# Patient Record
Sex: FEMALE | Race: WHITE | Employment: OTHER | URBAN - METROPOLITAN AREA
[De-identification: names, ages, dates, MRNs, and addresses within clinical notes are randomized per-mention and may not be internally consistent; named-entity substitution may affect disease eponyms.]

---

## 2017-01-13 ENCOUNTER — HOSPITAL ENCOUNTER (OUTPATIENT)
Dept: OTHER | Age: 82
Discharge: HOME | End: 2017-01-13
Attending: INTERNAL MEDICINE | Admitting: INTERNAL MEDICINE

## 2017-01-13 LAB
ANION GAP SERPL CALCULATED.3IONS-SCNC: 15 MMOL/L (ref 3–16)
BACTERIA: ABNORMAL /HPF
BILIRUBIN URINE: NEGATIVE
BLOOD, URINE: NEGATIVE
BUN BLDV-MCNC: 21 MG/DL (ref 7–20)
CALCIUM SERPL-MCNC: 9.4 MG/DL (ref 8.3–10.6)
CHLORIDE BLD-SCNC: 100 MMOL/L (ref 99–110)
CLARITY: CLEAR
CO2: 25 MMOL/L (ref 21–32)
COLOR: YELLOW
CREAT SERPL-MCNC: 1.2 MG/DL (ref 0.6–1.2)
CREATININE URINE: 27.8 MG/DL (ref 28–259)
EPITHELIAL CELLS, UA: ABNORMAL /HPF
GFR AFRICAN AMERICAN: 52
GFR NON-AFRICAN AMERICAN: 43
GLUCOSE BLD-MCNC: 104 MG/DL (ref 70–99)
GLUCOSE URINE: NEGATIVE MG/DL
HCT VFR BLD CALC: 35.3 % (ref 36–48)
HEMOGLOBIN: 11.8 G/DL (ref 12–16)
KETONES, URINE: NEGATIVE MG/DL
LEUKOCYTE ESTERASE, URINE: NEGATIVE
MCH RBC QN AUTO: 31.7 PG (ref 26–34)
MCHC RBC AUTO-ENTMCNC: 33.3 G/DL (ref 31–36)
MCV RBC AUTO: 95.4 FL (ref 80–100)
MICROSCOPIC EXAMINATION: ABNORMAL
NITRITE, URINE: NEGATIVE
PARATHYROID HORMONE INTACT: 54.1 PG/ML (ref 14–72)
PDW BLD-RTO: 14.1 % (ref 12.4–15.4)
PH UA: 5.5
PHOSPHORUS: 3.3 MG/DL (ref 2.5–4.9)
PLATELET # BLD: 170 K/UL (ref 135–450)
PMV BLD AUTO: 7.9 FL (ref 5–10.5)
POTASSIUM SERPL-SCNC: 4.6 MMOL/L (ref 3.5–5.1)
PROTEIN PROTEIN: 5 MG/DL
PROTEIN UA: NEGATIVE MG/DL
RBC # BLD: 3.7 M/UL (ref 4–5.2)
RBC UA: ABNORMAL /HPF (ref 0–2)
SODIUM BLD-SCNC: 140 MMOL/L (ref 136–145)
SPECIFIC GRAVITY UA: 1.01
UROBILINOGEN, URINE: 0.2 E.U./DL
VITAMIN D 25-HYDROXY: 18.4 NG/ML
WBC # BLD: 5.4 K/UL (ref 4–11)
WBC UA: ABNORMAL /HPF (ref 0–5)

## 2017-03-21 RX ORDER — ACETAMINOPHEN 160 MG
2000 TABLET,DISINTEGRATING ORAL DAILY
COMMUNITY

## 2017-03-21 RX ORDER — M-VIT,TX,IRON,MINS/CALC/FOLIC 27MG-0.4MG
1 TABLET ORAL DAILY
Status: ON HOLD | COMMUNITY
End: 2018-09-13

## 2017-03-21 RX ORDER — OMEPRAZOLE 20 MG/1
20 CAPSULE, DELAYED RELEASE ORAL NIGHTLY
Status: ON HOLD | COMMUNITY
End: 2019-04-11 | Stop reason: HOSPADM

## 2017-03-29 ENCOUNTER — HOSPITAL ENCOUNTER (OUTPATIENT)
Dept: SURGERY | Age: 82
Discharge: HOME | End: 2017-03-29
Attending: UROLOGY | Admitting: UROLOGY

## 2017-03-29 VITALS
WEIGHT: 180 LBS | SYSTOLIC BLOOD PRESSURE: 147 MMHG | BODY MASS INDEX: 35.34 KG/M2 | TEMPERATURE: 97.6 F | RESPIRATION RATE: 16 BRPM | HEIGHT: 60 IN | DIASTOLIC BLOOD PRESSURE: 84 MMHG | HEART RATE: 66 BPM | OXYGEN SATURATION: 99 %

## 2017-03-29 LAB
ANION GAP SERPL CALCULATED.3IONS-SCNC: 14 MMOL/L (ref 3–16)
BUN BLDV-MCNC: 17 MG/DL (ref 7–20)
CALCIUM SERPL-MCNC: 9.6 MG/DL (ref 8.3–10.6)
CHLORIDE BLD-SCNC: 100 MMOL/L (ref 99–110)
CO2: 26 MMOL/L (ref 21–32)
CREAT SERPL-MCNC: 1 MG/DL (ref 0.6–1.2)
GFR AFRICAN AMERICAN: >60
GFR NON-AFRICAN AMERICAN: 53
GLUCOSE BLD-MCNC: 112 MG/DL (ref 70–99)
GLUCOSE BLD-MCNC: 118 MG/DL (ref 70–99)
PERFORMED ON: ABNORMAL
POTASSIUM SERPL-SCNC: 4 MMOL/L (ref 3.5–5.1)
SODIUM BLD-SCNC: 140 MMOL/L (ref 136–145)

## 2017-03-29 PROCEDURE — 93010 ELECTROCARDIOGRAM REPORT: CPT | Performed by: INTERNAL MEDICINE

## 2017-03-29 RX ORDER — SODIUM CHLORIDE 0.9 % (FLUSH) 0.9 %
10 SYRINGE (ML) INJECTION EVERY 12 HOURS SCHEDULED
Status: DISCONTINUED | OUTPATIENT
Start: 2017-03-29 | End: 2017-03-30 | Stop reason: HOSPADM

## 2017-03-29 RX ORDER — PROMETHAZINE HYDROCHLORIDE 25 MG/ML
6.25 INJECTION, SOLUTION INTRAMUSCULAR; INTRAVENOUS
Status: DISCONTINUED | OUTPATIENT
Start: 2017-03-29 | End: 2017-03-30 | Stop reason: HOSPADM

## 2017-03-29 RX ORDER — SULFAMETHOXAZOLE AND TRIMETHOPRIM 400; 80 MG/1; MG/1
1 TABLET ORAL 2 TIMES DAILY
Qty: 10 TABLET | Refills: 0 | Status: SHIPPED | OUTPATIENT
Start: 2017-03-29 | End: 2017-04-03

## 2017-03-29 RX ORDER — ONDANSETRON 2 MG/ML
4 INJECTION INTRAMUSCULAR; INTRAVENOUS PRN
Status: DISCONTINUED | OUTPATIENT
Start: 2017-03-29 | End: 2017-03-30 | Stop reason: HOSPADM

## 2017-03-29 RX ORDER — SODIUM CHLORIDE, SODIUM LACTATE, POTASSIUM CHLORIDE, CALCIUM CHLORIDE 600; 310; 30; 20 MG/100ML; MG/100ML; MG/100ML; MG/100ML
INJECTION, SOLUTION INTRAVENOUS CONTINUOUS
Status: DISCONTINUED | OUTPATIENT
Start: 2017-03-29 | End: 2017-03-30 | Stop reason: HOSPADM

## 2017-03-29 RX ORDER — HYDRALAZINE HYDROCHLORIDE 20 MG/ML
5 INJECTION INTRAMUSCULAR; INTRAVENOUS EVERY 10 MIN PRN
Status: DISCONTINUED | OUTPATIENT
Start: 2017-03-29 | End: 2017-03-30 | Stop reason: HOSPADM

## 2017-03-29 RX ORDER — OXYCODONE HYDROCHLORIDE AND ACETAMINOPHEN 5; 325 MG/1; MG/1
1 TABLET ORAL PRN
Status: COMPLETED | OUTPATIENT
Start: 2017-03-29 | End: 2017-03-29

## 2017-03-29 RX ORDER — SODIUM CHLORIDE 0.9 % (FLUSH) 0.9 %
10 SYRINGE (ML) INJECTION PRN
Status: DISCONTINUED | OUTPATIENT
Start: 2017-03-29 | End: 2017-03-30 | Stop reason: HOSPADM

## 2017-03-29 RX ORDER — OXYCODONE HYDROCHLORIDE AND ACETAMINOPHEN 5; 325 MG/1; MG/1
2 TABLET ORAL PRN
Status: COMPLETED | OUTPATIENT
Start: 2017-03-29 | End: 2017-03-29

## 2017-03-29 RX ORDER — HYDROMORPHONE HCL 110MG/55ML
0.5 PATIENT CONTROLLED ANALGESIA SYRINGE INTRAVENOUS EVERY 5 MIN PRN
Status: DISCONTINUED | OUTPATIENT
Start: 2017-03-29 | End: 2017-03-30 | Stop reason: HOSPADM

## 2017-03-29 RX ORDER — HYDROMORPHONE HCL 110MG/55ML
0.25 PATIENT CONTROLLED ANALGESIA SYRINGE INTRAVENOUS EVERY 5 MIN PRN
Status: DISCONTINUED | OUTPATIENT
Start: 2017-03-29 | End: 2017-03-30 | Stop reason: HOSPADM

## 2017-03-29 RX ORDER — MEPERIDINE HYDROCHLORIDE 25 MG/ML
12.5 INJECTION INTRAMUSCULAR; INTRAVENOUS; SUBCUTANEOUS EVERY 5 MIN PRN
Status: DISCONTINUED | OUTPATIENT
Start: 2017-03-29 | End: 2017-03-30 | Stop reason: HOSPADM

## 2017-03-29 RX ORDER — LIDOCAINE HYDROCHLORIDE 10 MG/ML
1 INJECTION, SOLUTION EPIDURAL; INFILTRATION; INTRACAUDAL; PERINEURAL
Status: COMPLETED | OUTPATIENT
Start: 2017-03-29 | End: 2017-03-29

## 2017-03-29 RX ORDER — DIPHENHYDRAMINE HYDROCHLORIDE 50 MG/ML
12.5 INJECTION INTRAMUSCULAR; INTRAVENOUS
Status: ACTIVE | OUTPATIENT
Start: 2017-03-29 | End: 2017-03-29

## 2017-03-29 RX ORDER — LABETALOL HYDROCHLORIDE 5 MG/ML
5 INJECTION, SOLUTION INTRAVENOUS EVERY 10 MIN PRN
Status: DISCONTINUED | OUTPATIENT
Start: 2017-03-29 | End: 2017-03-30 | Stop reason: HOSPADM

## 2017-03-29 RX ADMIN — SODIUM CHLORIDE, SODIUM LACTATE, POTASSIUM CHLORIDE, CALCIUM CHLORIDE: 600; 310; 30; 20 INJECTION, SOLUTION INTRAVENOUS at 14:27

## 2017-03-29 RX ADMIN — LIDOCAINE HYDROCHLORIDE 1 ML: 10 INJECTION, SOLUTION EPIDURAL; INFILTRATION; INTRACAUDAL; PERINEURAL at 14:27

## 2017-03-29 RX ADMIN — OXYCODONE HYDROCHLORIDE AND ACETAMINOPHEN 1 TABLET: 5; 325 TABLET ORAL at 15:52

## 2017-03-29 ASSESSMENT — PAIN SCALES - GENERAL
PAINLEVEL_OUTOF10: 8
PAINLEVEL_OUTOF10: 8

## 2017-03-29 ASSESSMENT — PAIN DESCRIPTION - DESCRIPTORS: DESCRIPTORS: CONSTANT

## 2017-03-29 ASSESSMENT — PAIN DESCRIPTION - PAIN TYPE: TYPE: CHRONIC PAIN

## 2017-03-30 LAB
EKG ATRIAL RATE: 71 BPM
EKG DIAGNOSIS: NORMAL
EKG P AXIS: 40 DEGREES
EKG P-R INTERVAL: 334 MS
EKG Q-T INTERVAL: 410 MS
EKG QRS DURATION: 92 MS
EKG QTC CALCULATION (BAZETT): 445 MS
EKG R AXIS: -19 DEGREES
EKG T AXIS: 14 DEGREES
EKG VENTRICULAR RATE: 71 BPM

## 2017-11-03 ENCOUNTER — HOSPITAL ENCOUNTER (OUTPATIENT)
Dept: ULTRASOUND IMAGING | Age: 82
Discharge: HOME | End: 2017-11-03
Attending: NURSE PRACTITIONER | Admitting: NURSE PRACTITIONER

## 2017-11-03 DIAGNOSIS — R10.9 ABDOMINAL PAIN: ICD-10-CM

## 2017-11-03 DIAGNOSIS — R10.9 ABDOMINAL PAIN, UNSPECIFIED ABDOMINAL LOCATION: ICD-10-CM

## 2017-11-16 ENCOUNTER — HOSPITAL ENCOUNTER (OUTPATIENT)
Dept: MAMMOGRAPHY | Age: 82
Discharge: HOME | End: 2017-11-16
Attending: FAMILY MEDICINE | Admitting: FAMILY MEDICINE

## 2017-11-16 DIAGNOSIS — Z12.31 SCREENING MAMMOGRAM, ENCOUNTER FOR: ICD-10-CM

## 2017-11-21 ENCOUNTER — HOSPITAL ENCOUNTER (OUTPATIENT)
Dept: OTHER | Age: 82
Discharge: HOME | End: 2017-11-21
Attending: INTERNAL MEDICINE | Admitting: INTERNAL MEDICINE

## 2017-11-22 LAB
ALBUMIN SERPL-MCNC: 4.5 G/DL (ref 3.4–5)
ALP BLD-CCNC: 54 U/L (ref 40–129)
ALT SERPL-CCNC: 14 U/L (ref 10–40)
AST SERPL-CCNC: 23 U/L (ref 15–37)
BILIRUB SERPL-MCNC: 0.3 MG/DL (ref 0–1)
BILIRUBIN DIRECT: <0.2 MG/DL (ref 0–0.3)
BILIRUBIN, INDIRECT: NORMAL MG/DL (ref 0–1)
TOTAL PROTEIN: 7.1 G/DL (ref 6.4–8.2)

## 2017-11-28 RX ORDER — SODIUM CHLORIDE, SODIUM LACTATE, POTASSIUM CHLORIDE, CALCIUM CHLORIDE 600; 310; 30; 20 MG/100ML; MG/100ML; MG/100ML; MG/100ML
INJECTION, SOLUTION INTRAVENOUS CONTINUOUS
Status: CANCELLED | OUTPATIENT
Start: 2017-11-28

## 2017-11-29 ENCOUNTER — HOSPITAL ENCOUNTER (OUTPATIENT)
Dept: SURGERY | Age: 82
Discharge: HOME | End: 2017-11-29

## 2017-12-02 ENCOUNTER — HOSPITAL ENCOUNTER (OUTPATIENT)
Dept: MRI IMAGING | Age: 82
Discharge: HOME | End: 2017-12-02
Attending: INTERNAL MEDICINE | Admitting: INTERNAL MEDICINE

## 2017-12-02 DIAGNOSIS — R10.11 ABDOMINAL PAIN, RIGHT UPPER QUADRANT: ICD-10-CM

## 2017-12-02 DIAGNOSIS — K83.8 DILATED BILE DUCT: ICD-10-CM

## 2017-12-02 DIAGNOSIS — R10.11 RIGHT UPPER QUADRANT PAIN: ICD-10-CM

## 2018-06-04 VITALS — BODY MASS INDEX: 34.36 KG/M2 | WEIGHT: 175 LBS | HEIGHT: 60 IN

## 2018-06-04 RX ORDER — PREDNISOLONE ACETATE 10 MG/ML
1 SUSPENSION/ DROPS OPHTHALMIC CONTINUOUS
Status: CANCELLED | OUTPATIENT
Start: 2018-06-07

## 2018-06-04 RX ORDER — TOBRAMYCIN AND DEXAMETHASONE 3; 1 MG/ML; MG/ML
1 SUSPENSION/ DROPS OPHTHALMIC CONTINUOUS
Status: CANCELLED | OUTPATIENT
Start: 2018-06-07

## 2018-06-06 RX ORDER — LIDOCAINE HYDROCHLORIDE 10 MG/ML
1 INJECTION, SOLUTION EPIDURAL; INFILTRATION; INTRACAUDAL; PERINEURAL
Status: CANCELLED | OUTPATIENT
Start: 2018-06-06 | End: 2018-06-06

## 2018-06-06 RX ORDER — SODIUM CHLORIDE 0.9 % (FLUSH) 0.9 %
10 SYRINGE (ML) INJECTION PRN
Status: CANCELLED | OUTPATIENT
Start: 2018-06-06

## 2018-06-06 RX ORDER — SODIUM CHLORIDE, SODIUM LACTATE, POTASSIUM CHLORIDE, CALCIUM CHLORIDE 600; 310; 30; 20 MG/100ML; MG/100ML; MG/100ML; MG/100ML
INJECTION, SOLUTION INTRAVENOUS CONTINUOUS
Status: CANCELLED | OUTPATIENT
Start: 2018-06-06

## 2018-06-06 RX ORDER — SODIUM CHLORIDE 0.9 % (FLUSH) 0.9 %
10 SYRINGE (ML) INJECTION EVERY 12 HOURS SCHEDULED
Status: CANCELLED | OUTPATIENT
Start: 2018-06-06

## 2018-06-07 ENCOUNTER — HOSPITAL ENCOUNTER (OUTPATIENT)
Dept: SURGERY | Age: 83
Discharge: HOME | End: 2018-06-07
Attending: OPHTHALMOLOGY | Admitting: OPHTHALMOLOGY

## 2018-06-28 ENCOUNTER — HOSPITAL ENCOUNTER (OUTPATIENT)
Dept: SURGERY | Age: 83
Discharge: HOME | End: 2018-06-28
Attending: OPHTHALMOLOGY | Admitting: OPHTHALMOLOGY

## 2018-06-28 VITALS
TEMPERATURE: 97.6 F | SYSTOLIC BLOOD PRESSURE: 139 MMHG | RESPIRATION RATE: 16 BRPM | DIASTOLIC BLOOD PRESSURE: 77 MMHG | WEIGHT: 175 LBS | HEIGHT: 60 IN | BODY MASS INDEX: 34.36 KG/M2 | HEART RATE: 70 BPM | OXYGEN SATURATION: 95 %

## 2018-06-28 LAB
GLUCOSE BLD-MCNC: 90 MG/DL (ref 70–99)
PERFORMED ON: NORMAL

## 2018-06-28 RX ORDER — LIDOCAINE HYDROCHLORIDE 10 MG/ML
1 INJECTION, SOLUTION EPIDURAL; INFILTRATION; INTRACAUDAL; PERINEURAL
Status: ACTIVE | OUTPATIENT
Start: 2018-06-28 | End: 2018-06-28

## 2018-06-28 RX ORDER — SODIUM CHLORIDE, SODIUM LACTATE, POTASSIUM CHLORIDE, CALCIUM CHLORIDE 600; 310; 30; 20 MG/100ML; MG/100ML; MG/100ML; MG/100ML
INJECTION, SOLUTION INTRAVENOUS CONTINUOUS
Status: DISCONTINUED | OUTPATIENT
Start: 2018-06-28 | End: 2018-06-29 | Stop reason: HOSPADM

## 2018-06-28 RX ORDER — MEPERIDINE HYDROCHLORIDE 25 MG/ML
12.5 INJECTION INTRAMUSCULAR; INTRAVENOUS; SUBCUTANEOUS EVERY 5 MIN PRN
Status: DISCONTINUED | OUTPATIENT
Start: 2018-06-28 | End: 2018-06-29 | Stop reason: HOSPADM

## 2018-06-28 RX ORDER — TOBRAMYCIN AND DEXAMETHASONE 3; 1 MG/ML; MG/ML
1 SUSPENSION/ DROPS OPHTHALMIC CONTINUOUS
Status: DISCONTINUED | OUTPATIENT
Start: 2018-06-28 | End: 2018-06-29 | Stop reason: HOSPADM

## 2018-06-28 RX ORDER — SODIUM CHLORIDE 0.9 % (FLUSH) 0.9 %
10 SYRINGE (ML) INJECTION PRN
Status: DISCONTINUED | OUTPATIENT
Start: 2018-06-28 | End: 2018-06-29 | Stop reason: HOSPADM

## 2018-06-28 RX ORDER — ONDANSETRON 2 MG/ML
4 INJECTION INTRAMUSCULAR; INTRAVENOUS
Status: ACTIVE | OUTPATIENT
Start: 2018-06-28 | End: 2018-06-28

## 2018-06-28 RX ORDER — DIPHENHYDRAMINE HYDROCHLORIDE 50 MG/ML
12.5 INJECTION INTRAMUSCULAR; INTRAVENOUS
Status: ACTIVE | OUTPATIENT
Start: 2018-06-28 | End: 2018-06-28

## 2018-06-28 RX ORDER — OXYCODONE HYDROCHLORIDE AND ACETAMINOPHEN 5; 325 MG/1; MG/1
1 TABLET ORAL PRN
Status: ACTIVE | OUTPATIENT
Start: 2018-06-28 | End: 2018-06-28

## 2018-06-28 RX ORDER — LIDOCAINE HYDROCHLORIDE 10 MG/ML
1 INJECTION, SOLUTION EPIDURAL; INFILTRATION; INTRACAUDAL; PERINEURAL
Status: DISCONTINUED | OUTPATIENT
Start: 2018-06-28 | End: 2018-06-28 | Stop reason: SDUPTHER

## 2018-06-28 RX ORDER — HYDRALAZINE HYDROCHLORIDE 20 MG/ML
5 INJECTION INTRAMUSCULAR; INTRAVENOUS EVERY 10 MIN PRN
Status: DISCONTINUED | OUTPATIENT
Start: 2018-06-28 | End: 2018-06-29 | Stop reason: HOSPADM

## 2018-06-28 RX ORDER — SODIUM CHLORIDE 0.9 % (FLUSH) 0.9 %
10 SYRINGE (ML) INJECTION EVERY 12 HOURS SCHEDULED
Status: DISCONTINUED | OUTPATIENT
Start: 2018-06-28 | End: 2018-06-29 | Stop reason: HOSPADM

## 2018-06-28 RX ORDER — LABETALOL HYDROCHLORIDE 5 MG/ML
5 INJECTION, SOLUTION INTRAVENOUS EVERY 10 MIN PRN
Status: DISCONTINUED | OUTPATIENT
Start: 2018-06-28 | End: 2018-06-29 | Stop reason: HOSPADM

## 2018-06-28 RX ORDER — SODIUM CHLORIDE 0.9 % (FLUSH) 0.9 %
10 SYRINGE (ML) INJECTION EVERY 12 HOURS SCHEDULED
Status: DISCONTINUED | OUTPATIENT
Start: 2018-06-28 | End: 2018-06-28 | Stop reason: SDUPTHER

## 2018-06-28 RX ORDER — OXYCODONE HYDROCHLORIDE AND ACETAMINOPHEN 5; 325 MG/1; MG/1
2 TABLET ORAL PRN
Status: ACTIVE | OUTPATIENT
Start: 2018-06-28 | End: 2018-06-28

## 2018-06-28 RX ORDER — PROMETHAZINE HYDROCHLORIDE 25 MG/ML
6.25 INJECTION, SOLUTION INTRAMUSCULAR; INTRAVENOUS
Status: ACTIVE | OUTPATIENT
Start: 2018-06-28 | End: 2018-06-28

## 2018-06-28 RX ORDER — SODIUM CHLORIDE 0.9 % (FLUSH) 0.9 %
10 SYRINGE (ML) INJECTION PRN
Status: DISCONTINUED | OUTPATIENT
Start: 2018-06-28 | End: 2018-06-28 | Stop reason: SDUPTHER

## 2018-06-28 RX ORDER — PREDNISOLONE ACETATE 10 MG/ML
1 SUSPENSION/ DROPS OPHTHALMIC CONTINUOUS
Status: DISCONTINUED | OUTPATIENT
Start: 2018-06-28 | End: 2018-06-29 | Stop reason: HOSPADM

## 2018-06-28 ASSESSMENT — PAIN - FUNCTIONAL ASSESSMENT: PAIN_FUNCTIONAL_ASSESSMENT: 0-10

## 2018-08-21 ENCOUNTER — HOSPITAL ENCOUNTER (OUTPATIENT)
Dept: CT IMAGING | Age: 83
Discharge: HOME | End: 2018-08-21
Payer: MEDICARE

## 2018-08-21 DIAGNOSIS — S06.0X0A CONCUSSION WITHOUT LOSS OF CONSCIOUSNESS, INITIAL ENCOUNTER: ICD-10-CM

## 2018-08-21 PROCEDURE — 70450 CT HEAD/BRAIN W/O DYE: CPT

## 2018-08-28 ENCOUNTER — ANESTHESIA EVENT (OUTPATIENT)
Dept: OPERATING ROOM | Age: 83
End: 2018-08-28
Payer: MEDICARE

## 2018-08-28 RX ORDER — NITROFURANTOIN 25; 75 MG/1; MG/1
100 CAPSULE ORAL 2 TIMES DAILY
Status: ON HOLD | COMMUNITY
End: 2019-02-27 | Stop reason: HOSPADM

## 2018-08-28 NOTE — ANESTHESIA PRE PROCEDURE
summary reviewed and Nursing notes reviewed no history of anesthetic complications:   Airway: Mallampati: II  TM distance: >3 FB      Dental:          Pulmonary:Negative Pulmonary ROS and normal exam                               Cardiovascular:Negative CV ROS    (+) CAD:,                   Neuro/Psych:   Negative Neuro/Psych ROS              GI/Hepatic/Renal: Neg GI/Hepatic/Renal ROS  (+) hepatitis: A, liver disease:,      (-) hiatal hernia and GERD       Endo/Other: Negative Endo/Other ROS   (+) Diabetes, . Abdominal:           Vascular:                                     NPO > MN    2010 ECHO  Summary-  1.  Left ventricular ejection fraction is estimated to be 55-60%. 2.  The left ventricle is normal in size, wall thickness is normal,  and there is overall normal global and regional systolic function. 3.  Mild tricuspid regurgitation. 4.  Mild mitral regurgitation. 5.  Mildly elevated pulmonary artery systolic pressure.        Anesthesia Plan      MAC     ASA 3     (I discussed with the patient the risks and benefits of PIV, MAC, IV Narcotics, PACU. All questions were answered the patient agrees with the plan)        Anesthetic plan and risks discussed with patient. Plan discussed with CRNA.                   Torri Poole MD   8/28/2018

## 2018-08-28 NOTE — PROGRESS NOTES
1.  Do not eat or drink anything after 12 midnight prior to surgery. This includes no water, chewing gum or mints. 2.  Take the following pills with a small sip of water on the morning of surgery   3. Aspirin, Ibuprofen, Advil, Naproxen, Vitamin E and other Anti-inflammatory products should be stopped for 5 days before surgery or as directed by your physician. 4.  Check with your doctor regarding stopping Plavix, Coumadin, Lovenox, Fragmin or other blood thinners. 5.  Do not smoke and do not drink alcoholic beverages 24 hours prior to surgery. This includes NA Beer. 6.  You may brush your teeth and gargle the morning of surgery. DO NOT SWALLOW WATER.  7.  You MUST make arrangements for a responsible adult to take you home after your surgery. You will not be allowed to leave alone or drive yourself home. It is strongly suggested someone stay with you the first 24 hours. Your surgery will be cancelled if you do not have a ride home. 8.  A parent/legal guardian must accompany a child scheduled for surgery and plan to stay at the hospital until the child is discharged. Please do not bring other children with you. 9.  Please wear simple, loose fitting clothing to the hospital.  Andriette Maillard not bring valuables ( money, credit cards, checkbooks, etc.)  Do not wear any makeup (including no eye makeup) or nail polish on your fingers or toes. 10.  Do not wear any jewelry or piercing on the day of surgery. All body piercing jewelry must be removed. 11.  If you have dentures, they will be removed before going to the OR; we will provide you a container. If you wear contact lenses or glasses, they will be removed; please bring a case for them. 12.  Please see your family doctor/pediatrician for a history & physical and/or concerning medications. Bring any test results/reports from your physician's office the day of surgery. 15.  Remember to bring Blood Bank Bracelet to the hospital on the day of surgery.   14.  If

## 2018-08-29 RX ORDER — LIDOCAINE HYDROCHLORIDE 10 MG/ML
1 INJECTION, SOLUTION EPIDURAL; INFILTRATION; INTRACAUDAL; PERINEURAL
Status: CANCELLED | OUTPATIENT
Start: 2018-08-29 | End: 2018-08-29

## 2018-08-29 RX ORDER — SODIUM CHLORIDE 0.9 % (FLUSH) 0.9 %
10 SYRINGE (ML) INJECTION EVERY 12 HOURS SCHEDULED
Status: CANCELLED | OUTPATIENT
Start: 2018-08-29

## 2018-08-29 RX ORDER — SODIUM CHLORIDE 0.9 % (FLUSH) 0.9 %
10 SYRINGE (ML) INJECTION PRN
Status: CANCELLED | OUTPATIENT
Start: 2018-08-29

## 2018-08-30 ENCOUNTER — ANESTHESIA (OUTPATIENT)
Dept: OPERATING ROOM | Age: 83
End: 2018-08-30
Payer: MEDICARE

## 2018-09-10 RX ORDER — TOBRAMYCIN AND DEXAMETHASONE 3; 1 MG/ML; MG/ML
1 SUSPENSION/ DROPS OPHTHALMIC CONTINUOUS
Status: CANCELLED | OUTPATIENT
Start: 2018-09-13

## 2018-09-10 RX ORDER — PREDNISOLONE ACETATE 10 MG/ML
1 SUSPENSION/ DROPS OPHTHALMIC CONTINUOUS
Status: CANCELLED | OUTPATIENT
Start: 2018-09-13

## 2018-09-10 NOTE — PROGRESS NOTES
PRE OP INSTRUCTION SHEET   1. Do not eat or drink anything after 12 midnight  prior to surgery. This includes no water, chewing gum or mints. 2. Take the following pills will a small sip of water (see MAR)                                        3. Aspirin, Ibuprofen, Advil, Naproxen, Vitamin E, fish oil and other Anti-inflammatory products should be stopped for 5 days before surgery or as directed by your physician. 4. Check with your Doctor regarding stopping Plavix, Coumadin, Lovenox, Fragmin or other blood thinners   5. Do not smoke, and do not drink any alcoholic beverages 24 hours prior to surgery. This includes NA Beer. 6. You may brush your teeth and gargle the morning of surgery. DO NOT SWALLOW WATER   7. You MUST make arrangements for a responsible adult to take you home after your surgery. You will not be allowed to leave alone or drive yourself home. It is strongly suggested someone stay with you the first 24 hrs. Your surgery will be cancelled if you do not have a ride home. 8. A parent/legal guardian must accompany a child scheduled for surgery and plan to stay at the hospital until the child is discharged. Please do not bring other children with you. 9. Please wear simple, loose fitting clothing to the hospital.  Forest Graff not bring valuables (money, credit cards, checkbooks, etc.) Do not wear any makeup (including no eye makeup) or nail polish on your fingers or toes. 10. DO NOT wear any jewelry or piercings on day of surgery. All body piercing jewelry must be removed. 11. If you have dentures,glasses, or contacts they will be removed before going to the OR; we will provide you a container. 12. Please see your family doctor/and cardiologist for a history & physical and/or concerning medications. Bring any test results/reports from your physician's office. Have history and labs faxed to [phone].  Remember to bring Blood Bank bracelet on the day of surgery. 14. If you have a Living Will and Durable Power of  for Healthcare, please bring in a copy. 13. Notify your Surgeon if you develop any illness between now and surgery  time, cough, cold, fever, sore throat, nausea, vomiting, etc.  Please notify your surgeon if you experience dizziness, shortness of breath or blurred vision between now & the time of your surgery   16. DO NOT shave your operative site 96 hours prior to surgery. For face & neck surgery, men may use an electric razor 48 hours prior to surgery. 17. Shower with _x__Antibacterial soap (x_chlorhexidine for total joint  Pt's) shower two times before surgery.(the morning of and the night before. 18. To provide excellent care visitors will be limited to one in the room at any given time.   Please call pre admission testing if you any further questions [phone] or [phone]

## 2018-09-12 RX ORDER — SODIUM CHLORIDE, SODIUM LACTATE, POTASSIUM CHLORIDE, CALCIUM CHLORIDE 600; 310; 30; 20 MG/100ML; MG/100ML; MG/100ML; MG/100ML
INJECTION, SOLUTION INTRAVENOUS CONTINUOUS
Status: CANCELLED | OUTPATIENT
Start: 2018-09-12

## 2018-09-12 NOTE — ANESTHESIA PRE PROCEDURE
Historical Provider, MD   docusate sodium (COLACE) 100 MG capsule Take 300 mg by mouth 2 times daily    Yes Historical Provider, MD       Current medications:    No current facility-administered medications for this encounter. Current Outpatient Prescriptions   Medication Sig Dispense Refill    oxyCODONE-acetaminophen (PERCOCET)  MG per tablet Take 1 tablet by mouth 3 times daily for 30 days. . 90 tablet 0    [START ON 10/5/2018] oxyCODONE-acetaminophen (PERCOCET)  MG per tablet Take 1 tablet by mouth 3 times daily for 30 days. Nadene Moritz Date: 10/5/18 90 tablet 0    nitrofurantoin, macrocrystal-monohydrate, (MACROBID) 100 MG capsule Take 100 mg by mouth 2 times daily      omeprazole (PRILOSEC) 20 MG delayed release capsule Take 20 mg by mouth nightly      Multiple Vitamins-Minerals (THERAPEUTIC MULTIVITAMIN-MINERALS) tablet Take 1 tablet by mouth daily      Cholecalciferol (VITAMIN D3) 2000 UNITS CAPS Take 2,000 Units by mouth daily      ondansetron (ZOFRAN ODT) 4 MG disintegrating tablet Take 1 tablet by mouth every 8 hours as needed for Nausea or Vomiting 20 tablet 0    diphenoxylate-atropine (LOMOTIL) 2.5-0.025 MG per tablet Take 1 tablet by mouth 4 times daily as needed. [place] NEXIUM 40 MG capsule 40 mg 2 times daily       furosemide (LASIX) 20 MG tablet Take 20 mg by mouth as needed (MAINLY WHEN TRAVELS FOR LEG SWELLING).  Potassium Chloride ER 8 MEQ CPCR Take 1 tablet by mouth as needed (ONLY WHEN TAKES LASIX MAINLY WHEN TRAVELS).  docusate sodium (COLACE) 100 MG capsule Take 300 mg by mouth 2 times daily          Allergies:     Allergies   Allergen Reactions    Morphine Other (See Comments)     MIGRAINE HEADACHE    Ciprofloxacin Hcl Nausea And Vomiting       Problem List:    Patient Active Problem List   Diagnosis Code    Spinal stenosis, lumbar region, without neurogenic claudication M48.061    Spinal stenosis of thoracic region M48.04    Scoliosis (and kyphoscoliosis), idiopathic M41.20    Displacement of thoracic intervertebral disc without myelopathy M51.24    Displacement of lumbar intervertebral disc without myelopathy M51.26    Thoracic or lumbosacral neuritis or radiculitis, unspecified [id number]    Disc displacement, lumbar M51.26    Disc displacement, thoracic M51.24    Scoliosis M41.9    Lumbar stenosis M48.061    Thoracic stenosis M48.04    Cervicalgia M54.2       Past Medical History:        Diagnosis Date    Arthritis     OSTEOARTHRITIS BACK    CAD (coronary artery disease)     PT HAS AV BLOCK AND MVP    Cancer (HCC)     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    Cystocele     Diabetes mellitus (HCC)     type  2 diet controlled    Hepatitis A 1953    History of blood transfusion     hiatal hernia surgery    Hyperlipidemia     PVC (premature ventricular contraction)     Rectocele     Reflux     Scoliosis        Past Surgical History:        Procedure Laterality Date    APPENDECTOMY      BLADDER SUSPENSION      3 SURGERIES/ANTERIOR AND POSTERIOR REPAIR    BREAST SURGERY      RIGHT LUMPECTOMY AND SEVERAL BIOPSIES ON BOTH BREAST    BUNIONECTOMY  7/26/12    BUNIONECTOMY BILATERAL  FEET    CARDIAC CATHETERIZATION      AV block    CHOLECYSTECTOMY      COLONOSCOPY  2008    normal    CYSTOSCOPY  03/29/2017    U-dil    DILATION AND CURETTAGE OF UTERUS      SUNCTION    FOOT SURGERY      bilat foot surgeries    HERNIA REPAIR  6 YRS AGO    HIATAL HERNIA REPAIR- WIRED  RIBS    HYSTERECTOMY      VAGINAL    JOINT REPLACEMENT Left     TOTAL KNEE REPLACEMENT LEFT    OTHER SURGICAL HISTORY Left 09/22/14    removal rib wire    OTHER SURGICAL HISTORY Right 06/28/2018    PHACO EMULSIFICATION OF CATARACT WITH INTRAOCULAR LENS implant right eye    SHOULDER SURGERY Right 7/26/12    [place] DEPOSIT TAKEN OFF RIGHT SHOULDER    UPPER GASTROINTESTINAL ENDOSCOPY  11/2/12       Social History:    Social History   Substance Use Topics    Smoking status: Former Smoker     Packs/day: 0.50     Years: 15.00     Quit date: 1/20/2015    Smokeless tobacco: Former User      Comment: quit 2001    Alcohol use Yes      Comment: rare wine                                 Counseling given: Not Answered      Vital Signs (Current):   Vitals:    08/28/18 1043 09/10/18 1454   Weight: 170 lb (77.1 kg) 170 lb (77.1 kg)   Height: 5' (1.524 m) 5' (1.524 m)                                              BP Readings from Last 3 Encounters:   06/28/18 139/77   12/16/17 (!) 145/66   03/29/17 (!) 147/84       NPO Status:                                                                                 BMI:   Wt Readings from Last 3 Encounters:   06/26/18 175 lb (79.4 kg)   06/04/18 175 lb (79.4 kg)   12/16/17 180 lb (81.6 kg)     Body mass index is 33.2 kg/m². CBC:   Lab Results   Component Value Date    WBC 6.0 12/16/2017    RBC 3.83 12/16/2017    HGB 12.3 12/16/2017    HCT 36.2 12/16/2017    MCV 94.5 12/16/2017    RDW 13.3 12/16/2017     12/16/2017       CMP:   Lab Results   Component Value Date     12/16/2017    K 4.3 12/16/2017    CL 98 12/16/2017    CO2 23 12/16/2017    BUN 21 12/16/2017    CREATININE 1.2 12/16/2017    GFRAA 52 12/16/2017    GFRAA 56 01/10/2010    AGRATIO 1.5 12/16/2017    LABGLOM 43 12/16/2017    GLUCOSE 107 12/16/2017    PROT 7.1 12/16/2017    PROT 6.1 01/13/2010    CALCIUM 9.4 12/16/2017    BILITOT 0.5 12/16/2017    ALKPHOS 56 12/16/2017    AST 26 12/16/2017    ALT 16 12/16/2017       POC Tests: No results for input(s): POCGLU, POCNA, POCK, POCCL, POCBUN, POCHEMO, POCHCT in the last 72 hours.     Coags:   Lab Results   Component Value Date    PROTIME 11.5 08/05/2010    INR 1.03 08/05/2010    APTT 30.9 08/05/2010       HCG (If Applicable): No results found for: PREGTESTUR, PREGSERUM, HCG, HCGQUANT     ABGs: No results found for: PHART, PO2ART, DLV6CDP, ASM2FKF, BEART, Y1ZOEITZ     Type & Screen (If Applicable):  No results found for: LABABO,

## 2018-09-13 ENCOUNTER — HOSPITAL ENCOUNTER (OUTPATIENT)
Age: 83
Setting detail: OUTPATIENT SURGERY
Discharge: HOME | End: 2018-09-13
Attending: OPHTHALMOLOGY | Admitting: OPHTHALMOLOGY
Payer: MEDICARE

## 2018-09-13 VITALS
HEIGHT: 60 IN | DIASTOLIC BLOOD PRESSURE: 76 MMHG | BODY MASS INDEX: 33.38 KG/M2 | OXYGEN SATURATION: 99 % | TEMPERATURE: 98.4 F | SYSTOLIC BLOOD PRESSURE: 146 MMHG | WEIGHT: 170 LBS | HEART RATE: 71 BPM | RESPIRATION RATE: 16 BRPM

## 2018-09-13 VITALS — DIASTOLIC BLOOD PRESSURE: 72 MMHG | SYSTOLIC BLOOD PRESSURE: 150 MMHG | OXYGEN SATURATION: 100 %

## 2018-09-13 PROCEDURE — 2500000003 HC RX 250 WO HCPCS: Performed by: NURSE ANESTHETIST, CERTIFIED REGISTERED

## 2018-09-13 PROCEDURE — 3600000013 HC SURGERY LEVEL 3 ADDTL 15MIN: Performed by: OPHTHALMOLOGY

## 2018-09-13 PROCEDURE — V2632 POST CHMBR INTRAOCULAR LENS: HCPCS | Performed by: OPHTHALMOLOGY

## 2018-09-13 PROCEDURE — 3700000000 HC ANESTHESIA ATTENDED CARE: Performed by: OPHTHALMOLOGY

## 2018-09-13 PROCEDURE — 2580000003 HC RX 258: Performed by: NURSE ANESTHETIST, CERTIFIED REGISTERED

## 2018-09-13 PROCEDURE — 3700000001 HC ADD 15 MINUTES (ANESTHESIA): Performed by: OPHTHALMOLOGY

## 2018-09-13 PROCEDURE — 6370000000 HC RX 637 (ALT 250 FOR IP): Performed by: OPHTHALMOLOGY

## 2018-09-13 PROCEDURE — 2500000003 HC RX 250 WO HCPCS: Performed by: ANESTHESIOLOGY

## 2018-09-13 PROCEDURE — 2580000003 HC RX 258: Performed by: OPHTHALMOLOGY

## 2018-09-13 PROCEDURE — 7100000011 HC PHASE II RECOVERY - ADDTL 15 MIN: Performed by: OPHTHALMOLOGY

## 2018-09-13 PROCEDURE — 2709999900 HC NON-CHARGEABLE SUPPLY: Performed by: OPHTHALMOLOGY

## 2018-09-13 PROCEDURE — 3600000003 HC SURGERY LEVEL 3 BASE: Performed by: OPHTHALMOLOGY

## 2018-09-13 PROCEDURE — 6360000002 HC RX W HCPCS: Performed by: NURSE ANESTHETIST, CERTIFIED REGISTERED

## 2018-09-13 PROCEDURE — 7100000010 HC PHASE II RECOVERY - FIRST 15 MIN: Performed by: OPHTHALMOLOGY

## 2018-09-13 PROCEDURE — 6360000002 HC RX W HCPCS: Performed by: OPHTHALMOLOGY

## 2018-09-13 PROCEDURE — 2500000003 HC RX 250 WO HCPCS: Performed by: OPHTHALMOLOGY

## 2018-09-13 DEVICE — ACRYSOF(R) SINGLE-PIECE ACRYLIC FOLDABLE IOL,UV-ABSORBING POSTERIOR CHAMBER LENS (IOL/PC),13.0MM LENGTH, 6.0MM BICONVEX OPTIC, PLANARHAPTICS.
Type: IMPLANTABLE DEVICE | Site: EYE | Status: FUNCTIONAL
Brand: ACRYSOF®

## 2018-09-13 RX ORDER — TETRACAINE HYDROCHLORIDE 5 MG/ML
SOLUTION OPHTHALMIC PRN
Status: DISCONTINUED | OUTPATIENT
Start: 2018-09-13 | End: 2018-09-13 | Stop reason: HOSPADM

## 2018-09-13 RX ORDER — MEPERIDINE HYDROCHLORIDE 25 MG/ML
12.5 INJECTION INTRAMUSCULAR; INTRAVENOUS; SUBCUTANEOUS EVERY 5 MIN PRN
Status: DISCONTINUED | OUTPATIENT
Start: 2018-09-13 | End: 2018-09-13 | Stop reason: HOSPADM

## 2018-09-13 RX ORDER — SODIUM CHLORIDE, SODIUM LACTATE, POTASSIUM CHLORIDE, CALCIUM CHLORIDE 600; 310; 30; 20 MG/100ML; MG/100ML; MG/100ML; MG/100ML
INJECTION, SOLUTION INTRAVENOUS CONTINUOUS
Status: DISCONTINUED | OUTPATIENT
Start: 2018-09-13 | End: 2018-09-13 | Stop reason: HOSPADM

## 2018-09-13 RX ORDER — LIDOCAINE HYDROCHLORIDE 20 MG/ML
INJECTION, SOLUTION EPIDURAL; INFILTRATION; INTRACAUDAL; PERINEURAL PRN
Status: DISCONTINUED | OUTPATIENT
Start: 2018-09-13 | End: 2018-09-13 | Stop reason: SDUPTHER

## 2018-09-13 RX ORDER — ONDANSETRON 2 MG/ML
4 INJECTION INTRAMUSCULAR; INTRAVENOUS PRN
Status: DISCONTINUED | OUTPATIENT
Start: 2018-09-13 | End: 2018-09-13 | Stop reason: HOSPADM

## 2018-09-13 RX ORDER — SODIUM CHLORIDE, POTASSIUM CHLORIDE, CALCIUM CHLORIDE, MAGNESIUM CHLORIDE, SODIUM ACETATE, AND SODIUM CITRATE 6.4; .75; .48; .3; 3.9; 1.7 MG/ML; MG/ML; MG/ML; MG/ML; MG/ML; MG/ML
SOLUTION IRRIGATION PRN
Status: DISCONTINUED | OUTPATIENT
Start: 2018-09-13 | End: 2018-09-13 | Stop reason: HOSPADM

## 2018-09-13 RX ORDER — OXYCODONE HYDROCHLORIDE AND ACETAMINOPHEN 5; 325 MG/1; MG/1
2 TABLET ORAL PRN
Status: DISCONTINUED | OUTPATIENT
Start: 2018-09-13 | End: 2018-09-13 | Stop reason: HOSPADM

## 2018-09-13 RX ORDER — SODIUM CHLORIDE 0.9 % (FLUSH) 0.9 %
10 SYRINGE (ML) INJECTION PRN
Status: DISCONTINUED | OUTPATIENT
Start: 2018-09-13 | End: 2018-09-13 | Stop reason: HOSPADM

## 2018-09-13 RX ORDER — PREDNISOLONE ACETATE 10 MG/ML
1 SUSPENSION/ DROPS OPHTHALMIC SEE ADMIN INSTRUCTIONS
Status: DISCONTINUED | OUTPATIENT
Start: 2018-09-13 | End: 2018-09-13 | Stop reason: HOSPADM

## 2018-09-13 RX ORDER — TOBRAMYCIN AND DEXAMETHASONE 3; 1 MG/ML; MG/ML
SUSPENSION/ DROPS OPHTHALMIC PRN
Status: DISCONTINUED | OUTPATIENT
Start: 2018-09-13 | End: 2018-09-13 | Stop reason: HOSPADM

## 2018-09-13 RX ORDER — LIDOCAINE HYDROCHLORIDE 10 MG/ML
0.3 INJECTION, SOLUTION EPIDURAL; INFILTRATION; INTRACAUDAL; PERINEURAL
Status: COMPLETED | OUTPATIENT
Start: 2018-09-13 | End: 2018-09-13

## 2018-09-13 RX ORDER — PILOCARPINE HYDROCHLORIDE 20 MG/ML
SOLUTION/ DROPS OPHTHALMIC PRN
Status: DISCONTINUED | OUTPATIENT
Start: 2018-09-13 | End: 2018-09-13 | Stop reason: HOSPADM

## 2018-09-13 RX ORDER — HYDROMORPHONE HCL 110MG/55ML
0.25 PATIENT CONTROLLED ANALGESIA SYRINGE INTRAVENOUS EVERY 5 MIN PRN
Status: DISCONTINUED | OUTPATIENT
Start: 2018-09-13 | End: 2018-09-13 | Stop reason: HOSPADM

## 2018-09-13 RX ORDER — SODIUM CHLORIDE 0.9 % (FLUSH) 0.9 %
10 SYRINGE (ML) INJECTION EVERY 12 HOURS SCHEDULED
Status: DISCONTINUED | OUTPATIENT
Start: 2018-09-13 | End: 2018-09-13 | Stop reason: HOSPADM

## 2018-09-13 RX ORDER — PREDNISOLONE ACETATE 10 MG/ML
SUSPENSION/ DROPS OPHTHALMIC PRN
Status: DISCONTINUED | OUTPATIENT
Start: 2018-09-13 | End: 2018-09-13 | Stop reason: HOSPADM

## 2018-09-13 RX ORDER — OXYCODONE HYDROCHLORIDE AND ACETAMINOPHEN 5; 325 MG/1; MG/1
1 TABLET ORAL PRN
Status: DISCONTINUED | OUTPATIENT
Start: 2018-09-13 | End: 2018-09-13 | Stop reason: HOSPADM

## 2018-09-13 RX ORDER — PROPOFOL 10 MG/ML
INJECTION, EMULSION INTRAVENOUS PRN
Status: DISCONTINUED | OUTPATIENT
Start: 2018-09-13 | End: 2018-09-13 | Stop reason: SDUPTHER

## 2018-09-13 RX ORDER — HYDRALAZINE HYDROCHLORIDE 20 MG/ML
5 INJECTION INTRAMUSCULAR; INTRAVENOUS EVERY 10 MIN PRN
Status: DISCONTINUED | OUTPATIENT
Start: 2018-09-13 | End: 2018-09-13 | Stop reason: HOSPADM

## 2018-09-13 RX ORDER — SODIUM CHLORIDE, SODIUM LACTATE, POTASSIUM CHLORIDE, CALCIUM CHLORIDE 600; 310; 30; 20 MG/100ML; MG/100ML; MG/100ML; MG/100ML
INJECTION, SOLUTION INTRAVENOUS CONTINUOUS PRN
Status: DISCONTINUED | OUTPATIENT
Start: 2018-09-13 | End: 2018-09-13 | Stop reason: SDUPTHER

## 2018-09-13 RX ORDER — TOBRAMYCIN AND DEXAMETHASONE 3; 1 MG/ML; MG/ML
1 SUSPENSION/ DROPS OPHTHALMIC SEE ADMIN INSTRUCTIONS
Status: DISCONTINUED | OUTPATIENT
Start: 2018-09-13 | End: 2018-09-13 | Stop reason: HOSPADM

## 2018-09-13 RX ORDER — DIPHENHYDRAMINE HYDROCHLORIDE 50 MG/ML
12.5 INJECTION INTRAMUSCULAR; INTRAVENOUS
Status: DISCONTINUED | OUTPATIENT
Start: 2018-09-13 | End: 2018-09-13 | Stop reason: HOSPADM

## 2018-09-13 RX ORDER — LABETALOL HYDROCHLORIDE 5 MG/ML
5 INJECTION, SOLUTION INTRAVENOUS EVERY 10 MIN PRN
Status: DISCONTINUED | OUTPATIENT
Start: 2018-09-13 | End: 2018-09-13 | Stop reason: HOSPADM

## 2018-09-13 RX ORDER — PROMETHAZINE HYDROCHLORIDE 25 MG/ML
6.25 INJECTION, SOLUTION INTRAMUSCULAR; INTRAVENOUS
Status: DISCONTINUED | OUTPATIENT
Start: 2018-09-13 | End: 2018-09-13 | Stop reason: HOSPADM

## 2018-09-13 RX ORDER — HYDROMORPHONE HCL 110MG/55ML
0.5 PATIENT CONTROLLED ANALGESIA SYRINGE INTRAVENOUS EVERY 5 MIN PRN
Status: DISCONTINUED | OUTPATIENT
Start: 2018-09-13 | End: 2018-09-13 | Stop reason: HOSPADM

## 2018-09-13 RX ADMIN — PROPOFOL 60 MG: 10 INJECTION, EMULSION INTRAVENOUS at 07:59

## 2018-09-13 RX ADMIN — BROMFENAC SODIUM 1 DROP: 0.7 SOLUTION/ DROPS OPHTHALMIC at 06:40

## 2018-09-13 RX ADMIN — SODIUM CHLORIDE, POTASSIUM CHLORIDE, SODIUM LACTATE AND CALCIUM CHLORIDE: 600; 310; 30; 20 INJECTION, SOLUTION INTRAVENOUS at 07:59

## 2018-09-13 RX ADMIN — Medication 0.3 ML: at 06:49

## 2018-09-13 RX ADMIN — Medication 0.3 ML: at 06:40

## 2018-09-13 RX ADMIN — Medication 0.3 ML: at 07:02

## 2018-09-13 RX ADMIN — LIDOCAINE HYDROCHLORIDE 0.3 ML: 10 INJECTION, SOLUTION EPIDURAL; INFILTRATION; INTRACAUDAL; PERINEURAL at 06:47

## 2018-09-13 RX ADMIN — SODIUM CHLORIDE, POTASSIUM CHLORIDE, SODIUM LACTATE AND CALCIUM CHLORIDE: 600; 310; 30; 20 INJECTION, SOLUTION INTRAVENOUS at 06:47

## 2018-09-13 RX ADMIN — LIDOCAINE HYDROCHLORIDE 20 MG: 20 INJECTION, SOLUTION EPIDURAL; INFILTRATION; INTRACAUDAL; PERINEURAL at 07:59

## 2018-09-13 ASSESSMENT — PULMONARY FUNCTION TESTS
PIF_VALUE: 0
PIF_VALUE: 1
PIF_VALUE: 0

## 2018-09-13 ASSESSMENT — PAIN - FUNCTIONAL ASSESSMENT: PAIN_FUNCTIONAL_ASSESSMENT: 0-10

## 2018-09-13 ASSESSMENT — PAIN SCALES - GENERAL
PAINLEVEL_OUTOF10: 0
PAINLEVEL_OUTOF10: 0

## 2018-09-13 NOTE — H&P
Surgical Service History and Physical    CHIEF COMPLAINT:  Decreased Vision and Glare    Reason for Admission:  Cataract Surgery    History Obtained From:  Patient    HISTORY OF PRESENT ILLNESS:        Past Medical History:        Diagnosis Date    Arthritis     OSTEOARTHRITIS BACK    CAD (coronary artery disease)     PT HAS AV BLOCK AND MVP    Cancer (HCC)     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    Cystocele     Diabetes mellitus ([address]     type  2 diet controlled    Hepatitis A 1953    History of blood transfusion     hiatal hernia surgery    Hyperlipidemia     PVC (premature ventricular contraction)     Rectocele     Reflux     Scoliosis        Past Surgical History:        Procedure Laterality Date    APPENDECTOMY      BLADDER SUSPENSION      3 SURGERIES/ANTERIOR AND POSTERIOR REPAIR    BREAST SURGERY      RIGHT LUMPECTOMY AND SEVERAL BIOPSIES ON BOTH BREAST    BUNIONECTOMY  7/26/12    BUNIONECTOMY BILATERAL  FEET    CARDIAC CATHETERIZATION      AV block    CHOLECYSTECTOMY      COLONOSCOPY  2008    normal    CYSTOSCOPY  03/29/2017    U-dil    DILATION AND CURETTAGE OF UTERUS      SUNCTION    FOOT SURGERY      bilat foot surgeries    HERNIA REPAIR  6 YRS AGO    HIATAL HERNIA REPAIR- WIRED  RIBS    HYSTERECTOMY      VAGINAL    JOINT REPLACEMENT Left     TOTAL KNEE REPLACEMENT LEFT    OTHER SURGICAL HISTORY Left 09/22/14    removal rib wire    OTHER SURGICAL HISTORY Right 06/28/2018    PHACO EMULSIFICATION OF CATARACT WITH INTRAOCULAR LENS implant right eye    SHOULDER SURGERY Right 7/26/12    [place] DEPOSIT TAKEN OFF RIGHT SHOULDER    UPPER GASTROINTESTINAL ENDOSCOPY  11/2/12       Allergies:  Morphine and Ciprofloxacin hcl    Prior to Admission medications    Medication Sig Start Date End Date Taking? Authorizing Provider   oxyCODONE-acetaminophen (PERCOCET)  MG per tablet Take 1 tablet by mouth 3 times daily for 30 days. . 9/6/18 10/6/18 Yes DENIZ Win - CRISTOPHER oxyCODONE-acetaminophen (PERCOCET)  MG per tablet Take 1 tablet by mouth 3 times daily for 30 days. Jose Parekh Date: 10/5/18 10/5/18 11/4/18 Yes DENIZ Hauser - CNP   nitrofurantoin, macrocrystal-monohydrate, (MACROBID) 100 MG capsule Take 100 mg by mouth 2 times daily   Yes Historical Provider, MD   omeprazole (PRILOSEC) 20 MG delayed release capsule Take 20 mg by mouth nightly   Yes Historical Provider, MD   Cholecalciferol (VITAMIN D3) 2000 UNITS CAPS Take 2,000 Units by mouth daily   Yes Historical Provider, MD   ondansetron (ZOFRAN ODT) 4 MG disintegrating tablet Take 1 tablet by mouth every 8 hours as needed for Nausea or Vomiting 11/19/15  Yes Zandra Oglesby MD   diphenoxylate-atropine (LOMOTIL) 2.5-0.025 MG per tablet Take 1 tablet by mouth 4 times daily as needed. . 14  Yes Historical Provider, MD   NEXIUM 40 MG capsule 40 mg 2 times daily  14  Yes Historical Provider, MD   furosemide (LASIX) 20 MG tablet Take 20 mg by mouth as needed (MAINLY WHEN TRAVELS FOR LEG SWELLING). Yes Historical Provider, MD   Potassium Chloride ER 8 MEQ CPCR Take 1 tablet by mouth as needed (ONLY WHEN TAKES LASIX MAINLY WHEN TRAVELS). Yes Historical Provider, MD   docusate sodium (COLACE) 100 MG capsule Take 300 mg by mouth 2 times daily    Yes Historical Provider, MD         REVIEW OF SYSTEMS:    CONSTITUTIONAL:  negative  EYES: negative  HEENT:  negative  RESPIRATORY:  negative  CARDIOVASCULAR:  negative  MUSCULOSKELETAL:  negative  NEUROLOGICAL:  negative    PHYSICAL EXAM:    VITALS:  /71   Pulse 68   Temp 98.4 °F (36.9 °C) (Temporal)   Resp 16   Ht 5' (1.524 m)   Wt 170 lb (77.1 kg)   SpO2 95%   BMI 33.20 kg/m²   TEMPERATURE:  Current - Temp: 98.4 °F (36.9 °C);  Max - Temp  Av.4 °F (36.9 °C)  Min: 98.4 °F (36.9 °C)  Max: 98.4 °F (36.9 °C)  RESPIRATIONS RANGE: Resp  Av  Min: 16  Max: 16  PULSE RANGE: Pulse  Av  Min: 68  Max: 68  BLOOD PRESSURE RANGE:  Systolic (24hrs), Av , Min:133 , BIB:494   ; Diastolic (72NAB), NHZ:40, Min:71, Max:71    CONSTITUTIONAL:  awake, alert, cooperative, no apparent distress, and appears stated age  EYES:  Lids and lashes normal, pupils equal, round and reactive to light, extra ocular muscles intact, sclera clear, conjunctiva normal  ENT:  Normocephalic, without obvious abnormality, atramatic, sinuses nontender on palpation, external ears without lesions, oral pharynx with moist mucus membranes, tonsils without erythema or exudates, gums normal and good dentition. NECK:  Supple, symmetrical, trachea midline, no adenopathy, thyroid symmetric, not enlarged and no tenderness, skin normal  LUNGS:  No increased work of breathing, good air exchange, clear to auscultation bilaterally, no crackles or wheezing  CARDIOVASCULAR:  Normal apical impulse, regular rate and rhythm, normal S1 and S2, no S3 or S4, and no murmur noted  ABDOMEN:  No scars, normal bowel sounds, soft, non-distended, non-tender, no masses palpated, no hepatosplenomegally  MUSCULOSKELETAL:  There is no redness, warmth, or swelling of the joints. Full range of motion noted. Motor strength is 5 out of 5 all extremities bilaterally. Tone is normal.  NEUROLOGIC:  Awake, alert, oriented to name, place and time. Cranial nerves II-XII are grossly intact. Motor is 5 out of 5 bilaterally. Cerebellar finger to nose, heel to shin intact. Sensory is intact.   Babinski down going, Romberg negative, and gait is normal.                 Meryle Dienes

## 2018-09-13 NOTE — OP NOTE
phacoemilsification/aspiration device. This was performed in a bimanual/CHOP technique. The remaining cortex was then removed using the irrigation/aspiration device. The posterior capsule was polished using the I/A device with CAP/VAC settings. The capsular bag was reformed using [employer]. The previously selected [place] Acrysof +20.0 diopter SA60AT intraocular lens implant was then inserted using the cartridge system. It was spun in place using a Kuglen hook. It was centered and found to be in good, stable position. The remaining viscoelastic was then removed using the I/A device. The corneal incision was then hydrated using sterile BSS on a 30 gauge cannula. It was checked and found to be water-tight. Pilocarpine 2%, followed by Tobradex ophthalmic solutions were then instilled into the operative eye. The wire lid speculum was removed, and a postoperative protective shield was applied. The patient was then transferred to the postanesthesia recovery unit in good stable condition. The patient tolerated the procedure well without complications. A postoperative instruction sheet was given, and the patient will follow up with Dr. Jaylan Andrade office as scheduled.       EBL: none    Specimen: none

## 2018-09-13 NOTE — PROGRESS NOTES
Tamera Lynch over discharge instructions with patient and family. Eye taped by Ninfa Salcedo patient told to un tape eye at 10:00 AM and start drops per MD order. Pt aware to start eyes drop per scheduled time 1 drop every 2 hours while awake and to continue in AM until seen by Dr Minoo Abreu.

## 2018-09-13 NOTE — ANESTHESIA POSTPROCEDURE EVALUATION
Department of Anesthesiology  Postprocedure Note    Patient: Tomasz Guillen  MRN: [id number]  YOB: 1932  Date of evaluation: 9/13/2018  Time:  11:18 AM     Procedure Summary     Date:  09/13/18 Room / Location:  [place] [address] / [facility] OR    Anesthesia Start:  0752 Anesthesia Stop:  0830    Procedure:  PHACO EMULSIFICATION OF CATARACT WITH INTRAOCULAR LENS IMPLANT LEFT EYE (Left Eye) Diagnosis:  (CATARACT LEFT EYE)    Surgeon:  Citlalli Diaz MD Responsible Provider:  Ap Calloway MD    Anesthesia Type:  general ASA Status:  3          Anesthesia Type: general    Jorge Phase I: Jorge Score: 10    Jorge Phase II: Jorge Score: 10    Last vitals: Reviewed and per EMR flowsheets.        Anesthesia Post Evaluation    Comments: Postoperative Anesthesia Note    Name:    Tomasz Guillen  MRN:      [id number]    Patient Vitals in the past 12 hrs:  09/13/18 0844, BP:(!) 146/76, Pulse:71, Resp:16, SpO2:99 %  09/13/18 0835, BP:(!) 156/81, Temp:98.4 °F (36.9 °C), Temp src:Temporal, Pulse:70, Resp:16, SpO2:100 %  09/13/18 0626, BP:133/71, Temp:98.4 °F (36.9 °C), Temp src:Temporal, Pulse:68, Resp:16, SpO2:95 %     LABS:    CBC  Lab Results       Component                Value               Date/Time                  WBC                      6.0                 12/16/2017 12:20 PM        HGB                      12.3                12/16/2017 12:20 PM        HCT                      36.2                12/16/2017 12:20 PM        PLT                      172                 12/16/2017 12:20 PM   RENAL  Lab Results       Component                Value               Date/Time                  NA                       136                 12/16/2017 12:20 PM        K                        4.3                 12/16/2017 12:20 PM        CL                       98 (L)              12/16/2017 12:20 PM        CO2                      23                  12/16/2017 12:20 PM        BUN                      21 (H)

## 2018-12-06 ENCOUNTER — HOSPITAL ENCOUNTER (OUTPATIENT)
Age: 83
Discharge: HOME | End: 2018-12-06
Payer: MEDICARE

## 2018-12-06 LAB
APTT: 34.6 SEC (ref 26–36)
HCT VFR BLD CALC: 37.3 % (ref 36–48)
HEMOGLOBIN: 12.6 G/DL (ref 12–16)
INR BLD: 1.01 (ref 0.86–1.14)
MCH RBC QN AUTO: 33 PG (ref 26–34)
MCHC RBC AUTO-ENTMCNC: 33.6 G/DL (ref 31–36)
MCV RBC AUTO: 98.1 FL (ref 80–100)
PDW BLD-RTO: 13.6 % (ref 12.4–15.4)
PLATELET # BLD: 178 K/UL (ref 135–450)
PMV BLD AUTO: 7.2 FL (ref 5–10.5)
PROTHROMBIN TIME: 11.5 SEC (ref 9.8–13)
RBC # BLD: 3.8 M/UL (ref 4–5.2)
WBC # BLD: 6.2 K/UL (ref 4–11)

## 2018-12-06 PROCEDURE — 85730 THROMBOPLASTIN TIME PARTIAL: CPT

## 2018-12-06 PROCEDURE — 85027 COMPLETE CBC AUTOMATED: CPT

## 2018-12-06 PROCEDURE — 36415 COLL VENOUS BLD VENIPUNCTURE: CPT

## 2018-12-06 PROCEDURE — 85610 PROTHROMBIN TIME: CPT

## 2019-02-20 ENCOUNTER — APPOINTMENT (OUTPATIENT)
Dept: GENERAL RADIOLOGY | Age: 84
End: 2019-02-20
Payer: MEDICARE

## 2019-02-20 ENCOUNTER — HOSPITAL ENCOUNTER (EMERGENCY)
Age: 84
Discharge: HOME | End: 2019-02-20
Payer: MEDICARE

## 2019-02-20 VITALS
TEMPERATURE: 98 F | HEIGHT: 60 IN | WEIGHT: 160 LBS | OXYGEN SATURATION: 94 % | HEART RATE: 91 BPM | RESPIRATION RATE: 18 BRPM | SYSTOLIC BLOOD PRESSURE: 169 MMHG | BODY MASS INDEX: 31.41 KG/M2 | DIASTOLIC BLOOD PRESSURE: 85 MMHG
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
## 2019-02-20 DIAGNOSIS — S42.295A OTHER CLOSED NONDISPLACED FRACTURE OF PROXIMAL END OF LEFT HUMERUS, INITIAL ENCOUNTER: Primary | ICD-10-CM

## 2019-02-20 PROCEDURE — L3660 SO 8 AB RSTR CAN/WEB PRE OTS: HCPCS

## 2019-02-20 PROCEDURE — 73030 X-RAY EXAM OF SHOULDER: CPT

## 2019-02-20 PROCEDURE — 96372 THER/PROPH/DIAG INJ SC/IM: CPT

## 2019-02-20 PROCEDURE — 73060 X-RAY EXAM OF HUMERUS: CPT

## 2019-02-20 PROCEDURE — 6360000002 HC RX W HCPCS: Performed by: NURSE PRACTITIONER

## 2019-02-20 PROCEDURE — 99284 EMERGENCY DEPT VISIT MOD MDM: CPT

## 2019-02-20 RX ORDER — TRAMADOL HYDROCHLORIDE 50 MG/1
50 TABLET ORAL EVERY 6 HOURS PRN
Qty: 12 TABLET | Refills: 0 | Status: SHIPPED | OUTPATIENT
Start: 2019-02-20 | End: 2019-02-20 | Stop reason: CLARIF

## 2019-02-20 RX ORDER — HYDROMORPHONE HCL 110MG/55ML
1 PATIENT CONTROLLED ANALGESIA SYRINGE INTRAVENOUS ONCE
Status: COMPLETED | OUTPATIENT
Start: 2019-02-20 | End: 2019-02-20

## 2019-02-20 RX ADMIN — HYDROMORPHONE HYDROCHLORIDE 1 MG: 2 INJECTION INTRAMUSCULAR; INTRAVENOUS; SUBCUTANEOUS at 14:02

## 2019-02-20 ASSESSMENT — PAIN DESCRIPTION - PAIN TYPE: TYPE: ACUTE PAIN

## 2019-02-20 ASSESSMENT — ENCOUNTER SYMPTOMS
SHORTNESS OF BREATH: 0
ABDOMINAL PAIN: 0

## 2019-02-20 ASSESSMENT — PAIN DESCRIPTION - ORIENTATION: ORIENTATION: LEFT

## 2019-02-20 ASSESSMENT — PAIN DESCRIPTION - DESCRIPTORS: DESCRIPTORS: DULL

## 2019-02-20 ASSESSMENT — PAIN DESCRIPTION - LOCATION: LOCATION: ARM

## 2019-02-20 ASSESSMENT — PAIN SCALES - GENERAL: PAINLEVEL_OUTOF10: 9

## 2019-02-22 ENCOUNTER — OFFICE VISIT (OUTPATIENT)
Dept: ORTHOPEDIC SURGERY | Age: 84
End: 2019-02-22
Payer: MEDICARE

## 2019-02-22 VITALS — WEIGHT: 160.05 LBS | BODY MASS INDEX: 31.42 KG/M2 | HEIGHT: 60 IN

## 2019-02-22 DIAGNOSIS — M54.5 LOW BACK PAIN, UNSPECIFIED BACK PAIN LATERALITY, UNSPECIFIED CHRONICITY, WITH SCIATICA PRESENCE UNSPECIFIED: Primary | ICD-10-CM

## 2019-02-22 DIAGNOSIS — S42.202A CLOSED FRACTURE OF PROXIMAL END OF LEFT HUMERUS, UNSPECIFIED FRACTURE MORPHOLOGY, INITIAL ENCOUNTER: ICD-10-CM

## 2019-02-22 DIAGNOSIS — M25.512 LEFT SHOULDER PAIN, UNSPECIFIED CHRONICITY: ICD-10-CM

## 2019-02-22 PROCEDURE — 1101F PT FALLS ASSESS-DOCD LE1/YR: CPT | Performed by: ORTHOPAEDIC SURGERY

## 2019-02-22 PROCEDURE — G8484 FLU IMMUNIZE NO ADMIN: HCPCS | Performed by: ORTHOPAEDIC SURGERY

## 2019-02-22 PROCEDURE — G8427 DOCREV CUR MEDS BY ELIG CLIN: HCPCS | Performed by: ORTHOPAEDIC SURGERY

## 2019-02-22 PROCEDURE — 1090F PRES/ABSN URINE INCON ASSESS: CPT | Performed by: ORTHOPAEDIC SURGERY

## 2019-02-22 PROCEDURE — 4040F PNEUMOC VAC/ADMIN/RCVD: CPT | Performed by: ORTHOPAEDIC SURGERY

## 2019-02-22 PROCEDURE — 99213 OFFICE O/P EST LOW 20 MIN: CPT | Performed by: ORTHOPAEDIC SURGERY

## 2019-02-22 PROCEDURE — L3660 SO 8 AB RSTR CAN/WEB PRE OTS: HCPCS | Performed by: ORTHOPAEDIC SURGERY

## 2019-02-22 PROCEDURE — 1036F TOBACCO NON-USER: CPT | Performed by: ORTHOPAEDIC SURGERY

## 2019-02-22 PROCEDURE — G8417 CALC BMI ABV UP PARAM F/U: HCPCS | Performed by: ORTHOPAEDIC SURGERY

## 2019-02-22 PROCEDURE — 1123F ACP DISCUSS/DSCN MKR DOCD: CPT | Performed by: ORTHOPAEDIC SURGERY

## 2019-02-24 ENCOUNTER — HOSPITAL ENCOUNTER (INPATIENT)
Age: 84
LOS: 3 days | Discharge: NURSING, REHAB OR LONG-TERM CARE | DRG: 683 | End: 2019-02-27
Attending: EMERGENCY MEDICINE | Admitting: INTERNAL MEDICINE
Payer: MEDICARE

## 2019-02-24 ENCOUNTER — APPOINTMENT (OUTPATIENT)
Dept: GENERAL RADIOLOGY | Age: 84
DRG: 683 | End: 2019-02-24
Payer: MEDICARE

## 2019-02-24 DIAGNOSIS — M51.24 DISC DISPLACEMENT, THORACIC: ICD-10-CM

## 2019-02-24 DIAGNOSIS — R09.02 HYPOXIA: ICD-10-CM

## 2019-02-24 DIAGNOSIS — N39.0 URINARY TRACT INFECTION WITHOUT HEMATURIA, SITE UNSPECIFIED: ICD-10-CM

## 2019-02-24 DIAGNOSIS — R11.0 NAUSEA: ICD-10-CM

## 2019-02-24 DIAGNOSIS — E86.0 DEHYDRATION: Primary | ICD-10-CM

## 2019-02-24 DIAGNOSIS — I44.0 FIRST DEGREE AV BLOCK: ICD-10-CM

## 2019-02-24 DIAGNOSIS — J18.9 PNEUMONIA DUE TO ORGANISM: ICD-10-CM

## 2019-02-24 LAB
A/G RATIO: 1.4 (ref 1.1–2.2)
ALBUMIN SERPL-MCNC: 3.9 G/DL (ref 3.4–5)
ALP BLD-CCNC: 74 U/L (ref 40–129)
ALT SERPL-CCNC: 28 U/L (ref 10–40)
ANION GAP SERPL CALCULATED.3IONS-SCNC: 10 MMOL/L (ref 3–16)
ANION GAP SERPL CALCULATED.3IONS-SCNC: 11 MMOL/L (ref 3–16)
AST SERPL-CCNC: 36 U/L (ref 15–37)
BACTERIA: ABNORMAL /HPF
BASE EXCESS VENOUS: -0.8 MMOL/L (ref -3–3)
BASOPHILS ABSOLUTE: 0 K/UL (ref 0–0.2)
BASOPHILS RELATIVE PERCENT: 0.7 %
BILIRUB SERPL-MCNC: 0.5 MG/DL (ref 0–1)
BILIRUBIN URINE: NEGATIVE
BLOOD, URINE: ABNORMAL
BUN BLDV-MCNC: 33 MG/DL (ref 7–20)
BUN BLDV-MCNC: 35 MG/DL (ref 7–20)
CALCIUM SERPL-MCNC: 7.9 MG/DL (ref 8.3–10.6)
CALCIUM SERPL-MCNC: 8.9 MG/DL (ref 8.3–10.6)
CARBOXYHEMOGLOBIN: 4.1 % (ref 0–1.5)
CHLORIDE BLD-SCNC: 100 MMOL/L (ref 99–110)
CHLORIDE BLD-SCNC: 105 MMOL/L (ref 99–110)
CLARITY: CLEAR
CO2: 21 MMOL/L (ref 21–32)
CO2: 24 MMOL/L (ref 21–32)
COLOR: YELLOW
CREAT SERPL-MCNC: 1.6 MG/DL (ref 0.6–1.2)
CREAT SERPL-MCNC: 1.9 MG/DL (ref 0.6–1.2)
EOSINOPHILS ABSOLUTE: 0.1 K/UL (ref 0–0.6)
EOSINOPHILS RELATIVE PERCENT: 1.8 %
EPITHELIAL CELLS, UA: ABNORMAL /HPF
GFR AFRICAN AMERICAN: 30
GFR AFRICAN AMERICAN: 37
GFR NON-AFRICAN AMERICAN: 25
GFR NON-AFRICAN AMERICAN: 31
GLOBULIN: 2.8 G/DL
GLUCOSE BLD-MCNC: 109 MG/DL (ref 70–99)
GLUCOSE BLD-MCNC: 121 MG/DL (ref 70–99)
GLUCOSE URINE: NEGATIVE MG/DL
HCO3 VENOUS: 24.8 MMOL/L (ref 23–29)
HCT VFR BLD CALC: 30 % (ref 36–48)
HEMOGLOBIN: 10.2 G/DL (ref 12–16)
KETONES, URINE: NEGATIVE MG/DL
LACTIC ACID: 0.8 MMOL/L (ref 0.4–2)
LEUKOCYTE ESTERASE, URINE: ABNORMAL
LIPASE: 9 U/L (ref 13–60)
LYMPHOCYTES ABSOLUTE: 1.6 K/UL (ref 1–5.1)
LYMPHOCYTES RELATIVE PERCENT: 28.9 %
MCH RBC QN AUTO: 33.4 PG (ref 26–34)
MCHC RBC AUTO-ENTMCNC: 33.8 G/DL (ref 31–36)
MCV RBC AUTO: 98.8 FL (ref 80–100)
METHEMOGLOBIN VENOUS: 0.2 %
MICROSCOPIC EXAMINATION: YES
MONOCYTES ABSOLUTE: 0.3 K/UL (ref 0–1.3)
MONOCYTES RELATIVE PERCENT: 6.2 %
NEUTROPHILS ABSOLUTE: 3.4 K/UL (ref 1.7–7.7)
NEUTROPHILS RELATIVE PERCENT: 62.4 %
NITRITE, URINE: NEGATIVE
O2 CONTENT, VEN: 10 VOL %
O2 SAT, VEN: 67 %
O2 THERAPY: ABNORMAL
PCO2, VEN: 45.2 MMHG (ref 40–50)
PDW BLD-RTO: 13.8 % (ref 12.4–15.4)
PH UA: 5.5
PH VENOUS: 7.36 (ref 7.35–7.45)
PLATELET # BLD: 123 K/UL (ref 135–450)
PMV BLD AUTO: 7.4 FL (ref 5–10.5)
PO2, VEN: 36.3 MMHG (ref 25–40)
POTASSIUM SERPL-SCNC: 4.5 MMOL/L (ref 3.5–5.1)
POTASSIUM SERPL-SCNC: 4.6 MMOL/L (ref 3.5–5.1)
PROTEIN UA: NEGATIVE MG/DL
RBC # BLD: 3.04 M/UL (ref 4–5.2)
RBC UA: ABNORMAL /HPF (ref 0–2)
SODIUM BLD-SCNC: 135 MMOL/L (ref 136–145)
SODIUM BLD-SCNC: 136 MMOL/L (ref 136–145)
SPECIFIC GRAVITY UA: 1.02
TCO2 CALC VENOUS: 26 MMOL/L
TOTAL PROTEIN: 6.7 G/DL (ref 6.4–8.2)
TROPONIN: <0.01 NG/ML
URINE REFLEX TO CULTURE: YES
URINE TYPE: ABNORMAL
UROBILINOGEN, URINE: 0.2 E.U./DL
WBC # BLD: 5.5 K/UL (ref 4–11)
WBC UA: ABNORMAL /HPF (ref 0–5)

## 2019-02-24 PROCEDURE — 96361 HYDRATE IV INFUSION ADD-ON: CPT

## 2019-02-24 PROCEDURE — 83690 ASSAY OF LIPASE: CPT

## 2019-02-24 PROCEDURE — 82607 VITAMIN B-12: CPT

## 2019-02-24 PROCEDURE — 93005 ELECTROCARDIOGRAM TRACING: CPT | Performed by: EMERGENCY MEDICINE

## 2019-02-24 PROCEDURE — 80053 COMPREHEN METABOLIC PANEL: CPT

## 2019-02-24 PROCEDURE — 82803 BLOOD GASES ANY COMBINATION: CPT

## 2019-02-24 PROCEDURE — 6370000000 HC RX 637 (ALT 250 FOR IP): Performed by: PHYSICIAN ASSISTANT

## 2019-02-24 PROCEDURE — 83605 ASSAY OF LACTIC ACID: CPT

## 2019-02-24 PROCEDURE — 83540 ASSAY OF IRON: CPT

## 2019-02-24 PROCEDURE — 96365 THER/PROPH/DIAG IV INF INIT: CPT

## 2019-02-24 PROCEDURE — 6360000002 HC RX W HCPCS: Performed by: PHYSICIAN ASSISTANT

## 2019-02-24 PROCEDURE — 83550 IRON BINDING TEST: CPT

## 2019-02-24 PROCEDURE — G0378 HOSPITAL OBSERVATION PER HR: HCPCS

## 2019-02-24 PROCEDURE — 84484 ASSAY OF TROPONIN QUANT: CPT

## 2019-02-24 PROCEDURE — 93010 ELECTROCARDIOGRAM REPORT: CPT | Performed by: INTERNAL MEDICINE

## 2019-02-24 PROCEDURE — 85025 COMPLETE CBC W/AUTO DIFF WBC: CPT

## 2019-02-24 PROCEDURE — 82728 ASSAY OF FERRITIN: CPT

## 2019-02-24 PROCEDURE — 99285 EMERGENCY DEPT VISIT HI MDM: CPT

## 2019-02-24 PROCEDURE — 2060000000 HC ICU INTERMEDIATE R&B

## 2019-02-24 PROCEDURE — 82746 ASSAY OF FOLIC ACID SERUM: CPT

## 2019-02-24 PROCEDURE — 36415 COLL VENOUS BLD VENIPUNCTURE: CPT

## 2019-02-24 PROCEDURE — 87077 CULTURE AEROBIC IDENTIFY: CPT

## 2019-02-24 PROCEDURE — 96375 TX/PRO/DX INJ NEW DRUG ADDON: CPT

## 2019-02-24 PROCEDURE — 87186 SC STD MICRODIL/AGAR DIL: CPT

## 2019-02-24 PROCEDURE — 2580000003 HC RX 258: Performed by: PHYSICIAN ASSISTANT

## 2019-02-24 PROCEDURE — 96366 THER/PROPH/DIAG IV INF ADDON: CPT

## 2019-02-24 PROCEDURE — 87086 URINE CULTURE/COLONY COUNT: CPT

## 2019-02-24 PROCEDURE — 81001 URINALYSIS AUTO W/SCOPE: CPT

## 2019-02-24 PROCEDURE — 71045 X-RAY EXAM CHEST 1 VIEW: CPT

## 2019-02-24 RX ORDER — HYDROMORPHONE HCL 110MG/55ML
0.5 PATIENT CONTROLLED ANALGESIA SYRINGE INTRAVENOUS ONCE
Status: COMPLETED | OUTPATIENT
Start: 2019-02-24 | End: 2019-02-24

## 2019-02-24 RX ORDER — ONDANSETRON 4 MG/1
4 TABLET, ORALLY DISINTEGRATING ORAL ONCE
Status: COMPLETED | OUTPATIENT
Start: 2019-02-24 | End: 2019-02-24

## 2019-02-24 RX ORDER — 0.9 % SODIUM CHLORIDE 0.9 %
1000 INTRAVENOUS SOLUTION INTRAVENOUS ONCE
Status: COMPLETED | OUTPATIENT
Start: 2019-02-24 | End: 2019-02-24

## 2019-02-24 RX ORDER — CEFUROXIME AXETIL 500 MG/1
500 TABLET ORAL DAILY
Qty: 10 TABLET | Refills: 0 | Status: SHIPPED | OUTPATIENT
Start: 2019-02-24 | End: 2019-02-27 | Stop reason: HOSPADM

## 2019-02-24 RX ORDER — MECLIZINE HYDROCHLORIDE CHEWABLE TABLETS 25 MG/1
25 TABLET, CHEWABLE ORAL ONCE
Status: COMPLETED | OUTPATIENT
Start: 2019-02-24 | End: 2019-02-24

## 2019-02-24 RX ORDER — ONDANSETRON 2 MG/ML
4 INJECTION INTRAMUSCULAR; INTRAVENOUS ONCE
Status: COMPLETED | OUTPATIENT
Start: 2019-02-24 | End: 2019-02-24

## 2019-02-24 RX ORDER — MECLIZINE HYDROCHLORIDE 25 MG/1
25 TABLET ORAL 3 TIMES DAILY PRN
Qty: 30 TABLET | Refills: 0 | Status: SHIPPED | OUTPATIENT
Start: 2019-02-24 | End: 2019-02-27 | Stop reason: HOSPADM

## 2019-02-24 RX ORDER — 0.9 % SODIUM CHLORIDE 0.9 %
500 INTRAVENOUS SOLUTION INTRAVENOUS ONCE
Status: COMPLETED | OUTPATIENT
Start: 2019-02-24 | End: 2019-02-24

## 2019-02-24 RX ORDER — OXYCODONE AND ACETAMINOPHEN 10; 325 MG/1; MG/1
1 TABLET ORAL ONCE
Status: COMPLETED | OUTPATIENT
Start: 2019-02-24 | End: 2019-02-24

## 2019-02-24 RX ADMIN — OXYCODONE HYDROCHLORIDE AND ACETAMINOPHEN 1 TABLET: 10; 325 TABLET ORAL at 20:25

## 2019-02-24 RX ADMIN — SODIUM CHLORIDE 1000 ML: 9 INJECTION, SOLUTION INTRAVENOUS at 19:58

## 2019-02-24 RX ADMIN — MECLIZINE HCL 25 MG: 25 TABLET, CHEWABLE ORAL at 19:04

## 2019-02-24 RX ADMIN — ONDANSETRON 4 MG: 2 INJECTION INTRAMUSCULAR; INTRAVENOUS at 19:04

## 2019-02-24 RX ADMIN — ONDANSETRON 4 MG: 4 TABLET, ORALLY DISINTEGRATING ORAL at 20:25

## 2019-02-24 RX ADMIN — HYDROMORPHONE HYDROCHLORIDE 0.5 MG: 2 INJECTION, SOLUTION INTRAMUSCULAR; INTRAVENOUS; SUBCUTANEOUS at 19:04

## 2019-02-24 RX ADMIN — SODIUM CHLORIDE 500 ML: 9 INJECTION, SOLUTION INTRAVENOUS at 19:23

## 2019-02-24 RX ADMIN — CEFTRIAXONE SODIUM 1 G: 1 INJECTION, POWDER, FOR SOLUTION INTRAMUSCULAR; INTRAVENOUS at 20:04

## 2019-02-24 ASSESSMENT — PAIN DESCRIPTION - LOCATION: LOCATION: BACK;ARM

## 2019-02-24 ASSESSMENT — PAIN SCALES - GENERAL
PAINLEVEL_OUTOF10: 7
PAINLEVEL_OUTOF10: 8
PAINLEVEL_OUTOF10: 7
PAINLEVEL_OUTOF10: 8

## 2019-02-24 ASSESSMENT — PAIN DESCRIPTION - PAIN TYPE: TYPE: ACUTE PAIN

## 2019-02-24 ASSESSMENT — PAIN DESCRIPTION - ONSET: ONSET: GRADUAL

## 2019-02-24 ASSESSMENT — PAIN DESCRIPTION - PROGRESSION: CLINICAL_PROGRESSION: GRADUALLY WORSENING

## 2019-02-24 ASSESSMENT — PAIN DESCRIPTION - DESCRIPTORS: DESCRIPTORS: ACHING

## 2019-02-24 ASSESSMENT — PAIN DESCRIPTION - ORIENTATION: ORIENTATION: LEFT

## 2019-02-24 ASSESSMENT — PAIN DESCRIPTION - FREQUENCY: FREQUENCY: CONTINUOUS

## 2019-02-25 ENCOUNTER — APPOINTMENT (OUTPATIENT)
Dept: GENERAL RADIOLOGY | Age: 84
DRG: 683 | End: 2019-02-25
Payer: MEDICARE

## 2019-02-25 LAB
EKG ATRIAL RATE: 77 BPM
EKG DIAGNOSIS: NORMAL
EKG P AXIS: 42 DEGREES
EKG P-R INTERVAL: 324 MS
EKG Q-T INTERVAL: 376 MS
EKG QRS DURATION: 84 MS
EKG QTC CALCULATION (BAZETT): 425 MS
EKG R AXIS: -18 DEGREES
EKG T AXIS: 3 DEGREES
EKG VENTRICULAR RATE: 77 BPM
FERRITIN: 111 NG/ML (ref 15–150)
FOLATE: 12.19 NG/ML (ref 4.78–24.2)
GLUCOSE BLD-MCNC: 101 MG/DL (ref 70–99)
GLUCOSE BLD-MCNC: 106 MG/DL (ref 70–99)
GLUCOSE BLD-MCNC: 114 MG/DL (ref 70–99)
GLUCOSE BLD-MCNC: 161 MG/DL (ref 70–99)
GLUCOSE BLD-MCNC: 87 MG/DL (ref 70–99)
IRON SATURATION: 14 % (ref 15–50)
IRON: 28 UG/DL (ref 37–145)
PERFORMED ON: ABNORMAL
PERFORMED ON: NORMAL
TOTAL IRON BINDING CAPACITY: 207 UG/DL (ref 260–445)
VITAMIN B-12: 638 PG/ML (ref 211–911)

## 2019-02-25 PROCEDURE — 97530 THERAPEUTIC ACTIVITIES: CPT

## 2019-02-25 PROCEDURE — 2580000003 HC RX 258: Performed by: INTERNAL MEDICINE

## 2019-02-25 PROCEDURE — 6360000002 HC RX W HCPCS: Performed by: INTERNAL MEDICINE

## 2019-02-25 PROCEDURE — 2580000003 HC RX 258: Performed by: PHYSICIAN ASSISTANT

## 2019-02-25 PROCEDURE — 97116 GAIT TRAINING THERAPY: CPT

## 2019-02-25 PROCEDURE — 97535 SELF CARE MNGMENT TRAINING: CPT

## 2019-02-25 PROCEDURE — 2580000003 HC RX 258

## 2019-02-25 PROCEDURE — 6370000000 HC RX 637 (ALT 250 FOR IP): Performed by: INTERNAL MEDICINE

## 2019-02-25 PROCEDURE — 97162 PT EVAL MOD COMPLEX 30 MIN: CPT

## 2019-02-25 PROCEDURE — 2700000000 HC OXYGEN THERAPY PER DAY

## 2019-02-25 PROCEDURE — 6370000000 HC RX 637 (ALT 250 FOR IP): Performed by: PHYSICIAN ASSISTANT

## 2019-02-25 PROCEDURE — 2060000000 HC ICU INTERMEDIATE R&B

## 2019-02-25 PROCEDURE — 94761 N-INVAS EAR/PLS OXIMETRY MLT: CPT

## 2019-02-25 PROCEDURE — 97166 OT EVAL MOD COMPLEX 45 MIN: CPT

## 2019-02-25 PROCEDURE — 72070 X-RAY EXAM THORAC SPINE 2VWS: CPT

## 2019-02-25 PROCEDURE — 6360000002 HC RX W HCPCS: Performed by: PHYSICIAN ASSISTANT

## 2019-02-25 RX ORDER — SODIUM CHLORIDE 9 MG/ML
INJECTION, SOLUTION INTRAVENOUS
Status: COMPLETED
Start: 2019-02-25 | End: 2019-02-25

## 2019-02-25 RX ORDER — SODIUM CHLORIDE 0.9 % (FLUSH) 0.9 %
10 SYRINGE (ML) INJECTION EVERY 12 HOURS SCHEDULED
Status: DISCONTINUED | OUTPATIENT
Start: 2019-02-25 | End: 2019-02-27 | Stop reason: HOSPADM

## 2019-02-25 RX ORDER — SODIUM CHLORIDE 9 MG/ML
INJECTION, SOLUTION INTRAVENOUS CONTINUOUS
Status: DISCONTINUED | OUTPATIENT
Start: 2019-02-25 | End: 2019-02-26

## 2019-02-25 RX ORDER — OXYCODONE AND ACETAMINOPHEN 10; 325 MG/1; MG/1
1 TABLET ORAL 3 TIMES DAILY
Status: DISCONTINUED | OUTPATIENT
Start: 2019-02-25 | End: 2019-02-25

## 2019-02-25 RX ORDER — OXYCODONE AND ACETAMINOPHEN 10; 325 MG/1; MG/1
1 TABLET ORAL EVERY 6 HOURS PRN
Status: DISCONTINUED | OUTPATIENT
Start: 2019-02-25 | End: 2019-02-27

## 2019-02-25 RX ORDER — OXYCODONE AND ACETAMINOPHEN 10; 325 MG/1; MG/1
1 TABLET ORAL EVERY 8 HOURS PRN
Status: DISCONTINUED | OUTPATIENT
Start: 2019-02-25 | End: 2019-02-25

## 2019-02-25 RX ORDER — SODIUM CHLORIDE 0.9 % (FLUSH) 0.9 %
10 SYRINGE (ML) INJECTION PRN
Status: DISCONTINUED | OUTPATIENT
Start: 2019-02-25 | End: 2019-02-27 | Stop reason: HOSPADM

## 2019-02-25 RX ORDER — ONDANSETRON 2 MG/ML
4 INJECTION INTRAMUSCULAR; INTRAVENOUS EVERY 6 HOURS PRN
Status: DISCONTINUED | OUTPATIENT
Start: 2019-02-25 | End: 2019-02-27 | Stop reason: HOSPADM

## 2019-02-25 RX ORDER — PANTOPRAZOLE SODIUM 40 MG/1
40 TABLET, DELAYED RELEASE ORAL
Status: DISCONTINUED | OUTPATIENT
Start: 2019-02-25 | End: 2019-02-27 | Stop reason: HOSPADM

## 2019-02-25 RX ORDER — AZITHROMYCIN 250 MG/1
250 TABLET, FILM COATED ORAL DAILY
Status: DISCONTINUED | OUTPATIENT
Start: 2019-02-25 | End: 2019-02-25

## 2019-02-25 RX ADMIN — AZITHROMYCIN 250 MG: 250 TABLET, FILM COATED ORAL at 08:29

## 2019-02-25 RX ADMIN — Medication 10 ML: at 08:30

## 2019-02-25 RX ADMIN — PANTOPRAZOLE SODIUM 40 MG: 40 TABLET, DELAYED RELEASE ORAL at 06:34

## 2019-02-25 RX ADMIN — ENOXAPARIN SODIUM 30 MG: 30 INJECTION SUBCUTANEOUS at 08:29

## 2019-02-25 RX ADMIN — SODIUM CHLORIDE: 9 INJECTION, SOLUTION INTRAVENOUS at 10:10

## 2019-02-25 RX ADMIN — DEXTROSE MONOHYDRATE 500 MG: 50 INJECTION, SOLUTION INTRAVENOUS at 00:09

## 2019-02-25 RX ADMIN — OXYCODONE HYDROCHLORIDE AND ACETAMINOPHEN 1 TABLET: 10; 325 TABLET ORAL at 10:10

## 2019-02-25 RX ADMIN — SODIUM CHLORIDE: 9 INJECTION, SOLUTION INTRAVENOUS at 00:39

## 2019-02-25 RX ADMIN — Medication 10 ML: at 00:30

## 2019-02-25 RX ADMIN — OXYCODONE HYDROCHLORIDE AND ACETAMINOPHEN 1 TABLET: 10; 325 TABLET ORAL at 22:28

## 2019-02-25 RX ADMIN — ONDANSETRON 4 MG: 2 INJECTION INTRAMUSCULAR; INTRAVENOUS at 00:30

## 2019-02-25 RX ADMIN — OXYCODONE HYDROCHLORIDE AND ACETAMINOPHEN 1 TABLET: 10; 325 TABLET ORAL at 16:20

## 2019-02-25 ASSESSMENT — PAIN DESCRIPTION - PROGRESSION
CLINICAL_PROGRESSION: NOT CHANGED
CLINICAL_PROGRESSION: GRADUALLY IMPROVING
CLINICAL_PROGRESSION: GRADUALLY WORSENING
CLINICAL_PROGRESSION: GRADUALLY IMPROVING
CLINICAL_PROGRESSION: NOT CHANGED

## 2019-02-25 ASSESSMENT — PAIN DESCRIPTION - ORIENTATION
ORIENTATION: LEFT

## 2019-02-25 ASSESSMENT — PAIN DESCRIPTION - ONSET
ONSET: GRADUAL
ONSET: ON-GOING
ONSET: ON-GOING

## 2019-02-25 ASSESSMENT — PAIN SCALES - GENERAL
PAINLEVEL_OUTOF10: 8
PAINLEVEL_OUTOF10: 4
PAINLEVEL_OUTOF10: 7
PAINLEVEL_OUTOF10: 8
PAINLEVEL_OUTOF10: 8
PAINLEVEL_OUTOF10: 4

## 2019-02-25 ASSESSMENT — PAIN DESCRIPTION - LOCATION
LOCATION: BACK;ARM
LOCATION: BACK;ARM
LOCATION: ARM;BACK
LOCATION: ARM;BACK
LOCATION: BACK;ARM

## 2019-02-25 ASSESSMENT — PAIN DESCRIPTION - PAIN TYPE
TYPE: ACUTE PAIN

## 2019-02-25 ASSESSMENT — PAIN DESCRIPTION - FREQUENCY
FREQUENCY: CONTINUOUS

## 2019-02-25 ASSESSMENT — PAIN DESCRIPTION - DESCRIPTORS
DESCRIPTORS: ACHING;CONSTANT

## 2019-02-25 ASSESSMENT — PAIN DESCRIPTION - DIRECTION
RADIATING_TOWARDS: CHEST
RADIATING_TOWARDS: NON

## 2019-02-25 ASSESSMENT — PAIN - FUNCTIONAL ASSESSMENT: PAIN_FUNCTIONAL_ASSESSMENT: PREVENTS OR INTERFERES SOME ACTIVE ACTIVITIES AND ADLS

## 2019-02-26 ENCOUNTER — APPOINTMENT (OUTPATIENT)
Dept: GENERAL RADIOLOGY | Age: 84
DRG: 683 | End: 2019-02-26
Payer: MEDICARE

## 2019-02-26 LAB
GLUCOSE BLD-MCNC: 108 MG/DL (ref 70–99)
GLUCOSE BLD-MCNC: 111 MG/DL (ref 70–99)
GLUCOSE BLD-MCNC: 114 MG/DL (ref 70–99)
GLUCOSE BLD-MCNC: 120 MG/DL (ref 70–99)
ORGANISM: ABNORMAL
PERFORMED ON: ABNORMAL
URINE CULTURE, ROUTINE: ABNORMAL
URINE CULTURE, ROUTINE: ABNORMAL

## 2019-02-26 PROCEDURE — 94761 N-INVAS EAR/PLS OXIMETRY MLT: CPT

## 2019-02-26 PROCEDURE — 2580000003 HC RX 258: Performed by: INTERNAL MEDICINE

## 2019-02-26 PROCEDURE — 6360000002 HC RX W HCPCS: Performed by: INTERNAL MEDICINE

## 2019-02-26 PROCEDURE — 99232 SBSQ HOSP IP/OBS MODERATE 35: CPT | Performed by: INTERNAL MEDICINE

## 2019-02-26 PROCEDURE — 97535 SELF CARE MNGMENT TRAINING: CPT

## 2019-02-26 PROCEDURE — 73501 X-RAY EXAM HIP UNI 1 VIEW: CPT

## 2019-02-26 PROCEDURE — 6370000000 HC RX 637 (ALT 250 FOR IP): Performed by: INTERNAL MEDICINE

## 2019-02-26 PROCEDURE — 2700000000 HC OXYGEN THERAPY PER DAY

## 2019-02-26 PROCEDURE — 97116 GAIT TRAINING THERAPY: CPT

## 2019-02-26 PROCEDURE — 97530 THERAPEUTIC ACTIVITIES: CPT

## 2019-02-26 PROCEDURE — 97110 THERAPEUTIC EXERCISES: CPT

## 2019-02-26 PROCEDURE — 2060000000 HC ICU INTERMEDIATE R&B

## 2019-02-26 PROCEDURE — 2580000003 HC RX 258: Performed by: PHYSICIAN ASSISTANT

## 2019-02-26 RX ORDER — NITROFURANTOIN 25; 75 MG/1; MG/1
100 CAPSULE ORAL EVERY 12 HOURS SCHEDULED
Status: DISCONTINUED | OUTPATIENT
Start: 2019-02-26 | End: 2019-02-26

## 2019-02-26 RX ORDER — CIPROFLOXACIN 250 MG/1
250 TABLET, FILM COATED ORAL EVERY 12 HOURS SCHEDULED
Status: DISCONTINUED | OUTPATIENT
Start: 2019-02-26 | End: 2019-02-27 | Stop reason: HOSPADM

## 2019-02-26 RX ORDER — SENNA AND DOCUSATE SODIUM 50; 8.6 MG/1; MG/1
2 TABLET, FILM COATED ORAL 2 TIMES DAILY PRN
Status: DISCONTINUED | OUTPATIENT
Start: 2019-02-26 | End: 2019-02-27 | Stop reason: HOSPADM

## 2019-02-26 RX ORDER — LIDOCAINE 4 G/G
1 PATCH TOPICAL DAILY
Status: DISCONTINUED | OUTPATIENT
Start: 2019-02-26 | End: 2019-02-27 | Stop reason: HOSPADM

## 2019-02-26 RX ORDER — POLYETHYLENE GLYCOL 3350 17 G/17G
17 POWDER, FOR SOLUTION ORAL DAILY
Status: DISCONTINUED | OUTPATIENT
Start: 2019-02-26 | End: 2019-02-27 | Stop reason: HOSPADM

## 2019-02-26 RX ADMIN — SENNOSIDES AND DOCUSATE SODIUM 2 TABLET: 8.6; 5 TABLET ORAL at 10:33

## 2019-02-26 RX ADMIN — Medication 10 ML: at 20:08

## 2019-02-26 RX ADMIN — ENOXAPARIN SODIUM 30 MG: 30 INJECTION SUBCUTANEOUS at 08:37

## 2019-02-26 RX ADMIN — PANTOPRAZOLE SODIUM 40 MG: 40 TABLET, DELAYED RELEASE ORAL at 06:20

## 2019-02-26 RX ADMIN — OXYCODONE HYDROCHLORIDE AND ACETAMINOPHEN 1 TABLET: 10; 325 TABLET ORAL at 04:34

## 2019-02-26 RX ADMIN — OXYCODONE HYDROCHLORIDE AND ACETAMINOPHEN 1 TABLET: 10; 325 TABLET ORAL at 10:33

## 2019-02-26 RX ADMIN — OXYCODONE HYDROCHLORIDE AND ACETAMINOPHEN 1 TABLET: 10; 325 TABLET ORAL at 23:05

## 2019-02-26 RX ADMIN — CIPROFLOXACIN 250 MG: 250 TABLET, FILM COATED ORAL at 20:08

## 2019-02-26 RX ADMIN — SODIUM CHLORIDE: 9 INJECTION, SOLUTION INTRAVENOUS at 01:35

## 2019-02-26 RX ADMIN — OXYCODONE HYDROCHLORIDE AND ACETAMINOPHEN 1 TABLET: 10; 325 TABLET ORAL at 16:32

## 2019-02-26 RX ADMIN — NITROFURANTOIN (MONOHYDRATE/MACROCRYSTALS) 100 MG: 75; 25 CAPSULE ORAL at 13:55

## 2019-02-26 RX ADMIN — POLYETHYLENE GLYCOL (3350) 17 G: 17 POWDER, FOR SOLUTION ORAL at 16:32

## 2019-02-26 RX ADMIN — Medication 10 ML: at 08:37

## 2019-02-26 ASSESSMENT — PAIN SCALES - GENERAL
PAINLEVEL_OUTOF10: 7
PAINLEVEL_OUTOF10: 9
PAINLEVEL_OUTOF10: 8

## 2019-02-26 ASSESSMENT — PAIN - FUNCTIONAL ASSESSMENT: PAIN_FUNCTIONAL_ASSESSMENT: PREVENTS OR INTERFERES SOME ACTIVE ACTIVITIES AND ADLS

## 2019-02-26 ASSESSMENT — PAIN DESCRIPTION - FREQUENCY: FREQUENCY: CONTINUOUS

## 2019-02-26 ASSESSMENT — PAIN DESCRIPTION - LOCATION: LOCATION: BACK

## 2019-02-26 ASSESSMENT — PAIN DESCRIPTION - PROGRESSION: CLINICAL_PROGRESSION: GRADUALLY WORSENING

## 2019-02-26 ASSESSMENT — PAIN DESCRIPTION - ORIENTATION: ORIENTATION: LOWER

## 2019-02-26 ASSESSMENT — PAIN DESCRIPTION - DESCRIPTORS: DESCRIPTORS: ACHING;CONSTANT

## 2019-02-26 ASSESSMENT — PAIN DESCRIPTION - PAIN TYPE: TYPE: ACUTE PAIN

## 2019-02-26 ASSESSMENT — PAIN DESCRIPTION - ONSET: ONSET: GRADUAL

## 2019-02-27 ENCOUNTER — APPOINTMENT (OUTPATIENT)
Dept: CT IMAGING | Age: 84
DRG: 683 | End: 2019-02-27
Payer: MEDICARE

## 2019-02-27 VITALS
HEART RATE: 81 BPM | WEIGHT: 168.7 LBS | DIASTOLIC BLOOD PRESSURE: 80 MMHG | TEMPERATURE: 98.5 F | SYSTOLIC BLOOD PRESSURE: 177 MMHG | RESPIRATION RATE: 14 BRPM | HEIGHT: 60 IN | BODY MASS INDEX: 33.12 KG/M2 | OXYGEN SATURATION: 96 %

## 2019-02-27 LAB
ANION GAP SERPL CALCULATED.3IONS-SCNC: 8 MMOL/L (ref 3–16)
BUN BLDV-MCNC: 10 MG/DL (ref 7–20)
CALCIUM SERPL-MCNC: 8.8 MG/DL (ref 8.3–10.6)
CHLORIDE BLD-SCNC: 103 MMOL/L (ref 99–110)
CO2: 26 MMOL/L (ref 21–32)
CREAT SERPL-MCNC: 1 MG/DL (ref 0.6–1.2)
GFR AFRICAN AMERICAN: >60
GFR NON-AFRICAN AMERICAN: 53
GLUCOSE BLD-MCNC: 106 MG/DL (ref 70–99)
GLUCOSE BLD-MCNC: 109 MG/DL (ref 70–99)
GLUCOSE BLD-MCNC: 109 MG/DL (ref 70–99)
GLUCOSE BLD-MCNC: 160 MG/DL (ref 70–99)
PERFORMED ON: ABNORMAL
POTASSIUM SERPL-SCNC: 4.3 MMOL/L (ref 3.5–5.1)
SODIUM BLD-SCNC: 137 MMOL/L (ref 136–145)

## 2019-02-27 PROCEDURE — 6370000000 HC RX 637 (ALT 250 FOR IP): Performed by: INTERNAL MEDICINE

## 2019-02-27 PROCEDURE — 94150 VITAL CAPACITY TEST: CPT

## 2019-02-27 PROCEDURE — 80048 BASIC METABOLIC PNL TOTAL CA: CPT

## 2019-02-27 PROCEDURE — 99238 HOSP IP/OBS DSCHRG MGMT 30/<: CPT | Performed by: INTERNAL MEDICINE

## 2019-02-27 PROCEDURE — 73700 CT LOWER EXTREMITY W/O DYE: CPT

## 2019-02-27 PROCEDURE — 2580000003 HC RX 258: Performed by: INTERNAL MEDICINE

## 2019-02-27 PROCEDURE — 36415 COLL VENOUS BLD VENIPUNCTURE: CPT

## 2019-02-27 PROCEDURE — 6360000002 HC RX W HCPCS: Performed by: INTERNAL MEDICINE

## 2019-02-27 RX ORDER — POLYETHYLENE GLYCOL 3350 17 G/17G
17 POWDER, FOR SOLUTION ORAL DAILY
Qty: 527 G | Refills: 1
Start: 2019-02-28 | End: 2019-03-30

## 2019-02-27 RX ORDER — LIDOCAINE 4 G/G
1 PATCH TOPICAL DAILY
Qty: 12 PATCH | Refills: 0 | Status: ON HOLD
Start: 2019-02-28 | End: 2019-04-11 | Stop reason: HOSPADM

## 2019-02-27 RX ORDER — OXYCODONE AND ACETAMINOPHEN 10; 325 MG/1; MG/1
1 TABLET ORAL EVERY 4 HOURS PRN
Qty: 10 TABLET | Refills: 0 | Status: ON HOLD | OUTPATIENT
Start: 2019-02-27 | End: 2019-06-04 | Stop reason: HOSPADM

## 2019-02-27 RX ORDER — CIPROFLOXACIN 250 MG/1
250 TABLET, FILM COATED ORAL EVERY 12 HOURS SCHEDULED
Qty: 8 TABLET | Refills: 0
Start: 2019-02-27 | End: 2019-03-03

## 2019-02-27 RX ORDER — OXYCODONE AND ACETAMINOPHEN 10; 325 MG/1; MG/1
1 TABLET ORAL EVERY 4 HOURS PRN
Status: DISCONTINUED | OUTPATIENT
Start: 2019-02-27 | End: 2019-02-27 | Stop reason: HOSPADM

## 2019-02-27 RX ADMIN — OXYCODONE HYDROCHLORIDE AND ACETAMINOPHEN 1 TABLET: 10; 325 TABLET ORAL at 15:25

## 2019-02-27 RX ADMIN — SENNOSIDES AND DOCUSATE SODIUM 2 TABLET: 8.6; 5 TABLET ORAL at 15:26

## 2019-02-27 RX ADMIN — CIPROFLOXACIN 250 MG: 250 TABLET, FILM COATED ORAL at 11:06

## 2019-02-27 RX ADMIN — PANTOPRAZOLE SODIUM 40 MG: 40 TABLET, DELAYED RELEASE ORAL at 05:09

## 2019-02-27 RX ADMIN — OXYCODONE HYDROCHLORIDE AND ACETAMINOPHEN 1 TABLET: 10; 325 TABLET ORAL at 05:08

## 2019-02-27 RX ADMIN — Medication 10 ML: at 09:16

## 2019-02-27 RX ADMIN — OXYCODONE HYDROCHLORIDE AND ACETAMINOPHEN 1 TABLET: 10; 325 TABLET ORAL at 11:05

## 2019-02-27 RX ADMIN — POLYETHYLENE GLYCOL (3350) 17 G: 17 POWDER, FOR SOLUTION ORAL at 11:06

## 2019-02-27 RX ADMIN — ENOXAPARIN SODIUM 30 MG: 30 INJECTION SUBCUTANEOUS at 11:06

## 2019-02-27 ASSESSMENT — PAIN SCALES - GENERAL
PAINLEVEL_OUTOF10: 8
PAINLEVEL_OUTOF10: 8
PAINLEVEL_OUTOF10: 7
PAINLEVEL_OUTOF10: 7
PAINLEVEL_OUTOF10: 8

## 2019-02-27 ASSESSMENT — PAIN DESCRIPTION - FREQUENCY: FREQUENCY: CONTINUOUS

## 2019-02-27 ASSESSMENT — PAIN DESCRIPTION - ORIENTATION: ORIENTATION: LOWER

## 2019-02-27 ASSESSMENT — PAIN DESCRIPTION - LOCATION: LOCATION: BACK

## 2019-02-27 ASSESSMENT — PAIN DESCRIPTION - DESCRIPTORS: DESCRIPTORS: CRAMPING

## 2019-02-27 ASSESSMENT — PAIN DESCRIPTION - ONSET: ONSET: ON-GOING

## 2019-02-27 ASSESSMENT — PAIN DESCRIPTION - PAIN TYPE: TYPE: ACUTE PAIN

## 2019-03-11 ENCOUNTER — OFFICE VISIT (OUTPATIENT)
Dept: ORTHOPEDIC SURGERY | Age: 84
End: 2019-03-11
Payer: MEDICARE

## 2019-03-11 VITALS — HEIGHT: 60 IN | BODY MASS INDEX: 33.11 KG/M2 | WEIGHT: 168.65 LBS

## 2019-03-11 DIAGNOSIS — S42.202A CLOSED FRACTURE OF PROXIMAL END OF LEFT HUMERUS, UNSPECIFIED FRACTURE MORPHOLOGY, INITIAL ENCOUNTER: ICD-10-CM

## 2019-03-11 DIAGNOSIS — M25.512 LEFT SHOULDER PAIN, UNSPECIFIED CHRONICITY: Primary | ICD-10-CM

## 2019-03-11 DIAGNOSIS — M75.41 IMPINGEMENT SYNDROME OF RIGHT SHOULDER: ICD-10-CM

## 2019-03-11 PROCEDURE — G8484 FLU IMMUNIZE NO ADMIN: HCPCS | Performed by: ORTHOPAEDIC SURGERY

## 2019-03-11 PROCEDURE — 1123F ACP DISCUSS/DSCN MKR DOCD: CPT | Performed by: ORTHOPAEDIC SURGERY

## 2019-03-11 PROCEDURE — 99214 OFFICE O/P EST MOD 30 MIN: CPT | Performed by: ORTHOPAEDIC SURGERY

## 2019-03-11 PROCEDURE — 1111F DSCHRG MED/CURRENT MED MERGE: CPT | Performed by: ORTHOPAEDIC SURGERY

## 2019-03-11 PROCEDURE — 1036F TOBACCO NON-USER: CPT | Performed by: ORTHOPAEDIC SURGERY

## 2019-03-11 PROCEDURE — 1090F PRES/ABSN URINE INCON ASSESS: CPT | Performed by: ORTHOPAEDIC SURGERY

## 2019-03-11 PROCEDURE — 4040F PNEUMOC VAC/ADMIN/RCVD: CPT | Performed by: ORTHOPAEDIC SURGERY

## 2019-03-11 PROCEDURE — G8417 CALC BMI ABV UP PARAM F/U: HCPCS | Performed by: ORTHOPAEDIC SURGERY

## 2019-03-11 PROCEDURE — G8427 DOCREV CUR MEDS BY ELIG CLIN: HCPCS | Performed by: ORTHOPAEDIC SURGERY

## 2019-03-11 PROCEDURE — 1101F PT FALLS ASSESS-DOCD LE1/YR: CPT | Performed by: ORTHOPAEDIC SURGERY

## 2019-03-26 ENCOUNTER — TELEPHONE (OUTPATIENT)
Dept: ORTHOPEDIC SURGERY | Age: 84
End: 2019-03-26

## 2019-04-01 ENCOUNTER — APPOINTMENT (OUTPATIENT)
Dept: CT IMAGING | Age: 84
DRG: 983 | End: 2019-04-01
Payer: MEDICARE

## 2019-04-01 ENCOUNTER — APPOINTMENT (OUTPATIENT)
Dept: GENERAL RADIOLOGY | Age: 84
DRG: 983 | End: 2019-04-01
Payer: MEDICARE

## 2019-04-01 ENCOUNTER — APPOINTMENT (OUTPATIENT)
Dept: MRI IMAGING | Age: 84
DRG: 983 | End: 2019-04-01
Payer: MEDICARE

## 2019-04-01 ENCOUNTER — HOSPITAL ENCOUNTER (INPATIENT)
Age: 84
LOS: 2 days | Discharge: NURSING, REHAB OR LONG-TERM CARE | DRG: 983 | End: 2019-04-03
Attending: EMERGENCY MEDICINE | Admitting: HOSPITALIST
Payer: MEDICARE

## 2019-04-01 DIAGNOSIS — S42.202A CLOSED FRACTURE OF PROXIMAL END OF LEFT HUMERUS, UNSPECIFIED FRACTURE MORPHOLOGY, INITIAL ENCOUNTER: ICD-10-CM

## 2019-04-01 DIAGNOSIS — R29.6 MULTIPLE FALLS: Primary | ICD-10-CM

## 2019-04-01 DIAGNOSIS — S22.089A CLOSED FRACTURE OF TWELFTH THORACIC VERTEBRA, UNSPECIFIED FRACTURE MORPHOLOGY, INITIAL ENCOUNTER (HCC): ICD-10-CM

## 2019-04-01 LAB
A/G RATIO: 1.5 (ref 1.1–2.2)
ALBUMIN SERPL-MCNC: 4.1 G/DL (ref 3.4–5)
ALP BLD-CCNC: 87 U/L (ref 40–129)
ALT SERPL-CCNC: 13 U/L (ref 10–40)
ANION GAP SERPL CALCULATED.3IONS-SCNC: 13 MMOL/L (ref 3–16)
AST SERPL-CCNC: 22 U/L (ref 15–37)
BASOPHILS ABSOLUTE: 0 K/UL (ref 0–0.2)
BASOPHILS RELATIVE PERCENT: 0.9 %
BILIRUB SERPL-MCNC: 0.3 MG/DL (ref 0–1)
BILIRUBIN URINE: NEGATIVE
BLOOD, URINE: NEGATIVE
BUN BLDV-MCNC: 13 MG/DL (ref 7–20)
CALCIUM SERPL-MCNC: 9.1 MG/DL (ref 8.3–10.6)
CHLORIDE BLD-SCNC: 100 MMOL/L (ref 99–110)
CLARITY: CLEAR
CO2: 25 MMOL/L (ref 21–32)
COLOR: YELLOW
CREAT SERPL-MCNC: 1 MG/DL (ref 0.6–1.2)
EOSINOPHILS ABSOLUTE: 0 K/UL (ref 0–0.6)
EOSINOPHILS RELATIVE PERCENT: 1.2 %
GFR AFRICAN AMERICAN: >60
GFR NON-AFRICAN AMERICAN: 53
GLOBULIN: 2.8 G/DL
GLUCOSE BLD-MCNC: 133 MG/DL (ref 70–99)
GLUCOSE URINE: NEGATIVE MG/DL
HCT VFR BLD CALC: 34.9 % (ref 36–48)
HEMOGLOBIN: 11.6 G/DL (ref 12–16)
INR BLD: 0.98 (ref 0.86–1.14)
KETONES, URINE: NEGATIVE MG/DL
LEUKOCYTE ESTERASE, URINE: NEGATIVE
LYMPHOCYTES ABSOLUTE: 0.8 K/UL (ref 1–5.1)
LYMPHOCYTES RELATIVE PERCENT: 21 %
MCH RBC QN AUTO: 32.7 PG (ref 26–34)
MCHC RBC AUTO-ENTMCNC: 33.3 G/DL (ref 31–36)
MCV RBC AUTO: 98.2 FL (ref 80–100)
MICROSCOPIC EXAMINATION: NORMAL
MONOCYTES ABSOLUTE: 0.2 K/UL (ref 0–1.3)
MONOCYTES RELATIVE PERCENT: 6.1 %
NEUTROPHILS ABSOLUTE: 2.7 K/UL (ref 1.7–7.7)
NEUTROPHILS RELATIVE PERCENT: 70.8 %
NITRITE, URINE: NEGATIVE
PDW BLD-RTO: 13.9 % (ref 12.4–15.4)
PH UA: 6.5 (ref 5–8)
PLATELET # BLD: 189 K/UL (ref 135–450)
PMV BLD AUTO: 7.3 FL (ref 5–10.5)
POTASSIUM REFLEX MAGNESIUM: 4.2 MMOL/L (ref 3.5–5.1)
PRO-BNP: 769 PG/ML (ref 0–449)
PROTEIN UA: NEGATIVE MG/DL
PROTHROMBIN TIME: 11.2 SEC (ref 9.8–13)
RBC # BLD: 3.55 M/UL (ref 4–5.2)
SODIUM BLD-SCNC: 138 MMOL/L (ref 136–145)
SPECIFIC GRAVITY UA: 1.01 (ref 1–1.03)
TOTAL PROTEIN: 6.9 G/DL (ref 6.4–8.2)
TROPONIN: <0.01 NG/ML
URINE REFLEX TO CULTURE: NORMAL
URINE TYPE: NORMAL
UROBILINOGEN, URINE: 0.2 E.U./DL
WBC # BLD: 3.8 K/UL (ref 4–11)

## 2019-04-01 PROCEDURE — 84484 ASSAY OF TROPONIN QUANT: CPT

## 2019-04-01 PROCEDURE — 71250 CT THORAX DX C-: CPT

## 2019-04-01 PROCEDURE — 6370000000 HC RX 637 (ALT 250 FOR IP): Performed by: HOSPITALIST

## 2019-04-01 PROCEDURE — 2580000003 HC RX 258: Performed by: HOSPITALIST

## 2019-04-01 PROCEDURE — 96374 THER/PROPH/DIAG INJ IV PUSH: CPT

## 2019-04-01 PROCEDURE — 70450 CT HEAD/BRAIN W/O DYE: CPT

## 2019-04-01 PROCEDURE — 99285 EMERGENCY DEPT VISIT HI MDM: CPT

## 2019-04-01 PROCEDURE — 93005 ELECTROCARDIOGRAM TRACING: CPT | Performed by: PHYSICIAN ASSISTANT

## 2019-04-01 PROCEDURE — 81003 URINALYSIS AUTO W/O SCOPE: CPT

## 2019-04-01 PROCEDURE — 73560 X-RAY EXAM OF KNEE 1 OR 2: CPT

## 2019-04-01 PROCEDURE — 83880 ASSAY OF NATRIURETIC PEPTIDE: CPT

## 2019-04-01 PROCEDURE — 85610 PROTHROMBIN TIME: CPT

## 2019-04-01 PROCEDURE — 85025 COMPLETE CBC W/AUTO DIFF WBC: CPT

## 2019-04-01 PROCEDURE — 80053 COMPREHEN METABOLIC PANEL: CPT

## 2019-04-01 PROCEDURE — 73060 X-RAY EXAM OF HUMERUS: CPT

## 2019-04-01 PROCEDURE — 72125 CT NECK SPINE W/O DYE: CPT

## 2019-04-01 PROCEDURE — 96376 TX/PRO/DX INJ SAME DRUG ADON: CPT

## 2019-04-01 PROCEDURE — 2500000003 HC RX 250 WO HCPCS: Performed by: PHYSICIAN ASSISTANT

## 2019-04-01 PROCEDURE — 6360000002 HC RX W HCPCS: Performed by: HOSPITALIST

## 2019-04-01 PROCEDURE — 1200000000 HC SEMI PRIVATE

## 2019-04-01 PROCEDURE — 2500000003 HC RX 250 WO HCPCS: Performed by: HOSPITALIST

## 2019-04-01 PROCEDURE — 96375 TX/PRO/DX INJ NEW DRUG ADDON: CPT

## 2019-04-01 PROCEDURE — 72148 MRI LUMBAR SPINE W/O DYE: CPT

## 2019-04-01 PROCEDURE — 6360000002 HC RX W HCPCS: Performed by: PHYSICIAN ASSISTANT

## 2019-04-01 RX ORDER — METHOCARBAMOL 750 MG/1
750 TABLET, FILM COATED ORAL 3 TIMES DAILY
Status: DISCONTINUED | OUTPATIENT
Start: 2019-04-01 | End: 2019-04-03 | Stop reason: HOSPADM

## 2019-04-01 RX ORDER — SODIUM CHLORIDE 0.9 % (FLUSH) 0.9 %
10 SYRINGE (ML) INJECTION EVERY 12 HOURS SCHEDULED
Status: DISCONTINUED | OUTPATIENT
Start: 2019-04-01 | End: 2019-04-03 | Stop reason: HOSPADM

## 2019-04-01 RX ORDER — ACETAMINOPHEN 325 MG/1
650 TABLET ORAL EVERY 4 HOURS PRN
Status: DISCONTINUED | OUTPATIENT
Start: 2019-04-01 | End: 2019-04-03 | Stop reason: HOSPADM

## 2019-04-01 RX ORDER — FUROSEMIDE 20 MG/1
20 TABLET ORAL PRN
Status: DISCONTINUED | OUTPATIENT
Start: 2019-04-01 | End: 2019-04-03 | Stop reason: HOSPADM

## 2019-04-01 RX ORDER — ONDANSETRON 2 MG/ML
4 INJECTION INTRAMUSCULAR; INTRAVENOUS EVERY 6 HOURS PRN
Status: DISCONTINUED | OUTPATIENT
Start: 2019-04-01 | End: 2019-04-03 | Stop reason: HOSPADM

## 2019-04-01 RX ORDER — SODIUM CHLORIDE 0.9 % (FLUSH) 0.9 %
10 SYRINGE (ML) INJECTION PRN
Status: DISCONTINUED | OUTPATIENT
Start: 2019-04-01 | End: 2019-04-03 | Stop reason: HOSPADM

## 2019-04-01 RX ORDER — ONDANSETRON 2 MG/ML
4 INJECTION INTRAMUSCULAR; INTRAVENOUS ONCE
Status: COMPLETED | OUTPATIENT
Start: 2019-04-01 | End: 2019-04-01

## 2019-04-01 RX ORDER — PANTOPRAZOLE SODIUM 40 MG/1
40 TABLET, DELAYED RELEASE ORAL
Status: DISCONTINUED | OUTPATIENT
Start: 2019-04-02 | End: 2019-04-03 | Stop reason: HOSPADM

## 2019-04-01 RX ORDER — OXYCODONE AND ACETAMINOPHEN 10; 325 MG/1; MG/1
1 TABLET ORAL ONCE
Status: DISCONTINUED | OUTPATIENT
Start: 2019-04-01 | End: 2019-04-01

## 2019-04-01 RX ORDER — HYDROMORPHONE HYDROCHLORIDE 1 MG/ML
0.5 INJECTION, SOLUTION INTRAMUSCULAR; INTRAVENOUS; SUBCUTANEOUS EVERY 4 HOURS PRN
Status: DISCONTINUED | OUTPATIENT
Start: 2019-04-01 | End: 2019-04-02

## 2019-04-01 RX ORDER — DEXAMETHASONE SODIUM PHOSPHATE 4 MG/ML
4 INJECTION, SOLUTION INTRA-ARTICULAR; INTRALESIONAL; INTRAMUSCULAR; INTRAVENOUS; SOFT TISSUE EVERY 6 HOURS
Status: COMPLETED | OUTPATIENT
Start: 2019-04-01 | End: 2019-04-02

## 2019-04-01 RX ORDER — HYDROMORPHONE HYDROCHLORIDE 1 MG/ML
0.5 INJECTION, SOLUTION INTRAMUSCULAR; INTRAVENOUS; SUBCUTANEOUS ONCE
Status: COMPLETED | OUTPATIENT
Start: 2019-04-01 | End: 2019-04-01

## 2019-04-01 RX ORDER — DOCUSATE SODIUM 100 MG/1
100 CAPSULE, LIQUID FILLED ORAL 2 TIMES DAILY
COMMUNITY

## 2019-04-01 RX ORDER — ONDANSETRON 2 MG/ML
INJECTION INTRAMUSCULAR; INTRAVENOUS
Status: DISPENSED
Start: 2019-04-01 | End: 2019-04-02

## 2019-04-01 RX ORDER — LIDOCAINE 4 G/G
1 PATCH TOPICAL DAILY
Status: DISCONTINUED | OUTPATIENT
Start: 2019-04-02 | End: 2019-04-03 | Stop reason: HOSPADM

## 2019-04-01 RX ORDER — ALENDRONATE SODIUM 70 MG/1
70 TABLET ORAL WEEKLY
COMMUNITY
Start: 2019-03-21 | End: 2022-03-02 | Stop reason: ALTCHOICE

## 2019-04-01 RX ORDER — DEXAMETHASONE SODIUM PHOSPHATE 4 MG/ML
4 INJECTION, SOLUTION INTRA-ARTICULAR; INTRALESIONAL; INTRAMUSCULAR; INTRAVENOUS; SOFT TISSUE ONCE
Status: DISCONTINUED | OUTPATIENT
Start: 2019-04-01 | End: 2019-04-01

## 2019-04-01 RX ADMIN — ONDANSETRON 4 MG: 2 INJECTION INTRAMUSCULAR; INTRAVENOUS at 17:50

## 2019-04-01 RX ADMIN — Medication 10 ML: at 21:49

## 2019-04-01 RX ADMIN — METHOCARBAMOL 750 MG: 750 TABLET ORAL at 21:48

## 2019-04-01 RX ADMIN — HYDROMORPHONE HYDROCHLORIDE 0.5 MG: 1 INJECTION, SOLUTION INTRAMUSCULAR; INTRAVENOUS; SUBCUTANEOUS at 15:53

## 2019-04-01 RX ADMIN — HYDROMORPHONE HYDROCHLORIDE 0.5 MG: 1 INJECTION, SOLUTION INTRAMUSCULAR; INTRAVENOUS; SUBCUTANEOUS at 21:49

## 2019-04-01 RX ADMIN — HYDROMORPHONE HYDROCHLORIDE 0.5 MG: 1 INJECTION, SOLUTION INTRAMUSCULAR; INTRAVENOUS; SUBCUTANEOUS at 17:49

## 2019-04-01 RX ADMIN — DEXAMETHASONE SODIUM PHOSPHATE 4 MG: 4 INJECTION, SOLUTION INTRAMUSCULAR; INTRAVENOUS at 21:49

## 2019-04-01 RX ADMIN — ONDANSETRON 4 MG: 2 INJECTION INTRAMUSCULAR; INTRAVENOUS at 15:53

## 2019-04-01 ASSESSMENT — PAIN SCALES - GENERAL
PAINLEVEL_OUTOF10: 5
PAINLEVEL_OUTOF10: 9
PAINLEVEL_OUTOF10: 8
PAINLEVEL_OUTOF10: 9
PAINLEVEL_OUTOF10: 7
PAINLEVEL_OUTOF10: 7
PAINLEVEL_OUTOF10: 9

## 2019-04-01 ASSESSMENT — ENCOUNTER SYMPTOMS
COLOR CHANGE: 0
CONSTIPATION: 0
ABDOMINAL PAIN: 0
NAUSEA: 0
RESPIRATORY NEGATIVE: 1
DIARRHEA: 0
COUGH: 0
BACK PAIN: 0
VOMITING: 0
SHORTNESS OF BREATH: 0
PHOTOPHOBIA: 0

## 2019-04-01 ASSESSMENT — PAIN DESCRIPTION - PROGRESSION
CLINICAL_PROGRESSION: GRADUALLY IMPROVING
CLINICAL_PROGRESSION: GRADUALLY IMPROVING

## 2019-04-01 NOTE — ED PROVIDER NOTES
Impression:    Fracture deformity at L1 vertebral body with fracture lines seen extending  through the vertebral body involving both anterior and middle columns but  without involvement of posterior elements. Dennie Arms is also loss of vertebral  body height at L1.  Loss of vertebral body height appears similar to prior  thoracic spine x-rays 02/24/2019 but fracture lines may be new from that exam  (although diffuse bone demineralization somewhat limits evaluation on prior  x-rays).  All findings at L1 are new since prior CT chest, abdomen and pelvis  from 2017.  Acute on subacute to chronic fracture of concern.  Consider  further evaluation with MRI. Stable suspected subacute to chronic compression fractures at T5 and T6,  stable from thoracic spine x-rays 02/24/2019 but new from CT chest, abdomen  and pelvis from 2017. Subacute appearing left proximal humeral neck fracture redemonstrated. No acute intrathoracic abnormality. Large hiatal hernia, stable. Atherosclerosis to include coronary artery disease. Critical results were called by Dr. Bart Lynn. MD Barbara to [facility]  on 4/1/2019 at 15:43. MRI ordered. Shows: .      MRI LUMBAR SPINE WO CONTRAST (Final result)   Result time 04/01/19 17:49:01   Final result by Shikha Anderson MD (04/01/19 17:49:01)                Impression:    Acute T12 fracture. Dennie Arms is prominent diastasis of the vertebral fracture  plane, which may involve the right pedicle. Borderline compression of the distal cord at the T12 inferior endplate. Interspinous edema, likely traumatic.  In addition, there is suspected  traumatic disruption of the anterior annulus at T11-T12. MIGUEL spoke with neurosurgery. No new orders. Recommends admission. Patient to be admitted to the hospitalist.        FINAL IMPRESSION     1. Multiple falls    2.  Closed fracture of proximal end of left humerus, unspecified fracture morphology, initial encounter    3. Closed fracture of twelfth thoracic vertebra, unspecified fracture morphology, initial encounter [facility])            Electronically signed by:   Aaliyah Acosta DO  04/01/19 1945

## 2019-04-01 NOTE — PLAN OF CARE
79 yo female with hx of multiple falls over several weeks. Was in rehab 2 weeks ago w/o improvement. Mike Aver again and comes to ED with Back pain. Also has humeral fx. AVSS  Back pain on exam but neuro exam intact per ED CNP. MRI: acute on chronic T 12 compression fracture with some stenosis. Also chronic T5,6 fractures per CT. A/P: 79 yo female with multiple compression fractures of different ages on CT/MRI. No surgical indication. 1. Recommend w/u for persistent falls/syncope. 2. TLSO brace per orthotist.  3. Complete MRI of T spine when able,  Plain xrays of T and Lspine. 4. Analgesics per medicine  5. Consider IR consult for compression fracture if needed for analgesia. Will follow.

## 2019-04-01 NOTE — H&P
HOSPITALISTS HISTORY AND PHYSICAL    4/1/2019 6:49 PM    Patient Information:  Gavin Weinberg is a 80 y.o. female [id number]  PCP:  Gatito Gao MD (Tel: [phone] )    Chief complaint:    Chief Complaint   Patient presents with    Fall     Pt with 4 falls in last 6 weeks, known humerus fx from 6 weeks ago. Pt states right leg/hip get weak and cause to fall. Pt states fell in rehab few weeks ago and arm has been hurting worse since lower than origional fracture, no new xrays done. Pt with pain in both hips, right knee back. History of Present Illness:  Jesika Ortez is a 80 y.o. female who presented with falls, multiple fall  Pt has ahd 4 falls in past 6 weeks. States has been falling on her back and side. States looses balance all ere mecanical fall. Pt had falls at rehab as well. Pt states last fall today. States was wlking whne her right leg agave out on her and had afall. Landed on her right knee and left side. States she had pain on left shoulder. Back spine and neck area. Pt denies any weakness or numbness. No visual changs. No cp. No n/v/d. No bowel or bladder incontinence   She has unabarable pain. Her pain is dull achy at time sharp. 10/10. Worse with movement. History obtained from patient and . Old medical records show        REVIEW OF SYSTEMS:   Constitutional: Negative for fever,chills or night sweats  ENT: Negative for rhinorrhea, epistaxis, hoarseness, sore throat. Respiratory: Negative for shortness of breath,wheezing  Cardiovascular: Negative for chest pain, palpitations   Gastrointestinal: Negative for nausea, vomiting, diarrhea  Genitourinary: Negative for polyuria, dysuria   Hematologic/Lymphatic: Negative for bleeding tendency, easy bruising  Musculoskeletal: Negative for myalgias and arthralgias  Neurologic: Negative for confusion,dysarthria.   Skin: Negative for itching,rash  Psychiatric: Negative for depression,anxiety, agitation. Endocrine: Negative for polydipsia,polyuria,heat /cold intolerance. Past Medical History:   has a past medical history of Arthritis, CAD (coronary artery disease), Cancer ([facility] [address] Cystocele, Diabetes mellitus ([facility] [address] Hepatitis A, History of blood transfusion, Hyperlipidemia, PVC (premature ventricular contraction), Rectocele, Reflux, and Scoliosis. Past Surgical History:   has a past surgical history that includes Appendectomy; joint replacement (Left); hernia repair (6 YRS AGO); Cholecystectomy; shoulder surgery (Right, 7/26/12); Bunionectomy (7/26/12); Breast surgery; Upper gastrointestinal endoscopy (11/2/12); Hysterectomy; bladder suspension; Dilation and curettage of uterus; other surgical history (Left, 09/22/14); Foot surgery; Cardiac catheterization; Colonoscopy (2008); Cystocopy (03/29/2017); other surgical history (Right, 06/28/2018); Cataract removal with implant (Left, 09/13/2018); and pr remv cataract extracap,insert lens (Left, 9/13/2018). Medications:        Current Outpatient Medications on File Prior to Encounter   Medication Sig Dispense Refill    lidocaine PTCH Place 1 patch onto the skin daily 12 patch 0    Potassium (POTASSIMIN PO) Take 20 mg by mouth      naloxone 4 MG/0.1ML LIQD nasal spray 1 spray by Nasal route as needed for Opioid Reversal 1 each 0    omeprazole (PRILOSEC) 20 MG delayed release capsule Take 20 mg by mouth nightly      Cholecalciferol (VITAMIN D3) 2000 UNITS CAPS Take 2,000 Units by mouth daily      ondansetron (ZOFRAN ODT) 4 MG disintegrating tablet Take 1 tablet by mouth every 8 hours as needed for Nausea or Vomiting 20 tablet 0    diphenoxylate-atropine (LOMOTIL) 2.5-0.025 MG per tablet Take 1 tablet by mouth 4 times daily as needed. Chales Haste furosemide (LASIX) 20 MG tablet Take 20 mg by mouth as needed (MAINLY WHEN TRAVELS FOR LEG SWELLING).       oxyCODONE-acetaminophen (PERCOCET)  MG per tablet Take 1 tablet by mouth every 4 hours as needed for Pain for up to 3 days. . 10 tablet 0       Allergies: Allergies   Allergen Reactions    Morphine Other (See Comments)     MIGRAINE HEADACHE        Social History:  Patient Lives    reports that she quit smoking about 24 years ago. She has a 7.50 pack-year smoking history. She has quit using smokeless tobacco. She reports that she drinks alcohol. She reports that she does not use drugs. Family History:  family history includes Cancer in her maternal grandfather; Diabetes in her maternal grandmother. ,    Physical Exam:  /61   Pulse 91   Temp 98.7 °F (37.1 °C) (Oral)   Resp 22   Ht 5' 1\" (1.549 m)   Wt 157 lb (71.2 kg)   SpO2 90%   BMI 29.66 kg/m²     General appearance:  Appears comfortable. Well nourished  Eyes: Sclera clear, pupils equal  ENT: Moist mucus membranes, no thrush. Trachea midline. Cardiovascular: Regular rhythm, normal S1, S2. No murmur, gallop, rub. No edema in lower extremities  Respiratory: Clear to auscultation bilaterally, no wheeze, good inspiratory effort  Gastrointestinal: Abdomen soft, non-tender, not distended, normal bowel sounds  Musculoskeletal: No cyanosis in digits, neck supple  Neurology: Cranial nerves grossly intact. Alert and oriented in time, place and person. No speech or motor deficits  Psychiatry: Appropriate affect. Not agitated  Skin: Warm, dry, normal turgor, no rash  Brisk capillary refill, peripheral pulses palpable     Spine   she had tenderness to the cervcial and thoracic spine. Rib cage area. Pt with no foot drop. No focal deposit. Neurovascular in tack.    Labs:  CBC:   Lab Results   Component Value Date    WBC 3.8 04/01/2019    RBC 3.55 04/01/2019    HGB 11.6 04/01/2019    HCT 34.9 04/01/2019    MCV 98.2 04/01/2019    MCH 32.7 04/01/2019    MCHC 33.3 04/01/2019    RDW 13.9 04/01/2019     04/01/2019    MPV 7.3 04/01/2019     BMP:    Lab Results   Component Value Date    NA 138 04/01/2019    K 4.2 04/01/2019     04/01/2019    CO2 25 04/01/2019    BUN 13 04/01/2019    CREATININE 1.0 04/01/2019    CALCIUM 9.1 04/01/2019    GFRAA >60 04/01/2019    GFRAA 56 01/10/2010    LABGLOM 53 04/01/2019    GLUCOSE 133 04/01/2019     MRI LUMBAR SPINE WO CONTRAST   Final Result   Acute T12 fracture. There is prominent diastasis of the vertebral fracture   plane, which may involve the right pedicle. Borderline compression of the distal cord at the T12 inferior endplate. Interspinous edema, likely traumatic. In addition, there is suspected   traumatic disruption of the anterior annulus at T11-T12. CT CHEST WO CONTRAST   Final Result   Fracture deformity at L1 vertebral body with fracture lines seen extending   through the vertebral body involving both anterior and middle columns but   without involvement of posterior elements. There is also loss of vertebral   body height at L1. Loss of vertebral body height appears similar to prior   thoracic spine x-rays 02/24/2019 but fracture lines may be new from that exam   (although diffuse bone demineralization somewhat limits evaluation on prior   x-rays). All findings at L1 are new since prior CT chest, abdomen and pelvis   from 2017. Acute on subacute to chronic fracture of concern. Consider   further evaluation with MRI. Stable suspected subacute to chronic compression fractures at T5 and T6,   stable from thoracic spine x-rays 02/24/2019 but new from CT chest, abdomen   and pelvis from 2017. Subacute appearing left proximal humeral neck fracture redemonstrated. No acute intrathoracic abnormality. Large hiatal hernia, stable. Atherosclerosis to include coronary artery disease. Critical results were called by Dr. Iris Soto. MD Barbara to Maren Pompa   on 4/1/2019 at 15:43. CT Cervical Spine WO Contrast   Final Result   1. No acute abnormality of the cervical spine.    2. Age indeterminate fractures of T5 and likely T6 only partially evaluated   on this cervical spine CT. These are new since 12/16/2017, and contribute to   kyphosis in the thoracic spine. Recommend correlation with physical exam for   point tenderness. A follow-up MRI of the thoracic spine may be considered   for further evaluation if necessary. 3. Incidental right thyroid nodule, stable. No ultrasound follow-up is   necessary. CT Head WO Contrast   Final Result   No acute intracranial abnormality. XR HUMERUS LEFT (MIN 2 VIEWS)   Final Result   Healing, impacted fracture of the humeral neck. No superimposed, acute   fracture is identified. XR KNEE RIGHT (1-2 VIEWS)   Final Result   No acute abnormality of the right knee. MRI THORACIC SPINE WO CONTRAST    (Results Pending)     Chest Xray:   EKG:    I visualized CXR images and EKG strips      Problem List  Active Problems:    T12 compression fracture (HCC)  Resolved Problems:    * No resolved hospital problems. *        Assessment/Plan:   Acute t12 fracture with intractable pain   - likely due to multiple falls  - ? compression of distal cord but pt with no symptoms  - neurosurgery was consulted from er no acute intervention after mri reading  - MRI spine is pending as pt was not able to tolerate due to pain   - TLSO. - will consult IR to see if pt is a candidate for kyphoplasty. - pain control  - iv steroid. Muscle relaxant and pain  - will need PT/Ot. Given recurrent fall    multiple T5/t6 fracture which seems old. Chronic humurs neck fracture  - healing. Not acute    Recurrent fall  - mechanical.   - needs rehab         DVT prophylaxis       Admit as inpatient I anticipate hospitalization spanni than two midnights for investigation and treatment of the above medically necessary diagnoses. Please note that some part of this chart was generated using Dragon dictation software.  Although every effort was made to ensure the accuracy of this automated transcription, some errors in transcription may have occurred inadvertently. If you may need any clarification, please do not hesitate to contact me through [facility].        Stephon Dillon MD    4/1/2019 6:49 PM

## 2019-04-01 NOTE — ED NOTES
Pt had recent fracture of L arm and does not want to be changed into a gown. Refused and left in street clothing.      Ashlyn Ramirez RN  04/01/19 [id number]

## 2019-04-01 NOTE — PROGRESS NOTES
Pt was not able to complete thoracic spine MRI due to pain. Offered to call RN to see if additional pain meds were available, but patient could not tolerate anymore she said. Notified RN that patient was back, unable to complete tspine, and that she was in a lot of pain.

## 2019-04-01 NOTE — CARE COORDINATION
Discharge Planning Assessment  SW discharge planner met with patient to discuss reason for admission, current living situation, and potential needs at the time of discharge. Pt in ED d/t falls, left humerous fx, other fx's    Demographics/Insurance verified Yes    Current type of dwelling:  [employer] in [place], [place]. Pt just moved in 2 weeks ago. Informed registration and updated address on file. Living arrangements:  Alone    Level of function/Support:  Pt reported 4 falls recently. Broke her left humerus and in a sling. Pt stated she has weakness in legs which is causing the falls. Been using a cane at all times since first fall 6 weeks ago. PCP:   [facility]    Last Visit to PCP:  Can't remember    DME:  [employer] - uses all the time. Active with any community resources/agencies/skilled home care:  Yes, has [address] with the [address]. Has meals and cleaning from facility. Medication compliance issues:  No    Financial issues that could impact healthcare:  No    Transportation at the time of discharge: Will need assistance. Tentative discharge plan:  Pt just discharged from [facility] 2 weeks ago, felt is did not help and does not want to go back. WARD discussed ARU and pt was agreeable if she qualifies. WARD sent message to Dr. iDanne Garcia to put in Physical Medicine and Rehab Consult.       Electronically signed by THI Esquivel, LSW on 4/1/2019 at 7:49 PM

## 2019-04-01 NOTE — ED NOTES
Bed: [facility]  Expected date:   Expected time:   Means of arrival: [facility]  Comments:  M31 - fall     María Willson  04/01/19 [id number]

## 2019-04-01 NOTE — ED NOTES
Pt a&o x4. Pt c/o several falls over the last few weeks. Pt has a slight cardiac hx with 'some mitral problems' per the pt. IV established without complications, blood work obtained and sent to lab. Pt tolerated well. Medicated per MAR. Pt placed on monitors and cycling. NSR with some PVCs with a HR of 90s. ST segment alarm enabled. Pt laying supine in bed in low position, call light in reach, side rails up x2. Pt showing no signs of distress at this time. Breathing easy and unlabored. Will continue to monitor.       Felipe Cortez RN  04/01/19 [id number]

## 2019-04-01 NOTE — ED PROVIDER NOTES
[address] [facility]  eMERGENCY dEPARTMENT eNCOUnter        Pt Name: Yuan Musa  MRN: [id number]  [place] 10/16/1932  Date of evaluation: 4/1/2019  Provider: ROSALINDA Hebert  PCP: Crow Weston MD    This patient was seen and evaluated by the attending physician Cheikh Mitchell       Chief Complaint   Patient presents with    Fall     Pt with 4 falls in last 6 weeks, known humerus fx from 6 weeks ago. Pt states right leg/hip get weak and cause to fall. Pt states fell in rehab few weeks ago and arm has been hurting worse since lower than origional fracture, no new xrays done. Pt with pain in both hips, right knee back. HISTORY OF PRESENT ILLNESS   (Location/Symptom, Timing/Onset, Context/Setting, Quality, Duration, Modifying Factors, Severity)  Note limiting factors. Yuan Musa is a 80 y.o. female with past medical history of osteoarthritis, CAD, previous cancer, diabetes, hyperlipidemia who presents to the ED with complaint of a fall. Patient has had 4+ falls in the past 6 weeks. States initial fall caused a left humerus fracture. States she is followed up with orthopedic surgeon through [facility].. Patient that she is in a sling and is told she does not need surgery. Has had 4 more falls in the past 6 weeks. Most recent fall today. Patient states was in rehab a few weeks ago but states did not improve. Patient states today she was walking and felt like her right leg gave out on her and she fell. Landed on her right knee and onto her left side. States pain to her left shoulder is concern for injury to the left humerus that she has a previous fracture to. States also pain to left sided rib cage. Patient states she didn't hit her head and one of the previous falls. Patient states she feels so shaky and weak and is concern for \"neurologic abnormality\".   Denies visual changes, speech disturbances, numbness/tingling, chest pain otherwise, shortness of breath, abdominal pain, nausea/vomiting, urinary symptoms or changes in bowel movements. Nursing Notes were all reviewed and agreed with or any disagreements were addressed  in the HPI. REVIEW OF SYSTEMS    (2-9 systems for level 4, 10 or more for level 5)     Review of Systems   Constitutional: Positive for fatigue. Negative for appetite change, chills and fever. Eyes: Negative for photophobia and visual disturbance. Respiratory: Negative. Negative for cough and shortness of breath. Cardiovascular: Negative. Negative for chest pain. Gastrointestinal: Negative for abdominal pain, constipation, diarrhea, nausea and vomiting. Genitourinary: Negative for difficulty urinating and dysuria. Musculoskeletal: Positive for arthralgias, gait problem and myalgias. Negative for back pain, joint swelling, neck pain and neck stiffness. Skin: Negative for color change, pallor, rash and wound. Neurological: Positive for weakness. Negative for dizziness, tremors, seizures, syncope, facial asymmetry, speech difficulty, light-headedness, numbness and headaches. Positives and Pertinent negatives as per HPI. Except as noted abovein the ROS, all other systems were reviewed and negative.        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    Cystocele     Diabetes mellitus ([address]     type  2 diet controlled    Hepatitis A 1953    History of blood transfusion     hiatal hernia surgery    Hyperlipidemia     PVC (premature ventricular contraction)     Rectocele     Reflux     Scoliosis          SURGICAL HISTORY     Past Surgical History:   Procedure Laterality Date    APPENDECTOMY      BLADDER SUSPENSION      3 SURGERIES/ANTERIOR AND POSTERIOR REPAIR    BREAST SURGERY      RIGHT LUMPECTOMY AND SEVERAL BIOPSIES ON BOTH BREAST    BUNIONECTOMY  7/26/12    BUNIONECTOMY BILATERAL  FEET    CARDIAC CATHETERIZATION      AV block    CATARACT REMOVAL WITH IMPLANT Left 09/13/2018    CHOLECYSTECTOMY      COLONOSCOPY  2008    normal    CYSTOSCOPY  03/29/2017    U-dil    DILATION AND CURETTAGE OF UTERUS      SUNCTION    FOOT SURGERY      bilat foot surgeries    HERNIA REPAIR  6 YRS AGO    HIATAL HERNIA REPAIR- WIRED  RIBS    HYSTERECTOMY      VAGINAL    JOINT REPLACEMENT Left     TOTAL KNEE REPLACEMENT LEFT    OTHER SURGICAL HISTORY Left 09/22/14    removal rib wire    OTHER SURGICAL HISTORY Right 06/28/2018    [facility] EMULSIFICATION OF CATARACT WITH INTRAOCULAR LENS implant right eye    [place] REMV CATARACT EXTRACAP,INSERT LENS Left 9/13/2018    [facility]ACO EMULSIFICATION OF CATARACT WITH INTRAOCULAR LENS IMPLANT LEFT EYE performed by Bonny Roldan MD at [address] Right 7/26/12    [place] DEPOSIT TAKEN OFF RIGHT SHOULDER    UPPER GASTROINTESTINAL ENDOSCOPY  11/2/12         CURRENTMEDICATIONS       Previous Medications    CHOLECALCIFEROL (VITAMIN D3) 2000 UNITS CAPS    Take 2,000 Units by mouth daily    DIPHENOXYLATE-ATROPINE (LOMOTIL) 2.5-0.025 MG PER TABLET    Take 1 tablet by mouth 4 times daily as needed. .    FUROSEMIDE (LASIX) 20 MG TABLET    Take 20 mg by mouth as needed (MAINLY WHEN TRAVELS FOR LEG SWELLING). LIDOCAINE PTCH    Place 1 patch onto the skin daily    NALOXONE 4 MG/0.1ML LIQD NASAL SPRAY    1 spray by Nasal route as needed for Opioid Reversal    OMEPRAZOLE (PRILOSEC) 20 MG DELAYED RELEASE CAPSULE    Take 20 mg by mouth nightly    ONDANSETRON (ZOFRAN ODT) 4 MG DISINTEGRATING TABLET    Take 1 tablet by mouth every 8 hours as needed for Nausea or Vomiting    OXYCODONE-ACETAMINOPHEN (PERCOCET)  MG PER TABLET    Take 1 tablet by mouth every 4 hours as needed for Pain for up to 3 days. Sidra Stoughton     POTASSIUM (POTASSIMIN PO)    Take 20 mg by mouth         ALLERGIES Morphine    FAMILYHISTORY       Family History   Problem Relation Age of Onset    Diabetes Maternal Grandmother         type 2, great grandma type1    Cancer Maternal Grandfather         rectal and  scrotal cancer          SOCIAL HISTORY       Social History     Socioeconomic History    Marital status:      Spouse name: None    Number of children: None    Years of education: None    Highest education level: None   Occupational History    None   Social Needs    Financial resource strain: None    Food insecurity:     Worry: None     Inability: None    Transportation needs:     Medical: None     Non-medical: None   Tobacco Use    Smoking status: Former Smoker     Packs/day: 0.50     Years: 15.00     Pack years: 7.50     Last attempt to quit: 1995     Years since quittin.2    Smokeless tobacco: Former User    Tobacco comment: quit - pt reports    Substance and Sexual Activity    Alcohol use: Yes     Comment: rare wine     Drug use: No    Sexual activity: None   Lifestyle    Physical activity:     Days per week: None     Minutes per session: None    Stress: None   Relationships    Social connections:     Talks on phone: None     Gets together: None     Attends Religion service: None     Active member of club or organization: None     Attends meetings of clubs or organizations: None     Relationship status: None    Intimate partner violence:     Fear of current or ex partner: None     Emotionally abused: None     Physically abused: None     Forced sexual activity: None   Other Topics Concern    None   Social History Narrative    None       SCREENINGS             PHYSICAL EXAM    (up to 7 for level 4, 8 or more for level 5)     ED Triage Vitals [19 1318]   BP Temp Temp Source Pulse Resp SpO2 Height Weight   136/80 98.7 °F (37.1 °C) Oral 89 18 93 % 5' 1\" (1.549 m) 157 lb (71.2 kg)       Physical Exam   Constitutional: She is oriented to person, place, and time.  She appears well-developed and well-nourished. No distress. HENT:   Head: Normocephalic and atraumatic. Right Ear: External ear normal.   Left Ear: External ear normal.   Atraumatic. No raccoon eyes or Kumar sign. No trismus or jaw malocclusion. No crepitus or step-off. Eyes: Pupils are equal, round, and reactive to light. EOM are normal. Right eye exhibits no discharge. Left eye exhibits no discharge. Neck: Normal range of motion. Neck supple. Cardiovascular: Normal rate, regular rhythm, normal heart sounds and intact distal pulses. Exam reveals no gallop and no friction rub. No murmur heard. Pulmonary/Chest: Effort normal and breath sounds normal. No stridor. No respiratory distress. She has no wheezes. She has no rales. She exhibits tenderness. Abdominal: Soft. She exhibits no distension and no mass. There is no tenderness. There is no rebound and no guarding. Musculoskeletal: Normal range of motion. Sling noted to the left sided arm. Tenderness over the left mid humerus. No anterior chest wall tenderness. Tenderness over the left-sided rib cage. No pelvis instability. Tenderness over the right anterior knee. Full range of motion and strength. Distal neurovascular intact. No other TTP to the upper and lower extremities throughout. No midline cervical or lumbar spine. Tenderness thoracic paraspinal musculature and associated midline spine. Neurological: She is alert and oriented to person, place, and time. She has normal strength. No cranial nerve deficit or sensory deficit. GCS eye subscore is 4. GCS verbal subscore is 5. GCS motor subscore is 6. Reflex Scores:       Achilles reflexes are 2+ on the right side and 2+ on the left side. Gait deferred. Skin: Skin is warm and dry. No rash noted. She is not diaphoretic. No erythema. No pallor. Psychiatric: She has a normal mood and affect.  Her behavior is normal.       DIAGNOSTIC RESULTS   LABS:    Labs Reviewed   CBC WITH AUTO DIFFERENTIAL - Abnormal; Notable for the following components:       Result Value    WBC 3.8 (*)     RBC 3.55 (*)     Hemoglobin 11.6 (*)     Hematocrit 34.9 (*)     Lymphocytes # 0.8 (*)     All other components within normal limits    Narrative:     Performed at:  [facility] [address]   Phone [phone]   COMPREHENSIVE METABOLIC PANEL W/ REFLEX TO MG FOR LOW K - Abnormal; Notable for the following components:    Glucose 133 (*)     GFR Non- 53 (*)     All other components within normal limits    Narrative:     Performed at:  [facility] [address]   Phone [phone]   BRAIN NATRIURETIC PEPTIDE - Abnormal; Notable for the following components:    Pro- (*)     All other components within normal limits    Narrative:     Performed at:  [facility] [address]   Phone [phone]   TROPONIN    Narrative:     Performed at:  [facility] [address]   Phone [phone]   URINE RT REFLEX TO CULTURE    Narrative:     Performed at:  [facility] [address]   Phone [phone]   PROTIME-INR    Narrative:     Performed at:  [facility] [address]   Phone [phone]       All other labs were within normal range or not returned as of this dictation. EKG: All EKG's are interpreted by the Emergency Department Physician who either signs orCo-signs this chart in the absence of a cardiologist.  Please see their note for interpretation of EKG.       RADIOLOGY:   Non-plain film images such as CT, Ultrasound and MRI are read by the radiologist. Plain radiographic images are visualized andpreliminarily interpreted by the  ED Provider with admission with possible TLSO brace, consultation by IR for potential kyphoplasty, and further pain control/PT/OT evaluation. Did not recommend any emergent surgical intervention from their service. Neurosurgeon did place note in the chart. Case discussed with hospitalist, Dr. Teresita Jay, who agreed to admit the patient for further evaluation and treatment. FINAL IMPRESSION      1. Multiple falls    2. Closed fracture of proximal end of left humerus, unspecified fracture morphology, initial encounter    3.  Closed fracture of twelfth thoracic vertebra, unspecified fracture morphology, initial encounter [facility])          [address]Antoinette Dent Admitted 04/01/2019 06:48:13 PM      PATIENT REFERREDTO:  Joyce Maier MD  [address] [place] [address]  [phone]            DISCHARGE MEDICATIONS:  New Prescriptions    No medications on file       DISCONTINUED MEDICATIONS:  Discontinued Medications    No medications on file              (Please note that portions ofthis note were completed with a voice recognition program.  Efforts were made to edit the dictations but occasionally words are mis-transcribed.)    ROSALINDA Dominguez (electronically signed)          ROSALINDA Gonzalez  04/01/19 8853

## 2019-04-02 ENCOUNTER — APPOINTMENT (OUTPATIENT)
Dept: INTERVENTIONAL RADIOLOGY/VASCULAR | Age: 84
DRG: 983 | End: 2019-04-02
Payer: MEDICARE

## 2019-04-02 PROCEDURE — 6360000002 HC RX W HCPCS: Performed by: RADIOLOGY

## 2019-04-02 PROCEDURE — 1200000000 HC SEMI PRIVATE

## 2019-04-02 PROCEDURE — 2500000003 HC RX 250 WO HCPCS: Performed by: HOSPITALIST

## 2019-04-02 PROCEDURE — 99153 MOD SED SAME PHYS/QHP EA: CPT

## 2019-04-02 PROCEDURE — 99152 MOD SED SAME PHYS/QHP 5/>YRS: CPT

## 2019-04-02 PROCEDURE — 6360000002 HC RX W HCPCS: Performed by: HOSPITALIST

## 2019-04-02 PROCEDURE — 2700000000 HC OXYGEN THERAPY PER DAY

## 2019-04-02 PROCEDURE — C1713 ANCHOR/SCREW BN/BN,TIS/BN: HCPCS

## 2019-04-02 PROCEDURE — 6370000000 HC RX 637 (ALT 250 FOR IP): Performed by: RADIOLOGY

## 2019-04-02 PROCEDURE — 6370000000 HC RX 637 (ALT 250 FOR IP): Performed by: HOSPITALIST

## 2019-04-02 PROCEDURE — 22513 PERQ VERTEBRAL AUGMENTATION: CPT

## 2019-04-02 PROCEDURE — 0PS43ZZ REPOSITION THORACIC VERTEBRA, PERCUTANEOUS APPROACH: ICD-10-PCS | Performed by: RADIOLOGY

## 2019-04-02 PROCEDURE — 0PU43JZ SUPPLEMENT THORACIC VERTEBRA WITH SYNTHETIC SUBSTITUTE, PERCUTANEOUS APPROACH: ICD-10-PCS | Performed by: RADIOLOGY

## 2019-04-02 RX ORDER — HYDROMORPHONE HYDROCHLORIDE 1 MG/ML
0.5 INJECTION, SOLUTION INTRAMUSCULAR; INTRAVENOUS; SUBCUTANEOUS
Status: DISCONTINUED | OUTPATIENT
Start: 2019-04-02 | End: 2019-04-03 | Stop reason: HOSPADM

## 2019-04-02 RX ORDER — LIDOCAINE HYDROCHLORIDE 10 MG/ML
20 INJECTION, SOLUTION EPIDURAL; INFILTRATION; INTRACAUDAL; PERINEURAL ONCE
Status: COMPLETED | OUTPATIENT
Start: 2019-04-02 | End: 2019-04-02

## 2019-04-02 RX ORDER — CEFAZOLIN SODIUM 2 G/100ML
2 INJECTION, SOLUTION INTRAVENOUS
Status: COMPLETED | OUTPATIENT
Start: 2019-04-02 | End: 2019-04-02

## 2019-04-02 RX ORDER — OXYCODONE HYDROCHLORIDE AND ACETAMINOPHEN 5; 325 MG/1; MG/1
1 TABLET ORAL EVERY 4 HOURS PRN
Status: DISCONTINUED | OUTPATIENT
Start: 2019-04-02 | End: 2019-04-03 | Stop reason: HOSPADM

## 2019-04-02 RX ORDER — FENTANYL CITRATE 50 UG/ML
INJECTION, SOLUTION INTRAMUSCULAR; INTRAVENOUS
Status: COMPLETED | OUTPATIENT
Start: 2019-04-02 | End: 2019-04-02

## 2019-04-02 RX ORDER — DIPHENHYDRAMINE HCL 25 MG
TABLET ORAL
Status: COMPLETED | OUTPATIENT
Start: 2019-04-02 | End: 2019-04-02

## 2019-04-02 RX ORDER — BUPIVACAINE HYDROCHLORIDE 5 MG/ML
30 INJECTION, SOLUTION EPIDURAL; INTRACAUDAL ONCE
Status: COMPLETED | OUTPATIENT
Start: 2019-04-02 | End: 2019-04-02

## 2019-04-02 RX ORDER — MIDAZOLAM HYDROCHLORIDE 1 MG/ML
INJECTION INTRAMUSCULAR; INTRAVENOUS
Status: COMPLETED | OUTPATIENT
Start: 2019-04-02 | End: 2019-04-02

## 2019-04-02 RX ORDER — DIPHENHYDRAMINE HYDROCHLORIDE 50 MG/ML
50 INJECTION INTRAMUSCULAR; INTRAVENOUS ONCE
Status: DISCONTINUED | OUTPATIENT
Start: 2019-04-02 | End: 2019-04-03 | Stop reason: HOSPADM

## 2019-04-02 RX ADMIN — FENTANYL CITRATE 50 MCG: 50 INJECTION, SOLUTION INTRAMUSCULAR; INTRAVENOUS at 11:25

## 2019-04-02 RX ADMIN — OXYCODONE AND ACETAMINOPHEN 1 TABLET: 5; 325 TABLET ORAL at 17:05

## 2019-04-02 RX ADMIN — Medication: at 12:31

## 2019-04-02 RX ADMIN — HYDROMORPHONE HYDROCHLORIDE 0.5 MG: 1 INJECTION, SOLUTION INTRAMUSCULAR; INTRAVENOUS; SUBCUTANEOUS at 09:00

## 2019-04-02 RX ADMIN — CEFAZOLIN SODIUM 2 G: 2 INJECTION, SOLUTION INTRAVENOUS at 11:26

## 2019-04-02 RX ADMIN — MIDAZOLAM HYDROCHLORIDE 1 MG: 1 INJECTION INTRAMUSCULAR; INTRAVENOUS at 11:26

## 2019-04-02 RX ADMIN — DEXAMETHASONE SODIUM PHOSPHATE 4 MG: 4 INJECTION, SOLUTION INTRAMUSCULAR; INTRAVENOUS at 04:25

## 2019-04-02 RX ADMIN — DIPHENHYDRAMINE HCL 50 MG: 25 TABLET ORAL at 11:26

## 2019-04-02 RX ADMIN — HYDROMORPHONE HYDROCHLORIDE 0.5 MG: 1 INJECTION, SOLUTION INTRAMUSCULAR; INTRAVENOUS; SUBCUTANEOUS at 15:29

## 2019-04-02 RX ADMIN — HYDROMORPHONE HYDROCHLORIDE 0.5 MG: 1 INJECTION, SOLUTION INTRAMUSCULAR; INTRAVENOUS; SUBCUTANEOUS at 06:35

## 2019-04-02 RX ADMIN — HYDROMORPHONE HYDROCHLORIDE 0.5 MG: 1 INJECTION, SOLUTION INTRAMUSCULAR; INTRAVENOUS; SUBCUTANEOUS at 12:16

## 2019-04-02 RX ADMIN — METHOCARBAMOL 750 MG: 750 TABLET ORAL at 17:05

## 2019-04-02 RX ADMIN — LIDOCAINE HYDROCHLORIDE 8 ML: 10 INJECTION, SOLUTION EPIDURAL; INFILTRATION; INTRACAUDAL; PERINEURAL at 11:45

## 2019-04-02 RX ADMIN — OXYCODONE AND ACETAMINOPHEN 1 TABLET: 5; 325 TABLET ORAL at 21:02

## 2019-04-02 RX ADMIN — PANTOPRAZOLE SODIUM 40 MG: 40 TABLET, DELAYED RELEASE ORAL at 06:35

## 2019-04-02 RX ADMIN — HYDROMORPHONE HYDROCHLORIDE 0.5 MG: 1 INJECTION, SOLUTION INTRAMUSCULAR; INTRAVENOUS; SUBCUTANEOUS at 01:48

## 2019-04-02 RX ADMIN — METHOCARBAMOL 750 MG: 750 TABLET ORAL at 21:03

## 2019-04-02 RX ADMIN — BUPIVACAINE HYDROCHLORIDE 150 MG: 5 INJECTION, SOLUTION EPIDURAL; INTRACAUDAL; PERINEURAL at 11:40

## 2019-04-02 ASSESSMENT — PAIN SCALES - GENERAL
PAINLEVEL_OUTOF10: 9
PAINLEVEL_OUTOF10: 4
PAINLEVEL_OUTOF10: 4
PAINLEVEL_OUTOF10: 7
PAINLEVEL_OUTOF10: 7
PAINLEVEL_OUTOF10: 9
PAINLEVEL_OUTOF10: 7
PAINLEVEL_OUTOF10: 8
PAINLEVEL_OUTOF10: 4
PAINLEVEL_OUTOF10: 8
PAINLEVEL_OUTOF10: 0
PAINLEVEL_OUTOF10: 4
PAINLEVEL_OUTOF10: 7

## 2019-04-02 NOTE — OP NOTE
Brief Postoperative Note    Jesika Ortez  YOB: 1932  [id number]    Pre-operative Diagnosis: T12 osteoporotic compression fracture    Post-operative Diagnosis: Same    Procedure: T12 kyphoplasty    Anesthesia: Moderate Sedation    Surgeons/Assistants: Dr. Violetta Landau    Estimated Blood Loss: less than 5ml     Complications: None    Specimens: Was Not Obtained    Findings: successful T12 kyphoplasty.     Electronically signed by Daniel Varela MD on 4/2/2019 at 12:10 PM

## 2019-04-02 NOTE — ED NOTES
Report called to [facility] on 4T. V/u, denies questions at this time. Pt taken to the floor by ED Tech and belongings in tow. Pt a&o with no signs of distress. No medications infusing during time of transport.         Madelyn Mitchell RN  04/01/19 2037

## 2019-04-02 NOTE — PROGRESS NOTES
Hospitalist Progress Note      PCP: Savannah Torrez MD    Date of Admission: 4/1/2019    Chief Complaint: fall    Hospital Course: 79 y/o female with Hx of spinal stenosis, disc displacement, and previous mechanical falls; presents to [facility] after a new mechanical fall. She states she was walking and her right leg gave out and she fell on her back and side. On admission she states unbearable pain in her left shoulder, back/spine, and neck area. Denies weakness or numbness, bowel, or bladder incontinence. CT spine showed compression fracture of T12, kyphoplasty performed 04/02/2019. Subjective:  Patient was seen post kyphoplasty with daughter at bedside. Patient complains of increased 10/10 L shoulder pain radiating to her neck since MRI early this morning, and asked if she can get an X-Ray to see if the \"bone shifted\". Patient states pain in back however is bearable at this moment. She denies any fever, chills, loss of consciousness, and bowel or urinary Sx. Medications:  Reviewed  Infusion Medications   Scheduled Medications    diphenhydrAMINE  50 mg Intravenous Once    lidocaine  1 patch Transdermal Daily    pantoprazole  40 mg Oral QAM AC    methocarbamol  750 mg Oral TID    sodium chloride flush  10 mL Intravenous 2 times per day     PRN Meds: HYDROmorphone, ondansetron, furosemide, sodium chloride flush, magnesium hydroxide, ondansetron, acetaminophen    Intake/Output Summary (Last 24 hours) at 4/2/2019 1433  Last data filed at 4/2/2019 1230  Gross per 24 hour   Intake --   Output 700 ml   Net -700 ml     Physical Exam Performed:  /71   Pulse 80   Temp 97.3 °F (36.3 °C) (Oral)   Resp 18   Ht 5' 1\" (1.549 m)   Wt 156 lb 9.6 oz (71 kg)   SpO2 100%   BMI 29.59 kg/m²   General appearance: Elderly  female. No apparent distress, appears stated age and cooperative. HEENT: Pupils equal, round, and reactive to light. Conjunctivae/corneas clear.   Neck: Supple, with full range of motion. No jugular venous distention. Trachea midline. Respiratory:  Normal respiratory effort. Clear to auscultation, bilaterally without Rales/Wheezes/Rhonchi. Cardiovascular: Regular rate and rhythm with normal S1/S2 without murmurs, rubs or gallops. Abdomen: Soft, non-tender, non-distended with normal bowel sounds. Musculoskeletal: Kyphosis and Scoliosis noted. Spinal and muscular tenderness to palpation from T5 to L5. No clubbing, cyanosis or edema bilaterally. ROM of LUE limited and in a sling from previous Fx. RLE passive ROM limited when compared to LLE. Skin: Skin color, texture, turgor normal.  No rashes or lesions. Neurologic:  Neurovascularly intact without any focal sensory/motor deficits. Cranial nerves: II-XII intact, grossly non-focal.  Psychiatric: Alert and oriented, thought content appropriate, normal insight  Capillary Refill: Brisk,< 3 seconds   Peripheral Pulses: +2 palpable, equal bilaterally     Labs:   Recent Labs     04/01/19  1431   WBC 3.8*   HGB 11.6*   HCT 34.9*        Recent Labs     04/01/19  1431      K 4.2      CO2 25   BUN 13   CREATININE 1.0   CALCIUM 9.1     Recent Labs     04/01/19  1431   AST 22   ALT 13   BILITOT 0.3   ALKPHOS 87     Recent Labs     04/01/19  1431   INR 0.98     Recent Labs     04/01/19  1431   TROPONINI <0.01     Urinalysis:   Lab Results   Component Value Date    NITRU Negative 04/01/2019    WBCUA 6-10 02/24/2019    BACTERIA 4+ 02/24/2019    RBCUA 3-5 02/24/2019    BLOODU Negative 04/01/2019    SPECGRAV 1.009 04/01/2019    GLUCOSEU Negative 04/01/2019     Radiology:  IR KYPHOPLASTY THORACIC 1 VERTEBRAL BODY   Final Result   Technically successful fluoroscopic guided T12 kyphoplasty. MRI LUMBAR SPINE WO CONTRAST   Final Result   Acute T12 fracture. There is prominent diastasis of the vertebral fracture   plane, which may involve the right pedicle.       Borderline compression of the distal cord at the T12 inferior endplate. Interspinous edema, likely traumatic. In addition, there is suspected   traumatic disruption of the anterior annulus at T11-T12. CT CHEST WO CONTRAST   Final Result   Fracture deformity at L1 vertebral body with fracture lines seen extending   through the vertebral body involving both anterior and middle columns but   without involvement of posterior elements. There is also loss of vertebral   body height at L1. Loss of vertebral body height appears similar to prior   thoracic spine x-rays 02/24/2019 but fracture lines may be new from that exam   (although diffuse bone demineralization somewhat limits evaluation on prior   x-rays). All findings at L1 are new since prior CT chest, abdomen and pelvis   from 2017. Acute on subacute to chronic fracture of concern. Consider   further evaluation with MRI. Stable suspected subacute to chronic compression fractures at T5 and T6,   stable from thoracic spine x-rays 02/24/2019 but new from CT chest, abdomen   and pelvis from 2017. Subacute appearing left proximal humeral neck fracture redemonstrated. No acute intrathoracic abnormality. Large hiatal hernia, stable. Atherosclerosis to include coronary artery disease. Critical results were called by Dr. Bart Lynn. MD Barbara to Rickie Clark   on 4/1/2019 at 15:43. CT Cervical Spine WO Contrast   Final Result   1. No acute abnormality of the cervical spine. 2. Age indeterminate fractures of T5 and likely T6 only partially evaluated   on this cervical spine CT. These are new since 12/16/2017, and contribute to   kyphosis in the thoracic spine. Recommend correlation with physical exam for   point tenderness. A follow-up MRI of the thoracic spine may be considered   for further evaluation if necessary. 3. Incidental right thyroid nodule, stable. No ultrasound follow-up is   necessary.          CT Head WO Contrast   Final Result   No acute intracranial

## 2019-04-02 NOTE — PROGRESS NOTES
Shift assessment complete. Patient alert and oriented x4, able to express physical needs. PRN pain meds through shift. Patient wants to go to rehab for strength building. The care plan and education has been reviewed and mutually agreed upon with the patient. Patient remains free from falls. All fall precautions in place. Yellow blanket at bedside, yellow bracelet on patient. SAFE sign on door. Bed and chair alarms being used. Bed in lowest position. Will monitor.      Electronically signed by Haven Weinberg RN on 4/2/2019 at 3:40 AM

## 2019-04-02 NOTE — PLAN OF CARE
Problem: Pain:  Goal: Pain level will decrease  Description  Pain level will decrease  Outcome: Ongoing  Note:   Pain has been manageable through means of assessing, interventions, and reassessing for duration of shift. Patient is able to rate pain on 0-10 pain scale. Pain level tolerable with PRN pain medications. Patient aware of medications, side effects and PRN schedule. Will continue to assess.     Goal: Control of acute pain  Description  Control of acute pain  Outcome: Ongoing  Goal: Control of chronic pain  Description  Control of chronic pain  Outcome: Ongoing

## 2019-04-02 NOTE — PROGRESS NOTES
Physical/Occupational Therapy  Roor Traore  PT/OT orders noted and appreciated. Per RN and chart review, pt scheduled for kyphoplasty this date for T12 compression fracture. PT/OT will therefore HOLD this date and follow-up with pt tomorrow to complete evaluations for D/C planning.   Thank you,  Dain Jones, PT, DPT, [phone]

## 2019-04-02 NOTE — ED NOTES
Called Gopal Awad RN ([phone]) - From Rehab and updated on plan of care and admission status.  Denies needs at this time, v/u.     rGegoria Hampton RN  04/01/19 2042

## 2019-04-02 NOTE — PRE SEDATION
Sedation Pre-Procedure Note    Patient Name: Alma Stewart   YOB: 1932  Room/Bed: [facility]4568/5880-91  Medical Record Number: [id number]  Date: 4/2/2019   Time: 12:09 PM       Indication:  T12 osteoporotic compression fracture    Consent: I have discussed with the patient and/or the patient representative the indication, alternatives, and the possible risks and/or complications of the planned procedure and the anesthesia methods. The patient and/or patient representative appear to understand and agree to proceed. Vital Signs:   Vitals:    04/02/19 1151   BP: (!) 150/76   Pulse: 98   Resp: (!) 34   Temp:    SpO2: 100%       Past Medical History:   has a past medical history of Arthritis, CAD (coronary artery disease), Cancer ([facility] [address] Cystocele, Diabetes mellitus ([facility] [address] Hepatitis A, History of blood transfusion, Hyperlipidemia, PVC (premature ventricular contraction), Rectocele, Reflux, and Scoliosis. Past Surgical History:   has a past surgical history that includes Appendectomy; joint replacement (Left); hernia repair (6 YRS AGO); Cholecystectomy; shoulder surgery (Right, 7/26/12); Bunionectomy (7/26/12); Breast surgery; Upper gastrointestinal endoscopy (11/2/12); Hysterectomy; bladder suspension; Dilation and curettage of uterus; other surgical history (Left, 09/22/14); Foot surgery; Cardiac catheterization; Colonoscopy (2008); Cystocopy (03/29/2017); other surgical history (Right, 06/28/2018); Cataract removal with implant (Left, 09/13/2018); and pr remv cataract extracap,insert lens (Left, 9/13/2018).     Medications:   Scheduled Meds:    diphenhydrAMINE  50 mg Intravenous Once    lidocaine  1 patch Transdermal Daily    pantoprazole  40 mg Oral QAM AC    methocarbamol  750 mg Oral TID    sodium chloride flush  10 mL Intravenous 2 times per day     Continuous Infusions:   PRN Meds: HYDROmorphone, ondansetron, furosemide, sodium chloride flush, magnesium hydroxide, ondansetron, acetaminophen  Home Meds:   Prior to Admission medications    Medication Sig Start Date End Date Taking? Authorizing Provider   omeprazole (PRILOSEC) 20 MG delayed release capsule Take 20 mg by mouth nightly   Yes Historical Provider, MD   Cholecalciferol (VITAMIN D3) 2000 UNITS CAPS Take 2,000 Units by mouth daily   Yes Historical Provider, MD   docusate sodium (COLACE) 100 MG capsule Take 100 mg by mouth 2 times daily    Historical Provider, MD   alendronate (FOSAMAX) 70 MG tablet Take 70 mg by mouth once a week 3/21/19   Historical Provider, MD   oxyCODONE-acetaminophen (PERCOCET)  MG per tablet Take 1 tablet by mouth every 4 hours as needed for Pain for up to 3 days. . 2/27/19 3/2/19  Kenny Retana MD   [facility] 1 patch onto the skin daily 2/28/19   Kenny Retana MD   Potassium (POTASSIMIN PO) Take 20 mg by mouth    Historical Provider, MD   naloxone 4 MG/0.1ML LIQD nasal spray 1 spray by Nasal route as needed for Opioid Reversal 1/7/19   Shawna Ganser, APRN - CNP   ondansetron (ZOFRAN ODT) 4 MG disintegrating tablet Take 1 tablet by mouth every 8 hours as needed for Nausea or Vomiting 11/19/15   Xavier Louis MD   diphenoxylate-atropine (LOMOTIL) 2.5-0.025 MG per tablet Take 1 tablet by mouth 4 times daily as needed. . 12/19/14   Historical Provider, MD   furosemide (LASIX) 20 MG tablet Take 20 mg by mouth as needed (MAINLY WHEN TRAVELS FOR LEG SWELLING). Historical Provider, MD     Coumadin Use Last 7 Days:  no  Antiplatelet drug therapy use last 7 days: no  Other anticoagulant use last 7 days: no  Additional Medication Information:        Pre-Sedation Documentation and Exam:   I have reviewed the patient's history and review of systems.     Mallampati Airway Assessment:  normal neck range of motion    Prior History of Anesthesia Complications:   none    ASA Classification:  Class 3 - A patient with severe systemic disease that limits activity but is not incapacitating    Sedation/ Anesthesia Plan:   intravenous sedation    Medications Planned:   midazolam (Versed) intravenously and fentanyl intravenously    Patient is an appropriate candidate for plan of sedation: yes    Electronically signed by Benigno Taylor MD on 4/2/2019 at 12:09 PM

## 2019-04-03 ENCOUNTER — HOSPITAL ENCOUNTER (INPATIENT)
Age: 84
LOS: 9 days | Discharge: HOME HEALTH | DRG: 561 | End: 2019-04-12
Attending: PHYSICAL MEDICINE & REHABILITATION | Admitting: PHYSICAL MEDICINE & REHABILITATION
Payer: MEDICARE

## 2019-04-03 VITALS
HEART RATE: 79 BPM | OXYGEN SATURATION: 96 % | SYSTOLIC BLOOD PRESSURE: 132 MMHG | TEMPERATURE: 97.5 F | BODY MASS INDEX: 29.57 KG/M2 | HEIGHT: 61 IN | DIASTOLIC BLOOD PRESSURE: 79 MMHG | WEIGHT: 156.6 LBS | RESPIRATION RATE: 16 BRPM

## 2019-04-03 DIAGNOSIS — S22.000S THORACIC COMPRESSION FRACTURE, SEQUELA: Primary | ICD-10-CM

## 2019-04-03 LAB
EKG ATRIAL RATE: 85 BPM
EKG DIAGNOSIS: NORMAL
EKG P AXIS: 47 DEGREES
EKG P-R INTERVAL: 332 MS
EKG Q-T INTERVAL: 362 MS
EKG QRS DURATION: 92 MS
EKG QTC CALCULATION (BAZETT): 430 MS
EKG R AXIS: -20 DEGREES
EKG T AXIS: 7 DEGREES
EKG VENTRICULAR RATE: 85 BPM

## 2019-04-03 PROCEDURE — 1280000000 HC REHAB R&B

## 2019-04-03 PROCEDURE — 2580000003 HC RX 258: Performed by: HOSPITALIST

## 2019-04-03 PROCEDURE — 6370000000 HC RX 637 (ALT 250 FOR IP): Performed by: HOSPITALIST

## 2019-04-03 PROCEDURE — 6360000002 HC RX W HCPCS: Performed by: FAMILY MEDICINE

## 2019-04-03 PROCEDURE — 6370000000 HC RX 637 (ALT 250 FOR IP): Performed by: FAMILY MEDICINE

## 2019-04-03 PROCEDURE — 97535 SELF CARE MNGMENT TRAINING: CPT

## 2019-04-03 PROCEDURE — 97166 OT EVAL MOD COMPLEX 45 MIN: CPT

## 2019-04-03 PROCEDURE — 2580000003 HC RX 258: Performed by: PHYSICAL MEDICINE & REHABILITATION

## 2019-04-03 PROCEDURE — 99024 POSTOP FOLLOW-UP VISIT: CPT | Performed by: NURSE PRACTITIONER

## 2019-04-03 PROCEDURE — 6370000000 HC RX 637 (ALT 250 FOR IP): Performed by: PHYSICAL MEDICINE & REHABILITATION

## 2019-04-03 PROCEDURE — 2500000003 HC RX 250 WO HCPCS: Performed by: HOSPITALIST

## 2019-04-03 PROCEDURE — 93010 ELECTROCARDIOGRAM REPORT: CPT | Performed by: INTERNAL MEDICINE

## 2019-04-03 PROCEDURE — 97530 THERAPEUTIC ACTIVITIES: CPT

## 2019-04-03 PROCEDURE — 97162 PT EVAL MOD COMPLEX 30 MIN: CPT

## 2019-04-03 PROCEDURE — APPNB30 APP NON BILLABLE TIME 0-30 MINS: Performed by: NURSE PRACTITIONER

## 2019-04-03 RX ORDER — DOCUSATE SODIUM 100 MG/1
100 CAPSULE, LIQUID FILLED ORAL 2 TIMES DAILY
Status: CANCELLED | OUTPATIENT
Start: 2019-04-03

## 2019-04-03 RX ORDER — METHYLPREDNISOLONE SODIUM SUCCINATE 40 MG/ML
40 INJECTION, POWDER, LYOPHILIZED, FOR SOLUTION INTRAMUSCULAR; INTRAVENOUS EVERY 12 HOURS
Status: DISCONTINUED | OUTPATIENT
Start: 2019-04-03 | End: 2019-04-03

## 2019-04-03 RX ORDER — HYDRALAZINE HYDROCHLORIDE 25 MG/1
50 TABLET, FILM COATED ORAL EVERY 8 HOURS PRN
Status: DISCONTINUED | OUTPATIENT
Start: 2019-04-03 | End: 2019-04-12 | Stop reason: HOSPADM

## 2019-04-03 RX ORDER — PANTOPRAZOLE SODIUM 40 MG/1
40 TABLET, DELAYED RELEASE ORAL
Status: DISCONTINUED | OUTPATIENT
Start: 2019-04-04 | End: 2019-04-04

## 2019-04-03 RX ORDER — SODIUM CHLORIDE 0.9 % (FLUSH) 0.9 %
10 SYRINGE (ML) INJECTION PRN
Status: DISCONTINUED | OUTPATIENT
Start: 2019-04-03 | End: 2019-04-04

## 2019-04-03 RX ORDER — POLYETHYLENE GLYCOL 3350 17 G/17G
17 POWDER, FOR SOLUTION ORAL
Status: DISCONTINUED | OUTPATIENT
Start: 2019-04-06 | End: 2019-04-12 | Stop reason: HOSPADM

## 2019-04-03 RX ORDER — POLYETHYLENE GLYCOL 3350 17 G/17G
17 POWDER, FOR SOLUTION ORAL
Status: DISCONTINUED | OUTPATIENT
Start: 2019-04-03 | End: 2019-04-03 | Stop reason: HOSPADM

## 2019-04-03 RX ORDER — LIDOCAINE 4 G/G
1 PATCH TOPICAL DAILY
Status: DISCONTINUED | OUTPATIENT
Start: 2019-04-04 | End: 2019-04-12 | Stop reason: HOSPADM

## 2019-04-03 RX ORDER — METHYLPREDNISOLONE 4 MG/1
4 TABLET ORAL
Status: DISCONTINUED | OUTPATIENT
Start: 2019-04-04 | End: 2019-04-03 | Stop reason: HOSPADM

## 2019-04-03 RX ORDER — TRAZODONE HYDROCHLORIDE 50 MG/1
50 TABLET ORAL NIGHTLY PRN
Status: DISCONTINUED | OUTPATIENT
Start: 2019-04-03 | End: 2019-04-12 | Stop reason: HOSPADM

## 2019-04-03 RX ORDER — HYDROMORPHONE HYDROCHLORIDE 1 MG/ML
0.5 INJECTION, SOLUTION INTRAMUSCULAR; INTRAVENOUS; SUBCUTANEOUS EVERY 4 HOURS PRN
Status: DISCONTINUED | OUTPATIENT
Start: 2019-04-03 | End: 2019-04-08

## 2019-04-03 RX ORDER — FUROSEMIDE 20 MG/1
20 TABLET ORAL PRN
Status: CANCELLED | OUTPATIENT
Start: 2019-04-03

## 2019-04-03 RX ORDER — ESOMEPRAZOLE MAGNESIUM 20 MG/1
20 FOR SUSPENSION ORAL DAILY
COMMUNITY

## 2019-04-03 RX ORDER — ACETAMINOPHEN 325 MG/1
650 TABLET ORAL 4 TIMES DAILY
Status: CANCELLED | OUTPATIENT
Start: 2019-04-03

## 2019-04-03 RX ORDER — ONDANSETRON 4 MG/1
4 TABLET, ORALLY DISINTEGRATING ORAL EVERY 8 HOURS PRN
Status: CANCELLED | OUTPATIENT
Start: 2019-04-03

## 2019-04-03 RX ORDER — HYDRALAZINE HYDROCHLORIDE 25 MG/1
50 TABLET, FILM COATED ORAL EVERY 8 HOURS PRN
Status: CANCELLED | OUTPATIENT
Start: 2019-04-03

## 2019-04-03 RX ORDER — SODIUM CHLORIDE 0.9 % (FLUSH) 0.9 %
10 SYRINGE (ML) INJECTION EVERY 12 HOURS SCHEDULED
Status: DISCONTINUED | OUTPATIENT
Start: 2019-04-03 | End: 2019-04-04

## 2019-04-03 RX ORDER — ONDANSETRON 4 MG/1
4 TABLET, ORALLY DISINTEGRATING ORAL EVERY 8 HOURS PRN
Status: DISCONTINUED | OUTPATIENT
Start: 2019-04-03 | End: 2019-04-12 | Stop reason: HOSPADM

## 2019-04-03 RX ORDER — HYDROMORPHONE HYDROCHLORIDE 1 MG/ML
0.5 INJECTION, SOLUTION INTRAMUSCULAR; INTRAVENOUS; SUBCUTANEOUS EVERY 4 HOURS PRN
Status: CANCELLED | OUTPATIENT
Start: 2019-04-03

## 2019-04-03 RX ORDER — DOCUSATE SODIUM 100 MG/1
100 CAPSULE, LIQUID FILLED ORAL 2 TIMES DAILY
Status: DISCONTINUED | OUTPATIENT
Start: 2019-04-03 | End: 2019-04-03 | Stop reason: HOSPADM

## 2019-04-03 RX ORDER — PANTOPRAZOLE SODIUM 40 MG/1
40 TABLET, DELAYED RELEASE ORAL
Status: CANCELLED | OUTPATIENT
Start: 2019-04-04

## 2019-04-03 RX ORDER — FLUTICASONE PROPIONATE 50 MCG
1 SPRAY, SUSPENSION (ML) NASAL 2 TIMES DAILY
Status: ON HOLD | COMMUNITY
End: 2022-01-12

## 2019-04-03 RX ORDER — TRAZODONE HYDROCHLORIDE 50 MG/1
50 TABLET ORAL NIGHTLY PRN
Status: CANCELLED | OUTPATIENT
Start: 2019-04-03

## 2019-04-03 RX ORDER — SODIUM CHLORIDE 0.9 % (FLUSH) 0.9 %
10 SYRINGE (ML) INJECTION EVERY 12 HOURS SCHEDULED
Status: CANCELLED | OUTPATIENT
Start: 2019-04-03

## 2019-04-03 RX ORDER — OXYCODONE HYDROCHLORIDE 5 MG/1
5 TABLET ORAL EVERY 4 HOURS PRN
Status: CANCELLED | OUTPATIENT
Start: 2019-04-03

## 2019-04-03 RX ORDER — METHOCARBAMOL 750 MG/1
750 TABLET, FILM COATED ORAL 3 TIMES DAILY
Status: CANCELLED | OUTPATIENT
Start: 2019-04-03

## 2019-04-03 RX ORDER — METHYLPREDNISOLONE 4 MG/1
4 TABLET ORAL NIGHTLY
Status: DISCONTINUED | OUTPATIENT
Start: 2019-04-05 | End: 2019-04-03 | Stop reason: HOSPADM

## 2019-04-03 RX ORDER — FUROSEMIDE 20 MG/1
20 TABLET ORAL PRN
Status: DISCONTINUED | OUTPATIENT
Start: 2019-04-03 | End: 2019-04-12 | Stop reason: HOSPADM

## 2019-04-03 RX ORDER — GUAIFENESIN 400 MG/1
400 TABLET ORAL 2 TIMES DAILY
COMMUNITY
End: 2019-07-03 | Stop reason: SDUPTHER

## 2019-04-03 RX ORDER — DOCUSATE SODIUM 100 MG/1
100 CAPSULE, LIQUID FILLED ORAL 2 TIMES DAILY
Status: DISCONTINUED | OUTPATIENT
Start: 2019-04-03 | End: 2019-04-12 | Stop reason: HOSPADM

## 2019-04-03 RX ORDER — LIDOCAINE 4 G/G
1 PATCH TOPICAL DAILY
Status: CANCELLED | OUTPATIENT
Start: 2019-04-04

## 2019-04-03 RX ORDER — METHYLPREDNISOLONE 4 MG/1
8 TABLET ORAL NIGHTLY
Status: DISCONTINUED | OUTPATIENT
Start: 2019-04-04 | End: 2019-04-03 | Stop reason: HOSPADM

## 2019-04-03 RX ORDER — ACETAMINOPHEN 325 MG/1
650 TABLET ORAL 4 TIMES DAILY
Status: DISCONTINUED | OUTPATIENT
Start: 2019-04-03 | End: 2019-04-12 | Stop reason: HOSPADM

## 2019-04-03 RX ORDER — DIPHENHYDRAMINE HCL 25 MG
25 TABLET ORAL EVERY 6 HOURS PRN
Status: CANCELLED | OUTPATIENT
Start: 2019-04-03

## 2019-04-03 RX ORDER — METHYLPREDNISOLONE 4 MG/1
24 TABLET ORAL ONCE
Status: COMPLETED | OUTPATIENT
Start: 2019-04-03 | End: 2019-04-03

## 2019-04-03 RX ORDER — DIPHENHYDRAMINE HCL 25 MG
25 TABLET ORAL EVERY 6 HOURS PRN
Status: DISCONTINUED | OUTPATIENT
Start: 2019-04-03 | End: 2019-04-12 | Stop reason: HOSPADM

## 2019-04-03 RX ORDER — METHOCARBAMOL 750 MG/1
750 TABLET, FILM COATED ORAL 3 TIMES DAILY
Status: DISCONTINUED | OUTPATIENT
Start: 2019-04-03 | End: 2019-04-12 | Stop reason: HOSPADM

## 2019-04-03 RX ORDER — OXYCODONE HYDROCHLORIDE 5 MG/1
5 TABLET ORAL EVERY 4 HOURS PRN
Status: DISCONTINUED | OUTPATIENT
Start: 2019-04-03 | End: 2019-04-04

## 2019-04-03 RX ORDER — POLYETHYLENE GLYCOL 3350 17 G/17G
17 POWDER, FOR SOLUTION ORAL
Status: CANCELLED | OUTPATIENT
Start: 2019-04-03

## 2019-04-03 RX ORDER — SODIUM CHLORIDE 0.9 % (FLUSH) 0.9 %
10 SYRINGE (ML) INJECTION PRN
Status: CANCELLED | OUTPATIENT
Start: 2019-04-03

## 2019-04-03 RX ADMIN — PANTOPRAZOLE SODIUM 40 MG: 40 TABLET, DELAYED RELEASE ORAL at 05:26

## 2019-04-03 RX ADMIN — OXYCODONE AND ACETAMINOPHEN 1 TABLET: 5; 325 TABLET ORAL at 01:27

## 2019-04-03 RX ADMIN — Medication 10 ML: at 08:22

## 2019-04-03 RX ADMIN — OXYCODONE AND ACETAMINOPHEN 1 TABLET: 5; 325 TABLET ORAL at 12:12

## 2019-04-03 RX ADMIN — POLYETHYLENE GLYCOL 3350 17 G: 17 POWDER, FOR SOLUTION ORAL at 13:12

## 2019-04-03 RX ADMIN — DOCUSATE SODIUM 100 MG: 100 CAPSULE, LIQUID FILLED ORAL at 13:12

## 2019-04-03 RX ADMIN — METHOCARBAMOL 750 MG: 750 TABLET ORAL at 08:21

## 2019-04-03 RX ADMIN — METHOCARBAMOL 750 MG: 750 TABLET ORAL at 13:11

## 2019-04-03 RX ADMIN — METHOCARBAMOL 750 MG: 750 TABLET ORAL at 21:16

## 2019-04-03 RX ADMIN — Medication 10 ML: at 21:15

## 2019-04-03 RX ADMIN — OXYCODONE AND ACETAMINOPHEN 1 TABLET: 5; 325 TABLET ORAL at 15:47

## 2019-04-03 RX ADMIN — HYDROMORPHONE HYDROCHLORIDE 0.5 MG: 1 INJECTION, SOLUTION INTRAMUSCULAR; INTRAVENOUS; SUBCUTANEOUS at 06:25

## 2019-04-03 RX ADMIN — DOCUSATE SODIUM 100 MG: 100 CAPSULE, LIQUID FILLED ORAL at 21:16

## 2019-04-03 RX ADMIN — OXYCODONE AND ACETAMINOPHEN 1 TABLET: 5; 325 TABLET ORAL at 05:26

## 2019-04-03 RX ADMIN — ACETAMINOPHEN 650 MG: 325 TABLET, FILM COATED ORAL at 21:16

## 2019-04-03 RX ADMIN — HYDROMORPHONE HYDROCHLORIDE 0.5 MG: 1 INJECTION, SOLUTION INTRAMUSCULAR; INTRAVENOUS; SUBCUTANEOUS at 02:26

## 2019-04-03 RX ADMIN — METHYLPREDNISOLONE 24 MG: 4 TABLET ORAL at 17:34

## 2019-04-03 RX ADMIN — METHYLPREDNISOLONE SODIUM SUCCINATE 40 MG: 40 INJECTION, POWDER, FOR SOLUTION INTRAMUSCULAR; INTRAVENOUS at 13:11

## 2019-04-03 RX ADMIN — OXYCODONE HYDROCHLORIDE 5 MG: 5 TABLET ORAL at 22:45

## 2019-04-03 ASSESSMENT — PAIN DESCRIPTION - FREQUENCY: FREQUENCY: INTERMITTENT

## 2019-04-03 ASSESSMENT — PAIN SCALES - GENERAL
PAINLEVEL_OUTOF10: 3
PAINLEVEL_OUTOF10: 3
PAINLEVEL_OUTOF10: 8
PAINLEVEL_OUTOF10: 7
PAINLEVEL_OUTOF10: 8
PAINLEVEL_OUTOF10: 2
PAINLEVEL_OUTOF10: 8
PAINLEVEL_OUTOF10: 8
PAINLEVEL_OUTOF10: 7
PAINLEVEL_OUTOF10: 3
PAINLEVEL_OUTOF10: 7
PAINLEVEL_OUTOF10: 0
PAINLEVEL_OUTOF10: 0
PAINLEVEL_OUTOF10: 7

## 2019-04-03 ASSESSMENT — PAIN DESCRIPTION - PAIN TYPE
TYPE: ACUTE PAIN

## 2019-04-03 ASSESSMENT — PAIN DESCRIPTION - LOCATION
LOCATION: GENERALIZED
LOCATION: GENERALIZED
LOCATION: BACK;SHOULDER
LOCATION: GENERALIZED
LOCATION: BACK;SHOULDER
LOCATION: GENERALIZED

## 2019-04-03 ASSESSMENT — PAIN DESCRIPTION - ORIENTATION
ORIENTATION: LEFT;MID;LOWER
ORIENTATION: LEFT;MID;LOWER

## 2019-04-03 NOTE — PROGRESS NOTES
AM assessment complete. Neuro checks WDL. VSS. Sling in place. Dressing is clean dry and intact with no drainage noted. Patient resting in bed with call light in reach.

## 2019-04-03 NOTE — DISCHARGE INSTR - COC
Continuity of Care Form    Patient Name: Poncho Oconnell   :  10/16/1932  MRN:  [id number]    Admit date:  2019  Discharge date:  ***    Code Status Order: Full Code   Advance Directives:   Advance Care Flowsheet Documentation     Date/Time Healthcare Directive Type of Healthcare Directive Copy in [address] Agent's Name Healthcare Agent's Phone Number    [phone]  Yes, patient has an advance directive for healthcare treatment  Living will  No, copy requested from family  Healthcare power of   Patricia Cleary  [phone]          Admitting Physician:  Denisse Alexander MD  PCP: Maribell Crum MD    Discharging Nurse: [facility] Unit/Room#: 7JO-8521/7393-47  Discharging Unit Phone Number: ***    Emergency Contact:   Extended Emergency Contact Information  Primary Emergency Contact: Chloé Allred  Address: [address] [place] [address] Phone: [phone]  Relation: Child  Secondary Emergency Contact: Anthony Riojas  Address: [place] [address] Phone: [phone]  Work Phone: [phone]  Relation: Other    Past Surgical History:  Past Surgical History:   Procedure Laterality Date    APPENDECTOMY      BLADDER SUSPENSION      3 SURGERIES/ANTERIOR AND POSTERIOR REPAIR    BREAST SURGERY      RIGHT LUMPECTOMY AND SEVERAL BIOPSIES ON BOTH BREAST    BUNIONECTOMY  12    BUNIONECTOMY BILATERAL  FEET    CARDIAC CATHETERIZATION      AV block    CATARACT REMOVAL WITH IMPLANT Left 2018    CHOLECYSTECTOMY      COLONOSCOPY  2008    normal    CYSTOSCOPY  2017    U-dil    DILATION AND CURETTAGE OF UTERUS      SUNCTION    FOOT SURGERY      bilat foot surgeries    HERNIA REPAIR  6 YRS AGO    HIATAL HERNIA REPAIR- WIRED  RIBS    HYSTERECTOMY      VAGINAL    JOINT REPLACEMENT Left     TOTAL KNEE REPLACEMENT LEFT    OTHER SURGICAL HISTORY Left 14    removal rib wire    AJVY:021084117}  Dressing  {CHP DME JSTM:051441920}  Toileting  {CHP DME VQKN:344902877}  Feeding  {CHP DME FYRS:513332030}  Med Admin  {CHP DME JUCH:963646307}  Med Delivery   { LINETTE MED Delivery:982551810}    Wound Care Documentation and Therapy:  Incision 09/22/14 Back Left;Upper (Active)   Number of days: 2090       Incision 09/13/18 Eye Left (Active)   Number of days: 202        Elimination:  Continence:   · Bowel: {YES / EN:95575}  · Bladder: {YES / VD:23572}  Urinary Catheter: {Urinary Catheter:773009775}   Colostomy/Ileostomy/Ileal Conduit: {YES / LI:31378}       Date of Last BM: ***    Intake/Output Summary (Last 24 hours) at 4/3/2019 1449  Last data filed at 4/3/2019 0526  Gross per 24 hour   Intake 480 ml   Output 800 ml   Net -320 ml     I/O last 3 completed shifts: In: 18 [P.O.:480]  Out: 1500 [Urine:1500]    Safety Concerns:     [employer] Safety Concerns:416567341}    Impairments/Disabilities:      [employer] Impairments/Disabilities:959671787}    Nutrition Therapy:  Current Nutrition Therapy:   [employer] Diet List:532513699}    Routes of Feeding: {CHP DME Other Feedings:565461870}  Liquids: {Slp liquid thickness:40776}  Daily Fluid Restriction: {CHP DME Yes amt example:079730910}  Last Modified Barium Swallow with Video (Video Swallowing Test): {Done Not Done XOBX:483647932}    Treatments at the Time of Hospital Discharge:   Respiratory Treatments: ***  Oxygen Therapy:  {Therapy; copd oxygen:44211}  Ventilator:    { CC Vent SGLQ:067094457}    Rehab Therapies: {THERAPEUTIC INTERVENTION:8213959731}  Weight Bearing Status/Restrictions: Non weight bearing LEFT shoulder. Maintain sling; may remove 24/7 for elbow/wrist ROM;  Lenetta Basket for passive ROM of shoulder with therapy  Other Medical Equipment (for information only, NOT a DME order):  {EQUIPMENT:705093739}  Other Treatments: ***    Patient's personal belongings (please select all that are sent with patient):  {SHAILA DME Belongings:996779843}    RN SIGNATURE:

## 2019-04-03 NOTE — PROGRESS NOTES
Physical Therapy    Facility/Department: [address] ORTHO/NEURO NURSING  Initial Assessment    NAME: Vanessa Crump  : 10/16/1932  MRN: [id number]    Date of Service: 4/3/2019    Discharge Recommendations:Lennie Alvarez scored a 15/24 on the AM-PAC short mobility form. Current research shows that an AM-PAC score of 17 or less is typically not associated with a discharge to the patient's home setting. Based on the patients AM-PAC score and their current functional mobility deficits, it is recommended that the patient have 5-7 sessions per week of Physical Therapy at d/c to increase the patients independence. PT Equipment Recommendations  Equipment Needed: No    Assessment   Body structures, Functions, Activity limitations: Decreased functional mobility ; Decreased ADL status; Decreased ROM; Decreased strength;Decreased endurance;Decreased balance;Decreased coordination  Assessment: Pt presents as below her baseline function, secondary to frequent falls and BLE weakness limiting tolerance to ambulation. Pt would benefit from skilled PT services to promote safe return to OF. Prognosis: Good  Decision Making: Medium Complexity  Patient Education: POC, role of acute PT, discharge recommendations  REQUIRES PT FOLLOW UP: Yes  Activity Tolerance  Activity Tolerance: Patient Tolerated treatment well;Patient limited by endurance       Patient Diagnosis(es): The primary encounter diagnosis was Multiple falls. Diagnoses of Closed fracture of proximal end of left humerus, unspecified fracture morphology, initial encounter and Closed fracture of twelfth thoracic vertebra, unspecified fracture morphology, initial encounter [facility]) were also pertinent to this visit.      has a past medical history of Arthritis, CAD (coronary artery disease), Cancer ([facility] [address] Cystocele, Diabetes mellitus ([facility] [address] Hepatitis A, History of blood transfusion, Hyperlipidemia, PVC (premature ventricular contraction), Rectocele, Reflux, and Scoliosis. has a past surgical history that includes Appendectomy; joint replacement (Left); hernia repair (6 YRS AGO); Cholecystectomy; shoulder surgery (Right, 7/26/12); Bunionectomy (7/26/12); Breast surgery; Upper gastrointestinal endoscopy (11/2/12); Hysterectomy; bladder suspension; Dilation and curettage of uterus; other surgical history (Left, 09/22/14); Foot surgery; Cardiac catheterization; Colonoscopy (2008); Cystocopy (03/29/2017); other surgical history (Right, 06/28/2018); Cataract removal with implant (Left, 09/13/2018); and pr remv cataract extracap,insert lens (Left, 9/13/2018).     Restrictions  Restrictions/Precautions  Restrictions/Precautions: Fall Risk, Weight Bearing(High fall risk)  Required Braces or Orthoses?: Yes  Upper Extremity Weight Bearing Restrictions  Left Upper Extremity Weight Bearing: Non Weight Bearing(Sling)  Required Braces or Orthoses  Left Upper Extremity Brace/Splint: Sling  Left Upper Extremity Brace/Splint: S/P fracture (~6 weeks ago)  Vision/Hearing  Vision: Impaired  Vision Exceptions: Wears glasses for reading  Hearing: Exceptions to [facility]  Hearing Exceptions: Hard of hearing/hearing concerns;Bilateral hearing aid(not here at hospital)     Subjective  General  Chart Reviewed: Yes  Response To Previous Treatment: Not applicable  Family / Caregiver Present: No  Diagnosis: T12 compression fracture; s/p kyphoplasty  Follows Commands: Within Functional Limits  Pain Screening  Patient Currently in Pain: Yes  Pain Assessment  Pain Assessment: 0-10  Pain Level: 7  Vital Signs  Patient Currently in Pain: Yes       Orientation  Orientation  Overall Orientation Status: Within Normal Limits  Social/Functional History  Social/Functional History  Lives With: Alone  Type of Home: Assisted living([facility] (just moved in 2 weeks ago) Was at home and independent prior to first fall)  Home Layout: One level  Home Access: Level entry  Bathroom Shower/Tub: Walk-in shower  Bathroom Toilet: Standard  Home Equipment: Alert Button, Quad cane, Wheelchair-manual, Rolling walker  ADL Assistance: Independent  Homemaking Assistance: Independent  Homemaking Responsibilities: No  Ambulation Assistance: Independent(use of cane at all times since initial fall; as being taken to dining room by w/c for meals)  Transfer Assistance: Independent  Active : Yes(waas driving prior to falls and admission [facility])  Additional Comments: Prior to initial fall 6 weeks, pt independent and living alone, just recently moved to [address] [place] [address] Pt had 4-5 falls since initial fall. Cognition        Objective     Observation/Palpation  Posture: Good    AROM RLE (degrees)  RLE AROM: WFL  AROM LLE (degrees)  LLE AROM : WFL  Strength RLE  Strength RLE: WFL  Comment: Knee extension 3/5; pain and difficulty maintaining R hip flexion 3-/5  Strength LLE  Comment: Knee extension 4/5  Tone RLE  RLE Tone: Normotonic  Tone LLE  LLE Tone: Normotonic  Motor Control  Gross Motor?: WFL  Sensation  Overall Sensation Status: WFL(Pt denies parasthesias to UEs/LEs)  Bed mobility  Supine to Sit: Minimal assistance  Scooting: Contact guard assistance  Transfers  Sit to Stand: Minimal Assistance  Stand to sit: Minimal Assistance  Bed to Chair: Minimal assistance(Pt with limited tolerance to stand, secondary to BLE weakness and muscle fatigue. Pt only able to tolerate standing ~30 seconds before requesting to sit due to \"wobbliness\" in LEs)  Ambulation  Ambulation?: Yes  Ambulation 1  Surface: level tile  Device: Single point cane  Assistance: Minimal assistance  Quality of Gait: Pt ambulates with decreased step length, narrow YASSINE, decreased step length, no LOB.   Distance: ~4 step to chair  Stairs/Curb  Stairs?: No     Balance  Posture: Good  Sitting - Static: Good  Sitting - Dynamic: Good  Standing - Static: Fair  Standing - Dynamic: [address]  Times per week: 7x  Times per day: Daily  Current Treatment

## 2019-04-03 NOTE — CONSULTS
[facility] Orthopedic Surgery  Consult Note    Patient: Atilio Cabrera  Admit Date: 4/1/2019  Requesting Physician: Rey Niño MD  Room: [address] [place] [id number]    Chief complaint: Left shoulder pain 2/2 known proximal humerus fracture    HPI: Atilio Cabrera is a 80 y.o. female who presented to [facility] ER 4/1/19 after having 4 falls in the past 6 weeks. She had known proximal humerus fracture from February 2019 and has been utilizing a sling. She was found to have acute T12 fracture and underwent kyphoplasty 4/2/19. She continues with shoulder pain. She is RIGHT hand dominant. She saw Dr. Rai Zelaya in the office 3/11/19. Describes pain in LEFT shoulder of moderate intensity and of aching nature since last several weeks after her fall about a week ago which is relieved by narcotic pain medication. States she normally takes 3 Percocet a day at home or her chronic back pain. Denies new numbness/tingling.        Imaging review of LEFT shoulder demonstrated:   Healing, impacted fracture of the humeral neck.  No superimposed, acute   fracture is identified     Medical History:  Past Medical History:   Diagnosis Date    Arthritis     OSTEOARTHRITIS BACK    CAD (coronary artery disease)     PT HAS AV BLOCK AND MVP    Cancer (HCC)     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    Cystocele     Diabetes mellitus (HCC)     type  2 diet controlled    Hepatitis A 1953    History of blood transfusion     hiatal hernia surgery    Hyperlipidemia     PVC (premature ventricular contraction)     Rectocele     Reflux     Scoliosis      Past Surgical History:   Procedure Laterality Date    APPENDECTOMY      BLADDER SUSPENSION      3 SURGERIES/ANTERIOR AND POSTERIOR REPAIR    BREAST SURGERY      RIGHT LUMPECTOMY AND SEVERAL BIOPSIES ON BOTH BREAST    BUNIONECTOMY  7/26/12    BUNIONECTOMY BILATERAL  FEET    CARDIAC CATHETERIZATION      AV block    CATARACT REMOVAL WITH IMPLANT Left 09/13/2018    CHOLECYSTECTOMY      COLONOSCOPY  2008    normal    CYSTOSCOPY  03/29/2017    U-dil    DILATION AND CURETTAGE OF UTERUS      SUNCTION    FOOT SURGERY      bilat foot surgeries    HERNIA REPAIR  6 YRS AGO    HIATAL HERNIA REPAIR- WIRED  RIBS    HYSTERECTOMY      VAGINAL    JOINT REPLACEMENT Left     TOTAL KNEE REPLACEMENT LEFT    OTHER SURGICAL HISTORY Left 09/22/14    removal rib wire    OTHER SURGICAL HISTORY Right 06/28/2018    [facility]O EMULSIFICATION OF CATARACT WITH INTRAOCULAR LENS implant right eye    IN REMV CATARACT EXTRACAP,INSERT LENS Left 9/13/2018    PHACO EMULSIFICATION OF CATARACT WITH INTRAOCULAR LENS IMPLANT LEFT EYE performed by Nael Milton MD at [address] [place], [place] [address]ers Right 7/26/12    CA DEPOSIT TAKEN OFF RIGHT SHOULDER    UPPER GASTROINTESTINAL ENDOSCOPY  11/2/12       Social History:    reports that she quit smoking about 24 years ago. She has a 7.50 pack-year smoking history. She has quit using smokeless tobacco.    Family History:        Problem Relation Age of Onset    Diabetes Maternal Grandmother         type 2, great grandma type1    Cancer Maternal Grandfather         rectal and  scrotal cancer       Medications:  ALL MEDICATIONS HAVE BEEN REVIEWED:  Scheduled:   methylPREDNISolone  40 mg Intravenous Q12H    docusate sodium  100 mg Oral BID    polyethylene glycol  17 g Oral Q72H    diphenhydrAMINE  50 mg Intravenous Once    lidocaine  1 patch Transdermal Daily    pantoprazole  40 mg Oral QAM AC    methocarbamol  750 mg Oral TID    sodium chloride flush  10 mL Intravenous 2 times per day     Continuous:  PRN:HYDROmorphone, oxyCODONE-acetaminophen, ondansetron, furosemide, sodium chloride flush, magnesium hydroxide, ondansetron, acetaminophen    Allergies: Allergies   Allergen Reactions    Morphine Other (See Comments)     MIGRAINE HEADACHE       Review of Systems:  Constitutional: Negative for fever, chills, fatigue.   Endorses weakness b/l LE over last several weeks and several falls  Skin:  Negative for pruritis, rash  Eyes: Negative for photophobia and visual disturbance. ENT:  Negative for rhinorrhea, epistaxis, sore throat  Respiratory:  Negative for cough and shortness of breath. Cardiovascular: Negative for chest pain. Gastrointestinal: Negative for nausea, vomiting, diarrhea. Genitourinary: Negative for dysuria and difficulty urinating. Neurological: Negative for confusion, dysarthria, tremors, seizures. Psychiatric:  Negative for depression or anxiety  Musculoskeletal:  Positive for chronic back pain and re-aggravation of shoulder pain sine fall about a week prior. States she sometimes experiences right groin pain which she believes may cause her to fall. Objective:  Vitals:    04/03/19 0803   BP: 132/79   Pulse: 79   Resp: 16   Temp: 97.5 °F (36.4 °C)   SpO2: 96%      Physical Examination:  GENERAL: No apparent distress, well-nourished  SKIN:  Warm and dry  EYES: Nonicteric. ENT: Mucous membranes moist  HEAD: Normocephalic, atraumatic  RESPIRATORY: Resp easy and unlabored  CARDIOVASCULAR: Regular rate and rhythm  GI: Abdomen soft, nontender  NEURO: Awake and alert. No speech defect  PSYCHIATRIC: Appropriate affect; not agitated  MUSCULOSKELETAL:  LEFT UE  Patient with sling. No obvious deformity. No erythema; expected dense proximal humerus ecchymosis in varying states of healing; worse posteriorly. No obvious swelling. Did not perform any ROM on shoulder. Sensation intact in axillary distribution. NVI in median, ulnar, radial distributions.        Labs reviewed:  Recent Labs     04/01/19  1431   WBC 3.8*   HGB 11.6*   HCT 34.9*        Recent Labs     04/01/19  1431      K 4.2      CO2 25   BUN 13   CREATININE 1.0   GLUCOSE 133*   CALCIUM 9.1     Recent Labs     04/01/19  1431   INR 0.98   PROTIME 11.2       Lab Results   Component Value Date    COLORU YELLOW 04/01/2019    CLARITYU Clear 04/01/2019    PHUR 6.5 04/01/2019 GLUCOSEU Negative 04/01/2019    BLOODU Negative 04/01/2019    LEUKOCYTESUR Negative 04/01/2019    BILIRUBINUR Negative 04/01/2019    UROBILINOGEN 0.2 04/01/2019    RBCUA 3-5 (A) 02/24/2019    WBCUA 6-10 (A) 02/24/2019    BACTERIA 4+ (A) 02/24/2019       Imaging:  IR KYPHOPLASTY THORACIC 1 VERTEBRAL BODY   Final Result   Technically successful fluoroscopic guided T12 kyphoplasty. MRI LUMBAR SPINE WO CONTRAST   Final Result   Acute T12 fracture. There is prominent diastasis of the vertebral fracture   plane, which may involve the right pedicle. Borderline compression of the distal cord at the T12 inferior endplate. Interspinous edema, likely traumatic. In addition, there is suspected   traumatic disruption of the anterior annulus at T11-T12. CT CHEST WO CONTRAST   Final Result   Fracture deformity at L1 vertebral body with fracture lines seen extending   through the vertebral body involving both anterior and middle columns but   without involvement of posterior elements. There is also loss of vertebral   body height at L1. Loss of vertebral body height appears similar to prior   thoracic spine x-rays 02/24/2019 but fracture lines may be new from that exam   (although diffuse bone demineralization somewhat limits evaluation on prior   x-rays). All findings at L1 are new since prior CT chest, abdomen and pelvis   from 2017. Acute on subacute to chronic fracture of concern. Consider   further evaluation with MRI. Stable suspected subacute to chronic compression fractures at T5 and T6,   stable from thoracic spine x-rays 02/24/2019 but new from CT chest, abdomen   and pelvis from 2017. Subacute appearing left proximal humeral neck fracture redemonstrated. No acute intrathoracic abnormality. Large hiatal hernia, stable. Atherosclerosis to include coronary artery disease. Critical results were called by Dr. Paulina Jim.  MD Barbara to Laron Anaya   on 4/1/2019 at 15:43. CT Cervical Spine WO Contrast   Final Result   1. No acute abnormality of the cervical spine. 2. Age indeterminate fractures of T5 and likely T6 only partially evaluated   on this cervical spine CT. These are new since 12/16/2017, and contribute to   kyphosis in the thoracic spine. Recommend correlation with physical exam for   point tenderness. A follow-up MRI of the thoracic spine may be considered   for further evaluation if necessary. 3. Incidental right thyroid nodule, stable. No ultrasound follow-up is   necessary. CT Head WO Contrast   Final Result   No acute intracranial abnormality. XR HUMERUS LEFT (MIN 2 VIEWS)   Final Result   Healing, impacted fracture of the humeral neck. No superimposed, acute   fracture is identified. XR KNEE RIGHT (1-2 VIEWS)   Final Result   No acute abnormality of the right knee. IMPRESSION:  LEFT proximal humerus fracture since February 2019  Multiple falls  S/P 4/2/19 T12 kyphoplasty  Chronic back pain  Generalized weakness. Active Problems:    Spinal stenosis, lumbar region, without neurogenic claudication    Scoliosis (and kyphoscoliosis), idiopathic    Displacement of thoracic intervertebral disc without myelopathy    Closed fracture of proximal end of left humerus with routine healing    T12 compression fracture (HCC)  Resolved Problems:    * No resolved hospital problems. *      RECOMMENDATIONS:  Continue with closed treatment of fracture in sling  Recent x-ray dated 4/1/19 demonsrates continued fracture line across the humeral neck, but no new fracture. May remove 4x day for elbow/wrist ROM  NWB LEFT shoulder  Ok for passive ROM of left shoulder  Pain control  F U with Dr. Do Gonzáles in 2 weeks. D/W patient need to discuss occasional right groin pain with Dr. Jerrod Melendez and evaluate for right hip OA. Patient does not use tobacco products.     I have reviewed imaging and plan with  Ariella Ford.         STEPHANE TORRES, DENIZ-CNP  4/3/2019  2:03 PM

## 2019-04-03 NOTE — PROGRESS NOTES
Shift assessment completed. Patient alert, oriented x4. Patient repositioned in bed for comfort. Ice packs provided to patient. Neuro checks completed, see flow sheets. All needs met at this time. PRN pain medications given, see MAR. POC discussed with patient. The care plan and education has been reviewed and mutually agreed upon with the patient. Bed alarm on. Call light in reach and patient demonstrated how to reach nursing staff. Will monitor.

## 2019-04-03 NOTE — DISCHARGE SUMMARY
Hospital Discharge Summary    Patient's PCP: Niharika Milton MD  Admit Date: 4/1/2019   Discharge Date: 4/3/2019    Admitting Physician: Dr. Milagro Wen MD  Discharge Physician: Dr. Glasgow Hidden:   IP CONSULT TO HOSPITALIST  IP CONSULT TO [facility] TO PHYSICAL MEDICINE REHAB  IP CONSULT TO DIETITIAN  IP CONSULT TO SOCIAL WORK    Brief HPI:   Torrie Westbrook is a 80 y.o. female who presented with falls, multiple fall  Pt has ahd 4 falls in past 6 weeks. States has been falling on her back and side. States looses balance all ere mecanical fall. Pt had falls at rehab as well. Pt states last fall today. States was wlking whne her right leg agave out on her and had afall. Landed on her right knee and left side. States she had pain on left shoulder. Back spine and neck area. Pt denies any weakness or numbness. No visual changs. No cp. No n/v/d. No bowel or bladder incontinence   She has unabarable pain. Her pain is dull achy at time sharp. 10/10. Worse with movement. Brief hospital course:   1) Acute on Chronic Compression Fracture T12  -Interspinous edema, likely traumatic- from cord compression  -MRI: acute on chronic T12 compression Fx with some stenosis  -Interventional Radiology Procedure: Kyphoplasty T12, successful on 04/02/2018   -Pain Management: Methocarbamol 750mg PO tid; Hydromorphone HCI PF IV 0.5 q3h PRN for pain     2) Chronic Compression Fracture T5/T6  -CT: Chronic T5/T6 compression Fx   -PT/OT evaluation pending     3) Humoral Fracture  -Previous fall injury  -increased pain, patient attributes to position in MRI  -Orthopedic Consult       Pt evaluated by Ortho and no surgical indication, follow ortho reccs. I recommend she complete a prednisone taper, apply heat to rt lower back, stable for transfer to ARU.         Invasive procedures:  Signed                 Show:Clear all  [x]Manual[x]Template[]Copied    Added by:  [x]Maddie Skylar Aldrich MD      [][facility] for details      Brief Postoperative Note     Liliya Garcia  YOB: 1932  [id number]     Pre-operative Diagnosis: T12 osteoporotic compression fracture     Post-operative Diagnosis: Same     Procedure: T12 kyphoplasty     Anesthesia: Moderate Sedation     Surgeons/Assistants: Dr. Shira Mondragon     Estimated Blood Loss: less than 5ml      Complications: None     Specimens: Was Not Obtained     Findings: successful T12 kyphoplasty.     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 *      Physical Exam: /79   Pulse 79   Temp 97.5 °F (36.4 °C) (Axillary)   Resp 16   Ht 5' 1\" (1.549 m)   Wt 156 lb 9.6 oz (71 kg)   SpO2 96%   BMI 29.59 kg/m²   Gen/overall appearance: Not in acute distress. Alert. Head: Normocephalic, atraumatic  Eyes: EOMI, good acuity  ENT:- Oral mucosa moist  Neck: No JVD, thyromegaly  CVS: Nml S1S2, no MRG, RRR  Pulm: Clear bilaterally. No crackles/wheezes  Gastrointestinal: Soft, NT/ND, +BS  Musculoskeletal: No edema. Warm, rt lower back muscle TTP, + muscle spasm, no rash or erythema, no bony tenderness, left arm in sling  Neuro: No focal deficit. Moves extremity spontaneously. Psychiatry: Appropriate affect. Not agitated. Skin: Warm, dry with normal turgor.  No rash        Significant diagnostic studies that may require follow up:  Xr Humerus Left (min 2 Views)    Result Date: 4/1/2019  EXAMINATION: TWO XRAY VIEWS OF THE LEFT HUMERUS 4/1/2019 2:17 pm COMPARISON: 02/20/2019 HISTORY: ORDERING SYSTEM PROVIDED HISTORY: inj TECHNOLOGIST states fell in rehab few weeks ago and arm has been hurting worse since lower than origional fracture, no new xrays done. Pt with pain in both hips, right knee back. ) Acuity: Acute Type of Exam: Initial FINDINGS: BRAIN/VENTRICLES: There is no acute intracranial hemorrhage, mass effect or midline shift. No abnormal extra-axial fluid collection. The gray-white differentiation is maintained without evidence of an acute infarct. There is no evidence of hydrocephalus. Mild generalized volume loss. Mild periventricular low-attenuation likely related chronic small vessel disease. ORBITS: The visualized portion of the orbits demonstrate no acute abnormality. SINUSES: The visualized paranasal sinuses and mastoid air cells demonstrate no acute abnormality. SOFT TISSUES/SKULL:  No acute abnormality of the visualized skull or soft tissues. No acute intracranial abnormality. Ct Chest Wo Contrast    Result Date: 4/1/2019  EXAMINATION: CT OF THE CHEST WITHOUT CONTRAST, 4/1/2019 2:56 pm TECHNIQUE: CT of the chest was performed without the administration of intravenous contrast. Multiplanar reformatted images are provided for review. Dose modulation, iterative reconstruction, and/or weight based adjustment of the mA/kV was utilized to reduce the radiation dose to as low as reasonably achievable. COMPARISON: Chest x-ray 02/24/2019. CT chest, abdomen and pelvis 12/16/2017. HISTORY: ORDERING SYSTEM PROVIDED HISTORY: Injury TECHNOLOGIST PROVIDED HISTORY: Ordering Physician Provided Reason for Exam: Fall (Pt with 4 falls in last 6 weeks, known humerus fx from 6 weeks ago. Pt states right leg/hip get weak and cause to fall. Pt states fell in rehab few weeks ago and arm has been hurting worse since lower than original fracture, no new x-rays done. Pt with pain in both hips, right knee back. ) Acuity: Acute Type of Exam: Initial FINDINGS: Mediastinum: Heart is normal in size. No pericardial effusion.   Coronary artery calcifications. Atherosclerotic changes to the thoracic aorta. No evidence for thoracic aortic aneurysm. No mediastinal or hilar lymphadenopathy noted on this noncontrast exam.  Large hiatal hernia, similar to prior exam. Lungs/pleura: Mild predominately dependent atelectasis. There is some stable likely postinflammatory scarring with some mild traction bronchiectasis noted to the lingula of the left upper lobe. No focal consolidations, pleural effusions or pneumothoraces. No suspicious pulmonary nodules or masses. No endobronchial lesions. Upper Abdomen: Limited images through the upper abdomen demonstrate no acute or suspicious abnormalities. Partially imaged intrahepatic biliary duct dilatation redemonstrated, better appreciated on prior dedicated CT of the abdomen and pelvis. This appears grossly stable to the visualized portions of the liver. Soft Tissues/Bones: Diffuse bone demineralization. Partially imaged subacute appearing proximal left humerus fracture involving surgical neck. Age-indeterminate compression fractures involving T5 and T6 vertebral bodies, new since prior CT exam from 2017, but grossly stable from thoracic spine x-rays 02/24/2019 and suspected subacute to chronic. There is also an age indeterminate fracture involving the L1 vertebral body with fracture lines seen through the vertebral body along with loss of vertebral body height, new from prior 2017 exam.  Loss of vertebral body height at L1 appears similar to prior thoracic spine x-rays 02/24/2019 but the superimposed fracture lines appear to be new, acute on subacute to chronic fracture cannot be excluded. No retropulsion noted at any of the fractures. Old healed fracture deformity of the sternum. Fracture deformity at L1 vertebral body with fracture lines seen extending through the vertebral body involving both anterior and middle columns but without involvement of posterior elements.   There is also loss of vertebral body height at L1. Loss of vertebral body height appears similar to prior thoracic spine x-rays 02/24/2019 but fracture lines may be new from that exam (although diffuse bone demineralization somewhat limits evaluation on prior x-rays). All findings at L1 are new since prior CT chest, abdomen and pelvis from 2017. Acute on subacute to chronic fracture of concern. Consider further evaluation with MRI. Stable suspected subacute to chronic compression fractures at T5 and T6, stable from thoracic spine x-rays 02/24/2019 but new from CT chest, abdomen and pelvis from 2017. Subacute appearing left proximal humeral neck fracture redemonstrated. No acute intrathoracic abnormality. Large hiatal hernia, stable. Atherosclerosis to include coronary artery disease. Critical results were called by Dr. Jaziel Shultz. MD Barbara to Carlton Adler on 4/1/2019 at 15:43. Ct Cervical Spine Wo Contrast    Result Date: 4/1/2019  EXAMINATION: CT OF THE CERVICAL SPINE WITHOUT CONTRAST 4/1/2019 2:57 pm TECHNIQUE: CT of the cervical spine was performed without the administration of intravenous contrast. Multiplanar reformatted images are provided for review. Dose modulation, iterative reconstruction, and/or weight based adjustment of the mA/kV was utilized to reduce the radiation dose to as low as reasonably achievable. COMPARISON: 12/16/2017 CT HISTORY: ORDERING SYSTEM PROVIDED HISTORY: inj TECHNOLOGIST PROVIDED HISTORY: Ordering Physician Provided Reason for Exam: Fall (Pt with 4 falls in last 6 weeks, known humerus fx from 6 weeks ago. Pt states right leg/hip get weak and cause to fall. Pt states fell in rehab few weeks ago and arm has been hurting worse since lower than origional fracture, no new xrays done. Pt with pain in both hips, right knee back. ) Acuity: Acute Type of Exam: Initial FINDINGS: BONES/ALIGNMENT: Severe kyphosis in the thoracic spine contributes to straightening of lordotic curvature in the cervical spine.   There is an vertebral body marrow edema. No significant left pedicular edema. There is right pedicular edema with possible mild fracture involvement. Focal linear T2 hyperintensity within the anterior T11-T12 disc space, likely traumatic. Chronic height loss of L4 and L5 vertebral bodies. Lumbar scoliosis with minimal retrolisthesis present at the L2-L3 and L3-L4 levels. There is minimal anterolisthesis at T11-T12 and retrolisthesis at T12-L1. Disc space and endplate change at D8-B9 favored represent degenerative process, similar to previous exam.  No evidence of additional acute fracture in the visualized thoracolumbar spine. SPINAL CORD: The conus terminates normally. SOFT TISSUES: Interspinous edema at T12-L1 and L1-L2. No definite ligamentous discontinuity. T12-L1: Bilateral facet hypertrophy. Retrolisthesis. Retropulsion and disc bulging/endplate spurring. Moderate neural foraminal narrowing. Anterior and posterior subarachnoid effacement. Conus/cord flattening. AP dimension of the thecal sac measures 7-8 mm at midline. L1-L2: Mild right neural foraminal narrowing due to facet hypertrophy and endplate spurring. No significant spinal canal stenosis. L2-L3: Mild-moderate right neural foraminal narrowing due to facet hypertrophy and disc spur complex. No significant spinal canal stenosis. L3-L4: Bilateral facet hypertrophy. Disc bulging and endplate spurring. Moderate-severe left and mild right neural foraminal narrowing. No significant spinal canal stenosis. L4-L5: Bilateral facet hypertrophy. Mild disc bulging and endplate spurring. Mild bilateral neural foraminal narrowing. No significant spinal canal stenosis. L5-S1: Bilateral facet hypertrophy. Moderate neural foraminal narrowing, greater on the left. No significant spinal canal stenosis. No evidence of descending S1 nerve root compression. Acute T12 fracture.   There is prominent diastasis of the vertebral fracture plane, which may involve the right pedicle. Borderline compression of the distal cord at the T12 inferior endplate. Interspinous edema, likely traumatic. In addition, there is suspected traumatic disruption of the anterior annulus at T11-T12. Xr Shoulder Left (min 2 Views)    Result Date: 3/11/2019  Radiology exam is complete. No Radiologist dictation. Please follow up with ordering provider. Ir Kyphoplasty Thoracic 1 Vertebral Body    Result Date: 4/2/2019  PROCEDURE: IR KYPHOPLASTY THORACIC FIRST LEVEL MODERATE CONSCIOUS SEDATION 4/2/2019 HISTORY: ORDERING SYSTEM PROVIDED HISTORY: t-12 compression fracture TECHNOLOGIST PROVIDED HISTORY: Reason for exam:->t-12 compression fracture Ordering Physician Provided Reason for Exam: Compression fracture Acuity: Unknown Type of Exam: Unknown T12 osteoporotic compression fracture TECHNIQUE: Fluoroscopic guidance CONTRAST: None SEDATION: 1 mgversed and 50 mcg fentanyl were titrated intravenously for moderate sedation monitored under my direction. Total intraservice time of sedation was 30 minutes. The patient's vital signs were monitored throughout the procedure and recorded in the patient's medical record by the nurse. FLUOROSCOPY DOSE AND TYPE OR TIME AND EXPOSURES: 5.6 minutes, 4 images, 181.8 mGy DESCRIPTION OF PROCEDURE: Informed consent was obtained after a detailed explanation of the procedure including risks, benefits, and alternatives. Universal protocol was observed. The patient received preprocedure IV antibiotics. With patient in prone position, fluoroscopy was used to identify the T12 compression fracture and select an appropriate skin entry site. The posterior mid back was prepped and draped in sterile fashion. 1% lidocaine local anesthesia was used and Marcaine was used for anesthesia on the bone surface. Using fluoroscopic guidance, Kyphon bone needles were advanced using a transpedicular approach into the anterior T12 vertebral body.   Small channels were drilled into the vertebral body and balloons were inflated through both needle cannula. Balloons were removed. Using continuous fluoroscopy, contrast was injected into the T12 vertebral body. Total of 4 mL methylmethacrylate bone cement was injected into the T12 vertebral body. No contrast extravasation or complication identified. The bone cannulas were then removed. Sites were closed with Dermabond and dressed and bandaged in sterile fashion. The patient tolerated procedure well without immediate complication. FINDINGS: There is excellent filling of bone cement across the midline of the T12 vertebral body. No contrast extravasation. Technically successful fluoroscopic guided T12 kyphoplasty. Treatments: As above. Discharge Medications:     Medication List      ASK your doctor about these medications    alendronate 70 MG tablet  Commonly known as:  FOSAMAX     diphenoxylate-atropine 2.5-0.025 MG per tablet  Commonly known as:  LOMOTIL     docusate sodium 100 MG capsule  Commonly known as:  COLACE     furosemide 20 MG tablet  Commonly known as:  LASIX     lidocaine 4 % external patch  Place 1 patch onto the skin daily     naloxone 4 MG/0.1ML Liqd nasal spray  1 spray by Nasal route as needed for Opioid Reversal     omeprazole 20 MG delayed release capsule  Commonly known as:  PRILOSEC     ondansetron 4 MG disintegrating tablet  Commonly known as:  ZOFRAN ODT  Take 1 tablet by mouth every 8 hours as needed for Nausea or Vomiting     oxyCODONE-acetaminophen  MG per tablet  Commonly known as:  PERCOCET  Take 1 tablet by mouth every 4 hours as needed for Pain for up to 3 days. Loly Berta      POTASSIMIN PO     Vitamin D3 2000 units Caps            Activity: activity as tolerated  Diet: DIET GENERAL;      Disposition: ARU  Discharged Condition: Stable  Follow Up:   Harvey Garza MD  [id number]          Heena Sargent MD  [facility] [address]  [phone]    Schedule an appointment as soon as possible for a visit in 2 weeks          Code status:  Full Code         Total time spent on discharge, finalizing medications, referrals and arranging outpatient follow up was more than 1 hour      Thank you Dr. Brett Brink MD for the opportunity to be involved in this patients care.

## 2019-04-03 NOTE — PROGRESS NOTES
Patient admitted to room 4908 per bed. Transferred to ARU with personal items andx cane and methylprednisolone. Oriented to room, call light, phone, TV, thermostat, bed controls, bathroom, emergency cord, white board, therapy times, unit routine, visiting hours,  and meal times. Patient verbalized understanding. States bowel routine is every other  Call light and personal items in reach, alarms active and in place.

## 2019-04-03 NOTE — PROGRESS NOTES
Occupational Therapy   Occupational Therapy Initial Assessment  Date: 4/3/2019   Patient Name: Vanessa Crump  MRN: [id number]     : 10/16/1932    Date of Service: 4/3/2019    Discharge Recommendations:Lennie Alvarez scored a 14/24 on the AM-PAC ADL Inpatient form. Current research shows that an AM-PAC score of 17 or less is typically not associated with a discharge to the patient's home setting. Based on the patients AM-PAC score and their current ADL deficits, it is recommended that the patient have 5-7 sessions per week of Occupational Therapy at d/c to increase the patients independence. OT Equipment Recommendations  Equipment Needed: No    Assessment   Performance deficits / Impairments: Decreased functional mobility ; Decreased ADL status; Decreased strength;Decreased endurance;Decreased balance;Decreased high-level IADLs  Assessment: Patient presents with the above deficits, which are below baseline, and would benefit from continued skilled OT to address  Treatment Diagnosis: Decreased ADLs, IADLs, transfers, mobility associated with T12 compression fx s/p Kyphoplasty  Prognosis: Good  Decision Making: Medium Complexity  History: I x 6 weeks ago, taking dog walking, all IADLs I  Exam: Decreased bathing, dressing, transfers, mobility, IADLs  Patient Education: Eval, POC, discharge recommendations  REQUIRES OT FOLLOW UP: Yes  Activity Tolerance  Activity Tolerance: Patient Tolerated treatment well;Patient limited by pain  Safety Devices  Safety Devices in place: Yes  Type of devices: All fall risk precautions in place;Call light within reach; Chair alarm in place; Patient at risk for falls;Nurse notified;Gait belt;Left in chair           Patient Diagnosis(es): The primary encounter diagnosis was Multiple falls.  Diagnoses of Closed fracture of proximal end of left humerus, unspecified fracture morphology, initial encounter and Closed fracture of twelfth thoracic vertebra, unspecified fracture goals : Home, I Living       Therapy Time   Individual Concurrent Group Co-treatment   Time In 0820         Time Out 0920         Minutes 60           Timed Code Treatment Minutes:  45 Minutes    Total Treatment Minutes:  [address], OTR/L [id number]

## 2019-04-04 ENCOUNTER — APPOINTMENT (OUTPATIENT)
Dept: GENERAL RADIOLOGY | Age: 84
DRG: 561 | End: 2019-04-04
Attending: PHYSICAL MEDICINE & REHABILITATION
Payer: MEDICARE

## 2019-04-04 PROBLEM — E44.1 MILD MALNUTRITION (HCC): Chronic | Status: ACTIVE | Noted: 2019-04-04

## 2019-04-04 LAB
ANION GAP SERPL CALCULATED.3IONS-SCNC: 11 MMOL/L (ref 3–16)
BUN BLDV-MCNC: 19 MG/DL (ref 7–20)
CALCIUM SERPL-MCNC: 9.1 MG/DL (ref 8.3–10.6)
CHLORIDE BLD-SCNC: 99 MMOL/L (ref 99–110)
CO2: 28 MMOL/L (ref 21–32)
CREAT SERPL-MCNC: 1 MG/DL (ref 0.6–1.2)
GFR AFRICAN AMERICAN: >60
GFR NON-AFRICAN AMERICAN: 53
GLUCOSE BLD-MCNC: 138 MG/DL (ref 70–99)
HCT VFR BLD CALC: 35 % (ref 36–48)
HEMOGLOBIN: 11.7 G/DL (ref 12–16)
MCH RBC QN AUTO: 32.4 PG (ref 26–34)
MCHC RBC AUTO-ENTMCNC: 33.5 G/DL (ref 31–36)
MCV RBC AUTO: 96.8 FL (ref 80–100)
PDW BLD-RTO: 14.2 % (ref 12.4–15.4)
PLATELET # BLD: 204 K/UL (ref 135–450)
PMV BLD AUTO: 6.7 FL (ref 5–10.5)
POTASSIUM SERPL-SCNC: 4.5 MMOL/L (ref 3.5–5.1)
RBC # BLD: 3.61 M/UL (ref 4–5.2)
SODIUM BLD-SCNC: 138 MMOL/L (ref 136–145)
WBC # BLD: 4.1 K/UL (ref 4–11)

## 2019-04-04 PROCEDURE — 97535 SELF CARE MNGMENT TRAINING: CPT

## 2019-04-04 PROCEDURE — 97162 PT EVAL MOD COMPLEX 30 MIN: CPT

## 2019-04-04 PROCEDURE — 6370000000 HC RX 637 (ALT 250 FOR IP): Performed by: PHYSICAL MEDICINE & REHABILITATION

## 2019-04-04 PROCEDURE — 72170 X-RAY EXAM OF PELVIS: CPT

## 2019-04-04 PROCEDURE — 1280000000 HC REHAB R&B

## 2019-04-04 PROCEDURE — 6360000002 HC RX W HCPCS: Performed by: PHYSICAL MEDICINE & REHABILITATION

## 2019-04-04 PROCEDURE — 80048 BASIC METABOLIC PNL TOTAL CA: CPT

## 2019-04-04 PROCEDURE — 36415 COLL VENOUS BLD VENIPUNCTURE: CPT

## 2019-04-04 PROCEDURE — 85027 COMPLETE CBC AUTOMATED: CPT

## 2019-04-04 PROCEDURE — 97530 THERAPEUTIC ACTIVITIES: CPT

## 2019-04-04 PROCEDURE — 97165 OT EVAL LOW COMPLEX 30 MIN: CPT

## 2019-04-04 RX ORDER — OXYCODONE HYDROCHLORIDE 5 MG/1
10 TABLET ORAL EVERY 4 HOURS PRN
Status: DISCONTINUED | OUTPATIENT
Start: 2019-04-04 | End: 2019-04-05

## 2019-04-04 RX ORDER — FLUTICASONE PROPIONATE 50 MCG
1 SPRAY, SUSPENSION (ML) NASAL DAILY
Status: DISCONTINUED | OUTPATIENT
Start: 2019-04-04 | End: 2019-04-08

## 2019-04-04 RX ORDER — OXYCODONE HYDROCHLORIDE 5 MG/1
5 TABLET ORAL EVERY 4 HOURS PRN
Status: DISCONTINUED | OUTPATIENT
Start: 2019-04-04 | End: 2019-04-05

## 2019-04-04 RX ORDER — GUAIFENESIN 600 MG/1
600 TABLET, EXTENDED RELEASE ORAL 2 TIMES DAILY
Status: DISCONTINUED | OUTPATIENT
Start: 2019-04-04 | End: 2019-04-12 | Stop reason: HOSPADM

## 2019-04-04 RX ORDER — PANTOPRAZOLE SODIUM 40 MG/1
40 TABLET, DELAYED RELEASE ORAL
Status: DISCONTINUED | OUTPATIENT
Start: 2019-04-04 | End: 2019-04-12 | Stop reason: HOSPADM

## 2019-04-04 RX ADMIN — METHOCARBAMOL 750 MG: 750 TABLET ORAL at 07:35

## 2019-04-04 RX ADMIN — ACETAMINOPHEN 650 MG: 325 TABLET, FILM COATED ORAL at 09:50

## 2019-04-04 RX ADMIN — PANTOPRAZOLE SODIUM 40 MG: 40 TABLET, DELAYED RELEASE ORAL at 15:54

## 2019-04-04 RX ADMIN — OXYCODONE HYDROCHLORIDE 5 MG: 5 TABLET ORAL at 07:35

## 2019-04-04 RX ADMIN — PANTOPRAZOLE SODIUM 40 MG: 40 TABLET, DELAYED RELEASE ORAL at 06:11

## 2019-04-04 RX ADMIN — ENOXAPARIN SODIUM 40 MG: 40 INJECTION SUBCUTANEOUS at 07:36

## 2019-04-04 RX ADMIN — OXYCODONE HYDROCHLORIDE 10 MG: 5 TABLET ORAL at 11:57

## 2019-04-04 RX ADMIN — METHOCARBAMOL 750 MG: 750 TABLET ORAL at 13:57

## 2019-04-04 RX ADMIN — METHOCARBAMOL 750 MG: 750 TABLET ORAL at 20:37

## 2019-04-04 RX ADMIN — OXYCODONE HYDROCHLORIDE 5 MG: 5 TABLET ORAL at 03:11

## 2019-04-04 RX ADMIN — ACETAMINOPHEN 650 MG: 325 TABLET, FILM COATED ORAL at 06:12

## 2019-04-04 RX ADMIN — GUAIFENESIN 600 MG: 600 TABLET, EXTENDED RELEASE ORAL at 20:36

## 2019-04-04 RX ADMIN — FLUTICASONE PROPIONATE 1 SPRAY: 50 SPRAY, METERED NASAL at 12:11

## 2019-04-04 RX ADMIN — ACETAMINOPHEN 650 MG: 325 TABLET, FILM COATED ORAL at 15:54

## 2019-04-04 RX ADMIN — DOCUSATE SODIUM 100 MG: 100 CAPSULE, LIQUID FILLED ORAL at 20:37

## 2019-04-04 RX ADMIN — DOCUSATE SODIUM 100 MG: 100 CAPSULE, LIQUID FILLED ORAL at 07:35

## 2019-04-04 RX ADMIN — OXYCODONE HYDROCHLORIDE 10 MG: 5 TABLET ORAL at 20:37

## 2019-04-04 RX ADMIN — ACETAMINOPHEN 650 MG: 325 TABLET, FILM COATED ORAL at 20:37

## 2019-04-04 ASSESSMENT — PAIN DESCRIPTION - ORIENTATION
ORIENTATION: MID;LOWER;LEFT
ORIENTATION: LOWER;RIGHT
ORIENTATION: RIGHT;MID;UPPER;LEFT

## 2019-04-04 ASSESSMENT — PAIN SCALES - GENERAL
PAINLEVEL_OUTOF10: 10
PAINLEVEL_OUTOF10: 10
PAINLEVEL_OUTOF10: 6
PAINLEVEL_OUTOF10: 8
PAINLEVEL_OUTOF10: 8
PAINLEVEL_OUTOF10: 9
PAINLEVEL_OUTOF10: 10
PAINLEVEL_OUTOF10: 5
PAINLEVEL_OUTOF10: 8
PAINLEVEL_OUTOF10: 10
PAINLEVEL_OUTOF10: 10
PAINLEVEL_OUTOF10: 0

## 2019-04-04 ASSESSMENT — PAIN DESCRIPTION - LOCATION
LOCATION: BACK;SHOULDER
LOCATION: BACK;HIP
LOCATION: BACK;HIP;SHOULDER

## 2019-04-04 ASSESSMENT — PAIN DESCRIPTION - DESCRIPTORS
DESCRIPTORS: ACHING;CONSTANT
DESCRIPTORS: ACHING;CONSTANT

## 2019-04-04 ASSESSMENT — PAIN DESCRIPTION - PAIN TYPE
TYPE: ACUTE PAIN
TYPE: ACUTE PAIN;SURGICAL PAIN
TYPE: ACUTE PAIN

## 2019-04-04 ASSESSMENT — PAIN DESCRIPTION - FREQUENCY: FREQUENCY: INTERMITTENT

## 2019-04-04 NOTE — CARE COORDINATION
Social Work Admission Assessment    Objective:  Met with pt to complete initial assessment and review role of  in rehab process. Pt oriented to unit. Pt states understanding of this. Current Home Situation:  Patient lives at [address] [place] [address]. Patient is active with [facility]. Patient has a cane at home already. Pt's plans re:  Return to work/school/volunteer:  Patient is retired. Accessibility to community resources/transportation:  Family to transport. Has pt experienced a recent loss or signigicant life event that would impact their care or ability to participate? _X_No  __Yes - Explain    Has pt ever been treated for emotional disorders? _X_No  __Yes--How does that affect current situation:    How does pt and family cope with stressful events and this hospitalization? Pt and family appears to be coping with this hospitalization appropriately, no concerns voiced or observed. Special Problem Areas:  None    Discharge Plan:  [address] [place] [address], await progress with therapy for recommendations. Impression/Plan:  Poncho Orourke (patient) is an 80year old female that has been admitted to ARU. Patient and family informed of team conference on Tuesday after 11 a.m. Will continue to follow for support and discharge planning.  Lady Lopez, MSW, LSW

## 2019-04-04 NOTE — PROGRESS NOTES
Patient admitted to room 4908. Oriented to room, call light, phone, TV, thermostat, bed controls,bed in low position, wheels locked. bathroom, emergency cord, white board, therapy times, unit routine, visiting hours, and meal times. Patient verbalized understanding. States bowel routine is daily. Call light and personal items in reach, bed  alarm on cell phone at bedside charging.

## 2019-04-04 NOTE — PLAN OF CARE
ARU PATIENT TREATMENT PLAN  [facility]   [address], [address]  [phone]      Anil Boland    : 10/16/1932  [facility]  MRN: [id number]   PHYSICIAN:  Kamron Angelo MD  Primary Problem    Patient Active Problem List   Diagnosis    Spinal stenosis, lumbar region, without neurogenic claudication    Spinal stenosis of thoracic region    Scoliosis (and kyphoscoliosis), idiopathic    Displacement of thoracic intervertebral disc without myelopathy    Displacement of lumbar intervertebral disc without myelopathy    Thoracic or lumbosacral neuritis or radiculitis, unspecified    Disc displacement, lumbar    Disc displacement, thoracic    Scoliosis    Lumbar stenosis    Thoracic stenosis    Cervicalgia    Hypoxia    Closed fracture of proximal end of left humerus with routine healing    Urinary tract infection without hematuria    T12 compression fracture ([address]    Thoracic compression fracture, sequela    Mild malnutrition ([address]       Rehabilitation Diagnosis:     T12 Compression Fracture   ADMIT DATE:4/3/2019    Patient Goals:  To return to assisted living  Admitting Impairments: Orthopedic  Activities: Eating, grooming, bathing, dressing, toileting, transfers, ambulation, endurance and cognition  Participation: Prior to admission was modified independent and driving   CARE PLAN     NURSING:  Anil Boland while on this unit will:   [x] Be continent of bowel and bladder      [x] Have an adequate number of bowel movements   [x] Urinate with no urinary retention >300ml in bladder   [] Complete bladder protocol with posadas removal   [] Maintain O2 SATs at ___%   [x] Have pain managed while on ARU        [x] Be pain free by discharge    [x] Have no skin breakdown while on ARU   [] Have improved skin integrity via wound measurements   [] Have no signs/symptoms of infection at the wound site  [x] Be free from injury during hospitalization   [x] Complete education with patient/family with understanding demonstrated for:  [] Adjustment   [] Other:   Nursing Interventions will include:  [x] bowel/bladder training   [] education for medical assistive devices   [x] medication education   [] O2 saturation management   [x] energy conservation   [x] stress management techniques   [x] fall prevention   [x] alarms protocol   [] seating and positioning   [x] skin/wound care   [x] pressure relief instruction   [] dressing changes     [] infection protection   [] DVT prophylaxis  [x] assistance with in room safety with transfers to bed, toilet, wheelchair, shower   [] bathroom activities and hygiene  [] Other:    Patient/Caregiver Education for:   [x] Disease/sustained injury/management      [x] Medication Use   [] Surgical intervention   [x] Safety   [x] Body mechanics and or joint protection   [] Health maintenance      [] Other:     PHYSICAL THERAPY:  Goals:                  Short term goals  Time Frame for Short term goals: 5-7 days  Short term goal 1: Pt will complete bed mobility mod I   Short term goal 2: Pt will complete transfers mod I w/ LRAD  Short term goal 3: Pt will ambulate 150' mod I w/ LRAD  Short term goal 4: Pt will navigate 4 stairs mod I                These goals were reviewed with this patient at the time of assessment and Anil Boland is in agreement.      Plan of Care: Pt to be seen 5 out of 7 days per week per ARU protocol ( 90  minutes with PT)                  Current Treatment Recommendations: Strengthening, ROM, Balance Training, Functional Mobility Training, Transfer Training, Endurance Training, Gait Training, Stair training, Neuromuscular Re-education, Home Exercise Program, Safety Education & Training, Patient/Caregiver Education & Training, Equipment Evaluation, Education, & procurement, Pain Management, Positioning, Manual Therapy - Soft Tissue Mobilization    OCCUPATIONAL THERAPY:  Goals:             Short term goals  Time Frame for Short term goals: 10 days   Short term goal 1: UB bathing/dressing mod I   Short term goal 2: LB bathing/dressing mod I   Short term goal 3: toileting mod I   Short term goal 4: SBA light meal preparation   Short term goal 5: mod I toilet transfers and shower transfers  :  Long term goals  Time Frame for Long term goals : LTG=STG :  These goals were reviewed with this patient at the time of assessment and Byron Cerda is in agreement    Plan of Care:  Pt to be seen 5 out of 7 days per week per ARU protocol ( 90  minutes with OT)  Patient Education: role of OT, review of goals, rehab POC    SPEECH THERAPY: Goals will be left blank if speech is not following this patient. Goals: These goals were reviewed with this patient at the time of assessment and Richmond Prashant is in agreement    Plan of Care:  Pt to be seen 5 out of 7 days per week per ARU protocol (    minutes with SLP)    Therapy Treatments will include:  [x]  therapeutic exercises    [x]  gait training     [x]  neuromuscular re-ed                            [x]  transfer training             [x] community reintegration    [x] bed mobility                          []  w/c mobility and training  [x]  self care    [x]home mgmt    []  cognitive training            [x]  energy conservation        []  dysphagia tx    []  speech/language/communication therapy   [x]  group therapy    [x]  patient/family education    [] Other:    If the patient is admitted with a diagnosis of 'CVA' and is appropriate for group therapy, their treatment plan will include educational group therapy interventions in the form of \"Stroke Education Group Therapy Class\". CASE MANAGEMENT:  Goals:   Assist patient/family with discharge planning, patient/family counseling,   and coordination with insurance during ARU stay.     Admission Period/Goal FIM SCORES  FIM Admit/Goal Score   Eating/Swallowing 5/6 fractures to address strengthening, endurance, balance, gait, activities of daily living, assistive/adaptive device needs, patient and family training, pain control. This plan has been reviewed with Atilio Cabrera on ____4/11/19________  in a language the patient understands. Atilio Cabrera has had the opportunity to include input with the therapy team. Electronically signed by THI Fu, MARCE on 4/11/2019 at 4:03 PM    I have reviewed this initial plan of care and agree with its contents:    Title   Name    Date    Time    Physician: Lucien Johnson. Jelani Hoyos MD 4/5/2019, 10:02 AM     Case Mgmt: THI Ireland, MARCE, 4/4/19, 9315    OT: Tunde Martinez OTR/L [id number], 4/4/2019, 4:28 PM    PT: Jacki Trevizo DPT [id number] 4/4/19 1558    RN: Karishma Hernandez RN  [id number]  3-6-16    :  Sudha Senior MA [facility], 4/5/19 831    Other:

## 2019-04-04 NOTE — PROGRESS NOTES
Occupational Therapy   Occupational Therapy Initial Assessment  Date: 2019   Patient Name: Liliya Garcia  MRN: [id number]     : 10/16/1932    Date of Service: 2019    Discharge Recommendations:  Home with assist PRN, Home with nursing aide, S Level 3  OT Equipment Recommendations  Other: may benefit from [facility] for night use as pt reports she is frequently up and down to the bathroom at night     Assessment   Performance deficits / Impairments: Decreased functional mobility ; Decreased ADL status; Decreased strength;Decreased endurance;Decreased balance;Decreased high-level IADLs  Assessment: Patient presents with the above deficits, which are below baseline, and would benefit from continued skilled OT to address  Treatment Diagnosis: Decreased ADLs, IADLs, transfers, mobility associated with T12 compression fx s/p Kyphoplasty  Prognosis: Good  Decision Making: Low Complexity  Patient Education: role of OT, review of goals, rehab POC  REQUIRES OT FOLLOW UP: Yes  Activity Tolerance  Activity Tolerance: Patient Tolerated treatment well  Safety Devices  Safety Devices in place: Yes  Type of devices: Call light within reach; Chair alarm in place; Left in chair  Restraints  Initially in place: No           Patient Diagnosis(es): Kyphoplasty post T12 compression fracture - L arm NWB due to humeral fracture 6 weeks ago      has a past medical history of Arthritis, CAD (coronary artery disease), Cancer ([address] Cystocele, Diabetes mellitus ([address] Hepatitis A, History of blood transfusion, Hyperlipidemia, PVC (premature ventricular contraction), Rectocele, Reflux, and Scoliosis. has a past surgical history that includes Appendectomy; joint replacement (Left); hernia repair (6 YRS AGO); Cholecystectomy; shoulder surgery (Right, 12); Bunionectomy (12); Breast surgery; Upper gastrointestinal endoscopy (12);  Hysterectomy; bladder suspension; Dilation and curettage of uterus; other surgical history (Left, 09/22/14); Foot surgery; Cardiac catheterization; Colonoscopy (2008); Cystocopy (03/29/2017); other surgical history (Right, 06/28/2018); Cataract removal with implant (Left, 09/13/2018); and pr remv cataract extracap,insert lens (Left, 9/13/2018).     Treatment Diagnosis: Decreased ADLs, IADLs, transfers, mobility associated with T12 compression fx s/p Kyphoplasty      Restrictions  Restrictions/Precautions  Restrictions/Precautions: Fall Risk, Weight Bearing  Required Braces or Orthoses?: Yes  Implants present? : Metal implants(L TKA)  Upper Extremity Weight Bearing Restrictions  Left Upper Extremity Weight Bearing: Non Weight Bearing(sling)  Required Braces or Orthoses  Left Upper Extremity Brace/Splint: Sling  Left Upper Extremity Brace/Splint: S/P humeral fracture (~6 weeks ago)    Subjective   General  Chart Reviewed: Yes  Patient assessed for rehabilitation services?: Yes  Additional Pertinent Hx: L TKA, cataract sx   Family / Caregiver Present: No  Subjective  Subjective: pt in chair on arrival - rate pain 7-8/10 in back and L arm   Pain Assessment  Pain Assessment: 0-10  Pain Level: 10  Patient's Stated Pain Goal: 4  Pain Type: Acute pain  Pain Location: Back, Hip  Pain Orientation: Lower, Right  Pain Descriptors: Aching, Constant  Pain Frequency: Intermittent  Non-Pharmaceutical Pain Intervention(s): Repositioned  Response to Pain Intervention: Asleep with RR greater than 10     Social/Functional History  Social/Functional History  Lives With: Alone  Type of Home: Assisted living([place] )  Home Layout: One level  Home Access: Level entry  Bathroom Shower/Tub: Walk-in shower, Shower chair without back  Bathroom Toilet: Standard  Bathroom Equipment: Grab bars in shower, Grab bars around toilet, Hand-held shower  Bathroom Accessibility: Accessible  Home Equipment: Alert Button, Quad cane, Wheelchair-manual, 4 wheeled walker, Hospital bed(head and feet adjustable )  Receives Help From: Home health, Family(supererior care at [address] [place] [address], family visits as able )  ADL Assistance: Independent  Homemaking Assistance: Independent  Homemaking Responsibilities: No  Ambulation Assistance: Independent(using cane since fall )  Transfer Assistance: Independent  Active : Yes  Mode of Transportation: Van([employer] van )  Education: college  Occupation: Retired  Type of occupation: RN  Leisure & Hobbies: play games on [address], group activities at [employer]  Additional Comments: Prior to initial fall 6 weeks, pt independent and living alone, just recently moved to [address] [place] [address]. Pt had 4-5 falls since initial fall. Patient Information/Preferences Form    Preferred Name:  Leisure activities/hobbies:games on ipad  Downtime activities:games  Please list 1-3 goals you want to achieve prior to going home:take care of myself, no more falls   How often do you take showers? 2 times a week   When do you prefer to shower?  Depends on how I'm feeling   Bedtime: between 8 and 10 but up to the bathroom a lot during the night   Wake up time:   Breakfast time: 8 am   Length of time to eat:  [employer] Accommodations:   Forms of alternative medication:       Objective   Vision: Impaired  Vision Exceptions: Wears glasses for reading  Hearing: Exceptions to [facility]  Hearing Exceptions: Hard of hearing/hearing concerns;Bilateral hearing aid    Orientation  Overall Orientation Status: Within Normal Limits  Observation/Palpation  Posture: Fair  Palpation: +TTP R ATFL, R sinus tarsi, R distal interosseous membrane between tib/fib  Observation: increased thoracic kyphosis  Balance  Sitting Balance: Stand by assistance  Standing Balance: Contact guard assistance  Standing Balance  Time: ~2-3 minutes x 2  Activity: ambulating to/from bathroom   Toilet Transfers  Toilet - Technique: Ambulating  Equipment Used: Standard toilet  Toilet Transfer: Minimal assistance  Toilet Transfers Comments: provided with BSC after transfer due to using hand on

## 2019-04-04 NOTE — H&P
Patient: Xin Major  [id number]  Date: 4/4/2019      Chief Complaint: pain     History of Present Illness/Hospital Course:  80-year-old female to history of CAD, diabetes, hyperlipidemia, and recent left humerus fracture who was admitted on 4/1 with repeat falls and back pain. Imaging revealed a healing left humerus fracture and T12 acute compression fracture. She underwent a kyphoplasty on 4/2. Her medical course has been significant for pain in the back and left upper extremity. Patient states that she was living independently in a condo and was in the process of moving to an assisted living facility when she fell while standing on a ladder and reaching for an object which initially set her into this down slide. It was this event that resulted in her left humerus fracture initially. She has had increased pain and right lower extremity occasional weakness that has led to her repeat falls per her report. She reports her pain as completely uncontrolled this morning.     Prior Level of Function:  Independent and living alone     Current Level of Function:  CGA     has a past medical history of Arthritis, CAD (coronary artery disease), Cancer ([address] Cystocele, Diabetes mellitus ([address] Hepatitis A, History of blood transfusion, Hyperlipidemia, PVC (premature ventricular contraction), Rectocele, Reflux, and Scoliosis. has a past surgical history that includes Appendectomy; joint replacement (Left); hernia repair (6 YRS AGO); Cholecystectomy; shoulder surgery (Right, 7/26/12); Bunionectomy (7/26/12); Breast surgery; Upper gastrointestinal endoscopy (11/2/12); Hysterectomy; bladder suspension; Dilation and curettage of uterus; other surgical history (Left, 09/22/14); Foot surgery; Cardiac catheterization; Colonoscopy (2008); Cystocopy (03/29/2017); other surgical history (Right, 06/28/2018); Cataract removal with implant (Left, 09/13/2018); and pr remv cataract extracap,insert lens (Left, 9/13/2018). back problems but   recently had a fall. Severe back pain, had dilaudid before coming to MRI,   still in a lot of pain, best images possible   Acuity: Acute   Type of Exam: Initial       FINDINGS:   BONES/ALIGNMENT: Approximately 25% height loss of T12 vertebral body.  Fluid   and/or hemorrhage present within a large fracture plane, involving the   anterior and posterior vertebral cortex in addition to superior endplate. There is osseous retropulsion at the inferior endplate level measuring   approximately 3 mm.  Extensive vertebral body marrow edema.  No significant   left pedicular edema.  There is right pedicular edema with possible mild   fracture involvement.  Focal linear T2 hyperintensity within the anterior   T11-T12 disc space, likely traumatic.       Chronic height loss of L4 and L5 vertebral bodies.  Lumbar scoliosis with   minimal retrolisthesis present at the L2-L3 and L3-L4 levels.  There is   minimal anterolisthesis at T11-T12 and retrolisthesis at T12-L1.       Disc space and endplate change at H1-J3 favored represent degenerative   process, similar to previous exam.  No evidence of additional acute fracture   in the visualized thoracolumbar spine.       SPINAL CORD: The conus terminates normally.       SOFT TISSUES: Interspinous edema at T12-L1 and L1-L2. No definite ligamentous   discontinuity.       T12-L1: Bilateral facet hypertrophy.  Retrolisthesis.  Retropulsion and disc   bulging/endplate spurring.  Moderate neural foraminal narrowing.  Anterior   and posterior subarachnoid effacement.  Conus/cord flattening.  AP dimension   of the thecal sac measures 7-8 mm at midline.       L1-L2: Mild right neural foraminal narrowing due to facet hypertrophy and   endplate spurring.  No significant spinal canal stenosis.       L2-L3: Mild-moderate right neural foraminal narrowing due to facet   hypertrophy and disc spur complex.  No significant spinal canal stenosis.       L3-L4: Bilateral facet hypertrophy.  Disc bulging and endplate spurring. Moderate-severe left and mild right neural foraminal narrowing.  No   significant spinal canal stenosis.       L4-L5: Bilateral facet hypertrophy.  Mild disc bulging and endplate spurring. Mild bilateral neural foraminal narrowing.  No significant spinal canal   stenosis.       L5-S1: Bilateral facet hypertrophy.  Moderate neural foraminal narrowing,   greater on the left.  No significant spinal canal stenosis.  No evidence of   descending S1 nerve root compression.           Impression   Acute T12 fracture.  There is prominent diastasis of the vertebral fracture   plane, which may involve the right pedicle.       Borderline compression of the distal cord at the T12 inferior endplate.       Interspinous edema, likely traumatic.  In addition, there is suspected   traumatic disruption of the anterior annulus at T11-T12.      EXAMINATION:   CT OF THE CERVICAL SPINE WITHOUT CONTRAST 4/1/2019 2:57 pm       TECHNIQUE:   CT of the cervical spine was performed without the administration of   intravenous contrast. Multiplanar reformatted images are provided for review. Dose modulation, iterative reconstruction, and/or weight based adjustment of   the mA/kV was utilized to reduce the radiation dose to as low as reasonably   achievable.       COMPARISON:   12/16/2017 CT       HISTORY:   ORDERING SYSTEM PROVIDED HISTORY: inj   TECHNOLOGIST PROVIDED HISTORY:   Ordering Physician Provided Reason for Exam: Fall (Pt with 4 falls in last 6   weeks, known humerus fx from 6 weeks ago. Pt states right leg/hip get weak   and cause to fall. Pt states fell in rehab few weeks ago and arm has been   hurting worse since lower than origional fracture, no new xrays done.  Pt with   pain in both hips, right knee back. )   Acuity: Acute   Type of Exam: Initial       FINDINGS:   BONES/ALIGNMENT: Severe kyphosis in the thoracic spine contributes to   straightening of lordotic curvature in the cervical spine. Sherry Martinez is an age   indeterminate compression fracture of T5 which is new in comparison to a   prior CT 12/16/2017.  Probable mild compression fracture also seen to the   superior endplate of T6.  No acute fracture or subluxation in the cervical   spine.       DEGENERATIVE CHANGES: Degenerative spondylosis and facet DJD observed   throughout.  No significant spinal canal stenosis.       SOFT TISSUES: Paraspinal soft tissues are normal.  Lung apices are clear. Incidental 6 mm right thyroid nodule.  This appears stable.           Impression   1. No acute abnormality of the cervical spine. 2. Age indeterminate fractures of T5 and likely T6 only partially evaluated   on this cervical spine CT.  These are new since 12/16/2017, and contribute to   kyphosis in the thoracic spine.  Recommend correlation with physical exam for   point tenderness.  A follow-up MRI of the thoracic spine may be considered   for further evaluation if necessary. 3. Incidental right thyroid nodule, stable.  No ultrasound follow-up is   necessary.      EXAMINATION:   CT OF THE HEAD WITHOUT CONTRAST  4/1/2019 2:56 pm       TECHNIQUE:   CT of the head was performed without the administration of intravenous   contrast. Dose modulation, iterative reconstruction, and/or weight based   adjustment of the mA/kV was utilized to reduce the radiation dose to as low   as reasonably achievable.       COMPARISON:   August 21, 2018       HISTORY:   ORDERING SYSTEM PROVIDED HISTORY: inj   TECHNOLOGIST PROVIDED HISTORY:   Has a \"code stroke\" or \"stroke alert\" been called? ->No   Ordering Physician Provided Reason for Exam: Fall (Pt with 4 falls in last 6   weeks, known humerus fx from 6 weeks ago. Pt states right leg/hip get weak   and cause to fall. Pt states fell in rehab few weeks ago and arm has been   hurting worse since lower than origional fracture, no new xrays done.  Pt with   pain in both hips, right knee back. )   Acuity: Acute   Type of Exam: Initial       FINDINGS:   BRAIN/VENTRICLES: There is no acute intracranial hemorrhage, mass effect or   midline shift.  No abnormal extra-axial fluid collection.  The gray-white   differentiation is maintained without evidence of an acute infarct.  There is   no evidence of hydrocephalus. Mild generalized volume loss.  Mild   periventricular low-attenuation likely related chronic small vessel disease.       ORBITS: The visualized portion of the orbits demonstrate no acute abnormality.       SINUSES: The visualized paranasal sinuses and mastoid air cells demonstrate   no acute abnormality.       SOFT TISSUES/SKULL:  No acute abnormality of the visualized skull or soft   tissues.           Impression   No acute intracranial abnormality.      EXAMINATION:   TWO XRAY VIEWS OF THE LEFT HUMERUS       4/1/2019 2:17 pm       COMPARISON:   02/20/2019       HISTORY:   ORDERING SYSTEM PROVIDED HISTORY: inj   TECHNOLOGIST PROVIDED HISTORY:   Reason for exam:->inj   Ordering Physician Provided Reason for Exam: Pt with 4 falls in last 6 weeks,   known humerus fx from 6 weeks ago. Pt states right leg/hip get weak and cause   to fall. Acuity: Acute   Type of Exam: Initial       FINDINGS:   There is a sclerotic, impacted fracture line across the humeral neck.  There   is fragmentation of the greater tuberosity.  There is no dislocation.  Old   left posterolateral rib fractures are noted.           Impression   Healing, impacted fracture of the humeral neck.  No superimposed, acute   fracture is identified.                The above laboratory data have been reviewed.      The above imaging data have been reviewed.      The above medical testing have been reviewed. Body mass index is 29.68 kg/m².     Barriers to Discharge: Pain, weakness, safety, falls, ADLs    POST ADMISSION PHYSICIAN EVALUATION  The patient has agreed to being admitted to our comprehensive inpatient  rehabilitation facility consisting of at least 180 minutes of therapy a day,  5 out of 7 days a week. The patient/family has a good understanding of our discharge process. The  patient has potential to make improvement and is in need of at least two of  the following multidisciplinary therapies including but not limited to  physical, occupational, respiratory, and speech, nutritional services, wound care, and prosthetics and orthotics. Given the patients complex condition  and risk of further medical complications, rehabilitation services cannot be  safely provided at a lower level of care such as a skilled nursing facility. I have compared the patients medical and functional status at the time of the  preadmission screening and the same on this date, and there are no significant changes except as documented below in the assessment and plan. By signing this document, I acknowledge that I have personally performed a  full physical examination on this patient within 24 hours of admission to  this inpatient rehabilitation facility and have determined the patient to be  able to tolerate the above course of treatment at an intensive level for a  reasonable period of time. I will be completing a detailed individualized  Plan of Care for this patient by day four of the patients stay based upon the  Preadmission Screen, this Post-Admission Evaluation, and the therapy  evaluations. Assessment and Plan:  Acute T12 compression fracture: S/P kyphoplasty 4/2. Pain control. PT/OT. TLSO when OOB    Subacute left humerus fracture: Sling immobilization    CAD: no ASA per home meds    HTN: question pain related     R PSIS point tenderness: significant, check XR    Osteoporosis: Fosamax at d/c    Bowels: Per protocol  Bladder: Per protocol   Sleep: Trazodone provided prn. Pain: Lidoder, robaxin 750 scheduled, - schedule tylenol, increase david, wean dilaudid in near future.    DVT PPx: eduardo Vora MD 4/4/2019, 9:02 AM     * This document was created using

## 2019-04-04 NOTE — PROGRESS NOTES
Spoke to patient at bedside post Kyphoplasty procedure. Patients site was clean, dry and intact. Patient states some rib pain from laying on stomach during procedure but overall back pain is  Much better post procedure. I addressed all the patients concerns, and directed patient to contact Special Procedures if they had any further questions.

## 2019-04-04 NOTE — PROGRESS NOTES
Physical Therapy    Facility/Department: [facility] AC[facility]  Initial Assessment    NAME: Melissa Ann  : 10/16/1932  MRN: [id number]    Date of Service: 2019    Discharge Recommendations:  S Level 3, Home with assist PRN, Home with Home health PT   PT Equipment Recommendations  Equipment Needed: No    Assessment   Body structures, Functions, Activity limitations: Decreased functional mobility ; Decreased ADL status; Decreased ROM; Decreased strength;Decreased endurance;Decreased balance; Increased Pain  Assessment: Pt presents w/ main limitations of decreased functional mobility, strength, and endurance below baseline 2/2 recent falls and WBing restriction LUE. Pt refused to complete FES-I, stating \"I'm not scared of falling, I just don't know why it keeps happening. \" Pt will benefit from skilled PT to improve safety and independence to assist in return to PLOF. Treatment Diagnosis: decreased functional mobility, decreased endurance  Prognosis: Good  Decision Making: Medium Complexity  Patient Education: PT POC, sequencing w/ transfers, arthritis presentation  Barriers to Learning: n/a  REQUIRES PT FOLLOW UP: Yes  Activity Tolerance  Activity Tolerance: Patient Tolerated treatment well;Patient limited by endurance       Patient Diagnosis(es): There were no encounter diagnoses. has a past medical history of Arthritis, CAD (coronary artery disease), Cancer ([address] Cystocele, Diabetes mellitus ([address] Hepatitis A, History of blood transfusion, Hyperlipidemia, PVC (premature ventricular contraction), Rectocele, Reflux, and Scoliosis. has a past surgical history that includes Appendectomy; joint replacement (Left); hernia repair (6 YRS AGO); Cholecystectomy; shoulder surgery (Right, 12); Bunionectomy (12); Breast surgery; Upper gastrointestinal endoscopy (12); Hysterectomy; bladder suspension; Dilation and curettage of uterus; other surgical history (Left, 14);  Foot surgery; Cardiac catheterization; Colonoscopy (2008); Cystocopy (03/29/2017); other surgical history (Right, 06/28/2018); Cataract removal with implant (Left, 09/13/2018); and pr remv cataract extracap,insert lens (Left, 9/13/2018). Restrictions  Restrictions/Precautions  Restrictions/Precautions: Fall Risk, Weight Bearing  Required Braces or Orthoses?: Yes  Implants present? : Metal implants(L TKA)  Upper Extremity Weight Bearing Restrictions  Left Upper Extremity Weight Bearing: Non Weight Bearing(sling)  Required Braces or Orthoses  Left Upper Extremity Brace/Splint: Sling  Left Upper Extremity Brace/Splint: S/P humeral fracture (~6 weeks ago)  Vision/Hearing  Vision: Impaired  Vision Exceptions: Wears glasses for reading  Hearing: Exceptions to [facility]  Hearing Exceptions: Hard of hearing/hearing concerns;Bilateral hearing aid     Subjective  General  Chart Reviewed: Yes  Patient assessed for rehabilitation services?: Yes  Additional Pertinent Hx: CAD, DM, cancer, arthritis  Response To Previous Treatment: Not applicable  Family / Caregiver Present: No  Referring Practitioner: Dr. Lupillo Galvan  Referral Date : 04/03/19  Diagnosis: T12 compression fracture; s/p kyphoplasty  Follows Commands: Within Functional Limits  General Comment  Comments: Pt supine in bed on arrival, L arm sling donned. Subjective  Subjective: Pt states she \"is not doing well, I'm in pain. \" Agreeable to therapy. Pt also states she is having difficulty controlling her bladder. During evaluation pt reported increased R ankle pain that is new since fall. Pt states R hip pain is chronic but worsened. Pain Screening  Patient Currently in Pain: Yes  Pain Assessment  Pain Assessment: 0-10  Pain Level: 6  Pain Type: Acute pain  Pain Location: Back; Hip  Pain Orientation: Lower;Right  Pain Descriptors: Aching;Constant  Vital Signs  Patient Currently in Pain: Yes  Pre Treatment Pain Screening  Intervention List: Patient able to continue with treatment; Pt educated regarding timing of pain meds;Nurse/physician notified  Comments / Details: pt received pain medicaiton 30 minutes ago    Orientation  Orientation  Overall Orientation Status: Within Normal Limits  Social/Functional History  Social/Functional History  Lives With: Alone  Type of Home: Assisted living([facility] )  Home Layout: One level  Home Access: Level entry  Bathroom Shower/Tub: Walk-in shower, Shower chair without back  Bathroom Toilet: Standard  Bathroom Equipment: Grab bars in shower, Grab bars around toilet, Hand-held shower  Bathroom Accessibility: Accessible  Home Equipment: Alert Button, Quad cane, Wheelchair-manual, 4 wheeled walker, Hospital bed(head and feet adjustable )  Receives Help From: Home health, Family(supererior care at [address] [place] [address], family visits as able )  ADL Assistance: Independent  Homemaking Assistance: Independent  Homemaking Responsibilities: No  Ambulation Assistance: Independent(using cane since fall )  Transfer Assistance: Independent  Active : Yes  Mode of Transportation: Van([facility] transport van )  Education: college  Occupation: Retired  Type of occupation: RN  Leisure & Hobbies: play games on [address], group activities at [facility]  Additional Comments: Prior to initial fall 6 weeks, pt independent and living alone, just recently moved to [address] [place] [address]. Pt had 4-5 falls since initial fall.    Cognition        Objective     Observation/Palpation  Posture: Fair  Palpation: +TTP R ATFL, R sinus tarsi, R distal interosseous membrane between tib/fib  Observation: increased thoracic kyphosis    AROM RLE (degrees)  RLE AROM: WFL  AROM LLE (degrees)  LLE AROM : WFL  Strength RLE  Strength RLE: WFL  Comment: increased pain w/ resisted ankle DF  Strength LLE  Strength LLE: WFL  Tone RLE  RLE Tone: Normotonic  Tone LLE  LLE Tone: Normotonic  Motor Control  Gross Motor?: WFL  Coordination  Rapid Alternating Movements: Normal  Sensation  Overall Sensation Status: WFL  Bed mobility  Bridging: Stand by assistance  Rolling to Left: Unable to assess(2/2 LUE pain/restrictions)  Rolling to Right: Stand by assistance  Supine to Sit: Stand by assistance  Sit to Supine: Stand by assistance  Scooting: Stand by assistance  Comment: performed in hospital bed, HOB slightly elevated   Transfers  Sit to Stand: Contact guard assistance(performed x3 during session w/ proper sequencing and good safety awareness)  Stand to sit: Contact guard assistance  Bed to Chair: Unable to assess  Stand Pivot Transfers: Unable to assess  Car Transfer: Contact guard assistance  Ambulation  Ambulation?: Yes  More Ambulation?: Yes  Ambulation 1  Surface: level tile  Device: Small Hector Corbin  Other Apparatus: Wheelchair follow  Assistance: Contact guard assistance  Quality of Gait: decreased step length, narrow YASSINE, decreased step length, decreased R heel strike, no LOB  Distance: 160'  Comments: pt stated \"my legs are getting weak and shakey\" towards end of ambulation but able to complete w/o incident   Ambulation 2  Surface - 2: carpet  Device 2: Small Base Quad Care  Quality of Gait 2: same as quality 1  Distance: 15'  Comments: pt ambulated across carpet, picked up object off floor using reacher  Stairs/Curb  Stairs?: Yes  Stairs  # Steps : 4  Stairs Height: 4\"  Rails: Right ascending  Device: No Device  Assistance: Contact guard assistance  Comment: step to pattern ascending/descending     Balance  Posture: Good  Sitting - Static: Good  Sitting - Dynamic: Good  Standing - Static: Fair  Standing - Dynamic: Fair        2nd session: Pt seated in chair on arrival, no complaints from previous session and agreeable to PT session. Sit <> stand transfer x2, SBA during session. Pt ambulated 125' + 125' CGA w/ same gait quality as above. Pt performed car transfer x2 (one at lowest height, one at standard elevated seat height) w/ CGA.  Discussed fear of falling w/ pt during rest break x7 minutes w/ pt stating \"I don't have fear of falling, I just don't know why it keeps happening. \" Pt left seated in chair, alarm engaged, call button in reach, all needs currently met. Pt requested Mucinex and Flonase for congestion, nursing and Dr. Lu Figueroa notified. Nursing notified of pt request for pain medication reporting 5/10 low back and R hip pain. Plan   Plan  Times per week: 90 minutes/day, 5 days/week  Times per day: Daily  Current Treatment Recommendations: Strengthening, ROM, Balance Training, Functional Mobility Training, Transfer Training, Endurance Training, Gait Training, Stair training, Neuromuscular Re-education, Home Exercise Program, Safety Education & Training, Patient/Caregiver Education & Training, Equipment Evaluation, Education, & procurement, Pain Management, Positioning, Manual Therapy - Soft Tissue Mobilization  Safety Devices  Type of devices:  All fall risk precautions in place, Call light within reach, Chair alarm in place, Gait belt, Patient at risk for falls, Left in chair, Nurse notified  Restraints  Initially in place: No    G-Code       OutComes Score                 AM-PAC Score       Goals  Short term goals  Time Frame for Short term goals: 5-7 days  Short term goal 1: Pt will complete bed mobility mod I   Short term goal 2: Pt will complete transfers mod I w/ LRAD  Short term goal 3: Pt will ambulate 150' mod I w/ LRAD  Short term goal 4: Pt will navigate 4 stairs mod I   Patient Goals   Patient goals : \"to get back home and take care of myself, don't have any more falls\"       Therapy Time   Individual Concurrent Group Co-treatment   Time In 0830         Time Out 0930         Minutes 60          Treatment Time: 60 minutes      Therapy Time   Individual Concurrent Group Co-treatment   Time In 1100         Time Out 1130         Minutes 30          Treatment Time: 30 minutes      Total Treatment Time: 90 minutes  Timed Code Treatment Minutes: 45+30    Garett Adame, student PT  Therapist was present, directed the patient's care, made skilled judgement, and was responsible for assessment and treatment of the patient.  Co-signed and supervised by:  Vannesa Pollock DPT [id number]

## 2019-04-04 NOTE — PROGRESS NOTES
Occupational Therapy Discharge Summary    Name: Afshin Springer  : 10/16/1932    The pt was evaluated by OT on 4/3/19 and seen for initial evaluation only, prior to DC to ARU on 4/3/19, per MD order. The pt's acute therapy goals were:    Short term goals  Time Frame for Short term goals: Discharge  Short term goal 1: Patient will bathe [place]  Short term goal 2: Patient will dress [place]  Short term goal 3: Patient will perform functional ADL transfer CGA  Short term goal 4: Patient will tolerate stance for ADLs and mobility CGA x 3-5 minutes  Long term goals  Time Frame for Long term goals : LTG=STG     Patient met 0 goals during stay. Number of Refusals:0  Number of Holds: 0    During this hospitalization, the patient was educated on:  Patient Education: YVES Delgado, discharge recommendations    DC pt from OT caseload at this time.   Thank you, Jose Beatty, OTR/L, [id number]

## 2019-04-04 NOTE — PLAN OF CARE
Nutrition Problem: Unintended weight loss  Intervention: Food and/or Nutrient Delivery: Continue current diet, Start ONS  Nutritional Goals: po intake 75% or greater

## 2019-04-04 NOTE — PROGRESS NOTES
Nutrition Assessment    Type and Reason for Visit: Initial, Consult    Nutrition Recommendations:   Chocolate Ensure High Protein BID  Monitor glucose for diet modification to [facility]    Nutrition Assessment: New admission to ARU. Pt nutritionally compromised as evidenced by wt loss r/t recent decrease in po intake. Pt was at a skilled nursing facility for rehab and reports the food as being awful and not eating; therefore she lost weight. Reports UBW of 160 - 165 lb. CBW is 157lb and pt's daughter reports that pt is now regaining some of her lost weight. Pt likes oral supplements, willing to accept chocolate Ensure High Protein. Pt with hx of DM, not on any OHA or insulin at this time. POC glucose elevated, suggest carb controlled diet. Pt not at risk for greater nutrition compromise at this time, as PO intake is improving and pt willing to drink ONS. Malnutrition Assessment:  · Malnutrition Status: Mild Malnutrition based on recent wt loss and inadequate oral intake. · Context: Acute illness or injury  · Findings of the 6 clinical characteristics of malnutrition (Minimum of 2 out of 6 clinical characteristics is required to make the diagnosis of moderate or severe Protein Calorie Malnutrition based on AND/ASPEN Guidelines):  1. Energy Intake-Less than or equal to 75% of estimated energy requirement, Greater than or equal to 1 month    2. Weight Loss-5% loss or greater, (6 wks)  3. Fat Loss-No significant subcutaneous fat loss,    4. Muscle Loss-No significant muscle mass loss,    5. Fluid Accumulation-No significant fluid accumulation,    6.  Strength-Not measured    Nutrition Risk Level:  Moderate    Nutrient Needs:  · Estimated Daily Total Kcal: 1420 - 1775  · Estimated Daily Protein (g): 62 - 72  · Estimated Daily Total Fluid (ml/day): 1 mL/kcal    Nutrition Diagnosis:   · Problem: Unintended weight loss  · Etiology: related to Insufficient energy/nutrient consumption     Signs and symptoms: as evidenced by Patient report of, Diet history of poor intake    Objective Information:  · Nutrition-Focused Physical Findings: healing left humerus fracture and T12 acute compression fracture (kyphoplasty on 4/2).     · Wound Type: None  · Current Nutrition Therapies:  · Oral Diet Orders: General   · Oral Diet intake: %  · Anthropometric Measures:  · Ht: 5' 1\" (154.9 cm)   · Current Body Wt: 157 lb (71.2 kg)  · % Weight Change:  ,  currently 8lb down from UBW (5% in 6 wks)  · Ideal Body Wt: 105 lb (47.6 kg)   · BMI Classification: BMI 25.0 - 29.9 Overweight    Nutrition Interventions:   Continue current diet, Start ONS  Continued Inpatient Monitoring, Education Not Indicated    Nutrition Evaluation:   · Evaluation: Goals set   · Goals: po intake 75% or greater    · Monitoring: Meal Intake, Supplement Intake, Pertinent Labs(monitor glucose)      Electronically signed by Joel Youssef RD, LD on 4/4/19 at 2:57 PM  Contact Number: [phone]

## 2019-04-05 PROCEDURE — 97535 SELF CARE MNGMENT TRAINING: CPT

## 2019-04-05 PROCEDURE — 97110 THERAPEUTIC EXERCISES: CPT

## 2019-04-05 PROCEDURE — 1280000000 HC REHAB R&B

## 2019-04-05 PROCEDURE — 6360000002 HC RX W HCPCS: Performed by: PHYSICAL MEDICINE & REHABILITATION

## 2019-04-05 PROCEDURE — 6370000000 HC RX 637 (ALT 250 FOR IP): Performed by: PHYSICAL MEDICINE & REHABILITATION

## 2019-04-05 PROCEDURE — 97530 THERAPEUTIC ACTIVITIES: CPT

## 2019-04-05 RX ORDER — OXYCODONE HYDROCHLORIDE 5 MG/1
5 TABLET ORAL
Status: DISCONTINUED | OUTPATIENT
Start: 2019-04-05 | End: 2019-04-08

## 2019-04-05 RX ORDER — OXYCODONE HYDROCHLORIDE 5 MG/1
10 TABLET ORAL
Status: DISCONTINUED | OUTPATIENT
Start: 2019-04-05 | End: 2019-04-08

## 2019-04-05 RX ADMIN — ACETAMINOPHEN 650 MG: 325 TABLET, FILM COATED ORAL at 07:46

## 2019-04-05 RX ADMIN — ACETAMINOPHEN 650 MG: 325 TABLET, FILM COATED ORAL at 16:09

## 2019-04-05 RX ADMIN — METHOCARBAMOL 750 MG: 750 TABLET ORAL at 12:53

## 2019-04-05 RX ADMIN — DOCUSATE SODIUM 100 MG: 100 CAPSULE, LIQUID FILLED ORAL at 21:22

## 2019-04-05 RX ADMIN — METHOCARBAMOL 750 MG: 750 TABLET ORAL at 07:46

## 2019-04-05 RX ADMIN — OXYCODONE HYDROCHLORIDE 10 MG: 5 TABLET ORAL at 10:29

## 2019-04-05 RX ADMIN — ENOXAPARIN SODIUM 40 MG: 40 INJECTION SUBCUTANEOUS at 07:47

## 2019-04-05 RX ADMIN — OXYCODONE HYDROCHLORIDE 10 MG: 5 TABLET ORAL at 21:22

## 2019-04-05 RX ADMIN — FLUTICASONE PROPIONATE 1 SPRAY: 50 SPRAY, METERED NASAL at 07:47

## 2019-04-05 RX ADMIN — OXYCODONE HYDROCHLORIDE 10 MG: 5 TABLET ORAL at 02:37

## 2019-04-05 RX ADMIN — OXYCODONE HYDROCHLORIDE 10 MG: 5 TABLET ORAL at 14:25

## 2019-04-05 RX ADMIN — GUAIFENESIN 600 MG: 600 TABLET, EXTENDED RELEASE ORAL at 21:21

## 2019-04-05 RX ADMIN — METHOCARBAMOL 750 MG: 750 TABLET ORAL at 21:22

## 2019-04-05 RX ADMIN — PANTOPRAZOLE SODIUM 40 MG: 40 TABLET, DELAYED RELEASE ORAL at 16:09

## 2019-04-05 RX ADMIN — ACETAMINOPHEN 650 MG: 325 TABLET, FILM COATED ORAL at 12:53

## 2019-04-05 RX ADMIN — PANTOPRAZOLE SODIUM 40 MG: 40 TABLET, DELAYED RELEASE ORAL at 05:55

## 2019-04-05 RX ADMIN — DOCUSATE SODIUM 100 MG: 100 CAPSULE, LIQUID FILLED ORAL at 07:46

## 2019-04-05 RX ADMIN — GUAIFENESIN 600 MG: 600 TABLET, EXTENDED RELEASE ORAL at 07:46

## 2019-04-05 RX ADMIN — ACETAMINOPHEN 650 MG: 325 TABLET, FILM COATED ORAL at 21:21

## 2019-04-05 RX ADMIN — OXYCODONE HYDROCHLORIDE 10 MG: 5 TABLET ORAL at 06:34

## 2019-04-05 ASSESSMENT — PAIN SCALES - GENERAL
PAINLEVEL_OUTOF10: 5
PAINLEVEL_OUTOF10: 10
PAINLEVEL_OUTOF10: 10
PAINLEVEL_OUTOF10: 8
PAINLEVEL_OUTOF10: 8
PAINLEVEL_OUTOF10: 0
PAINLEVEL_OUTOF10: 10
PAINLEVEL_OUTOF10: 10
PAINLEVEL_OUTOF10: 8
PAINLEVEL_OUTOF10: 7

## 2019-04-05 ASSESSMENT — PAIN DESCRIPTION - FREQUENCY: FREQUENCY: INTERMITTENT

## 2019-04-05 ASSESSMENT — PAIN DESCRIPTION - LOCATION
LOCATION: BACK;HIP;SHOULDER
LOCATION: BACK;HIP;SHOULDER
LOCATION: HIP;BACK

## 2019-04-05 ASSESSMENT — PAIN DESCRIPTION - PAIN TYPE
TYPE: ACUTE PAIN;SURGICAL PAIN
TYPE: ACUTE PAIN

## 2019-04-05 ASSESSMENT — PAIN DESCRIPTION - ORIENTATION
ORIENTATION: RIGHT;LOWER
ORIENTATION: RIGHT;LEFT;MID;UPPER
ORIENTATION: RIGHT;LOWER

## 2019-04-05 ASSESSMENT — PAIN DESCRIPTION - DESCRIPTORS: DESCRIPTORS: ACHING;CONSTANT

## 2019-04-05 NOTE — PROGRESS NOTES
Physical Therapy  Facility/Department: [facility] ACUTE REHAB UNIT  Daily Treatment Note  NAME: Torrie Westbrook  : 10/16/1932  MRN: [id number]    Date of Service: 2019    Discharge Recommendations:  S Level 3, Home with assist PRN, Home with Home health PT   PT Equipment Recommendations  Equipment Needed: No    Patient Diagnosis(es): There were no encounter diagnoses. has a past medical history of Arthritis, CAD (coronary artery disease), Cancer ([facility], [address] Cystocele, Diabetes mellitus ([facility], [address] Hepatitis A, History of blood transfusion, Hyperlipidemia, PVC (premature ventricular contraction), Rectocele, Reflux, and Scoliosis. has a past surgical history that includes Appendectomy; joint replacement (Left); hernia repair (6 YRS AGO); Cholecystectomy; shoulder surgery (Right, 12); Bunionectomy (12); Breast surgery; Upper gastrointestinal endoscopy (12); Hysterectomy; bladder suspension; Dilation and curettage of uterus; other surgical history (Left, 14); Foot surgery; Cardiac catheterization; Colonoscopy (); Cystocopy (2017); other surgical history (Right, 2018); Cataract removal with implant (Left, 2018); and pr remv cataract extracap,insert lens (Left, 2018). Restrictions  Restrictions/Precautions  Restrictions/Precautions: Fall Risk, Weight Bearing  Required Braces or Orthoses?: Yes  Implants present? : Metal implants(L TKA)  Upper Extremity Weight Bearing Restrictions  Left Upper Extremity Weight Bearing: Non Weight Bearing(sling)  Required Braces or Orthoses  Left Upper Extremity Brace/Splint: Sling  Left Upper Extremity Brace/Splint: S/P humeral fracture (~6 weeks ago)  Subjective   General  Chart Reviewed: Yes  Additional Pertinent Hx: CAD, DM, cancer, arthritis  Response To Previous Treatment: Patient with no complaints from previous session.   Family / Caregiver Present: No  Referring Practitioner: Dr. Keron Head  Subjective  Subjective: Pt states \"I'm having balance during today's session w/ EO/EC. Pt left seated in chair, alarm engaged, call button in reach, all needs currently met. 2nd session: Pt seated in chair on arrival, states she just started getting a headache, nursing notified and medicine administered prior to session, pt agreeable to therapy session. Sit <> stand transfer x2, SBA. Pt ambulated 115' + 115' to therapy gym SBA w/ small based quad cane, same gait quality as above. Alternating toe taps on 4 inch step CGA w/ RUE support 2x10 w/ pt demonstrating good control w/ weight shifts. Standing hamstring curls 2x10 BLE w/ 2# ankle weight, SBA w/ use of cane. BLE LAQ 2x10 w/ 2# ankle weight, pt reported increased difficulty w/ RLE but able to reach full ROM. Seated ankle pumps 2x10. Seated hip abduction x10 w/ red theraband. Seated hip adduction ball squeeze w/ pink ball, x10. Pt reported she needed to use the restroom at end of session, performed toileting and transfer w/ supervision w/ cane and grab bar. Pt left seated in chair, alarm engaged, call button in reach, all needs currently met. Assessment   Body structures, Functions, Activity limitations: Decreased functional mobility ; Decreased ADL status; Decreased ROM; Decreased strength;Decreased endurance;Decreased balance; Increased Pain  Assessment: Pt w/ improving functional mobility, strength and endurance w/ therapy today. Pt main limitation continues to be pain tolerance in low back, L arm, and R hip. Pt will continue to benefit from skilled PT to address impairments and return to PLOF.   Treatment Diagnosis: decreased functional mobility, decreased endurance  Prognosis: Good  Patient Education: use of call button for bathroom/brief change, sequencing for sit to stand from w/c  REQUIRES PT FOLLOW UP: Yes  Activity Tolerance  Activity Tolerance: Patient Tolerated treatment well     G-Code     OutComes Score       AM-PAC Score       Goals  Short term goals  Time Frame for Short term goals: 5-7 days  Short term goal 1: Pt will complete bed mobility mod I   Short term goal 2: Pt will complete transfers mod I w/ LRAD  Short term goal 3: Pt will ambulate 150' mod I w/ LRAD  Short term goal 4: Pt will navigate 4 stairs mod I   Patient Goals   Patient goals : \"to get back home and take care of myself, don't have any more falls\"    Plan    Plan  Times per week: 90 minutes/day, 5 days/week  Times per day: Daily  Current Treatment Recommendations: Strengthening, ROM, Balance Training, Functional Mobility Training, Transfer Training, Endurance Training, Gait Training, Stair training, Neuromuscular Re-education, Home Exercise Program, Safety Education & Training, Patient/Caregiver Education & Training, Equipment Evaluation, Education, & procurement, Pain Management, Positioning, Manual Therapy - Soft Tissue Mobilization  Safety Devices  Type of devices: All fall risk precautions in place, Call light within reach, Chair alarm in place, Gait belt, Patient at risk for falls, Left in chair, Nurse notified  Restraints  Initially in place: No     Therapy Time   Individual Concurrent Group Co-treatment   Time In 0945         Time Out 1030         Minutes 45          Treatment Time: 45 minutes    Therapy Time   Individual Concurrent Group Co-treatment   Time In 1245         Time Out 1330         Minutes 45          Treatment Time: 45 minutes     Total Treatment Time: 90 minutes  Timed Code Treatment Minutes: 88+02    Deepak Miner, student PT  Therapist was present, directed the patient's care, made skilled judgement, and was responsible for assessment and treatment of the patient.  Co-signed and supervised by:  Natalia Daniel DPT [id number]

## 2019-04-05 NOTE — PLAN OF CARE
Patient c/o pain all the time. Biofreeze abbplied to her left arm.  Pain meds given at the appropriate time

## 2019-04-05 NOTE — PROGRESS NOTES
Mounika Arcos  4/5/2019  [id number]    Chief Complaint: Thoracic compression fracture, sequela    Subjective:   Reports that pain medications work well but she can't get them frequently enough. No new c/o otherwise. ROS: No cp, sob, n/v  Objective:  Patient Vitals for the past 24 hrs:   BP Temp Temp src Pulse Resp SpO2   04/04/19 2030 112/61 97.8 °F (36.6 °C) Oral 72 16 95 %     Gen: No distress, pleasant. HEENT: Normocephalic, atraumatic. CV: Regular rate and rhythm. Resp: No respiratory distress. Abd: Soft, nontender   Ext: No edema. Neuro: Alert, oriented, appropriately interactive.    Wt Readings from Last 3 Encounters:   04/03/19 157 lb 1 oz (71.2 kg)   04/01/19 156 lb 9.6 oz (71 kg)   03/11/19 168 lb 10.4 oz (76.5 kg)       Laboratory data:   Lab Results   Component Value Date    WBC 4.1 04/04/2019    HGB 11.7 (L) 04/04/2019    HCT 35.0 (L) 04/04/2019    MCV 96.8 04/04/2019     04/04/2019       Lab Results   Component Value Date     04/04/2019    K 4.5 04/04/2019    K 4.2 04/01/2019    CL 99 04/04/2019    CO2 28 04/04/2019    BUN 19 04/04/2019    CREATININE 1.0 04/04/2019    GLUCOSE 138 04/04/2019    CALCIUM 9.1 04/04/2019        Therapy progress:  PT  Upper Extremity Weight Bearing Restrictions  Left Upper Extremity Weight Bearing: Non Weight Bearing(sling)  Required Braces or Orthoses  Left Upper Extremity Brace/Splint: Sling  Left Upper Extremity Brace/Splint: S/P humeral fracture (~6 weeks ago)  Objective     Sit to Stand: Contact guard assistance(performed x3 during session w/ proper sequencing and good safety awareness)  Stand to sit: Contact guard assistance  Bed to Chair: Unable to assess  Stand Pivot Transfers: Unable to assess  Device: [employer]  Other Apparatus: Wheelchair follow  Assistance: Contact guard assistance  Distance: 160'  OT  PT Equipment Recommendations  Equipment Needed: No  Toilet - Technique: Ambulating  Equipment Used: Standard toilet  Toilet Transfers Comments: provided with BSC after transfer due to using hand on toilet seat for sit to/from stand   Assessment        SLP                Body mass index is 29.68 kg/m². Assessment and Plan:  Acute T12 compression fracture: S/P kyphoplasty 4/2.  Pain control.  PT/OT.  TLSO when OOB     Subacute left humerus fracture: Sling immobilization     CAD: no ASA per home meds     HTN: question pain related      R PSIS point tenderness: significant; xray with osteopenia and arthritis but no fracture. Treat symptomatically for now. If no improvement consider CT.     Osteoporosis: Fosamax at d/c     Bowels: Per protocol  Bladder: Per protocol   Sleep: Trazodone provided prn. Pain: Lidoder, robaxin 750 scheduled, - schedule tylenol, increase frequency of david, wean dilaudid in near future. DVT PPx: lovenox         Alex Kaur MD 4/5/2019, 10:02 AM

## 2019-04-05 NOTE — PROGRESS NOTES
Occupational Therapy  Facility/Department: [facility] ACUTE REHAB UNIT  Daily Treatment Note  NAME: Aime Spring  : 10/16/1932  MRN: [id number]    Date of Service: 2019    Discharge Recommendations:  Home with assist PRN, Home with nursing aide, S Level 3  OT Equipment Recommendations  Other: may benefit from [facility] for night use as pt reports she is frequently up and down to the bathroom at night     Assessment   Performance deficits / Impairments: Decreased functional mobility ; Decreased ADL status; Decreased strength;Decreased endurance;Decreased balance;Decreased high-level IADLs  Assessment: Patient presents with the above deficits, which are below baseline, and would benefit from continued skilled OT to address  Treatment Diagnosis: Decreased ADLs, IADLs, transfers, mobility associated with T12 compression fx s/p Kyphoplasty  Prognosis: Good  Patient Education: ADL safety, energy conservation   REQUIRES OT FOLLOW UP: Yes  Activity Tolerance  Activity Tolerance: Patient Tolerated treatment well;Patient limited by pain  Safety Devices  Safety Devices in place: Yes  Type of devices: Call light within reach; Chair alarm in place; Left in chair;Nurse notified(RN providing pain medication at end of PM session )  Restraints  Initially in place: No         Patient Diagnosis(es): T12 compression fracture      has a past medical history of Arthritis, CAD (coronary artery disease), Cancer ([address] Cystocele, Diabetes mellitus ([address] Hepatitis A, History of blood transfusion, Hyperlipidemia, PVC (premature ventricular contraction), Rectocele, Reflux, and Scoliosis. has a past surgical history that includes Appendectomy; joint replacement (Left); hernia repair (6 YRS AGO); Cholecystectomy; shoulder surgery (Right, 12); Bunionectomy (12); Breast surgery; Upper gastrointestinal endoscopy (12); Hysterectomy; bladder suspension; Dilation and curettage of uterus; other surgical history (Left, 14);  Foot surgery; Cardiac catheterization; Colonoscopy (2008); Cystocopy (03/29/2017); other surgical history (Right, 06/28/2018); Cataract removal with implant (Left, 09/13/2018); and pr remv cataract extracap,insert lens (Left, 9/13/2018). Restrictions  Restrictions/Precautions  Restrictions/Precautions: Fall Risk, Weight Bearing  Required Braces or Orthoses?: Yes  Implants present? : Metal implants(L TKA)  Upper Extremity Weight Bearing Restrictions  Left Upper Extremity Weight Bearing: Non Weight Bearing(sling)  Required Braces or Orthoses  Left Upper Extremity Brace/Splint: Sling  Left Upper Extremity Brace/Splint: S/P humeral fracture (~6 weeks ago)  Subjective   General  Chart Reviewed: Yes  Patient assessed for rehabilitation services?: Yes  Additional Pertinent Hx: L TKA, cataract sx   Family / Caregiver Present: No  Subjective  Subjective: pt in chair on arrival - rate pain 7-8/10 in back and L arm - received pain medication recently  Second session pt received tylenol recently, heat pack applied to back, RN provided pain medication at end of session       Orientation   WFL  Objective    ADL  UE Bathing: Setup(sponge bathing, assist for back )  UE Dressing: Minimal assistance(assist to pull shirt down in back and to adjust sling )  LE Dressing: None  Toileting:  Moderate assistance(pt able to pull pants down and complete pericare- mod A to pull pants over hips )  Additional Comments: pt gathered new shirt from closet with SBA with cane for mobility and to get shirt out of knee height drawer and assist to transport - to toilet with cane SBA for transfer and toileting at assist level above - completed changing shirt and sponge bathing UB from reclining chair - periformis stretch x 2 minutes each leg for increased independence with LB dressing         Functional Mobility  Functional Mobility Comments: attempted kitchen mobility in pm session - pt able to ambulate to counter with cane with SBA but limited by pain and returned to sitting - states she would like to complete today but is in too much pain, awaiting pain medication at this time, heat applied for 20 minutes prior to attempt with some relief of pain      Transfers  Stand Step Transfers: Contact guard assistance        Coordination  Fine Motor: completed med management in sitting 100% accuracy with 5 medications setting up for a week   Modalities: Yes  Moist Heat  Number Minutes Moist Heat: 20 minutes to lower back for pain relief with mild relief noted - discussed Kpad for pain relief with RN, Conor Adame         Type of ROM/Therapeutic Exercise  Comment: AROM R shoulder flexion, ab/adduction, horizontal ab/adduction each 1x10; PM session 1# free weights biceps curl 1x15, chest press 1x15, shoulder flexion 1x10, horizontal ab/adduction 1x10        Returned to reclining chair at end of session with SBA for SPT   Plan   Plan  Times per week: 90 minutes 5 days per week   Times per day: Daily  Current Treatment Recommendations: Strengthening, Balance Training, Functional Mobility Training, Patient/Caregiver Education & Training, Equipment Evaluation, Education, & procurement, Self-Care / ADL, Home Management Training    Goals  Short term goals  Time Frame for Short term goals: 10 days   Short term goal 1: UB bathing/dressing mod I   Short term goal 2: LB bathing/dressing mod I   Short term goal 3: toileting mod I   Short term goal 4: SBA light meal preparation   Short term goal 5: mod I toilet transfers and shower transfers   Long term goals  Time Frame for Long term goals : LTG=STG  Patient Goals   Patient goals : \"take care of myself, no more falls\"        Therapy Time   Individual Concurrent Group Co-treatment   Time In 8059(4234)         Time Out 1100(1430)         Minutes 30,60         Timed Code Treatment Minutes: 60 Minutes+ 30    Total minutes 90     Tunde Zavaleta, OT   Tunde Gilbert OTR/L [id number], 4/5/2019, 2:54 PM

## 2019-04-06 LAB
BILIRUBIN URINE: NEGATIVE
BLOOD, URINE: NEGATIVE
CLARITY: CLEAR
COLOR: YELLOW
GLUCOSE URINE: NEGATIVE MG/DL
KETONES, URINE: NEGATIVE MG/DL
LEUKOCYTE ESTERASE, URINE: NEGATIVE
MICROSCOPIC EXAMINATION: NORMAL
NITRITE, URINE: NEGATIVE
PH UA: 6.5 (ref 5–8)
PROTEIN UA: NEGATIVE MG/DL
SPECIFIC GRAVITY UA: 1.01 (ref 1–1.03)
URINE REFLEX TO CULTURE: NORMAL
URINE TYPE: NORMAL
UROBILINOGEN, URINE: 0.2 E.U./DL

## 2019-04-06 PROCEDURE — 97530 THERAPEUTIC ACTIVITIES: CPT

## 2019-04-06 PROCEDURE — 97110 THERAPEUTIC EXERCISES: CPT

## 2019-04-06 PROCEDURE — 6370000000 HC RX 637 (ALT 250 FOR IP): Performed by: PHYSICAL MEDICINE & REHABILITATION

## 2019-04-06 PROCEDURE — 1280000000 HC REHAB R&B

## 2019-04-06 PROCEDURE — 97116 GAIT TRAINING THERAPY: CPT

## 2019-04-06 PROCEDURE — 81003 URINALYSIS AUTO W/O SCOPE: CPT

## 2019-04-06 PROCEDURE — 97535 SELF CARE MNGMENT TRAINING: CPT

## 2019-04-06 PROCEDURE — 6360000002 HC RX W HCPCS: Performed by: PHYSICAL MEDICINE & REHABILITATION

## 2019-04-06 RX ORDER — POLYVINYL ALCOHOL 14 MG/ML
1 SOLUTION/ DROPS OPHTHALMIC PRN
Status: DISCONTINUED | OUTPATIENT
Start: 2019-04-06 | End: 2019-04-12 | Stop reason: HOSPADM

## 2019-04-06 RX ADMIN — METHOCARBAMOL 750 MG: 750 TABLET ORAL at 14:41

## 2019-04-06 RX ADMIN — PANTOPRAZOLE SODIUM 40 MG: 40 TABLET, DELAYED RELEASE ORAL at 06:36

## 2019-04-06 RX ADMIN — DOCUSATE SODIUM 100 MG: 100 CAPSULE, LIQUID FILLED ORAL at 08:42

## 2019-04-06 RX ADMIN — OXYCODONE HYDROCHLORIDE 10 MG: 5 TABLET ORAL at 21:05

## 2019-04-06 RX ADMIN — OXYCODONE HYDROCHLORIDE 10 MG: 5 TABLET ORAL at 14:41

## 2019-04-06 RX ADMIN — OXYCODONE HYDROCHLORIDE 10 MG: 5 TABLET ORAL at 10:31

## 2019-04-06 RX ADMIN — GUAIFENESIN 600 MG: 600 TABLET, EXTENDED RELEASE ORAL at 08:42

## 2019-04-06 RX ADMIN — DOCUSATE SODIUM 100 MG: 100 CAPSULE, LIQUID FILLED ORAL at 21:06

## 2019-04-06 RX ADMIN — POLYVINYL ALCOHOL 1 DROP: 14 SOLUTION/ DROPS OPHTHALMIC at 10:32

## 2019-04-06 RX ADMIN — GUAIFENESIN 600 MG: 600 TABLET, EXTENDED RELEASE ORAL at 21:06

## 2019-04-06 RX ADMIN — ACETAMINOPHEN 650 MG: 325 TABLET, FILM COATED ORAL at 08:41

## 2019-04-06 RX ADMIN — ENOXAPARIN SODIUM 40 MG: 40 INJECTION SUBCUTANEOUS at 08:41

## 2019-04-06 RX ADMIN — POLYVINYL ALCOHOL 1 DROP: 14 SOLUTION/ DROPS OPHTHALMIC at 17:01

## 2019-04-06 RX ADMIN — OXYCODONE HYDROCHLORIDE 10 MG: 5 TABLET ORAL at 06:33

## 2019-04-06 RX ADMIN — ACETAMINOPHEN 650 MG: 325 TABLET, FILM COATED ORAL at 16:59

## 2019-04-06 RX ADMIN — PANTOPRAZOLE SODIUM 40 MG: 40 TABLET, DELAYED RELEASE ORAL at 17:00

## 2019-04-06 RX ADMIN — METHOCARBAMOL 750 MG: 750 TABLET ORAL at 21:05

## 2019-04-06 RX ADMIN — METHOCARBAMOL 750 MG: 750 TABLET ORAL at 08:42

## 2019-04-06 RX ADMIN — ACETAMINOPHEN 650 MG: 325 TABLET, FILM COATED ORAL at 14:41

## 2019-04-06 RX ADMIN — ACETAMINOPHEN 650 MG: 325 TABLET, FILM COATED ORAL at 21:05

## 2019-04-06 RX ADMIN — OXYCODONE HYDROCHLORIDE 10 MG: 5 TABLET ORAL at 03:31

## 2019-04-06 RX ADMIN — FLUTICASONE PROPIONATE 1 SPRAY: 50 SPRAY, METERED NASAL at 08:44

## 2019-04-06 ASSESSMENT — PAIN SCALES - GENERAL
PAINLEVEL_OUTOF10: 8
PAINLEVEL_OUTOF10: 8
PAINLEVEL_OUTOF10: 0
PAINLEVEL_OUTOF10: 7
PAINLEVEL_OUTOF10: 8
PAINLEVEL_OUTOF10: 8
PAINLEVEL_OUTOF10: 9
PAINLEVEL_OUTOF10: 7

## 2019-04-06 ASSESSMENT — PAIN DESCRIPTION - ORIENTATION
ORIENTATION: LEFT

## 2019-04-06 ASSESSMENT — PAIN DESCRIPTION - PAIN TYPE
TYPE: ACUTE PAIN

## 2019-04-06 ASSESSMENT — PAIN DESCRIPTION - LOCATION
LOCATION: BACK;SHOULDER
LOCATION: BACK;SHOULDER
LOCATION: BACK;HIP;SHOULDER
LOCATION: SHOULDER

## 2019-04-06 NOTE — PROGRESS NOTES
Physical Therapy  Facility/Department: [facility] ACUTE REHAB UNIT  Daily Treatment Note  NAME: Afshin Springer  : 10/16/1932  MRN: [id number]    Date of Service: 2019    Discharge Recommendations:  S Level 3, Home with assist PRN, Home with Home health PT   PT Equipment Recommendations  Equipment Needed: No    Patient Diagnosis(es): There were no encounter diagnoses. has a past medical history of Arthritis, CAD (coronary artery disease), Cancer ([facility] [address] Cystocele, Diabetes mellitus ([facility] [address] Hepatitis A, History of blood transfusion, Hyperlipidemia, PVC (premature ventricular contraction), Rectocele, Reflux, and Scoliosis. has a past surgical history that includes Appendectomy; joint replacement (Left); hernia repair (6 YRS AGO); Cholecystectomy; shoulder surgery (Right, 12); Bunionectomy (12); Breast surgery; Upper gastrointestinal endoscopy (12); Hysterectomy; bladder suspension; Dilation and curettage of uterus; other surgical history (Left, 14); Foot surgery; Cardiac catheterization; Colonoscopy (); Cystocopy (2017); other surgical history (Right, 2018); Cataract removal with implant (Left, 2018); and pr remv cataract extracap,insert lens (Left, 2018). Restrictions  Restrictions/Precautions  Restrictions/Precautions: Fall Risk, Weight Bearing  Required Braces or Orthoses?: Yes  Implants present? : Metal implants(L TKA )  Upper Extremity Weight Bearing Restrictions  Left Upper Extremity Weight Bearing: Non Weight Bearing  Required Braces or Orthoses  Left Upper Extremity Brace/Splint: Sling  Left Upper Extremity Brace/Splint: S/P humeral fracture (~6 weeks ago)  Subjective   General  Chart Reviewed: Yes  Additional Pertinent Hx: CAD, DM, cancer, arthritis  Response To Previous Treatment: Patient with no complaints from previous session.   Family / Caregiver Present: No  Subjective  Subjective: pt reported 7/10 pain in her shoulder at rest, RN aware and provided medication prior to PT  General Comment  Comments: Pt supine in bed on arrival, L arm sling donned. Pain Screening  Patient Currently in Pain: Yes  Pain Assessment  Pain Assessment: 0-10  Pain Level: 7  Pain Type: Acute pain  Pain Location: Shoulder  Pain Orientation: Left  Vital Signs  Patient Currently in Pain: Yes       Orientation  Orientation  Overall Orientation Status: Within Normal Limits       Objective   Bed mobility  Supine to Sit: Supervision  Scooting: Supervision  Comment: pt had pain with supine to sit, pt did not complete log rolling technique  Transfers  Sit to Stand: Stand by assistance(widened YASSINE with RLE ABD more than LLE)  Stand to sit: Stand by assistance  Ambulation  Ambulation?: Yes  Ambulation 1  Surface: level tile  Device: [facility]  Other Apparatus: Wheelchair follow  Assistance: Stand by assistance  Quality of Gait: decreased step length, narrow YASSINE, decreased step length, decreased R heel strike, no LOB  Distance: 50' in room, 150', 76'   Comments: pt stated \"my legs are getting weak and shakey\" towards end of ambulation but able to complete w/o incident   Stairs/Curb  Stairs?: Yes  Stairs  # Steps : 7  Rails: Right ascending  Assistance: Minimal assistance  Comment: R knee buckled while descending with LLE leading -min A to recover, pt cued to ascend with LLE leading and descend with RLE leading, no buckling during next set of stairs     Balance  Posture: Fair(flexed cervical spine, thoracic kyphosis, scoliosis)  Sitting - Static: Good  Sitting - Dynamic: Good  Standing - Static: Good;-  Standing - Dynamic: Fair;+  Comments: The pt stood at the sink for grooming with supervision. Pt toileted with PT's assistance for changing pants due to time restraint. Exercises  Comments: Sit to/from stand x7 reps with emphasis on normal YASSINE and RLE in equal weight-bearing with LLE with CGA. Second session: Pt reported fatigue and pain.   PT provided heat packs to back and shoulder at the end of the PT session, RN stated pt was not yet due for pain medication. Pt reported her shoulder hurts worse after rolling in bed at night for pericare. PT educated the pt on the benefit of ambulating to the toilet or using a BSC regularly throughout the night to decrease the need for pericare in supine. Pt stated she is incontinent of urine every hour at night. She stated this has been worse since her falls and UTI. PT educated the pt on decreasing her caffeine intake and increasing water intake. Pt stated she drinks more than 4 cups of coffee per day. Sit to/from supine with supervision, VC for log rolling to decrease back pain. Supine bridging x15 reps, hip ABD with feet on slide board with yellow theraband 2x10 reps, hip ABD in hooklying with knees flexed x15 reps and yellow theraband x20 reps. Posterior pelvic tilts 2x10 reps, kegel education with pt attempting to complete for 1 minute. Pt ambulated 163' with SBQC and SBA. Pt was left in supine with needs in reach, alarm on. Assessment   Body structures, Functions, Activity limitations: Decreased functional mobility ; Decreased ADL status; Decreased ROM; Decreased strength;Decreased endurance;Decreased balance; Increased Pain  Assessment: Pt w/ improving functional mobility, strength and endurance w/ therapy today. Pt main limitation continues to be pain tolerance in low back, L arm, and R hip. Pt will continue to benefit from skilled PT to address impairments and return to PLOF.   Treatment Diagnosis: decreased functional mobility, decreased endurance  Prognosis: Good  Patient Education: use of call button for bathroom/brief change, sequencing for sit to stand from w/c, importance of toileting OOB overnight  Barriers to Learning: hearing  REQUIRES PT FOLLOW UP: Yes  Activity Tolerance  Activity Tolerance: Patient Tolerated treatment well;Patient limited by fatigue          Goals  Short term goals  Time Frame for Short term goals: 5-7 days  Short term goal 1: Pt will complete bed mobility mod I   Short term goal 2: Pt will complete transfers mod I w/ LRAD  Short term goal 3: Pt will ambulate 150' mod I w/ LRAD  Short term goal 4: Pt will navigate 4 stairs mod I   Patient Goals   Patient goals : \"to get back home and take care of myself, don't have any more falls\"    Plan    Plan  Times per week: 90 minutes/day, 5 days/week  Times per day: Daily  Current Treatment Recommendations: Strengthening, ROM, Balance Training, Functional Mobility Training, Transfer Training, Endurance Training, Gait Training, Stair training, Neuromuscular Re-education, Home Exercise Program, Safety Education & Training, Patient/Caregiver Education & Training, Equipment Evaluation, Education, & procurement, Pain Management, Positioning, Manual Therapy - Soft Tissue Mobilization  Safety Devices  Type of devices:  All fall risk precautions in place, Left in chair, Chair alarm in place, Call light within reach  Restraints  Initially in place: No     Therapy Time   Individual Concurrent Group Co-treatment   Time In 0730         Time Out 0815         Minutes 39              Second Session Therapy Time:   Individual Concurrent Group Co-treatment   Time In 0930         Time Out 1024         Minutes 54           Timed Code Treatment Minutes:  99    Total Treatment Minutes:  [place], [place] DPT [id number]

## 2019-04-06 NOTE — PROGRESS NOTES
New order for UA w/ culture per patient request.  States she has a recent Hx of UTI. Feeling difficulty emptying bladder. Frequency noted.

## 2019-04-06 NOTE — PROGRESS NOTES
Occupational Therapy  Facility/Department: [facility] ACUTE REHAB UNIT  Daily Treatment Note  NAME: Mikael Aly  : 10/16/1932  MRN: [id number]    Date of Service: 2019    Discharge Recommendations:  Home with assist PRN, Home with nursing aide, S Level 3  OT Equipment Recommendations  Other: may benefit from [facility] for night use as pt reports she is frequently up and down to the bathroom at night     Assessment   Performance deficits / Impairments: Decreased functional mobility ; Decreased ADL status; Decreased strength;Decreased endurance;Decreased balance;Decreased high-level IADLs  Assessment: Patient presents with the above deficits, which are below baseline, and would benefit from continued skilled OT to address. Pt more fatigued and less willing to attempt functional standing activities over last two days   Treatment Diagnosis: Decreased ADLs, IADLs, transfers, mobility associated with T12 compression fx s/p Kyphoplasty  Prognosis: Good;Fair  Patient Education: ADL safety, energy conservation   REQUIRES OT FOLLOW UP: Yes  Activity Tolerance  Activity Tolerance: Patient Tolerated treatment well;Patient limited by pain; Patient limited by fatigue  Safety Devices  Type of devices: Call light within reach; Chair alarm in place; Left in chair  Restraints  Initially in place: No         Patient Diagnosis(es): T12 compression fx      has a past medical history of Arthritis, CAD (coronary artery disease), Cancer ([address] Cystocele, Diabetes mellitus ([address] Hepatitis A, History of blood transfusion, Hyperlipidemia, PVC (premature ventricular contraction), Rectocele, Reflux, and Scoliosis. has a past surgical history that includes Appendectomy; joint replacement (Left); hernia repair (6 YRS AGO); Cholecystectomy; shoulder surgery (Right, 12); Bunionectomy (12); Breast surgery; Upper gastrointestinal endoscopy (12);  Hysterectomy; bladder suspension; Dilation and curettage of uterus; other surgical history (Left, 09/22/14); Foot surgery; Cardiac catheterization; Colonoscopy (2008); Cystocopy (03/29/2017); other surgical history (Right, 06/28/2018); Cataract removal with implant (Left, 09/13/2018); and pr remv cataract extracap,insert lens (Left, 9/13/2018). Restrictions  Restrictions/Precautions  Restrictions/Precautions: Fall Risk, Weight Bearing  Required Braces or Orthoses?: Yes  Implants present? : Metal implants(L TKA )  Upper Extremity Weight Bearing Restrictions  Left Upper Extremity Weight Bearing: Non Weight Bearing  Required Braces or Orthoses  Left Upper Extremity Brace/Splint: Sling  Left Upper Extremity Brace/Splint: S/P humeral fracture (~6 weeks ago)  Subjective   General  Chart Reviewed: Yes  Patient assessed for rehabilitation services?: Yes  Additional Pertinent Hx: L TKA, cataract sx   Family / Caregiver Present: No  Diagnosis: T12 Compression fx s/p kyphoplasty  Subjective  Subjective: pt in chair on arrival - states she is feeling very weak and fatigued - participated in first session with encouragement, when arrived for second session after toileting pt stating \"i can't, i can't i'm just too tired\" requesting to return to reclining chair, declines returning to bed to rest despite encouragement       Orientation  Orientation  Overall Orientation Status: Within Normal Limits  Objective    ADL  Grooming: None  Toileting:  Moderate assistance(asssist to pull pants over hips - able to pull pants down and wipe )  Additional Comments: pt in chair on arrival for second session - ambulated to bathroom with cane and CGA - completed toileting, voiding urine - from standard toilet - pt initially agreeable to showering but stating she is too tired to complete at this time and just needs to rest             Type of ROM/Therapeutic Exercise  Comment: L UE - modified [employer]'s exerices in seated position with pt leaning forward in chair x 2 minutes PROM and 2 minutes static position; 1# free weight R UE shoulder flexion/ext 1x10, bicep curl 1x15, chest pres 1x15, horizontal ab/adduction 1x10       PM session - pt in chair on arrival - continued to complain of fatigue and declining shower but agreeable to sponge bathing - pt able to doff sling SBA - washed UB SBA except for assistance for back - dressed with min A to pull shirt down in back and adjust sling - pt washed LB with min A for feet and SBA while in standing for pericare - mod A for LB dressing to adjust L side of pants on hips and tien socks - stood x 2 with short periods of time SBA for LB bathing/dressing - remained in chair and set up with lunch at end of session - assist to cut chicken and open packages prior to eating - alarm in place and needs in reach at end of session     Plan   Plan  Times per week: 90 minutes 5 days per week   Times per day: Daily  Current Treatment Recommendations: Strengthening, Balance Training, Functional Mobility Training, Patient/Caregiver Education & Training, Equipment Evaluation, Education, & procurement, Self-Care / ADL, Home Management Training       Goals  Short term goals  Time Frame for Short term goals: 10 days   Short term goal 1: UB bathing/dressing mod I   Short term goal 2: LB bathing/dressing mod I   Short term goal 3: toileting mod I   Short term goal 4: SBA light meal preparation   Short term goal 5: mod I toilet transfers and shower transfers   Long term goals  Time Frame for Long term goals : LTG=STG  Patient Goals   Patient goals : \"take care of myself, no more falls\"        Therapy Time   Individual Concurrent Group Co-treatment   Time In 0900         Time Out 0930         Minutes 30         Timed Code Treatment Minutes: 30 Minutes    Second Session Therapy Time:   Individual Concurrent Group Co-treatment   Time In 1055         Time Out 1110         Minutes 15           Second Session Therapy Time:   Individual Concurrent Group Co-treatment   Time In 1150         Time Out 1230         Minutes 40 Timed Code Treatment Minutes:  85    Total Treatment Minutes:  [address] [place], OT   Tunde SOLORZANO/MARY [id number], 4/6/2019, 12:29 PM

## 2019-04-06 NOTE — PLAN OF CARE
Problem: Pain:  Goal: Pain level will decrease  Outcome: Ongoing     Problem: Falls - Risk of:  Goal: Will remain free from falls  Outcome: Ongoing     Problem: SAFETY  Goal: STG - Patient uses call light consistently to request assistance with transfers  Outcome: Ongoing     Problem: SKIN INTEGRITY  Goal: STG - patient will maintain good skin integrity  Outcome: Ongoing

## 2019-04-07 LAB
GLUCOSE BLD-MCNC: 154 MG/DL (ref 70–99)
PERFORMED ON: ABNORMAL

## 2019-04-07 PROCEDURE — 51798 US URINE CAPACITY MEASURE: CPT

## 2019-04-07 PROCEDURE — 1280000000 HC REHAB R&B

## 2019-04-07 PROCEDURE — 6370000000 HC RX 637 (ALT 250 FOR IP): Performed by: PHYSICAL MEDICINE & REHABILITATION

## 2019-04-07 PROCEDURE — 6360000002 HC RX W HCPCS: Performed by: PHYSICAL MEDICINE & REHABILITATION

## 2019-04-07 RX ADMIN — ACETAMINOPHEN 650 MG: 325 TABLET, FILM COATED ORAL at 13:49

## 2019-04-07 RX ADMIN — OXYCODONE HYDROCHLORIDE 10 MG: 5 TABLET ORAL at 13:49

## 2019-04-07 RX ADMIN — OXYCODONE HYDROCHLORIDE 10 MG: 5 TABLET ORAL at 21:37

## 2019-04-07 RX ADMIN — ACETAMINOPHEN 650 MG: 325 TABLET, FILM COATED ORAL at 09:58

## 2019-04-07 RX ADMIN — DOCUSATE SODIUM 100 MG: 100 CAPSULE, LIQUID FILLED ORAL at 09:58

## 2019-04-07 RX ADMIN — PANTOPRAZOLE SODIUM 40 MG: 40 TABLET, DELAYED RELEASE ORAL at 05:21

## 2019-04-07 RX ADMIN — DOCUSATE SODIUM 100 MG: 100 CAPSULE, LIQUID FILLED ORAL at 21:37

## 2019-04-07 RX ADMIN — GUAIFENESIN 600 MG: 600 TABLET, EXTENDED RELEASE ORAL at 09:57

## 2019-04-07 RX ADMIN — ACETAMINOPHEN 650 MG: 325 TABLET, FILM COATED ORAL at 21:37

## 2019-04-07 RX ADMIN — ACETAMINOPHEN 650 MG: 325 TABLET, FILM COATED ORAL at 16:57

## 2019-04-07 RX ADMIN — FLUTICASONE PROPIONATE 1 SPRAY: 50 SPRAY, METERED NASAL at 09:58

## 2019-04-07 RX ADMIN — ENOXAPARIN SODIUM 40 MG: 40 INJECTION SUBCUTANEOUS at 09:57

## 2019-04-07 RX ADMIN — OXYCODONE HYDROCHLORIDE 10 MG: 5 TABLET ORAL at 05:22

## 2019-04-07 RX ADMIN — OXYCODONE HYDROCHLORIDE 10 MG: 5 TABLET ORAL at 09:58

## 2019-04-07 RX ADMIN — ONDANSETRON 4 MG: 4 TABLET, ORALLY DISINTEGRATING ORAL at 12:44

## 2019-04-07 RX ADMIN — OXYCODONE HYDROCHLORIDE 10 MG: 5 TABLET ORAL at 18:26

## 2019-04-07 RX ADMIN — OXYCODONE HYDROCHLORIDE 10 MG: 5 TABLET ORAL at 01:49

## 2019-04-07 RX ADMIN — POLYVINYL ALCOHOL 1 DROP: 14 SOLUTION/ DROPS OPHTHALMIC at 09:57

## 2019-04-07 RX ADMIN — METHOCARBAMOL 750 MG: 750 TABLET ORAL at 13:49

## 2019-04-07 RX ADMIN — GUAIFENESIN 600 MG: 600 TABLET, EXTENDED RELEASE ORAL at 21:37

## 2019-04-07 RX ADMIN — METHOCARBAMOL 750 MG: 750 TABLET ORAL at 21:37

## 2019-04-07 RX ADMIN — METHOCARBAMOL 750 MG: 750 TABLET ORAL at 09:58

## 2019-04-07 ASSESSMENT — PAIN SCALES - GENERAL
PAINLEVEL_OUTOF10: 8
PAINLEVEL_OUTOF10: 9
PAINLEVEL_OUTOF10: 7
PAINLEVEL_OUTOF10: 8
PAINLEVEL_OUTOF10: 8
PAINLEVEL_OUTOF10: 9
PAINLEVEL_OUTOF10: 7
PAINLEVEL_OUTOF10: 9
PAINLEVEL_OUTOF10: 8

## 2019-04-07 ASSESSMENT — PAIN DESCRIPTION - PAIN TYPE
TYPE: ACUTE PAIN;SURGICAL PAIN
TYPE: ACUTE PAIN
TYPE: ACUTE PAIN

## 2019-04-07 ASSESSMENT — PAIN DESCRIPTION - ORIENTATION
ORIENTATION: LEFT;LOWER
ORIENTATION: MID
ORIENTATION: LEFT;LOWER

## 2019-04-07 ASSESSMENT — PAIN DESCRIPTION - LOCATION
LOCATION: BACK
LOCATION: BACK
LOCATION: ARM;BACK
LOCATION: BACK
LOCATION: ARM;BACK

## 2019-04-07 ASSESSMENT — PAIN DESCRIPTION - PROGRESSION
CLINICAL_PROGRESSION: NOT CHANGED

## 2019-04-07 ASSESSMENT — PAIN DESCRIPTION - DESCRIPTORS: DESCRIPTORS: ACHING

## 2019-04-07 ASSESSMENT — PAIN DESCRIPTION - ONSET: ONSET: ON-GOING

## 2019-04-07 ASSESSMENT — PAIN DESCRIPTION - FREQUENCY: FREQUENCY: CONTINUOUS

## 2019-04-07 NOTE — PLAN OF CARE
Problem: Pain:  Goal: Control of acute pain  Description  Control of acute pain  Note:   Patient medicated for back and L shoulder pain per mar.

## 2019-04-07 NOTE — PROGRESS NOTES
Patient has been to bathroom multiple times during shift. Encouraged patient to decrease fluids in the evening to promote rest (pt had an ensure before bed, I have filled patient's water pitcher twice tonight and three times the previous night). Patient states \" I'm a mouth breather my mouth gets dry. \" gave patient a cup of water with mouth swabs to moisten her mouth.

## 2019-04-07 NOTE — PROGRESS NOTES
Patient has been getting up to bathroom tonight and urinating. Pt has not been incontinent yet. Pt appears to urinate large amount in toilet but states she still feels like she needs to go. Patient bladder scanned for 121cc, no catheterization needed at this time.

## 2019-04-08 LAB
ANION GAP SERPL CALCULATED.3IONS-SCNC: 11 MMOL/L (ref 3–16)
BUN BLDV-MCNC: 25 MG/DL (ref 7–20)
CALCIUM SERPL-MCNC: 8.8 MG/DL (ref 8.3–10.6)
CHLORIDE BLD-SCNC: 101 MMOL/L (ref 99–110)
CO2: 25 MMOL/L (ref 21–32)
CREAT SERPL-MCNC: 1.1 MG/DL (ref 0.6–1.2)
GFR AFRICAN AMERICAN: 57
GFR NON-AFRICAN AMERICAN: 47
GLUCOSE BLD-MCNC: 104 MG/DL (ref 70–99)
HCT VFR BLD CALC: 30.6 % (ref 36–48)
HEMOGLOBIN: 10.2 G/DL (ref 12–16)
MCH RBC QN AUTO: 33.1 PG (ref 26–34)
MCHC RBC AUTO-ENTMCNC: 33.4 G/DL (ref 31–36)
MCV RBC AUTO: 99 FL (ref 80–100)
PDW BLD-RTO: 14.4 % (ref 12.4–15.4)
PLATELET # BLD: 178 K/UL (ref 135–450)
PMV BLD AUTO: 6.8 FL (ref 5–10.5)
POTASSIUM SERPL-SCNC: 4.5 MMOL/L (ref 3.5–5.1)
RBC # BLD: 3.09 M/UL (ref 4–5.2)
SODIUM BLD-SCNC: 137 MMOL/L (ref 136–145)
WBC # BLD: 4.2 K/UL (ref 4–11)

## 2019-04-08 PROCEDURE — 97116 GAIT TRAINING THERAPY: CPT

## 2019-04-08 PROCEDURE — 97110 THERAPEUTIC EXERCISES: CPT

## 2019-04-08 PROCEDURE — 6370000000 HC RX 637 (ALT 250 FOR IP): Performed by: PHYSICAL MEDICINE & REHABILITATION

## 2019-04-08 PROCEDURE — 51798 US URINE CAPACITY MEASURE: CPT

## 2019-04-08 PROCEDURE — 97530 THERAPEUTIC ACTIVITIES: CPT

## 2019-04-08 PROCEDURE — 0HBRXZZ EXCISION OF TOE NAIL, EXTERNAL APPROACH: ICD-10-PCS | Performed by: PODIATRIST

## 2019-04-08 PROCEDURE — 36415 COLL VENOUS BLD VENIPUNCTURE: CPT

## 2019-04-08 PROCEDURE — 1280000000 HC REHAB R&B

## 2019-04-08 PROCEDURE — 6360000002 HC RX W HCPCS: Performed by: PHYSICAL MEDICINE & REHABILITATION

## 2019-04-08 PROCEDURE — 80048 BASIC METABOLIC PNL TOTAL CA: CPT

## 2019-04-08 PROCEDURE — 85027 COMPLETE CBC AUTOMATED: CPT

## 2019-04-08 PROCEDURE — 97535 SELF CARE MNGMENT TRAINING: CPT

## 2019-04-08 RX ORDER — MORPHINE SULFATE 15 MG/1
15 TABLET ORAL EVERY 12 HOURS SCHEDULED
Status: DISCONTINUED | OUTPATIENT
Start: 2019-04-08 | End: 2019-04-08 | Stop reason: ALTCHOICE

## 2019-04-08 RX ORDER — CALCIUM CARBONATE 200(500)MG
1000 TABLET,CHEWABLE ORAL 3 TIMES DAILY PRN
Status: DISCONTINUED | OUTPATIENT
Start: 2019-04-08 | End: 2019-04-12 | Stop reason: HOSPADM

## 2019-04-08 RX ORDER — MORPHINE SULFATE 15 MG/1
15 TABLET, FILM COATED, EXTENDED RELEASE ORAL EVERY 12 HOURS SCHEDULED
Status: DISCONTINUED | OUTPATIENT
Start: 2019-04-08 | End: 2019-04-12 | Stop reason: HOSPADM

## 2019-04-08 RX ORDER — OXYCODONE HYDROCHLORIDE 5 MG/1
5 TABLET ORAL EVERY 4 HOURS PRN
Status: DISCONTINUED | OUTPATIENT
Start: 2019-04-08 | End: 2019-04-12 | Stop reason: HOSPADM

## 2019-04-08 RX ORDER — FLUTICASONE PROPIONATE 50 MCG
1 SPRAY, SUSPENSION (ML) NASAL 2 TIMES DAILY
Status: DISCONTINUED | OUTPATIENT
Start: 2019-04-08 | End: 2019-04-12 | Stop reason: HOSPADM

## 2019-04-08 RX ADMIN — MORPHINE SULFATE 15 MG: 15 TABLET, FILM COATED, EXTENDED RELEASE ORAL at 11:14

## 2019-04-08 RX ADMIN — GUAIFENESIN 600 MG: 600 TABLET, EXTENDED RELEASE ORAL at 08:05

## 2019-04-08 RX ADMIN — PANTOPRAZOLE SODIUM 40 MG: 40 TABLET, DELAYED RELEASE ORAL at 06:03

## 2019-04-08 RX ADMIN — GUAIFENESIN 600 MG: 600 TABLET, EXTENDED RELEASE ORAL at 21:31

## 2019-04-08 RX ADMIN — DOCUSATE SODIUM 100 MG: 100 CAPSULE, LIQUID FILLED ORAL at 21:31

## 2019-04-08 RX ADMIN — OXYCODONE HYDROCHLORIDE 10 MG: 5 TABLET ORAL at 00:43

## 2019-04-08 RX ADMIN — METHOCARBAMOL 750 MG: 750 TABLET ORAL at 21:31

## 2019-04-08 RX ADMIN — FLUTICASONE PROPIONATE 1 SPRAY: 50 SPRAY, METERED NASAL at 08:08

## 2019-04-08 RX ADMIN — METHOCARBAMOL 750 MG: 750 TABLET ORAL at 12:41

## 2019-04-08 RX ADMIN — PANTOPRAZOLE SODIUM 40 MG: 40 TABLET, DELAYED RELEASE ORAL at 16:56

## 2019-04-08 RX ADMIN — METHOCARBAMOL 750 MG: 750 TABLET ORAL at 08:05

## 2019-04-08 RX ADMIN — Medication 1 SPRAY: at 21:37

## 2019-04-08 RX ADMIN — ENOXAPARIN SODIUM 40 MG: 40 INJECTION SUBCUTANEOUS at 08:06

## 2019-04-08 RX ADMIN — OXYCODONE HYDROCHLORIDE 10 MG: 5 TABLET ORAL at 03:59

## 2019-04-08 RX ADMIN — ACETAMINOPHEN 650 MG: 325 TABLET, FILM COATED ORAL at 12:41

## 2019-04-08 RX ADMIN — ACETAMINOPHEN 650 MG: 325 TABLET, FILM COATED ORAL at 21:30

## 2019-04-08 RX ADMIN — MORPHINE SULFATE 15 MG: 15 TABLET, FILM COATED, EXTENDED RELEASE ORAL at 21:39

## 2019-04-08 RX ADMIN — OXYCODONE HYDROCHLORIDE 10 MG: 5 TABLET ORAL at 07:24

## 2019-04-08 RX ADMIN — OXYCODONE HYDROCHLORIDE 5 MG: 5 TABLET ORAL at 21:31

## 2019-04-08 RX ADMIN — ACETAMINOPHEN 650 MG: 325 TABLET, FILM COATED ORAL at 16:56

## 2019-04-08 RX ADMIN — POLYVINYL ALCOHOL 1 DROP: 14 SOLUTION/ DROPS OPHTHALMIC at 08:15

## 2019-04-08 RX ADMIN — OXYCODONE HYDROCHLORIDE 5 MG: 5 TABLET ORAL at 16:56

## 2019-04-08 RX ADMIN — DOCUSATE SODIUM 100 MG: 100 CAPSULE, LIQUID FILLED ORAL at 08:05

## 2019-04-08 RX ADMIN — OXYCODONE HYDROCHLORIDE 5 MG: 5 TABLET ORAL at 12:41

## 2019-04-08 RX ADMIN — ACETAMINOPHEN 650 MG: 325 TABLET, FILM COATED ORAL at 08:04

## 2019-04-08 ASSESSMENT — PAIN SCALES - GENERAL
PAINLEVEL_OUTOF10: 8
PAINLEVEL_OUTOF10: 0
PAINLEVEL_OUTOF10: 8
PAINLEVEL_OUTOF10: 10
PAINLEVEL_OUTOF10: 10
PAINLEVEL_OUTOF10: 8
PAINLEVEL_OUTOF10: 8

## 2019-04-08 ASSESSMENT — PAIN DESCRIPTION - PROGRESSION

## 2019-04-08 ASSESSMENT — PAIN DESCRIPTION - ORIENTATION
ORIENTATION: LEFT

## 2019-04-08 ASSESSMENT — PAIN DESCRIPTION - DESCRIPTORS
DESCRIPTORS: CONSTANT
DESCRIPTORS: CONSTANT

## 2019-04-08 ASSESSMENT — PAIN DESCRIPTION - LOCATION
LOCATION: ARM
LOCATION: BACK;ARM
LOCATION: ARM

## 2019-04-08 NOTE — PLAN OF CARE
Problem: Pain:  Goal: Control of acute pain  Description  Control of acute pain  Note:   Patient medicated for pain per mar and scheduled medications. Attempted hot pack but pt states it was \"too uncomfortable. \" biofreeze applied. Also have attempted to educate pt on sling not being dependent but she states it is to tight and loosens it up.

## 2019-04-08 NOTE — PROGRESS NOTES
Patient medicated for L arm pain. Pt c/o sling and buckle on sling hitting arm. Tried to educate pt on not keeping arm in a dependent position with sling on and to keep it elevated. Pt c/o pain and it being to tight. Placed washcloth where buckle is to prevent rubbing, folded towel under arm for support and also placed ice pack.

## 2019-04-08 NOTE — PATIENT CARE CONFERENCE
[facility]  Inpatient Rehabilitation  Weekly Team Conference Note    Patient Name: Adalberto Bustamante        MRN: [id number]    : 10/16/1932  (80 y.o.)  Gender: female   Referring Practitioner: Dr. Makayla Arcos  Diagnosis: T12 compression fracture; s/p kyphoplasty    The team conference for this patient was held on 19 at 11:00am by:  Danelle Ortiz MD    CASE MANAGEMENT:  Assessment: Patient lives at [address] [place] [address]. Patient is active with [facility]. Patient has a cane at home already. PSYCHOLOGY:  Assessment:     PHYSICAL THERAPY:  Bed Mobility:   Scooting: Supervision    Transfers:  Sit to Stand: Supervision  Stand to sit: Supervision  Bed to Chair: Unable to assess  Stand Pivot Transfers: Supervision(chair to w/c)    Ambulation 1  Surface: level tile  Device: [employer]  Other Apparatus: Wheelchair follow  Assistance: Stand by assistance  Quality of Gait: decreased step length, narrow YASSINE, decreased step length, decreased R heel strike, no LOB  Distance: 113' + 160'  Comments: pt stated \"my legs are getting weak and shakey\" towards end of ambulation but able to complete w/o incident     Stairs  # Steps : 7  Stairs Height: 4\"  Rails: Right ascending  Device: No Device  Assistance: Minimal assistance  Comment: R knee buckled while descending with LLE leading -min A to recover, pt cued to ascend with LLE leading and descend with RLE leading, no buckling during next set of stairs    FIMS:  Bed, Chair, Wheel Chair: 5 - Requires setup/supervision/cues  Walk: 5 - Supervision Requires standby supervision or cuing to walk at least 150 feet  Distance Walked: 160'  Stairs: 2- Maximal Assistance Performs 25-49% of the effort to go up and down 4 to 6 stairs and requires the assistance of one person only    PT Equipment Recommendations  Equipment Needed: No    Assessment: Pt continues to improve w/ functional mobility, strength, and ambulation endurance.  Pt w/ seemingly improved pain stool softeners, laxatives, inserts own suppository    [place] Fall Risk Score: 40  Wounds/Incisions/Ulcers: none noted/seen  Medication Review: reviewed with patient daily  Pain: controlled with medication as needed  Consultations/Labs/X-rays: CBC/BMP every Mon/Thurs              Risk for Readmission: 19%  Critical Items: If High Risk, consider the following recommendations:Home Health Nursing        Patient/Family Education provided by team:        Discharge Plan   Estimated Length of Stay:3 days  Destination: home health  Pass:No  Services at Discharge: Other [facility] OT/PT S3, home health aide  Equipment at Discharge: RW   Factors facilitating achievement of predicted outcomes: services available at discharge  Barriers to the achievement of predicted outcomes: pain, anxiety   Patient Goals:\"to get back home and take care of myself, don't have any more falls\", \"take care of myself, no more falls\"     Rehab Team Members in attendance for Team Conference:  Fer Fisher MSW, LSW  Amber Espinoza RD, LD    Ethan Jasso, OTR/L    Holly Horton, PT, DPT    Camden Meraz, MSN, RN, [address],       I approve the established interdisciplinary plan of care as documented within the medical record of Sue Rivera.     Mario Conde MD  Electronically signed by Mario Conde MD on 4/9/2019 at 11:32 AM

## 2019-04-08 NOTE — PROGRESS NOTES
Physical Therapy  Facility/Department: [facility] ACUTE REHAB UNIT  Daily Treatment Note  NAME: Gómez Mariano  : 10/16/1932  MRN: [id number]    Date of Service: 2019    Discharge Recommendations:  S Level 3, Home with assist PRN, Home with Home health PT   PT Equipment Recommendations  Equipment Needed: No    Patient Diagnosis(es): There were no encounter diagnoses. has a past medical history of Arthritis, CAD (coronary artery disease), Cancer ([facility] [address] Cystocele, Diabetes mellitus ([facility] [address] Hepatitis A, History of blood transfusion, Hyperlipidemia, PVC (premature ventricular contraction), Rectocele, Reflux, and Scoliosis. has a past surgical history that includes Appendectomy; joint replacement (Left); hernia repair (6 YRS AGO); Cholecystectomy; shoulder surgery (Right, 12); Bunionectomy (12); Breast surgery; Upper gastrointestinal endoscopy (12); Hysterectomy; bladder suspension; Dilation and curettage of uterus; other surgical history (Left, 14); Foot surgery; Cardiac catheterization; Colonoscopy (); Cystocopy (2017); other surgical history (Right, 2018); Cataract removal with implant (Left, 2018); and pr remv cataract extracap,insert lens (Left, 2018). Restrictions  Restrictions/Precautions  Restrictions/Precautions: Fall Risk, Weight Bearing  Required Braces or Orthoses?: Yes  Implants present? : Metal implants(L TKA)  Upper Extremity Weight Bearing Restrictions  Left Upper Extremity Weight Bearing: Non Weight Bearing  Required Braces or Orthoses  Left Upper Extremity Brace/Splint: Sling  Left Upper Extremity Brace/Splint: S/P humeral fracture (~6 weeks ago)  Subjective   General  Chart Reviewed: Yes  Additional Pertinent Hx: CAD, DM, cancer, arthritis  Response To Previous Treatment: Patient with no complaints from previous session.   Family / Caregiver Present: No  Referring Practitioner: Dr. Ann Marie Healy  Subjective  Subjective: Pt reported she had low back pain but would not provide rating, denied further intervention, agreeable to PT. General Comment  Comments: Pt sitting in chair on arrival, L UE sling donned. Pain Screening  Patient Currently in Pain: Yes  Pain Assessment  Pain Assessment: (did not rate)  Vital Signs  Patient Currently in Pain: Yes       Orientation     Cognition      Objective   Bed mobility  Comment: unable to assess 2/2 pre/post therapy positioning  Transfers  Sit to Stand: Supervision  Stand to sit: Supervision  Stand Pivot Transfers: Supervision(chair to w/c)  Ambulation  Ambulation?: Yes  More Ambulation?: No  Ambulation 1  Surface: level tile  Device: Gamador  Other Apparatus: Wheelchair follow  Assistance: Stand by assistance  Quality of Gait: decreased step length, narrow YASSINE, decreased step length, decreased R heel strike, no LOB  Distance: 113' + 160'     Balance  Posture: Fair  Sitting - Static: Good  Sitting - Dynamic: Good  Standing - Static: Good;-  Standing - Dynamic: Fair;+            Comment: 1st session: performed transfers and ambulation as noted above. Pt ambulated from chair to toilet w/ superivision using cane. Performed toileting w/ supervision, required min A to complete pulling up pants on L side 2/2 LUE in sling. Seated stepper x5 minutes, level 2.0. Pt performed sit <> stand from low chair x5, SBA w/ vc for foot placement to decrease hip abduction. Performed mini squats 2x10 w/ RUE support on rail. Pt left seated in chair, alarm engaged, call button in reach, all needs currently met. 2nd session: pt seated on toilet w/ nursing on arrival, no complaints from previous session and agreeable to therapy. Performed toilet transfer w/ supervision and required assist donning LE dressing 2/2 LUE restriction. Pt ambulated 160' SBA w/ quad based cane. Performed sit to stand transfer x2 w/ supervision. Stand pivot transfer x1 w/ supervision.  Performed 3x10 of following exercises: LAQ w/ 2# ankle weights w/ pt demonstrating improved AROM from last week, heel raises w/ 2# ankle weight above knee, ankle DF, seated hamstring curls w/ red theraband, seated hip abduction w/ red theraband. Pt left seated in chair, alarm engaged, call button in reach, all needs currently met. Assessment   Body structures, Functions, Activity limitations: Decreased functional mobility ; Decreased ADL status; Decreased ROM; Decreased strength;Decreased endurance;Decreased balance; Increased Pain  Assessment: Pt continues to improve w/ functional mobility, strength, and ambulation endurance. Pt w/ seemingly improved pain tolerance during treatment 2/2 no complaints during session and no request for pain intervention. Pt will continue to benefit from skilled PT to address limitations and return to PLOF.   Treatment Diagnosis: decreased functional mobility, decreased endurance  Prognosis: Good  Patient Education: positioning for sit to stand transfers  REQUIRES PT FOLLOW UP: Yes  Activity Tolerance  Activity Tolerance: Patient Tolerated treatment well     G-Code     OutComes Score       AM-PAC Score       Goals  Short term goals  Time Frame for Short term goals: 5-7 days  Short term goal 1: Pt will complete bed mobility mod I   Short term goal 2: Pt will complete transfers mod I w/ LRAD  Short term goal 3: Pt will ambulate 150' mod I w/ LRAD  Short term goal 4: Pt will navigate 4 stairs mod I   Patient Goals   Patient goals : \"to get back home and take care of myself, don't have any more falls\"    Plan    Plan  Times per week: 90 minutes/day, 5 days/week  Times per day: Daily  Current Treatment Recommendations: Strengthening, ROM, Balance Training, Functional Mobility Training, Transfer Training, Endurance Training, Gait Training, Stair training, Neuromuscular Re-education, Home Exercise Program, Safety Education & Training, Patient/Caregiver Education & Training, Equipment Evaluation, Education, & procurement, Pain Management, Positioning, Manual Therapy - Soft Tissue Mobilization  Safety Devices  Type of devices: All fall risk precautions in place, Left in chair, Chair alarm in place, Call light within reach  Restraints  Initially in place: No     Therapy Time   Individual Concurrent Group Co-treatment   Time In 0830         Time Out 0915         Minutes 45          Treatment Time: 45 minutes    Therapy Time   Individual Concurrent Group Co-treatment   Time In 1115         Time Out 1200         Minutes 45          Treatment Time: 45 minutes      Total Treatment Time: 90 minutes  Timed Code Treatment Minutes: 20+92    Gianfranco Andre, student PT  Therapist was present, directed the patient's care, made skilled judgement, and was responsible for assessment and treatment of the patient.  Co-signed and supervised by:  Shikha Boyd DPT [id number]

## 2019-04-08 NOTE — PLAN OF CARE
Problem: IP BLADDER/VOIDING  Goal: LTG - patient will complete bladder elimination  Note:   Patient incontinent of urine and used toilet. Pt feels like she always needs to go. Bladder scanned for 174cc.

## 2019-04-08 NOTE — PROGRESS NOTES
Woke patient up for assessment. Pt incontinent of urine. Explained to pt will help her to the bathroom so she can try to empty her bladder and get cleaned up. Pt states \" I already went. \" explained to pt that it is important for her to get up and try to empty her bladder. Pt agrees but asks if we have \"the wick\" for urine. Explained that it is not recommended to use, explained rehab and that we promote getting up and going to the bathroom like she will at home. Also explained therapy schedule, pt expressed surprise at morning and afternoon sessions and start times d/t she was not informed of that.

## 2019-04-08 NOTE — CONSULTS
MONOPCT 6.1 04/01/2019    EOSPCT 2.6 08/05/2010    BASOPCT 0.9 04/01/2019    MONOSABS 0.2 04/01/2019    LYMPHSABS 0.8 04/01/2019    EOSABS 0.0 04/01/2019    BASOSABS 0.0 04/01/2019    DIFFTYPE Auto 08/05/2010     CMP:    Lab Results   Component Value Date     04/08/2019    K 4.5 04/08/2019    K 4.2 04/01/2019     04/08/2019    CO2 25 04/08/2019    BUN 25 04/08/2019    CREATININE 1.1 04/08/2019    GFRAA 57 04/08/2019    GFRAA 56 01/10/2010    AGRATIO 1.5 04/01/2019    LABGLOM 47 04/08/2019    GLUCOSE 104 04/08/2019    PROT 6.9 04/01/2019    PROT 6.1 01/13/2010    LABALBU 4.1 04/01/2019    CALCIUM 8.8 04/08/2019    BILITOT 0.3 04/01/2019    ALKPHOS 87 04/01/2019    AST 22 04/01/2019    ALT 13 04/01/2019        Assessment:  Tinea unguium  Nail dystrophy  Pain b/l toes  Calluses    Plan:  Pt examined and evaluated. Nails 1-5 b/l debrided in length and thickness to tolerance of pt. Pt can follow up once d/c for routine foot care and callus care. Podiatry will sing off at this time.      Carlos Thomas DPM  4/8/2019

## 2019-04-08 NOTE — PROGRESS NOTES
Recommendations  Equipment Needed: No  Toilet - Technique: Ambulating  Equipment Used: Standard toilet  Toilet Transfers Comments: provided with BSC after transfer due to using hand on toilet seat for sit to/from stand   Assessment        SLP                Body mass index is 29.68 kg/m². Assessment and Plan:  Acute T12 compression fracture: S/P kyphoplasty 4/2.  Pain control.  PT/OT.  TLSO when OOB     Subacute left humerus fracture: Sling immobilization     CAD: no ASA per home meds     HTN: question pain related      R PSIS point tenderness: significant; xray with osteopenia and arthritis but no fracture. Treat symptomatically for now. If no improvement consider CT.     Osteoporosis: Fosamax at d/c     Bowels: Per protocol  Bladder: Per protocol   Sleep: Trazodone provided prn. Pain: Lidoderm, robaxin 750 scheduled, scheduled tylenol, decrease david, weaned dilaudid. - trial ms contin.   DVT PPx: lovenox     Brooklynn Hutson MD 4/8/2019, 9:28 AM

## 2019-04-08 NOTE — FLOWSHEET NOTE
Morphine given po aware of the allergy, will monitor patient for H/A's which is the reaction to IV Morphine

## 2019-04-09 PROCEDURE — 97110 THERAPEUTIC EXERCISES: CPT

## 2019-04-09 PROCEDURE — 6370000000 HC RX 637 (ALT 250 FOR IP): Performed by: PHYSICAL MEDICINE & REHABILITATION

## 2019-04-09 PROCEDURE — 97535 SELF CARE MNGMENT TRAINING: CPT

## 2019-04-09 PROCEDURE — 6360000002 HC RX W HCPCS: Performed by: PHYSICAL MEDICINE & REHABILITATION

## 2019-04-09 PROCEDURE — 97530 THERAPEUTIC ACTIVITIES: CPT

## 2019-04-09 PROCEDURE — 1280000000 HC REHAB R&B

## 2019-04-09 RX ADMIN — MORPHINE SULFATE 15 MG: 15 TABLET, FILM COATED, EXTENDED RELEASE ORAL at 21:28

## 2019-04-09 RX ADMIN — METHOCARBAMOL 750 MG: 750 TABLET ORAL at 09:34

## 2019-04-09 RX ADMIN — METHOCARBAMOL 750 MG: 750 TABLET ORAL at 21:28

## 2019-04-09 RX ADMIN — Medication 1 SPRAY: at 21:28

## 2019-04-09 RX ADMIN — METHOCARBAMOL 750 MG: 750 TABLET ORAL at 13:32

## 2019-04-09 RX ADMIN — ACETAMINOPHEN 650 MG: 325 TABLET, FILM COATED ORAL at 13:32

## 2019-04-09 RX ADMIN — OXYCODONE HYDROCHLORIDE 5 MG: 5 TABLET ORAL at 13:32

## 2019-04-09 RX ADMIN — ACETAMINOPHEN 650 MG: 325 TABLET, FILM COATED ORAL at 21:28

## 2019-04-09 RX ADMIN — PANTOPRAZOLE SODIUM 40 MG: 40 TABLET, DELAYED RELEASE ORAL at 05:26

## 2019-04-09 RX ADMIN — DOCUSATE SODIUM 100 MG: 100 CAPSULE, LIQUID FILLED ORAL at 09:34

## 2019-04-09 RX ADMIN — DOCUSATE SODIUM 100 MG: 100 CAPSULE, LIQUID FILLED ORAL at 21:28

## 2019-04-09 RX ADMIN — OXYCODONE HYDROCHLORIDE 5 MG: 5 TABLET ORAL at 09:34

## 2019-04-09 RX ADMIN — OXYCODONE HYDROCHLORIDE 5 MG: 5 TABLET ORAL at 17:49

## 2019-04-09 RX ADMIN — OXYCODONE HYDROCHLORIDE 5 MG: 5 TABLET ORAL at 03:43

## 2019-04-09 RX ADMIN — ACETAMINOPHEN 650 MG: 325 TABLET, FILM COATED ORAL at 09:34

## 2019-04-09 RX ADMIN — MORPHINE SULFATE 15 MG: 15 TABLET, FILM COATED, EXTENDED RELEASE ORAL at 10:28

## 2019-04-09 RX ADMIN — Medication 1 SPRAY: at 09:39

## 2019-04-09 RX ADMIN — GUAIFENESIN 600 MG: 600 TABLET, EXTENDED RELEASE ORAL at 09:34

## 2019-04-09 RX ADMIN — POLYVINYL ALCOHOL 1 DROP: 14 SOLUTION/ DROPS OPHTHALMIC at 13:36

## 2019-04-09 RX ADMIN — ENOXAPARIN SODIUM 40 MG: 40 INJECTION SUBCUTANEOUS at 09:35

## 2019-04-09 RX ADMIN — GUAIFENESIN 600 MG: 600 TABLET, EXTENDED RELEASE ORAL at 21:28

## 2019-04-09 ASSESSMENT — PAIN DESCRIPTION - PAIN TYPE
TYPE: ACUTE PAIN
TYPE: ACUTE PAIN

## 2019-04-09 ASSESSMENT — PAIN DESCRIPTION - ORIENTATION
ORIENTATION: LEFT
ORIENTATION: LEFT

## 2019-04-09 ASSESSMENT — PAIN SCALES - GENERAL
PAINLEVEL_OUTOF10: 10
PAINLEVEL_OUTOF10: 8
PAINLEVEL_OUTOF10: 8
PAINLEVEL_OUTOF10: 6
PAINLEVEL_OUTOF10: 10
PAINLEVEL_OUTOF10: 10
PAINLEVEL_OUTOF10: 8
PAINLEVEL_OUTOF10: 10

## 2019-04-09 ASSESSMENT — PAIN DESCRIPTION - LOCATION
LOCATION: ARM;BACK
LOCATION: ARM

## 2019-04-09 NOTE — PROGRESS NOTES
restroom, does not address pain this session, agreeable to PT session. General Comment  Comments: Pt sitting in chair on arrival, heating pad on low back. Pain Screening  Patient Currently in Pain: Denies  Vital Signs  Patient Currently in Pain: Denies       Orientation     Cognition      Objective   Bed mobility  Comment: unable to assess 2/2 pre/post therapy positioning  Transfers  Sit to Stand: Modified independent  Stand to sit: Modified independent  Stand Pivot Transfers: Modified independent(w/c to seated stepper)  Ambulation  Ambulation?: Yes  More Ambulation?: No  Ambulation 1  Surface: level tile  Device: Small Hector Troy  Assistance: Supervision  Quality of Gait: decreased step length, narrow YASSINE, decreased step length, decreased R heel strike, no LOB  Distance: 270'  Stairs/Curb  Stairs?: Yes  Stairs  # Steps : 8  Stairs Height: 4\"  Rails: Right ascending  Device: No Device  Assistance: Stand by assistance  Comment: pt educated on ascending LLE leading, descending w/ RLE leading; no episodes of buckling during this set     Balance  Posture: Fair  Sitting - Static: Good  Sitting - Dynamic: Good  Standing - Static: Good;-  Standing - Dynamic: Fair;+  Other exercises  Other exercises?: Yes  Other exercises 1: BLE heel taps on 4 inch step x10; RUE support on rail, SBA  Other exercises 2: seated stepper x5 minutes, level 2.5         Comment: 1st session: transfers, ambulation, stairs, and exercises as noted above. Pt ambulated to toilet w/ supervision w/ quad cane, performed toilet transfer w/ supervision w/ use of cane, provided min A for donning LB dressing on L side 2/2 to LUE in sling. Pt left seated in chair, alarm engaged, call button in reach, all needs currently met. 2nd session: Pt seated in chair on arrival talking w/ daughter, no complaints from previous session and agreeable to therapy.  Pt ambulated to toilet mod I w/ cane, performed toilet transfer mod I w/ cane, min A for LE dressing L side 2/2 LUE immobility. Sit to stand x3, mod I w/ quad cane during session. Pt ambulated 130' +130' mod I w/ quad cane. Sit <> supine transfer mod I on mat table. Performed alternating marching in supine 2x10, modified clamshells w/ yellow theraband 2x10, standing hip abduction SBA w/ cane 2x10 each side, standing hip extension SBA w/ cane 2x10 each side. Pt left seated in chair, alarm engaged, call button in reach, all needs currently met. Assessment   Body structures, Functions, Activity limitations: Decreased functional mobility ; Decreased ADL status; Decreased ROM; Decreased strength;Decreased endurance;Decreased balance; Increased Pain  Assessment: Pt w/ improved functional mobility and ambulation endurance today noted by assist levels and increased walking distance. Pt w/ improved sequencing w/ transfers and ambulation requiring no standing rest breaks. Pt will continue to benefit from skilled PT to improve safety and independence prior to discharge.   Treatment Diagnosis: decreased functional mobility, decreased endurance  Prognosis: Good  Patient Education: therex, stair navigation  REQUIRES PT FOLLOW UP: Yes  Activity Tolerance  Activity Tolerance: Patient Tolerated treatment well     G-Code     OutComes Score       AM-PAC Score       Goals  Short term goals  Time Frame for Short term goals: 5-7 days  Short term goal 1: Pt will complete bed mobility mod I   Short term goal 2: Pt will complete transfers mod I w/ LRAD  Short term goal 3: Pt will ambulate 150' mod I w/ LRAD  Short term goal 4: Pt will navigate 4 stairs mod I   Patient Goals   Patient goals : \"to get back home and take care of myself, don't have any more falls\"    Plan    Plan  Times per week: 90 minutes/day, 5 days/week  Times per day: Daily  Current Treatment Recommendations: Strengthening, ROM, Balance Training, Functional Mobility Training, Transfer Training, Endurance Training, Gait Training, Stair training, Neuromuscular Re-education, Home Exercise Program, Safety Education & Training, Patient/Caregiver Education & Training, Equipment Evaluation, Education, & procurement, Pain Management, Positioning, Manual Therapy - Soft Tissue Mobilization  Safety Devices  Type of devices: All fall risk precautions in place, Left in chair, Chair alarm in place, Call light within reach  Restraints  Initially in place: No     Therapy Time   Individual Concurrent Group Co-treatment   Time In 0830         Time Out 0915         Minutes 45          Treatment Time: 45 minutes      Therapy Time   Individual Concurrent Group Co-treatment   Time In 1245         Time Out 1330         Minutes 45          Treatment Time: 45 minutes     Total Treatment Time: 90 minutes  Timed Code Treatment Minutes: 27+89    Rissa Kan, student PT  Therapist was present, directed the patient's care, made skilled judgement, and was responsible for assessment and treatment of the patient.  Co-signed and supervised by:  Vannesa Pollock DPT [id number]

## 2019-04-09 NOTE — CARE COORDINATION
Team conference/Weekly Summary     Team conference held today. Met with pt and dtr to discuss progress with therapy, barriers to discharge, and plans to return to [place]. Estimated discharge date set for 4/12. Will continue to follow for support and discharge planning.  Mukul Huffman, MSW, LSW

## 2019-04-09 NOTE — PLAN OF CARE
Problem: Pain:  Goal: Pain level will decrease  Description  Pain level will decrease  4/8/2019 2126 by Jannie Kauffman RN  Outcome: Ongoing  4/8/2019 0950 by Sharon Avendano RN  Outcome: Ongoing  Goal: Control of acute pain  Description  Control of acute pain  4/8/2019 2126 by Jannie Kauffman RN  Outcome: Ongoing  4/8/2019 0950 by Sharon Avendano RN  Outcome: Ongoing  Goal: Control of chronic pain  Description  Control of chronic pain  4/8/2019 2126 by Jannie Kauffman RN  Outcome: Ongoing  4/8/2019 0950 by Sharon Avendano RN  Outcome: Ongoing     Problem: Falls - Risk of:  Goal: Will remain free from falls  Description  Will remain free from falls  4/8/2019 2126 by Jannie Kauffman RN  Outcome: Ongoing  4/8/2019 0950 by Sharon Avendano RN  Outcome: Ongoing  Goal: Absence of physical injury  Description  Absence of physical injury  4/8/2019 2126 by Jannie Kauffman RN  Outcome: Ongoing  4/8/2019 0950 by Sharon Avendano RN  Outcome: Ongoing     Problem: Risk for Impaired Skin Integrity  Goal: Tissue integrity - skin and mucous membranes  Description  Structural intactness and normal physiological function of skin and  mucous membranes.   4/8/2019 2126 by Jannie Kauffman RN  Outcome: Ongoing  4/8/2019 0950 by Sharon Avendano RN  Outcome: Ongoing     Problem: SAFETY  Goal: LTG - Patient will demonstrate safety requirements appropriate to situation/environment  4/8/2019 2126 by Jannie Kauffman RN  Outcome: Ongoing  4/8/2019 0950 by Sharon Avendano RN  Outcome: Ongoing  Goal: STG - Patient uses call light consistently to request assistance with transfers  4/8/2019 2126 by Jannie Kauffman RN  Outcome: Ongoing  4/8/2019 0950 by Sharon Avendano RN  Outcome: Ongoing     Problem: SKIN INTEGRITY  Goal: STG - patient will maintain good skin integrity  4/8/2019 2126 by Jannie Kauffman RN  Outcome: Ongoing  4/8/2019 0950 by Sharon Avendano RN  Outcome: Ongoing     Problem: Nutrition  Goal: Optimal nutrition therapy  4/8/2019 2126 by Daniel Crowley RN  Outcome: Ongoing  4/8/2019 0950 by Blayne Miguel RN  Outcome: Ongoing     Problem: IP BOWEL ELIMINATION  Goal: LTG - patient will have regular and routine bowel evacuation  4/8/2019 2126 by Daniel Crowley RN  Outcome: Ongoing  4/8/2019 0950 by Blayne Miguel RN  Outcome: Ongoing     Problem: IP BLADDER/VOIDING  Goal: LTG - patient will complete bladder elimination  4/8/2019 2126 by Daniel Crowley RN  Outcome: Ongoing  4/8/2019 0950 by Blayne Miguel RN  Outcome: Ongoing

## 2019-04-09 NOTE — PROGRESS NOTES
Occupational Therapy  Facility/Department: [facility] ACUTE REHAB UNIT  Daily Treatment Note  NAME: Peter Gutierrez  : 10/16/1932  MRN: [id number]    Date of Service: 2019    Discharge Recommendations:  Home with assist PRN, Home with nursing aide, S Level 3  OT Equipment Recommendations  Other: may benefit from [facility] for night use as pt reports she is frequently up and down to the bathroom at night     Assessment   Performance deficits / Impairments: Decreased functional mobility ; Decreased ADL status; Decreased strength;Decreased endurance;Decreased balance;Decreased high-level IADLs  Assessment: Patient presents with the above deficits, which are below baseline, and would benefit from continued skilled OT to address. Pt continues to report increased pain in L arm and increased fatigue but willing to participate with encouragement and rest breaks   Treatment Diagnosis: Decreased ADLs, IADLs, transfers, mobility associated with T12 compression fx s/p Kyphoplasty  Prognosis: Good;Fair  Patient Education: ADLs, codman's exercises   REQUIRES OT FOLLOW UP: Yes  Activity Tolerance  Activity Tolerance: Patient Tolerated treatment well  Safety Devices  Safety Devices in place: Yes  Type of devices: Call light within reach; Chair alarm in place; Left in chair  Restraints  Initially in place: No         Patient Diagnosis(es): T12 fracture. L UE NWB     has a past medical history of Arthritis, CAD (coronary artery disease), Cancer ([address] Cystocele, Diabetes mellitus ([address] Hepatitis A, History of blood transfusion, Hyperlipidemia, PVC (premature ventricular contraction), Rectocele, Reflux, and Scoliosis. has a past surgical history that includes Appendectomy; joint replacement (Left); hernia repair (6 YRS AGO); Cholecystectomy; shoulder surgery (Right, 12); Bunionectomy (12); Breast surgery; Upper gastrointestinal endoscopy (12);  Hysterectomy; bladder suspension; Dilation and curettage of uterus; other surgical history (Left, 09/22/14); Foot surgery; Cardiac catheterization; Colonoscopy (2008); Cystocopy (03/29/2017); other surgical history (Right, 06/28/2018); Cataract removal with implant (Left, 09/13/2018); and pr remv cataract extracap,insert lens (Left, 9/13/2018).     Restrictions  Restrictions/Precautions  Restrictions/Precautions: Fall Risk, Weight Bearing  Required Braces or Orthoses?: Yes  Implants present? : Metal implants(L TKA)  Upper Extremity Weight Bearing Restrictions  Left Upper Extremity Weight Bearing: Non Weight Bearing  Required Braces or Orthoses  Left Upper Extremity Brace/Splint: Sling  Left Upper Extremity Brace/Splint: S/P humeral fracture (~6 weeks ago)  Subjective   General  Chart Reviewed: Yes  Patient assessed for rehabilitation services?: Yes  Additional Pertinent Hx: L TKA, cataract sx   Family / Caregiver Present: No  Diagnosis: T12 Compression fx s/p kyphoplasty  Subjective  Subjective: pt in bed on arrival - no complaint of pain on arrival - requesting to use toilet       Orientation     Objective    ADL  Grooming: Supervision(in stance at sink to wash hands and brush/rinse dentures )  UE Dressing: None  LE Dressing: Minimal assistance(to tien pants, able to pull underwear and pants over hips with extra time )  Toileting: Contact guard assistance(able to pull pants up/down over hips and complete pericare )  Additional Comments: pt in bed on arrival - ambulated to/from bathroom with cane and SBA - completed toileting, voiding urine, SBA - stood at sink for grooming - sat in reclining chair to tien pants min A          Type of ROM/Therapeutic Exercise  Comment: caroline's exercises with cuing and facilitation of therapist x 5 minutes in standing - AROM elbow flex/ext, supination/pronation, wrist flex/ext, gross grasp release L UE each 1x10; R UE exercises AROM \"swimming stroke\", shoulder retraction, ER to touch back of neck each 1x10, 1# free weight biceps curl 1x15, chest press 1x10, shoulder flexion 1x10       PM session - pt on toilet on arrival - SBA for toileting and clothing management, pt had voided bowels and RN aware, stood at sink to wash hands with supervision - ambulated 10 feet to w/c - community re-entry activity - dependent for w/c mobility to hospital lobby and outside, ambulated with SBA through hospital gift shop with quad cane for about 8 minutes prior to fatiguing and returning to chair - stood x 5 minutes at high top table for scrabAilvxing net type game, then continued in sitting - game on easel to promote upright posture and overhead reaching in sitting and standing - in reclining chair with k-pad in place at end of session      Plan   Plan  Times per week: 90 minutes 5 days per week   Times per day: Daily  Current Treatment Recommendations: Strengthening, Balance Training, Functional Mobility Training, Patient/Caregiver Education & Training, Equipment Evaluation, Education, & procurement, Self-Care / ADL, Home Management Training       Goals  Short term goals  Time Frame for Short term goals: 10 days   Short term goal 1: UB bathing/dressing mod I   Short term goal 2: LB bathing/dressing mod I   Short term goal 3: toileting mod I   Short term goal 4: SBA light meal preparation   Short term goal 5: mod I toilet transfers and shower transfers   Long term goals  Time Frame for Long term goals : LTG=STG  Patient Goals   Patient goals : \"take care of myself, no more falls\"        Therapy Time   Individual Concurrent Group Co-treatment   Time In 0730         Time Out 0815         Minutes 39           Second Session Therapy Time:   Individual Concurrent Group Co-treatment   Time In 0945         Time Out 1030         Minutes 45           Timed Code Treatment Minutes:  90    Total Treatment Minutes:  90         Tunde Conner, OT   Tunde Otero OTR/L [id number], 4/9/2019, 8:23 AM

## 2019-04-09 NOTE — PROGRESS NOTES
Isma Mom  4/9/2019  [id number]    Chief Complaint: Thoracic compression fracture, sequela    Subjective:   No overnight events. Pain control improved with MS Contin. Sleep improved overnight. Patient appears more comfortable today. No complaints. ROS: No cp, sob, n/v    Objective:  Patient Vitals for the past 24 hrs:   BP Temp Temp src Pulse Resp SpO2   04/08/19 2124 133/73 98.3 °F (36.8 °C) Oral 86 18 95 %   04/08/19 2057 (!) 174/85 98.7 °F (37.1 °C) Oral 97 16 94 %   04/08/19 1200 133/67 99.4 °F (37.4 °C) Oral 83 18 98 %   04/08/19 1015 123/63 98.3 °F (36.8 °C) Oral 74 18 93 %     Gen: No distress, pleasant. Resting in bedside chair  HEENT: Normocephalic, atraumatic. CV: Regular rate and rhythm. No MRG  Resp: No respiratory distress. CTAB  Abd: Soft, nontender, nondistended  Ext: No edema. Neuro: Alert, oriented, appropriately interactive.      Wt Readings from Last 3 Encounters:   04/03/19 157 lb 1 oz (71.2 kg)   04/01/19 156 lb 9.6 oz (71 kg)   03/11/19 168 lb 10.4 oz (76.5 kg)       Laboratory data:   Lab Results   Component Value Date    WBC 4.2 04/08/2019    HGB 10.2 (L) 04/08/2019    HCT 30.6 (L) 04/08/2019    MCV 99.0 04/08/2019     04/08/2019       Lab Results   Component Value Date     04/08/2019    K 4.5 04/08/2019    K 4.2 04/01/2019     04/08/2019    CO2 25 04/08/2019    BUN 25 04/08/2019    CREATININE 1.1 04/08/2019    GLUCOSE 104 04/08/2019    CALCIUM 8.8 04/08/2019        Therapy progress:  PT  Upper Extremity Weight Bearing Restrictions  Left Upper Extremity Weight Bearing: Non Weight Bearing  Required Braces or Orthoses  Left Upper Extremity Brace/Splint: Sling  Left Upper Extremity Brace/Splint: S/P humeral fracture (~6 weeks ago)  Objective     Sit to Stand: Supervision  Stand to sit: Supervision  Bed to Chair: Unable to assess  Stand Pivot Transfers: Supervision(chair to w/c)  Device: [employer]  Other Apparatus: Wheelchair follow  Assistance: Stand by assistance  Distance: 113' + 160'  OT  PT Equipment Recommendations  Equipment Needed: No  Toilet - Technique: Ambulating  Equipment Used: Standard toilet  Toilet Transfers Comments: provided with BSC after transfer due to using hand on toilet seat for sit to/from stand   Assessment        SLP                Body mass index is 29.68 kg/m². Assessment and Plan:  Acute T12 compression fracture: S/P kyphoplasty 4/2.  Pain control.  PT/OT.      Subacute left humerus fracture: Sling immobilization     CAD: no ASA per home meds     HTN: question pain related, improving     R PSIS point tenderness: significant; xray with osteopenia and arthritis but no fracture. Treat symptomatically for now. If no improvement consider CT.     Osteoporosis: Fosamax at d/c     Bowels: Per protocol  Bladder: Per protocol   Sleep: Trazodone provided prn. Pain: Lidoderm, robaxin 750 scheduled, scheduled tylenol, decreased david, weaned dilaudid. Started ms contin. DVT PPx: lovenox     Team conference was held today on the patient and discussed directly with the patient utilizing their entire treatment team. Please see separate team note for details. Total treatment time for today's care >34 min.     Nataliia Melo MD 4/9/2019, 8:57 AM

## 2019-04-09 NOTE — PROGRESS NOTES
Nutrition Assessment (Low Risk)    Type and Reason for Visit: Reassess    Nutrition Recommendations:   No new rec's     Nutrition Assessment:  Patient assessed for nutritional risk. Deemed to be at low risk at this time. Will continue to monitor for changes in status. Pt's nutrition status improving as po intake greater than 50% of meals. Pt reports appettie is better & tries to drink Ensure BID. No new wt to review for further changes. No further nutrition compromise @ this time.     Malnutrition Assessment:  · Malnutrition Status: Mild Malnutrition(as previously identified)    Nutrition Risk Level   Risk Level: Low    Nutrition Diagnosis:   · Problem: Unintended weight loss  · Etiology: Insufficient energy/nutrient consumption    Signs and symptoms: Diet history of poor intake, Patient report of(wt loss prior to admission)    Nutrition Intervention:  Food and/or Delivery: Continue current diet, Continue current ONS  Nutrition Education/Counseling/Coordination of Care:  Continued Inpatient Monitoring, Education Not Indicated      Electronically signed by Dorene Petersen RD, LD on 4/9/19 at 1:15 PM    Contact Number: [phone]

## 2019-04-10 ENCOUNTER — TELEPHONE (OUTPATIENT)
Dept: GENERAL RADIOLOGY | Age: 84
End: 2019-04-10

## 2019-04-10 PROCEDURE — 97530 THERAPEUTIC ACTIVITIES: CPT

## 2019-04-10 PROCEDURE — 1280000000 HC REHAB R&B

## 2019-04-10 PROCEDURE — 97110 THERAPEUTIC EXERCISES: CPT

## 2019-04-10 PROCEDURE — 6370000000 HC RX 637 (ALT 250 FOR IP): Performed by: PHYSICAL MEDICINE & REHABILITATION

## 2019-04-10 PROCEDURE — 97535 SELF CARE MNGMENT TRAINING: CPT

## 2019-04-10 PROCEDURE — 6360000002 HC RX W HCPCS: Performed by: PHYSICAL MEDICINE & REHABILITATION

## 2019-04-10 RX ADMIN — GUAIFENESIN 600 MG: 600 TABLET, EXTENDED RELEASE ORAL at 08:34

## 2019-04-10 RX ADMIN — OXYCODONE HYDROCHLORIDE 5 MG: 5 TABLET ORAL at 13:40

## 2019-04-10 RX ADMIN — METHOCARBAMOL 750 MG: 750 TABLET ORAL at 21:17

## 2019-04-10 RX ADMIN — Medication 1 SPRAY: at 21:18

## 2019-04-10 RX ADMIN — OXYCODONE HYDROCHLORIDE 5 MG: 5 TABLET ORAL at 05:05

## 2019-04-10 RX ADMIN — ACETAMINOPHEN 650 MG: 325 TABLET, FILM COATED ORAL at 08:34

## 2019-04-10 RX ADMIN — GUAIFENESIN 600 MG: 600 TABLET, EXTENDED RELEASE ORAL at 21:17

## 2019-04-10 RX ADMIN — MORPHINE SULFATE 15 MG: 15 TABLET, FILM COATED, EXTENDED RELEASE ORAL at 08:34

## 2019-04-10 RX ADMIN — METHOCARBAMOL 750 MG: 750 TABLET ORAL at 13:42

## 2019-04-10 RX ADMIN — DOCUSATE SODIUM 100 MG: 100 CAPSULE, LIQUID FILLED ORAL at 08:34

## 2019-04-10 RX ADMIN — ACETAMINOPHEN 650 MG: 325 TABLET, FILM COATED ORAL at 13:40

## 2019-04-10 RX ADMIN — OXYCODONE HYDROCHLORIDE 5 MG: 5 TABLET ORAL at 00:52

## 2019-04-10 RX ADMIN — PANTOPRAZOLE SODIUM 40 MG: 40 TABLET, DELAYED RELEASE ORAL at 05:05

## 2019-04-10 RX ADMIN — MORPHINE SULFATE 15 MG: 15 TABLET, FILM COATED, EXTENDED RELEASE ORAL at 21:18

## 2019-04-10 RX ADMIN — ENOXAPARIN SODIUM 40 MG: 40 INJECTION SUBCUTANEOUS at 08:33

## 2019-04-10 RX ADMIN — Medication 1 SPRAY: at 08:34

## 2019-04-10 RX ADMIN — ACETAMINOPHEN 650 MG: 325 TABLET, FILM COATED ORAL at 21:17

## 2019-04-10 RX ADMIN — ONDANSETRON 4 MG: 4 TABLET, ORALLY DISINTEGRATING ORAL at 18:11

## 2019-04-10 RX ADMIN — DOCUSATE SODIUM 100 MG: 100 CAPSULE, LIQUID FILLED ORAL at 21:17

## 2019-04-10 RX ADMIN — ACETAMINOPHEN 650 MG: 325 TABLET, FILM COATED ORAL at 17:21

## 2019-04-10 RX ADMIN — METHOCARBAMOL 750 MG: 750 TABLET ORAL at 08:34

## 2019-04-10 RX ADMIN — PANTOPRAZOLE SODIUM 40 MG: 40 TABLET, DELAYED RELEASE ORAL at 17:21

## 2019-04-10 ASSESSMENT — PAIN DESCRIPTION - PAIN TYPE
TYPE: ACUTE PAIN

## 2019-04-10 ASSESSMENT — PAIN DESCRIPTION - LOCATION
LOCATION: BACK
LOCATION: BACK
LOCATION: ARM
LOCATION: BACK
LOCATION: ARM;BACK

## 2019-04-10 ASSESSMENT — PAIN SCALES - GENERAL
PAINLEVEL_OUTOF10: 6
PAINLEVEL_OUTOF10: 8
PAINLEVEL_OUTOF10: 8
PAINLEVEL_OUTOF10: 7
PAINLEVEL_OUTOF10: 0
PAINLEVEL_OUTOF10: 8
PAINLEVEL_OUTOF10: 8
PAINLEVEL_OUTOF10: 9

## 2019-04-10 ASSESSMENT — PAIN DESCRIPTION - ORIENTATION: ORIENTATION: LOWER;RIGHT

## 2019-04-10 ASSESSMENT — PAIN DESCRIPTION - DESCRIPTORS: DESCRIPTORS: CONSTANT

## 2019-04-10 NOTE — PROGRESS NOTES
Physical Therapy  Facility/Department: [facility] ACUTE REHAB UNIT  Daily Treatment Note  NAME: Yuan Musa  : 10/16/1932  MRN: [id number]    Date of Service: 4/10/2019    Discharge Recommendations:  S Level 3, Home with assist PRN, Home with Home health PT   PT Equipment Recommendations  Equipment Needed: No    Patient Diagnosis(es): There were no encounter diagnoses. has a past medical history of Arthritis, CAD (coronary artery disease), Cancer ([facility] [address] Cystocele, Diabetes mellitus ([facility] [address] Hepatitis A, History of blood transfusion, Hyperlipidemia, PVC (premature ventricular contraction), Rectocele, Reflux, and Scoliosis. has a past surgical history that includes Appendectomy; joint replacement (Left); hernia repair (6 YRS AGO); Cholecystectomy; shoulder surgery (Right, 12); Bunionectomy (12); Breast surgery; Upper gastrointestinal endoscopy (12); Hysterectomy; bladder suspension; Dilation and curettage of uterus; other surgical history (Left, 14); Foot surgery; Cardiac catheterization; Colonoscopy (); Cystocopy (2017); other surgical history (Right, 2018); Cataract removal with implant (Left, 2018); and pr remv cataract extracap,insert lens (Left, 2018). Restrictions  Restrictions/Precautions  Restrictions/Precautions: Fall Risk, Weight Bearing  Required Braces or Orthoses?: Yes  Implants present? : Metal implants  Upper Extremity Weight Bearing Restrictions  Left Upper Extremity Weight Bearing: Non Weight Bearing  Required Braces or Orthoses  Left Upper Extremity Brace/Splint: Sling  Left Upper Extremity Brace/Splint: S/P humeral fracture (~6 weeks ago)  Subjective   General  Chart Reviewed: Yes  Additional Pertinent Hx: CAD, DM, cancer, arthritis  Response To Previous Treatment: Patient with no complaints from previous session.   Family / Caregiver Present: No  Referring Practitioner: Dr. Rogerio Stearns  Subjective  Subjective: Pt states she is doing good an dwould like to use the bathroom prior to therpay, agreeable to session. General Comment  Comments: Pt sitting in chair on arrival w/ nurse administering medication. Pain Screening  Patient Currently in Pain: Yes  Pain Assessment  Pain Type: Acute pain  Pain Location: Back  Pain Orientation: Lower;Right  Pain Descriptors: Constant  Non-Pharmaceutical Pain Intervention(s): Ambulation/Increased Activity  Response to Pain Intervention: Patient Satisfied  Vital Signs  Patient Currently in Pain: Yes       Orientation     Cognition      Objective   Bed mobility  Comment: unable to assess 2/2 pre/post therapy positioning  Transfers  Sit to Stand: Modified independent(x5 during session from low chair)  Stand to sit: Modified independent  Stand Pivot Transfers: Modified independent  Comment: toilet transfer mod I w/ cane  Ambulation  Ambulation?: Yes  More Ambulation?: No  Ambulation 1  Surface: level tile  Device: [employer]  Assistance: Modified Independent  Quality of Gait: decreased step length, narrow YASSINE, decreased R heel strike, no LOB  Distance: 175' + 113'  Stairs/Curb  Stairs?: No     Balance  Posture: Fair  Sitting - Static: Good  Sitting - Dynamic: Good  Standing - Static: Good;-  Standing - Dynamic: Fair;+  Other exercises  Other exercises?: Yes  Other exercises 1: modified lunges 2x5, RUE support on ballet bar w/ SBA  Other exercises 2: soft surface walking: 2x6 ft fwd, bwd, right, left(RUE support on ballet bar, SBA)         Comment: 1st session: transfers, ambulation, and exercises as noted above. Pt ambulated to toilet from chair mod I, performed toilet transfer mod I, min A for LB dressing 2/2 LUE immobility. Pt left seated in chair, heating pad place on low back, alarm engaged, call button in reach, all needs currently met. 2nd session:  Pt sitting in bedside chair on arrival.  Pain rated 8/10. Pt states she already had pain medication. Performed sit to stand SBA.   Ambulated 160' with SBQC and SBA.  Performed step ups onto 4\" step with RUE assist X 10 B. Performed sit to stand on aeromat X 10. Performed seated LAQ X 10 with 2# B, marching B X 10 with 2#, heel raises X 10 B with 2#.  Performed manual stretching of BLE all muscle groups with increasing in R hip. Performed superior reaching as well as reaching out of base of support X 6 reps with facilitation to increase upright posture. When attempting to look up for numbers, pt utilized B knee flexion. Pt also unable to maintain upward gaze and perform shoulder flexion and started reaching without looking. Pt returned to room and performed toilet transfer SBA and toileting SBA in standing. Pt returned to room and remained in recliner with feet elevated with alarm on and all needs in reach. Assessment   Body structures, Functions, Activity limitations: Decreased functional mobility ; Decreased ADL status; Decreased ROM; Decreased strength;Decreased endurance;Decreased balance; Increased Pain  Assessment: Pt w/ continued improvment w/ functional mobility and ambulation w/ continued improvement w/ independence. Pt w/ continued improvement in gait quality w/ improved step length bilaterally and no standing rest breaks. Pt will continue to benefit from skilled PT to improve safety and independence prior to discharge.   Treatment Diagnosis: decreased functional mobility, decreased endurance  Prognosis: Good  Patient Education: modified lunges  REQUIRES PT FOLLOW UP: Yes  Activity Tolerance  Activity Tolerance: Patient Tolerated treatment well     G-Code     OutComes Score       AM-PAC Score       Goals  Short term goals  Time Frame for Short term goals: 5-7 days  Short term goal 1: Pt will complete bed mobility mod I   Short term goal 2: Pt will complete transfers mod I w/ LRAD- MET 4/10/2019  Short term goal 3: Pt will ambulate 150' mod I w/ LRAD- MET 4/10/2019; progress to ambulate 200' mod I w/ LRAD  Short term goal 4: Pt will navigate 4 stairs mod I Patient Goals   Patient goals : \"to get back home and take care of myself, don't have any more falls\"    Plan    Plan  Times per week: 90 minutes/day, 5 days/week  Times per day: Daily  Current Treatment Recommendations: Strengthening, ROM, Balance Training, Functional Mobility Training, Transfer Training, Endurance Training, Gait Training, Stair training, Neuromuscular Re-education, Home Exercise Program, Safety Education & Training, Patient/Caregiver Education & Training, Equipment Evaluation, Education, & procurement, Pain Management, Positioning, Manual Therapy - Soft Tissue Mobilization  Safety Devices  Type of devices: All fall risk precautions in place, Left in chair, Chair alarm in place, Call light within reach  Restraints  Initially in place: No     Therapy Time   Individual Concurrent Group Co-treatment   Time In 0830         Time Out 0915         Minutes 45          Treatment Time: 45 minutes  1st session performed by: Ana Laura Elaine, student PT    Therapy Time   Individual Concurrent Group Co-treatment   Time In 1345         Time Out 1430         Minutes 45          Treatment Time: 45 minutes      Total Treatment Time: 90 minutes   Timed Code Treatment Minutes: 56+33    Amelie Vitale, [facility]   Therapist was present, directed the patient's care, made skilled judgement, and was responsible for assessment and treatment of the patient.  Co-signed and supervised by:  Lucia Diana DPT [id number]

## 2019-04-10 NOTE — PROGRESS NOTES
Pt up to bathroom again and requesting pain medication again. Told patient that she received it right before 1 am and it is not due until 5 am. Pt states pain medicine not helping pain for very long. Informed pt will leave note for doctor.

## 2019-04-10 NOTE — PROGRESS NOTES
Cole Bass  4/10/2019  [id number]    Chief Complaint: Thoracic compression fracture, sequela    Subjective:   No overnight events. Slept well again. Reporting pain is uncontrolled and constantly an 8 though she appears much more comfortable. ROS: No cp, sob, n/v    Objective:  Patient Vitals for the past 24 hrs:   BP Temp Temp src Pulse Resp SpO2   04/10/19 0833 112/65 98 °F (36.7 °C) Oral 84 18 --   04/09/19 2115 (!) 149/75 98.2 °F (36.8 °C) Oral 82 18 96 %   04/09/19 0915 138/81 98.2 °F (36.8 °C) Oral 88 18 --     Gen: No distress, pleasant. Resting in bedside chair  HEENT: Normocephalic, atraumatic. CV: Regular rate and rhythm. No MRG  Resp: No respiratory distress. CTAB  Abd: Soft, nontender, nondistended  Ext: No edema. Neuro: Alert, oriented, appropriately interactive.      Wt Readings from Last 3 Encounters:   04/03/19 157 lb 1 oz (71.2 kg)   04/01/19 156 lb 9.6 oz (71 kg)   03/11/19 168 lb 10.4 oz (76.5 kg)       Laboratory data:   Lab Results   Component Value Date    WBC 4.2 04/08/2019    HGB 10.2 (L) 04/08/2019    HCT 30.6 (L) 04/08/2019    MCV 99.0 04/08/2019     04/08/2019       Lab Results   Component Value Date     04/08/2019    K 4.5 04/08/2019    K 4.2 04/01/2019     04/08/2019    CO2 25 04/08/2019    BUN 25 04/08/2019    CREATININE 1.1 04/08/2019    GLUCOSE 104 04/08/2019    CALCIUM 8.8 04/08/2019        Therapy progress:  PT  Upper Extremity Weight Bearing Restrictions  Left Upper Extremity Weight Bearing: Non Weight Bearing  Required Braces or Orthoses  Left Upper Extremity Brace/Splint: Sling  Left Upper Extremity Brace/Splint: S/P humeral fracture (~6 weeks ago)  Objective     Sit to Stand: Modified independent  Stand to sit: Modified independent  Bed to Chair: Unable to assess  Stand Pivot Transfers: Modified independent(w/c to seated stepper)  Device: [employer]ade[employer]  Other Apparatus: Wheelchair follow  Assistance: Supervision  Distance: 270'  OT  PT

## 2019-04-10 NOTE — TELEPHONE ENCOUNTER
Patients chart was reviewed, 7 day follow-up IR  Kyphoplasty procedure. No complications were noted post procedure.

## 2019-04-10 NOTE — PROGRESS NOTES
Occupational Therapy  Facility/Department: [facility] ACUTE REHAB UNIT  Daily Treatment Note  NAME: Alma Stewart  : 10/16/1932  MRN: [id number]    Date of Service: 4/10/2019    Discharge Recommendations:  Home with assist PRN, Home with nursing aide, S Level 3  OT Equipment Recommendations  Equipment Needed: Yes  Other: may benefit from [facility] for night use as pt reports she is frequently up and down to the bathroom at night     Assessment   Performance deficits / Impairments: Decreased functional mobility ; Decreased ADL status; Decreased strength;Decreased endurance;Decreased balance;Decreased high-level IADLs  Assessment: Patient presents with the above deficits, which are below baseline, and would benefit from continued skilled OT to address. Pt continues to report increased pain in L arm and increased fatigue but willing to participate with encouragement and rest breaks   Treatment Diagnosis: Decreased ADLs, IADLs, transfers, mobility associated with T12 compression fx s/p Kyphoplasty  Prognosis: Good  REQUIRES OT FOLLOW UP: Yes  Activity Tolerance  Activity Tolerance: Patient Tolerated treatment well  Safety Devices  Safety Devices in place: Yes  Type of devices: Call light within reach; Chair alarm in place; Left in chair;Gait belt;Nurse notified; Patient at risk for falls         Patient Diagnosis(es): There were no encounter diagnoses. has a past medical history of Arthritis, CAD (coronary artery disease), Cancer ([address] Cystocele, Diabetes mellitus ([address] Hepatitis A, History of blood transfusion, Hyperlipidemia, PVC (premature ventricular contraction), Rectocele, Reflux, and Scoliosis. has a past surgical history that includes Appendectomy; joint replacement (Left); hernia repair (6 YRS AGO); Cholecystectomy; shoulder surgery (Right, 12); Bunionectomy (12); Breast surgery; Upper gastrointestinal endoscopy (12);  Hysterectomy; bladder suspension; Dilation and curettage of uterus; other surgical history (Left, 09/22/14); Foot surgery; Cardiac catheterization; Colonoscopy (2008); Cystocopy (03/29/2017); other surgical history (Right, 06/28/2018); Cataract removal with implant (Left, 09/13/2018); and pr remv cataract extracap,insert lens (Left, 9/13/2018). Restrictions  Restrictions/Precautions  Restrictions/Precautions: Fall Risk, Weight Bearing  Required Braces or Orthoses?: Yes  Implants present? : Metal implants  Upper Extremity Weight Bearing Restrictions  Left Upper Extremity Weight Bearing: Non Weight Bearing  Required Braces or Orthoses  Left Upper Extremity Brace/Splint: Sling  Left Upper Extremity Brace/Splint: S/P humeral fracture (~6 weeks ago)  Subjective   General  Chart Reviewed: Yes  Patient assessed for rehabilitation services?: Yes  Additional Pertinent Hx: L TKA, cataract sx   Family / Caregiver Present: No  Diagnosis: T12 Compression fx s/p kyphoplasty  Subjective  Subjective: Pt upright in chair upon arrival. Pt requested to use the toilet. Denies pain in UE  Pain Assessment  Pain Level: 8  Pain Location: Back  Vital Signs  Patient Currently in Pain: Yes   Orientation  Orientation  Overall Orientation Status: Within Normal Limits  Objective    ADL  Grooming: Minimal assistance(washing hands at sink; combed hair, clipped nails with min A, in chair )  UE Bathing: Setup;Minimal assistance(in chair, assist for reaching R underarm )  LE Bathing: Setup;Stand by assistance(in chair, verbal cues for maintaining precautions)  UE Dressing: Stand by assistance;Setup(in chair, long sleeve shirt )  LE Dressing: Setup;Stand by assistance(socks, verbal cues for maintaining precatuions )  Toileting: Contact guard assistance(pulling pants up/down, shaheen care )  Additional Comments: Pt requested to clip finger nails in chair, required minimal assist for clipping broken nail.          Balance  Sitting Balance: Supervision(in chair, on toilet)  Standing Balance: Contact guard assistance  Standing Balance  Time: ~20 seconds, 1 minute   Activity: functional mobility to toilet> grooming at sink, ambulation to chair  Sit to stand: Contact guard assistance  Stand to sit: Contact guard assistance  Functional Mobility  Functional - Mobility Device: Cane  Activity: To/from bathroom  Assist Level: Contact guard assistance  Toilet Transfers  Toilet - Technique: Ambulating  Equipment Used: Standard toilet  Toilet Transfer: Contact guard assistance  Bed mobility  Comment: pt in chair upon arrival, ended session with pt in chair   Transfers  Sit to stand: Contact guard assistance  Stand to sit: Contact guard assistance         Cognition  Overall Cognitive Status: WNL     Perception  Overall Perceptual Status: WFL      Type of ROM/Therapeutic Exercise  Type of ROM/Therapeutic Exercise: AROM  Comment: 2 sets 15 reps, elbow flexion/extension, wrist flexion/extension; 2 sets 10 reps of shoulder  flexion to  ~70 degrees       PM session - pt in chair on arrival - states she is feeling fatigued but agreeable to session - concerned about swelling in R LE - MAKSIM hose applied - elastic shoe laces added to tennis shoes for increased independence with LB dressing - min A with shoes and assist to tien L sock and R compression sock - ambulated to kitchenette area with  feet with cane - after seated rest break pt made hot chocolate at counter top SBA to reach items in cabinets and refrigerator - able to put cup into microwave but assist to take out of microwave - pt reports she plans to use rollator walker with brake on as a seat by countertop in kitchen - returned to room in w/c - SPT to reclining chair mod I - requesting pain medication end of session and reports pain 8/10 - RN informed - in chair with alarm in place and needs in reach end of session   Plan   Plan  Times per week: 90 minutes 5 days per week   Times per day: Daily  Current Treatment Recommendations: Strengthening, Balance Training, Functional Mobility Training, Patient/Caregiver Education & Training, Equipment Evaluation, Education, & procurement, Self-Care / ADL, Home Management Training                  AM-PAC Score        AM-PAC Inpatient Daily Activity Raw Score: 18  AM-PAC Inpatient ADL T-Scale Score : 38.66  ADL Inpatient CMS 0-100% Score: 46.65  ADL Inpatient CMS G-Code Modifier : CK    Goals  Short term goals  Time Frame for Short term goals: 10 days   Short term goal 1: UB bathing/dressing mod I--UB bathing with min A, UB dressing with setup 4/10  Short term goal 2: LB bathing/dressing mod I--LB bathing with setup, SBA; LB dressing socks with setup and verbal cues for maintaining precautions, 4/10  Short term goal 3: toileting mod I--CGA 4/10  Short term goal 4: SBA light meal preparation--did not address 4/10  Short term goal 5: mod I toilet transfers and shower transfers--CGA 4/10  Long term goals  Time Frame for Long term goals : LTG=STG  Patient Goals   Patient goals : \"take care of myself, no more falls\"        Therapy Time   Individual Concurrent Group Co-treatment   Time In 1103         Time Out 5970         Minutes 49           Second Session Therapy Time:   Individual Concurrent Group Co-treatment   Time In 7356         Time Out 1330         Minutes 45           Timed Code Treatment Minutes:  94    Total Treatment Minutes:  [address] [facility], [place]     Trisha Gonzalez OT   Therapist directly observed and directed this treatment. Lakeisha Guallpa OTR/L [id number]    PM session   Tunde Goldstein OTR/L [id number], 4/10/2019, 3:08 PM

## 2019-04-10 NOTE — PLAN OF CARE
Problem: Pain:  Description  Pain management should include both nonpharmacologic and pharmacologic interventions. Goal: Pain level will decrease  Description  Pain level will decrease  Outcome: Ongoing  Goal: Control of acute pain  Description  Control of acute pain  Outcome: Ongoing  Goal: Control of chronic pain  Description  Control of chronic pain  Outcome: Ongoing     Problem: Falls - Risk of:  Goal: Will remain free from falls  Description  Will remain free from falls  Outcome: Ongoing  Goal: Absence of physical injury  Description  Absence of physical injury  Outcome: Ongoing     Problem: Risk for Impaired Skin Integrity  Goal: Tissue integrity - skin and mucous membranes  Description  Structural intactness and normal physiological function of skin and  mucous membranes.   Outcome: Ongoing     Problem: SAFETY  Goal: LTG - Patient will demonstrate safety requirements appropriate to situation/environment  Outcome: Ongoing  Goal: STG - Patient uses call light consistently to request assistance with transfers  Outcome: Ongoing     Problem: SKIN INTEGRITY  Goal: STG - patient will maintain good skin integrity  Outcome: Ongoing     Problem: Nutrition  Goal: Optimal nutrition therapy  Outcome: Ongoing     Problem: IP BOWEL ELIMINATION  Goal: LTG - patient will have regular and routine bowel evacuation  Outcome: Ongoing     Problem: IP BLADDER/VOIDING  Goal: LTG - patient will complete bladder elimination  Outcome: Ongoing

## 2019-04-11 LAB
ANION GAP SERPL CALCULATED.3IONS-SCNC: 10 MMOL/L (ref 3–16)
BUN BLDV-MCNC: 22 MG/DL (ref 7–20)
CALCIUM SERPL-MCNC: 8.7 MG/DL (ref 8.3–10.6)
CHLORIDE BLD-SCNC: 104 MMOL/L (ref 99–110)
CO2: 25 MMOL/L (ref 21–32)
CREAT SERPL-MCNC: 1 MG/DL (ref 0.6–1.2)
GFR AFRICAN AMERICAN: >60
GFR NON-AFRICAN AMERICAN: 53
GLUCOSE BLD-MCNC: 107 MG/DL (ref 70–99)
HCT VFR BLD CALC: 30.8 % (ref 36–48)
HEMOGLOBIN: 10.2 G/DL (ref 12–16)
MCH RBC QN AUTO: 32.5 PG (ref 26–34)
MCHC RBC AUTO-ENTMCNC: 33.1 G/DL (ref 31–36)
MCV RBC AUTO: 98.2 FL (ref 80–100)
PDW BLD-RTO: 14.4 % (ref 12.4–15.4)
PLATELET # BLD: 170 K/UL (ref 135–450)
PMV BLD AUTO: 6.5 FL (ref 5–10.5)
POTASSIUM SERPL-SCNC: 4.3 MMOL/L (ref 3.5–5.1)
RBC # BLD: 3.14 M/UL (ref 4–5.2)
SODIUM BLD-SCNC: 139 MMOL/L (ref 136–145)
WBC # BLD: 3.8 K/UL (ref 4–11)

## 2019-04-11 PROCEDURE — 85027 COMPLETE CBC AUTOMATED: CPT

## 2019-04-11 PROCEDURE — 6370000000 HC RX 637 (ALT 250 FOR IP): Performed by: PHYSICAL MEDICINE & REHABILITATION

## 2019-04-11 PROCEDURE — 97110 THERAPEUTIC EXERCISES: CPT

## 2019-04-11 PROCEDURE — 1280000000 HC REHAB R&B

## 2019-04-11 PROCEDURE — 6360000002 HC RX W HCPCS: Performed by: PHYSICAL MEDICINE & REHABILITATION

## 2019-04-11 PROCEDURE — 97530 THERAPEUTIC ACTIVITIES: CPT

## 2019-04-11 PROCEDURE — 36415 COLL VENOUS BLD VENIPUNCTURE: CPT

## 2019-04-11 PROCEDURE — 80048 BASIC METABOLIC PNL TOTAL CA: CPT

## 2019-04-11 PROCEDURE — 97535 SELF CARE MNGMENT TRAINING: CPT

## 2019-04-11 RX ORDER — TIZANIDINE 2 MG/1
4 TABLET ORAL EVERY 6 HOURS PRN
Qty: 75 TABLET | Refills: 0 | Status: ON HOLD | OUTPATIENT
Start: 2019-04-11 | End: 2022-01-12

## 2019-04-11 RX ORDER — OXYCODONE HYDROCHLORIDE 5 MG/1
5 TABLET ORAL EVERY 4 HOURS PRN
Qty: 45 TABLET | Refills: 0 | Status: SHIPPED | OUTPATIENT
Start: 2019-04-11 | End: 2019-04-18

## 2019-04-11 RX ORDER — DOCOSANOL 100 MG/G
CREAM TOPICAL
Status: DISCONTINUED | OUTPATIENT
Start: 2019-04-11 | End: 2019-04-12 | Stop reason: HOSPADM

## 2019-04-11 RX ORDER — MORPHINE SULFATE 15 MG/1
15 TABLET, FILM COATED, EXTENDED RELEASE ORAL EVERY 12 HOURS SCHEDULED
Qty: 14 TABLET | Refills: 0 | Status: SHIPPED | OUTPATIENT
Start: 2019-04-11 | End: 2019-05-11

## 2019-04-11 RX ADMIN — PANTOPRAZOLE SODIUM 40 MG: 40 TABLET, DELAYED RELEASE ORAL at 06:09

## 2019-04-11 RX ADMIN — DOCOSANOL: 100 CREAM TOPICAL at 17:55

## 2019-04-11 RX ADMIN — Medication 1 SPRAY: at 23:00

## 2019-04-11 RX ADMIN — ENOXAPARIN SODIUM 40 MG: 40 INJECTION SUBCUTANEOUS at 08:34

## 2019-04-11 RX ADMIN — METHOCARBAMOL 750 MG: 750 TABLET ORAL at 08:34

## 2019-04-11 RX ADMIN — Medication 1 SPRAY: at 08:34

## 2019-04-11 RX ADMIN — MORPHINE SULFATE 15 MG: 15 TABLET, FILM COATED, EXTENDED RELEASE ORAL at 22:52

## 2019-04-11 RX ADMIN — GUAIFENESIN 600 MG: 600 TABLET, EXTENDED RELEASE ORAL at 08:34

## 2019-04-11 RX ADMIN — OXYCODONE HYDROCHLORIDE 5 MG: 5 TABLET ORAL at 13:29

## 2019-04-11 RX ADMIN — DOCUSATE SODIUM 100 MG: 100 CAPSULE, LIQUID FILLED ORAL at 08:34

## 2019-04-11 RX ADMIN — DOCOSANOL: 100 CREAM TOPICAL at 10:26

## 2019-04-11 RX ADMIN — DOCUSATE SODIUM 100 MG: 100 CAPSULE, LIQUID FILLED ORAL at 22:52

## 2019-04-11 RX ADMIN — METHOCARBAMOL 750 MG: 750 TABLET ORAL at 13:29

## 2019-04-11 RX ADMIN — PANTOPRAZOLE SODIUM 40 MG: 40 TABLET, DELAYED RELEASE ORAL at 16:06

## 2019-04-11 RX ADMIN — ACETAMINOPHEN 650 MG: 325 TABLET, FILM COATED ORAL at 13:29

## 2019-04-11 RX ADMIN — OXYCODONE HYDROCHLORIDE 5 MG: 5 TABLET ORAL at 04:53

## 2019-04-11 RX ADMIN — DOCOSANOL: 100 CREAM TOPICAL at 14:49

## 2019-04-11 RX ADMIN — ACETAMINOPHEN 650 MG: 325 TABLET, FILM COATED ORAL at 08:34

## 2019-04-11 RX ADMIN — ACETAMINOPHEN 650 MG: 325 TABLET, FILM COATED ORAL at 22:51

## 2019-04-11 RX ADMIN — MORPHINE SULFATE 15 MG: 15 TABLET, FILM COATED, EXTENDED RELEASE ORAL at 08:34

## 2019-04-11 RX ADMIN — GUAIFENESIN 600 MG: 600 TABLET, EXTENDED RELEASE ORAL at 22:51

## 2019-04-11 RX ADMIN — DOCOSANOL: 100 CREAM TOPICAL at 22:55

## 2019-04-11 RX ADMIN — POLYVINYL ALCOHOL 1 DROP: 14 SOLUTION/ DROPS OPHTHALMIC at 17:55

## 2019-04-11 RX ADMIN — METHOCARBAMOL 750 MG: 750 TABLET ORAL at 22:52

## 2019-04-11 RX ADMIN — ACETAMINOPHEN 650 MG: 325 TABLET, FILM COATED ORAL at 16:06

## 2019-04-11 ASSESSMENT — PAIN SCALES - GENERAL
PAINLEVEL_OUTOF10: 8
PAINLEVEL_OUTOF10: 7
PAINLEVEL_OUTOF10: 8
PAINLEVEL_OUTOF10: 8
PAINLEVEL_OUTOF10: 0
PAINLEVEL_OUTOF10: 8
PAINLEVEL_OUTOF10: 8

## 2019-04-11 ASSESSMENT — PAIN DESCRIPTION - LOCATION: LOCATION: BACK

## 2019-04-11 ASSESSMENT — PAIN DESCRIPTION - ORIENTATION: ORIENTATION: LOWER

## 2019-04-11 ASSESSMENT — PAIN DESCRIPTION - PROGRESSION: CLINICAL_PROGRESSION: NOT CHANGED

## 2019-04-11 ASSESSMENT — PAIN DESCRIPTION - PAIN TYPE: TYPE: CHRONIC PAIN

## 2019-04-11 ASSESSMENT — PAIN DESCRIPTION - ONSET: ONSET: ON-GOING

## 2019-04-11 ASSESSMENT — PAIN DESCRIPTION - FREQUENCY: FREQUENCY: CONTINUOUS

## 2019-04-11 ASSESSMENT — PAIN DESCRIPTION - DESCRIPTORS: DESCRIPTORS: CONSTANT

## 2019-04-11 NOTE — PROGRESS NOTES
Occupational Therapy  Facility/Department: [facility] ACUTE REHAB UNIT  Daily Treatment Note/Discharge Summary  NAME: María Anna  : 10/16/1932  MRN: [id number]    Date of Service: 2019    Discharge Recommendations:  Home with assist PRN, Home with nursing aide, S Level 3  OT Equipment Recommendations  Other: may benefit from [facility] for night use as pt reports she is frequently up and down to the bathroom at night     Assessment   Performance deficits / Impairments: Decreased functional mobility ; Decreased ADL status; Decreased strength;Decreased endurance;Decreased balance;Decreased high-level IADLs  Assessment: Patient presents with the above deficits, which are below baseline, and would benefit from continued skilled OT to address. Pt continues to report increased pain in L arm and increased fatigue but willing to participate with encouragement and rest breaks   Treatment Diagnosis: Decreased ADLs, IADLs, transfers, mobility associated with T12 compression fx s/p Kyphoplasty  Prognosis: Good  Patient Education: ADls, ADL safety   Barriers to Learning: anxiety, pain   REQUIRES OT FOLLOW UP: Yes  Activity Tolerance  Activity Tolerance: Patient Tolerated treatment well  Safety Devices  Safety Devices in place: Yes  Type of devices: Call light within reach; Chair alarm in place; Left in chair  Restraints  Initially in place: No         Patient Diagnosis(es): The encounter diagnosis was Thoracic compression fracture, sequela. has a past medical history of Arthritis, CAD (coronary artery disease), Cancer ([address] Cystocele, Diabetes mellitus ([address] Hepatitis A, History of blood transfusion, Hyperlipidemia, PVC (premature ventricular contraction), Rectocele, Reflux, and Scoliosis. has a past surgical history that includes Appendectomy; joint replacement (Left); hernia repair (6 YRS AGO); Cholecystectomy; shoulder surgery (Right, 12); Bunionectomy (12); Breast surgery;  Upper gastrointestinal endoscopy (11/2/12); Hysterectomy; bladder suspension; Dilation and curettage of uterus; other surgical history (Left, 09/22/14); Foot surgery; Cardiac catheterization; Colonoscopy (2008); Cystocopy (03/29/2017); other surgical history (Right, 06/28/2018); Cataract removal with implant (Left, 09/13/2018); and pr remv cataract extracap,insert lens (Left, 9/13/2018).     Restrictions  Restrictions/Precautions  Restrictions/Precautions: Fall Risk, Weight Bearing  Required Braces or Orthoses?: Yes  Implants present? : Metal implants  Upper Extremity Weight Bearing Restrictions  Left Upper Extremity Weight Bearing: Non Weight Bearing  Required Braces or Orthoses  Left Upper Extremity Brace/Splint: Sling  Left Upper Extremity Brace/Splint: S/P humeral fracture (~6 weeks ago)  Subjective   General  Chart Reviewed: Yes  Patient assessed for rehabilitation services?: Yes  Additional Pertinent Hx: L TKA, cataract sx   Family / Caregiver Present: No  Diagnosis: T12 Compression fx s/p kyphoplasty  Subjective  Subjective: pt in bed on arrival - agreeable to session - states she is in pain and her medications are not due yet but able to continue with session without further complaints of pain       Orientation  Orientation  Overall Orientation Status: Within Normal Limits  Objective    ADL  Feeding: Setup  Grooming: Modified independent   UE Bathing: Modified independent ;Setup  LE Bathing: Setup;Stand by assistance  UE Dressing: Modified independent ;Setup  LE Dressing: Stand by assistance;Setup  Toileting: Modified independent   Additional Comments: pt in bed on arrival - independent to EOB - mod I with cane to toilet and toileted, voiding urine, mod I - completed showering from shower chair in walk in shower - cuing to avoid using R arm to pull up pants due to sling not donned yet - pt able to tien shirt and sling mod I, underwear with cuing, pants and socks mod I - in chair eating breakfast end of session         Functional MET   Long term goals  Time Frame for Long term goals : LTG=STG  Patient Goals   Patient goals : \"take care of myself, no more falls\"        Therapy Time   Individual Concurrent Group Co-treatment   Time In 0730         Time Out 0825         Minutes 55         Timed Code Treatment Minutes: 55 Minutes     Therapy Time   Individual Concurrent Group Co-treatment   Time In 0559         Time Out 1320         Minutes 35         Timed Code Treatment Minutes: 35 Minutes     Total minutes [address] [id number] (Treating and documentation for 2nd therapy session)    CRYS Lemus OTR/MARY [id number], 4/11/2019, 12:15 PM- AM session, d/c summary and co-sign

## 2019-04-11 NOTE — PROGRESS NOTES
follow  Assistance: Modified Independent  Distance: 80' + 113'  OT  PT Equipment Recommendations  Equipment Needed: No  Toilet - Technique: Ambulating  Equipment Used: Standard toilet  Toilet Transfers Comments: provided with BSC after transfer due to using hand on toilet seat for sit to/from stand   Assessment        SLP                Body mass index is 29.68 kg/m². Assessment and Plan:  Acute T12 compression fracture: S/P kyphoplasty 4/2.  Pain control.  PT/OT.      Subacute left humerus fracture: Sling immobilization. PROM with therapy approved.     CAD: no ASA per home meds     HTN: question pain related, improving     R PSIS point tenderness: significant; xray with osteopenia and arthritis but no fracture. Treat symptomatically for now.      Osteoporosis: Fosamax at d/c    HSV 1: - abreva     Bowels: Per protocol  Bladder: Per protocol   Sleep: Trazodone provided prn. Pain: Lidoderm, robaxin 750 scheduled, scheduled tylenol, decreased david, weaned dilaudid. Started ms contin. DVT PPx: lovenox - d/c, ambulating >250'    Dispo: Prep d/c for tomorrow.       Mera Glass MD 4/11/2019, 9:09 AM

## 2019-04-11 NOTE — DISCHARGE SUMMARY
Physical Medicine & Rehabilitation  Discharge Summary     Patient Identification:  Byron Cerda  : 10/16/1932  Admit date: 4/3/2019  Discharge date:  2019  Attending provider: Beth No MD        Primary care provider: Fannie Garcia MD     Discharge Diagnoses:   Patient Active Problem List   Diagnosis    Spinal stenosis, lumbar region, without neurogenic claudication    Spinal stenosis of thoracic region    Scoliosis (and kyphoscoliosis), idiopathic    Displacement of thoracic intervertebral disc without myelopathy    Displacement of lumbar intervertebral disc without myelopathy    Thoracic or lumbosacral neuritis or radiculitis, unspecified    Disc displacement, lumbar    Disc displacement, thoracic    Scoliosis    Lumbar stenosis    Thoracic stenosis    Cervicalgia    Hypoxia    Closed fracture of proximal end of left humerus with routine healing    Urinary tract infection without hematuria    T12 compression fracture ([address]    Thoracic compression fracture, sequela    Mild malnutrition ([address]       Discharge Functional Status:    Physical therapy:  Bed Mobility: Scooting: Independent  Transfers: Sit to Stand: Modified independent(x5 during session from low chair)  Stand to sit: Modified independent  Bed to Chair: Unable to assess  Stand Pivot Transfers: Modified independent  Comment: 1st session: transfers, ambulation, and exercises as noted above. Pt ambulated to toilet from chair mod I, performed toilet transfer mod I, min A for LB dressing 2/2 LUE immobility.  Pt left seated in chair, heating pad place on low back, alarm engaged, call button in reach, all needs currently met., Ambulation 1  Surface: level tile  Device: Small Hector Corbin  Other Apparatus: Wheelchair follow  Assistance: Modified Independent  Quality of Gait: decreased step length, narrow YASSINE, decreased R heel strike, no LOB  Distance: 175' + 113'  Comments: pt stated \"my legs are getting weak and shakey\" towards end of ambulation but able to complete w/o incident , Stairs  # Steps : 8  Stairs Height: 4\"  Rails: Right ascending  Device: No Device  Assistance: Stand by assistance  Comment: pt educated on ascending LLE leading, descending w/ RLE leading; no episodes of buckling during this set  Mobility: Bed, Chair, Wheel Chair: 6 - Requires assistive device (slide rail)  Walk: 6 - Modified Gibbs  Walks at least 150 feet with an ambulatory device, orthosis or prosthesis OR requires extra amount of time OR there is concern for safety  Distance Walked: 175'  Stairs: 2- Maximal Assistance Performs 25-49% of the effort to go up and down 4 to 6 stairs and requires the assistance of one person only, PT Equipment Recommendations  Equipment Needed: No, Assessment: Pt w/ continued improvment w/ functional mobility and ambulation w/ continued improvement w/ independence. Pt w/ continued improvement in gait quality w/ improved step length bilaterally and no standing rest breaks. Pt will continue to benefit from skilled PT to improve safety and independence prior to discharge. Occupational therapy: Eatin - Feeds self with adaptive equipment/dentures and/or feeds self with modified diet and/or performs own tube feeding  Groomin - Requires setup/cues to do all tasks  Bathin - Able to bathe 8-9 areas  Dressing-Upper: 5 - Requires setup/supervision/cues and/or requires assist with presthesis/brace only  Dressing-Lower: 4 - Requires assist with buttons/zippers/shoelaces and/or assist with shoes only  Toiletin - Requires setup/supervision/cues  Toilet Transfer: 6 - Independent with device (grab bar/walker/slide bar)  Shower Transfer: 4 - Minimal contact assistance, pt. expends 75% or more effort,  , Assessment: Patient presents with the above deficits, which are below baseline, and would benefit from continued skilled OT to address.  Pt continues to report increased pain in L arm and increased fatigue but willing to participate with encouragement and rest breaks     Speech therapy:  Comprehension: 6 - Complex ideas 90% or device (hearing aid/glasses)  Expression: 6 - Device used to express complex ideas/needs  Social Interaction: 5 - Patient is appropriate with supervision/cues  Problem Solvin - Independent with device (e.g. notes, schedules)  Memory: 6 - Patient requires device to recall (e.g. memory book)    Inpatient Rehabilitation Course:   Natalia Palencia is a 80 y.o. female admitted to inpatient rehabilitation on 4/3/2019 s/p Thoracic compression fracture, sequela. The patient participated in an aggressive multidisciplinary inpatient rehabilitation program involving 3 hours of therapy per day, at least 5 days per week. She progressed well in therapy was able be discharged to home with home healthcare. Her medical rehab course was significant for below:    Acute T12 compression fracture: S/P kyphoplasty .  Pain control.  PT/OT.      Subacute left humerus fracture: Sling immobilization. PROM with therapy approved.     CAD: no ASA per home meds     HTN: question pain related, improving prior to discharge.     R PSIS point tenderness: significant; xray with osteopenia and arthritis but no fracture. Treat symptomatically for now.      Osteoporosis: Fosamax to resume at d/c    Discharge Exam:  Gen: No distress, pleasant. Resting in bedside chair  HEENT: Normocephalic, atraumatic. CV: Regular rate and rhythm. No MRG  Resp: No respiratory distress. CTAB  Abd: Soft, nontender, nondistended  Ext: No edema. Neuro: Alert, oriented, appropriately interactive.      Significant Diagnostics:   Lab Results   Component Value Date    CREATININE 1.0 2019    BUN 22 (H) 2019     2019    K 4.3 2019     2019    CO2 25 2019       Lab Results   Component Value Date    WBC 3.8 (L) 2019    HGB 10.2 (L) 2019    HCT 30.8 (L) 2019    MCV 98.2 2019    PLT 170 04/11/2019       Disposition:  Home in stable condition. Follow-up:  See after visit summary from hospitalization    Discharge Medications:     Medication List      START taking these medications    morphine 15 MG extended release tablet  Commonly known as:  MS CONTIN  Take 1 tablet by mouth every 12 hours for 30 days. oxyCODONE 5 MG immediate release tablet  Commonly known as:  ROXICODONE  Take 1 tablet by mouth every 4 hours as needed for Pain for up to 7 days. tiZANidine 2 MG tablet  Commonly known as:  ZANAFLEX  Take 2 tablets by mouth every 6 hours as needed (spasms)        CONTINUE taking these medications    alendronate 70 MG tablet  Commonly known as:  FOSAMAX     diphenoxylate-atropine 2.5-0.025 MG per tablet  Commonly known as:  LOMOTIL     docusate sodium 100 MG capsule  Commonly known as:  COLACE     esomeprazole Magnesium 20 MG Pack  Commonly known as:  NEXIUM     fluticasone 50 MCG/ACT nasal spray  Commonly known as:  FLONASE     furosemide 20 MG tablet  Commonly known as:  LASIX     guaiFENesin 400 MG tablet     OCUVITE ADULT FORMULA PO     ondansetron 4 MG disintegrating tablet  Commonly known as:  ZOFRAN ODT  Take 1 tablet by mouth every 8 hours as needed for Nausea or Vomiting     POTASSIMIN PO     Vitamin D3 2000 units Caps        STOP taking these medications    lidocaine 4 % external patch     naloxone 4 MG/0.1ML Liqd nasal spray     omeprazole 20 MG delayed release capsule  Commonly known as:  PRILOSEC        ASK your doctor about these medications    oxyCODONE-acetaminophen  MG per tablet  Commonly known as:  PERCOCET  Take 1 tablet by mouth every 4 hours as needed for Pain for up to 3 days. .           Where to Get Your Medications      You can get these medications from any pharmacy    Bring a paper prescription for each of these medications  · morphine 15 MG extended release tablet  · oxyCODONE 5 MG immediate release tablet  · tiZANidine 2 MG tablet         I spent over 32 minutes on this discharge encounter between counseling, coordination of care, and medication reconciliation. To comply with [facility] by R.II.[id number]:  Discharge order placed in advance to facilitate patient's discharge needs. Juan Santos MD    * This document was created using dictation software. While all precautions were taken to ensure accuracy, errors may have occurred. Please disregard any typographical errors.

## 2019-04-11 NOTE — CARE COORDINATION
Discharge Planning:  Met with pt to review care plan and discuss discharge plans. Provided pt with IMM letter, pt signed and dated- copy provided to pt and original on chart w/ stickers. Faxed home care orders to [facility].  Mariel Jasso, MSW, LSW

## 2019-04-11 NOTE — PROGRESS NOTES
Physical Therapy  Facility/Department: [facility] ACUTE REHAB UNIT  Daily Treatment Note/Discharge Summary  NAME: Anil Boland  : 10/16/1932  MRN: [id number]    Date of Service: 2019    Discharge Recommendations:  S Level 3, Home with assist PRN, Home with Home health PT   PT Equipment Recommendations  Equipment Needed: No    Patient Diagnosis(es): The encounter diagnosis was Thoracic compression fracture, sequela. has a past medical history of Arthritis, CAD (coronary artery disease), Cancer ([address] Cystocele, Diabetes mellitus ([address] Hepatitis A, History of blood transfusion, Hyperlipidemia, PVC (premature ventricular contraction), Rectocele, Reflux, and Scoliosis. has a past surgical history that includes Appendectomy; joint replacement (Left); hernia repair (6 YRS AGO); Cholecystectomy; shoulder surgery (Right, 12); Bunionectomy (12); Breast surgery; Upper gastrointestinal endoscopy (12); Hysterectomy; bladder suspension; Dilation and curettage of uterus; other surgical history (Left, 14); Foot surgery; Cardiac catheterization; Colonoscopy (); Cystocopy (2017); other surgical history (Right, 2018); Cataract removal with implant (Left, 2018); and pr remv cataract extracap,insert lens (Left, 2018). Restrictions  Restrictions/Precautions  Restrictions/Precautions: Fall Risk, Weight Bearing  Required Braces or Orthoses?: Yes  Implants present? : Metal implants  Upper Extremity Weight Bearing Restrictions  Left Upper Extremity Weight Bearing: Non Weight Bearing  Required Braces or Orthoses  Left Upper Extremity Brace/Splint: Sling  Left Upper Extremity Brace/Splint: S/P humeral fracture (~6 weeks ago)  Subjective   General  Chart Reviewed: Yes  Additional Pertinent Hx: CAD, DM, cancer, arthritis  Response To Previous Treatment: Patient with no complaints from previous session.   Family / Caregiver Present: No  Referring Practitioner:  mod I and min A for LB dressing 2/2 immobile LUE and time constraints. Pt ambulated 270' + 80' mod I w/ quad cane, same gait quality as above w/ 1 standing rest break. Pt performed car transfer mod I w/ quad cane. Seated stepper 2x4 minutes, level 1.5. Therex as follows: 2x10 LAQ w/ 2# ankle weight, seated hip abduction w/ red theraband 2x10, seated DF/PF 2x10, mini squats 2x5. Pt left seated in chair, alarm engaged, call button in reach, all needs currently met. Assessment   Body structures, Functions, Activity limitations: Decreased functional mobility ; Decreased ADL status; Decreased ROM; Decreased strength;Decreased endurance;Decreased balance; Increased Pain  Assessment: Pt continues to improve w/ functional mobility and endurance w/ ambulation based on distance and independence w/ functional tasks. Pt will continue to benefit from skilled PT following discharge to maintain functional mobility and continue to improve strength and endurance to maintain independence and return to PLOF.   Treatment Diagnosis: decreased functional mobility, decreased endurance  Prognosis: Good  Patient Education: therex for HEP, d/c schedule  REQUIRES PT FOLLOW UP: Yes  Activity Tolerance  Activity Tolerance: Patient Tolerated treatment well     G-Code     OutComes Score       AM-PAC Score       Goals  Short term goals  Time Frame for Short term goals: 5-7 days  Short term goal 1: Pt will complete bed mobility mod I - MET 4/11/2019  Short term goal 2: Pt will complete transfers mod I w/ LRAD- MET 4/10/2019  Short term goal 3: Pt will ambulate 150' mod I w/ LRAD- MET 4/10/2019; progress to ambulate 200' mod I w/ LRAD- MET 4/11/2019  Short term goal 4: Pt will navigate 4 stairs mod I - MET 4/11/2019  Patient Goals   Patient goals : Barbara Nicole get back home and take care of myself, don't have any more falls\"    Plan    Plan  Times per week: 90 minutes/day, 5 days/week  Times per day: Daily  Current Treatment Recommendations: Strengthening, ROM, Balance Training, Functional Mobility Training, Transfer Training, Endurance Training, Gait Training, Stair training, Neuromuscular Re-education, Home Exercise Program, Safety Education & Training, Patient/Caregiver Education & Training, Equipment Evaluation, Education, & procurement, Pain Management, Positioning, Manual Therapy - Soft Tissue Mobilization  Safety Devices  Type of devices:  All fall risk precautions in place, Left in chair, Chair alarm in place, Call light within reach  Restraints  Initially in place: No     Therapy Time   Individual Concurrent Group Co-treatment   Time In 0900         Time Out 0945         Minutes 45          Treatment Time: 45 minutes    Therapy Time   Individual Concurrent Group Co-treatment   Time In 1345         Time Out 1430         Minutes 45          Treatment Time: 45 minutes    Total Treatment Time: 90 minutes  Timed Code Treatment Minutes: 78+69       Michaela Diaz, student PT

## 2019-04-11 NOTE — PLAN OF CARE
Problem: Pain:  Goal: Pain level will decrease  Description  Pain level will decrease  4/10/2019 2237 by Sergio Holguin RN  Outcome: Ongoing  4/10/2019 1043 by Stephy Miller RN  Outcome: Ongoing  Goal: Control of acute pain  Description  Control of acute pain  4/10/2019 2237 by Sergio Holguin RN  Outcome: Ongoing  Note:   Pain assessment done every shift, pain is currently being managed with PO medications, see MAR. Staff assist with repositioning when needed and pillow support is provided for comfort, patient verbalizes satisfaction with current pain interventions. 4/10/2019 1043 by Stephy Miller RN  Outcome: Ongoing  Goal: Control of chronic pain  Description  Control of chronic pain  4/10/2019 2237 by Sergio Holguin RN  Outcome: Ongoing  4/10/2019 1043 by Stephy Miller RN  Outcome: Ongoing     Problem: Falls - Risk of:  Goal: Will remain free from falls  Description  Will remain free from falls  4/10/2019 2237 by Sergio Hloguin RN  Outcome: Ongoing  4/10/2019 1043 by Stephy Miller RN  Outcome: Ongoing  Goal: Absence of physical injury  Description  Absence of physical injury  4/10/2019 2237 by Sergio Holguin RN  Outcome: Ongoing  4/10/2019 1043 by Stephy Miller RN  Outcome: Ongoing     Problem: Risk for Impaired Skin Integrity  Goal: Tissue integrity - skin and mucous membranes  Description  Structural intactness and normal physiological function of skin and  mucous membranes.   4/10/2019 2237 by Sergio Holguin RN  Outcome: Ongoing  4/10/2019 1043 by Stephy Miller RN  Outcome: Ongoing     Problem: SAFETY  Goal: LTG - Patient will demonstrate safety requirements appropriate to situation/environment  4/10/2019 2237 by Sergio Holguin RN  Outcome: Ongoing  4/10/2019 1043 by Stephy Miller RN  Outcome: Ongoing  Goal: STG - Patient uses call light consistently to request assistance with transfers  4/10/2019 2237 by Sergio Holguin RN  Outcome: Ongoing  4/10/2019 1043 by Stephy Miller RN  Outcome: Ongoing     Problem: SKIN

## 2019-04-12 VITALS
WEIGHT: 157.06 LBS | HEIGHT: 61 IN | DIASTOLIC BLOOD PRESSURE: 70 MMHG | SYSTOLIC BLOOD PRESSURE: 136 MMHG | HEART RATE: 76 BPM | RESPIRATION RATE: 16 BRPM | TEMPERATURE: 98.2 F | OXYGEN SATURATION: 93 % | BODY MASS INDEX: 29.65 KG/M2

## 2019-04-12 PROCEDURE — 6370000000 HC RX 637 (ALT 250 FOR IP): Performed by: PHYSICAL MEDICINE & REHABILITATION

## 2019-04-12 RX ADMIN — ACETAMINOPHEN 650 MG: 325 TABLET, FILM COATED ORAL at 12:52

## 2019-04-12 RX ADMIN — ACETAMINOPHEN 650 MG: 325 TABLET, FILM COATED ORAL at 09:47

## 2019-04-12 RX ADMIN — DOCOSANOL: 100 CREAM TOPICAL at 06:48

## 2019-04-12 RX ADMIN — Medication 1 SPRAY: at 11:34

## 2019-04-12 RX ADMIN — OXYCODONE HYDROCHLORIDE 5 MG: 5 TABLET ORAL at 06:48

## 2019-04-12 RX ADMIN — GUAIFENESIN 600 MG: 600 TABLET, EXTENDED RELEASE ORAL at 09:48

## 2019-04-12 RX ADMIN — OXYCODONE HYDROCHLORIDE 5 MG: 5 TABLET ORAL at 12:52

## 2019-04-12 RX ADMIN — MORPHINE SULFATE 15 MG: 15 TABLET, FILM COATED, EXTENDED RELEASE ORAL at 09:48

## 2019-04-12 RX ADMIN — DOCUSATE SODIUM 100 MG: 100 CAPSULE, LIQUID FILLED ORAL at 09:47

## 2019-04-12 RX ADMIN — PANTOPRAZOLE SODIUM 40 MG: 40 TABLET, DELAYED RELEASE ORAL at 06:48

## 2019-04-12 RX ADMIN — DOCOSANOL: 100 CREAM TOPICAL at 11:37

## 2019-04-12 RX ADMIN — OXYCODONE HYDROCHLORIDE 5 MG: 5 TABLET ORAL at 02:41

## 2019-04-12 RX ADMIN — METHOCARBAMOL 750 MG: 750 TABLET ORAL at 09:48

## 2019-04-12 ASSESSMENT — PAIN SCALES - GENERAL
PAINLEVEL_OUTOF10: 8
PAINLEVEL_OUTOF10: 8
PAINLEVEL_OUTOF10: 0
PAINLEVEL_OUTOF10: 9

## 2019-04-12 ASSESSMENT — PAIN DESCRIPTION - LOCATION: LOCATION: BACK

## 2019-04-12 NOTE — PLAN OF CARE
Problem: Pain:  Goal: Control of acute pain  Description  Control of acute pain  4/12/2019 1052 by Sanjay Loo RN  Outcome: Ongoing  4/11/2019 2306 by Chetna Clay RN  Outcome: Ongoing     Problem: Falls - Risk of:  Goal: Will remain free from falls  Description  Will remain free from falls  4/12/2019 1052 by Sanjay Loo RN  Outcome: Ongoing  4/11/2019 2306 by Chetna Clay RN  Outcome: Ongoing     Problem: Falls - Risk of:  Goal: Absence of physical injury  Description  Absence of physical injury  4/11/2019 2306 by Chetna Clay RN  Outcome: Ongoing     Problem: Risk for Impaired Skin Integrity  Goal: Tissue integrity - skin and mucous membranes  Description  Structural intactness and normal physiological function of skin and  mucous membranes.   4/11/2019 2306 by Chetna Clay RN  Outcome: Ongoing

## 2019-04-12 NOTE — CARE COORDINATION
Social Work Discharge Summary    Patient discharged home via car with dtr. Patient to receive home nursing and therapy from [facility].  No further needs voiced by pt or treatment team.     Individual responsible for the coordination of the discharge/follow up:    Bobo Beth, MSW, LSW

## 2019-04-17 ENCOUNTER — OFFICE VISIT (OUTPATIENT)
Dept: ORTHOPEDIC SURGERY | Age: 84
End: 2019-04-17
Payer: MEDICARE

## 2019-04-17 VITALS — WEIGHT: 156.97 LBS | BODY MASS INDEX: 29.64 KG/M2 | HEIGHT: 61 IN

## 2019-04-17 DIAGNOSIS — M25.512 LEFT SHOULDER PAIN, UNSPECIFIED CHRONICITY: Primary | ICD-10-CM

## 2019-04-17 DIAGNOSIS — S42.202A CLOSED FRACTURE OF PROXIMAL END OF LEFT HUMERUS, UNSPECIFIED FRACTURE MORPHOLOGY, INITIAL ENCOUNTER: ICD-10-CM

## 2019-04-17 PROCEDURE — 1036F TOBACCO NON-USER: CPT | Performed by: ORTHOPAEDIC SURGERY

## 2019-04-17 PROCEDURE — 1090F PRES/ABSN URINE INCON ASSESS: CPT | Performed by: ORTHOPAEDIC SURGERY

## 2019-04-17 PROCEDURE — 1111F DSCHRG MED/CURRENT MED MERGE: CPT | Performed by: ORTHOPAEDIC SURGERY

## 2019-04-17 PROCEDURE — 4040F PNEUMOC VAC/ADMIN/RCVD: CPT | Performed by: ORTHOPAEDIC SURGERY

## 2019-04-17 PROCEDURE — 99213 OFFICE O/P EST LOW 20 MIN: CPT | Performed by: ORTHOPAEDIC SURGERY

## 2019-04-17 PROCEDURE — 1123F ACP DISCUSS/DSCN MKR DOCD: CPT | Performed by: ORTHOPAEDIC SURGERY

## 2019-04-17 PROCEDURE — G8427 DOCREV CUR MEDS BY ELIG CLIN: HCPCS | Performed by: ORTHOPAEDIC SURGERY

## 2019-04-17 PROCEDURE — G8417 CALC BMI ABV UP PARAM F/U: HCPCS | Performed by: ORTHOPAEDIC SURGERY

## 2019-04-17 NOTE — PROGRESS NOTES
Chief Complaint    Follow-up (left humerus fx)      History of Present Illness:  Peter Gutierrez is a 80 y.o. female. She is here for follow-up for her left shoulder. She's been treated for a left proximal humerus fracture. This fracture being treated nonoperatively. She is about 6 weeks from the time of injury. She has been using a sling. States her pain has improved. She has been doing some passive range of motion. Medical History:  Patient's medications, allergies, past medical, surgical, social and family histories were reviewed and updated as appropriate. Review of Systems:  Pertinent items are noted in HPI  Review of systems reviewed from Patient History Form dated on 4/17/19 and available in the patient's chart under the Media tab. Vital Signs:  Ht 5' 0.98\" (1.549 m)   Wt 156 lb 15.5 oz (71.2 kg)   BMI 29.67 kg/m²     General Exam:   Constitutional: Patient is adequately groomed with no evidence of malnutrition  DTRs: Deep tendon reflexes are intact  Mental Status: The patient is oriented to time, place and person. The patient's mood and affect are appropriate. Shoulder Examination:    Inspection:  No swelling erythema or ecchymosis noted about the left shoulder today    Palpation:  No significant tenderness palpation today    Range of Motion:  Passive forward elevation is 95°. External rotation is 25°. Strength:  She does demonstrate some weakness with rotator cuff testing    Special Tests:  Negative drop arm exam    Skin: There are no rashes, ulcerations or lesions. Additional Comments:       Additional Examinations:         Right Upper Extremity:  Examination of the right upper extremity does not show any tenderness, deformity or injury. Range of motion is unremarkable. There is no gross instability. There are no rashes, ulcerations or lesions.   Strength and tone are normal.    Radiology:     X-rays obtained and reviewed in office:  Views 4 views of left shoulder does demonstrate proximal humerus fracture without significant displacement from previous x-rays. She is starting to demonstrate some callus formation about the fracture site      Assessment : Left proximal Humerus fracture    Impression:  Encounter Diagnoses   Name Primary?  Left shoulder pain, unspecified chronicity Yes    Closed fracture of proximal end of left humerus, unspecified fracture morphology, initial encounter        Office Procedures:  Orders Placed This Encounter   Procedures    XR SHOULDER LEFT (MIN 2 VIEWS)     Standing Status:   Future     Number of Occurrences:   1     Standing Expiration Date:   4/17/2020       Treatment Plan:  She is doing much better at this point in time. I'm okay with her discontinuing use of her sling. I did write her a note allowing her to start doing physical therapy at her skilled nursing facility with both active and passive range of motion for the shoulder.   I'll see her back in 6 weeks with repeat x-rays of her left shoulder

## 2019-05-01 ENCOUNTER — OFFICE VISIT (OUTPATIENT)
Dept: ORTHOPEDIC SURGERY | Age: 84
End: 2019-05-01
Payer: MEDICARE

## 2019-05-01 VITALS
HEART RATE: 81 BPM | HEIGHT: 61 IN | WEIGHT: 156.97 LBS | SYSTOLIC BLOOD PRESSURE: 129 MMHG | DIASTOLIC BLOOD PRESSURE: 66 MMHG | BODY MASS INDEX: 29.64 KG/M2

## 2019-05-01 DIAGNOSIS — M47.816 SPONDYLOSIS OF LUMBAR REGION WITHOUT MYELOPATHY OR RADICULOPATHY: Primary | ICD-10-CM

## 2019-05-01 DIAGNOSIS — M70.62 GREATER TROCHANTERIC BURSITIS OF BOTH HIPS: ICD-10-CM

## 2019-05-01 DIAGNOSIS — M43.16 SPONDYLOLISTHESIS OF LUMBAR REGION: ICD-10-CM

## 2019-05-01 DIAGNOSIS — M70.61 GREATER TROCHANTERIC BURSITIS OF BOTH HIPS: ICD-10-CM

## 2019-05-01 DIAGNOSIS — S22.000S CLOSED COMPRESSION FRACTURE OF THORACIC VERTEBRA, SEQUELA: ICD-10-CM

## 2019-05-01 PROCEDURE — 99204 OFFICE O/P NEW MOD 45 MIN: CPT | Performed by: PHYSICAL MEDICINE & REHABILITATION

## 2019-05-01 PROCEDURE — G8417 CALC BMI ABV UP PARAM F/U: HCPCS | Performed by: PHYSICAL MEDICINE & REHABILITATION

## 2019-05-01 PROCEDURE — 1090F PRES/ABSN URINE INCON ASSESS: CPT | Performed by: PHYSICAL MEDICINE & REHABILITATION

## 2019-05-01 PROCEDURE — 20611 DRAIN/INJ JOINT/BURSA W/US: CPT | Performed by: PHYSICAL MEDICINE & REHABILITATION

## 2019-05-01 PROCEDURE — G8427 DOCREV CUR MEDS BY ELIG CLIN: HCPCS | Performed by: PHYSICAL MEDICINE & REHABILITATION

## 2019-05-01 RX ORDER — NALOXONE HYDROCHLORIDE 4 MG/.1ML
1 SPRAY NASAL PRN
Qty: 1 EACH | Refills: 5 | Status: SHIPPED | OUTPATIENT
Start: 2019-05-01 | End: 2022-03-02

## 2019-05-01 RX ORDER — OXYCODONE AND ACETAMINOPHEN 7.5; 325 MG/1; MG/1
1 TABLET ORAL 2 TIMES DAILY PRN
Qty: 14 TABLET | Refills: 0 | Status: ON HOLD | OUTPATIENT
Start: 2019-05-01 | End: 2019-05-29 | Stop reason: HOSPADM

## 2019-05-01 NOTE — PROGRESS NOTES
New Patient: SPINE    Referring Provider:  Frannie Raymundo MD    CHIEF COMPLAINT:    Chief Complaint   Patient presents with    Back Problem     NP low back pain. referred by Dr. Fela Bynum. c/o radiating pain into right leg. wakes her up at night.  Hip Pain     c/o bilateral hip pain. increased left hip pain w/ sitting. HISTORY OF PRESENT ILLNESS:      · The patient is being sent at the request of Frannie Raymundo MD in consultation as a new spine patient for low back pain and bilateral leg pain The patient is a 80 y.o. female whom reports increased pain over the last 2-3 months. She is having bilateral hip pain and low back with bilateral leg pain on occasion. Patient did have a fall and fractured her T12, and a kyphoplasty was undertaken at the beginning of April. She describes the pain to be a sharp, dull ache concerning her low back. She describes her pain to be persistent and constant. Aggravating factors include walking and standing, and she also has pain with sitting. She has limited behavior due to this pain. She is more comfortable in a leaned over position. Increased kyphosis is noted. Relieving factors include resting. · Patient has had x-rays and an MRI of the lumbar spine. She has been through PT over the last month. She has been in pain management but has recently moved [place] to [place]. She was seeing Dr. Main Hwang and receiving Percocet 10 3 times per day. This brought her pain to manageable level. She reports that she was not taking it 3 times per day. After her fracture she is admitted to [facility] and treated in the acute inpatient rehabilitation. She reports she is almost out of Percocet. She is requesting a refill. Her pain is 8-10 Sharp dull aching persistent constant. This aggravated by walking.   No Alleviating factors except for Percocet    Pain Assessment  Location of Pain: Back  Severity of Pain: 8  Quality of Pain: Selvin Walter Aching  Duration of Pain: Persistent  Frequency of Pain: Constant  Aggravating Factors: Walking, Standing(sitting)  Limiting Behavior: Yes  Relieving Factors: Rest  Result of Injury: No  Work-Related Injury: No  Are there other pain locations you wish to document?: No      Associated signs and symptoms:   Neurogenic bowel or bladder symptoms:  no   Perceived weakness:  yes   Difficulty walking:  yes    Recent Imaging (within past one year)   Xrays: yes   MRI or CT of spine: yes    Current/Past Treatment:   · Physical Therapy:  yes  · Chiropractic:  none  · Injection:  yes, three years ago   · Medications:   NSAIDS:  yes   Muscle relaxer:  none   Steriods:  none   Neuropathic medications:  none   Opioids:  Percocet   · Previous surgery:  Yes, kyphoplasty   · Previous surgical consult:  no  · Other:  · Infection control  · Tested positive for MRSA in past 12 months:  no  · Tested positive for MSSA \"staph infection\" in past 12 months: no  · Tested positive for VRE (Vancomycin Resistant Enterococci) in past 12 months:   no  · Currently on any antibiotics for an infection: no  · Anticoagulants:  · On a blood thinner:  no   · Any history of bleeding disorder: no   · MRI Contraindication: no   · Previous Pain Management: no                   Past Medical History:   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    Cystocele     Diabetes mellitus ([facility] [address]     type  2 diet controlled    Hepatitis A 1953    History of blood transfusion     hiatal hernia surgery    Hyperlipidemia     PVC (premature ventricular contraction)     Rectocele     Reflux     Scoliosis       Past Surgical History:     Past Surgical History:   Procedure Laterality Date    APPENDECTOMY      BLADDER SUSPENSION      3 SURGERIES/ANTERIOR AND POSTERIOR REPAIR    BREAST SURGERY RIGHT LUMPECTOMY AND SEVERAL BIOPSIES ON BOTH BREAST    BUNIONECTOMY  7/26/12    BUNIONECTOMY BILATERAL  FEET    CARDIAC CATHETERIZATION      AV block    CATARACT REMOVAL WITH IMPLANT Left 09/13/2018    CHOLECYSTECTOMY      COLONOSCOPY  2008    normal    CYSTOSCOPY  03/29/2017    U-dil    DILATION AND CURETTAGE OF UTERUS      SUNCTION    FOOT SURGERY      bilat foot surgeries    HERNIA REPAIR  6 YRS AGO    HIATAL HERNIA REPAIR- WIRED  RIBS    HYSTERECTOMY      VAGINAL    JOINT REPLACEMENT Left     TOTAL KNEE REPLACEMENT LEFT    OTHER SURGICAL HISTORY Left 09/22/14    removal rib wire    OTHER SURGICAL HISTORY Right 06/28/2018    PHACO EMULSIFICATION OF CATARACT WITH INTRAOCULAR LENS implant right eye    DC REMV CATARACT EXTRACAP,INSERT LENS Left 9/13/2018    PHACO EMULSIFICATION OF CATARACT WITH INTRAOCULAR LENS IMPLANT LEFT EYE performed by Latrice Kay MD at [facility] Right 7/26/12    CA DEPOSIT TAKEN OFF RIGHT SHOULDER    UPPER GASTROINTESTINAL ENDOSCOPY  11/2/12     Current Medications:     Current Outpatient Medications:     Omeprazole (PRILOSEC PO), Take by mouth, Disp: , Rfl:     guaiFENesin (MUCINEX PO), Take by mouth, Disp: , Rfl:     ACETAMINOPHEN-CODEINE #4 PO, Take by mouth, Disp: , Rfl:     Acetaminophen (TYLENOL PO), Take by mouth, Disp: , Rfl:     oxyCODONE-acetaminophen (PERCOCET) 7.5-325 MG per tablet, Take 1 tablet by mouth 2 times daily as needed for Pain for up to 7 days. , Disp: 14 tablet, Rfl: 0    naloxone 4 MG/0.1ML LIQD nasal spray, 1 spray by Nasal route as needed for Opioid Reversal, Disp: 1 each, Rfl: 5    tiZANidine (ZANAFLEX) 2 MG tablet, Take 2 tablets by mouth every 6 hours as needed (spasms), Disp: 75 tablet, Rfl: 0    esomeprazole Magnesium (NEXIUM) 20 MG PACK, Take 20 mg by mouth daily Took 20 mg Nexium in am and 20 Mg Pepcid at bedtime. , Disp: , Rfl:     fluticasone (FLONASE) 50 MCG/ACT nasal spray, 1 spray by Each Nare route 2 times daily, Disp: , Rfl:     Multiple Vitamins-Minerals (OCUVITE ADULT FORMULA PO), Take 1 tablet by mouth daily, Disp: , Rfl:     docusate sodium (COLACE) 100 MG capsule, Take 100 mg by mouth 2 times daily, Disp: , Rfl:     alendronate (FOSAMAX) 70 MG tablet, Take 70 mg by mouth once a week, Disp: , Rfl:     Cholecalciferol (VITAMIN D3) 2000 UNITS CAPS, Take 2,000 Units by mouth daily, Disp: , Rfl:     ondansetron (ZOFRAN ODT) 4 MG disintegrating tablet, Take 1 tablet by mouth every 8 hours as needed for Nausea or Vomiting, Disp: 20 tablet, Rfl: 0    diphenoxylate-atropine (LOMOTIL) 2.5-0.025 MG per tablet, Take 1 tablet by mouth 4 times daily as needed. ., Disp: , Rfl:     morphine (MS CONTIN) 15 MG extended release tablet, Take 1 tablet by mouth every 12 hours for 30 days. , Disp: 14 tablet, Rfl: 0    guaiFENesin 400 MG tablet, Take 400 mg by mouth 2 times daily Unsure of exact dose, Disp: , Rfl:     oxyCODONE-acetaminophen (PERCOCET)  MG per tablet, Take 1 tablet by mouth every 4 hours as needed for Pain for up to 3 days. ., Disp: 10 tablet, Rfl: 0    Potassium (POTASSIMIN PO), Take 20 mg by mouth, Disp: , Rfl:     furosemide (LASIX) 20 MG tablet, Take 20 mg by mouth as needed (MAINLY WHEN TRAVELS FOR LEG SWELLING). , Disp: , Rfl:   Allergies:  Morphine  Social History:    reports that she quit smoking about 18 years ago. She has a 7.50 pack-year smoking history. She has never used smokeless tobacco. She reports that she drinks about 0.6 oz of alcohol per week. She reports that she does not use drugs.   Family History:   Family History   Problem Relation Age of Onset    Diabetes Maternal Grandmother         type 2, great grandma type1    Cancer Maternal Grandfather         rectal and  scrotal cancer         REVIEW OF SYSTEMS: Full ROS reviewed & scanned from 5/1/2019           PHYSICAL EXAM:    Vitals: Blood pressure 129/66, pulse 81, height 5' 0.98\" (1.549 m), weight 156 lb 15.5 oz (71.2 kg), not currently breastfeeding. GENERAL EXAM:  · General Apparence: Patient is adequately groomed with no evidence of malnutrition. · Psychiatric: Orientation: The patient is oriented to time, place and person. The patient's mood and affect are appropriate   · Vascular: Examination reveals no swelling and palpation reveals no tenderness in upper or lower extremities. Good capillary refill. · The lymphatic examination of the neck, axillae and groin reveals all areas to be without enlargement or induration   Sensation is intact without deficit in the upper and lower extremities to light touch and pinprick  · Coordination of the upper and lower extremities are normal.    CERVICAL EXAMINATION:  · Inspection: Local inspection shows no step-off or bruising. Cervical alignment is normal. No instability is noted. · Palpation and Percussion: No evidence of tenderness at the midline. Paraspinal tenderness is present. There is no paraspinal spasm. · Range of Motion:  limited by 25% in all planes due to pain   · Strength: 5/5 bilateral upper extremities  · Special Tests:   Spurling's and Metcalf's are negative bilaterally. Mendez and Impingement tests are negative bilaterally. · Skin:There are no rashes, ulcerations or lesions. · Reflexes: Bilaterally triceps, biceps and brachioradialis are 2+. Clonus absent bilaterally at the feet. No pathological reflexes are noted. · Gait & station: uses a single point cane to ambulate  · Additional Examinations:  · RIGHT UPPER EXTREMITY:  Inspection/examination of the right upper extremity does not show any tenderness, deformity or injury. Range of motion is normal and pain-free. There is no gross instability. There are no rashes, ulcerations or lesions. Strength and tone are normal. No atrophy or abnormal movements are noted. · LEFT UPPER EXTREMITY: Inspection/examination of the left upper extremity does not show any tenderness, deformity or injury.  Range of motion is normal and pain-free. There is no gross instability. There are no rashes, ulcerations or lesions. Strength and tone are normal. No atrophy or abnormal movements are noted. LUMBAR/SACRAL EXAMINATION:  · Inspection: Local inspection shows no step-off or bruising. Lumbar alignment is normal. No instability is noted. · Palpation:   No evidence of tenderness at the midline. Lumbar paraspinal tenderness Mild L4/5 and L5/S1 tenderness  Bursal tenderness Bilateral greater trochanteric tenderness  There is no paraspinal spasm. · Range of Motion: limited by 50% in all planes due to pain with extension and side bending bilaterally. · Strength:   Strength testing is 5/5 in all muscle groups tested. · Special Tests:   Straight leg raise and crossed SLR negative. Taran's testing is negative bilaterally. FADIR's testing is negative bilaterally. · Skin: There are no rashes, ulcerations or lesions. · Reflexes: Reflexes are symmetrically 2+ at the patellar and ankle tendons. Clonus absent bilaterally at the feet. · Gait & station: uses a single point cane to ambulate  · Additional Examinations:  · RIGHT LOWER EXTREMITY: Inspection/examination of the right lower extremity does not show any tenderness, deformity or injury. Range of motion is unremarkable. There is no gross instability. There are no rashes, ulcerations or lesions. Strength and tone are normal. No atrophy or abnormal movements are noted. · LEFT LOWER EXTREMITY:  Inspection/examination of the left lower extremity does not show any tenderness, deformity or injury. Range of motion is unremarkable. There is no gross instability. There are no rashes, ulcerations or lesions. Strength and tone are normal. No atrophy or abnormal movements are noted. Diagnostic Testing:    Xrays:   I personally reviewed images of the lumbar spine from 2/22/19.   MRI or CT:  MRI of the Lumbar Spine from 4/1/19  Acute T12 fracture.  There is prominent diastasis of the vertebral fracture   plane, which may involve the right pedicle.       Borderline compression of the distal cord at the T12 inferior endplate.       Interspinous edema, likely traumatic.  In addition, there is suspected   traumatic disruption of the anterior annulus at T11-T12.      EMG:  None  Results for orders placed or performed during the hospital encounter of 57/63/01   Basic Metabolic Panel   Result Value Ref Range    Sodium 138 136 - 145 mmol/L    Potassium 4.5 3.5 - 5.1 mmol/L    Chloride 99 99 - 110 mmol/L    CO2 28 21 - 32 mmol/L    Anion Gap 11 3 - 16    Glucose 138 (H) 70 - 99 mg/dL    BUN 19 7 - 20 mg/dL    CREATININE 1.0 0.6 - 1.2 mg/dL    GFR Non- 53 (A) >60    GFR African American >60 >60    Calcium 9.1 8.3 - 10.6 mg/dL   CBC   Result Value Ref Range    WBC 4.1 4.0 - 11.0 K/uL    RBC 3.61 (L) 4.00 - 5.20 M/uL    Hemoglobin 11.7 (L) 12.0 - 16.0 g/dL    Hematocrit 35.0 (L) 36.0 - 48.0 %    MCV 96.8 80.0 - 100.0 fL    MCH 32.4 26.0 - 34.0 pg    MCHC 33.5 31.0 - 36.0 g/dL    RDW 14.2 12.4 - 15.4 %    Platelets 955 263 - 510 K/uL    MPV 6.7 5.0 - 10.5 fL   Urine rt reflex to culture   Result Value Ref Range    Color, UA YELLOW Straw/Yellow    Clarity, UA Clear Clear    Glucose, Ur Negative Negative mg/dL    Bilirubin Urine Negative Negative    Ketones, Urine Negative Negative mg/dL    Specific Gravity, UA 1.010 1.005 - 1.030    Blood, Urine Negative Negative    pH, UA 6.5 5.0 - 8.0    Protein, UA Negative Negative mg/dL    Urobilinogen, Urine 0.2 <2.0 E.U./dL    Nitrite, Urine Negative Negative    Leukocyte Esterase, Urine Negative Negative    Microscopic Examination Not Indicated     Urine Reflex to Culture Not Indicated     Urine Type Not Specified    Basic Metabolic Panel   Result Value Ref Range    Sodium 137 136 - 145 mmol/L    Potassium 4.5 3.5 - 5.1 mmol/L    Chloride 101 99 - 110 mmol/L    CO2 25 21 - 32 mmol/L    Anion Gap 11 3 - 16    Glucose 104 (H) 70 - 99 mg/dL    BUN 25 (H) 7 - 20 mg/dL    CREATININE 1.1 0.6 - 1.2 mg/dL    GFR Non- 47 (A) >60    GFR  57 (A) >60    Calcium 8.8 8.3 - 10.6 mg/dL   CBC   Result Value Ref Range    WBC 4.2 4.0 - 11.0 K/uL    RBC 3.09 (L) 4.00 - 5.20 M/uL    Hemoglobin 10.2 (L) 12.0 - 16.0 g/dL    Hematocrit 30.6 (L) 36.0 - 48.0 %    MCV 99.0 80.0 - 100.0 fL    MCH 33.1 26.0 - 34.0 pg    MCHC 33.4 31.0 - 36.0 g/dL    RDW 14.4 12.4 - 15.4 %    Platelets 915 384 - 688 K/uL    MPV 6.8 5.0 - 10.5 fL   Basic Metabolic Panel   Result Value Ref Range    Sodium 139 136 - 145 mmol/L    Potassium 4.3 3.5 - 5.1 mmol/L    Chloride 104 99 - 110 mmol/L    CO2 25 21 - 32 mmol/L    Anion Gap 10 3 - 16    Glucose 107 (H) 70 - 99 mg/dL    BUN 22 (H) 7 - 20 mg/dL    CREATININE 1.0 0.6 - 1.2 mg/dL    GFR Non- 53 (A) >60    GFR African American >60 >60    Calcium 8.7 8.3 - 10.6 mg/dL   CBC   Result Value Ref Range    WBC 3.8 (L) 4.0 - 11.0 K/uL    RBC 3.14 (L) 4.00 - 5.20 M/uL    Hemoglobin 10.2 (L) 12.0 - 16.0 g/dL    Hematocrit 30.8 (L) 36.0 - 48.0 %    MCV 98.2 80.0 - 100.0 fL    MCH 32.5 26.0 - 34.0 pg    MCHC 33.1 31.0 - 36.0 g/dL    RDW 14.4 12.4 - 15.4 %    Platelets 531 139 - 429 K/uL    MPV 6.5 5.0 - 10.5 fL   POCT Glucose   Result Value Ref Range    POC Glucose 154 (H) 70 - 99 mg/dl    Performed on ACCU-CHEK        Impression (Medical Decision Making):       1. Spondylosis of lumbar region without myelopathy or radiculopathy    2. Spondylolisthesis of lumbar region    3. Greater trochanteric bursitis of both hips    4. Closed compression fracture of thoracic vertebra, sequela        Plan (Medical Decision Making):    I discussed the diagnosis and the treatment options with Natalia Palencia today. In Summary:  The various treatment options were outlined and discussed with Natalia Palencia including:  Conservative care options: physical therapy, ice, medications, bracing, and activity modification.  The indications for therapeutic injections. The indications for additional imaging/laboratory studies. The indications for (possible future) interventions. After considering the various options discussed, Melissa Ann elected to pursue a course of treatment that includes the followin. Medications:   OARRS reviewed and appropriate. Low risk on ORT. 4 A's reviewed. Proceed with current regimen of Percocet 7.5-325 mg po BID for a total of 14 pills. Informed verbal consent was obtained. Goals of the current treatment regimen include: improvement in pain, improvement in overall in physical performance, ability to perform daily activities, work or disability, emotional distress, health care utilization and decreased medication consumption. Progress will be monitored towards achieving/maintaining therapeutic goals:    1. Improving perceived interference of pain with ADL's, ability to perform HEP, continue to improve in overall flexibility, ROM, strength and endurance, ability to do household activities indoor and/or outdoor, work and social/leisure activities. Improve psychosocial and physical functioning. 2. Improving sleep to 6-7 hours a night. Improve mood/ anxiety and depression symptoms    3. Reduction of reliance on opioid analgesia/more appropriate opioid use. Utilization of adjuvant medications as appropriate. Risks and benefits of the medications and alternative treatments have been discussed with the patient. The patient was advised against drinking alcohol with the opioid pain medicines, advised against driving or handling machinery when starting or adjusting the dose of medicines, feeling groggy or drowsy, or if having any cognitive issues related to the current medications. The patient is fully aware of the risk of overdose and death, if medicines are misused and not taken as prescribed.  If the patient develops new symptoms or if the symptoms worsen, the patient was told to call the office. RX Monitoring 2/24/2019   Attestation The Prescription Monitoring Report for this patient was reviewed today. Chronic Pain Routine Monitoring -         2. PT:  Encouraged to continue with HEP. 3. Further studies: No further imaging at this time. 4. Interventional:  We discussed pursuing a Right L4 and L5 TF epidural steroid injection to address the pain. Radiologic imaging and symptoms confirm the pain etiology. Risks, benefits and alternatives of interventional options were discussed. These include and are not limited to bleeding, infection, spinal headache, nerve injury, increased pain and lack of pain relief. The patient verbalized understanding and would like to proceed. The patient will be scheduled accordingly. After risks benefits alternatives discussed a right greater trochanter bursa injection was undertaken the bedside. The skin overlying the right hip was prepped with ChloraPrep ×3. Ultrasound guidance was utilized with a  curvilinear aray transducer  with an in plane technique. A mixture of 80 mg of Kenalog with 3 ml of 1% lidocaine was drawn up and instilled over the most tender point of the right greater trochanter with a 22-gauge needle. The needle was removed after the mixture was instilled and a Band-Aid was applied. The identical procedure was repeated on the left side       5.  Healthy Lifestyle Measures:  Patient education material reviewing the following was distributed to Gómez Mariano  Anatomic drawings  Healthy lifestyle education  Osteoporosis prevention,   Back and neck pain educational information   Advanced imaging preparedness    Posture education   Proper lifting and carrying techniques,   Weight management  Quitting smoking and   Minor ways to treat back pain  For further information regarding the spine conditions and to review interventional treatments the patient was directed to [facility].    6.  Follow up:  2-3 weeks    Amy Lopez

## 2019-05-01 NOTE — PROGRESS NOTES
5/1/19 1:35 PM         ND: 0739-6162-89   [employer]    Lot Number: [id number]       Comments: BILATERAL HIP BURSA        NDC: 9546-6384-18   -   LIDOCAINE 1%    Lot Number: [id number]       Comments:  BILATERAL HIP BURSA

## 2019-05-01 NOTE — LETTER
Please schedule the following with:     Date:   5/15/2019 @ 11:00     Account: [id number]  Patient: Poncho Oconnell    : 10/16/1932  Address:  [address] [place] [address]    Phone (H):  [phone] (home)      ----------------------------------------------------------------------------------------------  Diagnosis:     ICD-10-CM    1. Spondylosis of lumbar region without myelopathy or radiculopathy M47.816 oxyCODONE-acetaminophen (PERCOCET) 7.5-325 MG per tablet   2. Spondylolisthesis of lumbar region M43.16 oxyCODONE-acetaminophen (PERCOCET) 7.5-325 MG per tablet   3. Greater trochanteric bursitis of both hips M70.61 Ultrasound guided needle placement    M70.62 SD TRIAMCINOLONE ACETONIDE INJ     SD ARTHROCENTESIS ASPIR&/INJ MAJOR JT/BURSA W/O US     oxyCODONE-acetaminophen (PERCOCET) 7.5-325 MG per tablet   4. Closed compression fracture of thoracic vertebra, sequela S22.000S oxyCODONE-acetaminophen (PERCOCET) 7.5-325 MG per tablet         Levels:L4 AND L5  Procedure type TRANSFORAMINAL  Side RIGHT  CPT Codes 05303, 54581    ----------------------------------------------------------------------------------------------  Injection #   [id number]    Attending Physician       Roberto Carey MD.      ----------------------------------------------------------------------------------------------  Injection Scheduled For:    At:    1st Insurance MEDICARE A&B    Pre-Cert#    2nd Insurance BCBS    Pre-Cert#    Comments or Special instructions:    · Infection control  · Tested positive for MRSA in past 12 months:  no  · Tested positive for MSSA \"staph infection\" in past 12 months: no  · Tested positive for VRE (Vancomycin Resistant Enterococci) in past 12 months:   no  · Currently on any antibiotics for an infection: no  · Anticoagulants:  · On a blood thinner:  no   · Any history of bleeding disorder: no   · Advanced Liver disease: no   · Advanced Renal disease: no   · Glaucoma: no   · Diabetes: yes Sedation:  No  -----------------------------------------------------------------------------------------------  Allergies   Allergen Reactions    Morphine Other (See Comments)     MIGRAINE HEADACHE

## 2019-05-04 ENCOUNTER — CLINICAL DOCUMENTATION (OUTPATIENT)
Dept: ORTHOPEDIC SURGERY | Age: 84
End: 2019-05-04

## 2019-05-06 ENCOUNTER — TELEPHONE (OUTPATIENT)
Dept: ORTHOPEDIC SURGERY | Age: 84
End: 2019-05-06

## 2019-05-06 NOTE — TELEPHONE ENCOUNTER
Please see message below from home health nurse of this patient for worsening pain and numbness.  Patient last seen 5/1/2019

## 2019-05-06 NOTE — TELEPHONE ENCOUNTER
HOME HEALTH NURSE CALLED  STATING THAT PT IS COMPLAINING OF NUMBNESS AND TINGLING TRAVELING DOWN HER RIGHT LEG. THIS STATED ON Saturday. PT WAS LAST SEEN BY DR. Rudy Hdz ON 5-. [facility] HOME HEALTH NURSE WOULD LIKE A CALL BACK [phone]. THANKS!

## 2019-05-09 ENCOUNTER — TELEPHONE (OUTPATIENT)
Dept: ORTHOPEDIC SURGERY | Age: 84
End: 2019-05-09

## 2019-05-09 NOTE — TELEPHONE ENCOUNTER
DOS   05/15/2019  CPT   55458  23905   54984.26  [id number]  OP SX AUTH  NPR     RIGHT  LEVELS   L4 - L5   PROCEDURE   TRANSFORAMINAL RAIN INJ    [address]:   MEDICARE

## 2019-05-15 ENCOUNTER — APPOINTMENT (OUTPATIENT)
Dept: GENERAL RADIOLOGY | Age: 84
End: 2019-05-15
Attending: PHYSICAL MEDICINE & REHABILITATION
Payer: MEDICARE

## 2019-05-15 ENCOUNTER — HOSPITAL ENCOUNTER (OUTPATIENT)
Age: 84
Setting detail: OUTPATIENT SURGERY
Discharge: HOME | End: 2019-05-15
Attending: PHYSICAL MEDICINE & REHABILITATION | Admitting: PHYSICAL MEDICINE & REHABILITATION
Payer: MEDICARE

## 2019-05-15 VITALS
OXYGEN SATURATION: 99 % | HEIGHT: 60 IN | BODY MASS INDEX: 30.66 KG/M2 | TEMPERATURE: 98.6 F | HEART RATE: 87 BPM | DIASTOLIC BLOOD PRESSURE: 73 MMHG | RESPIRATION RATE: 20 BRPM | SYSTOLIC BLOOD PRESSURE: 132 MMHG

## 2019-05-15 LAB
GLUCOSE BLD-MCNC: 112 MG/DL (ref 70–99)
PERFORMED ON: ABNORMAL

## 2019-05-15 PROCEDURE — 3610000056 HC PAIN LEVEL 4 BASE (NON-OR): Performed by: PHYSICAL MEDICINE & REHABILITATION

## 2019-05-15 PROCEDURE — 3209999900 FLUORO FOR SURGICAL PROCEDURES

## 2019-05-15 PROCEDURE — 2500000003 HC RX 250 WO HCPCS: Performed by: PHYSICAL MEDICINE & REHABILITATION

## 2019-05-15 PROCEDURE — 2709999900 HC NON-CHARGEABLE SUPPLY: Performed by: PHYSICAL MEDICINE & REHABILITATION

## 2019-05-15 PROCEDURE — 6360000002 HC RX W HCPCS: Performed by: PHYSICAL MEDICINE & REHABILITATION

## 2019-05-15 RX ORDER — LIDOCAINE HYDROCHLORIDE 10 MG/ML
INJECTION, SOLUTION INFILTRATION; PERINEURAL
Status: COMPLETED | OUTPATIENT
Start: 2019-05-15 | End: 2019-05-15

## 2019-05-15 RX ORDER — METHYLPREDNISOLONE ACETATE 80 MG/ML
INJECTION, SUSPENSION INTRA-ARTICULAR; INTRALESIONAL; INTRAMUSCULAR; SOFT TISSUE
Status: COMPLETED | OUTPATIENT
Start: 2019-05-15 | End: 2019-05-15

## 2019-05-15 RX ORDER — BUPIVACAINE HYDROCHLORIDE 5 MG/ML
INJECTION, SOLUTION PERINEURAL
Status: COMPLETED | OUTPATIENT
Start: 2019-05-15 | End: 2019-05-15

## 2019-05-15 ASSESSMENT — PAIN - FUNCTIONAL ASSESSMENT: PAIN_FUNCTIONAL_ASSESSMENT: 0-10

## 2019-05-15 ASSESSMENT — PAIN SCALES - GENERAL: PAINLEVEL_OUTOF10: 7

## 2019-05-15 NOTE — OP NOTE
Patient:  Doug Lyons  YOB: 1932  Medical Record #:  [id number]   Place: [address] [place] [address]  Date:  5/15/2019   Physician:  Dong Dong MD, ANA    Procedure: 1. Transforaminal Lumbar Epidural Steroid Injection -  right L4           2. Transforaminal Lumbar Epidural Steroid Injection -  right L5     Pre-Procedure Diagnosis: Lumbar radiculopathy      Post-Procedure Diagnosis: Same    Sedation: Local with 1% Lidocaine 3 ml and 2 mg of IV Versed    EBL: None    Complications: None    Procedure Summary:        The patient was brought to the procedure suite and placed in the prone position. The skin overlying the lumbar spine was prepped and draped in the usual sterile fashion. Using fluoroscopic guidance, the right L4 foramen was identified. Through anesthetized skin, a 22 gauge 3.5 inch curved tip spinal needle was advanced into the foramen. Isovue M 300 was instilled showing an epidurogram/nerve root outline pattern without evidence of vascular or intrathecal spread. Following which, 50 mg of depomedrol mixed with 1 ml of 0.5% Marcaine was instilled. The needle was removed. Using fluoroscopic guidance, the right L5 foramen was identified. Through anesthetized skin, a 22 gauge 3.5 inch curved tip spinal needle was advanced into the foramen. Isovue M 300 was instilled showing an epidurogram/nerve root outline pattern without evidence of vascular or intrathecal spread. Following which, 50 mg of depomedrol mixed with 1 ml of 0.5% Marcaine was instilled. The needle was removed and band-aids were applied. The patient was transferred to the post-operative area in stable condition.

## 2019-05-15 NOTE — PROGRESS NOTES
Procedural dressing dry and intact. Bilateral lower extremities equal in strength. Discharge instructions reviewed with patient or responsible adult, signed and copy given. All home medications have been reviewed. All questions answered and patient or responsible adult verbalized understanding.   PAIN LEVEL AT DISCHARGE ___7__

## 2019-05-15 NOTE — H&P
HISTORY AND PHYSICAL/PRE-SEDATION ASSESSMENT    Patient:  Karlos Boo   :  10/16/1932  Medical Record No.:  [id number]   Date:  5/15/2019  Physician:  Sanket Alvarado M.D. Facility: [address] [place] [address]    HISTORY OF PRESENT ILLNESS:                 The patient is a 80 y.o. female whom presents with lower back and bilateral leg pain. Review of the imaging and physical exam of the patient confirmed the pre-procedure diagnosis. After a thorough discussion of risks, benefits and alternatives informed consent was obtained.                 Past Medical History:   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    Cystocele     Diabetes mellitus ([address]     type  2 diet controlled    Hepatitis A     History of blood transfusion     hiatal hernia surgery    Hyperlipidemia     PVC (premature ventricular contraction)     Rectocele     Reflux     Scoliosis       Past Surgical History:     Past Surgical History:   Procedure Laterality Date    APPENDECTOMY      BLADDER SUSPENSION      3 SURGERIES/ANTERIOR AND POSTERIOR REPAIR    BREAST SURGERY      RIGHT LUMPECTOMY AND SEVERAL BIOPSIES ON BOTH BREAST    BUNIONECTOMY  12    BUNIONECTOMY BILATERAL  FEET    CARDIAC CATHETERIZATION      AV block    CATARACT REMOVAL WITH IMPLANT Left 2018    CHOLECYSTECTOMY      COLONOSCOPY      normal    CYSTOSCOPY  2017    U-dil    DILATION AND CURETTAGE OF UTERUS      SUNCTION    FOOT SURGERY      bilat foot surgeries    HERNIA REPAIR  6 YRS AGO    HIATAL HERNIA REPAIR- WIRED  RIBS    HYSTERECTOMY      VAGINAL    JOINT REPLACEMENT Left     TOTAL KNEE REPLACEMENT LEFT    OTHER SURGICAL HISTORY Left 14    removal rib wire    OTHER SURGICAL HISTORY Right 2018    PHACO EMULSIFICATION OF CATARACT WITH INTRAOCULAR LENS implant right eye    OR REMV CATARACT EXTRACAP,INSERT LENS Left 9/13/2018    PHACO EMULSIFICATION OF CATARACT WITH INTRAOCULAR LENS IMPLANT LEFT EYE performed by Linda Flower MD at [facility] Right 7/26/12    [place] DEPOSIT TAKEN OFF RIGHT SHOULDER    UPPER GASTROINTESTINAL ENDOSCOPY  11/2/12     Current Medications:   Prior to Admission medications    Medication Sig Start Date End Date Taking? Authorizing Provider   Omeprazole (PRILOSEC PO) Take by mouth   Yes Historical Provider, MD   guaiFENesin (MUCINEX PO) Take by mouth   Yes Historical Provider, MD   ACETAMINOPHEN-CODEINE #4 PO Take by mouth   Yes Historical Provider, MD   Acetaminophen (TYLENOL PO) Take by mouth   Yes Historical Provider, MD   naloxone 4 MG/0.1ML LIQD nasal spray 1 spray by Nasal route as needed for Opioid Reversal 5/1/19  Yes Arely Salgado MD   tiZANidine (ZANAFLEX) 2 MG tablet Take 2 tablets by mouth every 6 hours as needed (spasms) 4/11/19  Yes Stephanie Graff MD   esomeprazole Magnesium (NEXIUM) 20 MG PACK Take 20 mg by mouth daily Took 20 mg Nexium in am and 20 Mg Pepcid at bedtime.    Yes Historical Provider, MD nicholasaiFENesin 400 MG tablet Take 400 mg by mouth 2 times daily Unsure of exact dose   Yes Historical Provider, MD   fluticasone (FLONASE) 50 MCG/ACT nasal spray 1 spray by Each Nare route 2 times daily   Yes Historical Provider, MD   Multiple Vitamins-Minerals (OCUVITE ADULT FORMULA PO) Take 1 tablet by mouth daily   Yes Historical Provider, MD   docusate sodium (COLACE) 100 MG capsule Take 100 mg by mouth 2 times daily   Yes Historical Provider, MD   alendronate (FOSAMAX) 70 MG tablet Take 70 mg by mouth once a week 3/21/19  Yes Historical Provider, MD   Potassium (POTASSIMIN PO) Take 20 mg by mouth   Yes Historical Provider, MD   Cholecalciferol (VITAMIN D3) 2000 UNITS CAPS Take 2,000 Units by mouth daily   Yes Historical Provider, MD   ondansetron (ZOFRAN ODT) 4 MG disintegrating tablet Take 1 tablet by mouth every 8 hours as needed for Nausea or Vomiting 11/19/15  Yes Chin Gage MD   diphenoxylate-atropine (LOMOTIL) 2.5-0.025 MG per tablet Take 1 tablet by mouth 4 times daily as needed. . 12/19/14  Yes Historical Provider, MD   furosemide (LASIX) 20 MG tablet Take 20 mg by mouth as needed (MAINLY WHEN TRAVELS FOR LEG SWELLING). Yes Historical Provider, MD   oxyCODONE-acetaminophen (PERCOCET) 7.5-325 MG per tablet Take 1 tablet by mouth 2 times daily as needed for Pain for up to 7 days. 5/1/19 5/8/19  Adry Argueta MD   oxyCODONE-acetaminophen (PERCOCET)  MG per tablet Take 1 tablet by mouth every 4 hours as needed for Pain for up to 3 days. . 2/27/19 4/3/19  Johann Rivera MD     Allergies:  Morphine  Social History:    reports that she quit smoking about 18 years ago. She has a 7.50 pack-year smoking history. She has never used smokeless tobacco. She reports that she drinks about 0.6 oz of alcohol per week. She reports that she does not use drugs. Family History:   Family History   Problem Relation Age of Onset    Diabetes Maternal Grandmother         type 2, great grandma type1    Cancer Maternal Grandfather         rectal and  scrotal cancer       Vitals: Blood pressure (!) 158/64, pulse 75, temperature 98.6 °F (37 °C), temperature source Temporal, resp. rate 20, height 5' (1.524 m), SpO2 96 %, not currently breastfeeding. PHYSICAL EXAM:including affected areas  HENT: Airway patent and reviewed  Cardiovascular: Normal rate, regular rhythm, normal heart sounds. Pulmonary/Chest: No wheezes. No rhonchi. No rales. Abdominal: Soft. Bowel sounds are normal. No distension. Extremities: Moves all extremities equally  Cervical and Lumbar Spine: Painful range of motion, no midline tenderness       Diagnosis:Lumbar radiculopathy  M47.816   M43.16   M70.61   M70.62   S22.000S    Plan: Proceed with planned procedure      ASA CLASS:         []   I.  Normal, healthy adult [x]   II.  Mild systemic disease            []   III. Severe systemic disease      Mallampati: Mallampati Class II - (soft palate, fauces & uvula are visible)      Sedation plan:   [x]  Local              []  Minimal                  []  General anesthesia    Patient's condition acceptable for planned procedure/sedation. Post Procedure Plan   Return to same level of care   ______________________     The risks and benefits as well as alternatives to the procedure have been discussed with the patient and or family. The patient and or next of kin understands and agrees to proceed.     Adry Argueta M.D.

## 2019-05-24 ENCOUNTER — APPOINTMENT (OUTPATIENT)
Dept: GENERAL RADIOLOGY | Age: 84
DRG: 392 | End: 2019-05-24
Payer: MEDICARE

## 2019-05-24 ENCOUNTER — HOSPITAL ENCOUNTER (INPATIENT)
Age: 84
LOS: 3 days | Discharge: NURSING, REHAB OR LONG-TERM CARE | DRG: 392 | End: 2019-05-29
Attending: EMERGENCY MEDICINE | Admitting: INTERNAL MEDICINE
Payer: MEDICARE

## 2019-05-24 ENCOUNTER — APPOINTMENT (OUTPATIENT)
Dept: CT IMAGING | Age: 84
DRG: 392 | End: 2019-05-24
Payer: MEDICARE

## 2019-05-24 DIAGNOSIS — M54.41 CHRONIC LOW BACK PAIN WITH RIGHT-SIDED SCIATICA, UNSPECIFIED BACK PAIN LATERALITY: ICD-10-CM

## 2019-05-24 DIAGNOSIS — G89.29 CHRONIC PAIN OF BOTH HIPS: ICD-10-CM

## 2019-05-24 DIAGNOSIS — R07.9 ACUTE CHEST PAIN: Primary | ICD-10-CM

## 2019-05-24 DIAGNOSIS — M25.551 CHRONIC PAIN OF BOTH HIPS: ICD-10-CM

## 2019-05-24 DIAGNOSIS — M25.552 CHRONIC PAIN OF BOTH HIPS: ICD-10-CM

## 2019-05-24 DIAGNOSIS — G89.29 CHRONIC LOW BACK PAIN WITH RIGHT-SIDED SCIATICA, UNSPECIFIED BACK PAIN LATERALITY: ICD-10-CM

## 2019-05-24 LAB
A/G RATIO: 1.8 (ref 1.1–2.2)
ALBUMIN SERPL-MCNC: 4 G/DL (ref 3.4–5)
ALP BLD-CCNC: 53 U/L (ref 40–129)
ALT SERPL-CCNC: 12 U/L (ref 10–40)
ANION GAP SERPL CALCULATED.3IONS-SCNC: 11 MMOL/L (ref 3–16)
APTT: 30.5 SEC (ref 26–36)
AST SERPL-CCNC: 17 U/L (ref 15–37)
BASOPHILS ABSOLUTE: 0 K/UL (ref 0–0.2)
BASOPHILS RELATIVE PERCENT: 0.6 %
BILIRUB SERPL-MCNC: 0.4 MG/DL (ref 0–1)
BUN BLDV-MCNC: 19 MG/DL (ref 7–20)
CALCIUM SERPL-MCNC: 9 MG/DL (ref 8.3–10.6)
CHLORIDE BLD-SCNC: 100 MMOL/L (ref 99–110)
CO2: 25 MMOL/L (ref 21–32)
CREAT SERPL-MCNC: 0.8 MG/DL (ref 0.6–1.2)
D DIMER: 575 NG/ML DDU (ref 0–229)
EKG ATRIAL RATE: 73 BPM
EKG DIAGNOSIS: NORMAL
EKG P AXIS: 51 DEGREES
EKG P-R INTERVAL: 346 MS
EKG Q-T INTERVAL: 400 MS
EKG QRS DURATION: 92 MS
EKG QTC CALCULATION (BAZETT): 440 MS
EKG R AXIS: -16 DEGREES
EKG T AXIS: 13 DEGREES
EKG VENTRICULAR RATE: 73 BPM
EOSINOPHILS ABSOLUTE: 0.2 K/UL (ref 0–0.6)
EOSINOPHILS RELATIVE PERCENT: 3.5 %
GFR AFRICAN AMERICAN: >60
GFR NON-AFRICAN AMERICAN: >60
GLOBULIN: 2.2 G/DL
GLUCOSE BLD-MCNC: 118 MG/DL (ref 70–99)
HCT VFR BLD CALC: 36.6 % (ref 36–48)
HEMOGLOBIN: 12.1 G/DL (ref 12–16)
INR BLD: 0.96 (ref 0.86–1.14)
LYMPHOCYTES ABSOLUTE: 1.4 K/UL (ref 1–5.1)
LYMPHOCYTES RELATIVE PERCENT: 30.3 %
MAGNESIUM: 2.4 MG/DL (ref 1.8–2.4)
MCH RBC QN AUTO: 32 PG (ref 26–34)
MCHC RBC AUTO-ENTMCNC: 33 G/DL (ref 31–36)
MCV RBC AUTO: 96.9 FL (ref 80–100)
MONOCYTES ABSOLUTE: 0.3 K/UL (ref 0–1.3)
MONOCYTES RELATIVE PERCENT: 7.2 %
NEUTROPHILS ABSOLUTE: 2.8 K/UL (ref 1.7–7.7)
NEUTROPHILS RELATIVE PERCENT: 58.4 %
PDW BLD-RTO: 14.4 % (ref 12.4–15.4)
PLATELET # BLD: 149 K/UL (ref 135–450)
PMV BLD AUTO: 6.6 FL (ref 5–10.5)
POTASSIUM SERPL-SCNC: 4.1 MMOL/L (ref 3.5–5.1)
PRO-BNP: 288 PG/ML (ref 0–449)
PROTHROMBIN TIME: 11 SEC (ref 9.8–13)
RBC # BLD: 3.78 M/UL (ref 4–5.2)
SODIUM BLD-SCNC: 136 MMOL/L (ref 136–145)
TOTAL PROTEIN: 6.2 G/DL (ref 6.4–8.2)
TROPONIN: 0.02 NG/ML
TROPONIN: 0.02 NG/ML
TROPONIN: <0.01 NG/ML
WBC # BLD: 4.8 K/UL (ref 4–11)

## 2019-05-24 PROCEDURE — 71046 X-RAY EXAM CHEST 2 VIEWS: CPT

## 2019-05-24 PROCEDURE — 83735 ASSAY OF MAGNESIUM: CPT

## 2019-05-24 PROCEDURE — 72190 X-RAY EXAM OF PELVIS: CPT

## 2019-05-24 PROCEDURE — 6370000000 HC RX 637 (ALT 250 FOR IP): Performed by: INTERNAL MEDICINE

## 2019-05-24 PROCEDURE — 70450 CT HEAD/BRAIN W/O DYE: CPT

## 2019-05-24 PROCEDURE — G0378 HOSPITAL OBSERVATION PER HR: HCPCS

## 2019-05-24 PROCEDURE — 99223 1ST HOSP IP/OBS HIGH 75: CPT | Performed by: INTERNAL MEDICINE

## 2019-05-24 PROCEDURE — 2580000003 HC RX 258: Performed by: INTERNAL MEDICINE

## 2019-05-24 PROCEDURE — 93005 ELECTROCARDIOGRAM TRACING: CPT | Performed by: PHYSICIAN ASSISTANT

## 2019-05-24 PROCEDURE — 36415 COLL VENOUS BLD VENIPUNCTURE: CPT

## 2019-05-24 PROCEDURE — 99285 EMERGENCY DEPT VISIT HI MDM: CPT

## 2019-05-24 PROCEDURE — 85025 COMPLETE CBC W/AUTO DIFF WBC: CPT

## 2019-05-24 PROCEDURE — 80053 COMPREHEN METABOLIC PANEL: CPT

## 2019-05-24 PROCEDURE — 83880 ASSAY OF NATRIURETIC PEPTIDE: CPT

## 2019-05-24 PROCEDURE — 99223 1ST HOSP IP/OBS HIGH 75: CPT | Performed by: PSYCHIATRY & NEUROLOGY

## 2019-05-24 PROCEDURE — 6360000002 HC RX W HCPCS: Performed by: INTERNAL MEDICINE

## 2019-05-24 PROCEDURE — 84484 ASSAY OF TROPONIN QUANT: CPT

## 2019-05-24 PROCEDURE — 93010 ELECTROCARDIOGRAM REPORT: CPT | Performed by: INTERNAL MEDICINE

## 2019-05-24 PROCEDURE — 6370000000 HC RX 637 (ALT 250 FOR IP): Performed by: PHYSICIAN ASSISTANT

## 2019-05-24 PROCEDURE — 85730 THROMBOPLASTIN TIME PARTIAL: CPT

## 2019-05-24 PROCEDURE — 85379 FIBRIN DEGRADATION QUANT: CPT

## 2019-05-24 PROCEDURE — 96372 THER/PROPH/DIAG INJ SC/IM: CPT

## 2019-05-24 PROCEDURE — 72100 X-RAY EXAM L-S SPINE 2/3 VWS: CPT

## 2019-05-24 PROCEDURE — 85610 PROTHROMBIN TIME: CPT

## 2019-05-24 RX ORDER — SODIUM CHLORIDE 0.9 % (FLUSH) 0.9 %
10 SYRINGE (ML) INJECTION PRN
Status: DISCONTINUED | OUTPATIENT
Start: 2019-05-24 | End: 2019-05-28 | Stop reason: SDUPTHER

## 2019-05-24 RX ORDER — MULTIVITAMIN WITH FOLIC ACID 400 MCG
1 TABLET ORAL DAILY
Status: DISCONTINUED | OUTPATIENT
Start: 2019-05-24 | End: 2019-05-29 | Stop reason: HOSPADM

## 2019-05-24 RX ORDER — ONDANSETRON 2 MG/ML
4 INJECTION INTRAMUSCULAR; INTRAVENOUS EVERY 6 HOURS PRN
Status: DISCONTINUED | OUTPATIENT
Start: 2019-05-24 | End: 2019-05-29 | Stop reason: HOSPADM

## 2019-05-24 RX ORDER — ONDANSETRON 4 MG/1
4 TABLET, ORALLY DISINTEGRATING ORAL EVERY 8 HOURS PRN
Status: DISCONTINUED | OUTPATIENT
Start: 2019-05-24 | End: 2019-05-29 | Stop reason: HOSPADM

## 2019-05-24 RX ORDER — ACETAMINOPHEN 325 MG/1
650 TABLET ORAL EVERY 4 HOURS PRN
Status: DISCONTINUED | OUTPATIENT
Start: 2019-05-24 | End: 2019-05-28 | Stop reason: SDUPTHER

## 2019-05-24 RX ORDER — DIPHENOXYLATE HYDROCHLORIDE AND ATROPINE SULFATE 2.5; .025 MG/1; MG/1
1 TABLET ORAL 4 TIMES DAILY PRN
Status: DISCONTINUED | OUTPATIENT
Start: 2019-05-24 | End: 2019-05-29 | Stop reason: HOSPADM

## 2019-05-24 RX ORDER — HYDROCODONE BITARTRATE AND ACETAMINOPHEN 5; 325 MG/1; MG/1
1 TABLET ORAL EVERY 6 HOURS PRN
Status: DISCONTINUED | OUTPATIENT
Start: 2019-05-24 | End: 2019-05-24

## 2019-05-24 RX ORDER — FUROSEMIDE 20 MG/1
20 TABLET ORAL PRN
Status: DISCONTINUED | OUTPATIENT
Start: 2019-05-24 | End: 2019-05-29 | Stop reason: HOSPADM

## 2019-05-24 RX ORDER — PANTOPRAZOLE SODIUM 40 MG/1
40 TABLET, DELAYED RELEASE ORAL DAILY
Status: DISCONTINUED | OUTPATIENT
Start: 2019-05-25 | End: 2019-05-29 | Stop reason: HOSPADM

## 2019-05-24 RX ORDER — ASPIRIN 81 MG/1
324 TABLET, CHEWABLE ORAL ONCE
Status: COMPLETED | OUTPATIENT
Start: 2019-05-24 | End: 2019-05-24

## 2019-05-24 RX ORDER — TIZANIDINE 4 MG/1
4 TABLET ORAL EVERY 6 HOURS PRN
Status: DISCONTINUED | OUTPATIENT
Start: 2019-05-24 | End: 2019-05-29 | Stop reason: HOSPADM

## 2019-05-24 RX ORDER — DOCUSATE SODIUM 100 MG/1
100 CAPSULE, LIQUID FILLED ORAL 2 TIMES DAILY
Status: DISCONTINUED | OUTPATIENT
Start: 2019-05-24 | End: 2019-05-29 | Stop reason: HOSPADM

## 2019-05-24 RX ORDER — POLYETHYLENE GLYCOL 3350 17 G/17G
17 POWDER, FOR SOLUTION ORAL DAILY
Status: DISCONTINUED | OUTPATIENT
Start: 2019-05-24 | End: 2019-05-29 | Stop reason: HOSPADM

## 2019-05-24 RX ORDER — FLUTICASONE PROPIONATE 50 MCG
1 SPRAY, SUSPENSION (ML) NASAL 2 TIMES DAILY
Status: DISCONTINUED | OUTPATIENT
Start: 2019-05-24 | End: 2019-05-29 | Stop reason: HOSPADM

## 2019-05-24 RX ORDER — OXYCODONE HYDROCHLORIDE AND ACETAMINOPHEN 5; 325 MG/1; MG/1
1 TABLET ORAL EVERY 4 HOURS PRN
Status: DISCONTINUED | OUTPATIENT
Start: 2019-05-24 | End: 2019-05-29 | Stop reason: HOSPADM

## 2019-05-24 RX ORDER — SODIUM CHLORIDE 0.9 % (FLUSH) 0.9 %
10 SYRINGE (ML) INJECTION EVERY 12 HOURS SCHEDULED
Status: DISCONTINUED | OUTPATIENT
Start: 2019-05-24 | End: 2019-05-28 | Stop reason: SDUPTHER

## 2019-05-24 RX ADMIN — DOCUSATE SODIUM 100 MG: 100 CAPSULE, LIQUID FILLED ORAL at 12:34

## 2019-05-24 RX ADMIN — HYDROCODONE BITARTRATE AND ACETAMINOPHEN 1 TABLET: 5; 325 TABLET ORAL at 14:55

## 2019-05-24 RX ADMIN — ENOXAPARIN SODIUM 40 MG: 40 INJECTION SUBCUTANEOUS at 12:34

## 2019-05-24 RX ADMIN — FLUTICASONE PROPIONATE 1 SPRAY: 50 SPRAY, METERED NASAL at 20:51

## 2019-05-24 RX ADMIN — NITROGLYCERIN 1 INCH: 20 OINTMENT TOPICAL at 10:39

## 2019-05-24 RX ADMIN — ASPIRIN 81 MG 324 MG: 81 TABLET ORAL at 10:39

## 2019-05-24 RX ADMIN — POLYETHYLENE GLYCOL 3350 17 G: 17 POWDER, FOR SOLUTION ORAL at 14:56

## 2019-05-24 RX ADMIN — THERA TABS 1 TABLET: TAB at 14:55

## 2019-05-24 RX ADMIN — FLUTICASONE PROPIONATE 1 SPRAY: 50 SPRAY, METERED NASAL at 13:21

## 2019-05-24 RX ADMIN — DOCUSATE SODIUM 100 MG: 100 CAPSULE, LIQUID FILLED ORAL at 20:51

## 2019-05-24 RX ADMIN — Medication 10 ML: at 20:52

## 2019-05-24 RX ADMIN — OXYCODONE HYDROCHLORIDE AND ACETAMINOPHEN 1 TABLET: 5; 325 TABLET ORAL at 19:45

## 2019-05-24 RX ADMIN — ACETAMINOPHEN 650 MG: 325 TABLET, FILM COATED ORAL at 12:34

## 2019-05-24 RX ADMIN — NITROGLYCERIN 0.5 INCH: 20 OINTMENT TOPICAL at 17:20

## 2019-05-24 ASSESSMENT — PAIN SCALES - GENERAL
PAINLEVEL_OUTOF10: 5
PAINLEVEL_OUTOF10: 7
PAINLEVEL_OUTOF10: 7
PAINLEVEL_OUTOF10: 8
PAINLEVEL_OUTOF10: 4

## 2019-05-24 ASSESSMENT — ENCOUNTER SYMPTOMS
WHEEZING: 0
ALLERGIC/IMMUNOLOGIC NEGATIVE: 1
VOMITING: 0
NAUSEA: 0
SHORTNESS OF BREATH: 0
ABDOMINAL PAIN: 0
BACK PAIN: 1
STRIDOR: 0
DIARRHEA: 0
COUGH: 0
ABDOMINAL DISTENTION: 0
CONSTIPATION: 0
COLOR CHANGE: 0

## 2019-05-24 ASSESSMENT — PAIN DESCRIPTION - PAIN TYPE: TYPE: CHRONIC PAIN

## 2019-05-24 ASSESSMENT — PAIN DESCRIPTION - LOCATION: LOCATION: BACK

## 2019-05-24 NOTE — ED PROVIDER NOTES
I independently performed a history and physical on Kristal Zimmerman. All diagnostic, treatment, and disposition decisions were made by myself in conjunction with the mid-level provider. Patient has had several recent issues, has been dealing with a numbness down her lateral right leg, she had hip injections done weeks ago, an epidural injection done in the last couple weeks as well, no dysuria, or saddle anesthesia or incontinence. She is also noticed some numbness in her right hand intermittently as well. Today she came in because she was having a squeezing pain in her chest for about 20-30 minutes which she does not routinely have. First EKG shows a first-degree AV block, but no ST elevation or depression. For further details of [facility] emergency department encounter, please see Dennis's documentation. The Ekg interpreted by me shows  normal sinus rhythm with a rate of 73  Axis is   Normal  QTc is  normal  First-degree AV block with VT interval of 346. ST Segments: normal  VT interval remains prolonged when compared to prior EKG from April 1 of this year.            Naman Chinchilla MD  05/24/19 0616

## 2019-05-24 NOTE — ED NOTES
Bed: 21  Expected date:   Expected time:   Means of arrival: [facility]  Comments:  FF [address] [facility], [facility]  05/24/19 8689

## 2019-05-24 NOTE — CONSULTS
[facility]   Cardiac Consultation    Referring Provider:  DENIZ Fernandes CNP     Chief Complaint   Patient presents with    Back Pain     Patient in with complaints of coming out of rehab for recent falls. Woke up this am with back pain and hip pain. No falls in last month just states 7/10 back pain since this morning. No urinary symptoms. History of Present Illness:  81 yo presents with multiple complaints and confusing clinical presentation. Initially awoke with a hard upper back/mid scapular pain described as dull heavy pain with associated SOB. Denies radiation, diaphoresis, palpitations, dizziness or syncope. Had taken a pain pill prior to event with gradual relief of discomfort after 30-45 minutes. Denies cough or fever. Has a hx of GERD and a large hiatal hernia with prior surgery and recurrence. She also states that she was dizzy with R arm weakness resulting in Stroke Alert in the ER. Her head CT was negative for acute CVA. She has chronic lower back pain and RLE weakness. 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 She ate a large meal at 1 PM today. Suspect non-invasive cardiac evaluation will be difficult to accomplish safely or effectively with the constellation of issues above. Past Medical History:   has a past medical history of Arthritis, CAD (coronary artery disease), Cancer ([address] Cystocele, Diabetes mellitus ([address] Hepatitis A, History of blood transfusion, Hyperlipidemia, PVC (premature ventricular contraction), Rectocele, Reflux, and Scoliosis. Surgical History:   has a past surgical history that includes Appendectomy; joint replacement (Left); hernia repair (6 YRS AGO); Cholecystectomy; shoulder surgery (Right, 7/26/12); Bunionectomy (7/26/12); Breast surgery; Upper gastrointestinal endoscopy (11/2/12);  Hysterectomy; bladder suspension; Dilation and curettage of uterus; other surgical history (Left, 09/22/14); Foot surgery; Cardiac catheterization; Colonoscopy (2008); Cystocopy (03/29/2017); other surgical history (Right, 06/28/2018); Cataract removal with implant (Left, 09/13/2018); pr remv cataract extracap,insert lens (Left, 9/13/2018); and Lumbar spine surgery (Right, 5/15/2019). Social History:   reports that she quit smoking about 18 years ago. She has a 7.50 pack-year smoking history. She has never used smokeless tobacco. She reports that she drinks about 0.6 oz of alcohol per week. She reports that she does not use drugs. Family History:  family history includes Cancer in her maternal grandfather; Diabetes in her maternal grandmother. Home Medications:  See List    Allergies:  Morphine     Review of Systems: See [place]  · Constitutional: there has been no unanticipated weight loss. There's been no change in energy level, sleep pattern, or activity level. · Eyes: No visual changes or diplopia. No scleral icterus. · ENT: No Headaches, hearing loss or vertigo. No mouth sores or sore throat. · Cardiovascular: Reviewed in HPI  · Respiratory: No cough or wheezing, no sputum production. No hematemesis. · Gastrointestinal: No abdominal pain, appetite loss, blood in stools. No change in bowel or bladder habits. · Genitourinary: No dysuria, trouble voiding, or hematuria. · Musculoskeletal:  No gait disturbance, weakness or joint complaints. · Integumentary: No rash or pruritis. · Neurological: No headache, diplopia, change in muscle strength, numbness or tingling. No change in gait, balance, coordination, mood, affect, memory, mentation, behavior. · Psychiatric: No anxiety, no depression. · Endocrine: No malaise, fatigue or temperature intolerance. No excessive thirst, fluid intake, or urination. No tremor. · Hematologic/Lymphatic: No abnormal bruising or bleeding, blood clots or swollen lymph nodes. · Allergic/Immunologic: No nasal congestion or hives.     Physical Examination: Vitals:    05/24/19 1204   BP: (!) 168/75   Pulse: 80   Resp: 20   Temp: 98.9 °F (37.2 °C)   SpO2: 94%          Constitutional and General Appearance:   . NAD   SKIN:  .     Warm and dry  EYES:    .     EOMI  Neck:   . Normal carotid contour  Respiratory:  · Normal excursion and expansion without use of accessory muscles  --Prominent kyphoscoliosis. · Resp Auscultation: Normal breath sounds without dullness  Cardiovascular:  · The apical impulses not displaced  · Heart tones are crisp and normal  · Cervical veins are not engorged  · Normal M8E0, No S3, systolic Murmur  · Peripheral pulses are symmetrical and full  Extremities:  · There is no clubbing, cyanosis of the extremities. · No edema  · Femoral Arteries: 2+ and equal  · Pedal Pulses: 2+ and equal   Abdomen:  · No masses or tenderness  · Liver/Spleen: No Abnormalities Noted  Neurological/Psychiatric:  · Alert and oriented in all spheres  · Moves all extremities well-Some assymetric grasp weakness on the right  · No abnormalities of mood, affect, memory, mentation, or behavior are noted    All testing and labs listed below were personally reviewed. Assessment:     1. Acute chest pain-Both typical and atypical features. Difficult to evaluate with noninvasive approach with her inability to walk and marked FAVB. Will need to treat as unstable angina until proven otherwise with current troponin elevation. 2. Chronic pain of both hips    3. Chronic low back pain with right-sided sciatica, unspecified back pain laterality    4. Large hiatal hernia with prior repair and current recurrence. 5.      RUE weakness and recurrent dizziness--She thinks she had a stroke though CT scan was negative.     Plan:  Low dose heparin infusion if ok with neuro  NTG paste  D-dimer   Christ enzymes and ECG  R/O recent stroke  Echo to evaluate Systolic Murmur  Cath when other issues clarified    Thank you for allowing me to participate in the care of this

## 2019-05-24 NOTE — ED PROVIDER NOTES
[facility]        Pt Name: Venancio Rain  MRN: [id number]  [place] 10/16/1932  Date of evaluation: 5/24/2019  Provider: Venancio Arias PA-C  PCP: DENIZ Anthony CNP    This patient was seen and evaluated by the attending physician Dr. Ramos Organ   Patient presents with    Back Pain     Patient in with complaints of coming out of rehab for recent falls. Woke up this am with back pain and hip pain. No falls in last month just states 7/10 back pain since this morning. No urinary symptoms. HISTORY OF PRESENT ILLNESS   (Location/Symptom, Timing/Onset, Context/Setting, Quality, Duration, Modifying Factors, Severity)  Note limiting factors. Venancio Rain is a 80 y.o. female with history of coronary artery disease, arthritis, chronic low back and hip pain who presents to the emergency department complaining of transient chest pain starting this morning. She states that she woke up with this pain. She states that it felt like someone was pressing on her chest from the front and the back. It lasted for about thirty minutes. This is what brought her to the emergency department. Currently, her chest pain is gone. She does have chronic low back and bilateral hip pain, unchanged today. She states that she has had right leg numbness/tingling and a little bit of weakness since a recent epidural in early May by Dr. Tommy Rogers with [facility] orthopedics. Dr. Tommy Rogers has been managing her pain and radicular symptoms. Denies new injury/fall. Nursing Notes were all reviewed and agreed with or any disagreements were addressed  in the HPI. REVIEW OF SYSTEMS    (2-9 systems for level 4, 10 or more for level 5)     Review of Systems   Constitutional: Negative for chills and fever. HENT: Negative. Eyes: Negative for visual disturbance.    Respiratory: Negative for cough, shortness of breath, wheezing and stridor. Cardiovascular: Positive for chest pain. Negative for palpitations and leg swelling. Gastrointestinal: Negative for abdominal distention, abdominal pain, constipation, diarrhea, nausea and vomiting. Endocrine: Negative. Genitourinary: Negative. Musculoskeletal: Positive for arthralgias, back pain and myalgias. Negative for neck pain and neck stiffness. Skin: Negative for color change, pallor, rash and wound. Allergic/Immunologic: Negative. Neurological: Negative for dizziness, tremors, seizures, syncope, facial asymmetry, speech difficulty, weakness, light-headedness, numbness and headaches. Hematological: Negative. Psychiatric/Behavioral: Negative for confusion. All other systems reviewed and are negative. Positives and Pertinent negatives as per HPI. Except as noted abovein the ROS, all other systems were reviewed and negative.        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    Cystocele     Diabetes mellitus ([facility] [address]     type  2 diet controlled    Hepatitis A 1953    History of blood transfusion     hiatal hernia surgery    Hyperlipidemia     PVC (premature ventricular contraction)     Rectocele     Reflux     Scoliosis          SURGICAL HISTORY     Past Surgical History:   Procedure Laterality Date    APPENDECTOMY      BLADDER SUSPENSION      3 SURGERIES/ANTERIOR AND POSTERIOR REPAIR    BREAST SURGERY      RIGHT LUMPECTOMY AND SEVERAL BIOPSIES ON BOTH BREAST    BUNIONECTOMY  7/26/12    BUNIONECTOMY BILATERAL  FEET    CARDIAC CATHETERIZATION      AV block    CATARACT REMOVAL WITH IMPLANT Left 09/13/2018    CHOLECYSTECTOMY      COLONOSCOPY  2008    normal    CYSTOSCOPY  03/29/2017    U-dil    DILATION AND CURETTAGE OF UTERUS SUNCTION    FOOT SURGERY      bilat foot surgeries    HERNIA REPAIR  6 YRS AGO    HIATAL HERNIA REPAIR- WIRED  RIBS    HYSTERECTOMY      VAGINAL    JOINT REPLACEMENT Left     TOTAL KNEE REPLACEMENT LEFT    LUMBAR SPINE SURGERY Right 5/15/2019    RIGHT L4 AND L5 TRANSFORAMINAL EPIDURAL STEROID INJECTION WITH FLUOROSCOPY performed by Nathalie Payne MD at [address] [place] [address] Left 09/22/14    removal rib wire    OTHER SURGICAL HISTORY Right 06/28/2018    PHACO EMULSIFICATION OF CATARACT WITH INTRAOCULAR LENS implant right eye    [place] REMV CATARACT EXTRACAP,INSERT LENS Left 9/13/2018    PHACO EMULSIFICATION OF CATARACT WITH INTRAOCULAR LENS IMPLANT LEFT EYE performed by Allison Ramirez MD at [address] [place] [address] Right 7/26/12    [place] DEPOSIT TAKEN OFF RIGHT SHOULDER    UPPER GASTROINTESTINAL ENDOSCOPY  11/2/12         CURRENTMEDICATIONS       Previous Medications    ACETAMINOPHEN (TYLENOL PO)    Take by mouth    ACETAMINOPHEN-CODEINE #4 PO    Take by mouth    ALENDRONATE (FOSAMAX) 70 MG TABLET    Take 70 mg by mouth once a week    CHOLECALCIFEROL (VITAMIN D3) 2000 UNITS CAPS    Take 2,000 Units by mouth daily    DIPHENOXYLATE-ATROPINE (LOMOTIL) 2.5-0.025 MG PER TABLET    Take 1 tablet by mouth 4 times daily as needed. E[employer] H[employer]les DOCUSATE SODIUM (COLACE) 100 MG CAPSULE    Take 100 mg by mouth 2 times daily    ESOMEPRAZOLE MAGNESIUM (NEXIUM) 20 MG PACK    Take 20 mg by mouth daily Took 20 mg Nexium in am and 20 Mg Pepcid at bedtime. FLUTICASONE (FLONASE) 50 MCG/ACT NASAL SPRAY    1 spray by Each Nare route 2 times daily    FUROSEMIDE (LASIX) 20 MG TABLET    Take 20 mg by mouth as needed (MAINLY WHEN TRAVELS FOR LEG SWELLING).     GUAIFENESIN (MUCINEX PO)    Take by mouth    GUAIFENESIN 400 MG TABLET    Take 400 mg by mouth 2 times daily Unsure of exact dose    MULTIPLE VITAMINS-MINERALS (OCUVITE ADULT FORMULA PO)    Take 1 tablet by mouth daily    NALOXONE 4 MG/0.1ML LIQD NASAL SPRAY    1 spray by Nasal route as needed for Opioid Reversal    OMEPRAZOLE (PRILOSEC PO)    Take by mouth    ONDANSETRON (ZOFRAN ODT) 4 MG DISINTEGRATING TABLET    Take 1 tablet by mouth every 8 hours as needed for Nausea or Vomiting    OXYCODONE-ACETAMINOPHEN (PERCOCET)  MG PER TABLET    Take 1 tablet by mouth every 4 hours as needed for Pain for up to 3 days. .    OXYCODONE-ACETAMINOPHEN (PERCOCET) 7.5-325 MG PER TABLET    Take 1 tablet by mouth 2 times daily as needed for Pain for up to 7 days. POTASSIUM (POTASSIMIN PO)    Take 20 mg by mouth    TIZANIDINE (ZANAFLEX) 2 MG TABLET    Take 2 tablets by mouth every 6 hours as needed (spasms)         ALLERGIES     Morphine    FAMILYHISTORY       Family History   Problem Relation Age of Onset    Diabetes Maternal Grandmother         type 2, great grandma type1    Cancer Maternal Grandfather         rectal and  scrotal cancer          SOCIAL HISTORY       Social History     Socioeconomic History    Marital status:      Spouse name: None    Number of children: None    Years of education: None    Highest education level: None   Occupational History    None   Social Needs    Financial resource strain: None    Food insecurity:     Worry: None     Inability: None    Transportation needs:     Medical: None     Non-medical: None   Tobacco Use    Smoking status: Former Smoker     Packs/day: 0.50     Years: 15.00     Pack years: 7.50     Last attempt to quit: 2001     Years since quittin.4    Smokeless tobacco: Never Used    Tobacco comment: quit -    Substance and Sexual Activity    Alcohol use:  Yes     Alcohol/week: 0.6 oz     Types: 1 Glasses of wine per week     Comment: rare wine     Drug use: No    Sexual activity: None   Lifestyle    Physical activity:     Days per week: None     Minutes per session: None    Stress: None   Relationships    Social connections:     Talks on phone: None     Gets together: None     Attends Evangelical service: None     Active member of club or organization: None     Attends meetings of clubs or organizations: None     Relationship status: None    Intimate partner violence:     Fear of current or ex partner: None     Emotionally abused: None     Physically abused: None     Forced sexual activity: None   Other Topics Concern    None   Social History Narrative    None       SCREENINGS             PHYSICAL EXAM    (up to 7 for level 4, 8 or more for level 5)     ED Triage Vitals [05/24/19 0843]   BP Temp Temp Source Pulse Resp SpO2 Height Weight   (!) 158/66 98.4 °F (36.9 °C) Oral 74 16 99 % -- --       Physical Exam   Constitutional: She is oriented to person, place, and time. She appears well-developed and well-nourished. No distress. HENT:   Head: Normocephalic and atraumatic. Right Ear: External ear normal.   Left Ear: External ear normal.   Nose: Nose normal.   Mouth/Throat: Oropharynx is clear and moist.   Eyes: Pupils are equal, round, and reactive to light. Conjunctivae and EOM are normal. Right eye exhibits no discharge. Left eye exhibits no discharge. No scleral icterus. Neck: Normal range of motion. No JVD present. Cardiovascular: Normal rate. Pulses:       Femoral pulses are 2+ on the right side, and 2+ on the left side. Popliteal pulses are 2+ on the right side, and 2+ on the left side. Dorsalis pedis pulses are 2+ on the right side, and 2+ on the left side. Posterior tibial pulses are 2+ on the right side, and 2+ on the left side. Pulmonary/Chest: Effort normal and breath sounds normal.   Abdominal: Soft. Bowel sounds are normal. She exhibits no distension, no abdominal bruit and no pulsatile midline mass. There is no tenderness. There is no rigidity, no rebound, no guarding, no CVA tenderness, no tenderness at McBurney's point and negative Tao's sign. Musculoskeletal:        Right hip: She exhibits decreased range of motion, decreased strength and tenderness.         Left hip: She exhibits tenderness. She exhibits normal range of motion and normal strength. Right knee: Normal.        Left knee: Normal.        Right ankle: Normal. Achilles tendon normal.        Left ankle: Normal. Achilles tendon normal.        Cervical back: Normal.        Thoracic back: Normal.        Lumbar back: She exhibits tenderness (bilateral musculature). She exhibits normal range of motion, no bony tenderness, no swelling, no edema, no deformity and no laceration. Right foot: Normal.        Left foot: Normal.   No foot drop or saddle paresthesia  Has subjective numbness/tingling in right leg, unchanged from her baseline. Positive SLR in RLE. No midline or other bony tenderness/step off deformity   Lymphadenopathy:     She has no cervical adenopathy. Neurological: She is alert and oriented to person, place, and time. Skin: Skin is warm and dry. Capillary refill takes less than 2 seconds. No rash noted. She is not diaphoretic. No erythema. No pallor. Psychiatric: She has a normal mood and affect. Her behavior is normal.   Nursing note and vitals reviewed.       DIAGNOSTIC RESULTS   LABS:    Labs Reviewed   CBC WITH AUTO DIFFERENTIAL - Abnormal; Notable for the following components:       Result Value    RBC 3.78 (*)     All other components within normal limits    Narrative:     Performed at:  [facility] [address], [place], [address] [facility]   Phone [phone]   COMPREHENSIVE METABOLIC PANEL - Abnormal; Notable for the following components:    Glucose 118 (*)     Total Protein 6.2 (*)     All other components within normal limits    Narrative:     Performed at:  [facility] [address], [place], [address]   Phone [phone]   TROPONIN    Narrative:     Performed at:  [facility] [address], [place], [address]   Phone [phone]   APTT    Narrative:     Performed at:  [facility]  [address], [address]   Phone [phone]   PROTIME-INR    Narrative:     Performed at:  [facility]  [address], [address]   Phone [phone]   BRAIN NATRIURETIC PEPTIDE    Narrative:     Performed at:  [facility]  [address] [facility],  [place], [address]   Phone [phone]   MAGNESIUM    Narrative:     Performed at:  [facility]  [address],  [place], [address]   Phone [phone]       All other labs were within normal range or not returned as of this dictation. EKG: All EKG's are interpreted by the Emergency Department Physician who either signs orCo-signs this chart in the absence of a cardiologist.  Please see their note for interpretation of EKG. RADIOLOGY:   Non-plain film images such as CT, Ultrasound and MRI are read by the radiologist. Plain radiographic images are visualized andpreliminarily interpreted by the  ED Provider with the below findings:        Interpretation [facility] Radiologist below, if available at the time of this note:    CT HEAD WO CONTRAST   Final Result   No acute intracranial abnormality. XR PELVIS (MIN 3 VIEWS)   Preliminary Result   1. No acute osseous abnormality of the pelvis. 2. Bilateral hip osteoarthritis. 3. Osteopenia. XR LUMBAR SPINE (2-3 VIEWS)   Final Result   Limited radiographs due to osteopenia and patient habitus. No definitive new   fracture or acute findings although consider further evaluation with CT or   MRI if there is persistent or acute symptomatology given limitations of these   radiographs         XR CHEST STANDARD (2 VW)   Final Result   Stable cardiomegaly. Large hiatal hernia.                  PROCEDURES   Unless otherwise noted below, none     Procedures    CRITICAL CARE TIME   N/A    CONSULTS:  Landon Morse Dr. Hetal Stroud will admit (1992)    EMERGENCY DEPARTMENT COURSE and DIFFERENTIALDIAGNOSIS/MDM:   Vitals:    Vitals:    05/24/19 0843 05/24/19 0945 05/24/19 0955   BP: (!) 158/66  (!) 152/51   Pulse: 74 75    Resp: 16 17    Temp: 98.4 °F (36.9 °C)     TempSrc: Oral     SpO2: 99% 98%        Patient was given thefollowing medications:  Medications   nitroglycerin (NITRO-BID) 2 % ointment 1 inch (1 inch Topical Given 5/24/19 1039)   aspirin chewable tablet 324 mg (324 mg Oral Given 5/24/19 1039)       This patient presents to the emergency department complaining of transient chest pain. She is currently chest pain-free, therefore nitroglycerin paste and full strength aspirin applied. She reports right leg numbness tingling sensation and discomfort, worsened with straight leg raise evaluation. She has no foot drop at this time. Denies bowel or bladder incontinence or retention. This has been an issue for the patient for several weeks and has already been addressed with her pain specialist, Dr. Ami Connor. X-rays appears stable at this time. CT head shows no acute intracranial abnormality. EKG appears stable at this time. She does have several risk factors, therefore we do feel admission is warranted for further evaluation. She is currently chest pain-free. My suspicion is low for STEMI, PE, myocarditis, pericarditis, endocarditis, acute pulmonary edema, pleural effusion, pericardial effusion, cardiac tamponade, cardiomyopathy, CHF exacerbation, thoracic aortic dissection, esophageal rupture, other life-threatening arrhythmia, hypertensive urgency or emergency, hemothorax, pulmonary contusion, subcutaneous emphysema, flail chest, pneumo mediastinum, rib fracture, pneumonia, pneumothorax, carotid dissection, sinus abscess, acute fracture, acute CVA, ICH, SAH, TIA, meningitis, encephalitis, pseudotumor cerebri, temporal arteritis, sentinel bleed from ruptured aneurysm, hypertensive emergency, subdural hematoma, epidural hematoma,  Acute fracture or dislocation, epidural abscess or hematoma, discitis, meningitis, encephalitis, transverse myelitis, pyelonephritis, perinephric abscess, urosepsis, kidney stone, AAA, dissection, shingles,  or other concerning pathology. hospitalist will admit. We have addressed concerns and expectations. FINAL IMPRESSION      1. Acute chest pain    2. Chronic pain of both hips    3. Chronic low back pain with right-sided sciatica, unspecified back pain laterality          DISPOSITION/PLAN   DISPOSITION Decision To Admit 05/24/2019 10:28:13 AM      PATIENT REFERREDTO:  No follow-up provider specified.     DISCHARGE MEDICATIONS:  New Prescriptions    No medications on file       DISCONTINUED MEDICATIONS:  Discontinued Medications    No medications on file              (Please note that portions ofthis note were completed with a voice recognition program.  Efforts were made to edit the dictations but occasionally words are mis-transcribed.)    Wolf Armando PA-C (electronically signed)           Wolf Armando PA-C  05/24/19 4194

## 2019-05-24 NOTE — ED NOTES
Patient in with history of falls. Patient states she was in a rehab unit a month ago for the frequent falls. Patient states she has not had a fall for almost a month now. No injury noted this am patient just states she woke up with 7/10 pain in lower back and hip. States her leg feels numb at times when the pain comes on strong. Vital signs stable. IV established and blood sent to lab. Denies needs at this time. Will continue to monitor.       Angelina Kawasaki, RN  05/24/19 2403

## 2019-05-24 NOTE — H&P
Hospital Medicine History & Physical      PCP: DNEIZ aMrcum - CNP    Date of Admission: 5/24/2019    Date of Service: Pt seen/examined on 5/24/19  and  Placed in Observation. Chief Complaint:  Back pain and chest pain       History Of Present Illness:       80 y.o. female  With h/o first degree AV block, DJD  In hips and back  Woke up this morning with upper back pain. It wrapped around to the front and she felt SOB,. It lasted  For 30 minutes or so. She was brought in for eval. It did not recur after that. Pt denies any URI or fever or heart burn. Denies any prior cardiac history. Had stress test in 2010. Having right leg numbness since hip injections by PMR  Which did not improve after RAIN.      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    Cystocele     Diabetes mellitus ([facility] [address]     type  2 diet controlled    Hepatitis A 1953    History of blood transfusion     hiatal hernia surgery    Hyperlipidemia     PVC (premature ventricular contraction)     Rectocele     Reflux     Scoliosis        Past Surgical History:          Procedure Laterality Date    APPENDECTOMY      BLADDER SUSPENSION      3 SURGERIES/ANTERIOR AND POSTERIOR REPAIR    BREAST SURGERY      RIGHT LUMPECTOMY AND SEVERAL BIOPSIES ON BOTH BREAST    BUNIONECTOMY  7/26/12    BUNIONECTOMY BILATERAL  FEET    CARDIAC CATHETERIZATION      AV block    CATARACT REMOVAL WITH IMPLANT Left 09/13/2018    CHOLECYSTECTOMY      COLONOSCOPY  2008    normal    CYSTOSCOPY  03/29/2017    U-dil    DILATION AND CURETTAGE OF UTERUS      SUNCTION    FOOT SURGERY      bilat foot surgeries    HERNIA REPAIR  6 YRS AGO    HIATAL HERNIA REPAIR- WIRED  RIBS    HYSTERECTOMY      VAGINAL    JOINT REPLACEMENT Left     TOTAL KNEE REPLACEMENT LEFT    LUMBAR SPINE SURGERY Right 5/15/2019    RIGHT L4 AND L5 TRANSFORAMINAL EPIDURAL STEROID INJECTION WITH FLUOROSCOPY performed by Noa Alford MD at [address] [place] [address] Left 09/22/14    removal rib wire    OTHER SURGICAL HISTORY Right 06/28/2018    PHACO EMULSIFICATION OF CATARACT WITH INTRAOCULAR LENS implant right eye    FL REMV CATARACT EXTRACAP,INSERT LENS Left 9/13/2018    PHACO EMULSIFICATION OF CATARACT WITH INTRAOCULAR LENS IMPLANT LEFT EYE performed by Julia Hernandez MD at [facility] Right 7/26/12    CA DEPOSIT TAKEN OFF RIGHT SHOULDER    UPPER GASTROINTESTINAL ENDOSCOPY  11/2/12       Medications Prior to Admission:      Prior to Admission medications    Medication Sig Start Date End Date Taking? Authorizing Provider   Omeprazole (PRILOSEC PO) Take by mouth    Historical Provider, MD   guaiFENesin (MUCINEX PO) Take by mouth    Historical Provider, MD   ACETAMINOPHEN-CODEINE #4 PO Take by mouth    Historical Provider, MD   Acetaminophen (TYLENOL PO) Take by mouth    Historical Provider, MD   oxyCODONE-acetaminophen (PERCOCET) 7.5-325 MG per tablet Take 1 tablet by mouth 2 times daily as needed for Pain for up to 7 days. 5/1/19 5/8/19  Noa Alford MD   naloxone 4 MG/0.1ML LIQD nasal spray 1 spray by Nasal route as needed for Opioid Reversal 5/1/19   Noa Alford MD   tiZANidine (ZANAFLEX) 2 MG tablet Take 2 tablets by mouth every 6 hours as needed (spasms) 4/11/19   Taran Ye MD   esomeprazole Magnesium (NEXIUM) 20 MG PACK Take 20 mg by mouth daily Took 20 mg Nexium in am and 20 Mg Pepcid at bedtime.     Historical Provider, MD houFENesin 400 MG tablet Take 400 mg by mouth 2 times daily Unsure of exact dose    Historical Provider, MD   fluticasone (FLONASE) 50 MCG/ACT nasal spray 1 spray by Each Nare route 2 times daily    Historical Provider, MD   Multiple Vitamins-Minerals (OCUVITE ADULT FORMULA PO) Take 1 tablet by mouth daily    Historical Provider, MD docusate sodium (COLACE) 100 MG capsule Take 100 mg by mouth 2 times daily    Historical Provider, MD   alendronate (FOSAMAX) 70 MG tablet Take 70 mg by mouth once a week 3/21/19   Historical Provider, MD   oxyCODONE-acetaminophen (PERCOCET)  MG per tablet Take 1 tablet by mouth every 4 hours as needed for Pain for up to 3 days. . 2/27/19 4/3/19  Briseida Whalen MD   Potassium (POTASSIMIN PO) Take 20 mg by mouth    Historical Provider, MD   Cholecalciferol (VITAMIN D3) 2000 UNITS CAPS Take 2,000 Units by mouth daily    Historical Provider, MD   ondansetron (ZOFRAN ODT) 4 MG disintegrating tablet Take 1 tablet by mouth every 8 hours as needed for Nausea or Vomiting 11/19/15   Bebe Schmid MD   diphenoxylate-atropine (LOMOTIL) 2.5-0.025 MG per tablet Take 1 tablet by mouth 4 times daily as needed. . 12/19/14   Historical Provider, MD   furosemide (LASIX) 20 MG tablet Take 20 mg by mouth as needed (MAINLY WHEN TRAVELS FOR LEG SWELLING). Historical Provider, MD       Allergies:  Morphine    Social History:      The patient currently lives at [facility]     TOBACCO:   reports that she quit smoking about 18 years ago. She has a 7.50 pack-year smoking history. She has never used smokeless tobacco.  ETOH:   reports that she drinks about 0.6 oz of alcohol per week. Family History:       Reviewed in detail and negative for DM, CAD, Cancer, CVA. Positive as follows:        Problem Relation Age of Onset    Diabetes Maternal Grandmother         type 2, great grandma type1    Cancer Maternal Grandfather         rectal and  scrotal cancer       REVIEW OF SYSTEMS:   Pertinent positives as noted in the HPI. All other systems reviewed and negative. PHYSICAL EXAM PERFORMED:    BP (!) 152/51   Pulse 75   Temp 98.4 °F (36.9 °C) (Oral)   Resp 17   SpO2 98%     General appearance:  No apparent distress, appears stated age and cooperative.   HEENT:  Normal cephalic, atraumatic without obvious deformity. Pupils equal, round, and reactive to light. Extra ocular muscles intact. Conjunctivae/corneas clear. Neck: Supple, with full range of motion. No jugular venous distention. Trachea midline. Respiratory:  Normal respiratory effort. Clear to auscultation, bilaterally without Rales/Wheezes/Rhonchi. Cardiovascular:  Regular rate and rhythm with normal S1/S2 without murmurs, rubs or gallops. Abdomen: Soft, non-tender, non-distended with normal bowel sounds. Musculoskeletal:  No clubbing, cyanosis or edema bilaterally. Full range of motion without deformity. Skin: Skin color, texture, turgor normal.  No rashes or lesions. Neurologic:  Neurovascularly intact without any focal sensory/motor deficits. Cranial nerves: II-XII intact, grossly non-focal.  Psychiatric:  Alert and oriented, thought content appropriate, normal insight  Capillary Refill: Brisk,< 3 seconds   Peripheral Pulses: +2 palpable, equal bilaterally       Labs:     Recent Labs     05/24/19  0905   WBC 4.8   HGB 12.1   HCT 36.6        Recent Labs     05/24/19  0905      K 4.1      CO2 25   BUN 19   CREATININE 0.8   CALCIUM 9.0     Recent Labs     05/24/19  0905   AST 17   ALT 12   BILITOT 0.4   ALKPHOS 53     Recent Labs     05/24/19  0905   INR 0.96     Recent Labs     05/24/19  0905   TROPONINI <0.01       Urinalysis:      Lab Results   Component Value Date    NITRU Negative 04/06/2019    WBCUA 6-10 02/24/2019    BACTERIA 4+ 02/24/2019    RBCUA 3-5 02/24/2019    BLOODU Negative 04/06/2019    SPECGRAV 1.010 04/06/2019    GLUCOSEU Negative 04/06/2019       Radiology:       EKG:  I have reviewed the EKG with the following interpretation: first degree AV block     CT HEAD WO CONTRAST   Final Result   No acute intracranial abnormality. XR PELVIS (MIN 3 VIEWS)   Preliminary Result   1. No acute osseous abnormality of the pelvis. 2. Bilateral hip osteoarthritis. 3. Osteopenia.          XR LUMBAR SPINE (2-3 VIEWS)

## 2019-05-24 NOTE — CONSULTS
NEUROLOGY CONSULTATION     Patient's Name :   Anil Boland        YOB: 1932                    Date of Consultation : 5/24/2019 5:26 PM    Referring Physician :  Cristian Teran MD    Reason for Consultation :    Right-sided numbness    HISTORY OF PRESENT ILLNESS :    Anusha Hurtado is a 80 y.o. female   History was obtained from patient and from dictations in the chart  Patient states that a month ago she had an injection to the right hip and later she developed right leg numbness. She also had epidural state or injections but the right leg has remained numb. Several weeks ago patient developed some episodic numbness in the right hand and arm. This seems to come and go. It lasts for a few minutes each time and if she would shake the arm it gets better. Patient denies any vertigo or diplopia. She developed significant chest pain this morning and was found to have a slightly elevated troponin level she was admitted for cardiac evaluation. I was asked to see her for neurological consultation regarding the numbness and the since the patient was concerned about a possible stroke. REVIEW OF SYSTEMS  Positive for chest pain. Denies any palpitations. Has occasional breathlessness. No abdominal pain fever or weight loss. Has chronic back pain.   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    Cystocele     Diabetes mellitus ([facility] [address]     type  2 diet controlled    Hepatitis A 1953    History of blood transfusion     hiatal hernia surgery    Hyperlipidemia     PVC (premature ventricular contraction)     Rectocele     Reflux     Scoliosis      Family History   Problem Relation Age of Onset    Diabetes Maternal Grandmother         type 2, great grandma type1    Cancer Maternal Grandfather         rectal and  scrotal cancer     Social History     Socioeconomic History    Marital status:      Spouse name: None    Number of children: None    Years of education: None    Highest education level: None   Occupational History    None   Social Needs    Financial resource strain: None    Food insecurity:     Worry: None     Inability: None    Transportation needs:     Medical: None     Non-medical: None   Tobacco Use    Smoking status: Former Smoker     Packs/day: 0.50     Years: 15.00     Pack years: 7.50     Last attempt to quit: 2001     Years since quittin.4    Smokeless tobacco: Never Used    Tobacco comment: quit -    Substance and Sexual Activity    Alcohol use:  Yes     Alcohol/week: 0.6 oz     Types: 1 Glasses of wine per week     Comment: rare wine     Drug use: No    Sexual activity: None   Lifestyle    Physical activity:     Days per week: None     Minutes per session: None    Stress: None   Relationships    Social connections:     Talks on phone: None     Gets together: None     Attends Zoroastrian service: None     Active member of club or organization: None     Attends meetings of clubs or organizations: None     Relationship status: None    Intimate partner violence:     Fear of current or ex partner: None     Emotionally abused: None     Physically abused: None     Forced sexual activity: None   Other Topics Concern    None   Social History Narrative    None     Current Facility-Administered Medications   Medication Dose Route Frequency Provider Last Rate Last Dose    diphenoxylate-atropine (LOMOTIL) 2.5-0.025 MG per tablet 1 tablet  1 tablet Oral 4x Daily PRN Clotilde Cohen MD        docusate sodium (COLACE) capsule 100 mg  100 mg Oral BID Clotilde Cohen MD   100 mg at 19 1234    [START ON 5/25/2019] pantoprazole (PROTONIX) tablet 40 mg  40 mg Oral Daily Rayna Whitaker MD        fluticasone (FLONASE) 50 MCG/ACT nasal spray 1 spray  1 spray Each Nostril BID Rayna Whitaekr MD   1 spray at 05/24/19 1321    furosemide (LASIX) tablet 20 mg  20 mg Oral PRN Rayna Whitaker MD        ondansetron (ZOFRAN-ODT) disintegrating tablet 4 mg  4 mg Oral Q8H PRN Rayna Whitaker MD        tiZANidine (ZANAFLEX) tablet 4 mg  4 mg Oral Q6H PRN Rayna Whitaker MD        sodium chloride flush 0.9 % injection 10 mL  10 mL Intravenous 2 times per day Rayna Whitaker MD        sodium chloride flush 0.9 % injection 10 mL  10 mL Intravenous PRN Rayna Whitaker MD        magnesium hydroxide (MILK OF MAGNESIA) 400 MG/5ML suspension 30 mL  30 mL Oral Daily PRN Rayna Whitaker MD        ondansetron (ZOFRAN) injection 4 mg  4 mg Intravenous Q6H PRN Rayna Whitaker MD        enoxaparin (LOVENOX) injection 40 mg  40 mg Subcutaneous Daily Rayna Whitaker MD   40 mg at 05/24/19 1234    acetaminophen (TYLENOL) tablet 650 mg  650 mg Oral Q4H PRN Rayna Whitaker MD   650 mg at 05/24/19 1234    regadenoson (LEXISCAN) injection 0.4 mg  0.4 mg Intravenous ONCE PRN Rayna Whitaker MD        polyethylene glycol (GLYCOLAX) packet 17 g  17 g Oral Daily Rayna Whitaker MD   17 g at 05/24/19 1456    multivitamin 1 tablet  1 tablet Oral Daily Rayna Whitaker MD   1 tablet at 05/24/19 1455    [START ON 5/25/2019] aspirin EC tablet 325 mg  325 mg Oral Daily Rayna Whitaker MD        oxyCODONE-acetaminophen (PERCOCET) 5-325 MG per tablet 1 tablet  1 tablet Oral Q4H PRN Rayna Whitaker MD        nitroglycerin (NITRO-BID) 2 % ointment 0.5 inch  0.5 inch Topical 4 times per day Alexus Haynes MD   0.5 inch at 05/24/19 1720    perflutren lipid microspheres (DEFINITY) injection 1.65 mg  1.5 mL Intravenous ONCE PRN Alexus Haynes MD         Allergies   Allergen Reactions  Morphine Other (See Comments)     MIGRAINE HEADACHE       PHYSICAL EXAMINATION:  BP (!) 155/74   Pulse 75   Temp 98.4 °F (36.9 °C) (Temporal)   Resp 20   Ht 5' (1.524 m)   Wt 149 lb (67.6 kg)   SpO2 96%   BMI 29.10 kg/m²   Appearance: Well appearing, well nourished and in no distress  Mental Status Exam: Patient is alert, oriented to person, place and time. Recent and remote memory is normal  Fund of Knowledge is normal  Attention/concentration is normal.   Speech : No dysarthria  Language : No aphasia  Funduscopic Exam: sharp disc margins  Cranial Nerves:   II: Visual fields:  Full to confrontation  III: Pupils:  equal, round, reactive to light  III,IV,VI: Extra Ocular Movements are intact. No nystagmus  V: Facial sensation is intact to pin prick and light touch  VII: Facial strength and movements: intact and symmetric smile,cheek puffing and eyebrow elevation  VIII: Hearing:  Intact to finger rub bilaterally  IX: Palate  elevation is symmetric  XI: Shoulder shrug is intact  XII: Tongue movements are normal  Motor:  Muscle tone and bulk are normal.   Strength is symmetrical 4+ /5 in all four extremities. Sensory: Decreased to light touch and  pin prick in bilateral distal lower extremities probably right more than left  Coordination:  Normal  Finger to Nose and impaired Heel to Shin bilaterally    . Reflexes:  DTR 1 in the upper extremities and diminished in the lower extremities Plantar response: Flexor bilaterally  Gait: Gait is impaired and patient uses a walker to ambulate   Romberg: negative  Vascular: No carotid bruit bilaterally        DATA:  LABS:  General Labs:    CBC:   Lab Results   Component Value Date    WBC 4.8 05/24/2019    RBC 3.78 05/24/2019    HGB 12.1 05/24/2019    HCT 36.6 05/24/2019    MCV 96.9 05/24/2019    MCH 32.0 05/24/2019    MCHC 33.0 05/24/2019    RDW 14.4 05/24/2019     05/24/2019    MPV 6.6 05/24/2019     BMP:    Lab Results   Component Value Date     05/24/2019    K 4.1 05/24/2019    K 4.2 04/01/2019     05/24/2019    CO2 25 05/24/2019    BUN 19 05/24/2019    LABALBU 4.0 05/24/2019    CREATININE 0.8 05/24/2019    CALCIUM 9.0 05/24/2019    GFRAA >60 05/24/2019    GFRAA 56 01/10/2010    LABGLOM >60 05/24/2019    GLUCOSE 118 05/24/2019     RADIOLOGY REVIEW:  I have reviewed radiology image(s) and reports(s) of:  CT scan of the head    IMPRESSION :  Right arm numbness is probably due to mononeuropathy. Right leg numbness is probably due to lumbar disc disease. CT head did not show any acute stroke. TIA will be considered in the differential diagnosis. Patient Active Problem List   Diagnosis    Spinal stenosis, lumbar region, without neurogenic claudication    Spinal stenosis of thoracic region    Scoliosis (and kyphoscoliosis), idiopathic    Displacement of thoracic intervertebral disc without myelopathy    Displacement of lumbar intervertebral disc without myelopathy    Thoracic or lumbosacral neuritis or radiculitis, unspecified    Disc displacement, lumbar    Disc displacement, thoracic    Scoliosis    Lumbar stenosis    Thoracic stenosis    Cervicalgia    Hypoxia    Closed fracture of proximal end of left humerus with routine healing    Urinary tract infection without hematuria    T12 compression fracture (HCC)    Thoracic compression fracture, sequela    Mild malnutrition (HCC)    Acute chest pain     RECOMMENDATIONS :  Discussed with patient  Chest pain workup as per cardiology  I will get carotid Doppler studies  She may benefit from EMG and nerve conduction study of the right upper extremity as an outpatient  Thank you for this consultation. Please note a portion of this chart was generated using dragon dictation software. Although every effort was made to ensure the accuracy of this automated transcription, some errors in transcription may have occurred.

## 2019-05-24 NOTE — PROGRESS NOTES
4 Eyes Skin Assessment     The patient is being assess for  Admission    I agree that 2 RN's have performed a thorough Head to Toe Skin Assessment on the patient. ALL assessment sites listed below have been assessed.        Areas assessed by both nurses: Juan Altamirano RN; Melisa Knight RN  [x]   Head, Face, and Ears   [x]   Shoulders, Back, and Chest  [x]   Arms, Elbows, and Hands   [x]   Coccyx, Sacrum, and IschIum  [x]   Legs, Feet, and Heels        Does the Patient have Skin Breakdown?  no         Nael Prevention initiated:  NA   Wound Care Orders initiated:  NA      WOC nurse consulted for Pressure Injury (Stage 3,4, Unstageable, DTI, NWPT, and Complex wounds), New and Established Ostomies:  No      Nurse 1 eSignature: Electronically signed by Alisha Lea RN on 5/24/19 at 12:26 PM    **SHARE this note so that the co-signing nurse is able to place an eSignature**    Nurse 2 eSignature: Electronically signed by Theresa Ross RN on 5/24/19 at 12:29 PM

## 2019-05-24 NOTE — ED NOTES
Patient returned from [address]. Family at bedside. Updated on plan of care.       Mary Kay Coffman RN  05/24/19 [id number]

## 2019-05-24 NOTE — DISCHARGE INSTR - COC
Continuity of Care Form    Patient Name: Byron Cerda   :  10/16/1932  MRN:  [id number]    Admit date:  2019  Discharge date:  ***    Code Status Order: Full Code   Advance Directives:     Admitting Physician:  Aristeo Cruz MD  PCP: DENIZ Wen CNP    Discharging Nurse: [facility] Unit/Room#: 5IR-[id number]/[id number]  Discharging Unit Phone Number: ***    Emergency Contact:   Extended Emergency Contact Information  Primary Emergency Contact: Chloé Allred  Address: [address] [place] [address] of [address]  Phone: [phone]  Relation: Child  Secondary Emergency Contact: Roxana Shankar  Address: SON IN LAW   [facility] of [address] [facility] Phone: [phone]  Work Phone: [phone]  Relation: Other    Past Surgical History:  Past Surgical History:   Procedure Laterality Date    APPENDECTOMY      BLADDER SUSPENSION      3 SURGERIES/ANTERIOR AND POSTERIOR REPAIR    BREAST SURGERY      RIGHT LUMPECTOMY AND SEVERAL BIOPSIES ON BOTH BREAST    BUNIONECTOMY  12    BUNIONECTOMY BILATERAL  FEET    CARDIAC CATHETERIZATION      AV block    CATARACT REMOVAL WITH IMPLANT Left 2018    CHOLECYSTECTOMY      COLONOSCOPY  2008    normal    CYSTOSCOPY  2017    U-dil    DILATION AND CURETTAGE OF UTERUS      SUNCTION    FOOT SURGERY      bilat foot surgeries    HERNIA REPAIR  6 YRS AGO    HIATAL HERNIA REPAIR- WIRED  RIBS    HYSTERECTOMY      VAGINAL    JOINT REPLACEMENT Left     TOTAL KNEE REPLACEMENT LEFT    LUMBAR SPINE SURGERY Right 5/15/2019    RIGHT L4 AND L5 TRANSFORAMINAL EPIDURAL STEROID INJECTION WITH FLUOROSCOPY performed by Basim Valencia MD at [address] [place] [address] Left 14    removal rib wire    OTHER SURGICAL HISTORY Right 2018    PHACO EMULSIFICATION OF CATARACT WITH INTRAOCULAR LENS implant right eye    IA REMV CATARACT EXTRACAP,INSERT LENS Left 2018    PHACO EMULSIFICATION OF CATARACT WITH INTRAOCULAR LENS IMPLANT LEFT EYE performed by Latrice Kay MD at [facility] Right 7/26/12    CA DEPOSIT TAKEN OFF RIGHT SHOULDER    UPPER GASTROINTESTINAL ENDOSCOPY  11/2/12       Immunization History: There is no immunization history on file for this patient.     Active Problems:  Patient Active Problem List   Diagnosis Code    Spinal stenosis, lumbar region, without neurogenic claudication M48.061    Spinal stenosis of thoracic region M48.04    Scoliosis (and kyphoscoliosis), idiopathic M41.20    Displacement of thoracic intervertebral disc without myelopathy M51.24    Displacement of lumbar intervertebral disc without myelopathy M51.26    Thoracic or lumbosacral neuritis or radiculitis, unspecified [id number]    Disc displacement, lumbar M51.26    Disc displacement, thoracic M51.24    Scoliosis M41.9    Lumbar stenosis M48.061    Thoracic stenosis M48.04    Cervicalgia M54.2    Hypoxia R09.02    Closed fracture of proximal end of left humerus with routine healing S42.202D    Urinary tract infection without hematuria N39.0    T12 compression fracture (HCC) S22.080A    Thoracic compression fracture, sequela S22.000S    Mild malnutrition (HCC) E44.1    Chest pain R07.9       Isolation/Infection:   Isolation          No Isolation            Nurse Assessment:  Last Vital Signs: BP (!) 168/75   Pulse 80   Temp 98.9 °F (37.2 °C) (Oral)   Resp 20   SpO2 94%     Last documented pain score (0-10 scale): Pain Level: 7  Last Weight:   Wt Readings from Last 1 Encounters:   05/01/19 156 lb 15.5 oz (71.2 kg)     Mental Status:  {IP PT MENTAL STATUS:32916}    IV Access:  {[facility] IV ACCESS:953534582}    Nursing Mobility/ADLs:  Walking   {CHP DME TIHH:728522282}  Transfer  {CHP DME PNXF:123683285}  Bathing  {CHP DME ILKW:346453140}  Dressing  {CHP DME GACN:878045640}  Toileting  {CHP DME CDPL:870039428}  Feeding  {CHP DME VDGL:091661331}  Med Admin  {CHP DME ECGL:942562668}  Med Delivery   [address] MED Delivery:486513579}    Wound Care Documentation and Therapy:        Elimination:  Continence:   · Bowel: {YES / PC:44950}  · Bladder: {YES / CT:05136}  Urinary Catheter: {Urinary Catheter:016881807}   Colostomy/Ileostomy/Ileal Conduit: {YES / IK:88367}       Date of Last BM: ***    Intake/Output Summary (Last 24 hours) at 2019 1514  Last data filed at 2019 1330  Gross per 24 hour   Intake 280 ml   Output --   Net 280 ml     I/O last 3 completed shifts:   In: 26 [P.O.:280]  Out: -     Safety Concerns:     [address] Safety Concerns:291914190}    Impairments/Disabilities:      [address] Impairments/Disabilities:578974527}    Nutrition Therapy:  Current Nutrition Therapy:   [address] Diet List:269611197}    Routes of Feeding: {CHP DME Other Feedings:945725238}  Liquids: {Slp liquid thickness:97839}  Daily Fluid Restriction: {CHP DME Yes amt example:338500537}  Last Modified Barium Swallow with Video (Video Swallowing Test): {Done Not Done VGOZ:204182383}    Treatments at the Time of Hospital Discharge:   Respiratory Treatments: ***  Oxygen Therapy:  {Therapy; copd oxygen:32591}  Ventilator:    { CC Vent TLYV:400490103}    Rehab Therapies: {THERAPEUTIC INTERVENTION:6007557926}  Weight Bearing Status/Restrictions: [address] [employer]  Weight Bearin}  Other Medical Equipment (for information only, NOT a DME order):  {EQUIPMENT:832510227}  Other Treatments: ***    Patient's personal belongings (please select all that are sent with patient):  {CHP DME Belongings:223220772}    RN SIGNATURE:  {Esignature:566579607}    CASE MANAGEMENT/SOCIAL WORK SECTION    Inpatient Status Date: Observation    Readmission Risk Assessment Score:  Readmission Risk              Risk of Unplanned Readmission:        16           [facility]charg[facility]/ [facility]   [facility]  Address:  [phone]        Fax: [phone]        Dialysis Facility (if applicable) · Name:  · Address:  · Dialysis Schedule:  · Phone:  · Fax:    / signature: Electronically signed by MARCE Zheng on 5/24/19 at 3:14 PM    PHYSICIAN SECTION    Prognosis: {Prognosis:0131291135}    Condition at Discharge: Elvis Douglas Patient Condition:624618825}    Rehab Potential (if transferring to Rehab): {Prognosis:8604354693}    Recommended Labs or Other Treatments After Discharge: ***    Physician Certification: I certify the above information and transfer of Melissa Ann  is necessary for the continuing treatment of the diagnosis listed and that she requires {Admit to Appropriate Level of Care:46574} for {GREATER/LESS:023027769} 30 days.      Update Admission H&P: {CHP DME Changes in FJXFD:185958262}    PHYSICIAN SIGNATURE:  {Esignature:736473101}

## 2019-05-25 PROBLEM — E44.0 MODERATE MALNUTRITION (HCC): Chronic | Status: ACTIVE | Noted: 2019-05-25

## 2019-05-25 LAB
APTT: 38 SEC (ref 26–36)
APTT: 63.7 SEC (ref 26–36)
APTT: 75.5 SEC (ref 26–36)
CHOLESTEROL, TOTAL: 195 MG/DL (ref 0–199)
EKG ATRIAL RATE: 69 BPM
EKG DIAGNOSIS: NORMAL
EKG P-R INTERVAL: 334 MS
EKG Q-T INTERVAL: 416 MS
EKG QRS DURATION: 88 MS
EKG QTC CALCULATION (BAZETT): 445 MS
EKG R AXIS: 205 DEGREES
EKG T AXIS: 179 DEGREES
EKG VENTRICULAR RATE: 69 BPM
HCT VFR BLD CALC: 35.9 % (ref 36–48)
HDLC SERPL-MCNC: 85 MG/DL (ref 40–60)
HEMOGLOBIN: 11.9 G/DL (ref 12–16)
LDL CHOLESTEROL CALCULATED: 98 MG/DL
LEFT VENTRICULAR EJECTION FRACTION HIGH VALUE: 60 %
LEFT VENTRICULAR EJECTION FRACTION MODE: NORMAL
LV EF: 60 %
LVEF MODALITY: NORMAL
MCH RBC QN AUTO: 31.9 PG (ref 26–34)
MCHC RBC AUTO-ENTMCNC: 33.2 G/DL (ref 31–36)
MCV RBC AUTO: 96.4 FL (ref 80–100)
PDW BLD-RTO: 14.2 % (ref 12.4–15.4)
PLATELET # BLD: 153 K/UL (ref 135–450)
PMV BLD AUTO: 7.6 FL (ref 5–10.5)
RBC # BLD: 3.72 M/UL (ref 4–5.2)
TRIGL SERPL-MCNC: 62 MG/DL (ref 0–150)
VLDLC SERPL CALC-MCNC: 12 MG/DL
WBC # BLD: 4.4 K/UL (ref 4–11)

## 2019-05-25 PROCEDURE — 93010 ELECTROCARDIOGRAM REPORT: CPT | Performed by: INTERNAL MEDICINE

## 2019-05-25 PROCEDURE — 96374 THER/PROPH/DIAG INJ IV PUSH: CPT

## 2019-05-25 PROCEDURE — 93306 TTE W/DOPPLER COMPLETE: CPT

## 2019-05-25 PROCEDURE — 6370000000 HC RX 637 (ALT 250 FOR IP): Performed by: INTERNAL MEDICINE

## 2019-05-25 PROCEDURE — 2700000000 HC OXYGEN THERAPY PER DAY

## 2019-05-25 PROCEDURE — G0378 HOSPITAL OBSERVATION PER HR: HCPCS

## 2019-05-25 PROCEDURE — 36415 COLL VENOUS BLD VENIPUNCTURE: CPT

## 2019-05-25 PROCEDURE — 99232 SBSQ HOSP IP/OBS MODERATE 35: CPT | Performed by: PSYCHIATRY & NEUROLOGY

## 2019-05-25 PROCEDURE — 99233 SBSQ HOSP IP/OBS HIGH 50: CPT | Performed by: NURSE PRACTITIONER

## 2019-05-25 PROCEDURE — 85027 COMPLETE CBC AUTOMATED: CPT

## 2019-05-25 PROCEDURE — 6360000002 HC RX W HCPCS: Performed by: INTERNAL MEDICINE

## 2019-05-25 PROCEDURE — 93005 ELECTROCARDIOGRAM TRACING: CPT | Performed by: INTERNAL MEDICINE

## 2019-05-25 PROCEDURE — 2580000003 HC RX 258: Performed by: INTERNAL MEDICINE

## 2019-05-25 PROCEDURE — 93880 EXTRACRANIAL BILAT STUDY: CPT

## 2019-05-25 PROCEDURE — 6360000002 HC RX W HCPCS: Performed by: NURSE PRACTITIONER

## 2019-05-25 PROCEDURE — 96376 TX/PRO/DX INJ SAME DRUG ADON: CPT

## 2019-05-25 PROCEDURE — 85730 THROMBOPLASTIN TIME PARTIAL: CPT

## 2019-05-25 PROCEDURE — 80061 LIPID PANEL: CPT

## 2019-05-25 RX ORDER — GUAIFENESIN 600 MG/1
600 TABLET, EXTENDED RELEASE ORAL 2 TIMES DAILY
Status: DISCONTINUED | OUTPATIENT
Start: 2019-05-25 | End: 2019-05-29 | Stop reason: HOSPADM

## 2019-05-25 RX ORDER — HEPARIN SODIUM 1000 [USP'U]/ML
60 INJECTION, SOLUTION INTRAVENOUS; SUBCUTANEOUS ONCE
Status: DISCONTINUED | OUTPATIENT
Start: 2019-05-25 | End: 2019-05-25

## 2019-05-25 RX ORDER — HEPARIN SODIUM 1000 [USP'U]/ML
29.5 INJECTION, SOLUTION INTRAVENOUS; SUBCUTANEOUS PRN
Status: DISCONTINUED | OUTPATIENT
Start: 2019-05-25 | End: 2019-05-29 | Stop reason: HOSPADM

## 2019-05-25 RX ORDER — HEPARIN SODIUM 1000 [USP'U]/ML
59.1 INJECTION, SOLUTION INTRAVENOUS; SUBCUTANEOUS PRN
Status: DISCONTINUED | OUTPATIENT
Start: 2019-05-25 | End: 2019-05-29 | Stop reason: HOSPADM

## 2019-05-25 RX ORDER — HEPARIN SODIUM 10000 [USP'U]/100ML
12 INJECTION, SOLUTION INTRAVENOUS CONTINUOUS
Status: DISCONTINUED | OUTPATIENT
Start: 2019-05-25 | End: 2019-05-28

## 2019-05-25 RX ADMIN — ENOXAPARIN SODIUM 40 MG: 40 INJECTION SUBCUTANEOUS at 08:29

## 2019-05-25 RX ADMIN — ASPIRIN 325 MG: 325 TABLET, DELAYED RELEASE ORAL at 08:29

## 2019-05-25 RX ADMIN — FLUTICASONE PROPIONATE 1 SPRAY: 50 SPRAY, METERED NASAL at 23:05

## 2019-05-25 RX ADMIN — ACETAMINOPHEN 650 MG: 325 TABLET, FILM COATED ORAL at 08:19

## 2019-05-25 RX ADMIN — DOCUSATE SODIUM 100 MG: 100 CAPSULE, LIQUID FILLED ORAL at 08:29

## 2019-05-25 RX ADMIN — ONDANSETRON 4 MG: 2 INJECTION INTRAMUSCULAR; INTRAVENOUS at 05:15

## 2019-05-25 RX ADMIN — NITROGLYCERIN 0.5 INCH: 20 OINTMENT TOPICAL at 05:30

## 2019-05-25 RX ADMIN — FLUTICASONE PROPIONATE 1 SPRAY: 50 SPRAY, METERED NASAL at 08:19

## 2019-05-25 RX ADMIN — DOCUSATE SODIUM 100 MG: 100 CAPSULE, LIQUID FILLED ORAL at 23:05

## 2019-05-25 RX ADMIN — OXYCODONE HYDROCHLORIDE AND ACETAMINOPHEN 1 TABLET: 5; 325 TABLET ORAL at 11:21

## 2019-05-25 RX ADMIN — GUAIFENESIN 600 MG: 600 TABLET, EXTENDED RELEASE ORAL at 23:04

## 2019-05-25 RX ADMIN — OXYCODONE HYDROCHLORIDE AND ACETAMINOPHEN 1 TABLET: 5; 325 TABLET ORAL at 01:01

## 2019-05-25 RX ADMIN — PANTOPRAZOLE SODIUM 40 MG: 40 TABLET, DELAYED RELEASE ORAL at 05:30

## 2019-05-25 RX ADMIN — ONDANSETRON 4 MG: 4 TABLET, ORALLY DISINTEGRATING ORAL at 16:45

## 2019-05-25 RX ADMIN — OXYCODONE HYDROCHLORIDE AND ACETAMINOPHEN 1 TABLET: 5; 325 TABLET ORAL at 05:30

## 2019-05-25 RX ADMIN — OXYCODONE HYDROCHLORIDE AND ACETAMINOPHEN 1 TABLET: 5; 325 TABLET ORAL at 16:45

## 2019-05-25 RX ADMIN — HEPARIN SODIUM AND DEXTROSE 12 UNITS/KG/HR: 10000; 5 INJECTION INTRAVENOUS at 13:11

## 2019-05-25 RX ADMIN — THERA TABS 1 TABLET: TAB at 08:29

## 2019-05-25 RX ADMIN — ONDANSETRON 4 MG: 2 INJECTION INTRAMUSCULAR; INTRAVENOUS at 11:22

## 2019-05-25 RX ADMIN — GUAIFENESIN 600 MG: 600 TABLET, EXTENDED RELEASE ORAL at 11:26

## 2019-05-25 RX ADMIN — Medication 10 ML: at 11:22

## 2019-05-25 RX ADMIN — Medication 10 ML: at 08:28

## 2019-05-25 RX ADMIN — OXYCODONE HYDROCHLORIDE AND ACETAMINOPHEN 1 TABLET: 5; 325 TABLET ORAL at 23:05

## 2019-05-25 RX ADMIN — NITROGLYCERIN 0.5 INCH: 20 OINTMENT TOPICAL at 00:50

## 2019-05-25 RX ADMIN — ONDANSETRON 4 MG: 4 TABLET, ORALLY DISINTEGRATING ORAL at 08:19

## 2019-05-25 RX ADMIN — POLYETHYLENE GLYCOL 3350 17 G: 17 POWDER, FOR SOLUTION ORAL at 08:29

## 2019-05-25 ASSESSMENT — PAIN DESCRIPTION - PROGRESSION
CLINICAL_PROGRESSION: GRADUALLY IMPROVING
CLINICAL_PROGRESSION: GRADUALLY WORSENING
CLINICAL_PROGRESSION: GRADUALLY WORSENING
CLINICAL_PROGRESSION: GRADUALLY IMPROVING
CLINICAL_PROGRESSION: GRADUALLY IMPROVING
CLINICAL_PROGRESSION: GRADUALLY WORSENING
CLINICAL_PROGRESSION: GRADUALLY IMPROVING
CLINICAL_PROGRESSION: GRADUALLY WORSENING

## 2019-05-25 ASSESSMENT — PAIN SCALES - GENERAL
PAINLEVEL_OUTOF10: 8
PAINLEVEL_OUTOF10: 5
PAINLEVEL_OUTOF10: 8
PAINLEVEL_OUTOF10: 8
PAINLEVEL_OUTOF10: 6
PAINLEVEL_OUTOF10: 5
PAINLEVEL_OUTOF10: 8

## 2019-05-25 ASSESSMENT — PAIN DESCRIPTION - LOCATION
LOCATION: BACK;RIB CAGE
LOCATION: BACK;RIB CAGE

## 2019-05-25 ASSESSMENT — PAIN DESCRIPTION - PAIN TYPE
TYPE: CHRONIC PAIN
TYPE: CHRONIC PAIN

## 2019-05-25 ASSESSMENT — PAIN DESCRIPTION - ORIENTATION
ORIENTATION: LOWER
ORIENTATION: LOWER

## 2019-05-25 NOTE — PROGRESS NOTES
NEUROLOGY FOLLOWUP    HISTORY OF PRESENT ILLNESS :    Linda Rosales is a 80 y.o. female   History was obtained from the patient and dictations in the chart  Patient had a headache earlier but this was better after the nitroglycerin paste was stopped. Patient has had occasional episodic right arm weakness and numbness in the past but none since the hospital admission. Patient has chronic low back pain and radicular pain in the right leg as well as numbness in the right leg for which she is being given epidural steroid injections in the past    REVIEW OF SYSTEMS    Constitutional:  []   Chills   [x]  Fatigue   []  Fevers   []  Malaise   []  Weight loss     [] Denies all of the above    Respiratory:   []  Cough    []  Shortness of breath         [x] Denies all of the above     Cardiovascular:   []  Chest pain    []  Exertional chest pressure/discomfort           [] Palpitations    []  Syncope     [x] Denies all of the above    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    Cystocele     Diabetes mellitus ([address]     type  2 diet controlled    Hepatitis A 1953    History of blood transfusion     hiatal hernia surgery    Hyperlipidemia     PVC (premature ventricular contraction)     Rectocele     Reflux     Scoliosis      Family History   Problem Relation Age of Onset    Diabetes Maternal Grandmother         type 2, great grandma type1    Cancer Maternal Grandfather         rectal and  scrotal cancer     Social History     Socioeconomic History    Marital status:       Spouse name: None    Number of children: None    Years of education: None    Highest education level: None   Occupational History    None   Social Needs    Financial resource strain: None    Food insecurity:     Worry: None     Inability: None    Transportation needs:     Medical: None     Non-medical: None   Tobacco Use    Smoking status: Former Smoker     Packs/day: 0.50     Years: 15.00     Pack years: 7.50     Last attempt to quit: 2001     Years since quittin.4    Smokeless tobacco: Never Used    Tobacco comment: quit -    Substance and Sexual Activity    Alcohol use: Yes     Alcohol/week: 0.6 oz     Types: 1 Glasses of wine per week     Comment: rare wine     Drug use: No    Sexual activity: None   Lifestyle    Physical activity:     Days per week: None     Minutes per session: None    Stress: None   Relationships    Social connections:     Talks on phone: None     Gets together: None     Attends Jewish service: None     Active member of club or organization: None     Attends meetings of clubs or organizations: None     Relationship status: None    Intimate partner violence:     Fear of current or ex partner: None     Emotionally abused: None     Physically abused: None     Forced sexual activity: None   Other Topics Concern    None   Social History Narrative    None        PHYSICAL EXAMINATION     BP (!) 154/76   Pulse 81   Temp 97.8 °F (36.6 °C) (Temporal)   Resp 18   Ht 5' (1.524 m)   Wt 149 lb 3.2 oz (67.7 kg)   SpO2 95%   BMI 29.14 kg/m²   This is a well-nourished patient in no acute distress  Patient is awake, alert and oriented x3. Speech is normal.  Pupils are equal round reacting to light. Extraocular movements intact. Face symmetrical. Tongue midline. Motor exam shows normal symmetrical strength. Deep tendon reflexes decreased in the legs. Plantar reflexes downgoing. Sensory exam shows decreased light touch in the lower extremities bilaterally. Coordination normal. . No carotid bruit. No neck stiffness.     DATA :  LABS:  General Labs:    CBC:   Lab Results   Component Value Date    WBC 4.8 2019    RBC 3.78 2019    HGB 12.1 05/24/2019    HCT 36.6 05/24/2019    MCV 96.9 05/24/2019    MCH 32.0 05/24/2019    MCHC 33.0 05/24/2019    RDW 14.4 05/24/2019     05/24/2019    MPV 6.6 05/24/2019     BMP:    Lab Results   Component Value Date     05/24/2019    K 4.1 05/24/2019    K 4.2 04/01/2019     05/24/2019    CO2 25 05/24/2019    BUN 19 05/24/2019    LABALBU 4.0 05/24/2019    CREATININE 0.8 05/24/2019    CALCIUM 9.0 05/24/2019    GFRAA >60 05/24/2019    GFRAA 56 01/10/2010    LABGLOM >60 05/24/2019    GLUCOSE 118 05/24/2019     RADIOLOGY REVIEW:  I have reviewed radiology image(s) and reports(s) of:  CT scan of the head      IMPRESSION :  Right arm numbness is probably due to mononeuropathy. Right leg numbness is probably due to lumbar disc disease. CT head did not show any acute stroke. TIA will be considered in the differential diagnosis but felt to be unlikely  Patient Active Problem List   Diagnosis    Spinal stenosis, lumbar region, without neurogenic claudication    Spinal stenosis of thoracic region    Scoliosis (and kyphoscoliosis), idiopathic    Displacement of thoracic intervertebral disc without myelopathy    Displacement of lumbar intervertebral disc without myelopathy    Thoracic or lumbosacral neuritis or radiculitis, unspecified    Disc displacement, lumbar    Disc displacement, thoracic    Scoliosis    Lumbar stenosis    Thoracic stenosis    Cervicalgia    Hypoxia    Closed fracture of proximal end of left humerus with routine healing    Urinary tract infection without hematuria    T12 compression fracture (HCC)    Thoracic compression fracture, sequela    Mild malnutrition (HCC)    Acute chest pain    Right sided numbness    Moderate malnutrition ([address]       RECOMMENDATIONS :  Discussed with patient  Patient's nurse informed me that cardiology would like to start the patient on heparin if okay with neurology.   There are no contraindications for heparin from a neurological standpoint. Patient will benefit from an EMG of the right arm as an outpatient. Please note a portion of this chart was generated using dragon dictation software. Although every effort was made to ensure the accuracy of this automated transcription, some errors in transcription may have occurred.          Samantha Huynh M.D.

## 2019-05-25 NOTE — PLAN OF CARE
Constant HA- remove nitro  Patch. D dimer high but if not when adjusted to her age.  Await cards recs regarding CTPA or angiogram. Await echo,/c dopplers

## 2019-05-25 NOTE — PROGRESS NOTES
Nutrition Assessment    Type and Reason for Visit: Positive Nutrition Screen(MST 3, poor appetite )    Nutrition Recommendations:   1. Trial chocolate or vanilla Ensure Enlive BID  2. Encourage PO intake  3. Document all PO intake in flow sheets     Nutrition Assessment: Pt is nutritionally compromised as evidenced by pt report of diet hx poor PO intake d/t generally not feeling well most days, and weight loss 19 lb (11.3%) over the past 3 months. Pt with excellent PO intake on cardiac diet during admission, consuming % of meals. Pt takes Boost at home and is agreeable to chocolate or vanilla Ensure here. Pt is not at risk for further nutrition compromise at this time d/t adequate PO intake. Malnutrition Assessment:  · Malnutrition Status: Meets the criteria for moderate malnutrition  · Context: Chronic illness  · Findings of the 6 clinical characteristics of malnutrition (Minimum of 2 out of 6 clinical characteristics is required to make the diagnosis of moderate or severe Protein Calorie Malnutrition based on AND/ASPEN Guidelines):  1. Energy Intake-Greater than 75% of estimated energy requirement, Greater than or equal to 3 months    2. Weight Loss-10% loss or greater, in 3 months  3. Fat Loss-Moderate subcutaneous fat loss, Orbital  4. Muscle Loss-No significant muscle mass loss,    5. Fluid Accumulation-No significant fluid accumulation,    6.  Strength-Not measured    Nutrition Risk Level:  Moderate    Nutrient Needs:  · Estimated Daily Total Kcal: 7238-2032   · Estimated Daily Protein (g): 59-90 grams   · Estimated Daily Total Fluid (ml/day): 1 mL per kcal     Nutrition Diagnosis:   · Problem: Unintended weight loss  · Etiology: related to Insufficient energy/nutrient consumption     Signs and symptoms:  as evidenced by Diet history of poor intake, Weight loss greater than or equal to 7.5% in 3 months    Objective Information:  · Nutrition-Focused Physical Findings: No edema noted   · Wound

## 2019-05-25 NOTE — PLAN OF CARE
Re-Assessment complete, see flow sheet. Lung sounds diminished in bases and mid lobes with fine rales in posterior bases. Pulse oximeter on room air 95%. Discussed plan of care regarding turn, cough & deep breath every 2 hours while awake & elevate head of bed. Patient Verbalized understanding, turned coughed & deep breathed with fair effort & no change in lungs. Head of bed elevated 30%. Explained new order for heparin, it's use & side effects to the patient & son, who was talking to his mom on the phone, both verbalized understanding. Will continue to monitor. Haleigh Sow RN, BSN, PCCN.

## 2019-05-25 NOTE — PROGRESS NOTES
[address]   Daily Progress Note      Admit Date:  5/24/2019  Saw pt in vascular lab    Subjective:headache  And nausea from NTG paste, DC this AM    HPI:   81 yo presents with multiple complaints and confusing clinical presentation. Initially awoke with a hard upper back/mid scapular pain described as dull heavy pain with associated SOB. Denies radiation, diaphoresis, palpitations, dizziness or syncope. Had taken a pain pill prior to event with gradual relief of discomfort after 30-45 minutes. Denies cough or fever. Has a hx of GERD and a large hiatal hernia with prior surgery and recurrence. She also states that she was dizzy with R arm weakness resulting in Stroke Alert in the ER. Her head CT was negative for acute CVA. She has chronic lower back pain and RLE weakness. 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 She ate a large meal at 1 PM today. Suspect non-invasive cardiac evaluation will be difficult to accomplish safely or effectively with the constellation of issues above. Had headache and NTG past DC this AM, no chest pain, sob        Ms. Cervantes     The patient was seen and examined. Notes, labs and recent testing reviewed. There were not complications over night.     The patient is being seen for chest pain        Objective:     BP (!) 154/76   Pulse 81   Temp 97.8 °F (36.6 °C) (Temporal)   Resp 18   Ht 5' (1.524 m)   Wt 149 lb 3.2 oz (67.7 kg)   SpO2 95%   BMI 29.14 kg/m²      Intake/Output Summary (Last 24 hours) at 5/25/2019 0933  Last data filed at 5/24/2019 1828  Gross per 24 hour   Intake 560 ml   Output --   Net 560 ml       Physical Exam:  General:  Awake, alert, NAD  Skin:  Warm and dry  Neck:  JVD<8, no bruit  Chest:  Clear to auscultation, no wheezes/rhonchi/rales, prominent kyphoscoliosis  Cardiovascular:  RRR U6Z4, systolic murmur  Abdomen:  Soft, nontender, +bowel sounds  Extremities:  no edema    Medications:  guaiFENesin  600 mg Oral BID    docusate sodium  100 mg Oral BID    pantoprazole  40 mg Oral Daily    fluticasone  1 spray Each Nostril BID    sodium chloride flush  10 mL Intravenous 2 times per day    enoxaparin  40 mg Subcutaneous Daily    polyethylene glycol  17 g Oral Daily    multivitamin  1 tablet Oral Daily    aspirin  325 mg Oral Daily    nitroglycerin  0.5 inch Topical 4 times per day       diphenoxylate-atropine, furosemide, ondansetron, tiZANidine, sodium chloride flush, magnesium hydroxide, ondansetron, acetaminophen, regadenoson, oxyCODONE-acetaminophen, perflutren lipid microspheres    Lab Data:  CBC:   Recent Labs     05/24/19 0905   WBC 4.8   HGB 12.1        BMP:    Recent Labs     05/24/19 0905      K 4.1   CO2 25   BUN 19   CREATININE 0.8     LIVR:   Recent Labs     05/24/19 0905   AST 17   ALT 12     PT/INR:   Recent Labs     05/24/19 0905   PROT 6.2*   INR 0.96     APTT:   Recent Labs     05/24/19 0905   APTT 30.5     BNP:  No results for input(s): BNP in the last 72 hours. CARDIAC ENZYMES:  Recent Labs     05/24/19  0905 05/24/19  1206 05/24/19  1756   TROPONINI <0.01 0.02* 0.02*     FASTING LIPID PANEL:  Lab Results   Component Value Date    CHOL 194 01/11/2010    HDL 70 01/11/2010    TRIG 85 01/11/2010         Echo had this am  CT HeadNo acute intracranial abnormality. Carotid scan pending had this am  Assessment/plan:       1. Acute chest pain-Both typical and atypical features. Difficult to evaluate with noninvasive approach with her inability to walk and marked FAVB. Will need to treat as unstable angina until proven otherwise with current troponin elevation. 2. Chronic pain of both hips    3. Chronic low back pain with right-sided sciatica, unspecified back pain laterality    4. Large hiatal hernia with prior repair and current recurrence.   5.      RUE weakness and recurrent dizziness--She thinks she had a stroke though CT scan was negative.     Plan:  Low dose heparin check with neuro first, stop lovenox  Echo to evaluate Systolic Murmur  Cath when other issues clarified      CHIVO Lindsay CNP  [address]

## 2019-05-25 NOTE — PLAN OF CARE
Problem: Pain:  Goal: Pain level will decrease  Description  Pain level will decrease  Outcome: Ongoing  Note:   Pt states pain decreased from 9/10 to 8/10. Percocet x3 this shift. Will continue to monitor. Problem: Pain:  Goal: Control of chronic pain  Description  Control of chronic pain  Note:   C/o back pain. Percocet x3. Pt's back supported w/ pillows. Pt satisfied. Problem: Falls - Risk of:  Goal: Will remain free from falls  Description  Will remain free from falls  Outcome: Ongoing  Note:   Pt remains free from falls and accidental injury at this time. Fall precautions in place, bed alarm activated, yellow blanket on bed, fall risk band on pt. Bed locked and in lowest position. Floor free from obstacles and pt encouraged to call before getting OOB and as needed. Using call light appropriately. Will continue to monitor additional needs. Problem: Skin Integrity:  Goal: Skin integrity will stabilize  Description  Skin integrity will stabilize  Outcome: Ongoing  Note:   Redness noted on pt's buttocks. Mepilex in place. Will continue to monitor.

## 2019-05-25 NOTE — PLAN OF CARE
Nutrition Problem: Unintended weight loss  Intervention: Food and/or Nutrient Delivery: Continue current diet, Start ONS  Nutritional Goals: Pt will consume greater than 50% of meals and supplements

## 2019-05-26 PROBLEM — R07.9 CHEST PAIN: Status: ACTIVE | Noted: 2019-05-26

## 2019-05-26 LAB — APTT: 87 SEC (ref 26–36)

## 2019-05-26 PROCEDURE — 36415 COLL VENOUS BLD VENIPUNCTURE: CPT

## 2019-05-26 PROCEDURE — 85730 THROMBOPLASTIN TIME PARTIAL: CPT

## 2019-05-26 PROCEDURE — 1200000000 HC SEMI PRIVATE

## 2019-05-26 PROCEDURE — 6360000002 HC RX W HCPCS: Performed by: INTERNAL MEDICINE

## 2019-05-26 PROCEDURE — 99233 SBSQ HOSP IP/OBS HIGH 50: CPT | Performed by: NURSE PRACTITIONER

## 2019-05-26 PROCEDURE — 6370000000 HC RX 637 (ALT 250 FOR IP): Performed by: INTERNAL MEDICINE

## 2019-05-26 PROCEDURE — 6360000002 HC RX W HCPCS: Performed by: NURSE PRACTITIONER

## 2019-05-26 PROCEDURE — G0378 HOSPITAL OBSERVATION PER HR: HCPCS

## 2019-05-26 PROCEDURE — 2580000003 HC RX 258: Performed by: INTERNAL MEDICINE

## 2019-05-26 PROCEDURE — 99232 SBSQ HOSP IP/OBS MODERATE 35: CPT | Performed by: PSYCHIATRY & NEUROLOGY

## 2019-05-26 RX ADMIN — GUAIFENESIN 600 MG: 600 TABLET, EXTENDED RELEASE ORAL at 21:10

## 2019-05-26 RX ADMIN — OXYCODONE HYDROCHLORIDE AND ACETAMINOPHEN 1 TABLET: 5; 325 TABLET ORAL at 07:01

## 2019-05-26 RX ADMIN — THERA TABS 1 TABLET: TAB at 09:40

## 2019-05-26 RX ADMIN — OXYCODONE HYDROCHLORIDE AND ACETAMINOPHEN 1 TABLET: 5; 325 TABLET ORAL at 03:01

## 2019-05-26 RX ADMIN — Medication 10 ML: at 09:41

## 2019-05-26 RX ADMIN — OXYCODONE HYDROCHLORIDE AND ACETAMINOPHEN 1 TABLET: 5; 325 TABLET ORAL at 21:10

## 2019-05-26 RX ADMIN — ONDANSETRON 4 MG: 2 INJECTION INTRAMUSCULAR; INTRAVENOUS at 17:36

## 2019-05-26 RX ADMIN — OXYCODONE HYDROCHLORIDE AND ACETAMINOPHEN 1 TABLET: 5; 325 TABLET ORAL at 11:08

## 2019-05-26 RX ADMIN — FLUTICASONE PROPIONATE 1 SPRAY: 50 SPRAY, METERED NASAL at 09:41

## 2019-05-26 RX ADMIN — FLUTICASONE PROPIONATE 1 SPRAY: 50 SPRAY, METERED NASAL at 21:11

## 2019-05-26 RX ADMIN — DOCUSATE SODIUM 100 MG: 100 CAPSULE, LIQUID FILLED ORAL at 21:10

## 2019-05-26 RX ADMIN — Medication 10 ML: at 21:10

## 2019-05-26 RX ADMIN — GUAIFENESIN 600 MG: 600 TABLET, EXTENDED RELEASE ORAL at 09:40

## 2019-05-26 RX ADMIN — PANTOPRAZOLE SODIUM 40 MG: 40 TABLET, DELAYED RELEASE ORAL at 07:02

## 2019-05-26 RX ADMIN — DOCUSATE SODIUM 100 MG: 100 CAPSULE, LIQUID FILLED ORAL at 09:41

## 2019-05-26 RX ADMIN — POLYETHYLENE GLYCOL 3350 17 G: 17 POWDER, FOR SOLUTION ORAL at 09:40

## 2019-05-26 RX ADMIN — Medication 10 ML: at 17:37

## 2019-05-26 RX ADMIN — HEPARIN SODIUM AND DEXTROSE 12 UNITS/KG/HR: 10000; 5 INJECTION INTRAVENOUS at 17:47

## 2019-05-26 RX ADMIN — ASPIRIN 325 MG: 325 TABLET, DELAYED RELEASE ORAL at 09:40

## 2019-05-26 RX ADMIN — OXYCODONE HYDROCHLORIDE AND ACETAMINOPHEN 1 TABLET: 5; 325 TABLET ORAL at 15:34

## 2019-05-26 ASSESSMENT — PAIN SCALES - GENERAL
PAINLEVEL_OUTOF10: 8
PAINLEVEL_OUTOF10: 6
PAINLEVEL_OUTOF10: 8
PAINLEVEL_OUTOF10: 6
PAINLEVEL_OUTOF10: 6
PAINLEVEL_OUTOF10: 0
PAINLEVEL_OUTOF10: 8
PAINLEVEL_OUTOF10: 0
PAINLEVEL_OUTOF10: 4

## 2019-05-26 ASSESSMENT — PAIN DESCRIPTION - PAIN TYPE
TYPE: CHRONIC PAIN
TYPE: ACUTE PAIN

## 2019-05-26 ASSESSMENT — PAIN DESCRIPTION - LOCATION
LOCATION: CHEST
LOCATION: BACK
LOCATION: BACK;HIP
LOCATION: BACK
LOCATION: BACK;HIP
LOCATION: BACK;HIP
LOCATION: BACK

## 2019-05-26 ASSESSMENT — PAIN DESCRIPTION - PROGRESSION
CLINICAL_PROGRESSION: GRADUALLY WORSENING

## 2019-05-26 ASSESSMENT — PAIN DESCRIPTION - ORIENTATION: ORIENTATION: LOWER

## 2019-05-26 NOTE — PLAN OF CARE
Daughter at bedside- denies any chest pain - back pain improved after po percocet given earlier- blood pressure 105/59 per face scale pain rated 4/10- educated on cardiac cath procedure and plan of care - acknowledged understanding -call light in reach bed alarm on

## 2019-05-26 NOTE — PROGRESS NOTES
Hospitalist Progress Note      PCP: Jeni Miller APRN - CNP    Date of Admission: 5/24/2019    Chief Complaint: chest pain     Subjective: having HA from nitro patch. No chest pain. Chronic issues persists      Medications:  Reviewed    Infusion Medications    heparin (porcine) 12 Units/kg/hr (05/25/19 1311)     Scheduled Medications    guaiFENesin  600 mg Oral BID    docusate sodium  100 mg Oral BID    pantoprazole  40 mg Oral Daily    fluticasone  1 spray Each Nostril BID    sodium chloride flush  10 mL Intravenous 2 times per day    polyethylene glycol  17 g Oral Daily    multivitamin  1 tablet Oral Daily    aspirin  325 mg Oral Daily     PRN Meds: heparin (porcine), heparin (porcine), diphenoxylate-atropine, furosemide, ondansetron, tiZANidine, sodium chloride flush, magnesium hydroxide, ondansetron, acetaminophen, regadenoson, oxyCODONE-acetaminophen, perflutren lipid microspheres      Intake/Output Summary (Last 24 hours) at 5/25/2019 2244  Last data filed at 5/25/2019 1714  Gross per 24 hour   Intake 1080 ml   Output --   Net 1080 ml       Physical Exam Performed:    BP (!) 161/74   Pulse 72   Temp 97.3 °F (36.3 °C) (Temporal)   Resp 16   Ht 5' (1.524 m)   Wt 149 lb 3.2 oz (67.7 kg)   SpO2 97%   BMI 29.14 kg/m²     General appearance: No apparent distress, appears stated age and cooperative. HEENT: Pupils equal, round, and reactive to light. Conjunctivae/corneas clear. Neck: Supple, with full range of motion. No jugular venous distention. Trachea midline. Respiratory:  Normal respiratory effort. Clear to auscultation, bilaterally without Rales/Wheezes/Rhonchi. Cardiovascular: Regular rate and rhythm with normal S1/S2 without murmurs, rubs or gallops. Abdomen: Soft, non-tender, non-distended with normal bowel sounds. Musculoskeletal: No clubbing, cyanosis or edema bilaterally. Full range of motion without deformity.   Skin: Skin color, texture, turgor normal.  No rashes or 98. Cards consulted. Started on heparin gtt. May need angiogram.      DJD     Rt leg numbness: s/p RAIN. Pt/ot consulted     H/o diet controlled DM       Neuropathy in leg and arm: neuro consulted.  Advised f.u         DVT Prophylaxis:heparin gtt  Diet: general   Code Status:full      PT/OT Eval Status: ordered     Dispo - 1 more day atleast     Cristian Teran MD

## 2019-05-26 NOTE — PROGRESS NOTES
NEUROLOGY FOLLOWUP    HISTORY OF PRESENT ILLNESS :    Huber Jung is a 80 y.o. female   History was obtained from the patient and dictations in the chart  No new neurological symptoms. Patient has had occasional episodic right arm weakness and numbness in the past but none since the hospital admission. Patient has chronic low back pain and radicular pain in the right leg as well as numbness in the right leg for which she is being given epidural steroid injections in the past    REVIEW OF SYSTEMS    Constitutional:  []   Chills   [x]  Fatigue   []  Fevers   []  Malaise   []  Weight loss     [] Denies all of the above    Respiratory:   []  Cough    []  Shortness of breath         [x] Denies all of the above     Cardiovascular:   []  Chest pain    []  Exertional chest pressure/discomfort           [] Palpitations    []  Syncope     [x] Denies all of the above    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    Cystocele     Diabetes mellitus ([facility] [address]     type  2 diet controlled    Hepatitis A 1953    History of blood transfusion     hiatal hernia surgery    Hyperlipidemia     PVC (premature ventricular contraction)     Rectocele     Reflux     Scoliosis      Family History   Problem Relation Age of Onset    Diabetes Maternal Grandmother         type 2, great grandma type1    Cancer Maternal Grandfather         rectal and  scrotal cancer     Social History     Socioeconomic History    Marital status:       Spouse name: None    Number of children: None    Years of education: None    Highest education level: None   Occupational History    None   Social Needs    Financial resource strain: None    Food insecurity:     Worry: None     Inability: None    Transportation needs:     Medical: None     Non-medical: None   Tobacco Use    Smoking status: Former Smoker     Packs/day: 0.50     Years: 15.00     Pack years: 7.50     Last attempt to quit: 2001     Years since quittin.4    Smokeless tobacco: Never Used    Tobacco comment: quit -    Substance and Sexual Activity    Alcohol use: Yes     Alcohol/week: 0.6 oz     Types: 1 Glasses of wine per week     Comment: rare wine     Drug use: No    Sexual activity: None   Lifestyle    Physical activity:     Days per week: None     Minutes per session: None    Stress: None   Relationships    Social connections:     Talks on phone: None     Gets together: None     Attends [employer] service: None     Active member of club or organization: None     Attends meetings of clubs or organizations: None     Relationship status: None    Intimate partner violence:     Fear of current or ex partner: None     Emotionally abused: None     Physically abused: None     Forced sexual activity: None   Other Topics Concern    None   Social History Narrative    None        PHYSICAL EXAMINATION     /71   Pulse 80   Temp 98.1 °F (36.7 °C) (Temporal)   Resp 18   Ht 5' (1.524 m)   Wt 151 lb 12.8 oz (68.9 kg)   SpO2 95%   BMI 29.65 kg/m²   This is a well-nourished patient in no acute distress  Patient is awake, alert and oriented x3. Speech is normal.  Pupils are equal round reacting to light. Extraocular movements intact. Face symmetrical. Tongue midline. Motor exam shows normal symmetrical strength. Deep tendon reflexes decreased in the legs. Plantar reflexes downgoing. Sensory exam shows decreased light touch in the lower extremities bilaterally. Coordination normal. . No carotid bruit. No neck stiffness.     DATA :  LABS:  General Labs:    CBC:   Lab Results   Component Value Date    WBC 4.4 2019    RBC 3.72 2019    HGB 11.9 2019    HCT 35.9 2019    MCV 96.4 2019    MCH 31.9 05/25/2019    MCHC 33.2 05/25/2019    RDW 14.2 05/25/2019     05/25/2019    MPV 7.6 05/25/2019     BMP:    Lab Results   Component Value Date     05/24/2019    K 4.1 05/24/2019    K 4.2 04/01/2019     05/24/2019    CO2 25 05/24/2019    BUN 19 05/24/2019    LABALBU 4.0 05/24/2019    CREATININE 0.8 05/24/2019    CALCIUM 9.0 05/24/2019    GFRAA >60 05/24/2019    GFRAA 56 01/10/2010    LABGLOM >60 05/24/2019    GLUCOSE 118 05/24/2019     RADIOLOGY REVIEW:  I have reviewed radiology image(s) and reports(s) of:  CT scan of the head      IMPRESSION :  Right arm numbness is probably due to mononeuropathy. Right leg numbness is probably due to lumbar disc disease. CT head did not show any acute stroke. TIA will be considered in the differential diagnosis but felt to be unlikely  Patient Active Problem List   Diagnosis    Spinal stenosis, lumbar region, without neurogenic claudication    Spinal stenosis of thoracic region    Scoliosis (and kyphoscoliosis), idiopathic    Displacement of thoracic intervertebral disc without myelopathy    Displacement of lumbar intervertebral disc without myelopathy    Thoracic or lumbosacral neuritis or radiculitis, unspecified    Disc displacement, lumbar    Disc displacement, thoracic    Scoliosis    Lumbar stenosis    Thoracic stenosis    Cervicalgia    Hypoxia    Closed fracture of proximal end of left humerus with routine healing    Urinary tract infection without hematuria    T12 compression fracture (HCC)    Thoracic compression fracture, sequela    Mild malnutrition (HCC)    Acute chest pain    Right sided numbness    Moderate malnutrition (HCC)       RECOMMENDATIONS :  Discussed with patient  Discussed with Dr. Debbie Mendez cardiac catheterization on Tuesday   Patient will benefit from an EMG of the right arm as an outpatient. Please note a portion of this chart was generated using dragon dictation software.  Although every effort was made to ensure the accuracy of this automated transcription, some errors in transcription may have occurred.          Jonh Casiano M.D.

## 2019-05-26 NOTE — PROGRESS NOTES
[facility]   Daily Progress Note      Admit Date:  5/24/2019      Subjective HA gone, but she is worried about \"numbness in R leg\" able to walk with this, no chest pain    HPI:   79 yo presents with multiple complaints and confusing clinical presentation. Initially awoke with a hard upper back/mid scapular pain described as dull heavy pain with associated SOB. Denies radiation, diaphoresis, palpitations, dizziness or syncope. Had taken a pain pill prior to event with gradual relief of discomfort after 30-45 minutes. Denies cough or fever. Has a hx of GERD and a large hiatal hernia with prior surgery and recurrence. She also states that she was dizzy with R arm weakness resulting in Stroke Alert in the ER. Her head CT was negative for acute CVA. She has chronic lower back pain and RLE weakness. 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 She ate a large meal at 1 PM today. Suspect non-invasive cardiac evaluation will be difficult to accomplish safely or effectively with the constellation of issues above. Headache gone, R leg numbness, getting PT/OT now      The patient was seen and examined. Notes, labs and recent testing reviewed. There were not complications over night.     The patient is being seen for chest pain        Objective:     /71   Pulse 80   Temp 98.1 °F (36.7 °C) (Temporal)   Resp 18   Ht 5' (1.524 m)   Wt 151 lb 12.8 oz (68.9 kg)   SpO2 95%   BMI 29.65 kg/m²      Intake/Output Summary (Last 24 hours) at 5/26/2019 1000  Last data filed at 5/26/2019 0945  Gross per 24 hour   Intake 1692.33 ml   Output 800 ml   Net 892.33 ml       Physical Exam:  General:  Awake, alert, NAD  Skin:  Warm and dry  Neck:  JVD<8, no bruit  Chest:  Clear to auscultation, no wheezes/rhonchi/rales, prominent kyphoscoliosis  Cardiovascular:  RRR O2Q7, systolic murmur  Abdomen:  Soft, nontender, +bowel sounds  Extremities:  no edema, legs equally warm, pulses good,     Medications:    guaiFENesin  600 mg Oral BID    docusate sodium  100 mg Oral BID    pantoprazole  40 mg Oral Daily    fluticasone  1 spray Each Nostril BID    sodium chloride flush  10 mL Intravenous 2 times per day    polyethylene glycol  17 g Oral Daily    multivitamin  1 tablet Oral Daily    aspirin  325 mg Oral Daily      heparin (porcine) 12 Units/kg/hr (05/25/19 1311)     heparin (porcine), heparin (porcine), diphenoxylate-atropine, furosemide, ondansetron, tiZANidine, sodium chloride flush, magnesium hydroxide, ondansetron, acetaminophen, regadenoson, oxyCODONE-acetaminophen, perflutren lipid microspheres    Lab Data:  CBC:   Recent Labs     05/24/19  0905 05/25/19  0537   WBC 4.8 4.4   HGB 12.1 11.9*    153     BMP:    Recent Labs     05/24/19  0905      K 4.1   CO2 25   BUN 19   CREATININE 0.8     LIVR:   Recent Labs     05/24/19  0905   AST 17   ALT 12     PT/INR:   Recent Labs     05/24/19  0905   PROT 6.2*   INR 0.96     APTT:   Recent Labs     05/25/19  1609 05/25/19  2237 05/26/19  0532   APTT 63.7* 75.5* 87.0*     BNP:  No results for input(s): BNP in the last 72 hours. CARDIAC ENZYMES:  Recent Labs     05/24/19  0905 05/24/19  1206 05/24/19  1756   TROPONINI <0.01 0.02* 0.02*     FASTING LIPID PANEL:  Lab Results   Component Value Date    CHOL 195 05/25/2019    HDL 85 05/25/2019    HDL 70 01/11/2010    TRIG 62 05/25/2019         Echo Summary   -Normal left ventricle size, wall thickness and systolic function with an   estimated ejection fraction of 60%. No regional wall motion abnormalities   are seen.   -Mild aortic stenosis with a peak velocity of 2.54m/s and a mean pressure   gradient of 15mmHg. Mild aortic regurgitation.   -Mild mitral regurgitation.   -Mild tricuspid regurgitation with a estimated PASP of 31 mmHg plus right   atrial pressure.   -Trivial pulmonic regurgitation present.   -Biatrial enlargement.       CT Head No acute

## 2019-05-26 NOTE — PROGRESS NOTES
Hospitalist Progress Note      PCP: DENIZ Basilio - CNP    Date of Admission: 5/24/2019    Chief Complaint: chest pain     History: pt with DJD and neuropathy admitted with chest pain- cards consulted. Heparin drip started per cards. Awaiting angio on Tuesday. Subjective:  . No chest pain recurrence. Chronic issues persists      Medications:  Reviewed    Infusion Medications    heparin (porcine) 12 Units/kg/hr (05/25/19 1311)     Scheduled Medications    guaiFENesin  600 mg Oral BID    docusate sodium  100 mg Oral BID    pantoprazole  40 mg Oral Daily    fluticasone  1 spray Each Nostril BID    sodium chloride flush  10 mL Intravenous 2 times per day    polyethylene glycol  17 g Oral Daily    multivitamin  1 tablet Oral Daily    aspirin  325 mg Oral Daily     PRN Meds: heparin (porcine), heparin (porcine), diphenoxylate-atropine, furosemide, ondansetron, tiZANidine, sodium chloride flush, magnesium hydroxide, ondansetron, acetaminophen, regadenoson, oxyCODONE-acetaminophen, perflutren lipid microspheres      Intake/Output Summary (Last 24 hours) at 5/26/2019 1249  Last data filed at 5/26/2019 1140  Gross per 24 hour   Intake 1692.33 ml   Output 1100 ml   Net 592.33 ml       Physical Exam Performed:    /71   Pulse 80   Temp 98.1 °F (36.7 °C) (Temporal)   Resp 18   Ht 5' (1.524 m)   Wt 151 lb 12.8 oz (68.9 kg)   SpO2 95%   BMI 29.65 kg/m²     General appearance: No apparent distress, appears stated age and cooperative. HEENT: Pupils equal, round, and reactive to light. Conjunctivae/corneas clear. Neck: Supple, with full range of motion. No jugular venous distention. Trachea midline. Respiratory:  Normal respiratory effort. Clear to auscultation, bilaterally without Rales/Wheezes/Rhonchi. Cardiovascular: Regular rate and rhythm with normal S1/S2 without murmurs, rubs or gallops. Abdomen: Soft, non-tender, non-distended with normal bowel sounds.   Musculoskeletal: No clubbing, cyanosis or edema bilaterally. Full range of motion without deformity. Skin: Skin color, texture, turgor normal.  No rashes or lesions. Neurologic:  Neurovascularly intact without any focal sensory/motor deficits. Cranial nerves: II-XII intact, grossly non-focal.  Psychiatric: Alert and oriented, thought content appropriate, normal insight  Capillary Refill: Brisk,< 3 seconds   Peripheral Pulses: +2 palpable, equal bilaterally       Labs:   Recent Labs     05/24/19  0905 05/25/19  0537   WBC 4.8 4.4   HGB 12.1 11.9*   HCT 36.6 35.9*    153     Recent Labs     05/24/19  0905      K 4.1      CO2 25   BUN 19   CREATININE 0.8   CALCIUM 9.0     Recent Labs     05/24/19  0905   AST 17   ALT 12   BILITOT 0.4   ALKPHOS 53     Recent Labs     05/24/19  0905   INR 0.96     Recent Labs     05/24/19  0905 05/24/19  1206 05/24/19  1756   TROPONINI <0.01 0.02* 0.02*       Urinalysis:      Lab Results   Component Value Date    NITRU Negative 04/06/2019    WBCUA 6-10 02/24/2019    BACTERIA 4+ 02/24/2019    RBCUA 3-5 02/24/2019    BLOODU Negative 04/06/2019    SPECGRAV 1.010 04/06/2019    GLUCOSEU Negative 04/06/2019       Radiology:  VL DUP CAROTID BILATERAL         CT HEAD WO CONTRAST   Final Result   No acute intracranial abnormality. XR PELVIS (MIN 3 VIEWS)   Final Result   1. No acute osseous abnormality of the pelvis. 2. Bilateral hip osteoarthritis. 3. Osteopenia. XR LUMBAR SPINE (2-3 VIEWS)   Final Result   Limited radiographs due to osteopenia and patient habitus. No definitive new   fracture or acute findings although consider further evaluation with CT or   MRI if there is persistent or acute symptomatology given limitations of these   radiographs         XR CHEST STANDARD (2 VW)   Final Result   Stable cardiomegaly. Large hiatal hernia.                  Assessment/Plan:    Active Hospital Problems    Diagnosis    Moderate malnutrition ([address] [E44.0]    Acute chest pain [R07.9]    Right sided numbness [R20.0]     Chest pain: ekg non acute. Second trop is mildly high. Cont asa. ldl 98. Cards consulted. Started on heparin gtt. Angiogram planned on tuesday.      DJD     Rt leg numbness: s/p RAIN. Pt/ot consulted     H/o diet controlled DM       Neuropathy in leg and arm: neuro consulted.  Advised f.u         DVT Prophylaxis:heparin gtt  Diet: general   Code Status:full      PT/OT Eval Status: ordered     Dispo - inpt awaiting angiogram     Yomaira Beltran MD

## 2019-05-26 NOTE — PLAN OF CARE
Problem: Falls - Risk of: Intervention: Assess risk factors for falls  Note:   Patient is medium fall risk     Problem: Pain:  Description  Pain management should include both nonpharmacologic and pharmacologic interventions. Goal: Control of acute pain  Description  Control of acute pain  5/26/2019 0211 by Adi Rodarte RN  Outcome: Ongoing  Note:   Patient has prescribed pain medications for pain.  See [facility]

## 2019-05-26 NOTE — PROGRESS NOTES
Patient requested an external catheter. Kareem Figueroa RN place external catheter for this RN. External catheter is working appropriately. German Jara RN

## 2019-05-26 NOTE — PLAN OF CARE
Nausea no emesis after attempting dinner- states didn't taste good - dry heaves- 4 mg iv zofran given for nausea - no emesis- now resting in bed- external female catheter in place- call light in reach - bed alarm on

## 2019-05-26 NOTE — PLAN OF CARE
Re-Assessment complete, see flow sheet. Up to commode hourly for small amounts urine, states this has been going on for past several years. complains of pain #8 in lower back & nausea. Given pain & nausea pills per request. PTT therapeutic @ 1610, no change in heparin rate. Will continue to monitor. Cuate Ramirez RN, BSN, PCCN.

## 2019-05-26 NOTE — PLAN OF CARE
Awake alert and oriented x 4- [place]- chronic back pain 8/10-  1 po percocet given for back pain- call light in reach -bed alarm on

## 2019-05-26 NOTE — PLAN OF CARE
Awake alert and oriented x 4- lungs CTA- no edema- denies any chest pain - vitals normal - 's - HR 80's -heparin drip infusing at 8.1 ml/hr - therapeutic at this time - see lab results- call light in reach -bed alarm on

## 2019-05-27 LAB
APTT: 84.6 SEC (ref 26–36)
PLATELET # BLD: 153 K/UL (ref 135–450)

## 2019-05-27 PROCEDURE — 1200000000 HC SEMI PRIVATE

## 2019-05-27 PROCEDURE — 99232 SBSQ HOSP IP/OBS MODERATE 35: CPT | Performed by: NURSE PRACTITIONER

## 2019-05-27 PROCEDURE — 97162 PT EVAL MOD COMPLEX 30 MIN: CPT

## 2019-05-27 PROCEDURE — 36415 COLL VENOUS BLD VENIPUNCTURE: CPT

## 2019-05-27 PROCEDURE — 6360000002 HC RX W HCPCS: Performed by: INTERNAL MEDICINE

## 2019-05-27 PROCEDURE — 6370000000 HC RX 637 (ALT 250 FOR IP): Performed by: INTERNAL MEDICINE

## 2019-05-27 PROCEDURE — 97110 THERAPEUTIC EXERCISES: CPT

## 2019-05-27 PROCEDURE — 97530 THERAPEUTIC ACTIVITIES: CPT

## 2019-05-27 PROCEDURE — 85049 AUTOMATED PLATELET COUNT: CPT

## 2019-05-27 PROCEDURE — 85730 THROMBOPLASTIN TIME PARTIAL: CPT

## 2019-05-27 PROCEDURE — 97116 GAIT TRAINING THERAPY: CPT

## 2019-05-27 PROCEDURE — 2580000003 HC RX 258: Performed by: INTERNAL MEDICINE

## 2019-05-27 RX ADMIN — ONDANSETRON 4 MG: 2 INJECTION INTRAMUSCULAR; INTRAVENOUS at 17:15

## 2019-05-27 RX ADMIN — Medication 10 ML: at 17:16

## 2019-05-27 RX ADMIN — FLUTICASONE PROPIONATE 1 SPRAY: 50 SPRAY, METERED NASAL at 20:42

## 2019-05-27 RX ADMIN — GUAIFENESIN 600 MG: 600 TABLET, EXTENDED RELEASE ORAL at 20:42

## 2019-05-27 RX ADMIN — FLUTICASONE PROPIONATE 1 SPRAY: 50 SPRAY, METERED NASAL at 08:13

## 2019-05-27 RX ADMIN — DOCUSATE SODIUM 100 MG: 100 CAPSULE, LIQUID FILLED ORAL at 08:15

## 2019-05-27 RX ADMIN — OXYCODONE HYDROCHLORIDE AND ACETAMINOPHEN 1 TABLET: 5; 325 TABLET ORAL at 08:10

## 2019-05-27 RX ADMIN — OXYCODONE HYDROCHLORIDE AND ACETAMINOPHEN 1 TABLET: 5; 325 TABLET ORAL at 12:11

## 2019-05-27 RX ADMIN — OXYCODONE HYDROCHLORIDE AND ACETAMINOPHEN 1 TABLET: 5; 325 TABLET ORAL at 19:09

## 2019-05-27 RX ADMIN — GUAIFENESIN 600 MG: 600 TABLET, EXTENDED RELEASE ORAL at 08:15

## 2019-05-27 RX ADMIN — PANTOPRAZOLE SODIUM 40 MG: 40 TABLET, DELAYED RELEASE ORAL at 06:32

## 2019-05-27 RX ADMIN — OXYCODONE HYDROCHLORIDE AND ACETAMINOPHEN 1 TABLET: 5; 325 TABLET ORAL at 03:17

## 2019-05-27 RX ADMIN — DOCUSATE SODIUM 100 MG: 100 CAPSULE, LIQUID FILLED ORAL at 20:42

## 2019-05-27 RX ADMIN — ASPIRIN 325 MG: 325 TABLET, DELAYED RELEASE ORAL at 08:15

## 2019-05-27 RX ADMIN — POLYETHYLENE GLYCOL 3350 17 G: 17 POWDER, FOR SOLUTION ORAL at 08:15

## 2019-05-27 RX ADMIN — Medication 10 ML: at 08:15

## 2019-05-27 RX ADMIN — ONDANSETRON 4 MG: 4 TABLET, ORALLY DISINTEGRATING ORAL at 23:02

## 2019-05-27 RX ADMIN — THERA TABS 1 TABLET: TAB at 08:15

## 2019-05-27 ASSESSMENT — PAIN DESCRIPTION - LOCATION
LOCATION: BACK

## 2019-05-27 ASSESSMENT — PAIN DESCRIPTION - PAIN TYPE
TYPE: CHRONIC PAIN

## 2019-05-27 ASSESSMENT — PAIN DESCRIPTION - PROGRESSION
CLINICAL_PROGRESSION: GRADUALLY WORSENING
CLINICAL_PROGRESSION: GRADUALLY WORSENING
CLINICAL_PROGRESSION: NOT CHANGED
CLINICAL_PROGRESSION: GRADUALLY WORSENING
CLINICAL_PROGRESSION: NOT CHANGED

## 2019-05-27 ASSESSMENT — PAIN SCALES - GENERAL
PAINLEVEL_OUTOF10: 8
PAINLEVEL_OUTOF10: 8
PAINLEVEL_OUTOF10: 7
PAINLEVEL_OUTOF10: 8
PAINLEVEL_OUTOF10: 6
PAINLEVEL_OUTOF10: 4
PAINLEVEL_OUTOF10: 7
PAINLEVEL_OUTOF10: 8
PAINLEVEL_OUTOF10: 6
PAINLEVEL_OUTOF10: 6
PAINLEVEL_OUTOF10: 8

## 2019-05-27 ASSESSMENT — PAIN DESCRIPTION - ORIENTATION: ORIENTATION: LOWER

## 2019-05-27 NOTE — PROGRESS NOTES
Physical Therapy    Facility/Department: [address]  Initial Assessment    NAME: Apolinar Muñoz  : 10/16/1932  MRN: [id number]    Date of Service: 2019    Discharge Recommendations: HOME HEALTH CARE: LEVEL 3 SAFETY     - Initial home health evaluation to occur within 24-48 hours, in patient home   - Therapy evaluations in home within 24-48 hours of discharge; including DME and home safety   - Frontload therapy 5 days, then 3x a week   - Therapy to evaluate if patient has [address] needs for personal care   -  evaluation within 24-48 hours, includes evaluation of resources and insurance to determine [place], [place], LTC, and Medicaid options         PT Equipment Recommendations  Equipment Needed: TBD, pt may benefit from [employer] for increased stability versus quad cane and 4WW  Other: Pt has W/C, 4WW, quad cane     Assessment   Body structures, Functions, Activity limitations: Decreased functional mobility ; Decreased strength;Decreased endurance;Decreased sensation;Decreased ROM; Decreased balance; Increased Pain  Assessment: The patient presents below her baseline function and demonstrates the deficits as listed above. She will benefit from skilled physical therapy to address these impairments and faciliate a safe return to [facility]. Treatment Diagnosis: Impaired strength, decreased endurance  Prognosis: Good  Decision Making: Medium Complexity  History: Scoliosis, DM II, PVC, CAD, HCC  Exam: MMT, Balance, AM-PAC  Clinical Presentation: Evolving  Patient Education: Pt educated on role of acute care PT, instructed in exercises, educated on neural tension  Barriers to Learning: none  REQUIRES PT FOLLOW UP: Yes  Activity Tolerance  Activity Tolerance: Patient Tolerated treatment well;Patient limited by fatigue;Patient limited by endurance  Activity Tolerance: Pt limited by R leg pain and weakness . Pt tolerated initial evaluation testing, and PT assisted with 3 trips to and from bathroom.  Pt demonstrates fatigue afterwards. Patient Diagnosis(es): The primary encounter diagnosis was Acute chest pain. Diagnoses of Chronic pain of both hips and Chronic low back pain with right-sided sciatica, unspecified back pain laterality were also pertinent to this visit. has a past medical history of Arthritis, CAD (coronary artery disease), Cancer ([address] Cystocele, Diabetes mellitus ([address] Hepatitis A, History of blood transfusion, Hyperlipidemia, PVC (premature ventricular contraction), Rectocele, Reflux, and Scoliosis. has a past surgical history that includes Appendectomy; joint replacement (Left); hernia repair (6 YRS AGO); Cholecystectomy; shoulder surgery (Right, 7/26/12); Bunionectomy (7/26/12); Breast surgery; Upper gastrointestinal endoscopy (11/2/12); Hysterectomy; bladder suspension; Dilation and curettage of uterus; other surgical history (Left, 09/22/14); Foot surgery; Cardiac catheterization; Colonoscopy (2008); Cystocopy (03/29/2017); other surgical history (Right, 06/28/2018); Cataract removal with implant (Left, 09/13/2018); pr remv cataract extracap,insert lens (Left, 9/13/2018); and Lumbar spine surgery (Right, 5/15/2019). Restrictions  Position Activity Restriction  Other position/activity restrictions: Fay Alejo is a 80 y.o. seen in ED for multiple complaints and confusing clinical presentation. Initially awoke with a hard upper back/mid scapular pain described as dull heavy pain with associated SOB. Denies radiation, diaphoresis, palpitations, dizziness or syncope. Had taken a pain pill prior to event with gradual relief of discomfort after 30-45 minutes. Denies cough or fever. Has a hx of GERD and a large hiatal hernia with prior surgery and recurrence. She also states that she was dizzy with R arm weakness resulting in Stroke Alert in the ER. Her head CT was negative for acute CVA. She has chronic lower back pain and RLE weakness.   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 Pt has had occasional episodic right arm weakness and numbness in the past but none since the hospital admission. Vision/Hearing  Vision: Impaired  Vision Exceptions: Wears glasses for reading  Hearing: Exceptions to [facility]  Hearing Exceptions: Hard of hearing/hearing concerns     Subjective  General  Chart Reviewed: Yes  Response To Previous Treatment: Not applicable  Family / Caregiver Present: No  Follows Commands: Within Functional Limits  General Comment  Comments: supine in bed upon arrival  Subjective  Subjective: Pt reporting back pain but agreeable to therapy eval. Pt states that following T-12 kyphoplasty 2 months ago, she started feeling as though her back cannot move and that it makes it diffucult to breathe.    Pain Screening  Patient Currently in Pain: Yes  Vital Signs  Patient Currently in Pain: Yes  Pre Treatment Pain Screening  Pain at present: 7  Scale Used: Numeric Score  Intervention List: Patient able to continue with treatment  Comments / Details: Pain in low back and RLE    Orientation  Orientation  Overall Orientation Status: Within Functional Limits  Social/Functional History  Social/Functional History  Lives With: Other (comment)(Assisted living community)  Type of Home: Assisted living  Home Layout: One level  Home Access: Level entry  Bathroom Shower/Tub: Walk-in shower  Bathroom Toilet: Standard  Bathroom Equipment: Grab bars in shower  Bathroom Accessibility: Accessible  Home Equipment: 4 wheeled walker, Quad cane, [address] Help From: Family  ADL Assistance: Independent  Homemaking Assistance: Needs assistance  Ambulation Assistance: Independent  Transfer Assistance: Independent  Active : No  Occupation: Retired  Cognition   Cognition  Overall Cognitive Status: WFL    Objective     Observation/Palpation  Posture: Poor  Observation: Severe kyphosis     AROM RLE (degrees)  RLE General AROM: Decreased R hip flexion and knee extension  PROM LLE (degrees)  LLE General PROM: Hip flexion and knee extension WFL passively   AROM LLE (degrees)  LLE AROM : WFL  Spine  Cervical: Decreased extension and rotation bilaterally   Thoracic: decreased extension, and rotation bilaterally  Lumbar: decreased rotation bilaterally  Strength RLE  Strength RLE: Exception  R Hip Flexion: 2+/5  R Hip ABduction: 3+/5  R Hip ADduction: 3+/5  R Knee Extension: 2-/5  Strength LLE  Strength LLE: WFL  Comment: 4+/5 throughout   Tone RLE  RLE Tone: Normotonic  Tone LLE  LLE Tone: Normotonic  Motor Control  Gross Motor?: WFL  Sensation  Overall Sensation Status: Impaired  Light Touch: Partial deficits in the RLE  Bed mobility  Supine to Sit: Stand by assistance  Sit to Supine: (Not assessed, pt up in chair at end of session)  Scooting: Stand by assistance  Comment: Use of bed rails, HOB elevated  Transfers  Sit to Stand: Contact guard assistance;Stand by assistance(Standby from bed, CGA from toilet)  Stand to sit: Contact guard assistance;Stand by assistance(standby to chair, CGA to toilet)  Comment: Completed 3 toilet transfers with patient   Ambulation  Ambulation?: Yes  More Ambulation?: Yes  Ambulation 1  Surface: level tile  Device: Small Hector Pierce  Assistance: Stand by assistance  Quality of Gait: Flexed posture, decreased step length, decreased step height  Distance: 10ft + 15ft  Comments: Bed to BR and BR to chair   Ambulation 2  Surface - 2: level tile  Device 2: Small Base Quad Care  Assistance 2: Stand by assistance  Distance: 15ft + 15ft   Comments: chair <> bathroom  Ambulation 3  Surface - 3: level tile  Device 3: Small Base Quad Cane  Assistance 3: Stand by assistance  Distance: 15ft + 15ft   Comments: chair <> bathroom  Stairs/Curb  Stairs?: No     Balance  Posture: Poor  Sitting - Static: Good  Sitting - Dynamic: Fair  Standing - Static: Good  Standing - Dynamic: Fair  Exercises  Gluteal Sets: x20  Hip Flexion: x20  Knee Long Arc Quad: x20  Other exercises  Other exercises?: Yes  Other exercises 1: Seated hamstring stretch      Plan   Plan  Times per week: 5-7  Times per day: Daily  Plan weeks: until d/c  Current Treatment Recommendations: Strengthening, Functional Mobility Training, Transfer Training, Gait Training, Patient/Caregiver Education & Training, Endurance Training, Balance Training  Safety Devices  Type of devices:  All fall risk precautions in place, Left in chair, Gait belt, Call light within reach, Patient at risk for falls, Chair alarm in place  Restraints  Initially in place: No    AM-PAC Score  AM-PAC Inpatient Mobility Raw Score : 18  AM-PAC Inpatient T-Scale Score : 43.63  Mobility Inpatient CMS 0-100% Score: 46.58  Mobility Inpatient CMS G-Code Modifier : CK       Goals  Short term goals  Time Frame for Short term goals: Until D/C  Short term goal 1: Pt will demonstrate Mod I with amb of at least 150' with LRAD   Short term goal 2: Pt will demonstrate Mod I with transfers sit<>stand   Short term goal 3: Pt will demonstrate Mod I with bed mobility   Long term goals  Time Frame for Long term goals : LTG = STG  Patient Goals   Patient goals : Return to assisted living condo       Therapy Time   Individual Concurrent Group Co-treatment   Time In 1341         Time Out 1500         Minutes 79         Timed Code Treatment Minutes: 69 Minutes  Variance: 10  Reason: (Evaluation paused when Physician entered room to speak with patient and again when dietary came to obtain the patient's order for dinner)    Huy Angeles, PT, DPT   [id number]

## 2019-05-27 NOTE — PLAN OF CARE
Awake alert and oriented x 4 - asking for pain medication - chronic back pain 8/10- lungs CTA - no edema- vitals stable- 1 po percocet given for pain - am medications now given - set up with breakfast tray - call light in reach - bed alarm on - discussed plan of care and cardiac angiogram tomorrow and need to be NPO after midnight tonight- acknowledged understanding

## 2019-05-27 NOTE — PLAN OF CARE
Up in chair with PT/OT assist- vitals stable- chronic back pain 7/10 - right leg numbness still present -lungs CTA_ external female catheter removed while up in chair - call light in reach - chair alarm on

## 2019-05-27 NOTE — PLAN OF CARE
Up in chair -nauseated-wanting to go back to bed- return to bed- fresh new external female catheter placed -placed per patient request-  4 mg iv zofran given for nausea--call light in reach bed alarm on

## 2019-05-27 NOTE — PROGRESS NOTES
Pt is awake with O2 Sat 92% on room air. She requests supplemental O2 @2 L/min when sleeping. She said that she uses O2 at home for sleeping because O2 Sat drops. Supplemental O2 is delivered at @L/min via nasal cannula.

## 2019-05-27 NOTE — PROGRESS NOTES
[facility] PROGRESS NOTE    5/27/2019 3:09 PM        Name: Anil Boland . Admitted: 5/24/2019  Primary Care Provider: DENIZ Cohen CNP (Tel: [phone])    Brief Course:        CC: Chest pain    Subjective:  .     Sitting in the chair  Daughter and son in law at bedside  No fever, no more chest pain at this time    Reviewed interval ancillary notes    Current Medications    guaiFENesin (MUCINEX) extended release tablet 600 mg BID   heparin (porcine) injection 4,000 Units PRN   heparin (porcine) injection 2,000 Units PRN   heparin 25,000 units in dextrose 5% 250 mL infusion Continuous   diphenoxylate-atropine (LOMOTIL) 2.5-0.025 MG per tablet 1 tablet 4x Daily PRN   docusate sodium (COLACE) capsule 100 mg BID   pantoprazole (PROTONIX) tablet 40 mg Daily   fluticasone (FLONASE) 50 MCG/ACT nasal spray 1 spray BID   furosemide (LASIX) tablet 20 mg PRN   ondansetron (ZOFRAN-ODT) disintegrating tablet 4 mg Q8H PRN   tiZANidine (ZANAFLEX) tablet 4 mg Q6H PRN   sodium chloride flush 0.9 % injection 10 mL 2 times per day   sodium chloride flush 0.9 % injection 10 mL PRN   magnesium hydroxide (MILK OF MAGNESIA) 400 MG/5ML suspension 30 mL Daily PRN   ondansetron (ZOFRAN) injection 4 mg Q6H PRN   acetaminophen (TYLENOL) tablet 650 mg Q4H PRN   regadenoson (LEXISCAN) injection 0.4 mg ONCE PRN   polyethylene glycol (GLYCOLAX) packet 17 g Daily   multivitamin 1 tablet Daily   aspirin EC tablet 325 mg Daily   oxyCODONE-acetaminophen (PERCOCET) 5-325 MG per tablet 1 tablet Q4H PRN   perflutren lipid microspheres (DEFINITY) injection 1.65 mg ONCE PRN       Objective:  /71   Pulse 69   Temp 97.8 °F (36.6 °C) (Temporal)   Resp 18   Ht 5' (1.524 m)   Wt 149 lb 4.8 oz (67.7 kg)   SpO2 94%   BMI 29.16 kg/m²     Intake/Output Summary (Last 24 hours) at 5/27/2019 1509  Last data filed at 5/27/2019 1344  Gross per 24 hour   Intake 1397.23 ml   Output 2000 ml   Net -602.77 ml    Wt Readings from Last 3 Encounters:   05/27/19 149 lb 4.8 oz (67.7 kg)   05/01/19 156 lb 15.5 oz (71.2 kg)   04/17/19 156 lb 15.5 oz (71.2 kg)     Kyphotic back  General appearance:  Appears comfortable  Eyes: Sclera clear. Pupils equal.  ENT: Moist oral mucosa. Trachea midline, no adenopathy. Cardiovascular: Regular rhythm, normal S1, S2. No murmur. No edema in lower extremities  Respiratory: Not using accessory muscles. Good inspiratory effort. Clear to auscultation bilaterally, no wheeze or crackles. GI: Abdomen soft, no tenderness, not distended, normal bowel sounds  Musculoskeletal: No cyanosis in digits, neck supple  Neurology: CN 2-12 grossly intact. No speech or motor deficits  Psych: Normal affect. Alert and oriented in time, place and person  Skin: Warm, dry, normal turgor  Extremity exam shows brisk capillary refill. Peripheral pulses are palpable in lower extremities     Labs and Tests:  CBC:   Recent Labs     05/25/19  0537 05/27/19  0529   WBC 4.4  --    HGB 11.9*  --     153     BMP:  No results for input(s): NA, K, CL, CO2, BUN, CREATININE, GLUCOSE in the last 72 hours. Hepatic: No results for input(s): AST, ALT, ALB, BILITOT, ALKPHOS in the last 72 hours. VL DUP CAROTID BILATERAL         CT HEAD WO CONTRAST   Final Result   No acute intracranial abnormality. XR PELVIS (MIN 3 VIEWS)   Final Result   1. No acute osseous abnormality of the pelvis. 2. Bilateral hip osteoarthritis. 3. Osteopenia. XR LUMBAR SPINE (2-3 VIEWS)   Final Result   Limited radiographs due to osteopenia and patient habitus. No definitive new   fracture or acute findings although consider further evaluation with CT or   MRI if there is persistent or acute symptomatology given limitations of these   radiographs         XR CHEST STANDARD (2 VW)   Final Result   Stable cardiomegaly. Large hiatal hernia.              Discussed care with family    Problem List  Active Problems:    Acute chest pain    Right sided numbness    Moderate malnutrition (HCC)    Chest pain  Resolved Problems:    * No resolved hospital problems. *       Assessment & Plan:   Unstable angina  Asa, heparin, cath planned for morning     Neuropathy   Neuro input during the stay     H/o Kyphoplasty in April T12    IV Access: peripheral  Petersen: no  Diet: DIET CARDIAC;   Dietary Nutrition Supplements: Standard High Calorie Oral Supplement  Diet NPO, After Midnight  Code:Full Code  DVT PPX Heparin  Disposition  Unclear at this time       Libby May MD   5/27/2019 3:09 PM

## 2019-05-27 NOTE — PLAN OF CARE
Awake alert and oriented x 4- vitals stable- no significant assessment changes- chronic back pain 8/10- 1 po percocet given for pain - call light in reach - bed alarm on

## 2019-05-27 NOTE — PROGRESS NOTES
[facility] [address]   Daily Progress Note      Admit Date:  5/24/2019      Subjective HA gone, but she is worried about \"numbness in R leg\" able to walk with this, no chest painPatient has chronic low back pain and radicular pain in the right leg as well as numbness in the right leg for which she is being given epidural steroid injections in the past, the R leg issues are the biggest concern for her        HPI:   81 yo presents with multiple complaints and confusing clinical presentation. Initially awoke with a hard upper back/mid scapular pain described as dull heavy pain with associated SOB. Denies radiation, diaphoresis, palpitations, dizziness or syncope. Had taken a pain pill prior to event with gradual relief of discomfort after 30-45 minutes. Denies cough or fever. Has a hx of GERD and a large hiatal hernia with prior surgery and recurrence. She also states that she was dizzy with R arm weakness resulting in Stroke Alert in the ER. Her head CT was negative for acute CVA. She has chronic lower back pain and RLE weakness. 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 Harriet Marley Suspect non-invasive cardiac evaluation will be difficult to accomplish safely or effectively with the constellation of issues above. Headache gone, R leg numbness, getting PT/OT now, for cath tomorrow      The patient was seen and examined. Notes, labs and recent testing reviewed. There were not complications over night.     The patient is being seen for chest pain        Objective:     /75   Pulse 76   Temp 97.2 °F (36.2 °C) (Temporal)   Resp 18   Ht 5' (1.524 m)   Wt 149 lb 4.8 oz (67.7 kg)   SpO2 93%   BMI 29.16 kg/m²      Intake/Output Summary (Last 24 hours) at 5/27/2019 0842  Last data filed at 5/27/2019 0810  Gross per 24 hour   Intake 1635.23 ml   Output 1400 ml   Net 235.23 ml       Physical Exam:  General:  Awake, alert, NAD  Skin:  Warm and dry  Neck:  JVD<8, no bruit  Chest:  Clear to auscultation, no wheezes/rhonchi/rales, prominent kyphoscoliosis  Cardiovascular:  RRR C3V3, systolic murmur  Abdomen:  Soft, nontender, +bowel sounds  Extremities:  no edema, legs equally warm, pulses good,     Medications:    guaiFENesin  600 mg Oral BID    docusate sodium  100 mg Oral BID    pantoprazole  40 mg Oral Daily    fluticasone  1 spray Each Nostril BID    sodium chloride flush  10 mL Intravenous 2 times per day    polyethylene glycol  17 g Oral Daily    multivitamin  1 tablet Oral Daily    aspirin  325 mg Oral Daily      heparin (porcine) 12 Units/kg/hr (05/26/19 1747)     heparin (porcine), heparin (porcine), diphenoxylate-atropine, furosemide, ondansetron, tiZANidine, sodium chloride flush, magnesium hydroxide, ondansetron, acetaminophen, regadenoson, oxyCODONE-acetaminophen, perflutren lipid microspheres    Lab Data:  CBC:   Recent Labs     05/24/19  0905 05/25/19  0537 05/27/19  0529   WBC 4.8 4.4  --    HGB 12.1 11.9*  --     153 153     BMP:    Recent Labs     05/24/19  0905      K 4.1   CO2 25   BUN 19   CREATININE 0.8     LIVR:   Recent Labs     05/24/19  0905   AST 17   ALT 12     PT/INR:   Recent Labs     05/24/19  0905   PROT 6.2*   INR 0.96     APTT:   Recent Labs     05/25/19  2237 05/26/19  0532 05/27/19  0529   APTT 75.5* 87.0* 84.6*     BNP:  No results for input(s): BNP in the last 72 hours. CARDIAC ENZYMES:  Recent Labs     05/24/19  0905 05/24/19  1206 05/24/19  1756   TROPONINI <0.01 0.02* 0.02*     FASTING LIPID PANEL:  Lab Results   Component Value Date    CHOL 195 05/25/2019    HDL 85 05/25/2019    HDL 70 01/11/2010    TRIG 62 05/25/2019         Echo Summary   -Normal left ventricle size, wall thickness and systolic function with an   estimated ejection fraction of 60%. No regional wall motion abnormalities   are seen.   -Mild aortic stenosis with a peak velocity of 2.54m/s and a mean pressure   gradient of 15mmHg.  Mild aortic regurgitation.   -Mild mitral regurgitation.   -Mild tricuspid regurgitation with a estimated PASP of 31 mmHg plus right   atrial pressure.   -Trivial pulmonic regurgitation present.   -Biatrial enlargement. CT Head No acute intracranial abnormality. Carotid scan   Tech Comments   Right   The right internal carotid artery appears to have a <50% diameter reducing   stenosis based on velocity criteria. The right vertebral artery demonstrates normal antegrade flow. The right subclavian artery is visualized and demonstrates multiphasic flow. Left   The left internal carotid artery demonstrates no significant stenosis. The left vertebral artery demonstrates normal antegrade flow. The left subclavian artery is visualized and demonstrates multiphasic flow. The left brachial pressure was not obtained due to IV placement .         LIPIDS: 5/19  TC-195, Trigs-62, HDL-85, LDL-98, not on any meds      Assessment/plan:       1. Acute chest pain-Both typical and atypical features. Difficult to evaluate with noninvasive approach with her inability to walk and marked FAVB. Will need to treat as unstable angina until proven otherwise with current troponin elevation. Cath Tuesday, risks, options, benefits discussed   2. Chronic pain of both hips    3. Chronic low back pain with right-sided sciatica, unspecified back pain laterality    4. Large hiatal hernia with prior repair and current recurrence. 5.      R sided  weakness mostly in leg--She thinks she had a stroke though CT scan was negative.  Getting PT     Plan:  Continue Low dose heparin   Cath tuesday      CHIVO Lindsay [facility]

## 2019-05-28 LAB
APTT: 86 SEC (ref 26–36)
POC ACT LR: 179 SEC

## 2019-05-28 PROCEDURE — 93458 L HRT ARTERY/VENTRICLE ANGIO: CPT | Performed by: INTERNAL MEDICINE

## 2019-05-28 PROCEDURE — 2580000003 HC RX 258

## 2019-05-28 PROCEDURE — 6370000000 HC RX 637 (ALT 250 FOR IP): Performed by: INTERNAL MEDICINE

## 2019-05-28 PROCEDURE — 97116 GAIT TRAINING THERAPY: CPT

## 2019-05-28 PROCEDURE — 2709999900 HC NON-CHARGEABLE SUPPLY

## 2019-05-28 PROCEDURE — 97530 THERAPEUTIC ACTIVITIES: CPT

## 2019-05-28 PROCEDURE — 6360000002 HC RX W HCPCS: Performed by: INTERNAL MEDICINE

## 2019-05-28 PROCEDURE — 1200000000 HC SEMI PRIVATE

## 2019-05-28 PROCEDURE — 99153 MOD SED SAME PHYS/QHP EA: CPT

## 2019-05-28 PROCEDURE — 2580000003 HC RX 258: Performed by: INTERNAL MEDICINE

## 2019-05-28 PROCEDURE — 93458 L HRT ARTERY/VENTRICLE ANGIO: CPT

## 2019-05-28 PROCEDURE — 6360000002 HC RX W HCPCS: Performed by: NURSE PRACTITIONER

## 2019-05-28 PROCEDURE — 85730 THROMBOPLASTIN TIME PARTIAL: CPT

## 2019-05-28 PROCEDURE — 6360000002 HC RX W HCPCS

## 2019-05-28 PROCEDURE — 99152 MOD SED SAME PHYS/QHP 5/>YRS: CPT

## 2019-05-28 PROCEDURE — C1769 GUIDE WIRE: HCPCS

## 2019-05-28 PROCEDURE — C1894 INTRO/SHEATH, NON-LASER: HCPCS

## 2019-05-28 PROCEDURE — B2111ZZ FLUOROSCOPY OF MULTIPLE CORONARY ARTERIES USING LOW OSMOLAR CONTRAST: ICD-10-PCS | Performed by: INTERNAL MEDICINE

## 2019-05-28 PROCEDURE — 85347 COAGULATION TIME ACTIVATED: CPT

## 2019-05-28 PROCEDURE — 2700000000 HC OXYGEN THERAPY PER DAY

## 2019-05-28 PROCEDURE — 36415 COLL VENOUS BLD VENIPUNCTURE: CPT

## 2019-05-28 PROCEDURE — 99233 SBSQ HOSP IP/OBS HIGH 50: CPT | Performed by: INTERNAL MEDICINE

## 2019-05-28 PROCEDURE — 2500000003 HC RX 250 WO HCPCS

## 2019-05-28 PROCEDURE — 4A023N7 MEASUREMENT OF CARDIAC SAMPLING AND PRESSURE, LEFT HEART, PERCUTANEOUS APPROACH: ICD-10-PCS | Performed by: INTERNAL MEDICINE

## 2019-05-28 PROCEDURE — 6360000004 HC RX CONTRAST MEDICATION: Performed by: INTERNAL MEDICINE

## 2019-05-28 RX ORDER — ATORVASTATIN CALCIUM 40 MG/1
40 TABLET, FILM COATED ORAL NIGHTLY
Status: DISCONTINUED | OUTPATIENT
Start: 2019-05-28 | End: 2019-05-29 | Stop reason: HOSPADM

## 2019-05-28 RX ORDER — ACETAMINOPHEN 325 MG/1
650 TABLET ORAL EVERY 4 HOURS PRN
Status: DISCONTINUED | OUTPATIENT
Start: 2019-05-28 | End: 2019-05-29 | Stop reason: HOSPADM

## 2019-05-28 RX ORDER — SODIUM CHLORIDE 0.9 % (FLUSH) 0.9 %
10 SYRINGE (ML) INJECTION PRN
Status: DISCONTINUED | OUTPATIENT
Start: 2019-05-28 | End: 2019-05-29 | Stop reason: HOSPADM

## 2019-05-28 RX ORDER — NITROGLYCERIN 0.4 MG/1
0.4 TABLET SUBLINGUAL EVERY 5 MIN PRN
Status: DISCONTINUED | OUTPATIENT
Start: 2019-05-28 | End: 2019-05-29 | Stop reason: HOSPADM

## 2019-05-28 RX ORDER — SODIUM CHLORIDE 0.9 % (FLUSH) 0.9 %
10 SYRINGE (ML) INJECTION EVERY 12 HOURS SCHEDULED
Status: DISCONTINUED | OUTPATIENT
Start: 2019-05-28 | End: 2019-05-29 | Stop reason: HOSPADM

## 2019-05-28 RX ADMIN — OXYCODONE HYDROCHLORIDE AND ACETAMINOPHEN 1 TABLET: 5; 325 TABLET ORAL at 11:50

## 2019-05-28 RX ADMIN — DOCUSATE SODIUM 100 MG: 100 CAPSULE, LIQUID FILLED ORAL at 11:50

## 2019-05-28 RX ADMIN — DOCUSATE SODIUM 100 MG: 100 CAPSULE, LIQUID FILLED ORAL at 20:25

## 2019-05-28 RX ADMIN — GUAIFENESIN 600 MG: 600 TABLET, EXTENDED RELEASE ORAL at 11:50

## 2019-05-28 RX ADMIN — OXYCODONE HYDROCHLORIDE AND ACETAMINOPHEN 1 TABLET: 5; 325 TABLET ORAL at 06:07

## 2019-05-28 RX ADMIN — OXYCODONE HYDROCHLORIDE AND ACETAMINOPHEN 1 TABLET: 5; 325 TABLET ORAL at 16:25

## 2019-05-28 RX ADMIN — HEPARIN SODIUM AND DEXTROSE 12 UNITS/KG/HR: 10000; 5 INJECTION INTRAVENOUS at 00:31

## 2019-05-28 RX ADMIN — IOPAMIDOL 63 ML: 755 INJECTION, SOLUTION INTRAVENOUS at 09:39

## 2019-05-28 RX ADMIN — ASPIRIN 325 MG: 325 TABLET, DELAYED RELEASE ORAL at 11:49

## 2019-05-28 RX ADMIN — Medication 10 ML: at 20:25

## 2019-05-28 RX ADMIN — PANTOPRAZOLE SODIUM 40 MG: 40 TABLET, DELAYED RELEASE ORAL at 06:08

## 2019-05-28 RX ADMIN — POLYETHYLENE GLYCOL 3350 17 G: 17 POWDER, FOR SOLUTION ORAL at 11:50

## 2019-05-28 RX ADMIN — ONDANSETRON 4 MG: 2 INJECTION INTRAMUSCULAR; INTRAVENOUS at 11:50

## 2019-05-28 RX ADMIN — GUAIFENESIN 600 MG: 600 TABLET, EXTENDED RELEASE ORAL at 20:25

## 2019-05-28 RX ADMIN — FLUTICASONE PROPIONATE 1 SPRAY: 50 SPRAY, METERED NASAL at 20:25

## 2019-05-28 RX ADMIN — OXYCODONE HYDROCHLORIDE AND ACETAMINOPHEN 1 TABLET: 5; 325 TABLET ORAL at 00:30

## 2019-05-28 RX ADMIN — Medication 10 ML: at 11:51

## 2019-05-28 RX ADMIN — FLUTICASONE PROPIONATE 1 SPRAY: 50 SPRAY, METERED NASAL at 11:51

## 2019-05-28 RX ADMIN — OXYCODONE HYDROCHLORIDE AND ACETAMINOPHEN 1 TABLET: 5; 325 TABLET ORAL at 22:09

## 2019-05-28 RX ADMIN — Medication 10 ML: at 11:52

## 2019-05-28 RX ADMIN — THERA TABS 1 TABLET: TAB at 11:50

## 2019-05-28 RX ADMIN — DESMOPRESSIN ACETATE 40 MG: 0.2 TABLET ORAL at 20:25

## 2019-05-28 ASSESSMENT — PAIN SCALES - GENERAL
PAINLEVEL_OUTOF10: 7
PAINLEVEL_OUTOF10: 8
PAINLEVEL_OUTOF10: 8
PAINLEVEL_OUTOF10: 7
PAINLEVEL_OUTOF10: 8
PAINLEVEL_OUTOF10: 7
PAINLEVEL_OUTOF10: 8
PAINLEVEL_OUTOF10: 7
PAINLEVEL_OUTOF10: 7
PAINLEVEL_OUTOF10: 8

## 2019-05-28 ASSESSMENT — PAIN DESCRIPTION - PROGRESSION
CLINICAL_PROGRESSION: NOT CHANGED

## 2019-05-28 ASSESSMENT — PAIN DESCRIPTION - LOCATION
LOCATION: HEAD
LOCATION: BACK
LOCATION: BACK;HEAD;GROIN

## 2019-05-28 ASSESSMENT — PAIN DESCRIPTION - DESCRIPTORS
DESCRIPTORS: ACHING
DESCRIPTORS: HEADACHE

## 2019-05-28 ASSESSMENT — PAIN DESCRIPTION - PAIN TYPE
TYPE: ACUTE PAIN
TYPE: ACUTE PAIN
TYPE: CHRONIC PAIN
TYPE: ACUTE PAIN
TYPE: CHRONIC PAIN

## 2019-05-28 ASSESSMENT — PAIN DESCRIPTION - ORIENTATION
ORIENTATION: MID;LOWER
ORIENTATION: LOWER;MID
ORIENTATION: RIGHT;LOWER
ORIENTATION: LOWER

## 2019-05-28 ASSESSMENT — PAIN DESCRIPTION - ONSET: ONSET: SUDDEN

## 2019-05-28 ASSESSMENT — PAIN DESCRIPTION - FREQUENCY: FREQUENCY: INTERMITTENT

## 2019-05-28 NOTE — PROGRESS NOTES
[facility] PROGRESS NOTE    5/28/2019 5:55 PM        Name: Aime Spring . Admitted: 5/24/2019  Primary Care Provider: DENIZ Jean-Baptiste CNP (Tel: [phone])    Brief Course:        CC: Chest pain    Subjective:  .   S/p cath   Slightly lethargic     Reviewed interval ancillary notes    Current Medications    sodium chloride flush 0.9 % injection 10 mL 2 times per day   sodium chloride flush 0.9 % injection 10 mL PRN   acetaminophen (TYLENOL) tablet 650 mg Q4H PRN   magnesium hydroxide (MILK OF MAGNESIA) 400 MG/5ML suspension 30 mL Daily PRN   atorvastatin (LIPITOR) tablet 40 mg Nightly   nitroGLYCERIN (NITROSTAT) SL tablet 0.4 mg Q5 Min PRN   guaiFENesin (MUCINEX) extended release tablet 600 mg BID   heparin (porcine) injection 4,000 Units PRN   heparin (porcine) injection 2,000 Units PRN   diphenoxylate-atropine (LOMOTIL) 2.5-0.025 MG per tablet 1 tablet 4x Daily PRN   docusate sodium (COLACE) capsule 100 mg BID   pantoprazole (PROTONIX) tablet 40 mg Daily   fluticasone (FLONASE) 50 MCG/ACT nasal spray 1 spray BID   furosemide (LASIX) tablet 20 mg PRN   ondansetron (ZOFRAN-ODT) disintegrating tablet 4 mg Q8H PRN   tiZANidine (ZANAFLEX) tablet 4 mg Q6H PRN   ondansetron (ZOFRAN) injection 4 mg Q6H PRN   regadenoson (LEXISCAN) injection 0.4 mg ONCE PRN   polyethylene glycol (GLYCOLAX) packet 17 g Daily   multivitamin 1 tablet Daily   aspirin EC tablet 325 mg Daily   oxyCODONE-acetaminophen (PERCOCET) 5-325 MG per tablet 1 tablet Q4H PRN   perflutren lipid microspheres (DEFINITY) injection 1.65 mg ONCE PRN       Objective:  /64   Pulse 84   Temp 98.3 °F (36.8 °C) (Oral)   Resp 18   Ht 5' (1.524 m)   Wt 147 lb 14.4 oz (67.1 kg)   SpO2 93%   BMI 28.88 kg/m²     Intake/Output Summary (Last 24 hours) at 5/28/2019 5871  Last data filed at 5/28/2019 1743  Gross per 24 hour   Intake 84.11 ml Output 1400 ml   Net -1315.89 ml      Wt Readings from Last 3 Encounters:   05/28/19 147 lb 14.4 oz (67.1 kg)   05/01/19 156 lb 15.5 oz (71.2 kg)   04/17/19 156 lb 15.5 oz (71.2 kg)     Kyphotic back  General appearance:  Appears comfortable  Eyes: Sclera clear. Pupils equal.  ENT: Moist oral mucosa. Trachea midline, no adenopathy. Cardiovascular: Regular rhythm, normal S1, S2. No murmur. No edema in lower extremities  Respiratory: Not using accessory muscles. Good inspiratory effort. Clear to auscultation bilaterally, no wheeze or crackles. GI: Abdomen soft, no tenderness, not distended, normal bowel sounds  Musculoskeletal: No cyanosis in digits, neck supple  Neurology: CN 2-12 grossly intact. No speech or motor deficits  Psych: Normal affect. Alert and oriented in time, place and person  Skin: Warm, dry, normal turgor  Extremity exam shows brisk capillary refill. Peripheral pulses are palpable in lower extremities     Labs and Tests:  CBC:   Recent Labs     05/27/19  0529        BMP:  No results for input(s): NA, K, CL, CO2, BUN, CREATININE, GLUCOSE in the last 72 hours. Hepatic: No results for input(s): AST, ALT, ALB, BILITOT, ALKPHOS in the last 72 hours. VL DUP CAROTID BILATERAL   Final Result      CT HEAD WO CONTRAST   Final Result   No acute intracranial abnormality. XR PELVIS (MIN 3 VIEWS)   Final Result   1. No acute osseous abnormality of the pelvis. 2. Bilateral hip osteoarthritis. 3. Osteopenia. XR LUMBAR SPINE (2-3 VIEWS)   Final Result   Limited radiographs due to osteopenia and patient habitus. No definitive new   fracture or acute findings although consider further evaluation with CT or   MRI if there is persistent or acute symptomatology given limitations of these   radiographs         XR CHEST STANDARD (2 VW)   Final Result   Stable cardiomegaly. Large hiatal hernia.              Discussed care with family    Problem List  Active Problems:    Acute chest pain    Right sided numbness    Moderate malnutrition (HCC)    Chest pain  Resolved Problems:    * No resolved hospital problems.  *       Assessment & Plan:   Atypical Chest pain  S/p cath, results reviewed     Neuropathy   Neuro input during the stay     H/o Kyphoplasty in April T12    Debility   PT/OT and PMR consult     IV Access: peripheral  Petersen: no  Diet: Dietary Nutrition Supplements: Standard High Calorie Oral Supplement  DIET CARDIAC;  Code:Full Code  DVT PPX Heparin  Disposition  Unclear at this time       Gege Lee MD   5/28/2019 5:55 PM

## 2019-05-28 NOTE — PROGRESS NOTES
Pt is resting quietly in bed. A&Ox4. All medication are given as scheduled. Bed is locked in the lowest position with bed alarm on and side rails up 2/4. NPO after midnight for cath lab tomorrow. Per  request, supplemental O2 is delivered via nasal cannula @ 2 L/min. Pt goes to sleep with warm blanket.

## 2019-05-28 NOTE — PROGRESS NOTES
falls; Left in bed;Call light within reach;Nurse notified; Bed alarm in place;Gait belt           Patient Diagnosis(es): The primary encounter diagnosis was Acute chest pain. Diagnoses of Chronic pain of both hips and Chronic low back pain with right-sided sciatica, unspecified back pain laterality were also pertinent to this visit. has a past medical history of Arthritis, CAD (coronary artery disease), Cancer ([address] Cystocele, Diabetes mellitus ([address] Hepatitis A, History of blood transfusion, Hyperlipidemia, PVC (premature ventricular contraction), Rectocele, Reflux, and Scoliosis. has a past surgical history that includes Appendectomy; joint replacement (Left); hernia repair (6 YRS AGO); Cholecystectomy; shoulder surgery (Right, 7/26/12); Bunionectomy (7/26/12); Breast surgery; Upper gastrointestinal endoscopy (11/2/12); Hysterectomy; bladder suspension; Dilation and curettage of uterus; other surgical history (Left, 09/22/14); Foot surgery; Cardiac catheterization; Colonoscopy (2008); Cystocopy (03/29/2017); other surgical history (Right, 06/28/2018); Cataract removal with implant (Left, 09/13/2018); pr remv cataract extracap,insert lens (Left, 9/13/2018); and Lumbar spine surgery (Right, 5/15/2019). Treatment Diagnosis: Decreased ADL status, functional mobility, ROMJ, strength and endurance associated with chest pain, s/p cardac cath      Restrictions  Restrictions/Precautions  Restrictions/Precautions: Fall Risk(HIGH FALL RISK)  Position Activity Restriction  Other position/activity restrictions: Anusha Hurtado is a 80 y.o. seen in ED for multiple complaints and confusing clinical presentation. Initially awoke with a hard upper back/mid scapular pain described as dull heavy pain with associated SOB. Denies radiation, diaphoresis, palpitations, dizziness or syncope. Had taken a pain pill prior to event with gradual relief of discomfort after 30-45 minutes. Denies cough or fever.   Has a hx of GERD Supervision(on toilet)  Standing Balance: Contact guard assistance(w/RW)  Standing Balance  Time: ~30 sec, 4 min X 2, ~3 min, ~2 min  Activity: functional mobility to toilet, hygiene & clothing mgt X 2, hygiene only X 2, functional mobility to EOB  Functional Mobility  Functional - Mobility Device: Rolling Walker  Activity: To/from bathroom  Assist Level: Contact guard assistance  Functional Mobility Comments: Verbal cues to keep walker closer, move closer to sink w/walker. Toilet Transfers  Toilet - Technique: Ambulating;Stand step  Equipment Used: Standard toilet(grab bar)  Toilet Transfer: Contact guard assistance(X 4)  ADL  Grooming: Setup(wipe hands with wipe)  LE Dressing: Maximum assistance(dependent socks, Mod A briefs)  Toileting: Minimal assistance(to assist with wiping)  Additional Comments: Pt had large BM in toilet, then 3 more attempts for smaller amounts. Extensive time for hygiene X 4. Tone RUE  RUE Tone: Normotonic  Tone LUE  LUE Tone: Normotonic  Coordination  Movements Are Fluid And Coordinated: Yes     Bed mobility  Supine to Sit: Stand by assistance(HOB elevated)  Scooting: Stand by assistance  Transfers  Stand Step Transfers: Contact guard assistance(w/RW)  Sit to stand: Contact guard assistance  Stand to sit: Contact guard assistance  Vision - Basic Assessment  Prior Vision: Wears glasses only for reading  Visual History: Cataracts; Corrective eye surgery  Patient Visual Report: No visual complaint reported.      Perception  Overall Perceptual Status: WFL     Sensation  Overall Sensation Status: Impaired  Light Touch: Partial deficits in the RLE(UEs WFL)        LUE AROM (degrees)  LUE AROM : WFL  Left Hand AROM (degrees)  Left Hand AROM: WNL  RUE AROM (degrees)  RUE AROM : WFL  Right Hand AROM (degrees)  Right Hand AROM: WNL  LUE Strength  Gross LUE Strength: Exceptions to [facility]  L Hand Grasp: 2/5  LUE Strength Comment: 2-/5 at shoulder, 2+/5 at elbow  RUE Strength  Gross RUE Strength: Exceptions to [facility] Hand Grasp: 2/5  RUE Strength Comment: 2-/5 at shoulder, 2+/5 at elbow                   Plan   Plan  Times per week: 3-5  Times per day: Daily  Current Treatment Recommendations: Functional Mobility Training, Safety Education & Training, Self-Care / ADL, ROM, Strengthening, Endurance Training      AM-PAC Score        AM-PAC Inpatient Daily Activity Raw Score: 17  AM-PAC Inpatient ADL T-Scale Score : 37.26  ADL Inpatient CMS 0-100% Score: 50.11  ADL Inpatient CMS G-Code Modifier : CK    Goals  Short term goals  Time Frame for Short term goals: Discharge  Short term goal 1: SBA for functional transfers to ADL surfaces w/RW  Short term goal 2: SBA for functional mobility w/RW for ADL activity  Short term goal 3: Supervision for toileting  Short term goal 4: Set-up for UB bathing/dressing  Short term goal 5: Min A for LB bathing/dressing w/AE as needed       Therapy Time   Individual Concurrent Group Co-treatment   Time In 1551         Time Out 1626         Minutes 35               Timed Code Treatment Minutes:  20    Total Treatment Minutes:  [address], OTR/L, [id number]

## 2019-05-28 NOTE — PROGRESS NOTES
Pt returned to room. Received report from Shira Abraham at bedside.  Electronically signed by Sharee Green RN on 5/28/2019 at 11:32 AM

## 2019-05-28 NOTE — PLAN OF CARE
Problem: Pain:  Goal: Control of chronic pain  Description  Control of chronic pain  Note:   Pt is experiencing chronic RLE and back pain. Requesting percocet PRN as prescribed q4h for pain relief. Pt states that reposition and relaxation techniques are not helpful. Pt calls out appropriately for pain medication in need. Problem: Falls - Risk of:  Goal: Will remain free from falls  Description  Will remain free from falls  Note:   Pt remains free from falls. Safety precautions in place. Bed in lowest position, bed wheels locked, call light within reach, bed alarm on, fall risk wrist band on. Pt is having external female catheter in place, help minimizing bathroom use. Pt understands her fall risk, calls out appropriately for assistance. Will continue to monitor. Problem: Skin Integrity:  Goal: Skin integrity will stabilize  Description  Skin integrity will stabilize  Outcome: Met This Shift  Note:   Pt is able to turn self and regulate the bed position in need. With proper skin assessment and interventions such as mepilex foam dressing on sacrum for redness. Pt's skin integrity is stabilized and maintained.

## 2019-05-28 NOTE — PROGRESS NOTES
Pt goes to sleep now when her heart burn is relieved by giving PRN Zofran orally and taking crackers. Her BP is elevated 154/76, most likely d/t her chronic pain. Percocet is given for pain relief. New bag of heparin starts infusing the same rate as @8.1 units/hr because of alpha PTT 84.6 in the therapeutic range.

## 2019-05-28 NOTE — CARE COORDINATION
Patient is from [address] [place] [address] with [facility], pending a home care order. Therapy is recommending a PMR consult for ARU.

## 2019-05-28 NOTE — PRE SEDATION
Brief Pre-Op Note/Sedation Assessment      Amy Lopez [facility]  10/16/1932  [id number]  [id number]  8:38 AM    Planned Procedure: Cardiac Catheterization     Post Procedure Plan: Return to same level of care    Consent: I have discussed with the patient and/or the patient representative the indication, alternatives, and the possible risks and/or complications of the planned procedure and the anesthesia methods. The patient and/or patient representative appear to understand and agree to proceed.     Chief Complaint: Anginal Equivalent      Indications for the Procedure:   CAD Presentation:  Worsening Angina    CCS Anginal Classification within 2 weeks:  CCS IV - Inability to perform any activity without angina or angina at rest, i.e., severe limitation    NYHA Heart Failure Class within 2 weeks: No symptoms    Valvular Disease: Mild Aortic Stenosis    Functional Capacity:   4 METS       Anti- Anginal Meds within 2 weeks:   ANTI-ANGINAL MEDS: Yes: Aspirin    Stress or Imaging Studies Performed:  None    Vital Signs:  /70   Pulse 71   Temp 97.4 °F (36.3 °C) (Oral)   Resp 16   Ht 5' (1.524 m)   Wt 147 lb 14.4 oz (67.1 kg)   SpO2 98%   BMI 28.88 kg/m²     Allergies:  No Active Allergies    Past Medical History:  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    Cystocele     Diabetes mellitus ([facility]     type  2 diet controlled    Hepatitis A 1953    History of blood transfusion     hiatal hernia surgery    Hyperlipidemia     PVC (premature ventricular contraction)     Rectocele     Reflux     Scoliosis        Surgical History:  Past Surgical History:   Procedure Laterality Date    APPENDECTOMY      BLADDER SUSPENSION      3 SURGERIES/ANTERIOR AND POSTERIOR REPAIR    BREAST SURGERY      RIGHT LUMPECTOMY AND SEVERAL BIOPSIES ON BOTH BREAST    BUNIONECTOMY  7/26/12    BUNIONECTOMY BILATERAL  FEET    CARDIAC CATHETERIZATION      AV block    CATARACT REMOVAL WITH IMPLANT Left 09/13/2018    CHOLECYSTECTOMY      COLONOSCOPY  2008    normal    CYSTOSCOPY  03/29/2017    U-dil    DILATION AND CURETTAGE OF UTERUS      SUNCTION    FOOT SURGERY      bilat foot surgeries    HERNIA REPAIR  6 YRS AGO    HIATAL HERNIA REPAIR- WIRED  RIBS    HYSTERECTOMY      VAGINAL    JOINT REPLACEMENT Left     TOTAL KNEE REPLACEMENT LEFT    LUMBAR SPINE SURGERY Right 5/15/2019    RIGHT L4 AND L5 TRANSFORAMINAL EPIDURAL STEROID INJECTION WITH FLUOROSCOPY performed by Abdiel Vee MD at [address] [place] [address] Left 09/22/14    removal rib wire    OTHER SURGICAL HISTORY Right 06/28/2018    PHACO EMULSIFICATION OF CATARACT WITH INTRAOCULAR LENS implant right eye    FL REMV CATARACT EXTRACAP,INSERT LENS Left 9/13/2018    PHACO EMULSIFICATION OF CATARACT WITH INTRAOCULAR LENS IMPLANT LEFT EYE performed by Temo Johnson MD at [facility] Right 7/26/12    [place] DEPOSIT TAKEN OFF RIGHT SHOULDER    UPPER GASTROINTESTINAL ENDOSCOPY  11/2/12       Medications:  Current Facility-Administered Medications   Medication Dose Route Frequency Provider Last Rate Last Dose    guaiFENesin (MUCINEX) extended release tablet 600 mg  600 mg Oral BID Sukumar Curran MD   600 mg at 05/27/19 2042    heparin (porcine) injection 4,000 Units  59.1 Units/kg Intravenous PRN Noemi Lockett APRN - CNP        heparin (porcine) injection 2,000 Units  29.5 Units/kg Intravenous PRN Noemi Lockett APRN - CNP        heparin 25,000 units in dextrose 5% 250 mL infusion  12 Units/kg/hr Intravenous Continuous Noemi Lockett APRN - CNP 8.1 mL/hr at 05/28/19 0031 12 Units/kg/hr at 05/28/19 0031    diphenoxylate-atropine (LOMOTIL) 2.5-0.025 MG per tablet 1 tablet  1 tablet Oral 4x Daily PRN Sukumar Curran MD        docusate patient and agree with the H&P present on the chart. Prior History of Anesthesia Complications:   none    Modified Mallampati:  II (soft palate, uvula, fauces visible)    ASA Classification:  Class 3 - A patient with severe systemic disease that limits activity but is not incapacitating    Jorge Scale: Activity:  2 - Able to move 4 extremities voluntarily on command  Respiration:  2 - Able to breathe deeply and cough freely  Circulation:  2 - BP+/- 20mmHg of normal  Consciousness:  2 - Fully awake  Oxygen Saturation (color):  2 - Able to maintain oxygen saturation >92% on room air    Sedation/Anesthesia Plan:  Guard the patient's safety and welfare. Minimize physical discomfort and pain. Minimize negative psychological responses to treatment by providing sedation and analgesia and maximize the potential amnesia. Patient to meet pre-procedure discharge plan. Medication Planned:  midazolam intravenously, fentanyl intravenously    Patient is an appropriate candidate for plan of sedation: yes    Preoperative Risk: Intermediate    The risks, benefits, goals, and alternatives of the procedure were discussed in detail with the patient. Informed consent was obtained and further recommendations will be made following the procedure.      Sandie Whitfield D.O., [facility] [place]  Interventional Cardiology     o: [phone]  [address] [place] [address]., [address], [address] [facility]

## 2019-05-28 NOTE — PROGRESS NOTES
Pt is experiencing heart burn. Requesting PRN medication Prilosec or Nexium that she uses at home regularly. Messaged NP for possible order. Elevating HOB and giving geoff crackers for the relief.

## 2019-05-28 NOTE — PROGRESS NOTES
Cardiovascular Progress Note      Chief Complaint:   Back pain/chest pain   Impression/Recommendations:    Chest pain presentation concerning for Unstable angina  Chronic First degree AVB  Mild multivalvular heart disease  Hiatal hernia/ GERD  Chronic pain syndrome with recent thoracic kyphoplasty   Former tobacco abuse       Heparin gtt   ASA    Risks, benefits, goals, and alternative of cardiac catheterization discussed with patient. All questions answered and informed consent obtained. Further recommendations pending coronary angiography and clinical course. Will plan for transfemoral as significant aortic tortuosity noted. Interval History:   Radha Glass is worried about angiogram bc \"podiatrist's father back home  in cath lab\" and \"does not want a stent unless needs one\". Questions asked and answered. No current pain or SOB.       19 ECG  Sinus rhythm with 1st degree A-V block  Right superior axis deviation  ST & T wave abnormality, consider inferior ischemia  Non-specific change in ST segment in Lateral leads    19 TTE  Summary   -Normal left ventricle size, wall thickness and systolic function with an   estimated ejection fraction of 60%. No regional wall motion abnormalities   are seen.   -Mild aortic stenosis with a peak velocity of 2.54m/s and a mean pressure   gradient of 15mmHg. Mild aortic regurgitation.   -Mild mitral regurgitation.   -Mild tricuspid regurgitation with a estimated PASP of 31 mmHg plus right   atrial pressure.   -Trivial pulmonic regurgitation present.   -Biatrial enlargement.     Medications:    guaiFENesin (MUCINEX) extended release tablet 600 mg BID   heparin (porcine) injection 4,000 Units PRN   heparin (porcine) injection 2,000 Units PRN   heparin 25,000 units in dextrose 5% 250 mL infusion Continuous   diphenoxylate-atropine (LOMOTIL) 2.5-0.025 MG per tablet 1 tablet 4x Daily PRN   docusate sodium (COLACE) capsule 100 mg BID   pantoprazole (PROTONIX) tablet 40 mg tenderness  GASTROINTESTINAL:  soft, non-tender, no bruit      Data Review:    CBC:   Recent Labs     05/27/19  0529        APTT:   Recent Labs     05/26/19  0532 05/27/19  0529 05/28/19  0444   APTT 87.0* 84.6* 86.0*           Ruben Gonzales D.O., [facility]  Interventional Cardiology     o: [phone]  [facility] [employer] [facility] [address], [address]      NOTE:  This report was transcribed using voice recognition software. Every effort was made to ensure accuracy; however, inadvertent computerized transcription errors may be present.

## 2019-05-28 NOTE — PROGRESS NOTES
Physical Therapy  Facility/Department: [address]  Daily Treatment Note  NAME: Alma Stewart  : 10/16/1932  MRN: [id number]    Date of Service: 2019    Discharge Recommendations: Alma Stewart scored a 17/24 on the AM-PAC short mobility form. Current research shows that an AM-PAC score of 17 or less is typically not associated with a discharge to the patient's home setting. Based on the patients AM-PAC score, their current functional mobility deficits, prior level of independence, and lack of supervision/assist at home/IL; it is recommended that the patient have 5-7 sessions per week of Physical Therapy at d/ to increase the patients independence. PT Equipment Recommendations  Equipment Needed: Yes(pt reports family member may have RW)  Mobility Devices: Rozanne Winnie: Rolling    Patient Diagnosis(es): The primary encounter diagnosis was Acute chest pain. Diagnoses of Chronic pain of both hips and Chronic low back pain with right-sided sciatica, unspecified back pain laterality were also pertinent to this visit. has a past medical history of Arthritis, CAD (coronary artery disease), Cancer ([address] Cystocele, Diabetes mellitus ([address] Hepatitis A, History of blood transfusion, Hyperlipidemia, PVC (premature ventricular contraction), Rectocele, Reflux, and Scoliosis. has a past surgical history that includes Appendectomy; joint replacement (Left); hernia repair (6 YRS AGO); Cholecystectomy; shoulder surgery (Right, 12); Bunionectomy (12); Breast surgery; Upper gastrointestinal endoscopy (12); Hysterectomy; bladder suspension; Dilation and curettage of uterus; other surgical history (Left, 14); Foot surgery; Cardiac catheterization; Colonoscopy (); Cystocopy (2017); other surgical history (Right, 2018);  Cataract removal with implant (Left, 2018); pr remv cataract extracap,insert lens (Left, 2018); and Lumbar spine surgery (Right, 5/15/2019). Restrictions  Restrictions/Precautions  Restrictions/Precautions: Fall Risk(HIGH FALL RISK)  Position Activity Restriction  Other position/activity restrictions: Melanie Musa is a 80 y.o. seen in ED for multiple complaints and confusing clinical presentation. Initially awoke with a hard upper back/mid scapular pain described as dull heavy pain with associated SOB. Denies radiation, diaphoresis, palpitations, dizziness or syncope. Had taken a pain pill prior to event with gradual relief of discomfort after 30-45 minutes. Denies cough or fever. Has a hx of GERD and a large hiatal hernia with prior surgery and recurrence. She also states that she was dizzy with R arm weakness resulting in Stroke Alert in the ER. Her head CT was negative for acute CVA. She has chronic lower back pain and RLE weakness. 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 Pt has had occasional episodic right arm weakness and numbness in the past but none since the hospital admission. S/P cardiac cath 5/28. Subjective   General  Chart Reviewed: Yes  Response To Previous Treatment: Patient with no complaints from previous session. Family / Caregiver Present: No  Subjective  Subjective: Pt reporting back pain, headache pain, and pain in R groin during session. Pt reporting 7/10 pain for headache. RN notified. Pt agreeable to PT session. General Comment  Comments: Pt supine in bed upon arrival, with bedrest orders expiring at 1315--approval obtained from RN to assist pt with OOB mobility at 1315. Pain Screening  Patient Currently in Pain: Yes  Pain Assessment  Pain Assessment: 0-10  Pain Level: 7  Pain Location: Back;Head;Groin  Pain Orientation: Right; Lower  Vital Signs  Patient Currently in Pain: Yes           Objective   Bed mobility  Supine to Sit: Stand by assistance  Sit to Supine: Contact guard assistance(assist with bringing RLE over EOB)  Scooting: Stand by Treatment Diagnosis: Impaired strength, decreased endurance  Prognosis: Good  Patient Education: Pt educated on role of acute care PT, instructed in exercises, educated on neural tension  REQUIRES PT FOLLOW UP: Yes  Activity Tolerance  Activity Tolerance: Patient limited by fatigue;Patient limited by endurance; Patient limited by pain  Activity Tolerance: Pt required increased time to perform tasks due to pain. Pt reporting RLE pain increased this date. G-Code     OutComes Score       AM-PAC Score  AM-PAC Inpatient Mobility Raw Score : 17  AM-PAC Inpatient T-Scale Score : 42.13  Mobility Inpatient CMS 0-100% Score: 50.57  Mobility Inpatient CMS G-Code Modifier : CK          Goals  Short term goals  Time Frame for Short term goals: Until D/C  Short term goal 1: Pt will demonstrate Mod I with amb of at least 150' with LRAD   Short term goal 2: Pt will demonstrate Mod I with transfers sit<>stand   Short term goal 3: Pt will demonstrate Mod I with bed mobility   Long term goals  Time Frame for Long term goals : LTG = STG  Patient Goals   Patient goals : Return to assisted living condo  NO GOALS MET THIS [address]  Times per week: 5-7  Times per day: Daily  Plan weeks: until d/c  Current Treatment Recommendations: Strengthening, Functional Mobility Training, Transfer Training, Gait Training, Patient/Caregiver Education & Training, Endurance Training, Balance Training  Safety Devices  Type of devices:  All fall risk precautions in place, Gait belt, Call light within reach, Patient at risk for falls, Bed alarm in place, Left in bed, Nurse notified(NNEKA Kc, aware)  Restraints  Initially in place: No     Therapy Time   Individual Concurrent Group Co-treatment   Time In 1314         Time Out 1341         Minutes 27              Timed Code Treatment Minutes:  27    Total Treatment Minutes:  [address] [phone]

## 2019-05-29 ENCOUNTER — HOSPITAL ENCOUNTER (INPATIENT)
Age: 84
LOS: 7 days | Discharge: HOME HEALTH | DRG: 948 | End: 2019-06-05
Attending: PHYSICAL MEDICINE & REHABILITATION | Admitting: PHYSICAL MEDICINE & REHABILITATION
Payer: MEDICARE

## 2019-05-29 VITALS
BODY MASS INDEX: 29.02 KG/M2 | SYSTOLIC BLOOD PRESSURE: 159 MMHG | DIASTOLIC BLOOD PRESSURE: 75 MMHG | HEART RATE: 70 BPM | OXYGEN SATURATION: 94 % | RESPIRATION RATE: 18 BRPM | WEIGHT: 147.8 LBS | TEMPERATURE: 98.3 F | HEIGHT: 60 IN

## 2019-05-29 DIAGNOSIS — M48.061 SPINAL STENOSIS OF LUMBAR REGION, UNSPECIFIED WHETHER NEUROGENIC CLAUDICATION PRESENT: Primary | ICD-10-CM

## 2019-05-29 PROBLEM — R53.81 DEBILITY: Status: ACTIVE | Noted: 2019-05-29

## 2019-05-29 LAB
APTT: 31.3 SEC (ref 26–36)
PLATELET # BLD: 158 K/UL (ref 135–450)

## 2019-05-29 PROCEDURE — 6370000000 HC RX 637 (ALT 250 FOR IP): Performed by: INTERNAL MEDICINE

## 2019-05-29 PROCEDURE — 2580000003 HC RX 258: Performed by: INTERNAL MEDICINE

## 2019-05-29 PROCEDURE — 1280000000 HC REHAB R&B

## 2019-05-29 PROCEDURE — 85049 AUTOMATED PLATELET COUNT: CPT

## 2019-05-29 PROCEDURE — 36415 COLL VENOUS BLD VENIPUNCTURE: CPT

## 2019-05-29 PROCEDURE — 97530 THERAPEUTIC ACTIVITIES: CPT

## 2019-05-29 PROCEDURE — 97116 GAIT TRAINING THERAPY: CPT

## 2019-05-29 PROCEDURE — 85730 THROMBOPLASTIN TIME PARTIAL: CPT

## 2019-05-29 PROCEDURE — 94760 N-INVAS EAR/PLS OXIMETRY 1: CPT

## 2019-05-29 PROCEDURE — 6370000000 HC RX 637 (ALT 250 FOR IP): Performed by: PHYSICAL MEDICINE & REHABILITATION

## 2019-05-29 RX ORDER — ATORVASTATIN CALCIUM 40 MG/1
40 TABLET, FILM COATED ORAL NIGHTLY
Status: CANCELLED | OUTPATIENT
Start: 2019-05-29

## 2019-05-29 RX ORDER — DIPHENHYDRAMINE HCL 25 MG
25 TABLET ORAL EVERY 6 HOURS PRN
Status: CANCELLED | OUTPATIENT
Start: 2019-05-29

## 2019-05-29 RX ORDER — FLUTICASONE PROPIONATE 50 MCG
1 SPRAY, SUSPENSION (ML) NASAL 2 TIMES DAILY
Status: CANCELLED | OUTPATIENT
Start: 2019-05-29

## 2019-05-29 RX ORDER — OMEPRAZOLE 20 MG/1
20 CAPSULE, DELAYED RELEASE ORAL NIGHTLY
COMMUNITY
End: 2022-03-02 | Stop reason: ALTCHOICE

## 2019-05-29 RX ORDER — POLYETHYLENE GLYCOL 3350 17 G/17G
17 POWDER, FOR SOLUTION ORAL DAILY
Status: CANCELLED | OUTPATIENT
Start: 2019-05-30

## 2019-05-29 RX ORDER — SODIUM CHLORIDE 0.9 % (FLUSH) 0.9 %
10 SYRINGE (ML) INJECTION EVERY 12 HOURS SCHEDULED
Status: DISCONTINUED | OUTPATIENT
Start: 2019-05-29 | End: 2019-05-29

## 2019-05-29 RX ORDER — SODIUM CHLORIDE 0.9 % (FLUSH) 0.9 %
10 SYRINGE (ML) INJECTION EVERY 12 HOURS SCHEDULED
Status: CANCELLED | OUTPATIENT
Start: 2019-05-29

## 2019-05-29 RX ORDER — OXYCODONE HYDROCHLORIDE AND ACETAMINOPHEN 5; 325 MG/1; MG/1
1 TABLET ORAL EVERY 4 HOURS PRN
Status: CANCELLED | OUTPATIENT
Start: 2019-05-29

## 2019-05-29 RX ORDER — TIZANIDINE 4 MG/1
4 TABLET ORAL EVERY 6 HOURS PRN
Status: DISCONTINUED | OUTPATIENT
Start: 2019-05-29 | End: 2019-06-05 | Stop reason: HOSPADM

## 2019-05-29 RX ORDER — DOCUSATE SODIUM 100 MG/1
100 CAPSULE, LIQUID FILLED ORAL 2 TIMES DAILY
Status: DISCONTINUED | OUTPATIENT
Start: 2019-05-29 | End: 2019-06-05 | Stop reason: HOSPADM

## 2019-05-29 RX ORDER — FUROSEMIDE 20 MG/1
20 TABLET ORAL PRN
Status: CANCELLED | OUTPATIENT
Start: 2019-05-29

## 2019-05-29 RX ORDER — FLUTICASONE PROPIONATE 50 MCG
1 SPRAY, SUSPENSION (ML) NASAL 2 TIMES DAILY
Status: DISCONTINUED | OUTPATIENT
Start: 2019-05-29 | End: 2019-06-05 | Stop reason: HOSPADM

## 2019-05-29 RX ORDER — GUAIFENESIN 600 MG/1
600 TABLET, EXTENDED RELEASE ORAL 2 TIMES DAILY
Status: CANCELLED | OUTPATIENT
Start: 2019-05-29

## 2019-05-29 RX ORDER — ACETAMINOPHEN 325 MG/1
650 TABLET ORAL EVERY 4 HOURS PRN
Status: CANCELLED | OUTPATIENT
Start: 2019-05-29

## 2019-05-29 RX ORDER — NITROGLYCERIN 0.4 MG/1
0.4 TABLET SUBLINGUAL EVERY 5 MIN PRN
Status: CANCELLED | OUTPATIENT
Start: 2019-05-29

## 2019-05-29 RX ORDER — DIPHENOXYLATE HYDROCHLORIDE AND ATROPINE SULFATE 2.5; .025 MG/1; MG/1
1 TABLET ORAL 4 TIMES DAILY PRN
Status: CANCELLED | OUTPATIENT
Start: 2019-05-29

## 2019-05-29 RX ORDER — TIZANIDINE 4 MG/1
4 TABLET ORAL EVERY 6 HOURS PRN
Status: CANCELLED | OUTPATIENT
Start: 2019-05-29

## 2019-05-29 RX ORDER — HYDRALAZINE HYDROCHLORIDE 25 MG/1
50 TABLET, FILM COATED ORAL EVERY 8 HOURS PRN
Status: DISCONTINUED | OUTPATIENT
Start: 2019-05-29 | End: 2019-06-05 | Stop reason: HOSPADM

## 2019-05-29 RX ORDER — POLYETHYLENE GLYCOL 3350 17 G/17G
17 POWDER, FOR SOLUTION ORAL DAILY
Status: DISCONTINUED | OUTPATIENT
Start: 2019-05-30 | End: 2019-06-05 | Stop reason: HOSPADM

## 2019-05-29 RX ORDER — MULTIVITAMIN WITH FOLIC ACID 400 MCG
1 TABLET ORAL DAILY
Status: CANCELLED | OUTPATIENT
Start: 2019-05-30

## 2019-05-29 RX ORDER — OXYCODONE HYDROCHLORIDE AND ACETAMINOPHEN 5; 325 MG/1; MG/1
1 TABLET ORAL EVERY 4 HOURS PRN
Status: DISCONTINUED | OUTPATIENT
Start: 2019-05-29 | End: 2019-06-05 | Stop reason: HOSPADM

## 2019-05-29 RX ORDER — SODIUM CHLORIDE 0.9 % (FLUSH) 0.9 %
10 SYRINGE (ML) INJECTION PRN
Status: DISCONTINUED | OUTPATIENT
Start: 2019-05-29 | End: 2019-06-01 | Stop reason: ALTCHOICE

## 2019-05-29 RX ORDER — ONDANSETRON 4 MG/1
4 TABLET, ORALLY DISINTEGRATING ORAL EVERY 8 HOURS PRN
Status: DISCONTINUED | OUTPATIENT
Start: 2019-05-29 | End: 2019-06-05 | Stop reason: HOSPADM

## 2019-05-29 RX ORDER — ATORVASTATIN CALCIUM 40 MG/1
40 TABLET, FILM COATED ORAL NIGHTLY
Status: DISCONTINUED | OUTPATIENT
Start: 2019-05-29 | End: 2019-06-05 | Stop reason: HOSPADM

## 2019-05-29 RX ORDER — GUAIFENESIN 600 MG/1
600 TABLET, EXTENDED RELEASE ORAL 2 TIMES DAILY
Status: DISCONTINUED | OUTPATIENT
Start: 2019-05-29 | End: 2019-06-05 | Stop reason: HOSPADM

## 2019-05-29 RX ORDER — SODIUM CHLORIDE 0.9 % (FLUSH) 0.9 %
10 SYRINGE (ML) INJECTION PRN
Status: CANCELLED | OUTPATIENT
Start: 2019-05-29

## 2019-05-29 RX ORDER — PANTOPRAZOLE SODIUM 40 MG/1
40 TABLET, DELAYED RELEASE ORAL DAILY
Status: DISCONTINUED | OUTPATIENT
Start: 2019-05-30 | End: 2019-06-01

## 2019-05-29 RX ORDER — DIPHENHYDRAMINE HCL 25 MG
25 TABLET ORAL EVERY 6 HOURS PRN
Status: DISCONTINUED | OUTPATIENT
Start: 2019-05-29 | End: 2019-06-05 | Stop reason: HOSPADM

## 2019-05-29 RX ORDER — TRAZODONE HYDROCHLORIDE 50 MG/1
50 TABLET ORAL NIGHTLY PRN
Status: CANCELLED | OUTPATIENT
Start: 2019-05-29

## 2019-05-29 RX ORDER — DIPHENOXYLATE HYDROCHLORIDE AND ATROPINE SULFATE 2.5; .025 MG/1; MG/1
1 TABLET ORAL 4 TIMES DAILY PRN
Status: DISCONTINUED | OUTPATIENT
Start: 2019-05-29 | End: 2019-06-05 | Stop reason: HOSPADM

## 2019-05-29 RX ORDER — NITROGLYCERIN 0.4 MG/1
0.4 TABLET SUBLINGUAL EVERY 5 MIN PRN
Status: DISCONTINUED | OUTPATIENT
Start: 2019-05-29 | End: 2019-06-05 | Stop reason: HOSPADM

## 2019-05-29 RX ORDER — HYDRALAZINE HYDROCHLORIDE 25 MG/1
50 TABLET, FILM COATED ORAL EVERY 8 HOURS PRN
Status: CANCELLED | OUTPATIENT
Start: 2019-05-29

## 2019-05-29 RX ORDER — MULTIVITAMIN WITH FOLIC ACID 400 MCG
1 TABLET ORAL DAILY
Status: DISCONTINUED | OUTPATIENT
Start: 2019-05-30 | End: 2019-06-05 | Stop reason: HOSPADM

## 2019-05-29 RX ORDER — PANTOPRAZOLE SODIUM 40 MG/1
40 TABLET, DELAYED RELEASE ORAL DAILY
Status: CANCELLED | OUTPATIENT
Start: 2019-05-30

## 2019-05-29 RX ORDER — FUROSEMIDE 20 MG/1
20 TABLET ORAL PRN
Status: DISCONTINUED | OUTPATIENT
Start: 2019-05-29 | End: 2019-06-05 | Stop reason: HOSPADM

## 2019-05-29 RX ORDER — DOCUSATE SODIUM 100 MG/1
100 CAPSULE, LIQUID FILLED ORAL 2 TIMES DAILY
Status: CANCELLED | OUTPATIENT
Start: 2019-05-29

## 2019-05-29 RX ORDER — ACETAMINOPHEN 325 MG/1
650 TABLET ORAL EVERY 4 HOURS PRN
Status: DISCONTINUED | OUTPATIENT
Start: 2019-05-29 | End: 2019-06-05 | Stop reason: HOSPADM

## 2019-05-29 RX ORDER — ONDANSETRON 4 MG/1
4 TABLET, ORALLY DISINTEGRATING ORAL EVERY 8 HOURS PRN
Status: CANCELLED | OUTPATIENT
Start: 2019-05-29

## 2019-05-29 RX ORDER — TRAZODONE HYDROCHLORIDE 50 MG/1
50 TABLET ORAL NIGHTLY PRN
Status: DISCONTINUED | OUTPATIENT
Start: 2019-05-29 | End: 2019-06-05 | Stop reason: HOSPADM

## 2019-05-29 RX ADMIN — OXYCODONE HYDROCHLORIDE AND ACETAMINOPHEN 1 TABLET: 5; 325 TABLET ORAL at 09:07

## 2019-05-29 RX ADMIN — FLUTICASONE PROPIONATE 1 SPRAY: 50 SPRAY, METERED NASAL at 20:44

## 2019-05-29 RX ADMIN — Medication 10 ML: at 09:07

## 2019-05-29 RX ADMIN — OXYCODONE HYDROCHLORIDE AND ACETAMINOPHEN 1 TABLET: 5; 325 TABLET ORAL at 02:09

## 2019-05-29 RX ADMIN — PANTOPRAZOLE SODIUM 40 MG: 40 TABLET, DELAYED RELEASE ORAL at 06:52

## 2019-05-29 RX ADMIN — POLYETHYLENE GLYCOL 3350 17 G: 17 POWDER, FOR SOLUTION ORAL at 09:06

## 2019-05-29 RX ADMIN — DESMOPRESSIN ACETATE 40 MG: 0.2 TABLET ORAL at 20:45

## 2019-05-29 RX ADMIN — TIZANIDINE 4 MG: 4 TABLET ORAL at 23:53

## 2019-05-29 RX ADMIN — GUAIFENESIN 600 MG: 600 TABLET, EXTENDED RELEASE ORAL at 09:06

## 2019-05-29 RX ADMIN — DOCUSATE SODIUM 100 MG: 100 CAPSULE, LIQUID FILLED ORAL at 09:07

## 2019-05-29 RX ADMIN — OXYCODONE HYDROCHLORIDE AND ACETAMINOPHEN 1 TABLET: 5; 325 TABLET ORAL at 15:50

## 2019-05-29 RX ADMIN — TIZANIDINE 4 MG: 4 TABLET ORAL at 01:14

## 2019-05-29 RX ADMIN — ONDANSETRON 4 MG: 4 TABLET, ORALLY DISINTEGRATING ORAL at 14:19

## 2019-05-29 RX ADMIN — FLUTICASONE PROPIONATE 1 SPRAY: 50 SPRAY, METERED NASAL at 09:06

## 2019-05-29 RX ADMIN — ASPIRIN 325 MG: 325 TABLET, DELAYED RELEASE ORAL at 09:06

## 2019-05-29 RX ADMIN — THERA TABS 1 TABLET: TAB at 09:07

## 2019-05-29 RX ADMIN — OXYCODONE HYDROCHLORIDE AND ACETAMINOPHEN 1 TABLET: 5; 325 TABLET ORAL at 20:44

## 2019-05-29 RX ADMIN — GUAIFENESIN 600 MG: 600 TABLET, EXTENDED RELEASE ORAL at 20:45

## 2019-05-29 RX ADMIN — ACETAMINOPHEN 650 MG: 325 TABLET, FILM COATED ORAL at 15:07

## 2019-05-29 ASSESSMENT — PAIN SCALES - GENERAL
PAINLEVEL_OUTOF10: 6
PAINLEVEL_OUTOF10: 9
PAINLEVEL_OUTOF10: 6
PAINLEVEL_OUTOF10: 8
PAINLEVEL_OUTOF10: 6
PAINLEVEL_OUTOF10: 8
PAINLEVEL_OUTOF10: 8
PAINLEVEL_OUTOF10: 7
PAINLEVEL_OUTOF10: 7
PAINLEVEL_OUTOF10: 9

## 2019-05-29 ASSESSMENT — PAIN DESCRIPTION - LOCATION
LOCATION: BACK;LEG
LOCATION: BACK

## 2019-05-29 ASSESSMENT — PAIN DESCRIPTION - ORIENTATION
ORIENTATION: RIGHT
ORIENTATION: LOWER

## 2019-05-29 ASSESSMENT — PAIN DESCRIPTION - PAIN TYPE
TYPE: CHRONIC PAIN

## 2019-05-29 NOTE — CONSULTS
Patient: Víctor Garcia  [id number]  Date: 5/29/2019      Chief Complaint: Weakness    History of Present Illness/Hospital Course:  59-year-old female to history of CAD, diabetes, HLD, scoliosis, and a recent lumbar compression fracture status post kyphoplasty who was admitted on 5/26 with chest pain. She underwent a left heart cath due to unstable angina. Cath without significant heart disease. She was evaluated by therapy and suggested to continue in an inpatient setting prior to returning home. She was recently discharged from the ARU following her compression fracture. She hopes to repeat her therapy and discharge to home as soon as possible. Prior Level of Function:  Independent    Current Level of Function:  CGA     has a past medical history of Arthritis, CAD (coronary artery disease), Cancer ([address] Cystocele, Diabetes mellitus ([address] Hepatitis A, History of blood transfusion, Hyperlipidemia, PVC (premature ventricular contraction), Rectocele, Reflux, and Scoliosis. has a past surgical history that includes Appendectomy; joint replacement (Left); hernia repair (6 YRS AGO); Cholecystectomy; shoulder surgery (Right, 7/26/12); Bunionectomy (7/26/12); Breast surgery; Upper gastrointestinal endoscopy (11/2/12); Hysterectomy; bladder suspension; Dilation and curettage of uterus; other surgical history (Left, 09/22/14); Foot surgery; Cardiac catheterization; Colonoscopy (2008); Cystocopy (03/29/2017); other surgical history (Right, 06/28/2018); Cataract removal with implant (Left, 09/13/2018); pr remv cataract extracap,insert lens (Left, 9/13/2018); and Lumbar spine surgery (Right, 5/15/2019). reports that she quit smoking about 18 years ago. She has a 7.50 pack-year smoking history. She has never used smokeless tobacco. She reports that she drinks about 0.6 oz of alcohol per week. She reports that she does not use drugs.     family history includes Cancer in her maternal grandfather; Diabetes in her maternal grandmother. REVIEW OF SYSTEMS:   CONSTITUTIONAL: negative for fevers, chills, diaphoresis, appetite change, fatigue, night sweats and unexpected weight change. HEENT: negative for hearing loss, tinnitus, ear drainage, sinus pressure, nasal congestion, epistaxis and snoring. RESPIRATORY: Negative for hemoptysis, cough, sputum production. CARDIOVASCULAR: negative for chest pain, palpitations, exertional chest pressure/discomfort, edema, syncope. GASTROINTESTINAL: negative for nausea, vomiting, diarrhea, constipation, blood in stool and abdominal pain. GENITOURINARY: negative for frequency, dysuria, urinary incontinence, decreased urine volume, and hematuria. HEMATOLOGIC/LYMPHATIC: negative for easy bruising, bleeding and lymphadenopathy. ALLERGIC/IMMUNOLOGIC: negative for recurrent infections, angioedema, anaphylaxis and drug reactions. ENDOCRINE: negative for weight changes and diabetic symptoms including polyuria, polydipsia and polyphagia. MUSCULOSKELETAL: negative for pain, joint swelling, decreased range of motion and muscle weakness. NEUROLOGICAL: negative for headaches, slurred speech, unilateral weakness. PSYCHIATRIC/BEHAVIORAL: negative for hallucinations, behavioral problems, confusion and agitation. All pertinent positives are noted in the HPI.     Physical Examination:  Vitals:   Patient Vitals for the past 24 hrs:   BP Temp Temp src Pulse Resp SpO2 Weight   05/29/19 0842 125/62 98.2 °F (36.8 °C) Oral 83 18 96 % --   05/29/19 0646 -- -- -- -- -- -- 147 lb 12.8 oz (67 kg)   05/29/19 0500 120/65 97.9 °F (36.6 °C) Oral 63 18 97 % --   05/29/19 0100 135/73 97.9 °F (36.6 °C) Oral 72 18 96 % --   05/28/19 2015 127/62 98.4 °F (36.9 °C) Oral 77 18 93 % --   05/28/19 1742 128/64 98.3 °F (36.8 °C) Oral 84 18 93 % --   05/28/19 1615 (!) 149/75 98.5 °F (36.9 °C) Oral 83 18 94 % --   05/28/19 1500 132/63 97.9 °F (36.6 °C) Oral 85 18 95 % --   05/28/19 1400 (!) 143/90 98.4 °F and systolic function with an   estimated ejection fraction of 60%. No regional wall motion abnormalities   are seen.   -Mild aortic stenosis with a peak velocity of 2.54m/s and a mean pressure   gradient of 15mmHg. Mild aortic regurgitation.   -Mild mitral regurgitation.   -Mild tricuspid regurgitation with a estimated PASP of 31 mmHg plus right   atrial pressure.   -Trivial pulmonic regurgitation present.   -Biatrial enlargement. The above laboratory data have been reviewed. The above imaging data have been reviewed. The above medical testing have been reviewed. Body mass index is 28.87 kg/m². Assessment and Plan:  Debility: PT/OT  Unstable angina  CAD  Lumbar spondylosis  Lumbar compression fx: recent kypoplasty  DM    Dispo: Patient is an appropriate ARU candidate. Able to accept today. Orders placed for transfer. Thank you for the consultation. Lorena Gan MD 5/29/2019, 10:09 AM     * This document was created using dictation software. While all precautions were taken to ensure accuracy, errors may have occurred. Please disregard any typographical errors.

## 2019-05-29 NOTE — PROGRESS NOTES
Report called to [facility] on ARU. Pt will be going to room 4914.  Electronically signed by Damien Kendall RN on 5/29/2019 at 3:09 PM

## 2019-05-29 NOTE — PLAN OF CARE
Problem: Pain:  Goal: Pain level will decrease  Description  Pain level will decrease  Outcome: Ongoing  Note:   Ice packs for headaches and PRN percocet for back pain (chronic)

## 2019-05-29 NOTE — DISCHARGE INSTR - DIET

## 2019-05-29 NOTE — PROGRESS NOTES
none  REQUIRES PT FOLLOW UP: Yes  Activity Tolerance  Activity Tolerance: Patient limited by fatigue;Patient limited by endurance; Patient limited by pain  Activity Tolerance: pt. limited by increased overall weakness on this date      AM-PAC Score  AM-PAC Inpatient Mobility Raw Score : 17  AM-PAC Inpatient T-Scale Score : 42.13  Mobility Inpatient CMS 0-100% Score: 50.57  Mobility Inpatient CMS G-Code Modifier : CK          Goals  Short term goals  Time Frame for Short term goals: Until D/C  Short term goal 1: Pt will demonstrate Mod I with amb of at least 150' with LRAD   Short term goal 2: Pt will demonstrate Mod I with transfers sit<>stand   Short term goal 3: Pt will demonstrate Mod I with bed mobility   Long term goals  Time Frame for Long term goals : STG=LTG  Patient Goals   Patient goals : Return to assisted living condo  No goals met on this date     Plan    Plan  Times per week: 5-7  Times per day: Daily  Plan weeks: until d/c  Current Treatment Recommendations: Strengthening, Functional Mobility Training, Transfer Training, Gait Training, Patient/Caregiver Education & Training, Endurance Training, Balance Training  Safety Devices  Type of devices: All fall risk precautions in place, Gait belt, Patient at risk for falls, Nurse notified, Call light within reach, Chair alarm in place, Left in chair  Restraints  Initially in place: No     Therapy Time   Individual Concurrent Group Co-treatment   Time In 0845         Time Out 0924         Minutes 39         Timed Code Treatment Minutes: [facility], [id number]  PT present/supervised treatment. Agrees with above note.     Maeve Blanton, SPT

## 2019-05-29 NOTE — CARE COORDINATION
Per Sunny Page, patient has been accepted today to ARU pending a discharge order. Daughter notified.

## 2019-05-29 NOTE — PROGRESS NOTES
ARU PATIENT TREATMENT PLAN  [facility]   [address] [facility]  [phone]      Gómez Mariano    : 10/16/1932  [facility]  MRN: [id number]   PHYSICIAN:  Vanessa Trejo MD  Primary Problem    Patient Active Problem List   Diagnosis    Spinal stenosis, lumbar region, without neurogenic claudication    Spinal stenosis of thoracic region    Scoliosis (and kyphoscoliosis), idiopathic    Displacement of thoracic intervertebral disc without myelopathy    Displacement of lumbar intervertebral disc without myelopathy    Thoracic or lumbosacral neuritis or radiculitis, unspecified    Disc displacement, lumbar    Disc displacement, thoracic    Scoliosis    Lumbar stenosis    Thoracic stenosis    Cervicalgia    Hypoxia    Closed fracture of proximal end of left humerus with routine healing    Urinary tract infection without hematuria    T12 compression fracture (HCC)    Thoracic compression fracture, sequela    Mild malnutrition (HCC)    Acute chest pain    Right sided numbness    Moderate malnutrition (HCC)    Chest pain    Debility    Moderate malnutrition (HCC)       Rehabilitation Diagnosis:     Lumbar spondylosis   ADMIT DATE:2019    Patient Goals:  To return to assisted living  Admitting Impairments: Orthopedic  Activities: Grooming, bathing, dressing, toileting, transfers, ambulation, endurance and cognition  Participation: Prior to admission was receiving assistance from home health and family   CARE PLAN     NURSING:  Gómez Mariano while on this unit will:   [] Be continent of bowel and bladder      [x] Have an adequate number of bowel movements   [] Urinate with no urinary retention >300ml in bladder   [] Complete bladder protocol with posadas removal   [] Maintain O2 SATs at ___%   [x] Have pain managed while on ARU        [] Be pain free by discharge    [x] Have no skin breakdown while on ARU   [] Have improved skin integrity via wound measurements   [] Have no signs/symptoms of infection at the wound site  [] Be free from injury during hospitalization   [] Complete education with patient/family with understanding demonstrated for:  [] Adjustment   [] Other:   Nursing Interventions will include:  [] bowel/bladder training   [] education for medical assistive devices   [x] medication education   [] O2 saturation management   [] energy conservation   [] stress management techniques   [x] fall prevention   [x] alarms protocol   [] seating and positioning   [] skin/wound care   [] pressure relief instruction   [] dressing changes     [] infection protection   [] DVT prophylaxis  [] assistance with in room safety with transfers to bed, toilet, wheelchair, shower   [] bathroom activities and hygiene  [] Other:    Patient/Caregiver Education for:   [] Disease/sustained injury/management      [x] Medication Use   [] Surgical intervention   [x] Safety   [] Body mechanics and or joint protection   [x] Health maintenance      [] Other:     PHYSICAL THERAPY:  Goals:                  Short term goals  Time Frame for Short term goals: 7-10 days  Short term goal 1: Perform all bed mobility mod I  Short term goal 2: Perform all transfers mod I  Short term goal 3: Ambulate 150' with LRAD mod I  Short term goal 4: Negotiate curb step with LRAD mod I  Short term goal 5: Decrease TUG score to 30 seconds to decrease risk of falling               These goals were reviewed with this patient at the time of assessment and Vanessa Crump is in agreement.      Plan of Care: Pt to be seen 5 out of 7 days per week per ARU protocol ( 90  minutes with PT)                  Current Treatment Recommendations: Strengthening, Functional Mobility Training, Transfer Training, Gait Training, Patient/Caregiver Education & Training, Endurance Training, Balance Training, Equipment Evaluation, Education, & procurement, Manual Therapy - Soft Tissue Mobilization, Neuromuscular Re-education, Stair training, Safety Education & Training    OCCUPATIONAL THERAPY:  Goals:             Short term goals  Time Frame for Short term goals: 1 week:  Short term goal 1: Pt will be mod I w/ bathing  Short term goal 2: Pt will be independent w/ UB dressing  Short term goal 3: Pt will be min A w/ LB dressing  Short term goal 4: Pt will be mod I W/ toileting  Short term goal 5: Pt will be mod I w/ functional transfers/mobility  Short term goal 6: Pt will be mod I w/ simple IADLs :  Long term goals  Time Frame for Long term goals : LTG=STG :  These goals were reviewed with this patient at the time of assessment and Isma Jeffries is in agreement    Plan of Care:  Pt to be seen 5 out of 7 days per week per ARU protocol (90 minutes with OT)  Patient Education: OT eval, role of OT, POC, safe transfers, ADLs, safe standing balance    SPEECH THERAPY: Goals will be left blank if speech is not following this patient. Goals:                                                                            These goals were reviewed with this patient at the time of assessment and Isma Jeffries is in agreement    Plan of Care:  Pt to be seen 5 out of 7 days per week per ARU protocol (    minutes with SLP)    Therapy Treatments will include:  [x]  therapeutic exercises    []  gait training     []  neuromuscular re-ed                            [x]  transfer training             [x] community reintegration    [x] bed mobility                          []  w/c mobility and training  [x]  self care    []home mgmt    []  cognitive training            [x]  energy conservation        []  dysphagia tx    []  speech/language/communication therapy   [x]  group therapy    [x]  patient/family education    [] Other:    If the patient is admitted with a diagnosis of 'CVA' and is appropriate for group therapy, their treatment plan will include educational group therapy interventions in the form of \"Stroke Education Group Therapy Class\". CASE MANAGEMENT:  Goals:   Assist patient/family with discharge planning, patient/family counseling,   and coordination with insurance during ARU stay. Admission Period/Goal FIM SCORES  FIM Admit/Goal Score   Eating/Swallowing 6/6   Grooming 5/7   Bathing 3/6   Upper Body Dressing 5/7   Lower Body Dressing 2/4   Toileting 2/6   Bladder Abhilash, Accidents = FIM 6/6   Bowel  Abhilash, Accidents = FIM 6/6   Bed/Chair Transfer 2/6   Toilet Transfer 4/6   Tub/Shower Transfer  4/6   Locomotion:  Ambulation (Walk) / Wheelchair:  W = walk , w/c = wheelchair  Distance:   1= 0-49 ft , 2=  ft, 3= 150+ ft Distance: 2   Level of Assist: 2   Mode: W   FIM: 2/6      Stairs 1/1   Comprehension 6/6   Expression 6/6   Social Interaction 6/6   Problem Solving 5/6   Memory 5/6        Byron Cerda will be seen a minimum of 3 hours of therapy per day, a minimum of 5 out of 7 days per week  (please see above for specific treatment plan per PT/OT/SLP). [] In this rare instance due to the nature of this patient's medical involvement, this patient will be seen 15 hours per week (900 minutes within a 7 day period). In addition, dietician/nutritionist may monitor calorie count as well as intake and collaboratively work with SLP on dietary upgrades. Neuropsychology/Psychology may evaluate and provide necessary support.     Medical issues being managed closely and that require 24 hour availability of a physician:   [] Swallowing Precautions  [x] Bowel/Bladder Fx  [] Weight bearing precautions   [] Wound Care    [x] Pain Mgmt   [] Infection Protection   [x] DVT Prophylaxis   [x] Fall Precautions  [] Fluid/Electrolyte/Nutrition Balance   [] Voice Protection   [] Respiratory  [] Other:    Medical Prognosis: [x] Good  [] Fair    [] Guarded   Total expected IRF days 8 days  Anticipated discharge destination:    [] Home Independently   [x] Home Modified Independent  [] Home with supervision    []SNF     [] Other Physician anticipated functional outcomes: Will regain function to a modified independent level for most activities, may require minimal assistance with dressing   IPOC brief synthesis: 54-year-old female to history of CAD, diabetes, HLD, scoliosis, and a recent lumbar compression fracture status post kyphoplasty who was admitted on 5/26 with chest pain.  She underwent a left heart cath due to unstable angina.  Cath without significant heart disease.  She was evaluated by therapy and suggested to continue in an inpatient setting prior to returning home. Dhaval Sargent was recently discharged from the ARU following her compression fracture. she was admitted with the above goal.        This plan has been reviewed with Sue Rivera on _____5/31/2019 _______  in a language the patient understands.   Sue Rivera has had the opportunity to include input with the therapy team.    I have reviewed this initial plan of care and agree with its contents:    Electronically signed by Mario Conde MD on 5/31/2019 at 10:34 AM    OT: Richard Higgins [place], OTR/L [id number], 5/30/19, 0140    PT: Holly Horton, PATRICIO [id number] 5/30/19 8229    RN: Rain Luis RN 5/29/19 0154    : Blessing Watson, [address] [place] [address], 5/31/19 461    Other:

## 2019-05-29 NOTE — PROGRESS NOTES
Patient is requesting relaxation medication. 4 mg of Zanaflex is given PRN to relieve her muscle spasm. Right groin area cath procedure dressing clean and intact, no bleeding.  +1 PT/DT pulse RLE.  Warm and dry

## 2019-05-29 NOTE — PROGRESS NOTES
4 Eyes Skin Assessment     The patient is being assess for  Admission    I agree that 2 RN's have performed a thorough Head to Toe Skin Assessment on the patient. ALL assessment sites listed below have been assessed. Areas assessed by both nurses:   [x]   Head, Face, and Ears   [x]   Shoulders, Back, and Chest  [x]   Arms, Elbows, and Hands   [x]   Coccyx, Sacrum, and IschIum  [x]   Legs, Feet, and Heels        Does the Patient have Skin Breakdown?   No         Nael Prevention initiated:  Yes   Wound Care Orders initiated:  No      [facility] nurse consulted for Pressure Injury (Stage 3,4, Unstageable, DTI, NWPT, and Complex wounds), New and Established Ostomies:  No      Nurse 1 eSignature: Electronically signed by Tiffanie Copeland RN on 5/29/19 at 5:05 PM    **SHARE this note so that the co-signing nurse is able to place an eSignature**    Nurse 2 eSignature: Electronically signed by Jimmy Hollis RN on 5/29/19 at 5:34 PM

## 2019-05-30 PROBLEM — E44.0 MODERATE MALNUTRITION (HCC): Chronic | Status: ACTIVE | Noted: 2019-05-30

## 2019-05-30 LAB
ANION GAP SERPL CALCULATED.3IONS-SCNC: 8 MMOL/L (ref 3–16)
BUN BLDV-MCNC: 24 MG/DL (ref 7–20)
CALCIUM SERPL-MCNC: 9 MG/DL (ref 8.3–10.6)
CHLORIDE BLD-SCNC: 96 MMOL/L (ref 99–110)
CO2: 29 MMOL/L (ref 21–32)
CREAT SERPL-MCNC: 0.9 MG/DL (ref 0.6–1.2)
GFR AFRICAN AMERICAN: >60
GFR NON-AFRICAN AMERICAN: 59
GLUCOSE BLD-MCNC: 120 MG/DL (ref 70–99)
HCT VFR BLD CALC: 34.6 % (ref 36–48)
HEMOGLOBIN: 11.6 G/DL (ref 12–16)
MCH RBC QN AUTO: 32.5 PG (ref 26–34)
MCHC RBC AUTO-ENTMCNC: 33.6 G/DL (ref 31–36)
MCV RBC AUTO: 96.9 FL (ref 80–100)
PDW BLD-RTO: 14.3 % (ref 12.4–15.4)
PLATELET # BLD: 157 K/UL (ref 135–450)
PMV BLD AUTO: 6.7 FL (ref 5–10.5)
POTASSIUM SERPL-SCNC: 4.4 MMOL/L (ref 3.5–5.1)
RBC # BLD: 3.57 M/UL (ref 4–5.2)
SODIUM BLD-SCNC: 133 MMOL/L (ref 136–145)
WBC # BLD: 4.2 K/UL (ref 4–11)

## 2019-05-30 PROCEDURE — 85027 COMPLETE CBC AUTOMATED: CPT

## 2019-05-30 PROCEDURE — 97165 OT EVAL LOW COMPLEX 30 MIN: CPT

## 2019-05-30 PROCEDURE — 97161 PT EVAL LOW COMPLEX 20 MIN: CPT

## 2019-05-30 PROCEDURE — 6370000000 HC RX 637 (ALT 250 FOR IP): Performed by: PHYSICAL MEDICINE & REHABILITATION

## 2019-05-30 PROCEDURE — 6360000002 HC RX W HCPCS: Performed by: PHYSICAL MEDICINE & REHABILITATION

## 2019-05-30 PROCEDURE — 1280000000 HC REHAB R&B

## 2019-05-30 PROCEDURE — 97535 SELF CARE MNGMENT TRAINING: CPT

## 2019-05-30 PROCEDURE — 97110 THERAPEUTIC EXERCISES: CPT

## 2019-05-30 PROCEDURE — 97530 THERAPEUTIC ACTIVITIES: CPT

## 2019-05-30 PROCEDURE — 80048 BASIC METABOLIC PNL TOTAL CA: CPT

## 2019-05-30 PROCEDURE — 36415 COLL VENOUS BLD VENIPUNCTURE: CPT

## 2019-05-30 PROCEDURE — 97116 GAIT TRAINING THERAPY: CPT

## 2019-05-30 RX ORDER — POLYVINYL ALCOHOL 14 MG/ML
2 SOLUTION/ DROPS OPHTHALMIC PRN
Status: DISCONTINUED | OUTPATIENT
Start: 2019-05-30 | End: 2019-06-05 | Stop reason: HOSPADM

## 2019-05-30 RX ADMIN — OXYCODONE HYDROCHLORIDE AND ACETAMINOPHEN 1 TABLET: 5; 325 TABLET ORAL at 18:34

## 2019-05-30 RX ADMIN — ASPIRIN 325 MG: 325 TABLET, DELAYED RELEASE ORAL at 08:39

## 2019-05-30 RX ADMIN — ENOXAPARIN SODIUM 40 MG: 40 INJECTION SUBCUTANEOUS at 08:39

## 2019-05-30 RX ADMIN — FLUTICASONE PROPIONATE 1 SPRAY: 50 SPRAY, METERED NASAL at 22:54

## 2019-05-30 RX ADMIN — GUAIFENESIN 600 MG: 600 TABLET, EXTENDED RELEASE ORAL at 22:54

## 2019-05-30 RX ADMIN — TIZANIDINE 4 MG: 4 TABLET ORAL at 22:54

## 2019-05-30 RX ADMIN — GUAIFENESIN 600 MG: 600 TABLET, EXTENDED RELEASE ORAL at 08:39

## 2019-05-30 RX ADMIN — PANTOPRAZOLE SODIUM 40 MG: 40 TABLET, DELAYED RELEASE ORAL at 05:10

## 2019-05-30 RX ADMIN — POLYETHYLENE GLYCOL 3350 17 G: 17 POWDER, FOR SOLUTION ORAL at 08:39

## 2019-05-30 RX ADMIN — OXYCODONE HYDROCHLORIDE AND ACETAMINOPHEN 1 TABLET: 5; 325 TABLET ORAL at 01:15

## 2019-05-30 RX ADMIN — TRAZODONE HYDROCHLORIDE 50 MG: 50 TABLET ORAL at 01:15

## 2019-05-30 RX ADMIN — THERA TABS 1 TABLET: TAB at 08:39

## 2019-05-30 RX ADMIN — OXYCODONE HYDROCHLORIDE AND ACETAMINOPHEN 1 TABLET: 5; 325 TABLET ORAL at 05:09

## 2019-05-30 RX ADMIN — DESMOPRESSIN ACETATE 40 MG: 0.2 TABLET ORAL at 22:54

## 2019-05-30 RX ADMIN — DOCUSATE SODIUM 100 MG: 100 CAPSULE, LIQUID FILLED ORAL at 08:39

## 2019-05-30 RX ADMIN — ACETAMINOPHEN 650 MG: 325 TABLET, FILM COATED ORAL at 08:39

## 2019-05-30 RX ADMIN — OXYCODONE HYDROCHLORIDE AND ACETAMINOPHEN 1 TABLET: 5; 325 TABLET ORAL at 22:54

## 2019-05-30 RX ADMIN — OXYCODONE HYDROCHLORIDE AND ACETAMINOPHEN 1 TABLET: 5; 325 TABLET ORAL at 09:29

## 2019-05-30 RX ADMIN — FLUTICASONE PROPIONATE 1 SPRAY: 50 SPRAY, METERED NASAL at 08:39

## 2019-05-30 RX ADMIN — OXYCODONE HYDROCHLORIDE AND ACETAMINOPHEN 1 TABLET: 5; 325 TABLET ORAL at 13:43

## 2019-05-30 ASSESSMENT — PAIN SCALES - GENERAL
PAINLEVEL_OUTOF10: 8
PAINLEVEL_OUTOF10: 0
PAINLEVEL_OUTOF10: 3
PAINLEVEL_OUTOF10: 8
PAINLEVEL_OUTOF10: 7
PAINLEVEL_OUTOF10: 8
PAINLEVEL_OUTOF10: 6
PAINLEVEL_OUTOF10: 8
PAINLEVEL_OUTOF10: 4
PAINLEVEL_OUTOF10: 0
PAINLEVEL_OUTOF10: 7
PAINLEVEL_OUTOF10: 0

## 2019-05-30 ASSESSMENT — PAIN DESCRIPTION - ORIENTATION: ORIENTATION: RIGHT

## 2019-05-30 ASSESSMENT — PAIN DESCRIPTION - PAIN TYPE
TYPE: CHRONIC PAIN
TYPE: ACUTE PAIN
TYPE: ACUTE PAIN

## 2019-05-30 ASSESSMENT — PAIN DESCRIPTION - LOCATION
LOCATION: BACK
LOCATION: BACK;LEG

## 2019-05-30 NOTE — PROGRESS NOTES
Nutrition Assessment    Type and Reason for Visit: Initial, Consult(new ARU admission)    Nutrition Recommendations:   Con't Ensure Enlive BID    Nutrition Assessment: Pt is nutritionally compromsied AEB pt report of decreased appetite & hx poor po intake. Per hx, pt has lost21 lb over the past 3 months. Pt was eating % of meals prior to ARU admission. Expressed dislike for cardiac diet restriction, & is now on a regular diet. PO intake 26-50% x 1st meal on ARU. Pt likes Ensure & has been drinking well. Remains at risk for further nutrition compromise d/t decreased appetite & unintentional wt loss. Malnutrition Assessment:  · Malnutrition Status: Meets the criteria for moderate malnutrition  · Context: Chronic illness  · Findings of the 6 clinical characteristics of malnutrition (Minimum of 2 out of 6 clinical characteristics is required to make the diagnosis of moderate or severe Protein Calorie Malnutrition based on AND/ASPEN Guidelines):  1. Energy Intake-Less than or equal to 50% of estimated energy requirement, Unable to assess    2. Weight Loss-10% loss or greater, in 3 months  3. Fat Loss-Moderate subcutaneous fat loss, Orbital  4. Muscle Loss-Mild muscle mass loss, Clavicles (pectoralis and deltoids)  5. Fluid Accumulation-No significant fluid accumulation,    6.  Strength-Not measured    Nutrition Risk Level:  Moderate    Nutrient Needs:  · Estimated Daily Total Kcal: 1340- 1675  · Estimated Daily Protein (g): 59- 68 g(1.3-1.5)  · Estimated Daily Total Fluid (ml/day): 1 ml/kcal    Nutrition Diagnosis:   · Problem: Unintended weight loss  · Etiology: related to Insufficient energy/nutrient consumption     Signs and symptoms:  as evidenced by Weight loss greater than or equal to 7.5% in 3 months, Diet history of poor intake    Objective Information:  · Nutrition-Focused Physical Findings: Active BS; no edema noted  · Wound Type: None  · Current Nutrition Therapies:  · Oral Diet Orders:

## 2019-05-30 NOTE — PLAN OF CARE
Nutrition Problem: Unintended weight loss  Intervention: Food and/or Nutrient Delivery: Continue current diet, Continue current ONS  Nutritional Goals: po intake at least 50% of meals & supplements

## 2019-05-30 NOTE — PROGRESS NOTES
Physical Therapy    Facility/Department: [facility]  Initial Assessment    NAME: Jessica Colin  : 10/16/1932  MRN: [id number]    Date of Service: 2019    Discharge Recommendations:  S Level 3, Home with Home health PT, Home with assist PRN   PT Equipment Recommendations  Equipment Needed: Yes  Mobility Devices: Indira Karl: Rolling    Assessment   Body structures, Functions, Activity limitations: Decreased functional mobility ; Decreased strength;Decreased endurance;Decreased sensation;Decreased ROM; Decreased balance  Assessment: Pt presents with decreased balance, endurance, strength, and functional mobility following prolonged hospital stay. Pt will benefit from skilled PT services to address the above stated deficits and return to PLOF. Treatment Diagnosis: Impaired strength, decreased endurance  Prognosis: Good  Decision Making: Low Complexity  Patient Education: role of PT, expectations of ARU, use of RW vs QC  REQUIRES PT FOLLOW UP: Yes  Activity Tolerance  Activity Tolerance: Patient Tolerated treatment well       Patient Diagnosis(es): There were no encounter diagnoses. has a past medical history of Arthritis, CAD (coronary artery disease), Cancer ([facility] [address] Cystocele, Diabetes mellitus ([facility] [address] Hepatitis A, History of blood transfusion, Hyperlipidemia, PVC (premature ventricular contraction), Rectocele, Reflux, and Scoliosis. has a past surgical history that includes Appendectomy; joint replacement (Left); hernia repair (6 YRS AGO); Cholecystectomy; shoulder surgery (Right, 12); Bunionectomy (12); Breast surgery; Upper gastrointestinal endoscopy (12); Hysterectomy; bladder suspension; Dilation and curettage of uterus; other surgical history (Left, 14); Foot surgery; Cardiac catheterization; Colonoscopy (); Cystocopy (2017); other surgical history (Right, 2018);  Cataract removal with implant (Left, 2018); pr remv cataract extracap,insert Screening  Intervention List: Pt educated regarding timing of pain meds; Patient able to continue with treatment;Patient declined any intervention    Orientation  Orientation  Overall Orientation Status: Within Functional Limits  Social/Functional History  Social/Functional History  Lives With: Alone  Type of Home: Assisted living(independent living at [facility])  Home Layout: One level(first floor apartment)  Home Access: Level entry  Bathroom Shower/Tub: Walk-in shower, Shower chair without back  Bathroom Toilet: Standard  Bathroom Equipment: Grab bars in shower, Shower chair, Hand-held shower, Grab bars around toilet  Bathroom Accessibility: [facility]: 4 wheeled walker, Quad cane, Wheelchair-manual, Reacher, Alert [employer], Grab bars, Lift chair  Receives Help From: Family, Home health, Personal care attendant(daughter does grocery shopping, uses OT or HHA in the shower, dresses self)  ADL Assistance: Needs assistance(assist with showering from OT or HHA)  [address]: Needs assistance(makes breakfast herself, eats 2 meals in dining room, pt does not have to clean apartment or do laundry)  Homemaking Responsibilities: Yes(facility completes laundry and cleaning)  Ambulation Assistance: Independent(with quad cane and w/c follow for long distances, intermittently uses [employer])  Transfer Assistance: Independent  Active : No(daughter provides transportation)  Mode of Transportation: [facility]  Occupation: Retired  Type of occupation: RN  Leisure & Hobbies: Play Words with Friends on [employer], watch TV  Additional Comments: pt reports 1 fall in last 6 months that led to last admission but no falls after returning home  Cognition        Objective     Observation/Palpation  Posture: Poor  Observation: Severe kyphosis     AROM RLE (degrees)  RLE General AROM: Decreased R hip flexion and knee extension  PROM LLE (degrees)  LLE PROM: WFL  AROM LLE (degrees)  LLE AROM : WFL  Spine  Cervical: Decreased extension and rotation bilaterally   Thoracic: decreased extension, and rotation bilaterally  Lumbar: decreased rotation bilaterally  Strength RLE  Strength RLE: Exception  R Hip Flexion: 2/5  R Hip ABduction: 3+/5  R Hip ADduction: 3+/5  R Knee Extension: 2+/5  Strength LLE  Strength LLE: WFL  Comment: 4+/5 throughout   Tone RLE  RLE Tone: Normotonic  Tone LLE  LLE Tone: Normotonic  Motor Control  Gross Motor?: WFL  Sensation  Overall Sensation Status: Impaired  Light Touch: (Pt complains of N/T on RLE that increases with light touch, sensation intact)  Bed mobility  Bridging: Minimal assistance  Rolling to Left: Stand by assistance  Rolling to Right: Stand by assistance  Supine to Sit: Stand by assistance  Sit to Supine: Stand by assistance(pt used RUE to assist RLE into bed)  Scooting: Stand by assistance  Transfers  Sit to Stand: Stand by assistance  Stand to sit: Stand by assistance  Bed to Chair: Stand by assistance  Comment: Performed SPT with SBA and use of RW  Ambulation  Ambulation?: Yes  Ambulation 1  Surface: level tile  Device: Rolling Walker  Assistance: Contact guard assistance  Quality of Gait: Flexed posture, decreased B step length, decreased B step height, slow carlos   Gait Deviations: Slow Carlos;Decreased step length;Decreased head and trunk rotation  Distance: 130'  Ambulation 2  Surface - 2: carpet  Device 2: [address] [facility] 2: Contact guard assistance  Quality of Gait 2: same as above  Distance: 15'  Stairs/Curb  Stairs?: Yes  Stairs  # Steps : 2  Rails: None  Curbs: 4\"  Device: Rolling walker  Assistance: Contact guard assistance  Comment: ascended leading with RLE and descended leading with LLE without cues     Balance  Posture: Poor(scoliotic curve, kyphosis, overall flexion)  Sitting - Static: Good  Sitting - Dynamic: Good  Standing - Static: Fair;+  Standing - Dynamic: Fair;+  Exercises  Straight Leg Raise: X 10 B with intermittent assist RLE  Heelslides: X 10 B with overpressure of R knee flexion for quad stretch  Hip Abduction: x10 B   Knee Long Arc Quad: x10 B in unsupported sitting with posterior lean with RLE knee extension   2nd session:  Pt sitting in recliner on arrival.  Ambulated 228' with RW and SBA with same gait pattern as above with decreased LLE foot clearance. Performed STS X 5 reps with CGA. Performed seated marching X 20. Performed lateral stepping with BUE assist X 5' X 3 reps R and L and forward/backward walking with RUE assist and CGA X 5' X 3 laps each. Performed cross body reaching with pt requiring forearm support for balance X 6 R and L. Pt returned to room and remained in recliner with alarm on and all needs in reach. Plan   Plan  Times per week: 90 minutes a day 5 days a week  Current Treatment Recommendations: Strengthening, Functional Mobility Training, Transfer Training, Gait Training, Patient/Caregiver Education & Training, Endurance Training, Balance Training, Equipment Evaluation, Education, & procurement, Manual Therapy - Soft Tissue Mobilization, Neuromuscular Re-education, Stair training, Safety Education & Training  Safety Devices  Type of devices:  All fall risk precautions in place, Gait belt, Patient at risk for falls, Nurse notified, Call light within reach, Chair alarm in place, Left in chair  Restraints  Initially in place: No    G-Code       OutComes Score   TUG 36 seconds                                               AM-PAC Score             Goals  Short term goals  Time Frame for Short term goals: 7-10 days  Short term goal 1: Perform all bed mobility mod I  Short term goal 2: Perform all transfers mod I  Short term goal 3: Ambulate 150' with LRAD mod I  Short term goal 4: Negotiate curb step with LRAD mod I  Short term goal 5: Decrease TUG score to 30 seconds to decrease risk of falling  Patient Goals   Patient goals : to return to level of function prior to admission, return to use of quad cane       Therapy Time   Individual

## 2019-05-30 NOTE — PLAN OF CARE
Problem: Pain:  Goal: Control of acute pain  Description  Control of acute pain  Outcome: Ongoing  Note:   Pt medicated for back pain and R leg pain per mar.

## 2019-05-30 NOTE — PROGRESS NOTES
Occupational Therapy  Occupational Therapy Discharge Summary    Name: Jesika Ortez  : 10/16/1932    The pt was evaluated by OT on 19 and seen for 0 treatment sessions prior to DC to ARU on 19 per MD order. The pt's acute therapy goals were:  Short term goals  Time Frame for Short term goals: Discharge  Short term goal 1: SBA for functional transfers to ADL surfaces w/RW  Short term goal 2: SBA for functional mobility w/RW for ADL activity  Short term goal 3: Supervision for toileting  Short term goal 4: Set-up for UB bathing/dressing  Short term goal 5: Min A for LB bathing/dressing w/AE as needed        Patient met 0 goals during stay. Number of Refusals:0  Number of Holds: 0  During this hospitalization, the patient was educated on:  Patient Education: OT aureliano, POC, d/c recommendation, walker safety w/RW    DC pt from OT caseload at this time. Thank you!     Kirti Wasserman, [facility], OTR/L [id number]

## 2019-05-30 NOTE — H&P
Patient: Adalberto Bustamante  [id number]  Date: 5/30/2019      Chief Complaint: Weakness     History of Present Illness/Hospital Course:  80-year-old female to history of CAD, diabetes, HLD, scoliosis, and a recent lumbar compression fracture status post kyphoplasty who was admitted on 5/26 with chest pain. She underwent a left heart cath due to unstable angina. Cath without significant heart disease. She was evaluated by therapy and suggested to continue in an inpatient setting prior to returning home. She was recently discharged from the ARU following her compression fracture. Her pain is currently controlled.     Prior Level of Function:  Independent     Current Level of Function:  CGA     has a past medical history of Arthritis, CAD (coronary artery disease), Cancer ([address] Cystocele, Diabetes mellitus ([address] Hepatitis A, History of blood transfusion, Hyperlipidemia, PVC (premature ventricular contraction), Rectocele, Reflux, and Scoliosis. has a past surgical history that includes Appendectomy; joint replacement (Left); hernia repair (6 YRS AGO); Cholecystectomy; shoulder surgery (Right, 7/26/12); Bunionectomy (7/26/12); Breast surgery; Upper gastrointestinal endoscopy (11/2/12); Hysterectomy; bladder suspension; Dilation and curettage of uterus; other surgical history (Left, 09/22/14); Foot surgery; Cardiac catheterization; Colonoscopy (2008); Cystocopy (03/29/2017); other surgical history (Right, 06/28/2018); Cataract removal with implant (Left, 09/13/2018); pr remv cataract extracap,insert lens (Left, 9/13/2018); and Lumbar spine surgery (Right, 5/15/2019). reports that she quit smoking about 18 years ago. She has a 7.50 pack-year smoking history. She has never used smokeless tobacco. She reports that she drinks about 0.6 oz of alcohol per week. She reports that she does not use drugs. family history includes Cancer in her maternal grandfather; Diabetes in her maternal grandmother.     Allergies: Patient has no known allergies.     Current Facility-Administered Medications   Medication Dose Route Frequency Provider Last Rate Last Dose    polyvinyl alcohol (LIQUIFILM TEARS) 1.4 % ophthalmic solution 2 drop  2 drop Both Eyes PRN Mario Conde MD        acetaminophen (TYLENOL) tablet 650 mg  650 mg Oral Q4H PRN Mario Conde MD   650 mg at 05/30/19 0839    magnesium hydroxide (MILK OF MAGNESIA) 400 MG/5ML suspension 30 mL  30 mL Oral Daily PRN Mario Conde MD        sodium chloride flush 0.9 % injection 10 mL  10 mL Intravenous PRN Mario Conde MD        enoxaparin (LOVENOX) injection 40 mg  40 mg Subcutaneous Daily Mario Conde MD   40 mg at 05/30/19 3039    aspirin EC tablet 325 mg  325 mg Oral Daily Mario Conde MD   325 mg at 05/30/19 0258    atorvastatin (LIPITOR) tablet 40 mg  40 mg Oral Nightly Mario Conde MD   40 mg at 05/29/19 2045    diphenhydrAMINE (BENADRYL) tablet 25 mg  25 mg Oral Q6H PRN Mario Conde MD        diphenoxylate-atropine (LOMOTIL) 2.5-0.025 MG per tablet 1 tablet  1 tablet Oral 4x Daily PRN Mario Conde MD        docusate sodium (COLACE) capsule 100 mg  100 mg Oral BID Mario Conde MD   100 mg at 05/30/19 0839    fluticasone (FLONASE) 50 MCG/ACT nasal spray 1 spray  1 spray Each Nostril BID Mario Conde MD   1 spray at 05/30/19 4367    furosemide (LASIX) tablet 20 mg  20 mg Oral PRN Mario Conde MD        guaiFENesin [facility]) extended release tablet 600 mg  600 mg Oral BID Mario Conde MD   600 mg at 05/30/19 0690    hydrALAZINE (APRESOLINE) tablet 50 mg  50 mg Oral Q8H PRN Mario Conde MD        multivitamin 1 tablet  1 tablet Oral Daily Mario Conde MD   1 tablet at 05/30/19 0839    nitroGLYCERIN (NITROSTAT) SL tablet 0.4 mg  0.4 mg Sublingual Q5 Min PRN Mario Conde MD        ondansetron (ZOFRAN-ODT) disintegrating tablet 4 mg  4 mg Oral Q8H PRN Mario Codne MD        oxyCODONE-acetaminophen (PERCOCET) 5-325 MG per tablet 1 tablet  1 tablet Oral Q4H PRN Lux Head MD   1 tablet at 05/30/19 0509    pantoprazole (PROTONIX) tablet 40 mg  40 mg Oral Daily Lux Head MD   40 mg at 05/30/19 0510    polyethylene glycol (GLYCOLAX) packet 17 g  17 g Oral Daily Lux Head MD   17 g at 05/30/19 0839    tiZANidine (ZANAFLEX) tablet 4 mg  4 mg Oral Q6H PRN Lux Head MD   4 mg at 05/29/19 3403    traZODone (DESYREL) tablet 50 mg  50 mg Oral Nightly PRN Lux Head MD   50 mg at 05/30/19 0115       REVIEW OF SYSTEMS:   CONSTITUTIONAL: negative for fevers, chills, diaphoresis, appetite change, fatigue, night sweats and unexpected weight change. EYES: negative for blurred vision, eye discharge, visual disturbance and icterus. HEENT: negative for hearing loss, tinnitus, ear drainage, sinus pressure, nasal congestion, and epistaxis. RESPIRATORY: Negative for hemoptysis, cough, sputum production. CARDIOVASCULAR: negative for chest pain, palpitations, exertional chest pressure/discomfort, edema, syncope. GASTROINTESTINAL: negative for nausea, vomiting, diarrhea, constipation, blood in stool and abdominal pain. GENITOURINARY: negative for frequency, dysuria, urinary incontinence, decreased urine volume, and hematuria. HEMATOLOGIC/LYMPHATIC: negative for easy bruising, bleeding and lymphadenopathy. ALLERGIC/IMMUNOLOGIC: negative for recurrent infections, angioedema, anaphylaxis and drug reactions. ENDOCRINE: negative for weight changes and diabetic symptoms including polyuria, polydipsia and polyphagia. MUSCULOSKELETAL: negative for pain, joint swelling, decreased range of motion and muscle weakness. NEUROLOGICAL: negative for headaches, slurred speech, unilateral weakness. PSYCHIATRIC/BEHAVIORAL: negative for hallucinations, behavioral problems, confusion and agitation. All pertinent positives are noted in the HPI.     Physical Examination:  Vitals:   Patient Vitals for the past 24 hrs:   BP Temp Temp src Pulse Resp SpO2 Height Weight   05/30/19 0830 (!) 152/77 97.7 °F (36.5 °C) Oral 76 16 97 % -- --   05/29/19 2044 (!) 146/73 97.7 °F (36.5 °C) Oral 71 18 95 % -- --   05/29/19 1650 -- -- -- -- -- -- 5' (1.524 m) 147 lb 12.6 oz (67 kg)   05/29/19 1610 134/73 98.3 °F (36.8 °C) Oral 82 16 93 % -- --     Psych: Stable mood, normal judgement, normal affect   Const: No distress  Eyes: Conjunctiva noninjected, no icterus noted; pupils equal, round. HENT: Atraumatic, normocephalic; Oral mucosa moist  Neck: Trachea midline, neck supple. No thyromegaly noted. CV: Regular rate and rhythm, no murmur rub or gallop noted  Resp: Lungs clear to auscultation bilaterally, no rales wheezes or ronchi, no retractions. Respirations unlabored. GI: Soft, nontender, nondistended. Normal bowel sounds. No palpable masses. Neuro: Alert, oriented, appropriate. No cranial nerve deficits appreciated. Sensation intact to light touch. Motor examination reveals: BUE 5/5 LLE 5/5 RLE 4/5 HF 4+/5 KE 5/5 DF/PF. Reflexes normal and symmetric. Skin: Normal temperature and turgor. Well healed left knee incision  MSK: No joint abnormalities noted. Ext: No significant edema appreciated. No varicosities.     Lab Results   Component Value Date    WBC 4.2 05/30/2019    HGB 11.6 (L) 05/30/2019    HCT 34.6 (L) 05/30/2019    MCV 96.9 05/30/2019     05/30/2019     Lab Results   Component Value Date    INR 0.96 05/24/2019    INR 0.98 04/01/2019    INR 1.01 12/06/2018    PROTIME 11.0 05/24/2019    PROTIME 11.2 04/01/2019    PROTIME 11.5 12/06/2018     Lab Results   Component Value Date    CREATININE 0.9 05/30/2019    BUN 24 (H) 05/30/2019     (L) 05/30/2019    K 4.4 05/30/2019    CL 96 (L) 05/30/2019    CO2 29 05/30/2019     Lab Results   Component Value Date    ALT 12 05/24/2019    AST 17 05/24/2019    ALKPHOS 53 05/24/2019    BILITOT 0.4 05/24/2019       Most recent echocardiogram revealed Procedure    Type of Study      TTE procedure:ECHOCARDIOGRAM COMPLETE 2D W DOPPLER W COLOR.     Procedure Date  Date: 05/25/2019 Start: 10:14 AM    Study Location: [facility] - Echo Lab  Technical Quality: Adequate visualization    Indications:Heart murmur and Chest pain. Patient Status: Routine    Height: 60 inches Weight: 149 pounds BSA: 1.65 m2 BMI: 29.1 kg/m2    BP: 164/76 mmHg     Conclusions      Summary   -Normal left ventricle size, wall thickness and systolic function with an   estimated ejection fraction of 60%. No regional wall motion abnormalities   are seen.   -Mild aortic stenosis with a peak velocity of 2.54m/s and a mean pressure   gradient of 15mmHg. Mild aortic regurgitation.   -Mild mitral regurgitation.   -Mild tricuspid regurgitation with a estimated PASP of 31 mmHg plus right   atrial pressure.   -Trivial pulmonic regurgitation present.   -Biatrial enlargement.     The above laboratory data have been reviewed.      The above imaging data have been reviewed.      The above medical testing have been reviewed. Body mass index is 28.86 kg/m². Barriers to Discharge: endurance, falls, pain, ADLs    POST ADMISSION PHYSICIAN EVALUATION  The patient has agreed to being admitted to our comprehensive inpatient  rehabilitation facility consisting of at least 180 minutes of therapy a day,  5 out of 7 days a week. The patient/family has a good understanding of our discharge process. The  patient has potential to make improvement and is in need of at least two of  the following multidisciplinary therapies including but not limited to  physical, occupational, respiratory, and speech, nutritional services, wound care, and prosthetics and orthotics. Given the patients complex condition  and risk of further medical complications, rehabilitation services cannot be  safely provided at a lower level of care such as a skilled nursing facility.   I have compared the patients medical and functional status at the time of the  preadmission screening and the same on this date, and there are no significant changes except as documented below in the assessment and plan. By signing this document, I acknowledge that I have personally performed a  full physical examination on this patient within 24 hours of admission to  this inpatient rehabilitation facility and have determined the patient to be  able to tolerate the above course of treatment at an intensive level for a  reasonable period of time. I will be completing a detailed individualized  Plan of Care for this patient by day four of the patients stay based upon the  Preadmission Screen, this Post-Admission Evaluation, and the therapy  evaluations. Assessment and Plan:  Debility: PT/OT    Unstable angina: LHC without severe disease    CAD: ASA, statin    Lumbar spondylosis: pain control    Lumbar compression fx: recent kypoplasty    Bowels: Per protocol  Bladder: Per protocol   Sleep: Trazodone provided prn  Pain: Percocet PRN, zanaflex   DVT PPx: Lovenox     Lodema MD Poonam 5/30/2019, 8:54 AM     * This document was created using dictation software. While all precautions were taken to ensure accuracy, errors may have occurred. Please disregard any typographical errors.

## 2019-05-30 NOTE — CARE COORDINATION
Social Work Admission Assessment    Objective:  Met with pt to complete initial assessment and review role of  in rehab process. Pt oriented to unit. Pt states understanding of this. Current Home Situation:  Patient lives at [address] [place] [address] and is active with [facility] for therapy and nursing. Patient has a wheel chair, walker, and cane at home already. Patient has advance directives in place already. Pt's plans re:  Return to work/school/volunteer:  Patient is a retired RN. Accessibility to community resources/transportation:  Dtr transports pt. Has pt experienced a recent loss or signigicant life event that would impact their care or ability to participate? _X_No  __Yes - Explain    Has pt ever been treated for emotional disorders? _X_No  __Yes--How does that affect current situation:    How does pt and family cope with stressful events and this hospitalization? Patient appears to be coping with this hospitalization appropriately, no concerns voiced or observed. Special Problem Areas:  None    Discharge Plan: Home with appropriate support, await progress with therapy for recommendations. Impression/Plan:  Radha Pereira (patient) is an 80year old female that has been admitted to ARU. Patient informed of team conference on Tuesday after 11 a.m. Will continue to follow for support and discharge planning.  Fer Fisher, MSW, LSW

## 2019-05-30 NOTE — DISCHARGE SUMMARY
[address] [facility] DISCHARGE SUMMARY    Patient Demographics    Patient. Lupe Vivar  Date of Birth. 10/16/1932  MRN. [id number]     Primary care provider. Markell Goldberg, DENIZ Barajas CRISTOPHER  (Tel: [phone])    Admit date: 5/24/2019    Discharge date (blank if same as Note Date): 5/29/2019  Note Date: 5/30/2019     Reason for Hospitalization. Chief Complaint   Patient presents with    Back Pain     Patient in with complaints of coming out of rehab for recent falls. Woke up this am with back pain and hip pain. No falls in last month just states 7/10 back pain since this morning. No urinary symptoms. Significant Findings. Active Problems:    Acute chest pain    Right sided numbness    Moderate malnutrition (HCC)    Chest pain  Resolved Problems:    * No resolved hospital problems. *       Problems and results from this hospitalization that need follow up. 1. Outpatient follow up with Ophthalmology     Significant test results and incidental findings. 1.   VL DUP CAROTID BILATERAL   Final Result      CT HEAD WO CONTRAST   Final Result   No acute intracranial abnormality. XR PELVIS (MIN 3 VIEWS)   Final Result   1. No acute osseous abnormality of the pelvis. 2. Bilateral hip osteoarthritis. 3. Osteopenia. XR LUMBAR SPINE (2-3 VIEWS)   Final Result   Limited radiographs due to osteopenia and patient habitus. No definitive new   fracture or acute findings although consider further evaluation with CT or   MRI if there is persistent or acute symptomatology given limitations of these   radiographs         XR CHEST STANDARD (2 VW)   Final Result   Stable cardiomegaly. Large hiatal hernia. Invasive procedures and treatments.    CathLeft Heart Cath  Dominance      LM: luminal irregularities   LAD: luminal irregularities with 30% proximal, mildly calcified stenosis   LCx: mild plaquing; 30% ostial stenosis of smaller OM1 ; moderate sized second and third marginal free of significant disease  RCA: 40% eccentric mid      LVEDP: 12 mmHg     5 mmHg gradient upon pullback across Ao     1. [facility] Course. Patient admitted with Chest pain with concerns of unstable angina, underwent cath showing non obstructive coronaries. Chest pain probably related to GERD  For her debility, PT/OT was consulted and was discharged to acute rehab as per their recommendation and patient preference. Her neuro symptoms were thought to be related to Peripheral neuropathy. Neurology was consulted during the stay. Consults. IP CONSULT TO HOSPITALIST  IP CONSULT TO CARDIOLOGY  IP CONSULT TO SPIRITUAL SERVICES  IP CONSULT TO NEUROLOGY  IP CONSULT TO PHYSICAL MEDICINE REHAB    Physical examination on discharge day. BP (!) 159/75   Pulse 70   Temp 98.3 °F (36.8 °C) (Oral)   Resp 18   Ht 5' (1.524 m)   Wt 147 lb 12.8 oz (67 kg)   SpO2 94%   BMI 28.87 kg/m²   General appearance. Alert. Looks comfortable. HEENT. Sclera clear. Moist mucus membranes. Cardiovascular. Regular rate and rhythm, normal S1, S2. No murmur. Respiratory. Not using accessory muscles. Clear to auscultation bilaterally, no wheeze. Gastrointestinal. Abdomen soft, non-tender, not distended, normal bowel sounds  Neurology. Facial symmetry. No speech deficits. Moving all extremities equally. Extremities. No edema in lower extremities. Skin. Warm, dry, normal turgor        Condition at time of discharge stable     Medication instructions provided to patient at discharge. Medication List      CHANGE how you take these medications    oxyCODONE-acetaminophen  MG per tablet  Commonly known as:  PERCOCET  Take 1 tablet by mouth every 4 hours as needed for Pain for up to 3 days. .  What changed:  Another medication with the same name was removed. Continue taking this medication, and follow the directions you see here.         CONTINUE taking these medications    ACETAMINOPHEN-CODEINE #4 PO  Notes to patient:  Use: pain.   Side effects: may cause upset stomach and constipation     alendronate 70 MG tablet  Commonly known as:  FOSAMAX  Notes to patient:  Use: Treatment of osteoporosis  Side effects: Headache, constipation, diarrhea     diphenoxylate-atropine 2.5-0.025 MG per tablet  Commonly known as:  LOMOTIL  Notes to patient:  Use: Treatment of diarrhea  Side effects: Dizziness, fatigue     docusate sodium 100 MG capsule  Commonly known as:  COLACE  Notes to patient:  Docusate Sodium (Colace)  Use: stool softener, prevents or treats constipation  Side effects: usually none     esomeprazole Magnesium 20 MG Pack  Commonly known as:  [email or web address] [address]  Notes to patient:  Use: prevention and treatment of gastric ulcers and/or heartburn  Side effects: headache, fatigue, constipation, dry mouth      fluticasone 50 MCG/ACT nasal spray  Commonly known as:  FLONASE  Notes to patient:  Use: Allergies  Side effects: Headache, back pain, dizziness     guaiFENesin 400 MG tablet  Notes to patient:  Use: cough, liquefies mucus (expectorant)  Side effects: nausea, vomiting, diarrhea, headache     naloxone 4 MG/0.1ML Liqd nasal spray  1 spray by Nasal route as needed for Opioid Reversal  Notes to patient:  Use: to block or suppress the effects of opoid medication  Side effects: irritability, restlessness, body aches, nasal irritation     OCUVITE ADULT FORMULA PO  Notes to patient:  Use: Dietary supplement  Side Effects: Constipation, diarrhea, stool discoloration     ondansetron 4 MG disintegrating tablet  Commonly known as:  ZOFRAN ODT  Take 1 tablet by mouth every 8 hours as needed for Nausea or Vomiting  Notes to patient:  Use: nausea and vomiting  Side effects: headache, weakness or dizziness     tiZANidine 2 MG tablet  Commonly known as:  ZANAFLEX  Take 2 tablets by mouth every 6 hours as needed (spasms)  Notes to patient:  Use: muscle relaxer and lower back pain  Side effects: dry mouth, fatigue, dizziness     TYLENOL PO  Notes to patient:  Use: pain or fever  Side effects: stomach upset  ALERT: Acetaminophen may be an additive in other medications. Do not exceed 4 grams in 24 hours. Vitamin D3 2000 units Caps  Notes to patient:  Use: prevention and treatment of low vitamin D  Side effects: muscle weakness/twitching fatigue, constipation, upset stomach        STOP taking these medications    furosemide 20 MG tablet  Commonly known as:  LASIX  Notes to patient:  STOP     POTASSIMIN PO  Notes to patient:  STOP     PRILOSEC PO            Discharge recommendations given to patient. Follow Up. PCP   Disposition. Acute Rehab  Activity. activity as tolerated  Diet: No diet orders on file      Spent 35 minutes in discharge process.     Signed:  Kei Torres MD     5/30/2019 3:24 PM

## 2019-05-30 NOTE — PROGRESS NOTES
Occupational Therapy   Occupational Therapy Initial Assessment  Date: 2019   Patient Name: Isma Jeffries  MRN: [id number]     : 10/16/1932    Date of Service: 2019    Discharge Recommendations:  Home with Home health OT, S Level 3, Home with nursing aide  OT Equipment Recommendations  Equipment Needed: No  Other: May benefit from reacher and sock aid for dressing if pt willing to learn    Assessment   Performance deficits / Impairments: Decreased functional mobility ; Decreased ADL status; Decreased ROM; Decreased endurance;Decreased strength  Assessment: Pt is not at baseline level of function d/t above deficits, impacting daily occupational performance. Pt mainly limited by pain, impaired balance, and decreased endurance. Recommend current POC. Treatment Diagnosis: Decreased ADL status, functional mobility, ROMJ, strength and endurance associated with chest pain, s/p cardac cath  Prognosis: Good  Decision Making: Low Complexity  Patient Education: OT eval, role of OT, POC, safe transfers, ADLs, safe standing balance  REQUIRES OT FOLLOW UP: Yes  Activity Tolerance  Activity Tolerance: Patient Tolerated treatment well  Safety Devices  Safety Devices in place: Yes  Type of devices: All fall risk precautions in place;Call light within reach; Chair alarm in place;Gait belt;Left in chair           Patient Diagnosis(es): There were no encounter diagnoses. has a past medical history of Arthritis, CAD (coronary artery disease), Cancer ([address] Cystocele, Diabetes mellitus ([address] Hepatitis A, History of blood transfusion, Hyperlipidemia, PVC (premature ventricular contraction), Rectocele, Reflux, and Scoliosis. has a past surgical history that includes Appendectomy; joint replacement (Left); hernia repair (6 YRS AGO); Cholecystectomy; shoulder surgery (Right, 12); Bunionectomy (12); Breast surgery; Upper gastrointestinal endoscopy (12);  Hysterectomy; bladder suspension; Dilation and History  Social/Functional History  Lives With: Alone  Type of Home: Assisted living(Independent [facility] at [facility] [place])  Home Layout: One level  Home Access: Level entry  Bathroom Shower/Tub: Walk-in shower  Bathroom Toilet: Standard  Bathroom Equipment: Grab bars in shower, Shower chair, Hand-held shower  Bathroom Accessibility: Accessible  Home Equipment: 4 wheeled walker, Quad cane, Raisa Magdalenoia, Reacher  Receives Help From: Family(family assists w/ grocery shopping and bills)  ADL Assistance: Needs assistance(assist w/ LB ADLs from [facility] per pt)  Homemaking Assistance: Needs assistance  Homemaking Responsibilities: Yes(facility completes laundry and cleaning; pt cooks breakfast and manages own meds)  Ambulation Assistance: Independent(w/ quad cane or occasionally [address])  Transfer Assistance: Independent  Active : No(daughter assists w/ driving)  Occupation: Retired  Leisure & Hobbies: Play Words on ipad  Additional Comments: pt reports 1 fall in last 6 months that led to last admission but no falls after returning home       Objective   Vision: Impaired  Vision Exceptions: Wears glasses for reading  Hearing: Exceptions to [facility]  Hearing Exceptions: Hard of hearing/hearing concerns;Bilateral hearing aid    Orientation  Overall Orientation Status: Within Normal Limits  Observation/Palpation  Posture: Poor  Observation: Severe kyphosis   Balance  Sitting Balance: Supervision  Standing Balance: Contact guard assistance  Functional Mobility  Functional - Mobility Device: Rolling Walker  Activity: To/from bathroom  Assist Level: Contact guard assistance  Toilet Transfers  Toilet - Technique: Ambulating  Equipment Used: Standard toilet  Toilet Transfer: Contact guard assistance  Shower Transfers  Shower - Transfer From: Nigel Barber - Transfer Type: To and From  Shower - Transfer To:  Shower seat with back  Shower - Technique: Ambulating  Shower Transfers: Contact Guard  ADL  Grooming: Setup  UE Bathing: Setup;Supervision  LE Bathing: Setup; Moderate assistance  UE Dressing: Setup;Supervision  LE Dressing: Setup;Maximum assistance(pt able to adjust clothing over hips in stance after assist to thread depends and pants, and don B socks)  Toileting: None  Additional Comments: Pt performed shower level ADL seated on shower chair. Limited by pain and fatigue. Tone RUE  RUE Tone: Normotonic  Tone LUE  LUE Tone: Normotonic  Coordination  Movements Are Fluid And Coordinated: Yes        Transfers  Stand Step Transfers: Contact guard assistance(to/from recliner w/ RW)  Sit to stand: Contact guard assistance  Stand to sit: Contact guard assistance  Vision - Basic Assessment  Prior Vision: Wears glasses only for reading  Visual History: Cataracts; Corrective eye surgery  Patient Visual Report: No visual complaint reported. Cognition  Overall Cognitive Status: WFL  Perception  Overall Perceptual Status: WFL     Sensation  Overall Sensation Status: Impaired(BUE WFL)  Light Touch: Partial deficits in the RLE        LUE AROM (degrees)  LUE AROM : WFL  LUE General AROM: shoulder ROM limited to ~100 degrees  RUE AROM (degrees)  RUE AROM : WFL  RUE General AROM: shoulder ROM limited to ~100 degrees  LUE Strength  Gross LUE Strength: WFL  LUE Strength Comment: shoulder 3-/5; otherwise grossly 4-/5  RUE Strength  Gross RUE Strength: WFL  RUE Strength Comment: shoulder 3-/5; otherwise grossly 4-/5        2nd session: Pt seated in recliner upon arrival, agreeable to session. C/o pain as 7/10 in back but unable to receive additional pain med until end of session, at which RN was aware and reports will administer when allowed. Ambulated to/from BR w/ RW and CGA. Transferred to toilet w/ CGA and completed toileting w/ CGA. Ambulated to/from dining room w/ RW and CGA ~75 feet x2. Transferred to chair w/ armrests w/ CGA.  Performed 2x15 reps of the following BUE exercises w/ 1# free weight, alternating between UE, to improve functional strength/endurance for ADL performance: shoulder flexion/extension, chest press, elbow flexion/extension, and supination/pronation. Transferred to recliner upon return to room w/ CGA. Pt left seated in recliner w/ alarm engaged, call light within reach, and all needs met at this time.      Plan   Plan  Times per week: 90 min, 5/7 x/week  Times per day: Daily  Current Treatment Recommendations: Functional Mobility Training, Safety Education & Training, Self-Care / ADL, ROM, Strengthening, Endurance Training, Balance Training      Goals  Short term goals  Time Frame for Short term goals: 1 week:  Short term goal 1: Pt will be mod I w/ bathing  Short term goal 2: Pt will be independent w/ UB dressing  Short term goal 3: Pt will be min A w/ LB dressing  Short term goal 4: Pt will be mod I W/ toileting  Short term goal 5: Pt will be mod I w/ functional transfers/mobility  Short term goal 6: Pt will be mod I w/ simple IADLs  Long term goals  Time Frame for Long term goals : LTG=STG  Patient Goals   Patient goals : \"to get back to how I was before I came in\"       Therapy Time   Individual Concurrent Group Co-treatment   Time In 0830         Time Out 0930         Minutes 60                Second Session Therapy Time:   Individual Concurrent Group Co-treatment   Time In 1330         Time Out 1400         Minutes 30           Timed Code Treatment Minutes:  60+30    Total Treatment Minutes:  [address] [facility] [address] [address] [facility], [address] [facility], [address] [facility]

## 2019-05-31 LAB
BILIRUBIN URINE: NEGATIVE
BLOOD, URINE: ABNORMAL
CLARITY: CLEAR
COLOR: YELLOW
EPITHELIAL CELLS, UA: 12 /HPF (ref 0–5)
GLUCOSE URINE: NEGATIVE MG/DL
HYALINE CASTS: 5 /LPF (ref 0–8)
KETONES, URINE: NEGATIVE MG/DL
LEUKOCYTE ESTERASE, URINE: ABNORMAL
MICROSCOPIC EXAMINATION: YES
NITRITE, URINE: NEGATIVE
PH UA: 6 (ref 5–8)
PROTEIN UA: NEGATIVE MG/DL
RBC UA: 10 /HPF (ref 0–4)
SPECIFIC GRAVITY UA: 1.02 (ref 1–1.03)
URINE REFLEX TO CULTURE: YES
URINE TYPE: ABNORMAL
UROBILINOGEN, URINE: 0.2 E.U./DL
WBC UA: 7 /HPF (ref 0–5)

## 2019-05-31 PROCEDURE — 97116 GAIT TRAINING THERAPY: CPT

## 2019-05-31 PROCEDURE — 1280000000 HC REHAB R&B

## 2019-05-31 PROCEDURE — 97535 SELF CARE MNGMENT TRAINING: CPT

## 2019-05-31 PROCEDURE — 6370000000 HC RX 637 (ALT 250 FOR IP): Performed by: PHYSICAL MEDICINE & REHABILITATION

## 2019-05-31 PROCEDURE — 87086 URINE CULTURE/COLONY COUNT: CPT

## 2019-05-31 PROCEDURE — 81001 URINALYSIS AUTO W/SCOPE: CPT

## 2019-05-31 PROCEDURE — 6360000002 HC RX W HCPCS: Performed by: PHYSICAL MEDICINE & REHABILITATION

## 2019-05-31 PROCEDURE — 97530 THERAPEUTIC ACTIVITIES: CPT

## 2019-05-31 PROCEDURE — 97110 THERAPEUTIC EXERCISES: CPT

## 2019-05-31 PROCEDURE — 97140 MANUAL THERAPY 1/> REGIONS: CPT

## 2019-05-31 RX ADMIN — PANTOPRAZOLE SODIUM 40 MG: 40 TABLET, DELAYED RELEASE ORAL at 06:45

## 2019-05-31 RX ADMIN — DOCUSATE SODIUM 100 MG: 100 CAPSULE, LIQUID FILLED ORAL at 08:09

## 2019-05-31 RX ADMIN — FLUTICASONE PROPIONATE 1 SPRAY: 50 SPRAY, METERED NASAL at 22:30

## 2019-05-31 RX ADMIN — POLYETHYLENE GLYCOL 3350 17 G: 17 POWDER, FOR SOLUTION ORAL at 08:09

## 2019-05-31 RX ADMIN — DESMOPRESSIN ACETATE 40 MG: 0.2 TABLET ORAL at 22:29

## 2019-05-31 RX ADMIN — OXYCODONE HYDROCHLORIDE AND ACETAMINOPHEN 1 TABLET: 5; 325 TABLET ORAL at 14:58

## 2019-05-31 RX ADMIN — GUAIFENESIN 600 MG: 600 TABLET, EXTENDED RELEASE ORAL at 08:09

## 2019-05-31 RX ADMIN — OXYCODONE HYDROCHLORIDE AND ACETAMINOPHEN 1 TABLET: 5; 325 TABLET ORAL at 21:49

## 2019-05-31 RX ADMIN — ACETAMINOPHEN 650 MG: 325 TABLET, FILM COATED ORAL at 08:09

## 2019-05-31 RX ADMIN — POLYVINYL ALCOHOL 2 DROP: 14 SOLUTION/ DROPS OPHTHALMIC at 04:25

## 2019-05-31 RX ADMIN — THERA TABS 1 TABLET: TAB at 08:09

## 2019-05-31 RX ADMIN — ENOXAPARIN SODIUM 40 MG: 40 INJECTION SUBCUTANEOUS at 08:09

## 2019-05-31 RX ADMIN — OXYCODONE HYDROCHLORIDE AND ACETAMINOPHEN 1 TABLET: 5; 325 TABLET ORAL at 04:25

## 2019-05-31 RX ADMIN — POLYVINYL ALCOHOL 2 DROP: 14 SOLUTION/ DROPS OPHTHALMIC at 08:10

## 2019-05-31 RX ADMIN — GUAIFENESIN 600 MG: 600 TABLET, EXTENDED RELEASE ORAL at 22:29

## 2019-05-31 RX ADMIN — POLYVINYL ALCOHOL 2 DROP: 14 SOLUTION/ DROPS OPHTHALMIC at 20:02

## 2019-05-31 RX ADMIN — ASPIRIN 325 MG: 325 TABLET, DELAYED RELEASE ORAL at 08:09

## 2019-05-31 RX ADMIN — ONDANSETRON 4 MG: 4 TABLET, ORALLY DISINTEGRATING ORAL at 08:09

## 2019-05-31 ASSESSMENT — PAIN DESCRIPTION - LOCATION
LOCATION: BACK

## 2019-05-31 ASSESSMENT — PAIN DESCRIPTION - PAIN TYPE
TYPE: CHRONIC PAIN

## 2019-05-31 ASSESSMENT — PAIN SCALES - GENERAL
PAINLEVEL_OUTOF10: 6
PAINLEVEL_OUTOF10: 6
PAINLEVEL_OUTOF10: 3
PAINLEVEL_OUTOF10: 8
PAINLEVEL_OUTOF10: 8
PAINLEVEL_OUTOF10: 7
PAINLEVEL_OUTOF10: 4
PAINLEVEL_OUTOF10: 10

## 2019-05-31 NOTE — PROGRESS NOTES
Occupational Therapy  Facility/Department: [facility] ACUTE REHAB UNIT  Daily Treatment Note  NAME: Melissa Ann  : 10/16/1932  MRN: [id number]    Date of Service: 2019    Discharge Recommendations:  Home with Home health OT, S Level 3, Home with nursing aide  OT Equipment Recommendations  Equipment Needed: No  Other: May benefit from reacher and sock aid for dressing if pt willing to learn    Assessment   Performance deficits / Impairments: Decreased functional mobility ; Decreased ADL status; Decreased ROM; Decreased endurance;Decreased strength  Assessment: Pt is not at baseline level of function d/t above deficits, impacting daily occupational performance. Pt mainly limited by pain, impaired balance, and decreased endurance. Recommend current POC. Treatment Diagnosis: Decreased ADL status, functional mobility, ROMJ, strength and endurance associated with chest pain, s/p cardac cath  Prognosis: Good  Patient Education: Safe transfers, standing balance  REQUIRES OT FOLLOW UP: Yes  Activity Tolerance  Activity Tolerance: Patient Tolerated treatment well  Safety Devices  Safety Devices in place: Yes  Type of devices: All fall risk precautions in place;Call light within reach; Chair alarm in place;Gait belt;Left in chair         Patient Diagnosis(es): There were no encounter diagnoses. has a past medical history of Arthritis, CAD (coronary artery disease), Cancer ([address] Cystocele, Diabetes mellitus ([address] Hepatitis A, History of blood transfusion, Hyperlipidemia, PVC (premature ventricular contraction), Rectocele, Reflux, and Scoliosis. has a past surgical history that includes Appendectomy; joint replacement (Left); hernia repair (6 YRS AGO); Cholecystectomy; shoulder surgery (Right, 12); Bunionectomy (12); Breast surgery; Upper gastrointestinal endoscopy (12); Hysterectomy; bladder suspension; Dilation and curettage of uterus; other surgical history (Left, 14);  Foot surgery; Cardiac catheterization; Colonoscopy (2008); Cystocopy (03/29/2017); other surgical history (Right, 06/28/2018); Cataract removal with implant (Left, 09/13/2018); pr remv cataract extracap,insert lens (Left, 9/13/2018); and Lumbar spine surgery (Right, 5/15/2019). Restrictions  Restrictions/Precautions  Restrictions/Precautions: Fall Risk  Position Activity Restriction  Other position/activity restrictions: Julienne Ortega is a 80 y.o. seen in ED for multiple complaints and confusing clinical presentation. Initially awoke with a hard upper back/mid scapular pain described as dull heavy pain with associated SOB. Denies radiation, diaphoresis, palpitations, dizziness or syncope. Had taken a pain pill prior to event with gradual relief of discomfort after 30-45 minutes. Denies cough or fever. Has a hx of GERD and a large hiatal hernia with prior surgery and recurrence. She also states that she was dizzy with R arm weakness resulting in Stroke Alert in the ER. Her head CT was negative for acute CVA. She has chronic lower back pain and RLE weakness. 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 Pt has had occasional episodic right arm weakness and numbness in the past but none since the hospital admission. S/P cardiac cath 5/28. Subjective   General  Chart Reviewed: Yes  Patient assessed for rehabilitation services?: Yes  Response to previous treatment: Patient with no complaints from previous session  Family / Caregiver Present: No  Diagnosis: Chest pain, s/p cardiac cath 5/28/19  Subjective  Subjective: Pt seated in recliner upon arrival, agreeable to session.   Pain Assessment  Pain Assessment: 0-10  Pain Level: 7  Pain Type: Chronic pain  Pain Location: Back  Vital Signs  Patient Currently in Pain: Yes   Orientation  Orientation  Overall Orientation Status: Within Normal Limits  Objective    ADL  Grooming: Setup(SBA in stance at sink for balance)  Toileting: Stand by assistance        Balance  Sitting Balance: Supervision  Standing Balance: Contact guard assistance(SBA-CGA)  Standing Balance  Time: ~10 min + ~11 min  Activity: Grooming tasks + leisure activity  Comment: Engaged in standing activities to promote upright posture, improve standing balance/tolerance, and improve functional endurance  Functional Mobility  Functional - Mobility Device: Rolling Walker  Activity: To/from bathroom; To/From therapy gym  Assist Level: Contact guard assistance  Functional Mobility Comments: ~75 feet to/from therapy gym  Toilet Transfers  Toilet - Technique: Ambulating  Equipment Used: Standard toilet  Toilet Transfer: Contact guard assistance     Transfers  Stand Step Transfers: Contact guard assistance(to/from recliner and chair w/ armrests)  Sit to stand: Stand by assistance  Stand to sit: Stand by assistance  Transfer Comments: Pt became nauseated in therapy gym during standing activity and transferred to W/C to return to room - nausea subsided within a few minutes w/ rest break         2nd session: Pt seated in recliner upon arrival, agreeable to session w/ encouragement. Still c/o chronic back pain as 7/10 (baseline) - unable to receive additional pain med. Pt also c/o nausea - reports receiving anti-nausea med and not helping - requesting seated activity. Pt performed 3x15 reps of the following BUE exercises, alternating between UE, to improve functional strength/endurance for ADL performance: shoulder flexion/extension, chest press, elbow flexion/extension, and supination/pronation - required frequent rest breaks d/t nausea and self limiting behaviors. Pt filled out form w/ this writer regarding address change w supervision d/t mild cognitive deficits - pt able to complete w/ 100% accuracy. Provided pt w/ warm blanket to place over abdomen to decrease cramping sensation and assist w/ nausea.  Pt left seated in recliner w/ alarm engaged, call light within reach, and all needs met at

## 2019-05-31 NOTE — PLAN OF CARE
Patient urinates every 30 minutes, states this is her normal routine at home, stated in the past she has had her urethra stretched

## 2019-05-31 NOTE — PROGRESS NOTES
Miriam Houston  5/31/2019  [id number]    Chief Complaint: Debility    Subjective:   No overnight events. Right leg pain numbness remains. Tolerating therapy well. ROS: No CP, SOB, dyspnea    Objective:  Patient Vitals for the past 24 hrs:   BP Temp Temp src Pulse Resp SpO2   05/31/19 0726 (!) 133/59 98.3 °F (36.8 °C) Oral 75 18 --   05/30/19 1915 (!) 147/71 98 °F (36.7 °C) Oral 78 16 97 %     Gen: No distress, pleasant. Resting in bedside  HEENT: Normocephalic, atraumatic. CV: Regular rate and rhythm. No MRG  Resp: No respiratory distress. CTAB  Abd: Soft, nontender nondistended  Ext: No edema. Neuro: Alert, oriented, appropriately interactive. Laboratory data: Available via EMR. Therapy progress:  PT  Position Activity Restriction  Other position/activity restrictions: Alex Paniagua is a 80 y.o. seen in ED for multiple complaints and confusing clinical presentation. Initially awoke with a hard upper back/mid scapular pain described as dull heavy pain with associated SOB. Denies radiation, diaphoresis, palpitations, dizziness or syncope. Had taken a pain pill prior to event with gradual relief of discomfort after 30-45 minutes. Denies cough or fever. Has a hx of GERD and a large hiatal hernia with prior surgery and recurrence. She also states that she was dizzy with R arm weakness resulting in Stroke Alert in the ER. Her head CT was negative for acute CVA. She has chronic lower back pain and RLE weakness. 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 Pt has had occasional episodic right arm weakness and numbness in the past but none since the hospital admission. S/P cardiac cath 5/28.   Objective     Sit to Stand: Stand by assistance  Stand to sit: Stand by assistance  Bed to Chair: Stand by assistance  Device: Rolling Walker  Assistance: Contact guard assistance  Distance: 130'  OT  PT Equipment Recommendations  Equipment Needed: Yes  Mobility Devices: Myrna Batch: Rolling  Toilet - Technique: Ambulating  Equipment Used: Standard toilet  Assessment        SLP                Body mass index is 28.86 kg/m². Assessment:  Patient Active Problem List   Diagnosis    Spinal stenosis, lumbar region, without neurogenic claudication    Spinal stenosis of thoracic region    Scoliosis (and kyphoscoliosis), idiopathic    Displacement of thoracic intervertebral disc without myelopathy    Displacement of lumbar intervertebral disc without myelopathy    Thoracic or lumbosacral neuritis or radiculitis, unspecified    Disc displacement, lumbar    Disc displacement, thoracic    Scoliosis    Lumbar stenosis    Thoracic stenosis    Cervicalgia    Hypoxia    Closed fracture of proximal end of left humerus with routine healing    Urinary tract infection without hematuria    T12 compression fracture (HCC)    Thoracic compression fracture, sequela    Mild malnutrition (HCC)    Acute chest pain    Right sided numbness    Moderate malnutrition (HCC)    Chest pain    Debility    Moderate malnutrition ([address]       Plan:   Debility: PT/OT     Unstable angina: LHC without severe disease     CAD: ASA, statin     Lumbar spondylosis: pain control     Lumbar compression fx: recent kypoplasty     Bowels: Per protocol  Bladder: Per protocol   Sleep: Trazodone provided prn  Pain: Percocet PRN, zanaflex   DVT PPx: Lovenox     Jena Irene MD 5/31/2019, 8:53 AM    * This document was created using dictation software. While all precautions were taken to ensure accuracy, errors may have occurred. Please disregard any typographical errors.

## 2019-05-31 NOTE — PROGRESS NOTES
Physical Therapy  Facility/Department: [facility] ACUTE REHAB UNIT  Daily Treatment Note  NAME: Torrie Westbrook  : 10/16/1932  MRN: [id number]    Date of Service: 2019    Discharge Recommendations:  S Level 3, Home with Home health PT, Home with assist PRN   PT Equipment Recommendations  Equipment Needed: Yes  Mobility Devices: Therman Minder: Rolling    Patient Diagnosis(es): There were no encounter diagnoses. has a past medical history of Arthritis, CAD (coronary artery disease), Cancer ([address] Cystocele, Diabetes mellitus ([facility] [address] Hepatitis A, History of blood transfusion, Hyperlipidemia, PVC (premature ventricular contraction), Rectocele, Reflux, and Scoliosis. has a past surgical history that includes Appendectomy; joint replacement (Left); hernia repair (6 YRS AGO); Cholecystectomy; shoulder surgery (Right, 12); Bunionectomy (12); Breast surgery; Upper gastrointestinal endoscopy (12); Hysterectomy; bladder suspension; Dilation and curettage of uterus; other surgical history (Left, 14); Foot surgery; Cardiac catheterization; Colonoscopy (); Cystocopy (2017); other surgical history (Right, 2018); Cataract removal with implant (Left, 2018); pr remv cataract extracap,insert lens (Left, 2018); and Lumbar spine surgery (Right, 5/15/2019). Restrictions  Restrictions/Precautions  Restrictions/Precautions: Fall Risk  Position Activity Restriction  Other position/activity restrictions: Huber Jung is a 80 y.o. seen in ED for multiple complaints and confusing clinical presentation. Initially awoke with a hard upper back/mid scapular pain described as dull heavy pain with associated SOB. Denies radiation, diaphoresis, palpitations, dizziness or syncope. Had taken a pain pill prior to event with gradual relief of discomfort after 30-45 minutes. Denies cough or fever. Has a hx of GERD and a large hiatal hernia with prior surgery and recurrence.   She also states that she was dizzy with R arm weakness resulting in Stroke Alert in the ER. Her head CT was negative for acute CVA. She has chronic lower back pain and RLE weakness. 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 Pt has had occasional episodic right arm weakness and numbness in the past but none since the hospital admission. S/P cardiac cath 5/28. Subjective   General  Chart Reviewed: Yes  Additional Pertinent Hx: CAD, cancer, MD  Response To Previous Treatment: Patient reporting fatigue but able to participate. Family / Caregiver Present: No  Subjective  Subjective: Pt sitting in bedside chair on arrival.  Pt reporting extreme nausea. RN notified. Nausea medication already provided. Pain Screening  Patient Currently in Pain: Yes  Pain Assessment  Pain Assessment: 0-10  Pain Level: 3  Pain Location: Back  Vital Signs  Patient Currently in Pain: Yes       Orientation     Cognition      Objective   Bed mobility  Sit to Supine: Stand by assistance  Transfers  Sit to Stand: Stand by assistance  Stand to sit: Stand by assistance  Comment: Performed SPT with SBA and use of RW  Ambulation  Ambulation?: Yes  Ambulation 1  Surface: level tile  Device: Rolling Walker  Assistance: Stand by assistance  Quality of Gait: Flexed posture, decreased B step length, decreased B step height, slow carlos   Gait Deviations: Slow Carlos;Decreased step length;Decreased head and trunk rotation  Distance: 228'  Stairs/Curb  Stairs?: Yes  Stairs  # Steps : 7  Device: No Device  Assistance: Contact guard assistance  Comment: ascended leading with RLE and descended leading with LLE without cues     Balance  Posture: Poor  Sitting - Static: Good  Sitting - Dynamic: Good  Standing - Static: Fair;+  Standing - Dynamic: Fair;+            Comment: 1st session:  Pt sitting in bedside chair on arrival.  Performed ambulation as documented above.   Performed seated therapeutic exercises BLE with 1.5# or green band:  LAQ X 10, marching X 10, hip abduction X 10, hamstring curls X 10, heel raises X 10, toe raises X 10. Performed seated stepper X 5 minutes. Pt returned to room and remained in bedside chair with alarm on and all needs in reach. 2nd session:  Pt sitting in bedside chair on arrival.  Still complaining of extreme nausea. Pt ambulated 228' again with vc to increase step height and length with decreased carryover. Performed stair negotiation as documented above. Performed sit to supine transition as documented above. Performed supine therapeutic exercises BLE:  SLR X 10, heel slides X 10, hip abduction X 10, bridging X 10. Performed manual stretching BLE with restriction noted in B hamstrings and R quad. Pt remained in supine with alarm on and all needs in reach. Assessment   Body structures, Functions, Activity limitations: Decreased functional mobility ; Decreased strength;Decreased endurance;Decreased sensation;Decreased ROM; Decreased balance  Assessment: Pt limited this date by nausea, but remains with RLE weakness. Pt continues to improve endurance and functional mobility.   Treatment Diagnosis: Impaired strength, decreased endurance  Prognosis: Good  Patient Education: role of PT, expectations of ARU, use of RW vs QC  REQUIRES PT FOLLOW UP: Yes  Activity Tolerance  Activity Tolerance: Treatment limited secondary to medical complications (free text)  Activity Tolerance: limited by nausea     G-Code     OutComes Score                                                  AM-PAC Score             Goals  Short term goals  Time Frame for Short term goals: 7-10 days  Short term goal 1: Perform all bed mobility mod I  Short term goal 2: Perform all transfers mod I  Short term goal 3: Ambulate 150' with LRAD mod I  Short term goal 4: Negotiate curb step with LRAD mod I  Short term goal 5: Decrease TUG score to 30 seconds to decrease risk of falling  Patient Goals   Patient goals : to return to level of function prior to admission, return to use of quad cane    Plan    Plan  Times per week: 90 minutes a day 5 days a week  Current Treatment Recommendations: Strengthening, Functional Mobility Training, Transfer Training, Gait Training, Patient/Caregiver Education & Training, Endurance Training, Balance Training, Equipment Evaluation, Education, & procurement, Manual Therapy - Soft Tissue Mobilization, Neuromuscular Re-education, Stair training, Safety Education & Training  Safety Devices  Type of devices:  All fall risk precautions in place, Gait belt, Patient at risk for falls, Nurse notified, Call light within reach, Chair alarm in place, Left in chair  Restraints  Initially in place: No     Therapy Time   Individual Concurrent Group Co-treatment   Time In 0930         Time Out 1015         Minutes 45              Second Session Therapy Time:   Individual Concurrent Group Co-treatment   Time In 1245         Time Out 1330         Minutes 45           Timed Code Treatment Minutes:  45+45    Total Treatment Minutes:  [address] [facility] [address], [place] [id number]

## 2019-06-01 LAB — URINE CULTURE, ROUTINE: NORMAL

## 2019-06-01 PROCEDURE — 97530 THERAPEUTIC ACTIVITIES: CPT

## 2019-06-01 PROCEDURE — 97140 MANUAL THERAPY 1/> REGIONS: CPT

## 2019-06-01 PROCEDURE — 1280000000 HC REHAB R&B

## 2019-06-01 PROCEDURE — 97110 THERAPEUTIC EXERCISES: CPT

## 2019-06-01 PROCEDURE — 6360000002 HC RX W HCPCS: Performed by: PHYSICAL MEDICINE & REHABILITATION

## 2019-06-01 PROCEDURE — 6370000000 HC RX 637 (ALT 250 FOR IP): Performed by: PHYSICAL MEDICINE & REHABILITATION

## 2019-06-01 PROCEDURE — 97535 SELF CARE MNGMENT TRAINING: CPT

## 2019-06-01 PROCEDURE — 97116 GAIT TRAINING THERAPY: CPT

## 2019-06-01 PROCEDURE — 94760 N-INVAS EAR/PLS OXIMETRY 1: CPT

## 2019-06-01 RX ORDER — PANTOPRAZOLE SODIUM 40 MG/1
40 TABLET, DELAYED RELEASE ORAL
Status: DISCONTINUED | OUTPATIENT
Start: 2019-06-01 | End: 2019-06-05 | Stop reason: HOSPADM

## 2019-06-01 RX ADMIN — PANTOPRAZOLE SODIUM 40 MG: 40 TABLET, DELAYED RELEASE ORAL at 16:16

## 2019-06-01 RX ADMIN — ASPIRIN 325 MG: 325 TABLET, DELAYED RELEASE ORAL at 08:03

## 2019-06-01 RX ADMIN — DOCUSATE SODIUM 100 MG: 100 CAPSULE, LIQUID FILLED ORAL at 22:36

## 2019-06-01 RX ADMIN — FLUTICASONE PROPIONATE 1 SPRAY: 50 SPRAY, METERED NASAL at 22:36

## 2019-06-01 RX ADMIN — OXYCODONE HYDROCHLORIDE AND ACETAMINOPHEN 1 TABLET: 5; 325 TABLET ORAL at 12:35

## 2019-06-01 RX ADMIN — OXYCODONE HYDROCHLORIDE AND ACETAMINOPHEN 1 TABLET: 5; 325 TABLET ORAL at 08:03

## 2019-06-01 RX ADMIN — GUAIFENESIN 600 MG: 600 TABLET, EXTENDED RELEASE ORAL at 08:03

## 2019-06-01 RX ADMIN — THERA TABS 1 TABLET: TAB at 08:03

## 2019-06-01 RX ADMIN — POLYVINYL ALCOHOL 2 DROP: 14 SOLUTION/ DROPS OPHTHALMIC at 22:35

## 2019-06-01 RX ADMIN — POLYVINYL ALCOHOL 2 DROP: 14 SOLUTION/ DROPS OPHTHALMIC at 08:03

## 2019-06-01 RX ADMIN — ENOXAPARIN SODIUM 40 MG: 40 INJECTION SUBCUTANEOUS at 08:03

## 2019-06-01 RX ADMIN — PANTOPRAZOLE SODIUM 40 MG: 40 TABLET, DELAYED RELEASE ORAL at 05:35

## 2019-06-01 RX ADMIN — OXYCODONE HYDROCHLORIDE AND ACETAMINOPHEN 1 TABLET: 5; 325 TABLET ORAL at 02:16

## 2019-06-01 RX ADMIN — GUAIFENESIN 600 MG: 600 TABLET, EXTENDED RELEASE ORAL at 22:36

## 2019-06-01 RX ADMIN — OXYCODONE HYDROCHLORIDE AND ACETAMINOPHEN 1 TABLET: 5; 325 TABLET ORAL at 20:48

## 2019-06-01 RX ADMIN — OXYCODONE HYDROCHLORIDE AND ACETAMINOPHEN 1 TABLET: 5; 325 TABLET ORAL at 16:16

## 2019-06-01 RX ADMIN — DESMOPRESSIN ACETATE 40 MG: 0.2 TABLET ORAL at 22:36

## 2019-06-01 ASSESSMENT — PAIN SCALES - GENERAL
PAINLEVEL_OUTOF10: 6
PAINLEVEL_OUTOF10: 8
PAINLEVEL_OUTOF10: 8
PAINLEVEL_OUTOF10: 5
PAINLEVEL_OUTOF10: 6
PAINLEVEL_OUTOF10: 2
PAINLEVEL_OUTOF10: 7
PAINLEVEL_OUTOF10: 9
PAINLEVEL_OUTOF10: 6
PAINLEVEL_OUTOF10: 8
PAINLEVEL_OUTOF10: 7
PAINLEVEL_OUTOF10: 0

## 2019-06-01 ASSESSMENT — PAIN DESCRIPTION - LOCATION
LOCATION: BACK
LOCATION: BACK

## 2019-06-01 ASSESSMENT — PAIN DESCRIPTION - PAIN TYPE
TYPE: CHRONIC PAIN
TYPE: CHRONIC PAIN

## 2019-06-01 NOTE — PROGRESS NOTES
Occupational Therapy  Facility/Department: [facility] ACUTE REHAB UNIT  Daily Treatment Note  NAME: Torrie Westbrook  : 10/16/1932  MRN: [id number]    Date of Service: 2019    Discharge Recommendations:  Home with Home health OT, S Level 3, Home with nursing aide  OT Equipment Recommendations  Other: May benefit from reacher and sock aid for dressing if pt willing to learn    Assessment   Performance deficits / Impairments: Decreased functional mobility ; Decreased ADL status; Decreased ROM; Decreased endurance;Decreased strength  Assessment: Pt is not at baseline level of function d/t above deficits, impacting daily occupational performance. Pt mainly limited by pain, impaired balance, and decreased endurance. Recommend current POC. Treatment Diagnosis: Decreased ADL status, functional mobility, ROMJ, strength and endurance associated with chest pain, s/p cardac cath  Prognosis: Good  Patient Education: Safe transfers, standing balance  Barriers to Learning: hearing  REQUIRES OT FOLLOW UP: Yes  Activity Tolerance  Activity Tolerance: Patient Tolerated treatment well  Safety Devices  Safety Devices in place: Yes  Type of devices: All fall risk precautions in place;Call light within reach; Chair alarm in place;Gait belt;Left in chair         Patient Diagnosis(es): There were no encounter diagnoses. has a past medical history of Arthritis, CAD (coronary artery disease), Cancer ([address] Cystocele, Diabetes mellitus ([address] Hepatitis A, History of blood transfusion, Hyperlipidemia, PVC (premature ventricular contraction), Rectocele, Reflux, and Scoliosis. has a past surgical history that includes Appendectomy; joint replacement (Left); hernia repair (6 YRS AGO); Cholecystectomy; shoulder surgery (Right, 12); Bunionectomy (12); Breast surgery; Upper gastrointestinal endoscopy (12); Hysterectomy; bladder suspension; Dilation and curettage of uterus; other surgical history (Left, 14);  Foot surgery; assistance(assist to thread legs in to brief, patient able to thread pants and pull up over hips)  Toileting: Moderate assistance(assist with hygiene after BM)        Balance  Sitting Balance: Modified independent   Standing Balance: Stand by assistance(RW)  Functional Mobility  Functional - Mobility Device: Rolling Walker  Activity: To/from bathroom  Assist Level: Stand by assistance  Functional Mobility Comments: in room and to bathroom and closet  Toilet Transfers  Toilet - Technique: Ambulating  Equipment Used: Standard toilet  Toilet Transfer: Stand by assistance  Toilet Transfers Comments: cues for sequencing     Transfers  Stand Step Transfers: Stand by assistance  Sit to stand: Stand by assistance  Stand to sit: Stand by assistance                       Cognition  Overall Cognitive Status: WFL                    Type of ROM/Therapeutic Exercise  Comment: shoulder punches, elbow flexion/extension, and forearm 2x20 reps B UE AROM         2nd Session:  Patient in chair. C/O back pain 7/10. Meds given earlier by RN. Patient ambulated to BR with SBA using RW. Patient changed from large to medium brief per request of patient with Min A to don over feet. Patient able to don and doff slacks SBA. Toileting supervision. Due to back discomfort, discussed use of reacher/sock aide for LB dressing and ADLs. Patient stated, \"My back doesn't usually hurt this bad. \"  Patient educated on back protection. Functional  Mobility performed with RW and SBA in room and hallway. Performs sit to stand transfers at edge of chair with supervision x5 with occasional cues to reach back before sitting. Patient left in chair with alarm set and needs in reach.            Plan   Plan  Times per week: 90 min, 5/7 x/week  Times per day: Daily  Current Treatment Recommendations: Functional Mobility Training, Safety Education & Training, Self-Care / ADL, ROM, Strengthening, Endurance Training, Balance Training    Goals  Short term

## 2019-06-01 NOTE — PLAN OF CARE
Problem: Falls - Risk of:  Goal: Will remain free from falls  Description  Will remain free from falls  Outcome: Ongoing  Note:   Fall assessment completed, no attempts to get out of bed without assistance, bed alarm on and functioning, call light within reach, patient reminded to call for assist before ambulating, no falls this shift, will cont to monitor safety.    Goal: Absence of physical injury  Description  Absence of physical injury  Outcome: Ongoing     Problem: Pain:  Goal: Pain level will decrease  Description  Pain level will decrease  Outcome: Ongoing  Goal: Control of acute pain  Description  Control of acute pain  Outcome: Ongoing  Goal: Control of chronic pain  Description  Control of chronic pain  Outcome: Ongoing     Problem: Nutrition  Goal: Optimal nutrition therapy  Outcome: Ongoing

## 2019-06-01 NOTE — PROGRESS NOTES
Physical Therapy  Facility/Department: [facility] ACUTE REHAB UNIT  Daily Treatment Note  NAME: Anil Boland  : 10/16/1932  MRN: [id number]    Date of Service: 2019    Discharge Recommendations:  S Level 3, Home with Home health PT, Home with assist PRN   PT Equipment Recommendations  Equipment Needed: Yes  Mobility Devices: Delora Ports: Rolling    Patient Diagnosis(es): There were no encounter diagnoses. has a past medical history of Arthritis, CAD (coronary artery disease), Cancer ([facility] [address] Cystocele, Diabetes mellitus ([facility] [address] Hepatitis A, History of blood transfusion, Hyperlipidemia, PVC (premature ventricular contraction), Rectocele, Reflux, and Scoliosis. has a past surgical history that includes Appendectomy; joint replacement (Left); hernia repair (6 YRS AGO); Cholecystectomy; shoulder surgery (Right, 12); Bunionectomy (12); Breast surgery; Upper gastrointestinal endoscopy (12); Hysterectomy; bladder suspension; Dilation and curettage of uterus; other surgical history (Left, 14); Foot surgery; Cardiac catheterization; Colonoscopy (); Cystocopy (2017); other surgical history (Right, 2018); Cataract removal with implant (Left, 2018); pr remv cataract extracap,insert lens (Left, 2018); and Lumbar spine surgery (Right, 5/15/2019). Restrictions  Restrictions/Precautions  Restrictions/Precautions: Fall Risk  Position Activity Restriction  Other position/activity restrictions: Anusha Hurtado is a 80 y.o. seen in ED for multiple complaints and confusing clinical presentation. Initially awoke with a hard upper back/mid scapular pain described as dull heavy pain with associated SOB. Denies radiation, diaphoresis, palpitations, dizziness or syncope. Had taken a pain pill prior to event with gradual relief of discomfort after 30-45 minutes. Denies cough or fever. Has a hx of GERD and a large hiatal hernia with prior surgery and recurrence.   She also states that she was dizzy with R arm weakness resulting in Stroke Alert in the ER. Her head CT was negative for acute CVA. She has chronic lower back pain and RLE weakness. 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 Pt has had occasional episodic right arm weakness and numbness in the past but none since the hospital admission. S/P cardiac cath 5/28. Subjective   General  Chart Reviewed: Yes  Additional Pertinent Hx: CAD, cancer, MD  Response To Previous Treatment: Patient reporting fatigue but able to participate. Family / Caregiver Present: No  Referring Practitioner: Dr Vivian Nuñez  Subjective  Subjective: Pt sitting in bedside chair on arrival. Pt agreeable to PT treatment. Pain Screening  Patient Currently in Pain: Denies(at rest)  Vital Signs  Patient Currently in Pain: Denies(at rest)       Orientation  Orientation  Overall Orientation Status: Within Functional Limits  Cognition      Objective   Bed mobility  Supine to Sit: Unable to assess  Scooting: Unable to assess  Comment: Pt seated in chair upon arrival and left in chair following ambulation  Transfers  Sit to Stand: Stand by assistance  Stand to sit: Stand by assistance  Ambulation  Ambulation?: Yes  Ambulation 1  Surface: level tile  Device: Rolling Walker  Assistance: Contact guard assistance  Quality of Gait: Flexed posture, decreased B step length, decreased B step height, slow candace   Gait Deviations: Slow Candace;Decreased step length;Decreased head and trunk rotation  Distance: 228 ft   Stairs/Curb  Stairs?: Yes  Stairs  # Steps : 8  Stairs Height: 4\"  Rails: Bilateral  Assistance: Contact guard assistance  Comment: ascended leading with RLE and descended leading with LLE without cues during initial bout; pt reporting sensation of RLE \"wanting to buckle\" during descending. Pt cued to step down with LLE. Pt completing during second bout and appearing more controlled during descent of stairs. FOLLOW UP: Yes  Activity Tolerance  Activity Tolerance: Patient Tolerated treatment well     G-Code     OutComes Score                                                  AM-PAC Score             Goals  Short term goals  Time Frame for Short term goals: 7-10 days  Short term goal 1: Perform all bed mobility mod I  Short term goal 2: Perform all transfers mod I  Short term goal 3: Ambulate 150' with LRAD mod I  Short term goal 4: Negotiate curb step with LRAD mod I  Short term goal 5: Decrease TUG score to 30 seconds to decrease risk of falling  Long term goals  Time Frame for Long term goals : STG=LTG  Patient Goals   Patient goals : to return to level of function prior to admission, return to use of quad cane    Plan    Plan  Times per week: 90 minutes a day 5 days a week  Times per day: Daily  Current Treatment Recommendations: Strengthening, Functional Mobility Training, Transfer Training, Gait Training, Patient/Caregiver Education & Training, Endurance Training, Balance Training, Equipment Evaluation, Education, & procurement, Manual Therapy - Soft Tissue Mobilization, Neuromuscular Re-education, Stair training, Safety Education & Training  Safety Devices  Type of devices:  All fall risk precautions in place, Gait belt, Patient at risk for falls, Nurse notified, Call light within reach, Chair alarm in place, Left in chair  Restraints  Initially in place: No     Therapy Time   Individual Concurrent Group Co-treatment   Time In 1000         Time Out 1045         Minutes 45         Timed Code Treatment Minutes: [address] [facility] [address] Time:   [address] [facility]   Time In 1245         Time Out 1330         Minutes 45           Timed Code Treatment Minutes:  90    Total Treatment Minutes:  [address] Eric Araiza, PT   [facility], [address] [facility], [facility], [id number]

## 2019-06-01 NOTE — PROGRESS NOTES
Miriam Houston  6/1/2019  [id number]    Chief Complaint: Debility    Subjective:   No overnight events. Reports reflux at night. States she was on BID PPI. ROS: No CP, SOB, dyspnea    Objective:  Patient Vitals for the past 24 hrs:   BP Temp Temp src Pulse Resp SpO2   06/01/19 0730 (!) 148/74 98 °F (36.7 °C) Oral 88 18 --   05/31/19 2219 130/67 97.7 °F (36.5 °C) Oral 82 18 94 %     Gen: No distress, pleasant. Resting in bed  HEENT: Normocephalic, atraumatic. CV: Regular rate and rhythm. No MRG  Resp: No respiratory distress. CTAB  Abd: Soft, nontender nondistended  Ext: No edema. Neuro: Alert, oriented, appropriately interactive. Laboratory data: Available via EMR. Therapy progress:  PT  Position Activity Restriction  Other position/activity restrictions: Alex Paniagua is a 80 y.o. seen in ED for multiple complaints and confusing clinical presentation. Initially awoke with a hard upper back/mid scapular pain described as dull heavy pain with associated SOB. Denies radiation, diaphoresis, palpitations, dizziness or syncope. Had taken a pain pill prior to event with gradual relief of discomfort after 30-45 minutes. Denies cough or fever. Has a hx of GERD and a large hiatal hernia with prior surgery and recurrence. She also states that she was dizzy with R arm weakness resulting in Stroke Alert in the ER. Her head CT was negative for acute CVA. She has chronic lower back pain and RLE weakness. 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 Pt has had occasional episodic right arm weakness and numbness in the past but none since the hospital admission. S/P cardiac cath 5/28.   Objective     Sit to Stand: Stand by assistance  Stand to sit: Stand by assistance  Bed to Chair: Stand by assistance  Device: [employer]  Assistance: Stand by assistance  Distance: 228'  OT  PT Equipment Recommendations  Equipment Needed: Yes  Mobility Devices: Miriam Helio: Rolling  Toilet - Technique: Ambulating  Equipment Used: Standard toilet  Assessment        SLP                Body mass index is 28.86 kg/m². Assessment:  Patient Active Problem List   Diagnosis    Spinal stenosis, lumbar region, without neurogenic claudication    Spinal stenosis of thoracic region    Scoliosis (and kyphoscoliosis), idiopathic    Displacement of thoracic intervertebral disc without myelopathy    Displacement of lumbar intervertebral disc without myelopathy    Thoracic or lumbosacral neuritis or radiculitis, unspecified    Disc displacement, lumbar    Disc displacement, thoracic    Scoliosis    Lumbar stenosis    Thoracic stenosis    Cervicalgia    Hypoxia    Closed fracture of proximal end of left humerus with routine healing    Urinary tract infection without hematuria    T12 compression fracture (HCC)    Thoracic compression fracture, sequela    Mild malnutrition (HCC)    Acute chest pain    Right sided numbness    Moderate malnutrition (HCC)    Chest pain    Debility    Moderate malnutrition ([address]       Plan:   Debility: PT/OT     Unstable angina: LHC without severe disease     CAD: ASA, statin     Lumbar spondylosis: pain control     Lumbar compression fx: recent kypoplasty    GERD: - BID PPI     Bowels: Per protocol  Bladder: Per protocol   Sleep: Trazodone provided prn  Pain: Percocet PRN, zanaflex   DVT PPx: Lovenox     Alexa Dia MD 6/1/2019, 8:32 AM    * This document was created using dictation software. While all precautions were taken to ensure accuracy, errors may have occurred. Please disregard any typographical errors.

## 2019-06-02 PROCEDURE — 94760 N-INVAS EAR/PLS OXIMETRY 1: CPT

## 2019-06-02 PROCEDURE — 6360000002 HC RX W HCPCS: Performed by: PHYSICAL MEDICINE & REHABILITATION

## 2019-06-02 PROCEDURE — 6370000000 HC RX 637 (ALT 250 FOR IP): Performed by: PHYSICAL MEDICINE & REHABILITATION

## 2019-06-02 PROCEDURE — 1280000000 HC REHAB R&B

## 2019-06-02 RX ADMIN — FLUTICASONE PROPIONATE 1 SPRAY: 50 SPRAY, METERED NASAL at 21:03

## 2019-06-02 RX ADMIN — OXYCODONE HYDROCHLORIDE AND ACETAMINOPHEN 1 TABLET: 5; 325 TABLET ORAL at 22:43

## 2019-06-02 RX ADMIN — ENOXAPARIN SODIUM 40 MG: 40 INJECTION SUBCUTANEOUS at 08:17

## 2019-06-02 RX ADMIN — TIZANIDINE 4 MG: 4 TABLET ORAL at 20:59

## 2019-06-02 RX ADMIN — OXYCODONE HYDROCHLORIDE AND ACETAMINOPHEN 1 TABLET: 5; 325 TABLET ORAL at 10:27

## 2019-06-02 RX ADMIN — POLYETHYLENE GLYCOL 3350 17 G: 17 POWDER, FOR SOLUTION ORAL at 08:17

## 2019-06-02 RX ADMIN — OXYCODONE HYDROCHLORIDE AND ACETAMINOPHEN 1 TABLET: 5; 325 TABLET ORAL at 01:31

## 2019-06-02 RX ADMIN — POLYVINYL ALCOHOL 2 DROP: 14 SOLUTION/ DROPS OPHTHALMIC at 08:17

## 2019-06-02 RX ADMIN — GUAIFENESIN 600 MG: 600 TABLET, EXTENDED RELEASE ORAL at 20:59

## 2019-06-02 RX ADMIN — DOCUSATE SODIUM 100 MG: 100 CAPSULE, LIQUID FILLED ORAL at 20:59

## 2019-06-02 RX ADMIN — TRAZODONE HYDROCHLORIDE 50 MG: 50 TABLET ORAL at 01:31

## 2019-06-02 RX ADMIN — DOCUSATE SODIUM 100 MG: 100 CAPSULE, LIQUID FILLED ORAL at 08:17

## 2019-06-02 RX ADMIN — ASPIRIN 325 MG: 325 TABLET, DELAYED RELEASE ORAL at 08:17

## 2019-06-02 RX ADMIN — GUAIFENESIN 600 MG: 600 TABLET, EXTENDED RELEASE ORAL at 08:17

## 2019-06-02 RX ADMIN — OXYCODONE HYDROCHLORIDE AND ACETAMINOPHEN 1 TABLET: 5; 325 TABLET ORAL at 18:24

## 2019-06-02 RX ADMIN — OXYCODONE HYDROCHLORIDE AND ACETAMINOPHEN 1 TABLET: 5; 325 TABLET ORAL at 13:58

## 2019-06-02 RX ADMIN — PANTOPRAZOLE SODIUM 40 MG: 40 TABLET, DELAYED RELEASE ORAL at 05:27

## 2019-06-02 RX ADMIN — OXYCODONE HYDROCHLORIDE AND ACETAMINOPHEN 1 TABLET: 5; 325 TABLET ORAL at 05:27

## 2019-06-02 RX ADMIN — DESMOPRESSIN ACETATE 40 MG: 0.2 TABLET ORAL at 20:59

## 2019-06-02 RX ADMIN — THERA TABS 1 TABLET: TAB at 08:17

## 2019-06-02 RX ADMIN — PANTOPRAZOLE SODIUM 40 MG: 40 TABLET, DELAYED RELEASE ORAL at 16:18

## 2019-06-02 RX ADMIN — TRAZODONE HYDROCHLORIDE 50 MG: 50 TABLET ORAL at 20:59

## 2019-06-02 ASSESSMENT — PAIN SCALES - GENERAL
PAINLEVEL_OUTOF10: 9
PAINLEVEL_OUTOF10: 8
PAINLEVEL_OUTOF10: 0
PAINLEVEL_OUTOF10: 0
PAINLEVEL_OUTOF10: 5
PAINLEVEL_OUTOF10: 10
PAINLEVEL_OUTOF10: 8
PAINLEVEL_OUTOF10: 5
PAINLEVEL_OUTOF10: 0
PAINLEVEL_OUTOF10: 9
PAINLEVEL_OUTOF10: 10
PAINLEVEL_OUTOF10: 0

## 2019-06-02 ASSESSMENT — PAIN DESCRIPTION - PAIN TYPE
TYPE: CHRONIC PAIN
TYPE: CHRONIC PAIN
TYPE: ACUTE PAIN;CHRONIC PAIN

## 2019-06-02 ASSESSMENT — PAIN DESCRIPTION - LOCATION
LOCATION: BACK
LOCATION: BACK

## 2019-06-02 NOTE — FLOWSHEET NOTE
Patient has been up to the bathroom every 30-40 minutes all day today to urinate, patient states this is normal for her, UA sent earlier this week was negative

## 2019-06-02 NOTE — PLAN OF CARE
Problem: Falls - Risk of:  Goal: Will remain free from falls  Description  Will remain free from falls  Outcome: Ongoing  Goal: Absence of physical injury  Description  Absence of physical injury  Outcome: Ongoing     Problem: Pain:  Goal: Pain level will decrease  Description  Pain level will decrease  Outcome: Ongoing  Goal: Control of acute pain  Description  Control of acute pain  Outcome: Ongoing  Goal: Control of chronic pain  Description  Control of chronic pain  Outcome: Ongoing  Note:   Pain assessment done every shift, pain is currently being managed with PO medications, see MAR. Staff assist with repositioning when needed and pillow support is provided for comfort, patient verbalizes satisfaction with current pain interventions.        Problem: Nutrition  Goal: Optimal nutrition therapy  Outcome: Ongoing

## 2019-06-03 LAB
ANION GAP SERPL CALCULATED.3IONS-SCNC: 13 MMOL/L (ref 3–16)
BUN BLDV-MCNC: 19 MG/DL (ref 7–20)
CALCIUM SERPL-MCNC: 9.4 MG/DL (ref 8.3–10.6)
CHLORIDE BLD-SCNC: 96 MMOL/L (ref 99–110)
CO2: 24 MMOL/L (ref 21–32)
CREAT SERPL-MCNC: 0.9 MG/DL (ref 0.6–1.2)
GFR AFRICAN AMERICAN: >60
GFR NON-AFRICAN AMERICAN: 59
GLUCOSE BLD-MCNC: 113 MG/DL (ref 70–99)
HCT VFR BLD CALC: 35.8 % (ref 36–48)
HEMOGLOBIN: 11.9 G/DL (ref 12–16)
MCH RBC QN AUTO: 31.9 PG (ref 26–34)
MCHC RBC AUTO-ENTMCNC: 33.3 G/DL (ref 31–36)
MCV RBC AUTO: 95.7 FL (ref 80–100)
PDW BLD-RTO: 14.5 % (ref 12.4–15.4)
PLATELET # BLD: 196 K/UL (ref 135–450)
PMV BLD AUTO: 6.6 FL (ref 5–10.5)
POTASSIUM SERPL-SCNC: 4.4 MMOL/L (ref 3.5–5.1)
RBC # BLD: 3.74 M/UL (ref 4–5.2)
SODIUM BLD-SCNC: 133 MMOL/L (ref 136–145)
WBC # BLD: 5 K/UL (ref 4–11)

## 2019-06-03 PROCEDURE — 1280000000 HC REHAB R&B

## 2019-06-03 PROCEDURE — 80048 BASIC METABOLIC PNL TOTAL CA: CPT

## 2019-06-03 PROCEDURE — 97530 THERAPEUTIC ACTIVITIES: CPT

## 2019-06-03 PROCEDURE — 97110 THERAPEUTIC EXERCISES: CPT

## 2019-06-03 PROCEDURE — 6370000000 HC RX 637 (ALT 250 FOR IP): Performed by: PHYSICAL MEDICINE & REHABILITATION

## 2019-06-03 PROCEDURE — 85027 COMPLETE CBC AUTOMATED: CPT

## 2019-06-03 PROCEDURE — 36415 COLL VENOUS BLD VENIPUNCTURE: CPT

## 2019-06-03 PROCEDURE — 97116 GAIT TRAINING THERAPY: CPT

## 2019-06-03 PROCEDURE — 94760 N-INVAS EAR/PLS OXIMETRY 1: CPT

## 2019-06-03 PROCEDURE — 6360000002 HC RX W HCPCS: Performed by: PHYSICAL MEDICINE & REHABILITATION

## 2019-06-03 RX ADMIN — THERA TABS 1 TABLET: TAB at 07:09

## 2019-06-03 RX ADMIN — OXYCODONE HYDROCHLORIDE AND ACETAMINOPHEN 1 TABLET: 5; 325 TABLET ORAL at 11:24

## 2019-06-03 RX ADMIN — OXYCODONE HYDROCHLORIDE AND ACETAMINOPHEN 1 TABLET: 5; 325 TABLET ORAL at 07:07

## 2019-06-03 RX ADMIN — GUAIFENESIN 600 MG: 600 TABLET, EXTENDED RELEASE ORAL at 19:20

## 2019-06-03 RX ADMIN — PANTOPRAZOLE SODIUM 40 MG: 40 TABLET, DELAYED RELEASE ORAL at 05:47

## 2019-06-03 RX ADMIN — ONDANSETRON 4 MG: 4 TABLET, ORALLY DISINTEGRATING ORAL at 08:39

## 2019-06-03 RX ADMIN — POLYVINYL ALCOHOL 2 DROP: 14 SOLUTION/ DROPS OPHTHALMIC at 19:25

## 2019-06-03 RX ADMIN — ENOXAPARIN SODIUM 40 MG: 40 INJECTION SUBCUTANEOUS at 07:09

## 2019-06-03 RX ADMIN — DESMOPRESSIN ACETATE 40 MG: 0.2 TABLET ORAL at 19:20

## 2019-06-03 RX ADMIN — GUAIFENESIN 600 MG: 600 TABLET, EXTENDED RELEASE ORAL at 07:08

## 2019-06-03 RX ADMIN — ASPIRIN 325 MG: 325 TABLET, DELAYED RELEASE ORAL at 07:19

## 2019-06-03 RX ADMIN — FLUTICASONE PROPIONATE 1 SPRAY: 50 SPRAY, METERED NASAL at 19:21

## 2019-06-03 RX ADMIN — PANTOPRAZOLE SODIUM 40 MG: 40 TABLET, DELAYED RELEASE ORAL at 15:17

## 2019-06-03 RX ADMIN — DOCUSATE SODIUM 100 MG: 100 CAPSULE, LIQUID FILLED ORAL at 19:20

## 2019-06-03 RX ADMIN — FLUTICASONE PROPIONATE 1 SPRAY: 50 SPRAY, METERED NASAL at 07:09

## 2019-06-03 RX ADMIN — OXYCODONE HYDROCHLORIDE AND ACETAMINOPHEN 1 TABLET: 5; 325 TABLET ORAL at 15:17

## 2019-06-03 RX ADMIN — OXYCODONE HYDROCHLORIDE AND ACETAMINOPHEN 1 TABLET: 5; 325 TABLET ORAL at 19:21

## 2019-06-03 RX ADMIN — POLYETHYLENE GLYCOL 3350 17 G: 17 POWDER, FOR SOLUTION ORAL at 07:08

## 2019-06-03 RX ADMIN — OXYCODONE HYDROCHLORIDE AND ACETAMINOPHEN 1 TABLET: 5; 325 TABLET ORAL at 03:45

## 2019-06-03 ASSESSMENT — PAIN SCALES - GENERAL
PAINLEVEL_OUTOF10: 8
PAINLEVEL_OUTOF10: 6
PAINLEVEL_OUTOF10: 9
PAINLEVEL_OUTOF10: 2
PAINLEVEL_OUTOF10: 2
PAINLEVEL_OUTOF10: 8
PAINLEVEL_OUTOF10: 2
PAINLEVEL_OUTOF10: 0
PAINLEVEL_OUTOF10: 2
PAINLEVEL_OUTOF10: 8
PAINLEVEL_OUTOF10: 6
PAINLEVEL_OUTOF10: 9

## 2019-06-03 ASSESSMENT — PAIN DESCRIPTION - DESCRIPTORS
DESCRIPTORS: SORE
DESCRIPTORS: ACHING
DESCRIPTORS: SORE

## 2019-06-03 ASSESSMENT — PAIN DESCRIPTION - ORIENTATION
ORIENTATION: RIGHT
ORIENTATION: RIGHT

## 2019-06-03 ASSESSMENT — PAIN DESCRIPTION - LOCATION
LOCATION: BACK
LOCATION: BACK;LEG
LOCATION: BACK

## 2019-06-03 ASSESSMENT — PAIN DESCRIPTION - PAIN TYPE
TYPE: CHRONIC PAIN
TYPE: ACUTE PAIN
TYPE: CHRONIC PAIN
TYPE: ACUTE PAIN;CHRONIC PAIN
TYPE: CHRONIC PAIN

## 2019-06-03 ASSESSMENT — PAIN DESCRIPTION - FREQUENCY
FREQUENCY: CONTINUOUS
FREQUENCY: CONTINUOUS

## 2019-06-03 ASSESSMENT — PAIN DESCRIPTION - ONSET: ONSET: ON-GOING

## 2019-06-03 ASSESSMENT — PAIN DESCRIPTION - PROGRESSION
CLINICAL_PROGRESSION: NOT CHANGED
CLINICAL_PROGRESSION: NOT CHANGED

## 2019-06-03 NOTE — CARE COORDINATION
Met with pt and dtr to inform of d/c on 6/5 and that rolling walker was ordered through [facility], both are agreeable. All questions answered and support provided.  Yaneth Del Valle, MSW, LSW

## 2019-06-03 NOTE — PROGRESS NOTES
Lying in bed resting and watching TV, pt is alert and oriented and c/o pain which she rates 8/10. Given 1 Percocet 5mg PO per pt request.  Assessment done, VSS, call light in reach and bed alarm on, will continue to monitor.

## 2019-06-03 NOTE — PROGRESS NOTES
Sergio Stewart  6/3/2019  [id number]    Chief Complaint: Debility    Subjective:   No significant weekend events. Right nare lesion remains painful. Concerned given her history of cancer on her nose in past.. Labs reviewed. ROS: No CP, SOB, dyspnea    Objective:  Patient Vitals for the past 24 hrs:   BP Temp Temp src Pulse Resp SpO2   06/03/19 0713 124/72 97.5 °F (36.4 °C) Oral 73 18 94 %   06/02/19 2044 125/62 97.9 °F (36.6 °C) Oral 78 16 95 %   06/02/19 0939 (!) 156/88 97.3 °F (36.3 °C) Oral 98 18 93 %     Gen: No distress, pleasant. Resting in therapy chair  HEENT: Normocephalic, atraumatic. CV: Regular rate and rhythm. No MRG  Resp: No respiratory distress. CTAB  Abd: Soft, nontender nondistended  Ext: No edema. Neuro: Alert, oriented, appropriately interactive. Laboratory data: Available via EMR. Therapy progress:  PT  Position Activity Restriction  Other position/activity restrictions: Jose Owusu is a 80 y.o. seen in ED for multiple complaints and confusing clinical presentation. Initially awoke with a hard upper back/mid scapular pain described as dull heavy pain with associated SOB. Denies radiation, diaphoresis, palpitations, dizziness or syncope. Had taken a pain pill prior to event with gradual relief of discomfort after 30-45 minutes. Denies cough or fever. Has a hx of GERD and a large hiatal hernia with prior surgery and recurrence. She also states that she was dizzy with R arm weakness resulting in Stroke Alert in the ER. Her head CT was negative for acute CVA. She has chronic lower back pain and RLE weakness. 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 Pt has had occasional episodic right arm weakness and numbness in the past but none since the hospital admission. S/P cardiac cath 5/28.   Objective     Sit to Stand: Stand by assistance  Stand to sit: Stand by assistance  Bed to Chair: Stand by assistance  Device: Rolling Walker  Assistance: Contact guard assistance  Distance: 228 ft   OT  PT Equipment Recommendations  Equipment Needed: Yes  Mobility Devices: Renan Sharp: Rolling  Toilet - Technique: Ambulating  Equipment Used: Standard toilet  Toilet Transfers Comments: cues for sequencing  Assessment        SLP                Body mass index is 28.86 kg/m². Assessment:  Patient Active Problem List   Diagnosis    Spinal stenosis, lumbar region, without neurogenic claudication    Spinal stenosis of thoracic region    Scoliosis (and kyphoscoliosis), idiopathic    Displacement of thoracic intervertebral disc without myelopathy    Displacement of lumbar intervertebral disc without myelopathy    Thoracic or lumbosacral neuritis or radiculitis, unspecified    Disc displacement, lumbar    Disc displacement, thoracic    Scoliosis    Lumbar stenosis    Thoracic stenosis    Cervicalgia    Hypoxia    Closed fracture of proximal end of left humerus with routine healing    Urinary tract infection without hematuria    T12 compression fracture (HCC)    Thoracic compression fracture, sequela    Mild malnutrition (HCC)    Acute chest pain    Right sided numbness    Moderate malnutrition (HCC)    Chest pain    Debility    Moderate malnutrition ([address]       Plan:   Debility: PT/OT     Unstable angina: LHC without severe disease     CAD: ASA, statin     Lumbar spondylosis: pain control     Lumbar compression fx: recent kypoplasty    GERD: BID PPI    Right nare lesion: - ENT referral.     Bowels: Per protocol  Bladder: Per protocol   Sleep: Trazodone provided prn  Pain: Percocet PRN, zanaflex   DVT PPx: Lovenox     Steven Feliz MD 6/3/2019, 8:42 AM    * This document was created using dictation software. While all precautions were taken to ensure accuracy, errors may have occurred. Please disregard any typographical errors.

## 2019-06-03 NOTE — PROGRESS NOTES
Occupational Therapy  Facility/Department: [facility] ACUTE REHAB UNIT  Daily Treatment Note  NAME: Aime Spring  : 10/16/1932  MRN: [id number]    Date of Service: 6/3/2019    Discharge Recommendations:  Home with Home health OT, S Level 3, Home with nursing aide  OT Equipment Recommendations  Equipment Needed: No    Assessment   Performance deficits / Impairments: Decreased functional mobility ; Decreased ADL status; Decreased ROM; Decreased endurance;Decreased strength  Assessment: Pt is not at baseline level of function d/t above deficits, impacting daily occupational performance. Pt mainly limited by pain, impaired balance, and decreased endurance. Recommend current POC. Treatment Diagnosis: Decreased ADL status, functional mobility, ROMJ, strength and endurance associated with chest pain, s/p cardac cath  Prognosis: Good  History: Pt lives in AL apt, independent ADLS, gets assist with IADLs, independent ambulation. PMH: CAD, DM, scoliosis, 3 bladder sx, cystocele, rectocele  Assistance / Modification: SBA functional mobility/toilet transfer  Patient Education: Safe transfers  Barriers to Learning: hearing  REQUIRES OT FOLLOW UP: Yes  Activity Tolerance  Activity Tolerance: Patient Tolerated treatment well  Safety Devices  Safety Devices in place: Yes  Type of devices: All fall risk precautions in place;Call light within reach; Chair alarm in place;Gait belt;Left in chair         Patient Diagnosis(es): There were no encounter diagnoses. has a past medical history of Arthritis, CAD (coronary artery disease), Cancer ([address] Cystocele, Diabetes mellitus ([address] Hepatitis A, History of blood transfusion, Hyperlipidemia, PVC (premature ventricular contraction), Rectocele, Reflux, and Scoliosis. has a past surgical history that includes Appendectomy; joint replacement (Left); hernia repair (6 YRS AGO); Cholecystectomy; shoulder surgery (Right, 12); Bunionectomy (12); Breast surgery;  Upper

## 2019-06-03 NOTE — PLAN OF CARE
7pm 6/2/19 - 11am 6/3/19    Reviewed pain control, took prn muscle relaxer in addition to perc. Discussed interventions to improve quality of sleep hindered by toileting. Mild nausea in am relieved by zofran    Problem: Falls - Risk of:  Goal: Will remain free from falls  Description  Will remain free from falls  Outcome: Ongoing  Goal: Absence of physical injury  Description  Absence of physical injury  Outcome: Ongoing     Problem: Pain:  Description  Pain management should include both nonpharmacologic and pharmacologic interventions.   Goal: Pain level will decrease  Description  Pain level will decrease  Outcome: Ongoing  Goal: Control of acute pain  Description  Control of acute pain  Outcome: Ongoing  Goal: Control of chronic pain  Description  Control of chronic pain  Outcome: Ongoing     Problem: Nutrition  Goal: Optimal nutrition therapy  Outcome: Ongoing     Problem: SKIN INTEGRITY  Goal: LTG - patient will maintain/improve skin integrity through proper skin care techniques  Outcome: Ongoing  Goal: STG - Patient demonstrates preventative skin care measures  Outcome: Ongoing     Problem: IP BOWEL ELIMINATION  Goal: LTG - patient will have regular and routine bowel evacuation  Outcome: Ongoing  Goal: STG - patient will be continent of stool  Outcome: Ongoing     Problem: IP BLADDER/VOIDING  Goal: LTG - patient will complete bladder elimination  Outcome: Ongoing  Goal: LTG - patient will achieve acceptable level of continence  Outcome: Ongoing

## 2019-06-03 NOTE — PROGRESS NOTES
Physical Therapy  Facility/Department: [facility] ACUTE REHAB UNIT  Daily Treatment Note  NAME: Aitlio Cabrera  : 10/16/1932  MRN: [id number]    Date of Service: 6/3/2019    Discharge Recommendations:  S Level 3, Home with Home health PT, Home with assist PRN   PT Equipment Recommendations  Equipment Needed: Yes  Mobility Devices: Al Coker: Rolling  Other: Pt has W/C, 4WW, quad cane     Assessment   Body structures, Functions, Activity limitations: Decreased functional mobility ; Decreased strength;Decreased endurance;Decreased sensation;Decreased ROM; Decreased balance  Assessment: Pt with improved balance with use of RW due to reaching for objects. Pt with decreased endurance and increased SOB this date. Treatment Diagnosis: Impaired strength, decreased endurance  Prognosis: Good  Patient Education: role of PT, expectations of ARU, use of RW vs QC  REQUIRES PT FOLLOW UP: Yes  Activity Tolerance  Activity Tolerance: Patient Tolerated treatment well  Activity Tolerance: multiple rest breaks due to SOB     Patient Diagnosis(es): There were no encounter diagnoses. has a past medical history of Arthritis, CAD (coronary artery disease), Cancer ([address] Cystocele, Diabetes mellitus ([address] Hepatitis A, History of blood transfusion, Hyperlipidemia, PVC (premature ventricular contraction), Rectocele, Reflux, and Scoliosis. has a past surgical history that includes Appendectomy; joint replacement (Left); hernia repair (6 YRS AGO); Cholecystectomy; shoulder surgery (Right, 12); Bunionectomy (12); Breast surgery; Upper gastrointestinal endoscopy (12); Hysterectomy; bladder suspension; Dilation and curettage of uterus; other surgical history (Left, 14); Foot surgery; Cardiac catheterization; Colonoscopy (); Cystocopy (2017); other surgical history (Right, 2018);  Cataract removal with implant (Left, 2018); pr remv cataract extracap,insert lens (Left, 2018); and Lumbar spine surgery (Right, 5/15/2019). Restrictions  Restrictions/Precautions  Restrictions/Precautions: Fall Risk  Position Activity Restriction  Other position/activity restrictions: Sabine Roland is a 80 y.o. seen in ED for multiple complaints and confusing clinical presentation. Initially awoke with a hard upper back/mid scapular pain described as dull heavy pain with associated SOB. Denies radiation, diaphoresis, palpitations, dizziness or syncope. Had taken a pain pill prior to event with gradual relief of discomfort after 30-45 minutes. Denies cough or fever. Has a hx of GERD and a large hiatal hernia with prior surgery and recurrence. She also states that she was dizzy with R arm weakness resulting in Stroke Alert in the ER. Her head CT was negative for acute CVA. She has chronic lower back pain and RLE weakness. 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 Pt has had occasional episodic right arm weakness and numbness in the past but none since the hospital admission. S/P cardiac cath 5/28. Subjective   General  Chart Reviewed: Yes  Additional Pertinent Hx: CAD, cancer, MD  Response To Previous Treatment: Patient reporting fatigue but able to participate. Family / Caregiver Present: No  Subjective  Subjective: Pt supine in bed on arrival and agreeable to therapy session. Pt agreeable to PT treatment.   Pain Screening  Patient Currently in Pain: No(Pt states she had pain medication already)  Vital Signs  Patient Currently in Pain: No(Pt states she had pain medication already)       Orientation     Cognition      Objective   Bed mobility  Rolling to Right: Supervision  Sit to Supine: Supervision(used bed rail)  Scooting: Supervision  Transfers  Sit to Stand: Supervision  Stand to sit: Supervision  Ambulation  Ambulation?: Yes  More Ambulation?: Yes  Ambulation 1  Surface: level tile  Device: Rolling Walker  Assistance: Stand by assistance  Quality of Gait: Flexed posture, decreased B step length, decreased B step height, slow carlos   Gait Deviations: Slow Carlos;Decreased step length;Decreased head and trunk rotation  Distance: 228 ft   Ambulation 2  Surface - 2: level tile  Device 2: Small Base Quad Care  Assistance 2: Contact guard assistance  Quality of Gait 2: same as above with even more decreased step length from trial with RW  Distance: 55'  Comments: Pt reaching for objects with LUE  Stairs/Curb  Stairs?: Yes  Stairs  # Steps : 14  Rails: Bilateral  Device: No Device  Assistance: Contact guard assistance  Comment: ascended leading with RLE and descended leading with RLE without cues and no complaints of feeling knee buckle     Balance  Posture: Poor  Sitting - Static: Good  Sitting - Dynamic: Good  Standing - Static: Fair;+  Standing - Dynamic: Fair            Comment: 1st session:  Performed ambulation trial 1 and 2 and stair negotiation as documented above. Performed seated therapeutic exercises BLE with 1.5# or green band:  LAQ X 10, marching X 10, hip abduction X 10, hamstring curls X 10. Performed seated stepper X 5 minutes. Performed standing ball OH X 10. Performed target stepping X 10 B ipsilateral and crossing midline with SBQC on R and HHA on L. Performed chair push/pull X 10 in stagger stance B and normal stance. Pt returned to room and remained in bedside chair wiht alarm on and all needs in reach. Pt with SOB throughout session with HR in 80s-90s and O2 89-90%. RN notified. 2nd session:  Pt supine in bed on arrival.  Pt requesting to hold therapy due to waiting for her daughter to arrive. Pt agreeable to therapy in the room while waiting for daughter. Performed STS X 5 reps with ball in BUE with increased WOB with each rep with vc for pursed lip breathing. Pt able to recover breathing quickly.   Performed cone step overs X 10 B with RW for balance assist and lateral step overs X 10 B with RW for balance assist.  Performed RLE nerve glides X 10 with increase in RLE tingling. Performed manual stretching RLE. Pt remained in bedside chair with alarm on and all needs in reach. G-Code     OutComes Score                            Timed Up and Go: 36                        AM-PAC Score             Goals  Short term goals  Time Frame for Short term goals: 7-10 days  Short term goal 1: Perform all bed mobility mod I  Short term goal 2: Perform all transfers mod I  Short term goal 3: Ambulate 150' with LRAD mod I  Short term goal 4: Negotiate curb step with LRAD mod I  Short term goal 5: Decrease TUG score to 30 seconds to decrease risk of falling  Long term goals  Time Frame for Long term goals : STG=LTG  Patient Goals   Patient goals : to return to level of function prior to admission, return to use of quad cane    Plan    Plan  Times per week: 90 minutes a day 5 days a week  Times per day: Daily  Current Treatment Recommendations: Strengthening, Functional Mobility Training, Transfer Training, Gait Training, Patient/Caregiver Education & Training, Endurance Training, Balance Training, Equipment Evaluation, Education, & procurement, Manual Therapy - Soft Tissue Mobilization, Neuromuscular Re-education, Stair training, Safety Education & Training  Safety Devices  Type of devices:  All fall risk precautions in place, Gait belt, Patient at risk for falls, Nurse notified, Call light within reach, Chair alarm in place, Left in chair  Restraints  Initially in place: No     Therapy Time   Individual Concurrent Group Co-treatment   Time In 0730         Time Out 0830         Minutes 60              Second Session Therapy Time:   Individual Concurrent Group Co-treatment   Time In 1330         Time Out 1400         Minutes 30           Timed Code Treatment Minutes:  60+30    Total Treatment Minutes:  [address], [place] [id number]

## 2019-06-03 NOTE — PATIENT CARE CONFERENCE
[facility]  Inpatient Rehabilitation  Weekly Team Conference Note    Patient Name: Xin Major        MRN: [id number]    : 10/16/1932  (80 y.o.)  Gender: female   Referring Practitioner: Dr Lyndsay Michelle  Diagnosis: debility    The team conference for this patient was held on 19 at 11:00am by:  Rosario Alexander MD    CASE MANAGEMENT:  Assessment:  Patient lives at [address] [place] [address] and is active with [facility] for therapy and nursing. Patient has a wheel chair, walker, and cane at home already. Patient has advance directives in place already. PSYCHOLOGY:  Assessment:     PHYSICAL THERAPY:  Bed Mobility:   Scooting: Supervision    Transfers:  Sit to Stand: Supervision  Stand to sit: Supervision  Bed to Chair: Stand by assistance    Ambulation 1  Surface: level tile  Device: Rolling Walker  Assistance: Stand by assistance  Quality of Gait: Flexed posture, decreased B step length, decreased B step height, slow candace   Gait Deviations: Slow Candace, Decreased step length, Decreased head and trunk rotation  Distance: 228 ft     Stairs  # Steps : 14  Stairs Height: 4\"  Rails: Bilateral  Curbs: 4\"  Device: No Device  Assistance: Contact guard assistance  Comment: ascended leading with RLE and descended leading with RLE without cues and no complaints of feeling knee buckle    FIMS:  Bed, Chair, Wheel Chair: 5 - Requires setup/supervision/cues  Walk: 5 - Supervision Requires standby supervision or cuing to walk at least 150 feet  Distance Walked: 228'  Stairs: 4- Minimal Contact Assistance Perfoms 75% or more of the effort to go up and down one flight of stairs    PT Equipment Recommendations  Equipment Needed: Yes  Mobility Devices: Stephon Kumar: Rolling  Other: Pt has W/C, 4WW, quad cane     Assessment: Pt with improved balance with use of RW due to reaching for objects. Pt with decreased endurance and increased SOB this date.       SPEECH THERAPY:  Diet Level:      OCCUPATIONAL THERAPY:  FIMS:  Eatin - Feeds self with adaptive equipment/dentures and/or feeds self with modified diet and/or performs own tube feeding  Groomin - Requires setup/cues to do all tasks  Bathing: 3 - Able to bathe 5-7 areas  Dressing-Upper: 5 - Requires setup/supervision/cues and/or requires assist with presthesis/brace only  Dressing-Lower: 3 - Requires assist with 2-3 parts of dressing  Toiletin - Requires setup/supervision/cues  Toilet Transfer: 5 - Requires setup/supervision/cues  Shower Transfer: 4 - Minimal contact assistance, pt. expends 75% or more effort         Assessment: Pt is not at baseline level of function d/t above deficits, impacting daily occupational performance. Pt mainly limited by pain, impaired balance, and decreased endurance. Recommend current POC. NUTRITION:  Weight: 147 lb 12.6 oz (67 kg) / Body mass index is 28.86 kg/m². Diet Order: Regular    Supplements:Ensure Enlive BID     PO intake varies % of meals. Pt likes Ensure & has been drinking well. Remains at risk for further nutrition compromise d/t decreased appetite & unintentional wt loss per hx. Please see nutrition note for details. NURSING:  FIMS:  Bladder Level of Assistance: 6- Requires bedpan, urinal, diaper, catheter but patient obtains and empties own device. Or requires bladder management medication  Bladder Frequency of Accidents: (0 accident)  Bowel: 6 - Uses toilet independently with device or oral medication(s)  Bowel Level of Assistance: 6- Requires device like bedpan, diaper, bedside commode, but patient obtains and empties own device including colostomy.   Or requires bowel management medication including stool softeners, laxatives, inserts own suppository  Bowel Frequency of Accidents: (0 accident)    [place] Fall Risk Score: 67  Wounds/Incisions/Ulcers: Redness to sacrum & spine, kyphotic appearing back, decreased sensation down R leg  Medication Review: meds reviewed daily w/ patient  Pain: Takes prn Perc q4h for generalized & back pain. Takes prnmuscle relaxer on occasion  Consultations/Labs/X-rays: Consult: Dr. Leif Seo. Bi-weekly CBC & BMP    Risk for Readmission: 21%  Critical Items: If High Risk, consider the following recommendations:Home Health Nursing        Patient/Family Education provided by team:        Discharge Plan   Estimated Length of Stay:1 days  Destination: home health  Pass:No  Services at Discharge: Other [facility] PT S3, [facility] OT S3  Equipment at Discharge: RW  Factors facilitating achievement of predicted outcomes: cooperative  Barriers to the achievement of predicted outcomes:pain  Patient Patricia Carlos return to level of function prior to admission, return to use of quad cane, \"to get back to how I was before I came in\"    Rehab Team Members in attendance for Team Conference:  THI Felix, [address] [facility] [place], RD, LD    Josh Cesar, OTR/L    Calin Adler, PT, DPT    Chioma Bender, MSN, RN, [address] [place] [address],       I approve the established interdisciplinary plan of care as documented within the medical record of Aime Spring.     Maria Alejandra Avila MD  Electronically signed by Maria Alejandra Avila MD on 6/4/2019 at 11:18 AM

## 2019-06-04 PROCEDURE — 1280000000 HC REHAB R&B

## 2019-06-04 PROCEDURE — 6370000000 HC RX 637 (ALT 250 FOR IP): Performed by: PHYSICAL MEDICINE & REHABILITATION

## 2019-06-04 PROCEDURE — 97530 THERAPEUTIC ACTIVITIES: CPT

## 2019-06-04 PROCEDURE — 97116 GAIT TRAINING THERAPY: CPT

## 2019-06-04 PROCEDURE — 97535 SELF CARE MNGMENT TRAINING: CPT

## 2019-06-04 PROCEDURE — 97110 THERAPEUTIC EXERCISES: CPT

## 2019-06-04 PROCEDURE — 6360000002 HC RX W HCPCS: Performed by: PHYSICAL MEDICINE & REHABILITATION

## 2019-06-04 RX ORDER — ATORVASTATIN CALCIUM 40 MG/1
40 TABLET, FILM COATED ORAL NIGHTLY
Qty: 30 TABLET | Refills: 3 | Status: SHIPPED | OUTPATIENT
Start: 2019-06-04

## 2019-06-04 RX ORDER — FUROSEMIDE 20 MG/1
20 TABLET ORAL DAILY
Qty: 30 TABLET | Refills: 0 | Status: ON HOLD | OUTPATIENT
Start: 2019-06-04 | End: 2022-01-17 | Stop reason: HOSPADM

## 2019-06-04 RX ORDER — POLYETHYLENE GLYCOL 3350 17 G/17G
17 POWDER, FOR SOLUTION ORAL DAILY
Qty: 527 G | Refills: 1 | Status: SHIPPED | OUTPATIENT
Start: 2019-06-05 | End: 2019-07-05

## 2019-06-04 RX ORDER — OXYCODONE HYDROCHLORIDE AND ACETAMINOPHEN 5; 325 MG/1; MG/1
1 TABLET ORAL EVERY 4 HOURS PRN
Qty: 35 TABLET | Refills: 0 | Status: ON HOLD | OUTPATIENT
Start: 2019-06-04 | End: 2022-01-17

## 2019-06-04 RX ADMIN — GUAIFENESIN 600 MG: 600 TABLET, EXTENDED RELEASE ORAL at 08:12

## 2019-06-04 RX ADMIN — OXYCODONE HYDROCHLORIDE AND ACETAMINOPHEN 1 TABLET: 5; 325 TABLET ORAL at 08:18

## 2019-06-04 RX ADMIN — PANTOPRAZOLE SODIUM 40 MG: 40 TABLET, DELAYED RELEASE ORAL at 16:12

## 2019-06-04 RX ADMIN — OXYCODONE HYDROCHLORIDE AND ACETAMINOPHEN 1 TABLET: 5; 325 TABLET ORAL at 12:11

## 2019-06-04 RX ADMIN — DESMOPRESSIN ACETATE 40 MG: 0.2 TABLET ORAL at 19:34

## 2019-06-04 RX ADMIN — GUAIFENESIN 600 MG: 600 TABLET, EXTENDED RELEASE ORAL at 19:34

## 2019-06-04 RX ADMIN — DOCUSATE SODIUM 100 MG: 100 CAPSULE, LIQUID FILLED ORAL at 08:12

## 2019-06-04 RX ADMIN — OXYCODONE HYDROCHLORIDE AND ACETAMINOPHEN 1 TABLET: 5; 325 TABLET ORAL at 16:12

## 2019-06-04 RX ADMIN — DOCUSATE SODIUM 100 MG: 100 CAPSULE, LIQUID FILLED ORAL at 19:34

## 2019-06-04 RX ADMIN — ENOXAPARIN SODIUM 40 MG: 40 INJECTION SUBCUTANEOUS at 08:12

## 2019-06-04 RX ADMIN — PANTOPRAZOLE SODIUM 40 MG: 40 TABLET, DELAYED RELEASE ORAL at 04:36

## 2019-06-04 RX ADMIN — ASPIRIN 325 MG: 325 TABLET, DELAYED RELEASE ORAL at 08:12

## 2019-06-04 RX ADMIN — OXYCODONE HYDROCHLORIDE AND ACETAMINOPHEN 1 TABLET: 5; 325 TABLET ORAL at 21:04

## 2019-06-04 RX ADMIN — POLYETHYLENE GLYCOL 3350 17 G: 17 POWDER, FOR SOLUTION ORAL at 08:12

## 2019-06-04 RX ADMIN — OXYCODONE HYDROCHLORIDE AND ACETAMINOPHEN 1 TABLET: 5; 325 TABLET ORAL at 04:36

## 2019-06-04 RX ADMIN — THERA TABS 1 TABLET: TAB at 08:12

## 2019-06-04 RX ADMIN — OXYCODONE HYDROCHLORIDE AND ACETAMINOPHEN 1 TABLET: 5; 325 TABLET ORAL at 00:38

## 2019-06-04 ASSESSMENT — PAIN DESCRIPTION - LOCATION
LOCATION: BACK
LOCATION: BACK;LEG
LOCATION: BACK;LEG
LOCATION: BACK

## 2019-06-04 ASSESSMENT — PAIN SCALES - GENERAL
PAINLEVEL_OUTOF10: 7
PAINLEVEL_OUTOF10: 3
PAINLEVEL_OUTOF10: 10
PAINLEVEL_OUTOF10: 3
PAINLEVEL_OUTOF10: 0
PAINLEVEL_OUTOF10: 0
PAINLEVEL_OUTOF10: 2
PAINLEVEL_OUTOF10: 6
PAINLEVEL_OUTOF10: 7
PAINLEVEL_OUTOF10: 8
PAINLEVEL_OUTOF10: 7
PAINLEVEL_OUTOF10: 10
PAINLEVEL_OUTOF10: 10

## 2019-06-04 ASSESSMENT — PAIN DESCRIPTION - PAIN TYPE
TYPE: ACUTE PAIN

## 2019-06-04 ASSESSMENT — PAIN DESCRIPTION - PROGRESSION: CLINICAL_PROGRESSION: NOT CHANGED

## 2019-06-04 ASSESSMENT — PAIN DESCRIPTION - FREQUENCY
FREQUENCY: CONTINUOUS
FREQUENCY: CONTINUOUS

## 2019-06-04 ASSESSMENT — PAIN DESCRIPTION - DESCRIPTORS
DESCRIPTORS: SORE
DESCRIPTORS: SORE

## 2019-06-04 ASSESSMENT — PAIN DESCRIPTION - ORIENTATION
ORIENTATION: RIGHT
ORIENTATION: RIGHT

## 2019-06-04 ASSESSMENT — PAIN DESCRIPTION - ONSET
ONSET: ON-GOING
ONSET: ON-GOING

## 2019-06-04 NOTE — PROGRESS NOTES
Occupational Therapy  Facility/Department: [facility] ACUTE REHAB UNIT  Daily Treatment Note and Discharge  NAME: Racquel Alvarado  : 10/16/1932  MRN: [id number]    Date of Service: 2019    Discharge Recommendations:  Home with Home health OT, S Level 3, Home with nursing aide(home health aide for shoes and socks)       Assessment                Patient Diagnosis(es): The encounter diagnosis was Spinal stenosis of lumbar region, unspecified whether neurogenic claudication present. has a past medical history of Arthritis, CAD (coronary artery disease), Cancer ([facility] [address] Cystocele, Diabetes mellitus ([facility] [address] [facility] Hepatitis A, History of blood transfusion, Hyperlipidemia, PVC (premature ventricular contraction), Rectocele, Reflux, and Scoliosis. has a past surgical history that includes Appendectomy; joint replacement (Left); hernia repair (6 YRS AGO); Cholecystectomy; shoulder surgery (Right, 12); Bunionectomy (12); Breast surgery; Upper gastrointestinal endoscopy (12); Hysterectomy; bladder suspension; Dilation and curettage of uterus; other surgical history (Left, 14); Foot surgery; Cardiac catheterization; Colonoscopy (); Cystocopy (2017); other surgical history (Right, 2018); Cataract removal with implant (Left, 2018); pr remv cataract extracap,insert lens (Left, 2018); and Lumbar spine surgery (Right, 5/15/2019). Restrictions  Restrictions/Precautions  Restrictions/Precautions: Fall Risk(high fall risk)  Position Activity Restriction  Other position/activity restrictions: Yeni Mccoy is a 80 y.o. seen in ED for multiple complaints and confusing clinical presentation. Initially awoke with a hard upper back/mid scapular pain described as dull heavy pain with associated SOB. Denies radiation, diaphoresis, palpitations, dizziness or syncope. Had taken a pain pill prior to event with gradual relief of discomfort after 30-45 minutes. Denies cough or fever.   Has a

## 2019-06-04 NOTE — CARE COORDINATION
Met with pt to review care plan and discuss discharge plans. Provided pt with list of skilled home care agencies if she wants to switch from [facility] and non skilled home care agencies if she wants to hire additional help. Pt reports she wants to stick with [facility]  for now. Provided pt with IMM Letter, pt signed and dated- copy provided to pt and original on chart with stickers.  Tootie Ortiz, MSW, LSW

## 2019-06-04 NOTE — PROGRESS NOTES
Physical Therapy  Facility/Department: [facility] ACUTE REHAB UNIT  Daily Treatment Note/Discharge Summary  NAME: Kristal Zimmerman  : 10/16/1932  MRN: [id number]    Date of Service: 2019    Discharge Recommendations:  S Level 3, Home with Home health PT, Home with assist PRN   PT Equipment Recommendations  Equipment Needed: Yes  Mobility Devices: Waynetta [place]: Rolling    Assessment   Body structures, Functions, Activity limitations: Decreased functional mobility ; Decreased strength;Decreased endurance;Decreased sensation;Decreased ROM; Decreased balance  Assessment: Pt remains limited by pain and numbness, but continues to demonstrate improvement in endurance with RW. Pt will continue to benefit from skilled PT services to continue to address the above stated deficits and return to PLOF and improve independence at home. Treatment Diagnosis: Impaired strength, decreased endurance  Prognosis: Good  Patient Education: role of PT, expectations of ARU, use of RW vs QC  REQUIRES PT FOLLOW UP: Yes  Activity Tolerance  Activity Tolerance: Patient Tolerated treatment well  Activity Tolerance: multiple rest breaks due to SOB     Patient Diagnosis(es): The encounter diagnosis was Spinal stenosis of lumbar region, unspecified whether neurogenic claudication present. has a past medical history of Arthritis, CAD (coronary artery disease), Cancer ([address] Cystocele, Diabetes mellitus ([facility] [address] Hepatitis A, History of blood transfusion, Hyperlipidemia, PVC (premature ventricular contraction), Rectocele, Reflux, and Scoliosis. has a past surgical history that includes Appendectomy; joint replacement (Left); hernia repair (6 YRS AGO); Cholecystectomy; shoulder surgery (Right, 12); Bunionectomy (12); Breast surgery; Upper gastrointestinal endoscopy (12); Hysterectomy; bladder suspension; Dilation and curettage of uterus; other surgical history (Left, 14);  Foot surgery; Cardiac catheterization; Colonoscopy (2008); Cystocopy (03/29/2017); other surgical history (Right, 06/28/2018); Cataract removal with implant (Left, 09/13/2018); pr remv cataract extracap,insert lens (Left, 9/13/2018); and Lumbar spine surgery (Right, 5/15/2019). Restrictions  Restrictions/Precautions  Restrictions/Precautions: Fall Risk(high fall risk)  Position Activity Restriction  Other position/activity restrictions: Huber Jung is a 80 y.o. seen in ED for multiple complaints and confusing clinical presentation. Initially awoke with a hard upper back/mid scapular pain described as dull heavy pain with associated SOB. Denies radiation, diaphoresis, palpitations, dizziness or syncope. Had taken a pain pill prior to event with gradual relief of discomfort after 30-45 minutes. Denies cough or fever. Has a hx of GERD and a large hiatal hernia with prior surgery and recurrence. She also states that she was dizzy with R arm weakness resulting in Stroke Alert in the ER. Her head CT was negative for acute CVA. She has chronic lower back pain and RLE weakness. 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 Pt has had occasional episodic right arm weakness and numbness in the past but none since the hospital admission. S/P cardiac cath 5/28. Subjective   General  Chart Reviewed: Yes  Additional Pertinent Hx: CAD, cancer, MD  Response To Previous Treatment: Patient reporting fatigue but able to participate. Family / Caregiver Present: No  Subjective  Subjective: Pt sitting in recliner on arrival and agreeable to therapy session. Pt states her R leg is really numb today.   Pain Screening  Patient Currently in Pain: Yes  Pain Assessment  Pain Assessment: 0-10  Pain Level: 2  Vital Signs  Patient Currently in Pain: Yes       Orientation     Cognition      Objective   Bed mobility  Sit to Supine: Modified independent(used bed rail, used BUE to lift LEs into bed)  Transfers  Sit to Stand: Supervision  Stand to sit: Supervision  Comment: Performed SPT with CGA and use of SBQC  Ambulation  Ambulation?: Yes  Ambulation 1  Surface: level tile  Device: Small Hector [place]  Assistance: Contact guard assistance  Quality of Gait: Flexed posture, decreased B step length, decreased B step height, very slow carlos   Distance: 170'  Comments: intermittently reaching for objects with LUE for balance assist     Balance  Posture: Poor(kyphotic)  Sitting - Static: Good  Sitting - Dynamic: Good  Standing - Static: Fair;+  Standing - Dynamic: Fair            Comment: 1st session:  Performed ambulation trial 1 as documented above. Performed seated therapeutic exercises BLE with 1.5#:  LAQ X 10, marching X 10, heel raises X 10. Performed standing marching with SBQC and CGA X 10 and B hip abduction X 10. Pt required frequent seated rest breaks due to increased WOB. O2 sats stable. Performed standing alternating toe taps BLE with BUE assist X 20. Performed squats X 5 with BUE assist.  Performed standing heel raises X 10 with BUE assist.  Performed seated active nerve glides BLE without increases in symptons. Pt returned to room and requested to return to bed due to back pain. Pt returned to supine in bed as documented above with alarm on and all needs in reach. 2nd session:  Pt supine in bed on arrival.  Performed supine to sit transition mod I.  Pt ambulated 240' with RW mod I.  Negotiated 4, 4\" curb step with RW mod I.  Pt ambulated 20' on carpet with RW mod I. Performed object recovery from floor with reacher and RW mod I. Performed seated stepper X 5 minutes with multiple rest breaks. Performed seated rows, BUE horizontal abduction, and BUE D1 PNF with yellow band X 10 each. Pt returned to room and returned to supine in bed mod I without bed rail or HOB elevated. Pt remained supine in bed with alarm on and all needs in reach. Pt states she is not happy with aid staff at [facility] agency. SW notified. G-Code     OutComes Score                            Timed Up and Go: 36 (05/30/19 1154)                        AM-PAC Score             Goals  Short term goals  Time Frame for Short term goals: 7-10 days  Short term goal 1: Perform all bed mobility mod I - met  Short term goal 2: Perform all transfers mod I - met  Short term goal 3: Ambulate 150' with LRAD mod I - met with RW  Short term goal 4: Negotiate curb step with LRAD mod I - met with RW  Short term goal 5: Decrease TUG score to 30 seconds to decrease risk of falling - not met  Long term goals  Time Frame for Long term goals : STG=LTG  Patient Goals   Patient goals : to return to level of function prior to admission, return to use of quad cane    Plan    Plan  Times per week: 90 minutes a day 5 days a week  Times per day: Daily  Current Treatment Recommendations: Strengthening, Functional Mobility Training, Transfer Training, Gait Training, Patient/Caregiver Education & Training, Endurance Training, Balance Training, Equipment Evaluation, Education, & procurement, Manual Therapy - Soft Tissue Mobilization, Neuromuscular Re-education, Stair training, Safety Education & Training  Safety Devices  Type of devices:  All fall risk precautions in place, Gait belt, Patient at risk for falls, Nurse notified, Call light within reach, Left in bed, Bed alarm in place  Restraints  Initially in place: No     Therapy Time   Individual Concurrent Group Co-treatment   Time In 0830         Time Out 0915         Minutes 39              Second Session Therapy Time:   Individual Concurrent Group Co-treatment   Time In 1345         Time Out 1330         Minutes 45           Timed Code Treatment Minutes:  86+31    Total Treatment Minutes:  039[address] [facility], [address] [place] [address] [id number]

## 2019-06-04 NOTE — PLAN OF CARE
11pm-7am    Took Perc when offered for R leg, back & generalized pain. Declined trazadone, muscle relaxer or nausea. Slept adequately, toileted often    Problem: Falls - Risk of:  Goal: Will remain free from falls  Description  Will remain free from falls  Outcome: Ongoing  Goal: Absence of physical injury  Description  Absence of physical injury  Outcome: Ongoing     Problem: Pain:  Description  Pain management should include both nonpharmacologic and pharmacologic interventions.   Goal: Pain level will decrease  Description  Pain level will decrease  Outcome: Ongoing  Goal: Control of acute pain  Description  Control of acute pain  Outcome: Ongoing  Goal: Control of chronic pain  Description  Control of chronic pain  Outcome: Ongoing     Problem: Nutrition  Goal: Optimal nutrition therapy  Outcome: Ongoing     Problem: SKIN INTEGRITY  Goal: LTG - patient will maintain/improve skin integrity through proper skin care techniques  Outcome: Ongoing  Goal: STG - Patient demonstrates preventative skin care measures  Outcome: Ongoing     Problem: IP BOWEL ELIMINATION  Goal: LTG - patient will have regular and routine bowel evacuation  Outcome: Ongoing  Goal: STG - patient will be continent of stool  Outcome: Ongoing     Problem: IP BLADDER/VOIDING  Goal: LTG - patient will complete bladder elimination  Outcome: Ongoing  Goal: LTG - patient will achieve acceptable level of continence  Outcome: Ongoing

## 2019-06-04 NOTE — DISCHARGE INSTR - COC
Continuity of Care Form    Patient Name: Roro Traore   :  10/16/1932  MRN:  [id number]    Admit date:  2019  Discharge date:  19    Code Status Order: Full Code   Advance Directives:   Advance Care Flowsheet Documentation     Date/Time Healthcare Directive Type of Healthcare Directive Copy in [address] Agent's Name Healthcare Agent's Phone Number    [phone]  Yes, patient has an advance directive for healthcare treatment  Durable power of  for health care;Living will  No, copy requested from family  Adult [address]  [phone]          Admitting Physician:  Deven Beck MD  PCP: DENIZ Land CNP    Discharging Nurse: [facility] Unit/Room#: AHY-9867/3211-92  Discharging Unit Phone Number: [phone]    Emergency Contact:   Extended Emergency Contact Information  Primary Emergency Contact: Chloé Allred  Address: [address] [place] [address] [address] Phone: [phone]  Relation: Child  Secondary Emergency Contact: Marcus Alexandre  Address: [address] [facility] [address]  Phone: [phone]  Work Phone: [phone]  Relation: Other    Past Surgical History:  Past Surgical History:   Procedure Laterality Date    APPENDECTOMY      BLADDER SUSPENSION      3 SURGERIES/ANTERIOR AND POSTERIOR REPAIR    BREAST SURGERY      RIGHT LUMPECTOMY AND SEVERAL BIOPSIES ON BOTH BREAST    BUNIONECTOMY  12    BUNIONECTOMY BILATERAL  FEET    CARDIAC CATHETERIZATION      AV block    CATARACT REMOVAL WITH IMPLANT Left 2018    CHOLECYSTECTOMY      COLONOSCOPY      normal    CYSTOSCOPY  2017    U-dil    DILATION AND CURETTAGE OF UTERUS      SUNCTION    FOOT SURGERY      bilat foot surgeries    HERNIA REPAIR  6 YRS AGO    HIATAL HERNIA REPAIR- WIRED  RIBS    HYSTERECTOMY      VAGINAL    JOINT REPLACEMENT Left     TOTAL KNEE REPLACEMENT LEFT    LUMBAR SPINE SURGERY Right 5/15/2019    RIGHT L4 AND L5 TRANSFORAMINAL EPIDURAL STEROID INJECTION WITH FLUOROSCOPY performed by Bonita Gaming MD at [address] [place] [address] Left 09/22/14    removal rib wire    OTHER SURGICAL HISTORY Right 06/28/2018    PHACO EMULSIFICATION OF CATARACT WITH INTRAOCULAR LENS implant right eye    NH REMV CATARACT EXTRACAP,INSERT LENS Left 9/13/2018    PHACO EMULSIFICATION OF CATARACT WITH INTRAOCULAR LENS IMPLANT LEFT EYE performed by David Terrazas MD at [facility] Right 7/26/12    CA DEPOSIT TAKEN OFF RIGHT SHOULDER    UPPER GASTROINTESTINAL ENDOSCOPY  11/2/12       Immunization History: There is no immunization history on file for this patient.     Active Problems:  Patient Active Problem List   Diagnosis Code    Spinal stenosis, lumbar region, without neurogenic claudication M48.061    Spinal stenosis of thoracic region M48.04    Scoliosis (and kyphoscoliosis), idiopathic M41.20    Displacement of thoracic intervertebral disc without myelopathy M51.24    Displacement of lumbar intervertebral disc without myelopathy M51.26    Thoracic or lumbosacral neuritis or radiculitis, unspecified [id number]    Disc displacement, lumbar M51.26    Disc displacement, thoracic M51.24    Scoliosis M41.9    Lumbar stenosis M48.061    Thoracic stenosis M48.04    Cervicalgia M54.2    Hypoxia R09.02    Closed fracture of proximal end of left humerus with routine healing S42.202D    Urinary tract infection without hematuria N39.0    T12 compression fracture ([address] S22.080A    Thoracic compression fracture, sequela S22.000S    Mild malnutrition ([address] E44.1    Acute chest pain R07.9    Right sided numbness R20.0    Moderate malnutrition (HCC) E44.0    Chest pain R07.9    Debility R53.81    Moderate malnutrition (HCC) E44.0       Isolation/Infection:   Isolation          No Isolation            Nurse Assessment:  Last Vital Signs: /69 Pulse 75   Temp 98.1 °F (36.7 °C) (Oral)   Resp 16   Ht 5' (1.524 m)   Wt 147 lb 12.6 oz (67 kg)   SpO2 92%   BMI 28.86 kg/m²     Last documented pain score (0-10 scale): Pain Level: 10  Last Weight:   Wt Readings from Last 1 Encounters:   05/29/19 147 lb 12.6 oz (67 kg)     Mental Status:  oriented    IV Access:  - None    Nursing Mobility/ADLs:  Walking   Independent  Transfer  Independent  Bathing  Assisted  Dressing  Assisted  Toileting  Independent  Feeding  Independent  Med Admin  Assisted  Med Delivery   whole    Wound Care Documentation and Therapy:        Elimination:  Continence:   · Bowel: Yes  · Bladder: Yes  Urinary Catheter: None   Colostomy/Ileostomy/Ileal Conduit: No       Date of Last BM: 6/5/19  No intake or output data in the 24 hours ending 06/04/19 0832  No intake/output data recorded. Safety Concerns: At Risk for Falls    Impairments/Disabilities:      Hearing    Nutrition Therapy:  Current Nutrition Therapy:   - Oral Diet:  General    Routes of Feeding: Oral  Liquids: No Restrictions  Daily Fluid Restriction: no  Last Modified Barium Swallow with Video (Video Swallowing Test): not done    Treatments at the Time of Hospital Discharge:   Respiratory Treatments:   Oxygen Therapy:  is not on home oxygen therapy.   Ventilator:    - No ventilator support    Rehab Therapies: Physical Therapy, Occupational Therapy and RN, aide, MSW  Weight Bearing Status/Restrictions: No weight bearing restirctions  Other Medical Equipment (for information only, NOT a DME order):  walker and bath bench  Other Treatments:     Patient's personal belongings (please select all that are sent with patient):  clothing, shoes, phone    RN SIGNATURE:  Electronically signed by Valentine Ramirez RN on 6/5/19 at 10:06 AM    CASE MANAGEMENT/SOCIAL WORK SECTION  Inpatient Status Date: 5/29/19    Readmission Risk Assessment Score:  Readmission Risk              Risk of Unplanned Readmission:        21         Discharging to Facility/ Agency   · Name: [facility]  [address] [place] [address], [address] [place] [address] [place] [id number]        Phone: [phone]       Fax: [phone]        / signature: Electronically signed by THI Spivey, LSW on 6/4/19 at 8:32 AM    PHYSICIAN SECTION    Prognosis: Fair    Condition at Discharge: Stable    Rehab Potential (if transferring to Rehab): Fair    Recommended Labs or Other Treatments After Discharge: PT/OT/RN/Aide/MSW. Physician Certification: I certify the above information and transfer of Liliya Garcia  is necessary for the continuing treatment of the diagnosis listed and that she requires Home Care for less 30 days.      Update Admission H&P: No change in H&P    PHYSICIAN SIGNATURE:  Electronically signed by Ganga Zambrano MD on 6/4/19 at 9:32 AM

## 2019-06-04 NOTE — CARE COORDINATION
Faxed home care orders to [facility].  Electronically signed by THI Betts, IRINAW on 6/4/2019 at 10:46 AM

## 2019-06-04 NOTE — DISCHARGE SUMMARY
Physical Medicine & Rehabilitation  Discharge Summary     Patient Identification:  Peter Gutierrez  : 10/16/1932  Admit date: 2019  Discharge date:  2019  Attending provider: Nilda Harper MD        Primary care provider: DENIZ Persaud CNP     Discharge Diagnoses:   Patient Active Problem List   Diagnosis    Spinal stenosis, lumbar region, without neurogenic claudication    Spinal stenosis of thoracic region    Scoliosis (and kyphoscoliosis), idiopathic    Displacement of thoracic intervertebral disc without myelopathy    Displacement of lumbar intervertebral disc without myelopathy    Thoracic or lumbosacral neuritis or radiculitis, unspecified    Disc displacement, lumbar    Disc displacement, thoracic    Scoliosis    Lumbar stenosis    Thoracic stenosis    Cervicalgia    Hypoxia    Closed fracture of proximal end of left humerus with routine healing    Urinary tract infection without hematuria    T12 compression fracture ([address]    Thoracic compression fracture, sequela    Mild malnutrition (HCC)    Acute chest pain    Right sided numbness    Moderate malnutrition ([address]    Chest pain    Debility    Moderate malnutrition ([address]       Discharge Functional Status:    Physical therapy:  Bed Mobility: Scooting: Modified independent  Transfers: Sit to Stand: Supervision  Stand to sit: Supervision  Bed to Chair: Stand by assistance  Comment: 1st session:  Performed ambulation trial 1 and 2 and stair negotiation as documented above. Performed seated therapeutic exercises BLE with 1.5# or green band:  LAQ X 10, marching X 10, hip abduction X 10, hamstring curls X 10. Performed seated stepper X 5 minutes. Performed standing ball OH X 10. Performed target stepping X 10 B ipsilateral and crossing midline with SBQC on R and HHA on L. Performed chair push/pull X 10 in stagger stance B and normal stance.   Pt returned to room and remained in bedside chair wiht alarm on and mainly limited by back pain pain, impaired balance, and decreased endurance for ADL/IADl tasks. cues for rest breaks throughout session     Speech therapy:  Comprehension: 6 - Complex ideas 90% or device (hearing aid or glasses- if patient is primarily a visual learner)  Expression: 6 - Device used to express complex ideas/needs  Social Interaction: 6 - Patient requires medication for mood and/or effect  Problem Solvin - Patient able to solve simple/routine tasks  Memory: 5 - Patient requires prompting with stress/unfamiliar situations    Inpatient Rehabilitation Course:   Jesika Ortez is a 80 y.o. female admitted to inpatient rehabilitation on 2019 s/p Debility. The patient participated in an aggressive multidisciplinary inpatient rehabilitation program involving 3 hours of therapy per day, at least 5 days per week. She progressed well in therapy was able to discharge to home with home healthcare. Her medical rehab course was significant for below:    Debility: PT/OT     Unstable angina: LHC without severe disease     CAD: ASA, statin     Lumbar spondylosis: pain control. Referred to Dr. Timo Artis for continued pain management.     Lumbar compression fx: recent kypoplasty     GERD: BID PPI. Resume home PPI and H2 regimen.     Right nare lesion: ENT consult placed. To complete as OP. Discharge Exam:  Gen: No distress, pleasant. Resting in bed  HEENT: Normocephalic, atraumatic. CV: Regular rate and rhythm. No MRG  Resp: No respiratory distress. CTAB  Abd: Soft, nontender nondistended  Ext: No edema. Neuro: Alert, oriented, appropriately interactive. Jesika Ortez was evaluated today and a DME order was entered for a wheeled walker because she requires this to successfully complete daily living tasks of ambulating.   A wheeled walker is necessary due to the patient's unsteady gait, upper body weakness, and inability to  an ambulation device; and she can ambulate only by pushing a

## 2019-06-04 NOTE — CARE COORDINATION
Team conference/Weekly Summary     Team conference held today. Met with pt and dtr to discuss progress with therapy, barriers to discharge, and plans to return home. Estimated discharge date set for 6/5. Called Dr. Kalyani Harris [facility] to refer pt for pain management, left a voicemail. Called Dr. Radhika Lee [facility] to schedule for 7/3 at 2:40 p.m. Will continue to follow for support and discharge planning.  Quan Gamboa, MSW, LSW

## 2019-06-04 NOTE — PROGRESS NOTES
[facility]ish [facility]  6/4/2019  [id number]    Chief Complaint: Debility    Subjective:   No overnight events. Right nare lesion remains painful and bled yesterday. Requesting a pain management physician. ROS: No CP, SOB, dyspnea    Objective:  Patient Vitals for the past 24 hrs:   BP Temp Temp src Pulse Resp SpO2   06/03/19 1920 120/69 98.1 °F (36.7 °C) Oral 75 16 92 %   06/03/19 1156 -- -- -- -- 18 94 %     Gen: No distress, pleasant. Resting in bedside chair  HEENT: Normocephalic, atraumatic. CV: Regular rate and rhythm. No MRG  Resp: No respiratory distress. CTAB  Abd: Soft, nontender nondistended  Ext: No edema. Neuro: Alert, oriented, appropriately interactive. Laboratory data: Available via EMR. Therapy progress:  PT  Position Activity Restriction  Other position/activity restrictions: Linda Rosales is a 80 y.o. seen in ED for multiple complaints and confusing clinical presentation. Initially awoke with a hard upper back/mid scapular pain described as dull heavy pain with associated SOB. Denies radiation, diaphoresis, palpitations, dizziness or syncope. Had taken a pain pill prior to event with gradual relief of discomfort after 30-45 minutes. Denies cough or fever. Has a hx of GERD and a large hiatal hernia with prior surgery and recurrence. She also states that she was dizzy with R arm weakness resulting in Stroke Alert in the ER. Her head CT was negative for acute CVA. She has chronic lower back pain and RLE weakness. 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 Pt has had occasional episodic right arm weakness and numbness in the past but none since the hospital admission. S/P cardiac cath 5/28.   Objective     Sit to Stand: Supervision  Stand to sit: Supervision  Bed to Chair: Stand by assistance  Device: [address]: Stand by assistance  Distance: 228 ft   OT  PT Equipment Recommendations  Equipment Needed: Yes  Mobility Devices: Melvena Lob: Rolling  Other: Pt has W/C, 4WW, quad cane   Toilet - Technique: Ambulating  Equipment Used: Standard toilet  Toilet Transfers Comments: grab bar used  Assessment        SLP                Body mass index is 28.86 kg/m². Assessment:  Patient Active Problem List   Diagnosis    Spinal stenosis, lumbar region, without neurogenic claudication    Spinal stenosis of thoracic region    Scoliosis (and kyphoscoliosis), idiopathic    Displacement of thoracic intervertebral disc without myelopathy    Displacement of lumbar intervertebral disc without myelopathy    Thoracic or lumbosacral neuritis or radiculitis, unspecified    Disc displacement, lumbar    Disc displacement, thoracic    Scoliosis    Lumbar stenosis    Thoracic stenosis    Cervicalgia    Hypoxia    Closed fracture of proximal end of left humerus with routine healing    Urinary tract infection without hematuria    T12 compression fracture (HCC)    Thoracic compression fracture, sequela    Mild malnutrition (HCC)    Acute chest pain    Right sided numbness    Moderate malnutrition (HCC)    Chest pain    Debility    Moderate malnutrition (HCC)       Plan:   Debility: PT/OT     Unstable angina: LHC without severe disease     CAD: ASA, statin     Lumbar spondylosis: pain control     Lumbar compression fx: recent kypoplasty    GERD: BID PPI    Right nare lesion: ENT consult pending     Bowels: Per protocol  Bladder: Per protocol   Sleep: Trazodone provided prn  Pain: Percocet PRN, zanaflex   DVT PPx: Lovenox     Dispo: Prep d/c for tomorrow. Team conference was held today on the patient and discussed directly with the patient utilizing their entire treatment team. Please see separate team note for details. Total treatment time for today's care >33 min. Rachna Levin MD 6/4/2019, 8:35 AM    * This document was created using dictation software.   While all precautions were taken to ensure accuracy, errors may have occurred. Please disregard any typographical errors.

## 2019-06-05 VITALS
BODY MASS INDEX: 29.02 KG/M2 | OXYGEN SATURATION: 92 % | TEMPERATURE: 98.1 F | DIASTOLIC BLOOD PRESSURE: 68 MMHG | SYSTOLIC BLOOD PRESSURE: 129 MMHG | WEIGHT: 147.79 LBS | RESPIRATION RATE: 16 BRPM | HEART RATE: 77 BPM | HEIGHT: 60 IN

## 2019-06-05 PROCEDURE — 6360000002 HC RX W HCPCS: Performed by: PHYSICAL MEDICINE & REHABILITATION

## 2019-06-05 PROCEDURE — 6370000000 HC RX 637 (ALT 250 FOR IP): Performed by: PHYSICAL MEDICINE & REHABILITATION

## 2019-06-05 RX ADMIN — OXYCODONE HYDROCHLORIDE AND ACETAMINOPHEN 1 TABLET: 5; 325 TABLET ORAL at 02:18

## 2019-06-05 RX ADMIN — DOCUSATE SODIUM 100 MG: 100 CAPSULE, LIQUID FILLED ORAL at 08:23

## 2019-06-05 RX ADMIN — ASPIRIN 325 MG: 325 TABLET, DELAYED RELEASE ORAL at 08:23

## 2019-06-05 RX ADMIN — OXYCODONE HYDROCHLORIDE AND ACETAMINOPHEN 1 TABLET: 5; 325 TABLET ORAL at 06:09

## 2019-06-05 RX ADMIN — PANTOPRAZOLE SODIUM 40 MG: 40 TABLET, DELAYED RELEASE ORAL at 06:09

## 2019-06-05 RX ADMIN — OXYCODONE HYDROCHLORIDE AND ACETAMINOPHEN 1 TABLET: 5; 325 TABLET ORAL at 10:40

## 2019-06-05 RX ADMIN — GUAIFENESIN 600 MG: 600 TABLET, EXTENDED RELEASE ORAL at 08:23

## 2019-06-05 RX ADMIN — THERA TABS 1 TABLET: TAB at 08:23

## 2019-06-05 RX ADMIN — ENOXAPARIN SODIUM 40 MG: 40 INJECTION SUBCUTANEOUS at 08:23

## 2019-06-05 ASSESSMENT — PAIN DESCRIPTION - ORIENTATION
ORIENTATION: RIGHT
ORIENTATION: RIGHT

## 2019-06-05 ASSESSMENT — PAIN DESCRIPTION - LOCATION
LOCATION: BACK;LEG
LOCATION: BACK;LEG

## 2019-06-05 ASSESSMENT — PAIN SCALES - GENERAL
PAINLEVEL_OUTOF10: 8
PAINLEVEL_OUTOF10: 8
PAINLEVEL_OUTOF10: 0
PAINLEVEL_OUTOF10: 4
PAINLEVEL_OUTOF10: 8

## 2019-06-05 ASSESSMENT — PAIN DESCRIPTION - PAIN TYPE
TYPE: ACUTE PAIN
TYPE: ACUTE PAIN

## 2019-06-05 NOTE — CARE COORDINATION
Social Work Discharge Summary    Patient discharged home via car with dtr to [facility] Patient to receive home nursing and therapy from [facility].  Patient received a rolling walker from [facility]. No further needs voiced by pt or treatment team.     Individual responsible for the coordination of the discharge/follow up:    THI Jacobs, IRINAW

## 2019-06-05 NOTE — PLAN OF CARE
Problem: Falls - Risk of:  Goal: Will remain free from falls  Description  Will remain free from falls  Outcome: Ongoing  Note:   Fall assessment completed every shift, patient remains free of falls this shift, bed/chair alarms in use, is compliant with use of call light for staff assist, bed in lowest position with wheels locked and 2/4 side rails up.    Goal: Absence of physical injury  Description  Absence of physical injury  Outcome: Ongoing     Problem: Pain:  Goal: Pain level will decrease  Description  Pain level will decrease  Outcome: Ongoing  Goal: Control of acute pain  Description  Control of acute pain  Outcome: Ongoing  Goal: Control of chronic pain  Description  Control of chronic pain  Outcome: Ongoing     Problem: Nutrition  Goal: Optimal nutrition therapy  Outcome: Ongoing     Problem: SKIN INTEGRITY  Goal: LTG - patient will maintain/improve skin integrity through proper skin care techniques  Outcome: Ongoing  Goal: STG - Patient demonstrates preventative skin care measures  Outcome: Ongoing     Problem: IP BOWEL ELIMINATION  Goal: LTG - patient will have regular and routine bowel evacuation  Outcome: Ongoing  Goal: STG - patient will be continent of stool  Outcome: Ongoing     Problem: IP BLADDER/VOIDING  Goal: LTG - patient will complete bladder elimination  Outcome: Ongoing  Goal: LTG - patient will achieve acceptable level of continence  Outcome: Ongoing

## 2019-06-05 NOTE — PROGRESS NOTES
Patient discharged. Discharge paperwork reviewed with patient. Patient verbalized understanding. Patient left floor with staff. Patient left with all belongings.

## 2019-06-05 NOTE — PLAN OF CARE
Pt remains free from falls. Safety precautions in place. Pain assessed. Bed in lowest position, bed/chair wheels locked, call light with in reach, bed/chair alarm on, fall risk wrist band on, SAFE outside of doorway. Will continue to monitor.   Amparo Yoo RN

## 2019-06-13 ENCOUNTER — APPOINTMENT (OUTPATIENT)
Dept: GENERAL RADIOLOGY | Age: 84
End: 2019-06-13
Payer: MEDICARE

## 2019-06-13 ENCOUNTER — HOSPITAL ENCOUNTER (EMERGENCY)
Age: 84
Discharge: HOME | End: 2019-06-13
Payer: MEDICARE

## 2019-06-13 ENCOUNTER — APPOINTMENT (OUTPATIENT)
Dept: CT IMAGING | Age: 84
End: 2019-06-13
Payer: MEDICARE

## 2019-06-13 VITALS
SYSTOLIC BLOOD PRESSURE: 128 MMHG | HEART RATE: 101 BPM | WEIGHT: 145 LBS | TEMPERATURE: 98.2 F | BODY MASS INDEX: 28.47 KG/M2 | OXYGEN SATURATION: 93 % | DIASTOLIC BLOOD PRESSURE: 78 MMHG | RESPIRATION RATE: 25 BRPM | HEIGHT: 60 IN

## 2019-06-13 DIAGNOSIS — M79.604 RIGHT LEG PAIN: Primary | ICD-10-CM

## 2019-06-13 LAB
BILIRUBIN URINE: NEGATIVE
BLOOD, URINE: ABNORMAL
CLARITY: CLEAR
COLOR: YELLOW
EPITHELIAL CELLS, UA: 1 /HPF (ref 0–5)
GLUCOSE URINE: NEGATIVE MG/DL
HYALINE CASTS: 1 /LPF (ref 0–8)
KETONES, URINE: NEGATIVE MG/DL
LEUKOCYTE ESTERASE, URINE: NEGATIVE
MICROSCOPIC EXAMINATION: YES
NITRITE, URINE: NEGATIVE
PH UA: 6.5 (ref 5–8)
PROTEIN UA: NEGATIVE MG/DL
RBC UA: 5 /HPF (ref 0–4)
SPECIFIC GRAVITY UA: 1.01 (ref 1–1.03)
URINE REFLEX TO CULTURE: ABNORMAL
URINE TYPE: ABNORMAL
UROBILINOGEN, URINE: 0.2 E.U./DL
WBC UA: 1 /HPF (ref 0–5)

## 2019-06-13 PROCEDURE — 6360000002 HC RX W HCPCS: Performed by: NURSE PRACTITIONER

## 2019-06-13 PROCEDURE — 72170 X-RAY EXAM OF PELVIS: CPT

## 2019-06-13 PROCEDURE — 96372 THER/PROPH/DIAG INJ SC/IM: CPT

## 2019-06-13 PROCEDURE — 72190 X-RAY EXAM OF PELVIS: CPT

## 2019-06-13 PROCEDURE — 81001 URINALYSIS AUTO W/SCOPE: CPT

## 2019-06-13 PROCEDURE — 73700 CT LOWER EXTREMITY W/O DYE: CPT

## 2019-06-13 PROCEDURE — 6370000000 HC RX 637 (ALT 250 FOR IP): Performed by: NURSE PRACTITIONER

## 2019-06-13 PROCEDURE — 99284 EMERGENCY DEPT VISIT MOD MDM: CPT

## 2019-06-13 RX ORDER — IBUPROFEN 600 MG/1
600 TABLET ORAL ONCE
Status: COMPLETED | OUTPATIENT
Start: 2019-06-13 | End: 2019-06-13

## 2019-06-13 RX ORDER — HYDROCODONE BITARTRATE AND ACETAMINOPHEN 5; 325 MG/1; MG/1
2 TABLET ORAL ONCE
Status: COMPLETED | OUTPATIENT
Start: 2019-06-13 | End: 2019-06-13

## 2019-06-13 RX ORDER — TRAMADOL HYDROCHLORIDE 50 MG/1
50 TABLET ORAL EVERY 6 HOURS PRN
Qty: 20 TABLET | Refills: 0 | Status: SHIPPED | OUTPATIENT
Start: 2019-06-13 | End: 2019-06-16

## 2019-06-13 RX ORDER — MORPHINE SULFATE 4 MG/ML
4 INJECTION, SOLUTION INTRAMUSCULAR; INTRAVENOUS ONCE
Status: COMPLETED | OUTPATIENT
Start: 2019-06-13 | End: 2019-06-13

## 2019-06-13 RX ADMIN — MORPHINE SULFATE 4 MG: 4 INJECTION INTRAVENOUS at 13:47

## 2019-06-13 RX ADMIN — HYDROCODONE BITARTRATE AND ACETAMINOPHEN 2 TABLET: 5; 325 TABLET ORAL at 11:58

## 2019-06-13 RX ADMIN — IBUPROFEN 600 MG: 600 TABLET ORAL at 11:58

## 2019-06-13 ASSESSMENT — ENCOUNTER SYMPTOMS
VOMITING: 0
ABDOMINAL PAIN: 0
DIARRHEA: 0
SHORTNESS OF BREATH: 0
NAUSEA: 0
CHEST TIGHTNESS: 0

## 2019-06-13 ASSESSMENT — PAIN DESCRIPTION - LOCATION: LOCATION: LEG

## 2019-06-13 ASSESSMENT — PAIN DESCRIPTION - ORIENTATION: ORIENTATION: RIGHT

## 2019-06-13 ASSESSMENT — PAIN SCALES - GENERAL
PAINLEVEL_OUTOF10: 9
PAINLEVEL_OUTOF10: 7
PAINLEVEL_OUTOF10: 9

## 2019-06-13 NOTE — ED NOTES
Bed: 11  Expected date:   Expected time:   Means of arrival: [facility]  Comments:  Majo Sheppard  06/13/19 1055

## 2019-06-13 NOTE — ED NOTES
Pt assisted with bedpan. Incontinent of small amount of urine. UA collected per pt's request and sent to lab per Sharon  HYALEE.      Ancelmo Gomez RN  06/13/19 3082

## 2019-06-13 NOTE — ED PROVIDER NOTES
[address] [facility]        Pt Name: Peter Gutierrez  MRN: [id number]  [place] 10/16/1932  Date of evaluation: 6/13/2019  Provider: DENIZ Concepcion CNP  PCP: DENIZ Persaud CNP    This patient was not seen and evaluated by the attending physician No att. providers found. CHIEF COMPLAINT       Chief Complaint   Patient presents with    Leg Pain     pt [facility] by FF squad [facility] where she was getting into the [employer] to go to new pain management doctor. pt states her leg went out on her and she fell. c/o right leg pain. HISTORY OF PRESENT ILLNESS   (Location/Symptom, Timing/Onset, Context/Setting, Quality, Duration, Modifying Factors, Severity)  Note limiting factors. Peter Gutierrez is a 80 y.o. female presents emergency department complaint of right leg pain at the pelvis extending to the thigh. States that she stumbled while trying to get into the [employer] to go shopping, she was caught by staff member did not fall to the ground. She is experiencing pain at the right hip into the pelvis and the upper thigh. There is no deformity, angulation or shortening. Denies any headache, fever, lightheadedness, dizziness, visual disturbances. No chest pain or pressure. No neck or back pain. No shortness of breath, cough, or congestion. No abdominal pain, nausea, vomiting, diarrhea, constipation, or dysuria. No rash. Nursing Notes were all reviewed and agreed with or any disagreements were addressed  in the HPI. REVIEW OF SYSTEMS    (2-9 systems for level 4, 10 or more for level 5)     Review of Systems   Constitutional: Negative for activity change, chills and fever. Respiratory: Negative for chest tightness and shortness of breath. Cardiovascular: Negative for chest pain. Gastrointestinal: Negative for abdominal pain, diarrhea, nausea and vomiting. Genitourinary: Negative for dysuria. Musculoskeletal:        Right leg pain/pelvis   All other systems reviewed and are negative. Positives and Pertinent negatives as per HPI. Except as noted abovein the ROS, all other systems were reviewed and negative.        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    Cystocele     Diabetes mellitus ([facility] [address] [facility]     type  2 diet controlled    Hepatitis A 1953    History of blood transfusion     hiatal hernia surgery    Hyperlipidemia     PVC (premature ventricular contraction)     Rectocele     Reflux     Scoliosis          SURGICAL HISTORY     Past Surgical History:   Procedure Laterality Date    APPENDECTOMY      BLADDER SUSPENSION      3 SURGERIES/ANTERIOR AND POSTERIOR REPAIR    BREAST SURGERY      RIGHT LUMPECTOMY AND SEVERAL BIOPSIES ON BOTH BREAST    BUNIONECTOMY  7/26/12    BUNIONECTOMY BILATERAL  FEET    CARDIAC CATHETERIZATION      AV block    CATARACT REMOVAL WITH IMPLANT Left 09/13/2018    CHOLECYSTECTOMY      COLONOSCOPY  2008    normal    CYSTOSCOPY  03/29/2017    U-dil    DILATION AND CURETTAGE OF UTERUS      SUNCTION    FOOT SURGERY      bilat foot surgeries    HERNIA REPAIR  6 YRS AGO    HIATAL HERNIA REPAIR- WIRED  RIBS    HYSTERECTOMY      VAGINAL    JOINT REPLACEMENT Left     TOTAL KNEE REPLACEMENT LEFT    LUMBAR SPINE SURGERY Right 5/15/2019    RIGHT L4 AND L5 TRANSFORAMINAL EPIDURAL STEROID INJECTION WITH FLUOROSCOPY performed by May Perez MD at [address] [place] [address] Left 09/22/14    removal rib wire    OTHER SURGICAL HISTORY Right 06/28/2018    PHACO EMULSIFICATION OF CATARACT WITH INTRAOCULAR LENS implant right eye    MA REMV CATARACT EXTRACAP,INSERT LENS Left 9/13/2018    PHACO EMULSIFICATION OF CATARACT WITH INTRAOCULAR LENS IMPLANT LEFT EYE performed by Thomas Cesar MD at [facility] Right 7/26/12    CA DEPOSIT TAKEN OFF RIGHT SHOULDER    UPPER GASTROINTESTINAL ENDOSCOPY  11/2/12         CURRENTMEDICATIONS       Previous Medications    ALENDRONATE (FOSAMAX) 70 MG TABLET    Take 70 mg by mouth once a week    ASPIRIN 325 MG EC TABLET    Take 1 tablet by mouth daily    ATORVASTATIN (LIPITOR) 40 MG TABLET    Take 1 tablet by mouth nightly    CHOLECALCIFEROL (VITAMIN D3) 2000 UNITS CAPS    Take 2,000 Units by mouth daily    DIPHENOXYLATE-ATROPINE (LOMOTIL) 2.5-0.025 MG PER TABLET    Take 1 tablet by mouth 4 times daily as needed. Velora Sis DOCUSATE SODIUM (COLACE) 100 MG CAPSULE    Take 100 mg by mouth 2 times daily    ESOMEPRAZOLE MAGNESIUM (NEXIUM) 20 MG PACK    Take 20 mg by mouth daily Took 20 mg Nexium in am and 20 Mg Pepcid at bedtime. FLUTICASONE (FLONASE) 50 MCG/ACT NASAL SPRAY    1 spray by Each Nare route 2 times daily    FUROSEMIDE (LASIX) 20 MG TABLET    Take 1 tablet by mouth daily    GUAIFENESIN 400 MG TABLET    Take 400 mg by mouth 2 times daily Unsure of exact dose    MULTIPLE VITAMINS-MINERALS (OCUVITE ADULT FORMULA PO)    Take 1 tablet by mouth daily    NALOXONE 4 MG/0.1ML LIQD NASAL SPRAY    1 spray by Nasal route as needed for Opioid Reversal    OMEPRAZOLE (PRILOSEC) 20 MG DELAYED RELEASE CAPSULE    Take 20 mg by mouth nightly    ONDANSETRON (ZOFRAN ODT) 4 MG DISINTEGRATING TABLET    Take 1 tablet by mouth every 8 hours as needed for Nausea or Vomiting    OXYCODONE-ACETAMINOPHEN (PERCOCET) 5-325 MG PER TABLET    Take 1 tablet by mouth every 4 hours as needed for Pain for up to 7 days. POLYETHYLENE GLYCOL (GLYCOLAX) PACKET    Take 17 g by mouth daily    TIZANIDINE (ZANAFLEX) 2 MG TABLET    Take 2 tablets by mouth every 6 hours as needed (spasms)         ALLERGIES     Patient has no known allergies.     FAMILYHISTORY       Family History   Problem Relation Age of Onset    Diabetes Maternal Grandmother         type 2, great grandma type1    Cancer Maternal Grandfather         rectal and  scrotal cancer          SOCIAL HISTORY       Social History     Socioeconomic History    Marital status:      Spouse name: None    Number of children: None    Years of education: None    Highest education level: None   Occupational History    None   Social Needs    Financial resource strain: None    Food insecurity:     Worry: None     Inability: None    Transportation needs:     Medical: None     Non-medical: None   Tobacco Use    Smoking status: Former Smoker     Packs/day: 0.50     Years: 15.00     Pack years: 7.50     Last attempt to quit: 2001     Years since quittin.4    Smokeless tobacco: Never Used    Tobacco comment: quit -    Substance and Sexual Activity    Alcohol use: Yes     Alcohol/week: 0.6 oz     Types: 1 Glasses of wine per week     Comment: rare wine     Drug use: No    Sexual activity: None   Lifestyle    Physical activity:     Days per week: None     Minutes per session: None    Stress: None   Relationships    Social connections:     Talks on phone: None     Gets together: None     Attends Confucianism service: None     Active member of club or organization: None     Attends meetings of clubs or organizations: None     Relationship status: None    Intimate partner violence:     Fear of current or ex partner: None     Emotionally abused: None     Physically abused: None     Forced sexual activity: None   Other Topics Concern    None   Social History Narrative    None       SCREENINGS             PHYSICAL EXAM    (up to 7 for level 4, 8 or more for level 5)     ED Triage Vitals [19 1105]   BP Temp Temp Source Pulse Resp SpO2 Height Weight   (!) 170/75 98.2 °F (36.8 °C) Oral 94 18 98 % 5' (1.524 m) 145 lb (65.8 kg)       Physical Exam   Constitutional: She is oriented to person, place, and time. She appears well-developed and well-nourished. No distress.    HENT:   Head: Normocephalic and atraumatic. Right Ear: External ear normal.   Left Ear: External ear normal.   Eyes: Right eye exhibits no discharge. Left eye exhibits no discharge. Neck: Normal range of motion. Neck supple. No JVD present. Cardiovascular: Normal rate. Exam reveals no friction rub. No murmur heard. Pulmonary/Chest: Effort normal and breath sounds normal. No stridor. No respiratory distress. She has no wheezes. Abdominal: Soft. She exhibits no mass. There is no tenderness. Musculoskeletal: Normal range of motion. Right hip: She exhibits tenderness. Neurological: She is alert and oriented to person, place, and time. Skin: Skin is warm and dry. She is not diaphoretic. No pallor. Psychiatric: She has a normal mood and affect. Her behavior is normal.   Nursing note and vitals reviewed. DIAGNOSTIC RESULTS   LABS:    Labs Reviewed   URINE RT REFLEX TO CULTURE - Abnormal; Notable for the following components:       Result Value    Blood, Urine SMALL (*)     All other components within normal limits    Narrative:     Performed at:  [facility] [address], [place], [address]   Phone [phone]   MICROSCOPIC URINALYSIS - Abnormal; Notable for the following components:    RBC, UA 5 (*)     All other components within normal limits    Narrative:     Performed at:  [facility] [address] [facility]   Phone [phone]   URINE RT REFLEX TO CULTURE       All other labs were within normal range or not returned as of this dictation. EKG: All EKG's are interpreted by the Emergency Department Physician who either signs orCo-signs this chart in the absence of a cardiologist.  Please see their note for interpretation of EKG.       RADIOLOGY:   Non-plain film images such as CT, Ultrasound and MRI are read by the radiologist. Plain radiographic images are visualized andpreliminarily interpreted by the  ED Provider with the into the [employer] to go shopping, she was caught by staff member did not fall to the ground. She is experiencing pain at the right hip into the pelvis and the upper thigh. There is no deformity, angulation or shortening. She was given norco and ibuprofen for pain. Xray right pelvis:  Impression:    1. No acute fracture or dislocation. 2. Degenerative changes as described above. Impression:    1. Age-indeterminate compression deformity of L5 which may have progressed  from the lumbar spine plain radiographs of 05/24/2019. Alexandra Peaches may be a  subacute or acute component upon chronic L5 compression deformity.  Consider  further evaluation with MR.  2. Diffuse osteopenia.  No acute fracture or gross dislocation at the right  hip.  Mild right hip osteoarthrosis.  If there is persistent concern  regarding a fracture, further evaluation with MRI would be a more sensitive  study. 3. Sclerosis of the bilateral SI joints primarily on the iliac sides which  could reflect a chronic sacroiliitis. 4. No obvious acute deformity of the sacral ala identified. 5. Severe sigmoid diverticulosis.  Moderate stool burden. Patient was given Norco and Motrin without significant relief. She did continue to complain of severe pain and was then given an IM injection of morphine 4 mg. Patient is feeling better at time of reassessment. Her son is here at the bedside to transport her home. She is able to stand and pivot to sit in a wheelchair. She is given an outpatient follow-up with orthopedics and Ultram as needed for pain with drowsiness precautions. I estimate there is LOW risk for FRACTURE, COMPARTMENT SYNDROME, DEEP VENOUS THROMBOSIS, SEPTIC ARTHRITIS, TENDON OR NEUROVASCULAR INJURY, thus I consider the discharge disposition reasonable. FINAL IMPRESSION      1.  Right leg pain          DISPOSITION/PLAN   DISPOSITION Decision To Discharge 06/13/2019 02:30:33 PM      PATIENT REFERREDTO:  Jesus Jett III, DENIZ - CNP  [facility]  [phone]          Catherine Chacon, [address] [facility]  [address] [place] [address]  [phone]    Schedule an appointment as soon as possible for a visit         DISCHARGE MEDICATIONS:  New Prescriptions    TRAMADOL (ULTRAM) 50 MG TABLET    Take 1 tablet by mouth every 6 hours as needed for Pain for up to 3 days.        DISCONTINUED MEDICATIONS:  Discontinued Medications    No medications on file              (Please note that portions ofthis note were completed with a voice recognition program.  Efforts were made to edit the dictations but occasionally words are mis-transcribed.)    DENIZ Rdz CNP (electronically signed)           DENIZ Rdz CNP  06/13/19 6776

## 2019-06-13 NOTE — ED NOTES
Discharge instructions and prescriptions given to and reviewed with patient. Verbalized understanding. Pt assisted back into clothes and into wheelchair.      Trish Mohamud RN  06/13/19 6147

## 2019-06-13 NOTE — ED NOTES
Pt resting quietly in bed. No c/o at this time. Respirations easy and unlabored. Side rails up x 2 with call light in reach. Bed in low position. Family at bedside.      Chela Wilks RN  06/13/19 1675

## 2019-07-03 ENCOUNTER — OFFICE VISIT (OUTPATIENT)
Dept: ENT CLINIC | Age: 84
End: 2019-07-03
Payer: MEDICARE

## 2019-07-03 VITALS — DIASTOLIC BLOOD PRESSURE: 76 MMHG | SYSTOLIC BLOOD PRESSURE: 148 MMHG | HEART RATE: 79 BPM | OXYGEN SATURATION: 94 %

## 2019-07-03 DIAGNOSIS — R09.89 PHLEGM IN THROAT: ICD-10-CM

## 2019-07-03 DIAGNOSIS — D38.5 NEOPLASM OF UNCERTAIN BEHAVIOR OF VESTIBULE OF NOSE: Primary | ICD-10-CM

## 2019-07-03 PROCEDURE — 1111F DSCHRG MED/CURRENT MED MERGE: CPT | Performed by: OTOLARYNGOLOGY

## 2019-07-03 PROCEDURE — 1036F TOBACCO NON-USER: CPT | Performed by: OTOLARYNGOLOGY

## 2019-07-03 PROCEDURE — G8417 CALC BMI ABV UP PARAM F/U: HCPCS | Performed by: OTOLARYNGOLOGY

## 2019-07-03 PROCEDURE — 1090F PRES/ABSN URINE INCON ASSESS: CPT | Performed by: OTOLARYNGOLOGY

## 2019-07-03 PROCEDURE — 1123F ACP DISCUSS/DSCN MKR DOCD: CPT | Performed by: OTOLARYNGOLOGY

## 2019-07-03 PROCEDURE — 4040F PNEUMOC VAC/ADMIN/RCVD: CPT | Performed by: OTOLARYNGOLOGY

## 2019-07-03 PROCEDURE — G8427 DOCREV CUR MEDS BY ELIG CLIN: HCPCS | Performed by: OTOLARYNGOLOGY

## 2019-07-03 PROCEDURE — 99203 OFFICE O/P NEW LOW 30 MIN: CPT | Performed by: OTOLARYNGOLOGY

## 2019-07-03 RX ORDER — GUAIFENESIN 600 MG/1
1200 TABLET, EXTENDED RELEASE ORAL 2 TIMES DAILY
Qty: 120 TABLET | Refills: 0 | Status: SHIPPED | OUTPATIENT
Start: 2019-07-03 | End: 2019-08-02

## 2019-07-03 RX ORDER — OXYCODONE AND ACETAMINOPHEN 10; 325 MG/1; MG/1
TABLET ORAL
Refills: 0 | Status: ON HOLD | COMMUNITY
Start: 2019-06-20 | End: 2022-01-17 | Stop reason: HOSPADM

## 2019-08-06 ENCOUNTER — OFFICE VISIT (OUTPATIENT)
Dept: CARDIOLOGY CLINIC | Age: 84
End: 2019-08-06
Payer: MEDICARE

## 2019-08-06 VITALS
BODY MASS INDEX: 28.32 KG/M2 | HEIGHT: 60 IN | OXYGEN SATURATION: 94 % | DIASTOLIC BLOOD PRESSURE: 60 MMHG | HEART RATE: 80 BPM | SYSTOLIC BLOOD PRESSURE: 122 MMHG

## 2019-08-06 DIAGNOSIS — E78.2 MIXED HYPERLIPIDEMIA: ICD-10-CM

## 2019-08-06 DIAGNOSIS — I25.10 CORONARY ARTERY DISEASE INVOLVING NATIVE HEART WITHOUT ANGINA PECTORIS, UNSPECIFIED VESSEL OR LESION TYPE: ICD-10-CM

## 2019-08-06 PROBLEM — E78.5 HYPERLIPIDEMIA: Status: ACTIVE | Noted: 2019-08-06

## 2019-08-06 PROCEDURE — 1036F TOBACCO NON-USER: CPT | Performed by: NURSE PRACTITIONER

## 2019-08-06 PROCEDURE — 1123F ACP DISCUSS/DSCN MKR DOCD: CPT | Performed by: NURSE PRACTITIONER

## 2019-08-06 PROCEDURE — G8598 ASA/ANTIPLAT THER USED: HCPCS | Performed by: NURSE PRACTITIONER

## 2019-08-06 PROCEDURE — G8427 DOCREV CUR MEDS BY ELIG CLIN: HCPCS | Performed by: NURSE PRACTITIONER

## 2019-08-06 PROCEDURE — 99214 OFFICE O/P EST MOD 30 MIN: CPT | Performed by: NURSE PRACTITIONER

## 2019-08-06 PROCEDURE — G8417 CALC BMI ABV UP PARAM F/U: HCPCS | Performed by: NURSE PRACTITIONER

## 2019-08-06 PROCEDURE — 4040F PNEUMOC VAC/ADMIN/RCVD: CPT | Performed by: NURSE PRACTITIONER

## 2019-08-06 PROCEDURE — 1090F PRES/ABSN URINE INCON ASSESS: CPT | Performed by: NURSE PRACTITIONER

## 2019-08-06 NOTE — LETTER
Medication Sig Dispense Refill    Pseudoephedrine-guaiFENesin (GUAIFENESIN 600/ PO) Take by mouth      Multiple Vitamins-Minerals (OCUVITE ADULT 50+ PO) Take by mouth      oxyCODONE-acetaminophen (PERCOCET)  MG per tablet TAKE 1 TABLET BY MOUTH EVERY 8 HOURS AS NEEDED  0    aspirin 325 MG EC tablet Take 1 tablet by mouth daily 30 tablet 3    atorvastatin (LIPITOR) 40 MG tablet Take 1 tablet by mouth nightly 30 tablet 3    furosemide (LASIX) 20 MG tablet Take 1 tablet by mouth daily 30 tablet 0    omeprazole (PRILOSEC) 20 MG delayed release capsule Take 20 mg by mouth nightly      naloxone 4 MG/0.1ML LIQD nasal spray 1 spray by Nasal route as needed for Opioid Reversal 1 each 5    tiZANidine (ZANAFLEX) 2 MG tablet Take 2 tablets by mouth every 6 hours as needed (spasms) 75 tablet 0    esomeprazole Magnesium (NEXIUM) 20 MG PACK Take 20 mg by mouth daily Took 20 mg Nexium in am and 20 Mg Pepcid at bedtime.  fluticasone (FLONASE) 50 MCG/ACT nasal spray 1 spray by Each Nare route 2 times daily      docusate sodium (COLACE) 100 MG capsule Take 100 mg by mouth 2 times daily      alendronate (FOSAMAX) 70 MG tablet Take 70 mg by mouth once a week      Cholecalciferol (VITAMIN D3) 2000 UNITS CAPS Take 2,000 Units by mouth daily      ondansetron (ZOFRAN ODT) 4 MG disintegrating tablet Take 1 tablet by mouth every 8 hours as needed for Nausea or Vomiting 20 tablet 0    diphenoxylate-atropine (LOMOTIL) 2.5-0.025 MG per tablet Take 1 tablet by mouth 4 times daily as needed. Betzaida Cordoba oxyCODONE-acetaminophen (PERCOCET) 5-325 MG per tablet Take 1 tablet by mouth every 4 hours as needed for Pain for up to 7 days. 35 tablet 0    Multiple Vitamins-Minerals (OCUVITE ADULT FORMULA PO) Take 1 tablet by mouth daily       No current facility-administered medications for this visit.       REVIEW OF SYSTEMS:   CONSTITUTIONAL: No major weight gain or loss, fatigue, weakness, night CREATININE 0.9 2019    BUN 19 2019     (L) 2019    K 4.4 2019    CL 96 (L) 2019    CO2 24 2019     No results found for: TSH, B5TNKPX, W5EDIWY, THYROIDAB  Lab Results   Component Value Date    WBC 5.0 2019    HGB 11.9 (L) 2019    HCT 35.8 (L) 2019    MCV 95.7 2019     2019     No components found for: CHLPL  Lab Results   Component Value Date    TRIG 62 2019    TRIG 85 2010     Lab Results   Component Value Date    HDL 85 (H) 2019    HDL 70 (H) 2010     Lab Results   Component Value Date    LDLCALC 98 2019    LDLCALC 107 (H) 2010     Lab Results   Component Value Date    LABVLDL 12 2019    LABVLDL 17 2010     Radiology Review:  Pertinent images / reports were reviewed as a part of this visit and reveals the followin/19 Echo:   -Normal left ventricle size, wall thickness and systolic function with an   estimated ejection fraction of 60%. No regional wall motion abnormalities   are seen.   -Mild aortic stenosis with a peak velocity of 2.54m/s and a mean pressure   gradient of 15mmHg. Mild aortic regurgitation.   -Mild mitral regurgitation.   -Mild tricuspid regurgitation with a estimated PASP of 31 mmHg plus right   atrial pressure.   -Trivial pulmonic regurgitation present.   -Biatrial enlargement.           :Invasive procedures and treatments. CathLeft Heart Cath  Dominance      LM: luminal irregularities   LAD: luminal irregularities with 30% proximal, mildly calcified stenosis   LCx: mild plaquing; 30% ostial stenosis of smaller OM1 ; moderate sized second and third marginal free of significant disease  RCA: 40% eccentric mid      LVEDP: 12 mmHg     5 mmHg gradient upon pullback across Ao           Assessment:      Diagnosis Orders   1.  Coronary artery disease involving native heart without angina pectoris, unspecified vessel or lesion type  stable 2. Mixed hyperlipidemia  5/19  TC-195, Trig-62, HDL-85, LDL-98 on lipitor            Plan:     NO change in meds  FU 6 months      The patient  currently  is not smoking. The risks related to smoking were reviewed with the patient. Recommend maintaining a smoke-free lifestyle. Products available for smoking cessation were discussed. Saturated fat diet discussed  Exercise program discussed    Thank you for allowing to us to participate in the care of Elizabeth Zee. CHIVO Lindsay CNP  [address]   Documentation of today's visit sent to PCP    I am requesting a consultation of my patient Beryle Livings, to you for evaluation of ***. I appreciate your assistance in her care and look forward to your findings and recommendations.     Sincerely,                           DENIZ Chauhan - CNP

## 2019-08-06 NOTE — LETTER
unspecified vessel or lesion type  stable   2. Mixed hyperlipidemia  5/19  TC-195, Trig-62, HDL-85, LDL-98 on lipitor            Plan:     NO change in meds  FU 6 months      The patient  currently  is not smoking. The risks related to smoking were reviewed with the patient. Recommend maintaining a smoke-free lifestyle. Products available for smoking cessation were discussed. Saturated fat diet discussed  Exercise program discussed    Thank you for allowing to us to participate in the care of Nguyễn Ricks. CHIVO Lindsay CNP  [address] [facility]   Documentation of today's visit sent to PCP        If you have questions, please do not hesitate to call me. I look forward to following Charity Lynch along with you.     Sincerely,        DENIZ Myers - CNP

## 2019-08-06 NOTE — PROGRESS NOTES
[facility]     Outpatient Follow Up Note    CHIEF COMPLAINT / HPI: Hospital Follow Up secondary to   Chest pain, CAD  Hospital record has been reviewed  Hospital Course progressed as follows per discharge summary:   Patient admitted with Chest pain with concerns of unstable angina, underwent cath showing non obstructive coronaries. Chest pain probably related to GERD  For her debility, PT/OT was consulted and was discharged to acute rehab as per their recommendation and patient preference. Her neuro symptoms were thought to be related to Peripheral neuropathy. She did some time in the rehab unit and now back to assisted living. She walks with walker and has not had any futher chest pain, SOB remains the same, palpitations, edema.            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    Cystocele     Diabetes mellitus ([facility] [address] [facility]     type  2 diet controlled    Hepatitis A     History of blood transfusion     hiatal hernia surgery    Hyperlipidemia     PVC (premature ventricular contraction)     Rectocele     Reflux     Scoliosis      Social History:    Social History     Tobacco Use   Smoking Status Former Smoker    Packs/day: 0.50    Years: 15.00    Pack years: 7.50    Last attempt to quit: 2001    Years since quittin.6   Smokeless Tobacco Never Used   Tobacco Comment    quit 2001-      Current Medications:  Current Outpatient Medications   Medication Sig Dispense Refill    Pseudoephedrine-guaiFENesin (GUAIFENESIN 600/ PO) Take by mouth      Multiple Vitamins-Minerals (OCUVITE ADULT 50+ PO) Take by mouth      oxyCODONE-acetaminophen (PERCOCET)  MG per tablet TAKE 1 TABLET BY MOUTH EVERY 8 HOURS AS NEEDED  0    aspirin 325 MG EC tablet Take 1 tablet by mouth daily 30 tablet 3    atorvastatin

## 2019-09-13 ENCOUNTER — PROCEDURE VISIT (OUTPATIENT)
Dept: ENT CLINIC | Age: 84
End: 2019-09-13
Payer: MEDICARE

## 2019-09-13 VITALS
WEIGHT: 152 LBS | HEART RATE: 81 BPM | SYSTOLIC BLOOD PRESSURE: 139 MMHG | HEIGHT: 60 IN | BODY MASS INDEX: 29.84 KG/M2 | DIASTOLIC BLOOD PRESSURE: 76 MMHG

## 2019-09-13 DIAGNOSIS — D38.5 NEOPLASM OF UNCERTAIN BEHAVIOR OF VESTIBULE OF NOSE: ICD-10-CM

## 2019-09-13 PROCEDURE — 30100 INTRANASAL BIOPSY: CPT | Performed by: OTOLARYNGOLOGY

## 2019-09-19 ENCOUNTER — TELEPHONE (OUTPATIENT)
Dept: ENT CLINIC | Age: 84
End: 2019-09-19

## 2019-10-15 ENCOUNTER — OFFICE VISIT (OUTPATIENT)
Dept: ENT CLINIC | Age: 84
End: 2019-10-15
Payer: MEDICARE

## 2019-10-15 ENCOUNTER — TELEPHONE (OUTPATIENT)
Dept: ENT CLINIC | Age: 84
End: 2019-10-15

## 2019-10-15 VITALS
BODY MASS INDEX: 25.97 KG/M2 | WEIGHT: 155.9 LBS | DIASTOLIC BLOOD PRESSURE: 80 MMHG | HEIGHT: 65 IN | HEART RATE: 78 BPM | SYSTOLIC BLOOD PRESSURE: 164 MMHG

## 2019-10-15 DIAGNOSIS — J34.89 NASAL VESTIBULITIS: Primary | ICD-10-CM

## 2019-10-15 PROCEDURE — 4040F PNEUMOC VAC/ADMIN/RCVD: CPT | Performed by: OTOLARYNGOLOGY

## 2019-10-15 PROCEDURE — 1123F ACP DISCUSS/DSCN MKR DOCD: CPT | Performed by: OTOLARYNGOLOGY

## 2019-10-15 PROCEDURE — G8598 ASA/ANTIPLAT THER USED: HCPCS | Performed by: OTOLARYNGOLOGY

## 2019-10-15 PROCEDURE — 99213 OFFICE O/P EST LOW 20 MIN: CPT | Performed by: OTOLARYNGOLOGY

## 2019-10-15 PROCEDURE — G8417 CALC BMI ABV UP PARAM F/U: HCPCS | Performed by: OTOLARYNGOLOGY

## 2019-10-15 PROCEDURE — G8484 FLU IMMUNIZE NO ADMIN: HCPCS | Performed by: OTOLARYNGOLOGY

## 2019-10-15 PROCEDURE — G8427 DOCREV CUR MEDS BY ELIG CLIN: HCPCS | Performed by: OTOLARYNGOLOGY

## 2019-10-15 PROCEDURE — 1036F TOBACCO NON-USER: CPT | Performed by: OTOLARYNGOLOGY

## 2019-10-15 PROCEDURE — 1090F PRES/ABSN URINE INCON ASSESS: CPT | Performed by: OTOLARYNGOLOGY

## 2020-03-25 PROBLEM — E44.0 MODERATE MALNUTRITION (HCC): Status: RESOLVED | Noted: 2019-05-25 | Resolved: 2020-03-24

## 2020-11-03 PROBLEM — R07.9 CHEST PAIN: Status: RESOLVED | Noted: 2019-05-26 | Resolved: 2020-11-03

## 2021-07-21 ENCOUNTER — HOSPITAL ENCOUNTER (OUTPATIENT)
Dept: WOMENS IMAGING | Age: 86
Discharge: HOME | End: 2021-07-21
Payer: MEDICARE

## 2021-07-21 ENCOUNTER — HOSPITAL ENCOUNTER (OUTPATIENT)
Dept: ULTRASOUND IMAGING | Age: 86
Discharge: HOME | End: 2021-07-21
Payer: MEDICARE

## 2021-07-21 DIAGNOSIS — N64.4 BREAST PAIN: ICD-10-CM

## 2021-07-21 PROCEDURE — 76642 ULTRASOUND BREAST LIMITED: CPT

## 2021-07-21 PROCEDURE — G0279 TOMOSYNTHESIS, MAMMO: HCPCS

## 2022-01-05 ENCOUNTER — HOSPITAL ENCOUNTER (OUTPATIENT)
Age: 87
Setting detail: SPECIMEN
Discharge: HOME | End: 2022-01-05
Payer: MEDICARE

## 2022-01-05 LAB
A/G RATIO: 1.6 (ref 1.1–2.2)
ALBUMIN SERPL-MCNC: 3.9 G/DL (ref 3.4–5)
ALP BLD-CCNC: 59 U/L (ref 40–129)
ALT SERPL-CCNC: 11 U/L (ref 10–40)
ANION GAP SERPL CALCULATED.3IONS-SCNC: 11 MMOL/L (ref 3–16)
AST SERPL-CCNC: 24 U/L (ref 15–37)
BILIRUB SERPL-MCNC: 0.3 MG/DL (ref 0–1)
BUN BLDV-MCNC: 17 MG/DL (ref 7–20)
CALCIUM SERPL-MCNC: 8.5 MG/DL (ref 8.3–10.6)
CHLORIDE BLD-SCNC: 100 MMOL/L (ref 99–110)
CO2: 25 MMOL/L (ref 21–32)
CREAT SERPL-MCNC: 1.1 MG/DL (ref 0.6–1.2)
GFR AFRICAN AMERICAN: 57
GFR NON-AFRICAN AMERICAN: 47
GLUCOSE BLD-MCNC: 128 MG/DL (ref 70–99)
POTASSIUM SERPL-SCNC: 4.4 MMOL/L (ref 3.5–5.1)
SODIUM BLD-SCNC: 136 MMOL/L (ref 136–145)
TOTAL PROTEIN: 6.4 G/DL (ref 6.4–8.2)

## 2022-01-05 PROCEDURE — 82746 ASSAY OF FOLIC ACID SERUM: CPT

## 2022-01-05 PROCEDURE — 82306 VITAMIN D 25 HYDROXY: CPT

## 2022-01-05 PROCEDURE — 80053 COMPREHEN METABOLIC PANEL: CPT

## 2022-01-05 PROCEDURE — 84443 ASSAY THYROID STIM HORMONE: CPT

## 2022-01-05 PROCEDURE — 80061 LIPID PANEL: CPT

## 2022-01-05 PROCEDURE — 36415 COLL VENOUS BLD VENIPUNCTURE: CPT

## 2022-01-05 PROCEDURE — 82607 VITAMIN B-12: CPT

## 2022-01-05 PROCEDURE — 85025 COMPLETE CBC W/AUTO DIFF WBC: CPT

## 2022-01-05 PROCEDURE — 83036 HEMOGLOBIN GLYCOSYLATED A1C: CPT

## 2022-01-06 LAB
ATYPICAL LYMPHOCYTE RELATIVE PERCENT: 4 % (ref 0–6)
BANDED NEUTROPHILS RELATIVE PERCENT: 2 % (ref 0–7)
BASOPHILS ABSOLUTE: 0 K/UL (ref 0–0.2)
BASOPHILS RELATIVE PERCENT: 1 %
CHOLESTEROL, TOTAL: 156 MG/DL (ref 0–199)
EOSINOPHILS ABSOLUTE: 0 K/UL (ref 0–0.6)
EOSINOPHILS RELATIVE PERCENT: 2 %
ESTIMATED AVERAGE GLUCOSE: 131.2 MG/DL
FOLATE: 13.06 NG/ML (ref 4.78–24.2)
HBA1C MFR BLD: 6.2 %
HCT VFR BLD CALC: 34.1 % (ref 36–48)
HDLC SERPL-MCNC: 93 MG/DL (ref 40–60)
HEMATOLOGY PATH CONSULT: NORMAL
HEMATOLOGY PATH CONSULT: YES
HEMOGLOBIN: 11.3 G/DL (ref 12–16)
LDL CHOLESTEROL CALCULATED: 48 MG/DL
LYMPHOCYTES ABSOLUTE: 1.1 K/UL (ref 1–5.1)
LYMPHOCYTES RELATIVE PERCENT: 47 %
MCH RBC QN AUTO: 31.6 PG (ref 26–34)
MCHC RBC AUTO-ENTMCNC: 33 G/DL (ref 31–36)
MCV RBC AUTO: 95.7 FL (ref 80–100)
MONOCYTES ABSOLUTE: 0.3 K/UL (ref 0–1.3)
MONOCYTES RELATIVE PERCENT: 14 %
NEUTROPHILS ABSOLUTE: 0.7 K/UL (ref 1.7–7.7)
NEUTROPHILS RELATIVE PERCENT: 30 %
OVALOCYTES: ABNORMAL
PDW BLD-RTO: 14.7 % (ref 12.4–15.4)
PLATELET # BLD: 108 K/UL (ref 135–450)
PLATELET SLIDE REVIEW: ADEQUATE
PMV BLD AUTO: 7.5 FL (ref 5–10.5)
RBC # BLD: 3.56 M/UL (ref 4–5.2)
SLIDE REVIEW: ABNORMAL
TRIGL SERPL-MCNC: 74 MG/DL (ref 0–150)
TSH REFLEX: 2.88 UIU/ML (ref 0.27–4.2)
VITAMIN B-12: 507 PG/ML (ref 211–911)
VITAMIN D 25-HYDROXY: 29.1 NG/ML
VLDLC SERPL CALC-MCNC: 15 MG/DL
WBC # BLD: 2.2 K/UL (ref 4–11)

## 2022-01-12 ENCOUNTER — APPOINTMENT (OUTPATIENT)
Dept: CT IMAGING | Age: 87
DRG: 291 | End: 2022-01-12
Payer: MEDICARE

## 2022-01-12 ENCOUNTER — HOSPITAL ENCOUNTER (INPATIENT)
Age: 87
LOS: 5 days | Discharge: HOME HEALTH | DRG: 291 | End: 2022-01-17
Attending: EMERGENCY MEDICINE | Admitting: INTERNAL MEDICINE
Payer: MEDICARE

## 2022-01-12 ENCOUNTER — APPOINTMENT (OUTPATIENT)
Dept: GENERAL RADIOLOGY | Age: 87
DRG: 291 | End: 2022-01-12
Payer: MEDICARE

## 2022-01-12 DIAGNOSIS — M48.061 SPINAL STENOSIS OF LUMBAR REGION, UNSPECIFIED WHETHER NEUROGENIC CLAUDICATION PRESENT: ICD-10-CM

## 2022-01-12 DIAGNOSIS — R11.2 NAUSEA AND VOMITING, INTRACTABILITY OF VOMITING NOT SPECIFIED, UNSPECIFIED VOMITING TYPE: ICD-10-CM

## 2022-01-12 DIAGNOSIS — R53.1 GENERAL WEAKNESS: ICD-10-CM

## 2022-01-12 DIAGNOSIS — R06.09 DYSPNEA ON EXERTION: ICD-10-CM

## 2022-01-12 DIAGNOSIS — I50.9 CONGESTIVE HEART FAILURE, UNSPECIFIED HF CHRONICITY, UNSPECIFIED HEART FAILURE TYPE (HCC): Primary | ICD-10-CM

## 2022-01-12 PROBLEM — N39.0 UTI (URINARY TRACT INFECTION): Status: ACTIVE | Noted: 2022-01-12

## 2022-01-12 PROBLEM — I50.21 ACUTE SYSTOLIC HEART FAILURE (HCC): Status: ACTIVE | Noted: 2022-01-12

## 2022-01-12 PROBLEM — Z20.822 SUSPECTED COVID-19 VIRUS INFECTION: Status: ACTIVE | Noted: 2022-01-12

## 2022-01-12 PROBLEM — K21.9 GERD (GASTROESOPHAGEAL REFLUX DISEASE): Status: ACTIVE | Noted: 2022-01-12

## 2022-01-12 LAB
ALBUMIN SERPL-MCNC: 3.7 G/DL (ref 3.4–5)
ALP BLD-CCNC: 48 U/L (ref 40–129)
ALT SERPL-CCNC: 10 U/L (ref 10–40)
ANION GAP SERPL CALCULATED.3IONS-SCNC: 11 MMOL/L (ref 3–16)
AST SERPL-CCNC: 31 U/L (ref 15–37)
BASE EXCESS VENOUS: 2.8 MMOL/L (ref -3–3)
BASOPHILS ABSOLUTE: 0 K/UL (ref 0–0.2)
BASOPHILS RELATIVE PERCENT: 0.6 %
BILIRUB SERPL-MCNC: 0.5 MG/DL (ref 0–1)
BILIRUBIN DIRECT: <0.2 MG/DL (ref 0–0.3)
BILIRUBIN URINE: NEGATIVE
BILIRUBIN, INDIRECT: NORMAL MG/DL (ref 0–1)
BLOOD, URINE: NEGATIVE
BUN BLDV-MCNC: 11 MG/DL (ref 7–20)
CALCIUM SERPL-MCNC: 8.7 MG/DL (ref 8.3–10.6)
CARBOXYHEMOGLOBIN: 2.9 % (ref 0–1.5)
CHLORIDE BLD-SCNC: 103 MMOL/L (ref 99–110)
CLARITY: CLEAR
CO2: 23 MMOL/L (ref 21–32)
COLOR: YELLOW
CREAT SERPL-MCNC: 0.7 MG/DL (ref 0.6–1.2)
EOSINOPHILS ABSOLUTE: 0 K/UL (ref 0–0.6)
EOSINOPHILS RELATIVE PERCENT: 0.8 %
EPITHELIAL CELLS, UA: 1 /HPF (ref 0–5)
GFR AFRICAN AMERICAN: >60
GFR NON-AFRICAN AMERICAN: >60
GLUCOSE BLD-MCNC: 100 MG/DL (ref 70–99)
GLUCOSE URINE: NEGATIVE MG/DL
HCO3 VENOUS: 27.2 MMOL/L (ref 23–29)
HCT VFR BLD CALC: 34.5 % (ref 36–48)
HEMOGLOBIN, VEN, REDUCED: 16 %
HEMOGLOBIN: 11.7 G/DL (ref 12–16)
HYALINE CASTS: 0 /LPF (ref 0–8)
KETONES, URINE: NEGATIVE MG/DL
LACTIC ACID: 1.2 MMOL/L (ref 0.4–2)
LEUKOCYTE ESTERASE, URINE: ABNORMAL
LIPASE: 13 U/L (ref 13–60)
LYMPHOCYTES ABSOLUTE: 1.1 K/UL (ref 1–5.1)
LYMPHOCYTES RELATIVE PERCENT: 32.6 %
MCH RBC QN AUTO: 31.9 PG (ref 26–34)
MCHC RBC AUTO-ENTMCNC: 34 G/DL (ref 31–36)
MCV RBC AUTO: 93.6 FL (ref 80–100)
METHEMOGLOBIN VENOUS: 0.3 %
MICROSCOPIC EXAMINATION: YES
MONOCYTES ABSOLUTE: 0.2 K/UL (ref 0–1.3)
MONOCYTES RELATIVE PERCENT: 5.9 %
NEUTROPHILS ABSOLUTE: 2 K/UL (ref 1.7–7.7)
NEUTROPHILS RELATIVE PERCENT: 60.1 %
NITRITE, URINE: NEGATIVE
O2 CONTENT, VEN: 13 VOL %
O2 SAT, VEN: 84 %
O2 THERAPY: ABNORMAL
PCO2, VEN: 40.3 MMHG (ref 40–50)
PDW BLD-RTO: 14.3 % (ref 12.4–15.4)
PH UA: 7 (ref 5–8)
PH VENOUS: 7.44 (ref 7.35–7.45)
PLATELET # BLD: 144 K/UL (ref 135–450)
PMV BLD AUTO: 7.5 FL (ref 5–10.5)
PO2, VEN: 44.7 MMHG (ref 25–40)
POTASSIUM REFLEX MAGNESIUM: 4.7 MMOL/L (ref 3.5–5.1)
PRO-BNP: 1168 PG/ML (ref 0–449)
PROCALCITONIN: 0.03 NG/ML (ref 0–0.15)
PROTEIN UA: NEGATIVE MG/DL
RAPID INFLUENZA  B AGN: NEGATIVE
RAPID INFLUENZA A AGN: NEGATIVE
RBC # BLD: 3.68 M/UL (ref 4–5.2)
RBC UA: 1 /HPF (ref 0–4)
REASON FOR REJECTION: NORMAL
REJECTED TEST: NORMAL
SODIUM BLD-SCNC: 137 MMOL/L (ref 136–145)
SPECIFIC GRAVITY UA: 1.01 (ref 1–1.03)
TCO2 CALC VENOUS: 64 MMOL/L
TOTAL PROTEIN: 6.4 G/DL (ref 6.4–8.2)
TROPONIN: 0.02 NG/ML
TROPONIN: 0.03 NG/ML
URINE REFLEX TO CULTURE: ABNORMAL
URINE TYPE: ABNORMAL
UROBILINOGEN, URINE: 0.2 E.U./DL
WBC # BLD: 3.3 K/UL (ref 4–11)
WBC UA: 3 /HPF (ref 0–5)

## 2022-01-12 PROCEDURE — 2580000003 HC RX 258: Performed by: INTERNAL MEDICINE

## 2022-01-12 PROCEDURE — 6370000000 HC RX 637 (ALT 250 FOR IP)

## 2022-01-12 PROCEDURE — 82803 BLOOD GASES ANY COMBINATION: CPT

## 2022-01-12 PROCEDURE — 36415 COLL VENOUS BLD VENIPUNCTURE: CPT

## 2022-01-12 PROCEDURE — 2060000000 HC ICU INTERMEDIATE R&B

## 2022-01-12 PROCEDURE — 80048 BASIC METABOLIC PNL TOTAL CA: CPT

## 2022-01-12 PROCEDURE — 6370000000 HC RX 637 (ALT 250 FOR IP): Performed by: INTERNAL MEDICINE

## 2022-01-12 PROCEDURE — 83690 ASSAY OF LIPASE: CPT

## 2022-01-12 PROCEDURE — 6360000002 HC RX W HCPCS

## 2022-01-12 PROCEDURE — 99285 EMERGENCY DEPT VISIT HI MDM: CPT

## 2022-01-12 PROCEDURE — 93005 ELECTROCARDIOGRAM TRACING: CPT | Performed by: PHYSICIAN ASSISTANT

## 2022-01-12 PROCEDURE — 2580000003 HC RX 258: Performed by: PHYSICIAN ASSISTANT

## 2022-01-12 PROCEDURE — 81001 URINALYSIS AUTO W/SCOPE: CPT

## 2022-01-12 PROCEDURE — U0003 INFECTIOUS AGENT DETECTION BY NUCLEIC ACID (DNA OR RNA); SEVERE ACUTE RESPIRATORY SYNDROME CORONAVIRUS 2 (SARS-COV-2) (CORONAVIRUS DISEASE [COVID-19]), AMPLIFIED PROBE TECHNIQUE, MAKING USE OF HIGH THROUGHPUT TECHNOLOGIES AS DESCRIBED BY CMS-2020-01-R: HCPCS

## 2022-01-12 PROCEDURE — 84439 ASSAY OF FREE THYROXINE: CPT

## 2022-01-12 PROCEDURE — 80076 HEPATIC FUNCTION PANEL: CPT

## 2022-01-12 PROCEDURE — 84443 ASSAY THYROID STIM HORMONE: CPT

## 2022-01-12 PROCEDURE — 85025 COMPLETE CBC W/AUTO DIFF WBC: CPT

## 2022-01-12 PROCEDURE — 87086 URINE CULTURE/COLONY COUNT: CPT

## 2022-01-12 PROCEDURE — 74176 CT ABD & PELVIS W/O CONTRAST: CPT

## 2022-01-12 PROCEDURE — 84484 ASSAY OF TROPONIN QUANT: CPT

## 2022-01-12 PROCEDURE — 6360000002 HC RX W HCPCS: Performed by: PHYSICIAN ASSISTANT

## 2022-01-12 PROCEDURE — 84145 PROCALCITONIN (PCT): CPT

## 2022-01-12 PROCEDURE — 87804 INFLUENZA ASSAY W/OPTIC: CPT

## 2022-01-12 PROCEDURE — 71045 X-RAY EXAM CHEST 1 VIEW: CPT

## 2022-01-12 PROCEDURE — 96374 THER/PROPH/DIAG INJ IV PUSH: CPT

## 2022-01-12 PROCEDURE — 83605 ASSAY OF LACTIC ACID: CPT

## 2022-01-12 PROCEDURE — 6360000002 HC RX W HCPCS: Performed by: INTERNAL MEDICINE

## 2022-01-12 PROCEDURE — 83880 ASSAY OF NATRIURETIC PEPTIDE: CPT

## 2022-01-12 PROCEDURE — U0005 INFEC AGEN DETEC AMPLI PROBE: HCPCS

## 2022-01-12 RX ORDER — ESOMEPRAZOLE MAGNESIUM 20 MG/1
20 FOR SUSPENSION ORAL DAILY
Status: DISCONTINUED | OUTPATIENT
Start: 2022-01-12 | End: 2022-01-12 | Stop reason: CLARIF

## 2022-01-12 RX ORDER — OXYCODONE AND ACETAMINOPHEN 10; 325 MG/1; MG/1
TABLET ORAL
Status: COMPLETED
Start: 2022-01-12 | End: 2022-01-12

## 2022-01-12 RX ORDER — PANTOPRAZOLE SODIUM 40 MG/1
40 TABLET, DELAYED RELEASE ORAL
Status: DISCONTINUED | OUTPATIENT
Start: 2022-01-13 | End: 2022-01-17 | Stop reason: HOSPADM

## 2022-01-12 RX ORDER — ACETAMINOPHEN 650 MG/1
650 SUPPOSITORY RECTAL EVERY 6 HOURS PRN
Status: DISCONTINUED | OUTPATIENT
Start: 2022-01-12 | End: 2022-01-17 | Stop reason: HOSPADM

## 2022-01-12 RX ORDER — SODIUM CHLORIDE 9 MG/ML
25 INJECTION, SOLUTION INTRAVENOUS PRN
Status: DISCONTINUED | OUTPATIENT
Start: 2022-01-12 | End: 2022-01-17 | Stop reason: HOSPADM

## 2022-01-12 RX ORDER — HYDRALAZINE HYDROCHLORIDE 20 MG/ML
10 INJECTION INTRAMUSCULAR; INTRAVENOUS EVERY 6 HOURS PRN
Status: DISCONTINUED | OUTPATIENT
Start: 2022-01-12 | End: 2022-01-17 | Stop reason: HOSPADM

## 2022-01-12 RX ORDER — FLUTICASONE PROPIONATE 50 MCG
1 SPRAY, SUSPENSION (ML) NASAL 2 TIMES DAILY
Status: DISCONTINUED | OUTPATIENT
Start: 2022-01-12 | End: 2022-01-12

## 2022-01-12 RX ORDER — OMEPRAZOLE 10 MG/1
20 CAPSULE, DELAYED RELEASE ORAL NIGHTLY
Status: DISCONTINUED | OUTPATIENT
Start: 2022-01-12 | End: 2022-01-12 | Stop reason: CLARIF

## 2022-01-12 RX ORDER — FUROSEMIDE 10 MG/ML
40 INJECTION INTRAMUSCULAR; INTRAVENOUS 3 TIMES DAILY
Status: DISCONTINUED | OUTPATIENT
Start: 2022-01-12 | End: 2022-01-14

## 2022-01-12 RX ORDER — DIPHENOXYLATE HYDROCHLORIDE AND ATROPINE SULFATE 2.5; .025 MG/1; MG/1
1 TABLET ORAL 4 TIMES DAILY PRN
Status: DISCONTINUED | OUTPATIENT
Start: 2022-01-12 | End: 2022-01-17 | Stop reason: HOSPADM

## 2022-01-12 RX ORDER — ATORVASTATIN CALCIUM 80 MG/1
40 TABLET, FILM COATED ORAL NIGHTLY
Status: DISCONTINUED | OUTPATIENT
Start: 2022-01-12 | End: 2022-01-17 | Stop reason: HOSPADM

## 2022-01-12 RX ORDER — SODIUM CHLORIDE 0.9 % (FLUSH) 0.9 %
5-40 SYRINGE (ML) INJECTION EVERY 12 HOURS SCHEDULED
Status: DISCONTINUED | OUTPATIENT
Start: 2022-01-12 | End: 2022-01-17 | Stop reason: HOSPADM

## 2022-01-12 RX ORDER — ONDANSETRON 2 MG/ML
INJECTION INTRAMUSCULAR; INTRAVENOUS
Status: COMPLETED
Start: 2022-01-12 | End: 2022-01-12

## 2022-01-12 RX ORDER — ZINC GLUCONATE 50 MG
50 TABLET ORAL DAILY
COMMUNITY
End: 2022-03-02

## 2022-01-12 RX ORDER — POTASSIUM CHLORIDE 20 MEQ/1
40 TABLET, EXTENDED RELEASE ORAL PRN
Status: DISCONTINUED | OUTPATIENT
Start: 2022-01-12 | End: 2022-01-17 | Stop reason: HOSPADM

## 2022-01-12 RX ORDER — NALOXONE HYDROCHLORIDE 4 MG/.1ML
1 SPRAY NASAL PRN
Status: DISCONTINUED | OUTPATIENT
Start: 2022-01-12 | End: 2022-01-12

## 2022-01-12 RX ORDER — FUROSEMIDE 10 MG/ML
20 INJECTION INTRAMUSCULAR; INTRAVENOUS ONCE
Status: COMPLETED | OUTPATIENT
Start: 2022-01-12 | End: 2022-01-12

## 2022-01-12 RX ORDER — TIZANIDINE 4 MG/1
4 TABLET ORAL EVERY 6 HOURS PRN
Status: DISCONTINUED | OUTPATIENT
Start: 2022-01-12 | End: 2022-01-12

## 2022-01-12 RX ORDER — ONDANSETRON 2 MG/ML
4 INJECTION INTRAMUSCULAR; INTRAVENOUS ONCE
Status: COMPLETED | OUTPATIENT
Start: 2022-01-12 | End: 2022-01-12

## 2022-01-12 RX ORDER — SODIUM CHLORIDE 0.9 % (FLUSH) 0.9 %
10 SYRINGE (ML) INJECTION PRN
Status: DISCONTINUED | OUTPATIENT
Start: 2022-01-12 | End: 2022-01-17 | Stop reason: HOSPADM

## 2022-01-12 RX ORDER — 0.9 % SODIUM CHLORIDE 0.9 %
500 INTRAVENOUS SOLUTION INTRAVENOUS PRN
Status: DISCONTINUED | OUTPATIENT
Start: 2022-01-12 | End: 2022-01-17 | Stop reason: HOSPADM

## 2022-01-12 RX ORDER — VITAMIN B COMPLEX
2000 TABLET ORAL DAILY
Status: DISCONTINUED | OUTPATIENT
Start: 2022-01-12 | End: 2022-01-17 | Stop reason: HOSPADM

## 2022-01-12 RX ORDER — DOCUSATE SODIUM 100 MG/1
100 CAPSULE, LIQUID FILLED ORAL 2 TIMES DAILY
Status: DISCONTINUED | OUTPATIENT
Start: 2022-01-12 | End: 2022-01-17 | Stop reason: HOSPADM

## 2022-01-12 RX ORDER — ACETAMINOPHEN 325 MG/1
650 TABLET ORAL EVERY 6 HOURS PRN
Status: DISCONTINUED | OUTPATIENT
Start: 2022-01-12 | End: 2022-01-17 | Stop reason: HOSPADM

## 2022-01-12 RX ORDER — POTASSIUM CHLORIDE 7.45 MG/ML
10 INJECTION INTRAVENOUS PRN
Status: DISCONTINUED | OUTPATIENT
Start: 2022-01-12 | End: 2022-01-17 | Stop reason: HOSPADM

## 2022-01-12 RX ORDER — OXYCODONE AND ACETAMINOPHEN 10; 325 MG/1; MG/1
1 TABLET ORAL EVERY 6 HOURS PRN
Status: DISCONTINUED | OUTPATIENT
Start: 2022-01-12 | End: 2022-01-17 | Stop reason: HOSPADM

## 2022-01-12 RX ORDER — ONDANSETRON 4 MG/1
4 TABLET, ORALLY DISINTEGRATING ORAL EVERY 8 HOURS PRN
Status: DISCONTINUED | OUTPATIENT
Start: 2022-01-12 | End: 2022-01-17 | Stop reason: HOSPADM

## 2022-01-12 RX ORDER — ASCORBIC ACID 500 MG
1000 TABLET ORAL DAILY
COMMUNITY

## 2022-01-12 RX ORDER — 0.9 % SODIUM CHLORIDE 0.9 %
1000 INTRAVENOUS SOLUTION INTRAVENOUS ONCE
Status: COMPLETED | OUTPATIENT
Start: 2022-01-12 | End: 2022-01-12

## 2022-01-12 RX ORDER — ALENDRONATE SODIUM 70 MG/1
70 TABLET ORAL WEEKLY
Status: DISCONTINUED | OUTPATIENT
Start: 2022-01-12 | End: 2022-01-12 | Stop reason: RX

## 2022-01-12 RX ORDER — ONDANSETRON 2 MG/ML
4 INJECTION INTRAMUSCULAR; INTRAVENOUS EVERY 6 HOURS PRN
Status: DISCONTINUED | OUTPATIENT
Start: 2022-01-12 | End: 2022-01-17 | Stop reason: HOSPADM

## 2022-01-12 RX ADMIN — ONDANSETRON 4 MG: 2 INJECTION INTRAMUSCULAR; INTRAVENOUS at 22:50

## 2022-01-12 RX ADMIN — OXYCODONE AND ACETAMINOPHEN 1 TABLET: 10; 325 TABLET ORAL at 17:10

## 2022-01-12 RX ADMIN — ONDANSETRON 4 MG: 2 INJECTION INTRAMUSCULAR; INTRAVENOUS at 17:10

## 2022-01-12 RX ADMIN — ENOXAPARIN SODIUM 40 MG: 40 INJECTION SUBCUTANEOUS at 22:49

## 2022-01-12 RX ADMIN — ONDANSETRON 4 MG: 2 INJECTION INTRAMUSCULAR; INTRAVENOUS at 12:58

## 2022-01-12 RX ADMIN — Medication 1000 MG: at 22:50

## 2022-01-12 RX ADMIN — FUROSEMIDE 20 MG: 10 INJECTION, SOLUTION INTRAMUSCULAR; INTRAVENOUS at 17:10

## 2022-01-12 RX ADMIN — Medication 2000 UNITS: at 22:49

## 2022-01-12 RX ADMIN — FUROSEMIDE 40 MG: 10 INJECTION, SOLUTION INTRAMUSCULAR; INTRAVENOUS at 22:50

## 2022-01-12 RX ADMIN — DOCUSATE SODIUM 100 MG: 100 CAPSULE, LIQUID FILLED ORAL at 22:49

## 2022-01-12 RX ADMIN — ATORVASTATIN CALCIUM 40 MG: 80 TABLET, FILM COATED ORAL at 22:49

## 2022-01-12 RX ADMIN — SODIUM CHLORIDE 1000 ML: 9 INJECTION, SOLUTION INTRAVENOUS at 12:57

## 2022-01-12 RX ADMIN — Medication 10 ML: at 22:50

## 2022-01-12 ASSESSMENT — PAIN DESCRIPTION - FREQUENCY
FREQUENCY: CONTINUOUS

## 2022-01-12 ASSESSMENT — PAIN DESCRIPTION - PROGRESSION
CLINICAL_PROGRESSION: NOT CHANGED
CLINICAL_PROGRESSION: NOT CHANGED

## 2022-01-12 ASSESSMENT — PAIN SCALES - GENERAL
PAINLEVEL_OUTOF10: 7
PAINLEVEL_OUTOF10: 7
PAINLEVEL_OUTOF10: 8
PAINLEVEL_OUTOF10: 8

## 2022-01-12 ASSESSMENT — PAIN DESCRIPTION - DESCRIPTORS: DESCRIPTORS: DULL;CRUSHING

## 2022-01-12 ASSESSMENT — PAIN DESCRIPTION - PAIN TYPE
TYPE: ACUTE PAIN
TYPE: ACUTE PAIN
TYPE: CHRONIC PAIN

## 2022-01-12 ASSESSMENT — PAIN DESCRIPTION - LOCATION
LOCATION: BACK

## 2022-01-12 ASSESSMENT — PAIN DESCRIPTION - ORIENTATION
ORIENTATION: LOWER;MID;UPPER
ORIENTATION: RIGHT;LEFT;MID;LOWER

## 2022-01-12 ASSESSMENT — PAIN - FUNCTIONAL ASSESSMENT: PAIN_FUNCTIONAL_ASSESSMENT: ACTIVITIES ARE NOT PREVENTED

## 2022-01-12 ASSESSMENT — PAIN DESCRIPTION - ONSET
ONSET: ON-GOING
ONSET: ON-GOING

## 2022-01-12 NOTE — ED PROVIDER NOTES
[facility]        Pt Name: Mando Munson  MRN: [id number]  [place] 10/16/1932  Date of evaluation: 1/12/2022  Provider: Precious Hobbs PA-C  PCP: DENIZ Fisher III - CNP  Note Started: 12:10 PM EST        I have seen and evaluated this patient with my supervising physician Felix Lopez MD.    [address] [facility]       Chief Complaint   Patient presents with    Emesis     N/V/D for the last two days, lives in independent living at [facility] zofran this morning states that symptoms started on [place] and have been intermittent, last couple days symptoms have persisted.  Fatigue       HISTORY OF PRESENT ILLNESS   (Location, Timing/Onset, Context/Setting, Quality, Duration, Modifying Factors, Severity, Associated Signs and Symptoms)  Note limiting factors. Chief Complaint: Nausea, weakness    Mando Munson is a 80 y.o. female who presents via EMS from [facility]. Patient with complaint of persisting intermittent nausea with dry heaving emesis over the past 3 weeks. However, the patient does state over the past week she has had increasing nausea. She reports no melena or hematemesis. Indicates loose stool yesterday. She also describes a weakness and fatigue. Patient recently on Cipro for UTI diagnosis. No urinary complaints at this time. Patient does state producing a lot of mucus and swallowing and believes this could be the cause of her nausea. She has her usual back pain and she has had multiple back problems or procedures. She reports no specific chest pain, shortness of breath, abdominal pain or lower extremity edema. She does have known GERD and currently on Nexium twice daily. Had COVID vaccination January 8 and January 21, 2021. She has not had booster that I see. Patient last had echo study on May 24, 2019 showed EF of 60%.     Nursing Notes were all reviewed and agreed with or any disagreements were addressed in the HPI. REVIEW OF SYSTEMS    (2-9 systems for level 4, 10 or more for level 5)     Review of Systems    Positives and Pertinent negatives as per HPI. Except as noted above in the ROS, all other systems were reviewed and negative.        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    Cystocele     Diabetes mellitus ([address]     type  2 diet controlled    Hepatitis A 1953    History of blood transfusion     hiatal hernia surgery    Hyperlipidemia     PVC (premature ventricular contraction)     Rectocele     Reflux     Scoliosis          SURGICAL HISTORY     Past Surgical History:   Procedure Laterality Date    APPENDECTOMY      BLADDER SUSPENSION      3 SURGERIES/ANTERIOR AND POSTERIOR REPAIR    BREAST SURGERY      RIGHT LUMPECTOMY AND SEVERAL BIOPSIES ON BOTH BREAST    BUNIONECTOMY  7/26/12    BUNIONECTOMY BILATERAL  FEET    CARDIAC CATHETERIZATION      AV block    CATARACT REMOVAL WITH IMPLANT Left 09/13/2018    CHOLECYSTECTOMY      COLONOSCOPY  2008    normal    CYSTOSCOPY  03/29/2017    U-dil    DILATION AND CURETTAGE OF UTERUS      SUNCTION    FOOT SURGERY      bilat foot surgeries    HERNIA REPAIR  6 YRS AGO    HIATAL HERNIA REPAIR- WIRED  RIBS    HYSTERECTOMY      VAGINAL    JOINT REPLACEMENT Left     TOTAL KNEE REPLACEMENT LEFT    LUMBAR SPINE SURGERY Right 5/15/2019    RIGHT L4 AND L5 TRANSFORAMINAL EPIDURAL STEROID INJECTION WITH FLUOROSCOPY performed by Severo Sherwood MD at [address] [place] [address] Left 09/22/14    removal rib wire    OTHER SURGICAL HISTORY Right 06/28/2018    PHACO EMULSIFICATION OF CATARACT WITH INTRAOCULAR LENS implant right eye    MD XCAPSL CTRC RMVL INSJ IO LENS PROSTH W/O ECP Left 9/13/2018    PHACO EMULSIFICATION OF CATARACT WITH INTRAOCULAR LENS IMPLANT LEFT EYE performed by Josias Lam MD at [address] Right 7/26/12    CA DEPOSIT TAKEN OFF RIGHT SHOULDER    UPPER GASTROINTESTINAL ENDOSCOPY  11/2/12         CURRENTMEDICATIONS       Previous Medications    ALENDRONATE (FOSAMAX) 70 MG TABLET    Take 70 mg by mouth once a week    ASPIRIN 325 MG EC TABLET    Take 1 tablet by mouth daily    ATORVASTATIN (LIPITOR) 40 MG TABLET    Take 1 tablet by mouth nightly    CHOLECALCIFEROL (VITAMIN D3) 2000 UNITS CAPS    Take 2,000 Units by mouth daily    DIPHENOXYLATE-ATROPINE (LOMOTIL) 2.5-0.025 MG PER TABLET    Take 1 tablet by mouth 4 times daily as needed. Brandie Maid DOCUSATE SODIUM (COLACE) 100 MG CAPSULE    Take 100 mg by mouth 2 times daily    ESOMEPRAZOLE MAGNESIUM (NEXIUM) 20 MG PACK    Take 20 mg by mouth daily Took 20 mg Nexium in am and 20 Mg Pepcid at bedtime. FLUTICASONE (FLONASE) 50 MCG/ACT NASAL SPRAY    1 spray by Each Nare route 2 times daily    FUROSEMIDE (LASIX) 20 MG TABLET    Take 1 tablet by mouth daily    MULTIPLE VITAMINS-MINERALS (OCUVITE ADULT 50+ PO)    Take by mouth    MULTIPLE VITAMINS-MINERALS (OCUVITE ADULT FORMULA PO)    Take 1 tablet by mouth daily    NALOXONE 4 MG/0.1ML LIQD NASAL SPRAY    1 spray by Nasal route as needed for Opioid Reversal    OMEPRAZOLE (PRILOSEC) 20 MG DELAYED RELEASE CAPSULE    Take 20 mg by mouth nightly    ONDANSETRON (ZOFRAN ODT) 4 MG DISINTEGRATING TABLET    Take 1 tablet by mouth every 8 hours as needed for Nausea or Vomiting    OXYCODONE-ACETAMINOPHEN (PERCOCET)  MG PER TABLET    TAKE 1 TABLET BY MOUTH EVERY 8 HOURS AS NEEDED    OXYCODONE-ACETAMINOPHEN (PERCOCET) 5-325 MG PER TABLET    Take 1 tablet by mouth every 4 hours as needed for Pain for up to 7 days.     PSEUDOEPHEDRINE-GUAIFENESIN (GUAIFENESIN 600/ PO)    Take by mouth    TIZANIDINE (ZANAFLEX) 2 MG TABLET    Take 2 tablets by mouth every 6 hours as needed (spasms)         ALLERGIES Patient has no known allergies. FAMILYHISTORY       Family History   Problem Relation Age of Onset    Diabetes Maternal Grandmother         type 2, great grandma type1    Cancer Maternal Grandfather         rectal and  scrotal cancer    Breast Cancer Daughter     Breast Cancer Maternal Aunt           SOCIAL HISTORY       Social History     Tobacco Use    Smoking status: Former Smoker     Packs/day: 0.50     Years: 15.00     Pack years: 7.50     Quit date: 2001     Years since quittin.0    Smokeless tobacco: Never Used    Tobacco comment: quit -    Vaping Use    Vaping Use: Never used   Substance Use Topics    Alcohol use: Yes     Alcohol/week: 1.0 standard drink     Types: 1 Glasses of wine per week     Comment: rare wine     Drug use: No       SCREENINGS    [place] Coma Scale  Eye Opening: Spontaneous  Best Verbal Response: Oriented  Best Motor Response: Obeys commands  [place] Coma Scale Score: 15        PHYSICAL EXAM    (up to 7 for level 4, 8 or more for level 5)     ED Triage Vitals [22 1107]   BP Temp Temp Source Pulse Resp SpO2 Height Weight   (!) 156/69 97.4 °F (36.3 °C) Oral 72 20 93 % 5' (1.524 m) 153 lb (69.4 kg)       Physical Exam  Vitals and nursing note reviewed. Constitutional:       Appearance: Normal appearance. She is well-developed and normal weight. HENT:      Head: Normocephalic and atraumatic. Right Ear: External ear normal.      Left Ear: External ear normal.      Nose: Nose normal.      Mouth/Throat:      Mouth: Mucous membranes are moist.      Pharynx: Oropharynx is clear. Eyes:      General: No scleral icterus. Right eye: No discharge. Left eye: No discharge. Conjunctiva/sclera: Conjunctivae normal.   Cardiovascular:      Rate and Rhythm: Normal rate and regular rhythm. Heart sounds: Murmur heard. Pulmonary:      Effort: Pulmonary effort is normal.      Breath sounds: Rales present.       Comments: Crackles noted throughout both lung mostly bases. Abdominal:      General: Abdomen is flat. Bowel sounds are normal.      Palpations: Abdomen is soft. Tenderness: There is no abdominal tenderness. Musculoskeletal:         General: Normal range of motion. Cervical back: Normal range of motion and neck supple. Right lower leg: No edema. Left lower leg: No edema. Skin:     General: Skin is warm and dry. Neurological:      General: No focal deficit present. Mental Status: She is alert and oriented to person, place, and time. Mental status is at baseline. Psychiatric:         Mood and Affect: Mood normal.         Behavior: Behavior normal.         Thought Content:  Thought content normal.         Judgment: Judgment normal.         DIAGNOSTIC RESULTS   LABS:    Labs Reviewed   CBC WITH AUTO DIFFERENTIAL - Abnormal; Notable for the following components:       Result Value    WBC 3.3 (*)     RBC 3.68 (*)     Hemoglobin 11.7 (*)     Hematocrit 34.5 (*)     All other components within normal limits    Narrative:     Performed at:  [facility] [address],  [employer]   Phone [phone]   BLOOD GAS, VENOUS - Abnormal; Notable for the following components:    pO2, Randal 44.7 (*)     Carboxyhemoglobin 2.9 (*)     All other components within normal limits    Narrative:     Performed at:  [facility]  [facility] [address],  [employer]   Phone [phone]   URINE RT REFLEX TO CULTURE - Abnormal; Notable for the following components:    Leukocyte Esterase, Urine MODERATE (*)     All other components within normal limits    Narrative:     Performed at:  [facility] [address],  [employer]   Phone [phone]   BRAIN NATRIURETIC PEPTIDE - Abnormal; Notable for the following components:    Pro-BNP 1,168 (*)     All other components within normal limits    Narrative:     Performed at:  [facility] [address] [place], [employer]   Phone [phone]   BASIC METABOLIC PANEL W/ REFLEX TO MG FOR LOW K - Abnormal; Notable for the following components:    Glucose 100 (*)     All other components within normal limits    Narrative:     Performed at:  [facility] [address] [employer], [address] [employer]   Phone [phone]   TROPONIN - Abnormal; Notable for the following components:    Troponin 0.02 (*)     All other components within normal limits    Narrative:     Performed at:  [facility] [address] [place], [employer]   Phone [phone]   RAPID INFLUENZA A/B ANTIGENS    Narrative:     Performed at:  [facility] [address] [place], [employer]   Phone [phone]   CULTURE, URINE   LACTIC ACID, PLASMA    Narrative:     Performed at:  [facility] [address], [place]   Phone [phone]   MICROSCOPIC URINALYSIS    Narrative:     Performed at:  [facility] [address], [employer]   Phone [phone]   SPECIMEN REJECTION    Narrative:     Performed at:  [facility] [address] [place], [place]   Phone [phone]   HEPATIC FUNCTION PANEL    Narrative:     Performed at:  [facility] [address], [place]   Phone [phone]   LIPASE    Narrative:     Performed at:  [facility] [address], [place]   Phone [phone]   PROCALCITONIN    Narrative:     Performed at:  [facility] [address]   Phone [phone]       When ordered only abnormal lab results are displayed.  All other labs were within normal range or not returned as of this dictation. EKG: When ordered, EKG's are interpreted by the Emergency Department Physician in the absence of a cardiologist.  Please see their note for interpretation of EKG. RADIOLOGY:   Non-plain film images such as CT, Ultrasound and MRI are read by the radiologist. Plain radiographic images are visualized and preliminarily interpreted by the ED Provider with the below findings:        Interpretation per the Radiologist below, if available at the time of this note:    CT ABDOMEN PELVIS WO CONTRAST Additional Contrast? None   Final Result   1. Scattered pulmonary opacities noted at the lung bases bilaterally,   secondary to infection or edema. 2. Stable intra and extrahepatic ductal dilatation in the setting of   cholecystectomy. Consider correlation with LFTs. 3. Nonobstructing punctate left renal calculus. 4. Diverticulosis without evidence of diverticulitis. XR CHEST PORTABLE   Final Result   Asymmetric airspace opacification in left mid and lower lung zone with a   small pleural effusion. Questionable small right pleural effusion as well. Pattern may represent pulmonary edema or a developing pattern of viral   pneumonia. No results found. PROCEDURES   Unless otherwise noted below, none     Procedures    CRITICAL CARE TIME   Critical Care  There was a high probability of life-threatening clinical deterioration in the patient's condition requiring my urgent intervention. Total critical care time with the patient was 40 minutes excluding separately reportable procedures. Critical care required due to patients finding of dyspnea on exertion, general weakness, CHF. Lasix IV administered. Patient will require admission.       CONSULTS:  IP CONSULT TO CARDIOLOGY  IP CONSULT TO HEART FAILURE NURSE/COORDINATOR  IP CONSULT TO DIETITIAN      EMERGENCY DEPARTMENT COURSE and DIFFERENTIAL DIAGNOSIS/MDM:   Vitals:    Vitals:    01/12/22 1230 01/12/22 1245 01/12/22 1300 01/12/22 1330   BP: (!) 162/74  (!) 155/63 (!) 155/67   Pulse: 75 77 76 79   Resp: 19 23 20 19   Temp:       TempSrc:       SpO2: 93% 94% 94% 93%   Weight:       Height:           Patient was given the following medications:  Medications   furosemide (LASIX) injection 20 mg (has no administration in time range)   ondansetron (ZOFRAN) injection 4 mg (4 mg IntraVENous Given 1/12/22 1258)   0.9 % sodium chloride bolus (1,000 mLs IntraVENous New Bag 1/12/22 1257)           This patient presenting from [facility] ECF. Patient with complaint progressive weakness, shortness of breath with exertion, nausea with occasional vomiting. Onset of symptoms late December with mild progression particular over the past week. Laboratory studies obtained showing an elevated proBNP. Weight minimally above previous baseline. Renal function normal.  Hepatic function normal.  The patient troponin 0.02. This is baseline. Procalcitonin and lactic acid not elevated. Rapid flu study negative. COVID study pending. 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 UA shows moderate leukocyte. Microscopic not supporting UTI. Culture pending. Imaging: Abdominal pelvic CT scan without IV contrast and chest x-ray obtained showing scattered pulmonary opacities mostly in the bases secondary to infection VS edema. Patient with elevated BNP will consider edema at this time. Chest x-ray also suggestive of patterning that would represent mild pulmonary edema versus a viral pneumonia. ED treatment: Lasix 20 mg IV. Patient on daily Lasix 20 mg p.o. I will contact Dr. Maria De Jesus Seo who admits for [facility]. At approximately 4:20 PM Dr. Jeni Ashley acknowledges and will admit the patient. FINAL IMPRESSION      1. Congestive heart failure, unspecified HF chronicity, unspecified heart failure type ([address]    2. Nausea and vomiting, intractability of vomiting not specified, unspecified vomiting type    3.  Dyspnea on exertion    4. General weakness          DISPOSITION/PLAN   DISPOSITION Admitted 01/12/2022 04:28:16 PM      PATIENT REFERRED TO:  No follow-up provider specified. DISCHARGE MEDICATIONS:  New Prescriptions    No medications on file       DISCONTINUED MEDICATIONS:  Discontinued Medications    No medications on file              (Please note that portions of this note were completed with a voice recognition program.  Efforts were made to edit the dictations but occasionally words are mis-transcribed. )    Be Sanchez PA-C (electronically signed)           Be Sanchez PA-C  01/12/22 2962

## 2022-01-12 NOTE — PROGRESS NOTES
Adamaris Peterson [phone] would like to know if Pt gets admitted to the hospital and was updated on lab results.

## 2022-01-12 NOTE — ED TRIAGE NOTES
Pt brought in by EMS from [facility] [address] [place] [address] for N/V/D for the last two days and having general fatigue, received both doses of Pfizer vaccine. Denies being around anyone with COVID. Also states that symptoms started in [place], but have been intermittent until today.

## 2022-01-12 NOTE — H&P
History and Physical  Dr. Park Castro  1/12/2022    PCP: Estephanie Morris III, APRN - CNP    Cc:   Chief Complaint   Patient presents with    Emesis     N/V/D for the last two days, lives in independent living at [facility] zofran this morning states that symptoms started on Mili and have been intermittent, last couple days symptoms have persisted.  Fatigue       HPI:  Figueroa Negron is a 80 y.o. female who has a past medical history of Arthritis, CAD (coronary artery disease), Cancer ([facility] [address] Cystocele, Diabetes mellitus ([facility] [address] Hepatitis A, History of blood transfusion, Hyperlipidemia, PVC (premature ventricular contraction), Rectocele, Reflux, and Scoliosis. Patient presents with CHF (congestive heart failure) ([facility] HPI  (1-3 for expanded problem focused, ?4 for detailed/comprehensive)     Figueroa Negron is a 80 y.o. female who presents via EMS from [place]. Patient with complaint of persisting intermittent nausea with dry heaving emesis over the past 3 weeks. However, the patient does state over the past week she has had increasing nausea. She reports no melena or hematemesis. Indicates loose stool yesterday. She also describes a weakness and fatigue. Patient recently on Cipro for UTI diagnosis. No urinary complaints at this time. Patient does state producing a lot of mucus and swallowing and believes this could be the cause of her nausea. She has her usual back pain and she has had multiple back problems or procedures. She reports no specific chest pain, shortness of breath, abdominal pain or lower extremity edema. She does have known GERD and currently on Nexium twice daily. Had COVID vaccination January 8 and January 21, 2021    However, given her sx, to have covid test done  Also poss fluid overload  BNP elevated  IV lasix given  She is on lasix at home    Problem list of hospitalization thus far:   Active Hospital Problems    Diagnosis     GERD (gastroesophageal reflux disease) [K21.9]     UTI (urinary tract infection) [N39.0]     CHF (congestive heart failure) (HCC) [I50.9]     Suspected COVID-19 virus infection [Z20.822]     Acute systolic heart failure (HCC) [I50.21]     Hyperlipidemia [E78.5]     Hypoxia [R09.02]          Review of Systems: (1 system for EPF, 2-9 for detailed, 10+ for comprehensive)  Constitutional: Negative for chills and fever. HENT: Negative for dental problem, nosebleeds and rhinorrhea. Eyes: Negative for photophobia and visual disturbance. Respiratory: Negative for cough, chest tightness and positive for shortness of breath. Cardiovascular: Negative for chest pain and leg swelling. Gastrointestinal: positive for diarrhea, nausea and vomiting. Endocrine: Negative for polydipsia and polyphagia. Genitourinary: Negative for frequency, hematuria and urgency. Musculoskeletal: positive for back pain and myalgias. Skin: Negative for rash. Allergic/Immunologic: Negative for food allergies. Neurological: Negative for dizziness, seizures, syncope and facial asymmetry. Hematological: Negative for adenopathy. Psychiatric/Behavioral: Negative for dysphoric mood. The patient is not nervous/anxious.              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 (1 area for detailed, 2-3 for comprehensive)      Code Status: Prior    Meds - following list of home medications fromelectronic chart has been reviewed by myself  Prior to Admission medications    Medication Sig Start Date End Date Taking? Authorizing Provider   ondansetron (ZOFRAN ODT) 4 MG disintegrating tablet Take 1 tablet by mouth every 8 hours as needed for Nausea or Vomiting 11/19/15  Yes Turner Barlow MD   Pseudoephedrine-guaiFENesin (GUAIFENESIN 600/ PO) Take by mouth    Historical Provider, MD   Multiple Vitamins-Minerals (OCUVITE ADULT 50+ PO) Take by mouth    Historical Provider, MD   oxyCODONE-acetaminophen (PERCOCET)  MG per tablet TAKE 1 TABLET BY MOUTH EVERY 8 HOURS AS NEEDED 6/20/19   Historical Provider, MD   oxyCODONE-acetaminophen (PERCOCET) 5-325 MG per tablet Take 1 tablet by mouth every 4 hours as needed for Pain for up to 7 days. 6/4/19 6/11/19  Mariah Barnett MD   aspirin 325 MG EC tablet Take 1 tablet by mouth daily 6/5/19   Mariah Barnett MD   atorvastatin (LIPITOR) 40 MG tablet Take 1 tablet by mouth nightly 6/4/19   Mariah Barnett MD   furosemide (LASIX) 20 MG tablet Take 1 tablet by mouth daily 6/4/19   Mariah Barnett MD   omeprazole (PRILOSEC) 20 MG delayed release capsule Take 20 mg by mouth nightly    Historical Provider, MD   naloxone 4 MG/0.1ML LIQD nasal spray 1 spray by Nasal route as needed for Opioid Reversal 5/1/19   Nia Pedraza MD   tiZANidine (ZANAFLEX) 2 MG tablet Take 2 tablets by mouth every 6 hours as needed (spasms) 4/11/19   Mariah Barnett MD   esomeprazole Magnesium (NEXIUM) 20 MG PACK Take 20 mg by mouth daily Took 20 mg Nexium in am and 20 Mg Pepcid at bedtime.     Historical Provider, MD   fluticasone (FLONASE) 50 MCG/ACT nasal spray 1 spray by Each Nare route 2 times daily    Historical Provider, MD   Multiple Vitamins-Minerals (OCUVITE ADULT FORMULA PO) Take 1 tablet by mouth daily    Historical Provider, MD   docusate sodium (COLACE) 100 MG capsule Take 100 mg by mouth 2 times daily    Historical Provider, MD   alendronate (FOSAMAX) 70 MG tablet Take 70 mg by mouth once a week 3/21/19   Historical Provider, MD   Cholecalciferol (VITAMIN D3) 2000 UNITS CAPS Take 2,000 Units by mouth daily    Historical Provider, MD   diphenoxylate-atropine (LOMOTIL) 2.5-0.025 MG per tablet Take 1 tablet by mouth 4 times daily as needed. . 12/19/14   Historical Provider, MD         No Known Allergies          EXAM: (2-7 system for EPF/Detailed, ?8 for Comprehensive)  BP (!) 155/67   Pulse 79   Temp 97.4 °F (36.3 °C) (Oral)   Resp 19   Ht 5' (1.524 m)   Wt 153 lb (69.4 kg)   SpO2 93%   BMI 29.88 kg/m²   Constitutional: vitals as above: alert, appears stated age and cooperative    Psychiatric: normal insight and judgment, oriented to person, place, time, and general circumstances    Head: Normocephalic, without obvious abnormality, atraumatic    Eyes:lids and lashes normal, conjunctivae and sclerae normal and pupils equal, round, reactive to light and accomodation    EMNT: external ears normal, nares midline    Neck: no carotid bruit, supple, symmetrical, trachea midline and thyroid not enlarged, symmetric, no tenderness/mass/nodules     Respiratory: crackles bilat  Cardiovascular: normal rate, regular rhythm, normal S1 and S2 and no murmurs    Gastrointestinal: soft, non-tender, non-distended, normal bowel sounds, no masses or organomegaly    Extremities: no clubbing, trace edema  Skin:No rashes or nodules noted.     Neurologic:negative         LABS:  Labs Reviewed   CBC WITH AUTO DIFFERENTIAL - Abnormal; Notable for the following components:       Result Value    WBC 3.3 (*)     RBC 3.68 (*)     Hemoglobin 11.7 (*)     Hematocrit 34.5 (*)     All other components within normal limits    Narrative:     Performed at:  [facility]  [address] [place] [address] [employer], [place]   Phone [phone]   BLOOD GAS, VENOUS - Abnormal; Notable for the following components:    pO2, Randal 44.7 (*)     Carboxyhemoglobin 2.9 (*)     All other components within normal limits    Narrative:     Performed at:  [facility] [address], [address]   Phone [phone]   URINE RT REFLEX TO CULTURE - Abnormal; Notable for the following components:    Leukocyte Esterase, Urine MODERATE (*)     All other components within normal limits    Narrative:     Performed at:  [facility] [address], [address]   Phone [phone]   BRAIN NATRIURETIC PEPTIDE - Abnormal; Notable for the following components:    Pro-BNP 1,168 (*)     All other components within normal limits    Narrative:     Performed at:  [facility] [address], [place]   Phone [phone]   BASIC METABOLIC PANEL W/ REFLEX TO MG FOR LOW K - Abnormal; Notable for the following components:    Glucose 100 (*)     All other components within normal limits    Narrative:     Performed at:  [facility] [address] [place], [place]   Phone [phone]   TROPONIN - Abnormal; Notable for the following components:    Troponin 0.02 (*)     All other components within normal limits    Narrative:     Performed at:  [facility] [address], [place]   Phone [phone]   [place] A/B ANTIGENS    Narrative:     Performed at:  [facility] [address], [place]   Phone [phone]   CULTURE, URINE   LACTIC ACID, PLASMA    Narrative:     Performed at:  [facility] [address], [place]   Phone [phone]   MICROSCOPIC URINALYSIS    Narrative:     Performed at:  [facility]  [address],  [place], [address]   Phone [phone]   SPECIMEN REJECTION    Narrative:     Performed at:  [facility]  [address],  [place], [address]   Phone [phone]   HEPATIC FUNCTION PANEL    Narrative:     Performed at:  [facility]  [address], [place]   Phone [phone]   LIPASE    Narrative:     Performed at:  [facility]  [address], [place]   Phone [phone]   PROCALCITONIN    Narrative:     Performed at:  [facility]  [address], [place]   Phone [phone]         IMAGING:  Imaging results from the ER have been reviewed in the computerized chart. CT ABDOMEN PELVIS WO CONTRAST Additional Contrast? None    Result Date: 1/12/2022  EXAMINATION: CT OF THE ABDOMEN AND PELVIS WITHOUT CONTRAST 1/12/2022 1:12 pm TECHNIQUE: CT of the abdomen and pelvis was performed without the administration of intravenous contrast. Multiplanar reformatted images are provided for review. Dose modulation, iterative reconstruction, and/or weight based adjustment of the mA/kV was utilized to reduce the radiation dose to as low as reasonably achievable. COMPARISON: CT chest abdomen and pelvis 12/16/2020 HISTORY: ORDERING SYSTEM PROVIDED HISTORY: Nausea, vomiting, diarrhea TECHNOLOGIST PROVIDED HISTORY: Reason for exam:->Nausea, vomiting, diarrhea Additional Contrast?->None Decision Support Exception - unselect if not a suspected or confirmed emergency medical condition->Emergency Medical Condition (MA) Reason for Exam: n./v/d FINDINGS: Lower Chest: There are scattered pulmonary opacities at the lung bases. More consolidative change with bronchiectasis seen the lingula. A large hiatal hernia is noted.  Organs: Evaluation of the solid abdominal viscera and vascular structures is limited without IV contrast. Again noted is intra and extrahepatic biliary dilatation, measuring up to 3 cm. This is unchanged since the prior study. Gallbladder surgically removed. The spleen is unremarkable. Adrenals are unremarkable. Symmetric renal contours. No hydronephrosis. The bladder is mildly distended. Punctate nonobstructing stone seen in the superior left renal pole. GI/Bowel: No dilated loops of small or large bowel. The appendix is normal. No pericolonic inflammatory change. Peritoneum/Retroperitoneum: No free fluid or free air. No abdominal aortic aneurysm. Bones/Soft Tissues: Diffuse osseous demineralization is noted. Multilevel degenerative changes of the spine are present. Patient has chronic height loss seen involving the L5 and L4 vertebral bodies, not significantly changed since prior. Patient had vertebral augmentation at the T12 level. 1. Scattered pulmonary opacities noted at the lung bases bilaterally, secondary to infection or edema. 2. Stable intra and extrahepatic ductal dilatation in the setting of cholecystectomy. Consider correlation with LFTs. 3. Nonobstructing punctate left renal calculus. 4. Diverticulosis without evidence of diverticulitis. XR CHEST PORTABLE    Result Date: 1/12/2022  EXAMINATION: ONE XRAY VIEW OF THE CHEST 1/12/2022 12:15 pm COMPARISON: 05/24/2019 radiograph HISTORY: ORDERING SYSTEM PROVIDED HISTORY: Weakness, crackles bilateral chest TECHNOLOGIST PROVIDED HISTORY: Reason for exam:->Weakness, crackles bilateral chest Reason for Exam: Weakness, crackles bilateral chest FINDINGS: The heart is enlarged. Moderate-sized hiatal hernia stable in the lower chest.  Mild perihilar opacities are stable centrally. There is blunting of the left costophrenic sulcus with bibasilar ground-glass airspace opacities in both lungs. Asymmetric ground-glass opacification in the left mid and lower lung zone. Upper lungs are clear with underlying pattern of COPD.   No skeletal finding. Asymmetric airspace opacification in left mid and lower lung zone with a small pleural effusion. Questionable small right pleural effusion as well. Pattern may represent pulmonary edema or a developing pattern of viral pneumonia. EKG:   EKG from ER, reviewed by self - it shows sinus rhythm at 72  Old chart reviewed, EKG dated 5/25/19 is reviewed, there is not difference noted. Old study shows sinus at 71    Lab Results   Component Value Date    GLUCOSE 100 01/12/2022     Lab Results   Component Value Date    POCGLU 112 05/15/2019     BP (!) 155/67   Pulse 79   Temp 97.4 °F (36.3 °C) (Oral)   Resp 19   Ht 5' (1.524 m)   Wt 153 lb (69.4 kg)   SpO2 93%   BMI 29.88 kg/m²     MEDICAL DECISION MAKING:    Principal Problem:    CHF (congestive heart failure) (HCC) -New Problem to me. Pt with apparent overload, dyspnea  Plan: Place on telemetry floor. Start on iv diuretics. Monitor strict I/o. Check ECHO. Cards asked to see. Trend cardiac enzymes. Active Problems:    Hypoxia     Hyperlipidemia -Established problem. Stable. Plan: stay on statin    GERD (gastroesophageal reflux disease) -Established problem. Stable. Plan: cont PPI. Iv zofran ordered prn for nausea    UTI (urinary tract infection) -Established problem. Stable. +u/a in ER  Plan: change to iv rocephin. Was on cipro as outpt    Suspected COVID-19 virus infection -New Problem to me. Plan: place in isolation while we await swab results    Acute systolic heart failure ([facility] [address]          Diagnoses as listed above, designated as new or established and plan outlined for each. Data Reviewed:   (1) Lab tests were reviewed or ordered. (1) Radiology tests were reviewed or ordered. (1) Medical test (Echo, EKG, PFT/vincent) were ordered. (1)History was not obtained from someone other than patient  (1) Old records were reviewed - see HPI/MDM for pertinent details if review done.   (2) Case was discussed with another health care provider: Loren TELLEZ  (2) Imaging was viewed by myself. (2) EKG  was viewed by myself. The patient is being placed in inpatient status with the expectation of requiring a hospital stay spanning at least two midnights for care and treatment of the problems noted in the problem list.  This determination is also based on thepatients comorbidities and past medical history, the severity and timing of the signs and symptoms upon presentation.     (Please note that portions of this note were completed with a voice recognition program.  Efforts were made to edit the dictations but occasionally words are mis-transcribed.)      Electronically signed by: Soumya Chavez MD 1/12/2022

## 2022-01-12 NOTE — ED PROVIDER NOTES
[address] PROVIDER NOTE    Patient Identification  Pt Name: Ronnie Ramirez  MRN: [id number]  Emma 10/16/1932  Date of evaluation: 1/12/2022  Provider: Diane Serrano MD  PCP: Annemarie Curry III, APRN - CNP    Chief Complaint  Emesis (N/V/D for the last two days, lives in independent living at [facility] took zofran this morning states that symptoms started on [place] and have been intermittent, last couple days symptoms have persisted.) and Fatigue      HPI  History provided by patient   This is a 80 y.o. female who presents to the ED for nausea, vomiting, diarrhea, generalized weakness. Has dyspnea on exertion. Denies chest discomfort. Her nausea and vomiting have been ongoing for the past 2 to 3 weeks. She endorses lots of mucus production in her saliva for many months. Denies weight gain. No fever. Some cough. Can barely walk 5 steps without needing to sit. Lives in independent living    ROS  12 systems reviewed, pertinent positives/negatives per HPI otherwise noted to be negative. I have reviewed the following nursing documentation:  Allergies: Patient has no known allergies.     Past medical history:   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    Cystocele     Diabetes mellitus (HCC)     type  2 diet controlled    Hepatitis A 1953    History of blood transfusion     hiatal hernia surgery    Hyperlipidemia     PVC (premature ventricular contraction)     Rectocele     Reflux     Scoliosis      Past surgical history:   Past Surgical History:   Procedure Laterality Date    APPENDECTOMY      BLADDER SUSPENSION      3 SURGERIES/ANTERIOR AND POSTERIOR REPAIR    BREAST SURGERY      RIGHT LUMPECTOMY AND SEVERAL BIOPSIES ON BOTH BREAST    BUNIONECTOMY  7/26/12    BUNIONECTOMY BILATERAL 50+ PO)    Take by mouth    MULTIPLE VITAMINS-MINERALS (OCUVITE ADULT FORMULA PO)    Take 1 tablet by mouth daily    NALOXONE 4 MG/0.1ML LIQD NASAL SPRAY    1 spray by Nasal route as needed for Opioid Reversal    OMEPRAZOLE (PRILOSEC) 20 MG DELAYED RELEASE CAPSULE    Take 20 mg by mouth nightly    ONDANSETRON (ZOFRAN ODT) 4 MG DISINTEGRATING TABLET    Take 1 tablet by mouth every 8 hours as needed for Nausea or Vomiting    OXYCODONE-ACETAMINOPHEN (PERCOCET)  MG PER TABLET    TAKE 1 TABLET BY MOUTH EVERY 8 HOURS AS NEEDED    OXYCODONE-ACETAMINOPHEN (PERCOCET) 5-325 MG PER TABLET    Take 1 tablet by mouth every 4 hours as needed for Pain for up to 7 days. PSEUDOEPHEDRINE-GUAIFENESIN (GUAIFENESIN 600/ PO)    Take by mouth    TIZANIDINE (ZANAFLEX) 2 MG TABLET    Take 2 tablets by mouth every 6 hours as needed (spasms)       Social history:  reports that she quit smoking about 21 years ago. She has a 7.50 pack-year smoking history. She has never used smokeless tobacco. She reports current alcohol use of about 1.0 standard drink of alcohol per week. She reports that she does not use drugs. Family history:    Family History   Problem Relation Age of Onset    Diabetes Maternal Grandmother         type 2, great grandma type1    Cancer Maternal Grandfather         rectal and  scrotal cancer    Breast Cancer Daughter     Breast Cancer Maternal Aunt          Exam  ED Triage Vitals [01/12/22 1107]   BP Temp Temp Source Pulse Resp SpO2 Height Weight   (!) 156/69 97.4 °F (36.3 °C) Oral 72 20 93 % 5' (1.524 m) 153 lb (69.4 kg)     Nursing note and vitals reviewed. Constitutional: In no acute distress  HENT:      Head: Normocephalic      Ears: External ears normal.      Nose: Nose normal.     Mouth: Membrane mucosa moist   Eyes: No discharge. Neck: Supple. Trachea midline. Cardiovascular: Regular rate. Warm extremities  Pulmonary/Chest: Effort normal. No respiratory distress.   Mild rales at bases  Abdominal: Soft. No distension. Nontender  : Deferred  Rectal: Deferred   Musculoskeletal: Moves all extremities. No gross deformity. Neurological: Alert and oriented. Face symmetric. Speech is clear. Skin: Warm and dry. Psychiatric: Normal mood and affect. Behavior is normal.    Procedures      EKG  The Ekg interpreted by me in the absence of a cardiologist shows. Normal sinus rhythm. No specific ST changes appreciated. HR 72  No significant change from prior EKG dated 5/19  1st deg av block      Radiology  CT ABDOMEN PELVIS WO CONTRAST Additional Contrast? None   Final Result   1. Scattered pulmonary opacities noted at the lung bases bilaterally,   secondary to infection or edema. 2. Stable intra and extrahepatic ductal dilatation in the setting of   cholecystectomy. Consider correlation with LFTs. 3. Nonobstructing punctate left renal calculus. 4. Diverticulosis without evidence of diverticulitis. XR CHEST PORTABLE   Final Result   Asymmetric airspace opacification in left mid and lower lung zone with a   small pleural effusion. Questionable small right pleural effusion as well. Pattern may represent pulmonary edema or a developing pattern of viral   pneumonia.              Labs  Results for orders placed or performed during the hospital encounter of 01/12/22   Rapid influenza A/B antigens    Specimen: Nasopharyngeal   Result Value Ref Range    Rapid Influenza A Ag Negative Negative    Rapid Influenza B Ag Negative Negative   CBC Auto Differential   Result Value Ref Range    WBC 3.3 (L) 4.0 - 11.0 K/uL    RBC 3.68 (L) 4.00 - 5.20 M/uL    Hemoglobin 11.7 (L) 12.0 - 16.0 g/dL    Hematocrit 34.5 (L) 36.0 - 48.0 %    MCV 93.6 80.0 - 100.0 fL    MCH 31.9 26.0 - 34.0 pg    MCHC 34.0 31.0 - 36.0 g/dL    RDW 14.3 12.4 - 15.4 %    Platelets 780 120 - 471 K/uL    MPV 7.5 5.0 - 10.5 fL    Neutrophils % 60.1 %    Lymphocytes % 32.6 %    Monocytes % 5.9 %    Eosinophils % 0.8 %    Basophils % 0.6 %    Neutrophils Absolute 2.0 1.7 - 7.7 K/uL    Lymphocytes Absolute 1.1 1.0 - 5.1 K/uL    Monocytes Absolute 0.2 0.0 - 1.3 K/uL    Eosinophils Absolute 0.0 0.0 - 0.6 K/uL    Basophils Absolute 0.0 0.0 - 0.2 K/uL   Blood Gas, Venous   Result Value Ref Range    pH, Randal 7.438 7.350 - 7.450    pCO2, Randal 40.3 40.0 - 50.0 mmHg    pO2, Randal 44.7 (H) 25.0 - 40.0 mmHg    HCO3, Venous 27.2 23.0 - 29.0 mmol/L    Base Excess, Randal 2.8 -3.0 - 3.0 mmol/L    O2 Sat, Randal 84 Not Established %    Carboxyhemoglobin 2.9 (H) 0.0 - 1.5 %    MetHgb, Randal 0.3 <1.5 %    TC02 (Calc), Randal 64 Not Established mmol/L    O2 Content, Randal 13 Not Established VOL %    O2 Therapy Unknown     Hemoglobin, Randal, Reduced 16 %   Urinalysis Reflex to Culture    Specimen: Urine, clean catch   Result Value Ref Range    Color, UA YELLOW Straw/Yellow    Clarity, UA Clear Clear    Glucose, Ur Negative Negative mg/dL    Bilirubin Urine Negative Negative    Ketones, Urine Negative Negative mg/dL    Specific Gravity, UA 1.007 1.005 - 1.030    Blood, Urine Negative Negative    pH, UA 7.0 5.0 - 8.0    Protein, UA Negative Negative mg/dL    Urobilinogen, Urine 0.2 <2.0 E.U./dL    Nitrite, Urine Negative Negative    Leukocyte Esterase, Urine MODERATE (A) Negative    Microscopic Examination YES     Urine Type NotGiven     Urine Reflex to Culture Not Indicated    Lactic Acid, Plasma   Result Value Ref Range    Lactic Acid 1.2 0.4 - 2.0 mmol/L   Microscopic Urinalysis   Result Value Ref Range    Hyaline Casts, UA 0 0 - 8 /LPF    WBC, UA 3 0 - 5 /HPF    RBC, UA 1 0 - 4 /HPF    Epithelial Cells, UA 1 0 - 5 /HPF   SPECIMEN REJECTION   Result Value Ref Range    Rejected Test bnp bmp liver t     Reason for Rejection see below    Brain Natriuretic Peptide   Result Value Ref Range    Pro-BNP 1,168 (H) 0 - 449 pg/mL   Basic Metabolic Panel w/ Reflex to MG   Result Value Ref Range    Sodium 137 136 - 145 mmol/L    Potassium reflex Magnesium 4.7 3.5 - 5.1 mmol/L    Chloride 103 99 - 110 mmol/L    CO2 23 21 - 32 mmol/L    Anion Gap 11 3 - 16    Glucose 100 (H) 70 - 99 mg/dL    BUN 11 7 - 20 mg/dL    CREATININE 0.7 0.6 - 1.2 mg/dL    GFR Non-African American >60 >60    GFR African American >60 >60    Calcium 8.7 8.3 - 10.6 mg/dL   Hepatic Function Panel   Result Value Ref Range    Total Protein 6.4 6.4 - 8.2 g/dL    Albumin 3.7 3.4 - 5.0 g/dL    Alkaline Phosphatase 48 40 - 129 U/L    ALT 10 10 - 40 U/L    AST 31 15 - 37 U/L    Total Bilirubin 0.5 0.0 - 1.0 mg/dL    Bilirubin, Direct <0.2 0.0 - 0.3 mg/dL    Bilirubin, Indirect see below 0.0 - 1.0 mg/dL   Lipase   Result Value Ref Range    Lipase 13.0 13.0 - 60.0 U/L   Troponin   Result Value Ref Range    Troponin 0.02 (H) <0.01 ng/mL   Procalcitonin   Result Value Ref Range    Procalcitonin 0.03 0.00 - 0.15 ng/mL   EKG 12 Lead   Result Value Ref Range    Ventricular Rate 72 BPM    Atrial Rate 72 BPM    P-R Interval 360 ms    QRS Duration 96 ms    Q-T Interval 398 ms    QTc Calculation (Bazett) 435 ms    P Axis 36 degrees    R Axis -27 degrees    T Axis 8 degrees    Diagnosis       Sinus rhythm with 1st degree A-V blockOtherwise normal ECG       Screenings   [place] Coma Scale  Eye Opening: Spontaneous  Best Verbal Response: Oriented  Best Motor Response: Obeys commands  [place] Coma Scale Score: 15       MDM and ED Course  This is a 80 y.o. female who presents to the ED for weakness, nausea, vomiting, dyspnea on exertion. We attempted to ambulate the patient but she could barely walk 5 steps and she lives in an independent living. Will require admission for this. Found to have elevated BNP and possible pulmonary edema. No hypoxia. Giving lasix. Obtained CT abdomen pelvis which did not reveal any acute abnormalities or obstruction to explain patient's nausea and vomiting. [unfilled]    Final Impression  1. Congestive heart failure, unspecified HF chronicity, unspecified heart failure type ([facility] [address]    2.  Nausea and vomiting, intractability of vomiting not specified, unspecified vomiting type    3. Dyspnea on exertion    4. General weakness        Blood pressure (!) 155/67, pulse 79, temperature 97.4 °F (36.3 °C), temperature source Oral, resp. rate 19, height 5' (1.524 m), weight 153 lb (69.4 kg), SpO2 93 %, not currently breastfeeding. Disposition:  DISPOSITION Admitted 01/12/2022 04:28:16 PM      Patient Referrals:  No follow-up provider specified. Discharge Medications:  New Prescriptions    No medications on file       Discontinued Medications:  Discontinued Medications    No medications on file       This chart was generated using the [address] [place] [address] dictation system. I created this record but it may contain dictation errors given the limitations of this technology.        Thedore Mcardle, MD  01/12/22 1945

## 2022-01-13 PROBLEM — U07.1 COVID-19: Status: ACTIVE | Noted: 2022-01-13

## 2022-01-13 LAB
A/G RATIO: 1.3 (ref 1.1–2.2)
ALBUMIN SERPL-MCNC: 3.7 G/DL (ref 3.4–5)
ALP BLD-CCNC: 51 U/L (ref 40–129)
ALT SERPL-CCNC: 10 U/L (ref 10–40)
ANION GAP SERPL CALCULATED.3IONS-SCNC: 11 MMOL/L (ref 3–16)
AST SERPL-CCNC: 21 U/L (ref 15–37)
BASOPHILS ABSOLUTE: 0 K/UL (ref 0–0.2)
BASOPHILS RELATIVE PERCENT: 0.4 %
BILIRUB SERPL-MCNC: 0.5 MG/DL (ref 0–1)
BUN BLDV-MCNC: 13 MG/DL (ref 7–20)
C-REACTIVE PROTEIN: <3 MG/L (ref 0–5.1)
CALCIUM SERPL-MCNC: 9 MG/DL (ref 8.3–10.6)
CHLORIDE BLD-SCNC: 99 MMOL/L (ref 99–110)
CHOLESTEROL, TOTAL: 161 MG/DL (ref 0–199)
CO2: 29 MMOL/L (ref 21–32)
CREAT SERPL-MCNC: 1.3 MG/DL (ref 0.6–1.2)
D DIMER: 204 NG/ML DDU (ref 0–229)
EKG ATRIAL RATE: 72 BPM
EKG DIAGNOSIS: NORMAL
EKG P AXIS: 36 DEGREES
EKG P-R INTERVAL: 360 MS
EKG Q-T INTERVAL: 398 MS
EKG QRS DURATION: 96 MS
EKG QTC CALCULATION (BAZETT): 435 MS
EKG R AXIS: -27 DEGREES
EKG T AXIS: 8 DEGREES
EKG VENTRICULAR RATE: 72 BPM
EOSINOPHILS ABSOLUTE: 0.1 K/UL (ref 0–0.6)
EOSINOPHILS RELATIVE PERCENT: 1.7 %
GFR AFRICAN AMERICAN: 47
GFR NON-AFRICAN AMERICAN: 39
GLUCOSE BLD-MCNC: 109 MG/DL (ref 70–99)
HCT VFR BLD CALC: 33.3 % (ref 36–48)
HDLC SERPL-MCNC: 76 MG/DL (ref 40–60)
HEMOGLOBIN: 11.2 G/DL (ref 12–16)
LDL CHOLESTEROL CALCULATED: 71 MG/DL
LV EF: 65 %
LVEF MODALITY: NORMAL
LYMPHOCYTES ABSOLUTE: 1.4 K/UL (ref 1–5.1)
LYMPHOCYTES RELATIVE PERCENT: 43.7 %
MAGNESIUM: 1.9 MG/DL (ref 1.8–2.4)
MCH RBC QN AUTO: 31.5 PG (ref 26–34)
MCHC RBC AUTO-ENTMCNC: 33.6 G/DL (ref 31–36)
MCV RBC AUTO: 93.9 FL (ref 80–100)
MONOCYTES ABSOLUTE: 0.3 K/UL (ref 0–1.3)
MONOCYTES RELATIVE PERCENT: 9.2 %
NEUTROPHILS ABSOLUTE: 1.5 K/UL (ref 1.7–7.7)
NEUTROPHILS RELATIVE PERCENT: 45 %
PDW BLD-RTO: 14.2 % (ref 12.4–15.4)
PLATELET # BLD: 163 K/UL (ref 135–450)
PMV BLD AUTO: 7.5 FL (ref 5–10.5)
POTASSIUM SERPL-SCNC: 3.6 MMOL/L (ref 3.5–5.1)
PROCALCITONIN: 0.04 NG/ML (ref 0–0.15)
RBC # BLD: 3.55 M/UL (ref 4–5.2)
SARS-COV-2: DETECTED
SODIUM BLD-SCNC: 139 MMOL/L (ref 136–145)
T4 FREE: 1.5 NG/DL (ref 0.9–1.8)
TOTAL PROTEIN: 6.5 G/DL (ref 6.4–8.2)
TRIGL SERPL-MCNC: 69 MG/DL (ref 0–150)
TSH SERPL DL<=0.05 MIU/L-ACNC: 2 UIU/ML (ref 0.27–4.2)
URINE CULTURE, ROUTINE: NORMAL
VLDLC SERPL CALC-MCNC: 14 MG/DL
WBC # BLD: 3.3 K/UL (ref 4–11)

## 2022-01-13 PROCEDURE — 83735 ASSAY OF MAGNESIUM: CPT

## 2022-01-13 PROCEDURE — 2060000000 HC ICU INTERMEDIATE R&B

## 2022-01-13 PROCEDURE — 6370000000 HC RX 637 (ALT 250 FOR IP): Performed by: INTERNAL MEDICINE

## 2022-01-13 PROCEDURE — 97166 OT EVAL MOD COMPLEX 45 MIN: CPT

## 2022-01-13 PROCEDURE — 97535 SELF CARE MNGMENT TRAINING: CPT

## 2022-01-13 PROCEDURE — 97530 THERAPEUTIC ACTIVITIES: CPT

## 2022-01-13 PROCEDURE — 99223 1ST HOSP IP/OBS HIGH 75: CPT | Performed by: INTERNAL MEDICINE

## 2022-01-13 PROCEDURE — 85025 COMPLETE CBC W/AUTO DIFF WBC: CPT

## 2022-01-13 PROCEDURE — 6360000002 HC RX W HCPCS: Performed by: INTERNAL MEDICINE

## 2022-01-13 PROCEDURE — 97162 PT EVAL MOD COMPLEX 30 MIN: CPT

## 2022-01-13 PROCEDURE — 93010 ELECTROCARDIOGRAM REPORT: CPT | Performed by: INTERNAL MEDICINE

## 2022-01-13 PROCEDURE — 84145 PROCALCITONIN (PCT): CPT

## 2022-01-13 PROCEDURE — 80053 COMPREHEN METABOLIC PANEL: CPT

## 2022-01-13 PROCEDURE — 85379 FIBRIN DEGRADATION QUANT: CPT

## 2022-01-13 PROCEDURE — 36415 COLL VENOUS BLD VENIPUNCTURE: CPT

## 2022-01-13 PROCEDURE — 80061 LIPID PANEL: CPT

## 2022-01-13 PROCEDURE — 2580000003 HC RX 258: Performed by: INTERNAL MEDICINE

## 2022-01-13 PROCEDURE — 93306 TTE W/DOPPLER COMPLETE: CPT

## 2022-01-13 PROCEDURE — 86140 C-REACTIVE PROTEIN: CPT

## 2022-01-13 RX ORDER — DEXAMETHASONE SODIUM PHOSPHATE 10 MG/ML
6 INJECTION, SOLUTION INTRAMUSCULAR; INTRAVENOUS EVERY 24 HOURS
Status: DISCONTINUED | OUTPATIENT
Start: 2022-01-13 | End: 2022-01-16

## 2022-01-13 RX ADMIN — SODIUM CHLORIDE, PRESERVATIVE FREE 10 ML: 5 INJECTION INTRAVENOUS at 07:05

## 2022-01-13 RX ADMIN — ASPIRIN 325 MG: 325 TABLET, COATED ORAL at 09:03

## 2022-01-13 RX ADMIN — FUROSEMIDE 40 MG: 10 INJECTION, SOLUTION INTRAMUSCULAR; INTRAVENOUS at 14:47

## 2022-01-13 RX ADMIN — Medication 1000 MG: at 20:05

## 2022-01-13 RX ADMIN — ATORVASTATIN CALCIUM 40 MG: 80 TABLET, FILM COATED ORAL at 20:06

## 2022-01-13 RX ADMIN — OXYCODONE AND ACETAMINOPHEN 1 TABLET: 10; 325 TABLET ORAL at 15:36

## 2022-01-13 RX ADMIN — OXYCODONE AND ACETAMINOPHEN 1 TABLET: 10; 325 TABLET ORAL at 07:05

## 2022-01-13 RX ADMIN — Medication 2000 UNITS: at 09:03

## 2022-01-13 RX ADMIN — ENOXAPARIN SODIUM 30 MG: 30 INJECTION SUBCUTANEOUS at 20:06

## 2022-01-13 RX ADMIN — PANTOPRAZOLE SODIUM 40 MG: 40 TABLET, DELAYED RELEASE ORAL at 07:52

## 2022-01-13 RX ADMIN — ONDANSETRON 4 MG: 2 INJECTION INTRAMUSCULAR; INTRAVENOUS at 14:47

## 2022-01-13 RX ADMIN — OXYCODONE AND ACETAMINOPHEN 1 TABLET: 10; 325 TABLET ORAL at 23:49

## 2022-01-13 RX ADMIN — ONDANSETRON 4 MG: 2 INJECTION INTRAMUSCULAR; INTRAVENOUS at 23:49

## 2022-01-13 RX ADMIN — SODIUM CHLORIDE, PRESERVATIVE FREE 10 ML: 5 INJECTION INTRAVENOUS at 23:49

## 2022-01-13 RX ADMIN — FUROSEMIDE 40 MG: 10 INJECTION, SOLUTION INTRAMUSCULAR; INTRAVENOUS at 09:04

## 2022-01-13 RX ADMIN — Medication 10 ML: at 14:47

## 2022-01-13 RX ADMIN — DOCUSATE SODIUM 100 MG: 100 CAPSULE, LIQUID FILLED ORAL at 09:04

## 2022-01-13 RX ADMIN — Medication 10 ML: at 20:06

## 2022-01-13 RX ADMIN — FUROSEMIDE 40 MG: 10 INJECTION, SOLUTION INTRAMUSCULAR; INTRAVENOUS at 20:06

## 2022-01-13 RX ADMIN — Medication 10 ML: at 09:04

## 2022-01-13 RX ADMIN — ONDANSETRON 4 MG: 2 INJECTION INTRAMUSCULAR; INTRAVENOUS at 07:05

## 2022-01-13 RX ADMIN — DOCUSATE SODIUM 100 MG: 100 CAPSULE, LIQUID FILLED ORAL at 20:06

## 2022-01-13 RX ADMIN — DEXAMETHASONE SODIUM PHOSPHATE 6 MG: 10 INJECTION INTRAMUSCULAR; INTRAVENOUS at 09:04

## 2022-01-13 ASSESSMENT — PAIN SCALES - WONG BAKER
WONGBAKER_NUMERICALRESPONSE: 0

## 2022-01-13 ASSESSMENT — PAIN DESCRIPTION - PROGRESSION

## 2022-01-13 ASSESSMENT — PAIN SCALES - GENERAL
PAINLEVEL_OUTOF10: 8
PAINLEVEL_OUTOF10: 7
PAINLEVEL_OUTOF10: 8
PAINLEVEL_OUTOF10: 8

## 2022-01-13 ASSESSMENT — PAIN DESCRIPTION - PAIN TYPE
TYPE: ACUTE PAIN
TYPE: ACUTE PAIN

## 2022-01-13 ASSESSMENT — PAIN DESCRIPTION - LOCATION
LOCATION: BACK

## 2022-01-13 ASSESSMENT — PAIN DESCRIPTION - ORIENTATION: ORIENTATION: RIGHT;MID;LOWER

## 2022-01-13 NOTE — DISCHARGE INSTR - DIET
Good nutrition is important when healing from an illness, injury, or surgery. Follow any nutrition recommendations given to you during your hospital stay. If you were given an oral nutrition supplement while in the hospital, continue to take this supplement at home. You can take it with meals, in-between meals, and/or before bedtime. These supplements can be purchased at most local grocery stores, pharmacies, and chain super-stores. If you have any questions about your diet or nutrition, call the hospital and ask for the dietitian. For nutrition questions after discharge please call the Registered Dietitian at [phone]    Heart Failure Nutrition Therapy  This diet will help you feel better and support your heart by reducing symptoms of fluid retention, shortness of breath and swelling. You should focus on:  Limiting sodium in your diet by reading labels and limiting foods high in sodium. Limit your daily sodium intake to 2,000 mg per day. Select foods with 140 mg of sodium or less per serving. Foods with more than 300 mg of sodium per serving may not fit into a reduced-sodium meal plan. Check serving sizes. If you eat more than 1 serving, you will get more sodium than the amount listed. Limiting fluid in your diet. Ask your doctor how much fluid you can have per day  Remember foods that are liquid at room temperature such as popsicles, soup, ice cream and Jell-O are fluids. Checking your weight to make sure you're not retaining too much fluid. Weigh yourself every morning. If you gain 3 or more pounds in one day or 5 pounds within 1 week, call your doctor. Foods to choose and avoid:  Avoid processed foods. Eat more fresh foods. Fresh and frozen fruits and vegetables are good choices. Choose fresh meats. Avoid processed meats such as resendiz, sausage and hot dogs. Do not add salt to your food while cooking or at the table.   Try dry or fresh herbs, pepper, lemon juice, or a sodium-free seasoning blend such as Mrs. Dash to add flavor to food. Do not use a salt substitute. Use caution at [employer] foods are high in sodium. Ask for your food to be cooked without salt and request sauces and dressing to come on the side. 

## 2022-01-13 NOTE — PROGRESS NOTES
Occupational Therapy   Occupational Therapy Initial Assessment  Date: 2022   Patient Name: Figueroa Negron  MRN: [id number]     : 10/16/1932    Date of Service: 2022    Discharge Recommendations:  Figueroa Negron scored a 17/24 on the AM-PAC ADL Inpatient form. Current research shows that an AM-PAC score of 17 or less is typically not associated with a discharge to the patient's home setting. Based on the patient's AM-PAC score and their current ADL deficits, it is recommended that the patient have 5-7 sessions per week of Occupational Therapy at d/c to increase the patient's independence. At this time, this patient demonstrates the endurance, and/or tolerance for 3 hours of therapy each day, with a treatment frequency of 5-7x/wk. Please see assessment section for further patient specific details. If patient discharges prior to next session this note will serve as a discharge summary. Please see below for the latest assessment towards goals. OT Equipment Recommendations  Equipment Needed: No (pt has needed DME)    Assessment   Performance deficits / Impairments: Decreased functional mobility ; Decreased ADL status; Decreased endurance;Decreased strength;Decreased balance  Assessment: pt fatigues quickly, low endurance noted this date. pt not at baseline, typically independent with mobility (uses a cane in home and RW/4WW) in community and ADLs, has assist for IADLs at baseline.  pt would benefit from ongoing skilled OT services in order to return to PLOF  Treatment Diagnosis: acute on chronic CHF, COVID +  Prognosis: Good  Decision Making: Medium Complexity  [facility], independent with ADLs, assist for IADLs, uses cane and walker  Assistance / Modification: assist of 1, increased time, frequent rest breaks  Patient Education: eval, POC, discharge, transfers, endurance education-pt independent  Barriers to Learning: hearing  REQUIRES OT FOLLOW UP: Yes  Activity Tolerance  Activity Tolerance: Patient limited by fatigue  Safety Devices  Safety Devices in place: Yes  Type of devices: All fall risk precautions in place;Nurse notified;Call light within reach; Left in chair;Chair alarm in place; Patient at risk for falls;Gait belt  Restraints  Initially in place: No           Patient Diagnosis(es): The primary encounter diagnosis was Congestive heart failure, unspecified HF chronicity, unspecified heart failure type ([facility] [address] Diagnoses of Nausea and vomiting, intractability of vomiting not specified, unspecified vomiting type, Dyspnea on exertion, and General weakness were also pertinent to this visit. has a past medical history of Arthritis, CAD (coronary artery disease), Cancer ([facility] [address] Cystocele, Diabetes mellitus ([facility] [address] Hepatitis A, History of blood transfusion, Hyperlipidemia, PVC (premature ventricular contraction), Rectocele, Reflux, and Scoliosis. has a past surgical history that includes Appendectomy; joint replacement (Left); hernia repair (6 YRS AGO); Cholecystectomy; shoulder surgery (Right, 7/26/12); Bunionectomy (7/26/12); Breast surgery; Upper gastrointestinal endoscopy (11/2/12); Hysterectomy; bladder suspension; Dilation and curettage of uterus; other surgical history (Left, 09/22/14); Foot surgery; Cardiac catheterization; Colonoscopy (2008); Cystocopy (03/29/2017); other surgical history (Right, 06/28/2018); Cataract removal with implant (Left, 09/13/2018); pr xcapsl ctrc rmvl insj io lens prosth w/o ecp (Left, 9/13/2018); and Lumbar spine surgery (Right, 5/15/2019). Treatment Diagnosis: acute on chronic CHF, COVID +      Restrictions  Restrictions/Precautions  Restrictions/Precautions: Fall Risk (high fall risk, Isolation secondary to covid)  Position Activity Restriction  Other position/activity restrictions: Constance Oliveira is a 80 y.o. female who presents via EMS from [place].   Patient with complaint of persisting intermittent nausea with dry heaving emesis over the past 3 weeks. However, the patient does state over the past week she has had increasing nausea. She reports no melena or hematemesis. Indicates loose stool yesterday. She also describes a weakness and fatigue. Patient recently on Cipro for UTI diagnosis. No urinary complaints at this time. Patient does state producing a lot of mucus and swallowing and believes this could be the cause of her nausea. She has her usual back pain and she has had multiple back problems or procedures. She reports no specific chest pain, shortness of breath, abdominal pain or lower extremity edema. She does have known GERD and currently on Nexium twice daily.     Subjective   General  Chart Reviewed: Yes  Patient assessed for rehabilitation services?: Yes  Additional Pertinent Hx: scoliosis  Family / Caregiver Present: No  Diagnosis: acute on chronic CHF  Subjective  Subjective: pt supine upon arrival, O2 off, saturations at 93% on RA. pt reporting 8/10 pain, RN called for pain meds  Patient Currently in Pain: Yes  Pain Assessment  Pain Assessment: 0-10  Pain Level: 8  Rao-Baker Pain Rating: No hurt  Pain Type: Acute pain  Pain Location: Back  Clinical Progression: Not changed  Pre Treatment Pain Screening  Intervention List: Nurse called to administer meds  Vital Signs  Pulse: 82  Heart Rate Source: Monitor  Resp: 20  BP: (!) 119/56  Patient Currently in Pain: Yes  Oxygen Therapy  SpO2: 94 %  O2 Device: None (Room air)  Social/Functional History  Social/Functional History  Lives With: Alone (pt from [facility], in independent Living)  Type of Home: Apartment  Home Layout: One level  Home Access: Level entry  Bathroom Shower/Tub: Walk-in shower  Bathroom Toilet: Handicap height  Bathroom Equipment: Hand-held shower,Grab bars in shower,Shower chair,Grab bars around toilet  Bathroom Accessibility: Danial Martin accessible  Home Equipment: Cane,Wheelchair-manual,Rolling walker,4 wheeled walker,Oxygen,Alert Button (3 L at night)  ADL Assistance: Independent  Homemaking Assistance: Needs assistance (facility provides meals, laundry, cleaning)  Ambulation Assistance: Independent (cane used in the apt. walker used in community)  Transfer Assistance: Independent  Active : No  Leisure & Hobbies: bingo, words with friends  Additional Comments: pt denies falls in the last 6 months       Objective   Vision: Impaired  Vision Exceptions: Wears glasses for reading  Hearing: Exceptions to [facility]  Hearing Exceptions: Hard of hearing/hearing concerns;Bilateral hearing aid    Orientation  Overall Orientation Status: Within Functional Limits  Observation/Palpation  Posture: Poor  Observation: severe scoliosis  Balance  Sitting Balance: Stand by assistance  Standing Balance: Contact guard assistance  Standing Balance  Time: ~2-3 minutes x 3  Activity: stance EOB for shaheen care, mobility around bed with RW, stance at recliner  Comment: SPO2 on RA above 90% throughout session, however, pt fatigues quickly  Functional Mobility  Functional - Mobility Device: Rolling Walker  Activity: Other  Assist Level: Contact guard assistance  ADL  Grooming: Increased time to complete;Stand by assistance  UE Bathing: Stand by assistance; Increased time to complete  LE Bathing: Moderate assistance  UE Dressing: Minimal assistance  LE Dressing: Maximum assistance (clothing management and donning socks)  Toileting: Contact guard assistance (in stance  for shaheen care)  Additional Comments: pt seated EOB for bathing and dressing, stood EOB for shaheen care at [facility].  pt demo'd good FM control, changed batteries in hearing aides  Tone RUE  RUE Tone: Normotonic  Tone LUE  LUE Tone: Normotonic  Coordination  Movements Are Fluid And Coordinated: Yes     Bed mobility  Supine to Sit: Contact guard assistance  Scooting: Contact guard assistance  Comment: HOB elevated  Transfers  Sit to stand: Contact guard assistance  Stand to sit: Contact guard assistance  Transfer Comments: decreased eccentric control     Cognition  Overall Cognitive Status: WFL  Perception  Overall Perceptual Status: WFL     Sensation  Overall Sensation Status: Impaired  Light Touch: Partial deficits in the RUE (occassion n/t)        LUE AROM (degrees)  LUE AROM : Exceptions  L Shoulder Flexion 0-180: ~0-100 degrees  RUE AROM (degrees)  RUE AROM : Exceptions  R Shoulder Flexion 0-180: ~0-100 degrees  LUE Strength  Gross LUE Strength: Exceptions to [facility]  L Shoulder Flex: 3/5  L Elbow Flex: 3+/5  L Hand General: 3+/5  RUE Strength  Gross RUE Strength: Exceptions to [facility]  R Shoulder Flex: 3/5  R Elbow Flex: 3+/5  R Hand General: 3+/5                   Plan   Plan  Times per week: 3-5  Times per day: Daily  Current Treatment Recommendations: Strengthening,Balance Training,Functional Mobility Training,Endurance Training,Self-Care / ADL      AM-PAC Score        AM-[facility] Inpatient Daily Activity Raw Score: 17 (01/13/22 1542)  AM-PAC Inpatient ADL T-Scale Score : 37.26 (01/13/22 1542)  ADL Inpatient CMS 0-100% Score: 50.11 (01/13/22 1542)  ADL Inpatient CMS G-Code Modifier : CK (01/13/22 1542)    Goals  Short term goals  Time Frame for Short term goals: discharge  Short term goal 1: bed mobility supervision  Short term goal 2: functional mobility with RW supervision  Short term goal 3: functional ADL transfer supervision  Short term goal 4: UB/LB ADLs with AE as needed supervision  Short term goal 5: tolieting supervision  Patient Goals   Patient goals : get stronger, feel better       Therapy Time   Individual Concurrent Group Co-treatment   Time In 1433         Time Out 1522         Minutes 49           Timed Code Treatment Minutes:   34 minutes    Total Treatment Minutes:  49 minutes      Donato Hughes OTR/L [id number]      Donato Hughes OT

## 2022-01-13 NOTE — CONSULTS
5/25/19:   -Normal left ventricle size, wall thickness and systolic function with an   estimated ejection fraction of 60%. No regional wall motion abnormalities   are seen. -Mild aortic stenosis with a peak velocity of 2.54m/s and a mean pressure   gradient of 15mmHg. Mild aortic regurgitation.   -Mild mitral regurgitation.   -Mild tricuspid regurgitation with a estimated PASP of 31 mmHg plus right   atrial pressure.   -Trivial pulmonic regurgitation present.   -Biatrial enlargement.     EKG:  NSR, 1st degree AV block    Meds prior to admission:  Aspirin 325 qd  lipitor 40 qd  Lasix 20 qd      No Known Allergies  Current Facility-Administered Medications   Medication Dose Route Frequency Provider Last Rate Last Admin    dexamethasone (PF) (DECADRON) injection 6 mg  6 mg IntraVENous Q24H Guerrero Callaway MD   6 mg at 01/13/22 0904    enoxaparin (LOVENOX) injection 30 mg  30 mg SubCUTAneous Nightly Guerrero Callaway MD        diphenoxylate-atropine (LOMOTIL) 2.5-0.025 MG per tablet 1 tablet  1 tablet Oral 4x Daily PRN Guerrero Callaway MD        ondansetron (ZOFRAN-ODT) disintegrating tablet 4 mg  4 mg Oral Q8H PRN Gurerero Callaway MD        Vitamin D (CHOLECALCIFEROL) tablet 2,000 Units  2,000 Units Oral Daily Guerrero Callaway MD   2,000 Units at 01/13/22 0250    docusate sodium (COLACE) capsule 100 mg  100 mg Oral BID Guerrero Callaway MD   100 mg at 01/13/22 0904    aspirin EC tablet 325 mg  325 mg Oral Daily Guerrero Callaway MD   325 mg at 01/13/22 6907    atorvastatin (LIPITOR) tablet 40 mg  40 mg Oral Nightly Guerrero Callaway MD   40 mg at 01/12/22 2249    oxyCODONE-acetaminophen (PERCOCET)  MG per tablet 1 tablet  1 tablet Oral Q6H PRN Guerrero Callaway MD   1 tablet at 01/13/22 0705    perflutren lipid microspheres (DEFINITY) injection 1.65 mg  1.5 mL IntraVENous ONCE PRN Guerrero Callaway MD        sodium chloride flush 0.9 % injection 5-40 mL  5-40 mL IntraVENous 2 times per day Guerrero Callaway MD   10 mL at 01/13/22 0904    sodium chloride flush 0.9 % injection 10 mL  10 mL IntraVENous PRN Bibi Tran MD   10 mL at 01/13/22 0705    0.9 % sodium chloride infusion  25 mL IntraVENous PRN Bibi Tran MD        ondansetron (ZOFRAN-ODT) disintegrating tablet 4 mg  4 mg Oral Q8H PRN Bibi Tran MD        Or    ondansetron [facility]) injection 4 mg  4 mg IntraVENous Q6H PRN Bibi Tran MD   4 mg at 01/13/22 0705    magnesium hydroxide (MILK OF MAGNESIA) 400 MG/5ML suspension 30 mL  30 mL Oral Daily PRN Bibi Tran MD        acetaminophen (TYLENOL) tablet 650 mg  650 mg Oral Q6H PRN Bibi Tran MD        Or    acetaminophen (TYLENOL) suppository 650 mg  650 mg Rectal Q6H PRN Bibi Tran MD        furosemide (LASIX) injection 40 mg  40 mg IntraVENous TID Bibi Tran MD   40 mg at 01/13/22 0904    cefTRIAXone (ROCEPHIN) 1000 mg in sterile water 10 mL IV syringe  1,000 mg IntraVENous Q24H Bibi Tran MD   1,000 mg at 01/12/22 2250    hydrALAZINE (APRESOLINE) injection 10 mg  10 mg IntraVENous Q6H PRN Bibi Tran MD        0.9 % sodium chloride bolus  500 mL IntraVENous PRN Bibi Tran MD        potassium chloride (KLOR-CON M) extended release tablet 40 mEq  40 mEq Oral PRN Bibi Tran MD        Or    potassium bicarb-citric acid (EFFER-K) effervescent tablet 40 mEq  40 mEq Oral PRN Bibi Tran MD        Or    potassium chloride 10 mEq/100 mL IVPB (Peripheral Line)  10 mEq IntraVENous PRN Bibi Tran MD        pantoprazole (PROTONIX) tablet 40 mg  40 mg Oral QAM AC Bibi Tran MD   40 mg at 01/13/22 8207       Past Medical History:   Diagnosis Date    Arthritis     OSTEOARTHRITIS BACK    CAD (coronary artery disease)     PT HAS AV BLOCK AND MVP    Cancer ([address]     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    Cystocele     Diabetes mellitus ([address]     type  2 diet controlled    Hepatitis A 1953    History of blood transfusion     hiatal hernia surgery    Hyperlipidemia     PVC (premature ventricular contraction)     Rectocele     Reflux     Scoliosis      Past Surgical History:   Procedure Laterality Date    APPENDECTOMY      BLADDER SUSPENSION      3 SURGERIES/ANTERIOR AND POSTERIOR REPAIR    BREAST SURGERY      RIGHT LUMPECTOMY AND SEVERAL BIOPSIES ON BOTH BREAST    BUNIONECTOMY  7/26/12    BUNIONECTOMY BILATERAL  FEET    CARDIAC CATHETERIZATION      AV block    CATARACT REMOVAL WITH IMPLANT Left 09/13/2018    CHOLECYSTECTOMY      COLONOSCOPY  2008    normal    CYSTOSCOPY  03/29/2017    U-dil    DILATION AND CURETTAGE OF UTERUS      SUNCTION    FOOT SURGERY      bilat foot surgeries    HERNIA REPAIR  6 YRS AGO    HIATAL HERNIA REPAIR- WIRED  RIBS    HYSTERECTOMY      VAGINAL    JOINT REPLACEMENT Left     TOTAL KNEE REPLACEMENT LEFT    LUMBAR SPINE SURGERY Right 5/15/2019    RIGHT L4 AND L5 TRANSFORAMINAL EPIDURAL STEROID INJECTION WITH FLUOROSCOPY performed by Severo Sherwood MD at [address] [place] [address] Left 09/22/14    removal rib wire    OTHER SURGICAL HISTORY Right 06/28/2018    PHACO EMULSIFICATION OF CATARACT WITH INTRAOCULAR LENS implant right eye    NC XCAPSL CTRC RMVL INSJ IO LENS PROSTH W/O ECP Left 9/13/2018    PHACO EMULSIFICATION OF CATARACT WITH INTRAOCULAR LENS IMPLANT LEFT EYE performed [facility] Julio Cesar Thakur [facility] at [facility] Right 7/26/12    CA DEPOSIT TAKEN OFF RIGHT SHOULDER    UPPER GASTROINTESTINAL ENDOSCOPY  11/2/12     Family History   Problem Relation Age of Onset    Diabetes Maternal Grandmother         type 2, great grandma type1    Cancer Maternal Grandfather         rectal and  scrotal cancer    Breast Cancer Daughter     Breast Cancer Maternal Aunt      Social History     Socioeconomic History    Marital status:       Spouse name: Not on file    Number of children: Not on file    Years of education: Not on file    Highest education level: Not on file   Occupational History    Not on file   Tobacco Use    Smoking status: Former Smoker     Packs/day: 0.50     Years: 15.00     Pack years: 7.50     Quit date: 2001     Years since quittin.0    Smokeless tobacco: Never Used    Tobacco comment: quit -    Vaping Use    Vaping Use: Never used   Substance and Sexual Activity    Alcohol use: Yes     Alcohol/week: 1.0 standard drink     Types: 1 Glasses of wine per week     Comment: rare wine     Drug use: No    Sexual activity: Not Currently   Other Topics Concern    Not on file   Social History Narrative    Not on file     Social Determinants of Health     Financial Resource Strain:     Difficulty of Paying Living Expenses: Not on file   Food Insecurity:     Worried About Running Out of Food in the Last Year: Not on file    Jojo of Food in the Last Year: Not on file   Transportation Needs:     Lack of Transportation (Medical): Not on file    Lack of Transportation (Non-Medical):  Not on file   Physical Activity:     Days of Exercise per Week: Not on file    Minutes of Exercise per Session: Not on file   Stress:     Feeling of Stress : Not on file   Social Connections:     Frequency of Communication with Friends and Family: Not on file    Frequency of Social Gatherings with Friends and Family: Not on file    Attends [employer] Services: Not on file    Active Member of [address] [place] [address] or Organizations: Not on file    Attends Club or Organization Meetings: Not on file    Marital Status: Not on file   Intimate Partner Violence:     Fear of Current or Ex-Partner: Not on file    Emotionally Abused: Not on file    Physically Abused: Not on file    Sexually Abused: Not on file   Housing Stability:     Unable to Pay for Housing in the Last Year: Not on file    Number of [place] in the Last Year: Not on file    Unstable Housing in the Last Year: Not on file       Review of Systems:   Constitutional: there has been no unanticipated weight loss. There's been no change in energy level, sleep pattern, or activity level. Eyes: No visual changes or diplopia. No scleral icterus. ENT: No Headaches, hearing loss or vertigo. No mouth sores or sore throat. Cardiovascular: Reviewed in HPI  Respiratory: No cough or wheezing, no sputum production. No hematemesis. Gastrointestinal: No abdominal pain, appetite loss, blood in stools. No change in bowel or bladder habits. Genitourinary: No dysuria, trouble voiding, or hematuria. Musculoskeletal:  No gait disturbance, weakness or joint complaints. Integumentary: No rash or pruritis. Neurological: No headache, diplopia, change in muscle strength, numbness or tingling. No change in gait, balance, coordination, mood, affect, memory, mentation, behavior. Psychiatric: No anxiety, no depression. Endocrine: No malaise, fatigue or temperature intolerance. No excessive thirst, fluid intake, or urination. No tremor. Hematologic/Lymphatic: No abnormal bruising or bleeding, blood clots or swollen lymph nodes. Allergic/Immunologic: No nasal congestion or hives. Physical Examination:    Vitals:    01/12/22 2300 01/13/22 0353 01/13/22 0705 01/13/22 0841   BP: 129/76 120/73 126/68    Pulse: 71 66 74    Resp: 20 18 20    Temp: 98.6 °F (37 °C) 98.1 °F (36.7 °C) 97.2 °F (36.2 °C)    TempSrc: Oral Oral Axillary    SpO2: 94% 96% 98%    Weight: 147 lb (66.7 kg)   150 lb 9.6 oz (68.3 kg)   Height: 5' (1.524 m)        Body mass index is 29.41 kg/m².      Wt Readings from Last 3 Encounters:   01/13/22 150 lb 9.6 oz (68.3 kg)   10/15/19 155 lb 14.4 oz (70.7 kg)   09/13/19 152 lb (68.9 kg)     BP Readings from Last 3 Encounters:   01/13/22 126/68   10/15/19 (!) 164/80   09/13/19 139/76     Constitutional and General Appearance:   Elderly female in NAD  HEENT:  NC/AT  KAMARI  No problems with hearing  Skin:  Warm, dry  Respiratory:  Normal excursion and expansion without use of accessory muscles  Resp Auscultation: Normal breath sounds without dullness  Cardiovascular: The apical impulses not displaced  Heart tones are crisp and normal  Cervical veins are not engorged  The carotid upstroke is normal in amplitude and contour without delay or bruit  JVP less than 8 cm H2O  RRR with nl S1 and S2 without m,r,g  Peripheral pulses are symmetrical and full  There is no clubbing, cyanosis of the extremities. No edema  Femoral Arteries: 2+ and equal  Pedal Pulses: 2+ and equal   Neck:  No thyromegaly  Abdomen:  No masses or tenderness  Liver/Spleen: No Abnormalities Noted  Neurological/Psychiatric:  Alert and oriented in all spheres  Moves all extremities well  Exhibits normal gait balance and coordination  No abnormalities of mood, affect, memory, mentation, or behavior are noted    Labs were reviewed including labs from other hospital systems through [facility]. Cardiac testing was reviewed including echos, nuclear scans, cardiac catheterization, including from other hospital systems through [facility]. Echo:  1/13/22:   -Covid+   Normal left ventricle size, wall thickness, and systolic function with an   estimated ejection fraction of 65%. No regional wall motion abnormalities are seen. Normal diastolic filling pattern for age. Mild aortic stenosis with a peak velocity of 2.65 m/s with a mean pressure   gradient of 17 mmHg. There is moderate aortic insufficiency. Severe left atrial enlargement noted. There is mild mitral regurgitation. There is mild to moderate tricuspid regurgitation with a RVSP estimation of   25 mmHg. Assessment:    1. Chronic diastolic HF    2. Nausea and vomiting, intractability of vomiting not specified, unspecified vomiting type    3. Dyspnea on exertion    4. General weakness     5.  covid positive infection  6. Elevated tropomin level  Plan:  1. Her symptoms are most likely due to COVID infection  2.okay to give IV lasix today given elevated BNP level  3.   Repeat BNP level tomorrow  4. She is on room air and vital signs are stable  5. Switch to po lasix tomorrow  6. Treatment of covid per hospitalist service  7. Consider adding ACEI or ARB as an outpatient    Can likely be discharged in 1-2 days. I appreciate the opportunity of cooperating in the care of this patient.     Sakina Head M.D., [facility]

## 2022-01-13 NOTE — PROGRESS NOTES
Physical Therapy    Facility/Department: [address]  Initial Assessment    NAME: Octavio Vasquez  : 10/16/1932  MRN: [id number]    Date of Service: 2022    Discharge Recommendations:  5-7 sessions per week   PT Equipment Recommendations  Equipment Needed: No (pt has necessary equipment)     Octavio Vasqeuz scored a 17/24 on the AM-PAC short mobility form. Current research shows that an AM-PAC score of 17 or less is typically not associated with a discharge to the patient's home setting. Based on the patient's AM-PAC score and their current functional mobility deficits, it is recommended that the patient have 5-7 sessions per week of Physical Therapy at d/c to increase the patient's independence. At this time, this patient demonstrates the endurance, and/or tolerance for 3 hours of therapy each day, with a treatment frequency of 5-7x/wk. Please see assessment section for further patient specific details. If patient discharges prior to next session this note will serve as a discharge summary. Please see below for the latest assessment towards goals. Assessment   Body structures, Functions, Activity limitations: Decreased functional mobility ; Decreased balance;Decreased endurance;Decreased strength  Assessment: Pt is a 81 yo female presenting with heart failure and covid. Pt tolerates therapy evaluations well this date, presents with decreased activity tolerance from baseline function and will benefit from continued skilled therapy services to improve strength.   Treatment Diagnosis: impaired activtiy tolerance  Prognosis: Good  Decision Making: Medium Complexity  Clinical Presentation: evolving  PT Education: Goals;Transfer Training;PT Role;Plan of Care;Gait Training  Patient Education: d/c recommendations-pt verbalizes understanding  Barriers to Learning: none  REQUIRES PT FOLLOW UP: Yes  Activity Tolerance  Activity Tolerance: Patient Tolerated treatment well;Patient limited by endurance  Activity Tolerance: SPO2 remains above 90% with all activity, though pt presents with SOB between activities requiring seated rest breaks       Patient Diagnosis(es): The primary encounter diagnosis was Congestive heart failure, unspecified HF chronicity, unspecified heart failure type ([address] Diagnoses of Nausea and vomiting, intractability of vomiting not specified, unspecified vomiting type, Dyspnea on exertion, and General weakness were also pertinent to this visit. has a past medical history of Arthritis, CAD (coronary artery disease), Cancer ([address] Cystocele, Diabetes mellitus ([address] Hepatitis A, History of blood transfusion, Hyperlipidemia, PVC (premature ventricular contraction), Rectocele, Reflux, and Scoliosis. has a past surgical history that includes Appendectomy; joint replacement (Left); hernia repair (6 YRS AGO); Cholecystectomy; shoulder surgery (Right, 7/26/12); Bunionectomy (7/26/12); Breast surgery; Upper gastrointestinal endoscopy (11/2/12); Hysterectomy; bladder suspension; Dilation and curettage of uterus; other surgical history (Left, 09/22/14); Foot surgery; Cardiac catheterization; Colonoscopy (2008); Cystocopy (03/29/2017); other surgical history (Right, 06/28/2018); Cataract removal with implant (Left, 09/13/2018); pr xcapsl ctrc rmvl insj io lens prosth w/o ecp (Left, 9/13/2018); and Lumbar spine surgery (Right, 5/15/2019). Restrictions  Restrictions/Precautions  Restrictions/Precautions: Fall Risk (high fall risk, Isolation secondary to covid)  Position Activity Restriction  Other position/activity restrictions: Nellie Sierra is a 80 y.o. female who presents via EMS from [place]. Patient with complaint of persisting intermittent nausea with dry heaving emesis over the past 3 weeks. However, the patient does state over the past week she has had increasing nausea. She reports no melena or hematemesis. Indicates loose stool yesterday.   She also describes a weakness and fatigue. Patient recently on Cipro for UTI diagnosis. No urinary complaints at this time. Patient does state producing a lot of mucus and swallowing and believes this could be the cause of her nausea. She has her usual back pain and she has had multiple back problems or procedures. She reports no specific chest pain, shortness of breath, abdominal pain or lower extremity edema. She does have known GERD and currently on Nexium twice daily. Vision/Hearing  Vision: Impaired  Vision Exceptions: Wears glasses for reading  Hearing: Exceptions to [facility]  Hearing Exceptions: Hard of hearing/hearing concerns;Bilateral hearing aid     Subjective  General  Chart Reviewed: Yes  Patient assessed for rehabilitation services?: Yes  Response To Previous Treatment: Not applicable  Family / Caregiver Present: No  Diagnosis: Acute Heart Failure  Follows Commands: Within Functional Limits  Subjective  Subjective: Pt supine in bed upon PT/OT arrival, notes 8/10 back pain--RN notified, and is agreeable to skilled therapy evaluations.   Pain Screening  Patient Currently in Pain: Yes  Pain Assessment  Pain Assessment: 0-10  Pain Level: 8  Pain Type: Acute pain  Pain Location: Back  Pain Orientation: Right;Mid;Lower  Vital Signs  Patient Currently in Pain: Yes       Orientation  Orientation  Overall Orientation Status: Within Normal Limits  Social/Functional History  Social/Functional History  Lives With: Alone (pt from the [place], in independent Living)  Type of Home: Apartment  Home Layout: One level  Home Access: Level entry  Bathroom Shower/Tub: Walk-in shower  Bathroom Toilet: Handicap height  Bathroom Equipment: Hand-held shower,Grab bars in shower,Shower chair,Grab bars around toilet  Bathroom Accessibility: Dejon [place] accessible  Home Equipment: Cane,Wheelchair-manual,Rolling walker,4 wheeled walker,Oxygen,Alert Button (3 L at night)  ADL Assistance: [address] [place] [address] Avenue: Needs assistance (facility provides meals, laundry, cleaning)  Ambulation Assistance: Independent (cane used in the apt. walker used in community)  Transfer Assistance: Independent  Active : Lucy  [address]: dale, words with friends  Additional Comments: pt denies falls in the last 6 months  Cognition   Cognition  Overall Cognitive Status: WFL    Objective             Strength RLE  Strength RLE: WFL  Strength LLE  Strength LLE: WFL     Sensation  Overall Sensation Status: Impaired  Light Touch: Partial deficits in the RUE (occassion n/t)  Bed mobility  Supine to Sit: Contact guard assistance  Scooting: Contact guard assistance  Comment: HOB elevated  Transfers  Sit to Stand: Contact guard assistance (up to RW)  Stand to sit: Contact guard assistance  Ambulation  Ambulation?: Yes  Ambulation 1  Surface: level tile  Device: Rolling Walker  Assistance: Contact guard assistance  Quality of Gait: FW lean due to thoracic kyphosis, decreased step length/height, wide YASSINE, no postural sway  Gait Deviations: Decreased step length;Decreased step height  Distance: 20'  Comments: SPO2 remains above 90% on RA  Stairs/Curb  Stairs?: No     Balance  Posture: Fair (thoracic kyphosis)  Sitting - Static: Good;- (Supervision)  Sitting - Dynamic: Good;- (SBA while completing seated ADLs at EOB)  Standing - Static: Fair (CGA with RW)  Standing - Dynamic: Fair (CGA with RW)        Plan   Plan  Times per week: 3-5  Times per day: Daily  Current Treatment Recommendations: Strengthening,Transfer Training,Endurance Training,Gait Training,Functional Mobility Training,Balance Training,Safety Education & Training,Patient/Caregiver Education & Training  Safety Devices  Type of devices: Left in chair,Chair alarm in place,All fall risk precautions in place,Nurse notified  Restraints  Initially in place: No    AM-PAC Score  AM-PAC Inpatient Mobility Raw Score : 17 (01/13/22 1827)  AM-PAC Inpatient T-Scale Score : 42.13 (01/13/22 1607)  Mobility Inpatient CMS 0-100% Score: 50.57 (01/13/22 1607)  Mobility Inpatient CMS G-Code Modifier : CK (01/13/22 1607)          Goals  Short term goals  Time Frame for Short term goals: to be met by d/c  Short term goal 1: pt will complete bed mobility with mod I  Short term goal 2: pt will complete STS with mod I  Short term goal 3: pt will complete stand step transfer with mod I  Short term goal 4: pt will ambulate x 48' with mod I  Patient Goals   Patient goals : to improve strength       Therapy Time   Individual Concurrent Group Co-treatment   Time In       1433   Time Out       1522   Minutes       49           Timed Code Treatment Minutes:  34 minutes    Total Treatment Minutes:  49 minutes    [address], PT     Imer Bradshaw DPT [id number]

## 2022-01-13 NOTE — DISCHARGE INSTR - COC
Continuity of Care Form  CHF Pathway  _x_ Cardiovascular Assessment.  Titrate O2 to keep SaO2 greater than 90%    _x_ Daily Weights- Baseline Wt:154 lb   Call MD if:   3 pound weight gain or loss in one day OR 5 pound weight gain in one week       _x_No added salt diet (3-4 G sodium) and 64 oz fluid restriction      _x_ Labs:   BMP,BNP    Please start on    Frequency:  Weekly x 4              Fax results to: [facility]: [phone]        _x_ Med List attached:   Hold Coreg/Metoprolol if HR less than 45 or patient symptomatic*   Hold ACE/ARB if SBP less than 85 or patient symptomatic*   Do not hold Spironolactone (aldactone) for hypotension/bradycardia   Call MD for questions: [facility]: [phone]    _x_Follow up appointment with cardiology: date/time: Virtual visit with Blayne Chopra at 1030am on 21          Patient Name: Kandy Brown   :  10/16/1932  MRN:  [id number]    Admit date:  2022  Discharge date:  2022    Code Status Order: Full Code   Advance Directives:      Admitting Physician:  Karen Hammer MD  PCP: DENIZ Isaacs CNP    Discharging Nurse: [address] Unit/Room#: 1MF-7069/7655-36  Discharging Unit Phone Number: [phone]    Emergency Contact:   Extended Emergency Contact Information  Primary Emergency Contact: Chloé Allred  Address: [address] [place] [address] Phone: [phone]  Relation: Child  Secondary Emergency Contact: Joselin Guillermo  Address: [place] [address] Phone: [phone]  Work Phone: [phone]  Relation: Other    Past Surgical History:  Past Surgical History:   Procedure Laterality Date    APPENDECTOMY      BLADDER SUSPENSION      3 SURGERIES/ANTERIOR AND POSTERIOR REPAIR    BREAST SURGERY      RIGHT LUMPECTOMY AND SEVERAL BIOPSIES ON BOTH BREAST    BUNIONECTOMY  12    BUNIONECTOMY BILATERAL  FEET    CARDIAC CATHETERIZATION AV block    CATARACT REMOVAL WITH IMPLANT Left 09/13/2018    CHOLECYSTECTOMY      COLONOSCOPY  2008    normal    CYSTOSCOPY  03/29/2017    U-dil    DILATION AND CURETTAGE OF UTERUS      SUNCTION    FOOT SURGERY      bilat foot surgeries    HERNIA REPAIR  6 YRS AGO    HIATAL HERNIA REPAIR- WIRED  RIBS    HYSTERECTOMY      VAGINAL    JOINT REPLACEMENT Left     TOTAL KNEE REPLACEMENT LEFT    LUMBAR SPINE SURGERY Right 5/15/2019    RIGHT L4 AND L5 TRANSFORAMINAL EPIDURAL STEROID INJECTION WITH FLUOROSCOPY performed by Noni Garcia MD at [facility] Left 09/22/14    removal rib wire    OTHER SURGICAL HISTORY Right 06/28/2018    PHACO EMULSIFICATION OF CATARACT WITH INTRAOCULAR LENS implant right eye    AK XCAPSL CTRC RMVL INSJ IO LENS PROSTH W/O ECP Left 9/13/2018    PHACO EMULSIFICATION OF CATARACT WITH INTRAOCULAR LENS IMPLANT LEFT EYE performed by Roderick Mathur MD at [facility] Right 7/26/12    CA DEPOSIT TAKEN OFF RIGHT SHOULDER    UPPER GASTROINTESTINAL ENDOSCOPY  11/2/12       Immunization History:   Immunization History   Administered Date(s) Administered    COVID-19, Pfizer, PF, 30mcg/0.3mL 01/08/2021, 01/29/2021       Active Problems:  Patient Active Problem List   Diagnosis Code    Spinal stenosis of thoracic region M48.04    Disc displacement, lumbar M51.26    Disc displacement, thoracic M51.24    Scoliosis M41.9    Hypoxia R09.02    General weakness R53.1    Moderate malnutrition (HCC) E44.0    CAD (coronary artery disease) I25.10    Hyperlipidemia E78.5    GERD (gastroesophageal reflux disease) K21.9    UTI (urinary tract infection) N39.0    CHF (congestive heart failure) (HCC) I50.9    Suspected COVID-19 virus infection J27.738    Acute systolic heart failure (HCC) I50.21    COVID-19 U07.1    Chronic diastolic heart failure (HCC) I50.32    Nausea and vomiting R11.2    CORNELL (dyspnea on exertion) R06.00       Isolation/Infection:   Isolation            Droplet Plus Patient Infection Status       Infection Onset Added Last Indicated Last Indicated By Review Planned Expiration Resolved Resolved By    COVID-19 01/12/22 01/13/22 01/12/22 COVID-19 01/20/22 01/26/22      Resolved    COVID-19 (Rule Out) 01/12/22 01/12/22 01/12/22 COVID-19 (Ordered)   01/13/22 Rule-Out Test Resulted            Nurse Assessment:  Last Vital Signs: BP (!) 142/69   Pulse 63   Temp 97.9 °F (36.6 °C) (Oral)   Resp 16   Ht 5' (1.524 m)   Wt 147 lb (66.7 kg)   SpO2 96%   BMI 28.71 kg/m²     Last documented pain score (0-10 scale): Pain Level: 8  Last Weight:   Wt Readings from Last 1 Encounters:   01/16/22 147 lb (66.7 kg)     Mental Status:  oriented, alert, and thought processes intact    IV Access:  - None    Nursing Mobility/ADLs:  Walking   Assisted  Transfer  Assisted  Bathing  Assisted  Dressing  Assisted  Toileting  Assisted  Feeding  Independent  Med Admin  Independent  Med Delivery    Whole with water. Wound Care Documentation and Therapy:        Elimination:  Continence: Bowel: Yes  Bladder: Yes  Urinary Catheter: None   Colostomy/Ileostomy/Ileal Conduit: No       Date of Last BM: 1/16/2022    Intake/Output Summary (Last 24 hours) at 1/17/2022 0853  Last data filed at 1/17/2022 0517  Gross per 24 hour   Intake 720 ml   Output 2700 ml   Net -1980 ml     I/O last 3 completed shifts: In: 1200 [P.O.:1200]  Out: 2700 [Urine:2700]    Safety Concerns: At Risk for Falls    Impairments/Disabilities:      Fall Risk,     Nutrition Therapy:  Current Nutrition Therapy:   - Oral Diet:  General and Cardiac    Routes of Feeding: Oral  Liquids: Thin Liquids  Daily Fluid Restriction: no  Last Modified Barium Swallow with Video (Video Swallowing Test): not done    Treatments at the Time of Hospital Discharge:   Respiratory Treatments: None  Oxygen Therapy:  2L Nasal Canula at night.   Ventilator:      Rehab Therapies: Physical Therapy and Occupational Therapy  Weight Bearing Status/Restrictions: No weight bearing restirctions  Other Medical Equipment (for information only, NOT a DME order):  wheelchair, walker, and bath bench  Other Treatments: oxygen    Patient's personal belongings (please select all that are sent with patient):  Hearing Aides bilateral, Dentures upper and lower    RN SIGNATURE:  Electronically signed by Michoacano Haile RN on 1/17/22 at 2:00 PM EST    CASE MANAGEMENT/SOCIAL WORK SECTION    Inpatient Status Date: 1-12-22    Readmission Risk Assessment Score:  Readmission Risk              Risk of Unplanned Readmission:  16           Discharging to Facility/ Agency   Name: [facility],   [phone] to restart  Phone:  Fax:    Dialysis Facility (if applicable)     / signature: Electronically signed by MARCE Galarza on 1/13/22 at 3:13 PM EST    PHYSICIAN SECTION    Prognosis: Fair    Condition at Discharge: Stable    Rehab Potential (if transferring to Rehab): Fair    Recommended Labs or Other Treatments After Discharge: home health care PT OT      Physician Certification: I certify the above information and transfer of Jolynn Record  is necessary for the continuing treatment of the diagnosis listed and that she requires Home Care for less 30 days.      Update Admission H&P: No change in H&P    PHYSICIAN SIGNATURE:  Electronically signed by Keith Osullivan MD on 1/17/22 at 7:39 AM EST

## 2022-01-13 NOTE — PROGRESS NOTES
Progress Note - Dr. Lyn Fountain - Internal Medicine  PCP: Bryanna Manning, APRN - CNP [address] [place] [address] [phone]    Hospital Day: 1  Code Status: Full Code  Current Diet: ADULT DIET; Regular; Low Sodium (2 gm)        CC: follow up on medical issues    Subjective:   Mando Munson is a 80 y.o. female. Pt seen and examined  Chart reviewed since last visit, labs and imaging below        Doing ok  covid test is positive  Maintained in isolation    -2700 diuresed  Cards eval pending  No o2 requirement    She denies chest pain, denies shortness of breath, denies nausea,  denies emesis. 10 system Review of Systems is reviewed with patient, and pertinent positives are noted in HPI above . Otherwise, Review of systems is negative. I have reviewed the patient's medical and social history in detail and updated the computerized patient record. To recap: She  has a past medical history of Arthritis, CAD (coronary artery disease), Cancer ([facility] Cystocele, Diabetes mellitus ([facility] Hepatitis A, History of blood transfusion, Hyperlipidemia, PVC (premature ventricular contraction), Rectocele, Reflux, and Scoliosis. . She  has a past surgical history that includes Appendectomy; joint replacement (Left); hernia repair (6 YRS AGO); Cholecystectomy; shoulder surgery (Right, 7/26/12); Bunionectomy (7/26/12); Breast surgery; Upper gastrointestinal endoscopy (11/2/12); Hysterectomy; bladder suspension; Dilation and curettage of uterus; other surgical history (Left, 09/22/14); Foot surgery; Cardiac catheterization; Colonoscopy (2008); Cystocopy (03/29/2017); other surgical history (Right, 06/28/2018); Cataract removal with implant (Left, 09/13/2018); pr xcapsl ctrc rmvl insj io lens prosth w/o ecp (Left, 9/13/2018); and Lumbar spine surgery (Right, 5/15/2019). . She  reports that she quit smoking about 21 years ago. She has a 7.50 pack-year smoking history.  She has never used smokeless tobacco. She reports current alcohol use of about 1.0 standard drink of alcohol per week. She reports that she does not use drugs. .        Active Hospital Problems    Diagnosis Date Noted    COVID-19 [U07.1] 01/13/2022    GERD (gastroesophageal reflux disease) [K21.9] 01/12/2022    UTI (urinary tract infection) [N39.0] 01/12/2022    CHF (congestive heart failure) ([facility]) [I50.9] 58/55/6294    Acute systolic heart failure ([facility]) [I50.21] 01/12/2022    Hyperlipidemia [E78.5] 08/06/2019    Hypoxia [R09.02] 02/24/2019       Current Facility-Administered Medications: diphenoxylate-atropine (LOMOTIL) 2.5-0.025 MG per tablet 1 tablet, 1 tablet, Oral, 4x Daily PRN  ondansetron (ZOFRAN-ODT) disintegrating tablet 4 mg, 4 mg, Oral, Q8H PRN  Vitamin D (CHOLECALCIFEROL) tablet 2,000 Units, 2,000 Units, Oral, Daily  docusate sodium (COLACE) capsule 100 mg, 100 mg, Oral, BID  aspirin EC tablet 325 mg, 325 mg, Oral, Daily  atorvastatin (LIPITOR) tablet 40 mg, 40 mg, Oral, Nightly  oxyCODONE-acetaminophen (PERCOCET)  MG per tablet 1 tablet, 1 tablet, Oral, Q6H PRN  perflutren lipid microspheres (DEFINITY) injection 1.65 mg, 1.5 mL, IntraVENous, ONCE PRN  sodium chloride flush 0.9 % injection 5-40 mL, 5-40 mL, IntraVENous, 2 times per day  sodium chloride flush 0.9 % injection 10 mL, 10 mL, IntraVENous, PRN  0.9 % sodium chloride infusion, 25 mL, IntraVENous, PRN  ondansetron (ZOFRAN-ODT) disintegrating tablet 4 mg, 4 mg, Oral, Q8H PRN **OR** ondansetron (ZOFRAN) injection 4 mg, 4 mg, IntraVENous, Q6H PRN  magnesium hydroxide (MILK OF MAGNESIA) 400 MG/5ML suspension 30 mL, 30 mL, Oral, Daily PRN  acetaminophen (TYLENOL) tablet 650 mg, 650 mg, Oral, Q6H PRN **OR** acetaminophen (TYLENOL) suppository 650 mg, 650 mg, Rectal, Q6H PRN  enoxaparin (LOVENOX) injection 40 mg, 40 mg, SubCUTAneous, Nightly  furosemide (LASIX) injection 40 mg, 40 mg, IntraVENous, TID  cefTRIAXone (ROCEPHIN) 1000 mg in sterile water 10 mL IV syringe, 1,000 mg, IntraVENous, Q24H  hydrALAZINE (APRESOLINE) injection 10 mg, 10 mg, IntraVENous, Q6H PRN  0.9 % sodium chloride bolus, 500 mL, IntraVENous, PRN  potassium chloride (KLOR-CON M) extended release tablet 40 mEq, 40 mEq, Oral, PRN **OR** potassium bicarb-citric acid (EFFER-K) effervescent tablet 40 mEq, 40 mEq, Oral, PRN **OR** potassium chloride 10 mEq/100 mL IVPB (Peripheral Line), 10 mEq, IntraVENous, PRN  pantoprazole (PROTONIX) tablet 40 mg, 40 mg, Oral, QAM AC         Objective:  /68   Pulse 74   Temp 97.2 °F (36.2 °C) (Axillary)   Resp 20   Ht 5' (1.524 m)   Wt 147 lb (66.7 kg)   SpO2 98%   BMI 28.71 kg/m²      Patient Vitals for the past 24 hrs:   BP Temp Temp src Pulse Resp SpO2 Height Weight   01/13/22 0705 126/68 97.2 °F (36.2 °C) Axillary 74 20 98 % -- --   01/13/22 0353 120/73 98.1 °F (36.7 °C) Oral 66 18 96 % -- --   01/12/22 2300 129/76 98.6 °F (37 °C) Oral 71 20 94 % 5' (1.524 m) 147 lb (66.7 kg)   01/12/22 2028 (!) 123/56 -- -- 74 16 95 % -- --   01/12/22 1330 (!) 155/67 -- -- 79 19 93 % -- --   01/12/22 1300 (!) 155/63 -- -- 76 20 94 % -- --   01/12/22 1245 -- -- -- 77 23 94 % -- --   01/12/22 1230 (!) 162/74 -- -- 75 19 93 % -- --   01/12/22 1215 -- -- -- 72 22 91 % -- --   01/12/22 1200 (!) 143/72 -- -- 74 20 90 % -- --   01/12/22 1145 -- -- -- 71 19 91 % -- --   01/12/22 1130 132/73 -- -- 72 19 90 % -- --   01/12/22 1107 (!) 156/69 97.4 °F (36.3 °C) Oral 72 20 93 % 5' (1.524 m) 153 lb (69.4 kg)     Patient Vitals for the past 96 hrs (Last 3 readings):   Weight   01/12/22 2300 147 lb (66.7 kg)   01/12/22 1107 153 lb (69.4 kg)           Intake/Output Summary (Last 24 hours) at 1/13/2022 0726  Last data filed at 1/12/2022 2300  Gross per 24 hour   Intake --   Output 2700 ml   Net -2700 ml         Physical Exam:   Vitals as above  General appearance: alert, appears stated age and cooperative    Head: Normocephalic, without obvious abnormality, atraumatic    Lungs: clear to auscultation bilaterally Heart: regular rate and rhythm, S1, S2 normal, no murmur    Abdomen: soft, non-tender; bowel sounds normal; no masses, no organomegaly     Extremities: extremities normal, atraumatic, no cyanosis, trace edema    Labs:  Lab Results   Component Value Date    WBC 3.3 (L) 01/13/2022    HGB 11.2 (L) 01/13/2022    HCT 33.3 (L) 01/13/2022     01/13/2022    CHOL 156 01/05/2022    TRIG 74 01/05/2022    HDL 93 (H) 01/05/2022    ALT 10 01/13/2022    AST 21 01/13/2022     01/13/2022    K 3.6 01/13/2022    CL 99 01/13/2022    CREATININE 1.3 (H) 01/13/2022    BUN 13 01/13/2022    CO2 29 01/13/2022    TSH 2.00 01/12/2022    INR 0.96 05/24/2019    LABA1C 6.2 01/05/2022    LABMICR YES 01/12/2022     Lab Results   Component Value Date    TROPONINI 0.03 (H) 01/12/2022       Recent Imaging Results are Reviewed:  CT ABDOMEN PELVIS WO CONTRAST Additional Contrast? None    Result Date: 1/12/2022  EXAMINATION: CT OF THE ABDOMEN AND PELVIS WITHOUT CONTRAST 1/12/2022 1:12 pm TECHNIQUE: CT of the abdomen and pelvis was performed without the administration of intravenous contrast. Multiplanar reformatted images are provided for review. Dose modulation, iterative reconstruction, and/or weight based adjustment of the mA/kV was utilized to reduce the radiation dose to as low as reasonably achievable. COMPARISON: CT chest abdomen and pelvis 12/16/2020 HISTORY: ORDERING SYSTEM PROVIDED HISTORY: Nausea, vomiting, diarrhea TECHNOLOGIST PROVIDED HISTORY: Reason for exam:->Nausea, vomiting, diarrhea Additional Contrast?->None Decision Support Exception - unselect if not a suspected or confirmed emergency medical condition->Emergency Medical Condition (MA) Reason for Exam: n./v/d FINDINGS: Lower Chest: There are scattered pulmonary opacities at the lung bases. More consolidative change with bronchiectasis seen the lingula. A large hiatal hernia is noted.  Organs: Evaluation of the solid abdominal viscera and vascular structures is limited without IV contrast. Again noted is intra and extrahepatic biliary dilatation, measuring up to 3 cm. This is unchanged since the prior study. Gallbladder surgically removed. The spleen is unremarkable. Adrenals are unremarkable. Symmetric renal contours. No hydronephrosis. The bladder is mildly distended. Punctate nonobstructing stone seen in the superior left renal pole. GI/Bowel: No dilated loops of small or large bowel. The appendix is normal. No pericolonic inflammatory change. Peritoneum/Retroperitoneum: No free fluid or free air. No abdominal aortic aneurysm. Bones/Soft Tissues: Diffuse osseous demineralization is noted. Multilevel degenerative changes of the spine are present. Patient has chronic height loss seen involving the L5 and L4 vertebral bodies, not significantly changed since prior. Patient had vertebral augmentation at the T12 level. 1. Scattered pulmonary opacities noted at the lung bases bilaterally, secondary to infection or edema. 2. Stable intra and extrahepatic ductal dilatation in the setting of cholecystectomy. Consider correlation with LFTs. 3. Nonobstructing punctate left renal calculus. 4. Diverticulosis without evidence of diverticulitis. XR CHEST PORTABLE    Result Date: 1/12/2022  EXAMINATION: ONE XRAY VIEW OF THE CHEST 1/12/2022 12:15 pm COMPARISON: 05/24/2019 radiograph HISTORY: ORDERING SYSTEM PROVIDED HISTORY: Weakness, crackles bilateral chest TECHNOLOGIST PROVIDED HISTORY: Reason for exam:->Weakness, crackles bilateral chest Reason for Exam: Weakness, crackles bilateral chest FINDINGS: The heart is enlarged. Moderate-sized hiatal hernia stable in the lower chest.  Mild perihilar opacities are stable centrally. There is blunting of the left costophrenic sulcus with bibasilar ground-glass airspace opacities in both lungs. Asymmetric ground-glass opacification in the left mid and lower lung zone.   Upper lungs are clear with underlying pattern of COPD.  No skeletal finding. Asymmetric airspace opacification in left mid and lower lung zone with a small pleural effusion. Questionable small right pleural effusion as well. Pattern may represent pulmonary edema or a developing pattern of viral pneumonia. Lab Results   Component Value Date    GLUCOSE 109 01/13/2022     Lab Results   Component Value Date    POCGLU 112 05/15/2019     /68   Pulse 74   Temp 97.2 °F (36.2 °C) (Axillary)   Resp 20   Ht 5' (1.524 m)   Wt 147 lb (66.7 kg)   SpO2 98%   BMI 28.71 kg/m²     Assessment and Plan:  Principal Problem:    COVID-19 -Established problem. Stable. Pt is covid positive. No o2 requirement  Plan: cont supportive tx. As no o2 requirement, no indication for remdesivir/actemra. Trend crp. Continue decadron daily  Active Problems:    Hypoxia -Established problem. Improving. Plan: cont to diurese; steroids for covid    Hyperlipidemia -Established problem. Stable. Plan: cont statin    GERD (gastroesophageal reflux disease) -Established problem. Stable. Denies refluix  Plan: Continue present orders/plan. UTI (urinary tract infection) - Established problem. Stable. Plan: cont empiric rocephin, await urine cx    CHF (congestive heart failure) ([facility] -Established problem. Stable. 2700cc diuresed  Plan: cards to see. Cont lasix    Acute systolic heart failure (HCC)      Disp - PT/OT eval pending.      Dr Tanja uPtnam to cover starting this afternoon    (Please note that portions of this note were completed with a voice recognition program.  Efforts were made to edit the dictations but occasionally words are mis-transcribed.)        Frandy Leblanc MD  1/13/2022

## 2022-01-14 PROBLEM — R53.1 GENERAL WEAKNESS: Status: ACTIVE | Noted: 2019-05-29

## 2022-01-14 LAB
ANION GAP SERPL CALCULATED.3IONS-SCNC: 15 MMOL/L (ref 3–16)
BASOPHILS ABSOLUTE: 0 K/UL (ref 0–0.2)
BASOPHILS RELATIVE PERCENT: 0.2 %
BUN BLDV-MCNC: 29 MG/DL (ref 7–20)
C-REACTIVE PROTEIN: <3 MG/L (ref 0–5.1)
CALCIUM SERPL-MCNC: 9 MG/DL (ref 8.3–10.6)
CHLORIDE BLD-SCNC: 97 MMOL/L (ref 99–110)
CO2: 25 MMOL/L (ref 21–32)
CREAT SERPL-MCNC: 1.8 MG/DL (ref 0.6–1.2)
EOSINOPHILS ABSOLUTE: 0 K/UL (ref 0–0.6)
EOSINOPHILS RELATIVE PERCENT: 0.1 %
GFR AFRICAN AMERICAN: 32
GFR NON-AFRICAN AMERICAN: 26
GLUCOSE BLD-MCNC: 120 MG/DL (ref 70–99)
HCT VFR BLD CALC: 33 % (ref 36–48)
HEMOGLOBIN: 11.2 G/DL (ref 12–16)
LYMPHOCYTES ABSOLUTE: 1.8 K/UL (ref 1–5.1)
LYMPHOCYTES RELATIVE PERCENT: 36.7 %
MAGNESIUM: 1.9 MG/DL (ref 1.8–2.4)
MCH RBC QN AUTO: 31.9 PG (ref 26–34)
MCHC RBC AUTO-ENTMCNC: 34 G/DL (ref 31–36)
MCV RBC AUTO: 93.6 FL (ref 80–100)
MONOCYTES ABSOLUTE: 0.4 K/UL (ref 0–1.3)
MONOCYTES RELATIVE PERCENT: 8.8 %
NEUTROPHILS ABSOLUTE: 2.6 K/UL (ref 1.7–7.7)
NEUTROPHILS RELATIVE PERCENT: 54.2 %
PDW BLD-RTO: 14 % (ref 12.4–15.4)
PLATELET # BLD: 166 K/UL (ref 135–450)
PMV BLD AUTO: 7.2 FL (ref 5–10.5)
POTASSIUM SERPL-SCNC: 3.6 MMOL/L (ref 3.5–5.1)
PRO-BNP: 1038 PG/ML (ref 0–449)
RBC # BLD: 3.52 M/UL (ref 4–5.2)
SODIUM BLD-SCNC: 137 MMOL/L (ref 136–145)
WBC # BLD: 4.8 K/UL (ref 4–11)

## 2022-01-14 PROCEDURE — 99232 SBSQ HOSP IP/OBS MODERATE 35: CPT | Performed by: CLINICAL NURSE SPECIALIST

## 2022-01-14 PROCEDURE — 6360000002 HC RX W HCPCS: Performed by: INTERNAL MEDICINE

## 2022-01-14 PROCEDURE — 97530 THERAPEUTIC ACTIVITIES: CPT

## 2022-01-14 PROCEDURE — 83735 ASSAY OF MAGNESIUM: CPT

## 2022-01-14 PROCEDURE — 2700000000 HC OXYGEN THERAPY PER DAY

## 2022-01-14 PROCEDURE — 85025 COMPLETE CBC W/AUTO DIFF WBC: CPT

## 2022-01-14 PROCEDURE — 2060000000 HC ICU INTERMEDIATE R&B

## 2022-01-14 PROCEDURE — 36415 COLL VENOUS BLD VENIPUNCTURE: CPT

## 2022-01-14 PROCEDURE — 2580000003 HC RX 258: Performed by: INTERNAL MEDICINE

## 2022-01-14 PROCEDURE — 6370000000 HC RX 637 (ALT 250 FOR IP): Performed by: INTERNAL MEDICINE

## 2022-01-14 PROCEDURE — 83880 ASSAY OF NATRIURETIC PEPTIDE: CPT

## 2022-01-14 PROCEDURE — 94760 N-INVAS EAR/PLS OXIMETRY 1: CPT

## 2022-01-14 PROCEDURE — 86140 C-REACTIVE PROTEIN: CPT

## 2022-01-14 PROCEDURE — 80048 BASIC METABOLIC PNL TOTAL CA: CPT

## 2022-01-14 RX ORDER — SODIUM CHLORIDE/ALOE VERA
GEL (GRAM) NASAL PRN
COMMUNITY

## 2022-01-14 RX ORDER — FUROSEMIDE 20 MG/1
20 TABLET ORAL DAILY
Status: DISCONTINUED | OUTPATIENT
Start: 2022-01-15 | End: 2022-01-17 | Stop reason: HOSPADM

## 2022-01-14 RX ORDER — HYDROMORPHONE HYDROCHLORIDE 1 MG/ML
0.5 INJECTION, SOLUTION INTRAMUSCULAR; INTRAVENOUS; SUBCUTANEOUS EVERY 4 HOURS PRN
Status: DISCONTINUED | OUTPATIENT
Start: 2022-01-14 | End: 2022-01-17 | Stop reason: HOSPADM

## 2022-01-14 RX ORDER — PROMETHAZINE HYDROCHLORIDE 25 MG/ML
25 INJECTION, SOLUTION INTRAMUSCULAR; INTRAVENOUS EVERY 6 HOURS PRN
Status: DISCONTINUED | OUTPATIENT
Start: 2022-01-14 | End: 2022-01-17 | Stop reason: HOSPADM

## 2022-01-14 RX ORDER — FUROSEMIDE 40 MG/1
40 TABLET ORAL DAILY
Status: DISCONTINUED | OUTPATIENT
Start: 2022-01-15 | End: 2022-01-14

## 2022-01-14 RX ADMIN — HYDROMORPHONE HYDROCHLORIDE 0.5 MG: 1 INJECTION, SOLUTION INTRAMUSCULAR; INTRAVENOUS; SUBCUTANEOUS at 08:50

## 2022-01-14 RX ADMIN — ENOXAPARIN SODIUM 30 MG: 30 INJECTION SUBCUTANEOUS at 20:24

## 2022-01-14 RX ADMIN — ATORVASTATIN CALCIUM 40 MG: 80 TABLET, FILM COATED ORAL at 20:23

## 2022-01-14 RX ADMIN — OXYCODONE AND ACETAMINOPHEN 1 TABLET: 10; 325 TABLET ORAL at 07:26

## 2022-01-14 RX ADMIN — Medication 1000 MG: at 20:23

## 2022-01-14 RX ADMIN — Medication 1 SPRAY: at 17:32

## 2022-01-14 RX ADMIN — ASPIRIN 325 MG: 325 TABLET, COATED ORAL at 09:47

## 2022-01-14 RX ADMIN — Medication 2000 UNITS: at 09:47

## 2022-01-14 RX ADMIN — DEXAMETHASONE SODIUM PHOSPHATE 6 MG: 10 INJECTION INTRAMUSCULAR; INTRAVENOUS at 09:46

## 2022-01-14 RX ADMIN — ONDANSETRON 4 MG: 2 INJECTION INTRAMUSCULAR; INTRAVENOUS at 07:26

## 2022-01-14 RX ADMIN — DOCUSATE SODIUM 100 MG: 100 CAPSULE, LIQUID FILLED ORAL at 20:23

## 2022-01-14 RX ADMIN — PROMETHAZINE HYDROCHLORIDE 25 MG: 25 INJECTION INTRAMUSCULAR; INTRAVENOUS at 14:15

## 2022-01-14 RX ADMIN — HYDROMORPHONE HYDROCHLORIDE 0.5 MG: 1 INJECTION, SOLUTION INTRAMUSCULAR; INTRAVENOUS; SUBCUTANEOUS at 14:14

## 2022-01-14 RX ADMIN — PROMETHAZINE HYDROCHLORIDE 25 MG: 25 INJECTION INTRAMUSCULAR; INTRAVENOUS at 08:50

## 2022-01-14 RX ADMIN — Medication 10 ML: at 20:24

## 2022-01-14 RX ADMIN — Medication 10 ML: at 09:47

## 2022-01-14 RX ADMIN — ONDANSETRON 4 MG: 2 INJECTION INTRAMUSCULAR; INTRAVENOUS at 20:24

## 2022-01-14 RX ADMIN — OXYCODONE AND ACETAMINOPHEN 1 TABLET: 10; 325 TABLET ORAL at 20:23

## 2022-01-14 RX ADMIN — HYDROMORPHONE HYDROCHLORIDE 0.5 MG: 1 INJECTION, SOLUTION INTRAMUSCULAR; INTRAVENOUS; SUBCUTANEOUS at 22:20

## 2022-01-14 RX ADMIN — DOCUSATE SODIUM 100 MG: 100 CAPSULE, LIQUID FILLED ORAL at 09:47

## 2022-01-14 RX ADMIN — PANTOPRAZOLE SODIUM 40 MG: 40 TABLET, DELAYED RELEASE ORAL at 07:26

## 2022-01-14 ASSESSMENT — PAIN SCALES - GENERAL
PAINLEVEL_OUTOF10: 0
PAINLEVEL_OUTOF10: 7
PAINLEVEL_OUTOF10: 7
PAINLEVEL_OUTOF10: 10
PAINLEVEL_OUTOF10: 4
PAINLEVEL_OUTOF10: 4
PAINLEVEL_OUTOF10: 7
PAINLEVEL_OUTOF10: 10

## 2022-01-14 ASSESSMENT — PAIN DESCRIPTION - PROGRESSION
CLINICAL_PROGRESSION: NOT CHANGED

## 2022-01-14 NOTE — PLAN OF CARE
Problem: Falls - Risk of:  Goal: Will remain free from falls  Description: Will remain free from falls  Outcome: Ongoing  Note: All fall precautions in place, bed in lowest position, frequent rounding to assist with needs. Pt absent of fall this shift. Goal: Absence of physical injury  Description: Absence of physical injury  Outcome: Ongoing  Note: Problem: SAFETY   Goal: Free from accidental physical injury   Intervention: ASSESS FOR FALL RISK   Fall risk assessment complete, pt considered high fall risk. Discussed fall precautions with pt. Intervention: INITIATE FALL PRECAUTIONS   Fall precautions checked and applied. Pt VU regarding purpose of precautions. Intervention: KEEP BED IN LOW POSITION   Bed kept in lowest position, HOB at <30 degrees at pt's request, foot of bed elevated for pt comfort, linens untangled and straitened. Intervention: KEEP BED WHEELS LOCKED   Wheels of bed are locked. Intervention: KEEP CALL LIGHT WITHIN REACH   Call light is within reach, pt encouraged to call with any needs. Problem: Pain:  Goal: Pain level will decrease  Description: Pain level will decrease  Outcome: Ongoing  Note: Pt given percocet for pain as ordered. Goal: Control of chronic pain  Description: Control of chronic pain  Outcome: Ongoing  Note: Pt has chronic back pain and generalized pain. Numbers and FACES pain scale used to assess. Problem: OXYGENATION/RESPIRATORY FUNCTION  Goal: Patient will maintain patent airway  Outcome: Ongoing  Goal: Patient will achieve/maintain normal respiratory rate/effort  Description: Respiratory rate and effort will be within normal limits for the patient  Outcome: Ongoing     Problem: HEMODYNAMIC STATUS  Goal: Patient has stable vital signs and fluid balance  Outcome: Ongoing  Note: Pt vital signs will be assessed per floor protocol and as needed.  Medications will be administered accordingly to keep vitals signs within parameters that are normal or that are chosen by MD.       Problem: FLUID AND ELECTROLYTE IMBALANCE  Goal: Fluid and electrolyte balance are achieved/maintained  Outcome: Ongoing  Note: Pt is on lasix I&O monitored as well as labs in the am.     Problem: Airway Clearance - Ineffective  Goal: Achieve or maintain patent airway  Outcome: Ongoing  Note: Pt not currently coughing or expectorating. Problem: Gas Exchange - Impaired  Goal: Absence of hypoxia  Outcome: Ongoing  Note: Problem: OXYGENATION/RESPIRATORY FUNCTION   Goal: Patient will achieve/maintain normal respiratory rate/effort   Respiratory rate and effort will be within normal limits for the patient   Outcome: Ongoing   No signs of acute resp distress. No cough or SOB at present. 02 stable on 2 liters, see doc flow sheets   Will cont to monitor       Problem: Breathing Pattern - Ineffective  Goal: Ability to achieve and maintain a regular respiratory rate  Outcome: Ongoing     Problem: Body Temperature -  Risk of, Imbalanced  Goal: Ability to maintain a body temperature within defined limits  Outcome: Ongoing  Goal: Will regain or maintain usual level of consciousness  Outcome: Ongoing  Goal: Complications related to the disease process, condition or treatment will be avoided or minimized  Outcome: Ongoing     Problem: Isolation Precautions - Risk of Spread of Infection  Goal: Prevent transmission of infection  Outcome: Ongoing  Note: Pt in droplet plus isolation.      Problem: Risk for Fluid Volume Deficit  Goal: Maintain normal heart rhythm  Outcome: Ongoing  Goal: Maintain absence of muscle cramping  Outcome: Ongoing  Goal: Maintain normal serum potassium, sodium, calcium, phosphorus, and pH  Outcome: Ongoing     Problem: Loneliness or Risk for Loneliness  Goal: Demonstrate positive use of time alone when socialization is not possible  Outcome: Ongoing     Problem: Fatigue  Goal: Verbalize increase energy and improved vitality  Outcome: Ongoing     Problem: Patient Education: Go to Patient Education Activity  Goal: Patient/Family Education  Outcome: Ongoing

## 2022-01-14 NOTE — PROGRESS NOTES
Reassessment complete. VSS no SOB or any distress noted. Pian medication given for pain at 8/10, Zofran given for nausea. Crackers given per request. Call light within reach, pt encouraged to call if any needs arise. No further requests at this time. Will continue to monitor.

## 2022-01-14 NOTE — PROGRESS NOTES
Physical Therapy  Facility/Department: [address]  Daily Treatment Note  NAME: Octavio Vasquez  : 10/16/1932  MRN: [id number]    Date of Service: 2022  Octavio Vasquez scored a 17/24 on the AM-PAC short mobility form. Current research shows that an AM-PAC score of 17 or less is typically not associated with a discharge to the patient's home setting. Based on the patient's AM-PAC score and their current functional mobility deficits, it is recommended that the patient have 3-5 sessions per week of Physical Therapy at d/c to increase the patient's independence. Please see assessment section for further patient specific details. If patient discharges prior to next session this note will serve as a discharge summary. Please see below for the latest assessment towards goals. Discharge Recommendations:  3-5 sessions per week   PT Equipment Recommendations  Equipment Needed: No    Assessment   Assessment: Pt is a 79 yo female presenting with heart failure and covid. Pt this date is limited by severe back pain, nausea and lightheadedness. Pt. is below baseline function and will benefit from continued skilled therapy services to improve strength. Treatment Diagnosis: impaired activtiy tolerance  Prognosis: Good  PT Education: Goals;Transfer Training;PT Role;Plan of Care;Gait Training  Patient Education: d/c recommendations-pt verbalizes understanding  Barriers to Learning: none  REQUIRES PT FOLLOW UP: Yes  Activity Tolerance  Activity Tolerance: Patient limited by pain; Patient limited by endurance  Activity Tolerance: SPO2 remains above 90% with all activity,  Fatigues quickly, Limited also by nausea and lightheadedness     Patient Diagnosis(es): The primary encounter diagnosis was Congestive heart failure, unspecified HF chronicity, unspecified heart failure type ([facility] [address]  Diagnoses of Nausea and vomiting, intractability of vomiting not specified, unspecified vomiting type, Dyspnea on exertion, and General weakness were also pertinent to this visit. has a past medical history of Arthritis, CAD (coronary artery disease), Cancer ([address] Cystocele, Diabetes mellitus ([address] Hepatitis A, History of blood transfusion, Hyperlipidemia, PVC (premature ventricular contraction), Rectocele, Reflux, and Scoliosis. has a past surgical history that includes Appendectomy; joint replacement (Left); hernia repair (6 YRS AGO); Cholecystectomy; shoulder surgery (Right, 7/26/12); Bunionectomy (7/26/12); Breast surgery; Upper gastrointestinal endoscopy (11/2/12); Hysterectomy; bladder suspension; Dilation and curettage of uterus; other surgical history (Left, 09/22/14); Foot surgery; Cardiac catheterization; Colonoscopy (2008); Cystocopy (03/29/2017); other surgical history (Right, 06/28/2018); Cataract removal with implant (Left, 09/13/2018); pr xcapsl ctrc rmvl insj io lens prosth w/o ecp (Left, 9/13/2018); and Lumbar spine surgery (Right, 5/15/2019). Restrictions  Restrictions/Precautions  Restrictions/Precautions: Fall Risk (high)  Position Activity Restriction  Other position/activity restrictions: Kathleen Kang is a 80 y.o. female who presents via EMS from [place]. Patient with complaint of persisting intermittent nausea with dry heaving emesis over the past 3 weeks. However, the patient does state over the past week she has had increasing nausea. She reports no melena or hematemesis. Indicates loose stool yesterday. She also describes a weakness and fatigue. Patient recently on Cipro for UTI diagnosis. No urinary complaints at this time. Patient does state producing a lot of mucus and swallowing and believes this could be the cause of her nausea. She has her usual back pain and she has had multiple back problems or procedures. She reports no specific chest pain, shortness of breath, abdominal pain or lower extremity edema. She does have known GERD and currently on Nexium twice daily.   Subjective General  Chart Reviewed: Yes  Response To Previous Treatment: Patient with no complaints from previous session. Family / Caregiver Present: No  Subjective  Subjective: Pt supine in bed upon PT/OT arrival, notes 9/10 back pain and nausea--RN notified and presented with pain meds, Pt. initially wimpering with pain quite frequently          Orientation  Orientation  Overall Orientation Status: Within Functional Limits  Cognition      Objective   Bed mobility  Supine to Sit: Contact guard assistance (Increased time and use of rail)  Sit to Supine: Contact guard assistance  Scooting: Contact guard assistance;Minimal assistance  Comment: HOB elevated  Transfers  Sit to Stand: Contact guard assistance  Stand to sit: Contact guard assistance  Comment: Pt. refused the chair as Pt. reports it is uncomfortable for her back  Ambulation  Ambulation?: No (d/T nausea and pain)     Balance  Posture: Poor (Severe Kyphoscoliosis, ~90 degree bend)  Sitting - Static: Good;-  Sitting - Dynamic: Good;-  Standing - Static: Fair  Standing - Dynamic: Fair  Comments: Pt. stood to the walker with CGA            Comment: Stood 2 x ~ 3-5  minutes eaach time. Completed weight shifting R to/from L with CGA. 3-4 steps in place with CGA. Unable to ambulate this date d/t pain and nausea as well as lightheadedness. Asked RN for K-Pad to assist with back pain. RN to order.               G-Code     OutComes Score                                                     AM-PAC Score  AM-PAC Inpatient Mobility Raw Score : 17 (01/14/22 0921)  AM-PAC Inpatient T-Scale Score : 42.13 (01/14/22 0772)  Mobility Inpatient CMS 0-100% Score: 50.57 (01/14/22 0921)  Mobility Inpatient CMS G-Code Modifier : CK (01/14/22 0921)          Goals  Short term goals  Time Frame for Short term goals: to be met by d/c  Short term goal 1: pt will complete bed mobility with mod I  Short term goal 2: pt will complete STS with mod I  Short term goal 3: pt will complete stand step transfer with mod I  Short term goal 4: pt will ambulate x 48' with mod I  Patient Goals   Patient goals : to improve strength    Plan    Plan  Times per week: 3-5  Times per day: Daily  Current Treatment Recommendations: Strengthening,Transfer Training,Endurance Training,Gait Training,Functional Mobility [employer] Devices  Type of devices:  All fall risk precautions in place,Call light within reach,Bed alarm in place,Left in bed,Telesitter in use,Nurse notified (Pt. refused gait belt and sitting in the chair this date)  Restraints  Initially in place: No     Therapy Time   Individual Concurrent Group Co-treatment   Time In 0839         Time Out 0909         Minutes 30         Timed Code Treatment Minutes: [place], [place], [id number]

## 2022-01-14 NOTE — PROGRESS NOTES
[address]   Daily Progress Note      Admit Date:  1/12/2022    HPI:    Ms. Jordi Meza is an 80year old female with history of chronic back pain, vaccinated but no booster, chronic diastolic heart failure, non obstructive CAD    She came from [facility] [place] with intermittent nausea, dry heaving for 3 weeks. Recently on cipro for UTI. She is + for covid. cxr with small pleural effusion  probnp [phone]     Subjective:  Patient is being seen for elevated bnp. There were no acute overnight cardiac events. Creat 0.7-1.3-1.8  She is complaining of nausea and pain (given meds) no BM since 1/11/21 per nurse at bedside. No chest pain or increased shortness of breath. Weight stable at 150     Objective:   /79   Pulse 68   Temp 97.9 °F (36.6 °C) (Oral)   Resp 20   Ht 5' (1.524 m)   Wt 150 lb (68 kg)   SpO2 97%   BMI 29.29 kg/m²     Intake/Output Summary (Last 24 hours) at 1/14/2022 0933  Last data filed at 1/13/2022 2315  Gross per 24 hour   Intake --   Output 2500 ml   Net -2500 ml          Physical Exam:  General:  Awake, alert, oriented in NAD  Skin:  Warm and dry. No unusual bruising or rash  Neck:  Supple. No JVD or carotid bruit appreciated  Chest:  Normal effort.   Clear to auscultation, no wheezes/rhonchi/rales  Cardiovascular:  RRR, S1/S2, no murmur/gallop/rub  Abdomen:  Soft, nontender, +bowel sounds  Extremities:  No edema  Neurological: No focal deficits  Psychological: Normal mood and affect      Medications:    dexamethasone  6 mg IntraVENous Q24H    enoxaparin  30 mg SubCUTAneous Nightly    Vitamin D  2,000 Units Oral Daily    docusate sodium  100 mg Oral BID    aspirin  325 mg Oral Daily    atorvastatin  40 mg Oral Nightly    sodium chloride flush  5-40 mL IntraVENous 2 times per day    furosemide  40 mg IntraVENous TID    cefTRIAXone (ROCEPHIN) IV  1,000 mg IntraVENous Q24H    pantoprazole  40 mg Oral QAM AC      sodium chloride         Lab Data:  CBC:   Recent Labs 01/12/22  1232 01/13/22  0439 01/14/22  0440   WBC 3.3* 3.3* 4.8   HGB 11.7* 11.2* 11.2*    163 166     BMP:    Recent Labs     01/12/22  1336 01/13/22  0439 01/14/22  0440    139 137   K 4.7 3.6 3.6   CO2 23 29 25   BUN 11 13 29*   CREATININE 0.7 1.3* 1.8*     INR:  No results for input(s): INR in the last 72 hours. BNP:    Recent Labs     01/12/22  1336 01/14/22  0440   PROBNP 1,168* 1,038*         Diagnostics:  Echo:  1/13/22:   -Covid+   Normal left ventricle size, wall thickness, and systolic function with an   estimated ejection fraction of 65%.   No regional wall motion abnormalities are seen.   Normal diastolic filling pattern for age.  Omid Beady aortic stenosis with a peak velocity of 2.65 m/s with a mean pressure   gradient of 17 mmHg. There is moderate aortic insufficiency.   Severe left atrial enlargement noted.   There is mild mitral regurgitation.   There is mild to moderate tricuspid regurgitation with a RVSP estimation of   25 mmHg. CXR:  Impression   Asymmetric airspace opacification in left mid and lower lung zone with a   small pleural effusion.  Questionable small right pleural effusion as well. Pattern may represent pulmonary edema or a developing pattern of viral   pneumonia.          CT chest:  Impression   1. Scattered pulmonary opacities noted at the lung bases bilaterally,   secondary to infection or edema. 2. Stable intra and extrahepatic ductal dilatation in the setting of   cholecystectomy.  Consider correlation with LFTs. 3. Nonobstructing punctate left renal calculus. 4. Diverticulosis without evidence of diverticulitis.      Echo:  5/25/19:   -Normal left ventricle size, wall thickness and systolic function with an   estimated ejection fraction of 60%. No regional wall motion abnormalities   are seen.   -Mild aortic stenosis with a peak velocity of 2.54m/s and a mean pressure   gradient of 15mmHg.  Mild aortic regurgitation.   -Mild mitral regurgitation.   -Mild tricuspid regurgitation with a estimated PASP of 31 mmHg plus right   atrial pressure.   -Trivial pulmonic regurgitation present.   -Biatrial enlargement. 5/19:Invasive procedures and treatments.   CathLeft Heart Cath  Dominance      LM: luminal irregularities   LAD: luminal irregularities with 30% proximal, mildly calcified stenosis   LCx: mild plaquing; 30% ostial stenosis of smaller OM1 ; moderate sized second and third marginal free of significant disease  RCA: 40% eccentric mid      LVEDP: 12 mmHg     5 mmHg gradient upon pullback across Ao      Assessment:    1. Chronic diastolic heart failure, compensated  2. Nausea and vomiting  3.  covid +  4. Dyspnea on exertion   5. General weakness      Plan:    1. Stop IV lasix today and resume lasix 20 mg daily tomorrow due to increased creat  2.  covid treatment per hospitalist  3. Consider adding ace or arb as an outpatient      Discussed with patient who is agreeable with plan of care. Thank you for allowing me to participate in the care of your patient.     DENIZ Go - CNS, CNS

## 2022-01-14 NOTE — PROGRESS NOTES
Assessment complete. VSS no SOB or any distress noted. Evening meds given. Pt c/o nausea, too soon for  Zofran. Pt has pain at 7/10 at this time. Pt aware that pIn med is not due at this time. Pure wick in place and working correctly. Pt repositioned. POC discussed and agreed upon. Call light within reach, pt encouraged to call if any needs arise. No further requests at this time. Will continue to monitor.

## 2022-01-15 PROBLEM — J34.89 NASAL VESTIBULITIS: Status: RESOLVED | Noted: 2019-10-15 | Resolved: 2022-01-15

## 2022-01-15 PROBLEM — S22.000S THORACIC COMPRESSION FRACTURE, SEQUELA: Status: RESOLVED | Noted: 2019-04-03 | Resolved: 2022-01-15

## 2022-01-15 PROBLEM — E44.1 MILD MALNUTRITION (HCC): Chronic | Status: RESOLVED | Noted: 2019-04-04 | Resolved: 2022-01-15

## 2022-01-15 PROBLEM — R07.9 ACUTE CHEST PAIN: Status: RESOLVED | Noted: 2019-05-24 | Resolved: 2022-01-15

## 2022-01-15 LAB — MAGNESIUM: 1.9 MG/DL (ref 1.8–2.4)

## 2022-01-15 PROCEDURE — 6360000002 HC RX W HCPCS: Performed by: INTERNAL MEDICINE

## 2022-01-15 PROCEDURE — 6370000000 HC RX 637 (ALT 250 FOR IP): Performed by: INTERNAL MEDICINE

## 2022-01-15 PROCEDURE — 36415 COLL VENOUS BLD VENIPUNCTURE: CPT

## 2022-01-15 PROCEDURE — 2060000000 HC ICU INTERMEDIATE R&B

## 2022-01-15 PROCEDURE — 99231 SBSQ HOSP IP/OBS SF/LOW 25: CPT | Performed by: NURSE PRACTITIONER

## 2022-01-15 PROCEDURE — 2580000003 HC RX 258: Performed by: INTERNAL MEDICINE

## 2022-01-15 PROCEDURE — 6370000000 HC RX 637 (ALT 250 FOR IP): Performed by: CLINICAL NURSE SPECIALIST

## 2022-01-15 PROCEDURE — 83735 ASSAY OF MAGNESIUM: CPT

## 2022-01-15 RX ORDER — CEFUROXIME AXETIL 250 MG/1
250 TABLET ORAL DAILY
Status: DISCONTINUED | OUTPATIENT
Start: 2022-01-15 | End: 2022-01-17 | Stop reason: HOSPADM

## 2022-01-15 RX ADMIN — CEFUROXIME AXETIL 250 MG: 250 TABLET ORAL at 17:15

## 2022-01-15 RX ADMIN — ASPIRIN 325 MG: 325 TABLET, COATED ORAL at 09:40

## 2022-01-15 RX ADMIN — DEXAMETHASONE SODIUM PHOSPHATE 6 MG: 10 INJECTION INTRAMUSCULAR; INTRAVENOUS at 09:39

## 2022-01-15 RX ADMIN — Medication 2000 UNITS: at 09:40

## 2022-01-15 RX ADMIN — ONDANSETRON 4 MG: 2 INJECTION INTRAMUSCULAR; INTRAVENOUS at 05:30

## 2022-01-15 RX ADMIN — HYDROMORPHONE HYDROCHLORIDE 0.5 MG: 1 INJECTION, SOLUTION INTRAMUSCULAR; INTRAVENOUS; SUBCUTANEOUS at 07:21

## 2022-01-15 RX ADMIN — OXYCODONE AND ACETAMINOPHEN 1 TABLET: 10; 325 TABLET ORAL at 11:21

## 2022-01-15 RX ADMIN — ATORVASTATIN CALCIUM 40 MG: 80 TABLET, FILM COATED ORAL at 20:15

## 2022-01-15 RX ADMIN — FUROSEMIDE 20 MG: 20 TABLET ORAL at 09:40

## 2022-01-15 RX ADMIN — DOCUSATE SODIUM 100 MG: 100 CAPSULE, LIQUID FILLED ORAL at 09:40

## 2022-01-15 RX ADMIN — Medication 10 ML: at 09:36

## 2022-01-15 RX ADMIN — ONDANSETRON 4 MG: 2 INJECTION INTRAMUSCULAR; INTRAVENOUS at 11:21

## 2022-01-15 RX ADMIN — DOCUSATE SODIUM 100 MG: 100 CAPSULE, LIQUID FILLED ORAL at 20:15

## 2022-01-15 RX ADMIN — ENOXAPARIN SODIUM 30 MG: 30 INJECTION SUBCUTANEOUS at 20:15

## 2022-01-15 RX ADMIN — Medication 10 ML: at 20:15

## 2022-01-15 RX ADMIN — HYDROMORPHONE HYDROCHLORIDE 0.5 MG: 1 INJECTION, SOLUTION INTRAMUSCULAR; INTRAVENOUS; SUBCUTANEOUS at 21:24

## 2022-01-15 RX ADMIN — HYDROMORPHONE HYDROCHLORIDE 0.5 MG: 1 INJECTION, SOLUTION INTRAMUSCULAR; INTRAVENOUS; SUBCUTANEOUS at 17:14

## 2022-01-15 RX ADMIN — PANTOPRAZOLE SODIUM 40 MG: 40 TABLET, DELAYED RELEASE ORAL at 05:30

## 2022-01-15 RX ADMIN — HYDROMORPHONE HYDROCHLORIDE 0.5 MG: 1 INJECTION, SOLUTION INTRAMUSCULAR; INTRAVENOUS; SUBCUTANEOUS at 02:22

## 2022-01-15 RX ADMIN — ONDANSETRON 4 MG: 2 INJECTION INTRAMUSCULAR; INTRAVENOUS at 17:14

## 2022-01-15 ASSESSMENT — PAIN SCALES - GENERAL
PAINLEVEL_OUTOF10: 7
PAINLEVEL_OUTOF10: 0

## 2022-01-15 ASSESSMENT — PAIN DESCRIPTION - PROGRESSION
CLINICAL_PROGRESSION: NOT CHANGED
CLINICAL_PROGRESSION: NOT CHANGED

## 2022-01-15 NOTE — PROGRESS NOTES
[facility]  HEART FAILURE  Progress Note      Admit Date 1/12/2022     Reason for Consult:      Reason for Consultation/Chief Complaint: emesis    HPI:    Cyndi Gaming is a 80 y.o. female with PMH HFpEF, nonobstructive CAD admitted with N&V, COVID +, BNP elevated. Subjective:  Patient is being seen for CHF. There were no acute overnight cardiac events. Today Ms. Adrianna Gonzalez denies chest pain, SOB, or palpitations, c/o cough and nausea      Baseline Weight:    Wt Readings from Last 3 Encounters:   01/15/22 149 lb 12.8 oz (67.9 kg)   10/15/19 155 lb 14.4 oz (70.7 kg)   09/13/19 152 lb (68.9 kg)          NYHA Class III  Objective:   BP (!) 124/57   Pulse 74   Temp 98 °F (36.7 °C) (Oral)   Resp 16   Ht 5' (1.524 m)   Wt 149 lb 12.8 oz (67.9 kg)   SpO2 98%   BMI 29.26 kg/m²       Intake/Output Summary (Last 24 hours) at 1/15/2022 0835  Last data filed at 1/14/2022 1910  Gross per 24 hour   Intake --   Output 1500 ml   Net -1500 ml      Wt Readings from Last 3 Encounters:   01/15/22 149 lb 12.8 oz (67.9 kg)   10/15/19 155 lb 14.4 oz (70.7 kg)   09/13/19 152 lb (68.9 kg)      In: -   Out: 1500       Physical Exam:  General Appearance:  Non-obese/Well Nourished  Respiratory:  · Resp Auscultation: Normal breath sounds without dullness  Cardiovascular:  · Auscultation: Regular rate and rhythm, normal S1S2, no m/g/r/c  · Palpation: Normal    · JVD: none  · Pedal Pulses: 2+ and equal   Abdomen:  · Soft, NT, ND, + bs  Extremities:  · No Cyanosis or Clubbing  · Extremities: negative  Neurological/Psychiatric:  · Oriented to time, place, and person  · Non-anxious    MEDICATIONS:   Scheduled Meds:   Scheduled Meds:   furosemide  20 mg Oral Daily    dexamethasone  6 mg IntraVENous Q24H    enoxaparin  30 mg SubCUTAneous Nightly    Vitamin D  2,000 Units Oral Daily    docusate sodium  100 mg Oral BID    aspirin  325 mg Oral Daily    atorvastatin  40 mg Oral Nightly    sodium chloride flush  5-40 mL IntraVENous 2 times per day    cefTRIAXone (ROCEPHIN) IV  1,000 mg IntraVENous Q24H    pantoprazole  40 mg Oral QAM AC     Continuous Infusions:   sodium chloride       PRN Meds:. HYDROmorphone, promethazine, sodium chloride, diphenoxylate-atropine, ondansetron, oxyCODONE-acetaminophen, perflutren lipid microspheres, sodium chloride flush, sodium chloride, ondansetron **OR** ondansetron, magnesium hydroxide, acetaminophen **OR** acetaminophen, hydrALAZINE, sodium chloride, potassium chloride **OR** potassium alternative oral replacement **OR** potassium chloride  Continuous Infusions:   sodium chloride         Intake/Output Summary (Last 24 hours) at 1/15/2022 0835  Last data filed at 1/14/2022 1910  Gross per 24 hour   Intake --   Output 1500 ml   Net -1500 ml       Lab Data:  CBC:   Lab Results   Component Value Date    WBC 4.8 01/14/2022    HGB 11.2 01/14/2022     01/14/2022     BMP:  Lab Results   Component Value Date     01/14/2022    K 3.6 01/14/2022    K 4.7 01/12/2022    CL 97 01/14/2022    CO2 25 01/14/2022    BUN 29 01/14/2022    CREATININE 1.8 01/14/2022    GLUCOSE 120 01/14/2022     INR:   Lab Results   Component Value Date    INR 0.96 05/24/2019    INR 0.98 04/01/2019    INR 1.01 12/06/2018        CARDIAC LABS  ENZYMES:  Recent Labs     01/12/22  1336 01/12/22  2148   TROPONINI 0.02* 0.03*     FASTING LIPID PANEL:  Lab Results   Component Value Date    HDL 76 01/13/2022    HDL 70 01/11/2010    LDLCALC 71 01/13/2022    TRIG 69 01/13/2022    TSH 2.00 01/12/2022     LIVER PROFILE:  Lab Results   Component Value Date    AST 21 01/13/2022    AST 31 01/12/2022    ALT 10 01/13/2022    ALT 10 01/12/2022     BNP:   Lab Results   Component Value Date    PROBNP 1,038 01/14/2022    PROBNP 1,168 01/12/2022    PROBNP 288 05/24/2019     Iron Studies:    Lab Results   Component Value Date    FERRITIN 111.0 02/24/2019     Lab Results   Component Value Date    IRON 28 (L) 02/24/2019    TIBC 207 (L) appreciate the opportunity of cooperating in the care of this individual.    DENIZ Hendrix - CNP, ACNP, [address] 1/15/2022, 8:35 AM  Heart Failure  [address], [address]  Ph: [phone]      Core Measures:   · Discharge instructions:   · LVEF documented:   · ACEI for LV dysfunction:   · Smoking Cessation:

## 2022-01-15 NOTE — PROGRESS NOTES
Department of Internal Medicine  General Internal Medicine   Progress Note      SUBJECTIVE: moderate SOB , PO Intake fair , no wheezes     History obtained from chart review and the patient  General ROS: positive for  - fatigue and malaise  negative for - chills, fever or night sweats  Psychological ROS: positive for - anxiety and memory difficulties  negative for - hallucinations or hostility  Ophthalmic ROS: negative  Respiratory ROS: positive for - cough, shortness of breath and tachypnea  negative for - hemoptysis, stridor or wheezing  Cardiovascular ROS: positive for - dyspnea on exertion and shortness of breath  negative for - chest pain or palpitations  Gastrointestinal ROS: no abdominal pain, change in bowel habits, or black or bloody stools  Genito-Urinary ROS: no dysuria, trouble voiding, or hematuria  Musculoskeletal ROS: negative  Neurological ROS: no TIA or stroke symptoms  Dermatological ROS: negative    OBJECTIVE      Medications      Current Facility-Administered Medications: HYDROmorphone HCl PF (DILAUDID) injection 0.5 mg, 0.5 mg, IntraVENous, Q4H PRN  promethazine (PHENERGAN) injection 25 mg, 25 mg, IntraVENous, Q6H PRN  furosemide (LASIX) tablet 20 mg, 20 mg, Oral, Daily  dexamethasone (PF) (DECADRON) injection 6 mg, 6 mg, IntraVENous, Q24H  enoxaparin (LOVENOX) injection 30 mg, 30 mg, SubCUTAneous, Nightly  sodium chloride (OCEAN, BABY AYR) 0.65 % nasal spray 1 spray, 1 spray, Each Nostril, Q2H PRN  diphenoxylate-atropine (LOMOTIL) 2.5-0.025 MG per tablet 1 tablet, 1 tablet, Oral, 4x Daily PRN  ondansetron (ZOFRAN-ODT) disintegrating tablet 4 mg, 4 mg, Oral, Q8H PRN  Vitamin D (CHOLECALCIFEROL) tablet 2,000 Units, 2,000 Units, Oral, Daily  docusate sodium (COLACE) capsule 100 mg, 100 mg, Oral, BID  aspirin EC tablet 325 mg, 325 mg, Oral, Daily  atorvastatin (LIPITOR) tablet 40 mg, 40 mg, Oral, Nightly  oxyCODONE-acetaminophen (PERCOCET)  MG per tablet 1 tablet, 1 tablet, Oral, Q6H PRN  perflutren lipid microspheres (DEFINITY) injection 1.65 mg, 1.5 mL, IntraVENous, ONCE PRN  sodium chloride flush 0.9 % injection 5-40 mL, 5-40 mL, IntraVENous, 2 times per day  sodium chloride flush 0.9 % injection 10 mL, 10 mL, IntraVENous, PRN  0.9 % sodium chloride infusion, 25 mL, IntraVENous, PRN  ondansetron (ZOFRAN-ODT) disintegrating tablet 4 mg, 4 mg, Oral, Q8H PRN **OR** ondansetron (ZOFRAN) injection 4 mg, 4 mg, IntraVENous, Q6H PRN  magnesium hydroxide (MILK OF MAGNESIA) 400 MG/5ML suspension 30 mL, 30 mL, Oral, Daily PRN  acetaminophen (TYLENOL) tablet 650 mg, 650 mg, Oral, Q6H PRN **OR** acetaminophen (TYLENOL) suppository 650 mg, 650 mg, Rectal, Q6H PRN  cefTRIAXone (ROCEPHIN) 1000 mg in sterile water 10 mL IV syringe, 1,000 mg, IntraVENous, Q24H  hydrALAZINE (APRESOLINE) injection 10 mg, 10 mg, IntraVENous, Q6H PRN  0.9 % sodium chloride bolus, 500 mL, IntraVENous, PRN  potassium chloride (KLOR-CON M) extended release tablet 40 mEq, 40 mEq, Oral, PRN **OR** potassium bicarb-citric acid (EFFER-K) effervescent tablet 40 mEq, 40 mEq, Oral, PRN **OR** potassium chloride 10 mEq/100 mL IVPB (Peripheral Line), 10 mEq, IntraVENous, PRN  pantoprazole (PROTONIX) tablet 40 mg, 40 mg, Oral, QAM AC    Physical      Vitals: /62   Pulse 68   Temp 98 °F (36.7 °C) (Oral)   Resp 17   Ht 5' (1.524 m)   Wt 149 lb 12.8 oz (67.9 kg)   SpO2 93%   BMI 29.26 kg/m²   Temp: Temp: 98 °F (36.7 °C)  Max: Temp  Av.9 °F (36.6 °C)  Min: 97.5 °F (36.4 °C)  Max: 98.1 °F (36.7 °C)  Respiration range:  Resp  Av.4  Min: 16  Max: 18  Pulse Range:  Pulse  Av.6  Min: 60  Max: 74  Blood pressure range:  Systolic (23WTT), PWR:472 , Min:108 , RUN:545   , Diastolic (11ZLA), FZJ:13, Min:57, Max:72    SpO2  Av.2 %  Min: 93 %  Max: 99 %    Intake/Output Summary (Last 24 hours) at 1/15/2022 1525  Last data filed at 2022 1910  Gross per 24 hour   Intake --   Output 1500 ml   Net -1500 ml       Vent settings:  Pulse  Av.8  Min: 60  Max: 82  Resp  Av.3  Min: 16  Max: 23  SpO2  Av.1 %  Min: 90 %  Max: 99 %    CONSTITUTIONAL:  fatigued, alert, cooperative, mild distress, appears stated age and mildly obese  EYES:  Unremarkable    NECK:  Mild jvd  and supple, symmetrical, trachea midline  BACK:  kyphotic and symmetric  LUNGS:  tachypneic, Moderate respiratory distress, moderate air exchange, no retractions and crackles diffuse, rhonchi diffuse  CARDIOVASCULAR:  normal apical pulses, regular rate and rhythm, normal S1 and S2, no S3 and no S4  ABDOMEN:  Soft BS + non tender   MUSCULOSKELETAL:  Trace edema   NEUROLOGIC:  Grossly intact   SKIN:  Warm dry and Pale  and no bruising or bleeding    Data      Recent Results (from the past 96 hour(s))   EKG 12 Lead    Collection Time: 22 12:28 PM   Result Value Ref Range    Ventricular Rate 72 BPM    Atrial Rate 72 BPM    P-R Interval 360 ms    QRS Duration 96 ms    Q-T Interval 398 ms    QTc Calculation (Bazett) 435 ms    P Axis 36 degrees    R Axis -27 degrees    T Axis 8 degrees    Diagnosis       Sinus rhythm with 1st degree A-V blockOtherwise normal ECGConfirmed by [facility] DOMING[facility] () on 2022 5:37:04 PM   Rapid influenza A/B antigens    Collection Time: 22 12:32 PM    Specimen: Nasopharyngeal   Result Value Ref Range    Rapid Influenza A Ag Negative Negative    Rapid Influenza B Ag Negative Negative   CBC Auto Differential    Collection Time: 22 12:32 PM   Result Value Ref Range    WBC 3.3 (L) 4.0 - 11.0 K/uL    RBC 3.68 (L) 4.00 - 5.20 M/uL    Hemoglobin 11.7 (L) 12.0 - 16.0 g/dL    Hematocrit 34.5 (L) 36.0 - 48.0 %    MCV 93.6 80.0 - 100.0 fL    MCH 31.9 26.0 - 34.0 pg    MCHC 34.0 31.0 - 36.0 g/dL    RDW 14.3 12.4 - 15.4 %    Platelets 895 454 - 429 K/uL    MPV 7.5 5.0 - 10.5 fL    Neutrophils % 60.1 %    Lymphocytes % 32.6 %    Monocytes % 5.9 %    Eosinophils % 0.8 %    Basophils % 0.6 %    Neutrophils Absolute 2.0 1.7 - 7.7 K/uL    Lymphocytes Absolute 1.1 1.0 - 5.1 K/uL    Monocytes Absolute 0.2 0.0 - 1.3 K/uL    Eosinophils Absolute 0.0 0.0 - 0.6 K/uL    Basophils Absolute 0.0 0.0 - 0.2 K/uL   Blood Gas, Venous    Collection Time: 01/12/22 12:32 PM   Result Value Ref Range    pH, Randal 7.438 7.350 - 7.450    pCO2, Randal 40.3 40.0 - 50.0 mmHg    pO2, Randal 44.7 (H) 25.0 - 40.0 mmHg    HCO3, Venous 27.2 23.0 - 29.0 mmol/L    Base Excess, Randal 2.8 -3.0 - 3.0 mmol/L    O2 Sat, Randal 84 Not Established %    Carboxyhemoglobin 2.9 (H) 0.0 - 1.5 %    MetHgb, Randal 0.3 <1.5 %    TC02 (Calc), Randal 64 Not Established mmol/L    O2 Content, Randal 13 Not Established VOL %    O2 Therapy Unknown     Hemoglobin, Randal, Reduced 16 %   Urinalysis Reflex to Culture    Collection Time: 01/12/22 12:32 PM    Specimen: Urine, clean catch   Result Value Ref Range    Color, UA YELLOW Straw/Yellow    Clarity, UA Clear Clear    Glucose, Ur Negative Negative mg/dL    Bilirubin Urine Negative Negative    Ketones, Urine Negative Negative mg/dL    Specific Gravity, UA 1.007 1.005 - 1.030    Blood, Urine Negative Negative    pH, UA 7.0 5.0 - 8.0    Protein, UA Negative Negative mg/dL    Urobilinogen, Urine 0.2 <2.0 E.U./dL    Nitrite, Urine Negative Negative    Leukocyte Esterase, Urine MODERATE (A) Negative    Microscopic Examination YES     Urine Type NotGiven     Urine Reflex to Culture Not Indicated    Lactic Acid, Plasma    Collection Time: 01/12/22 12:32 PM   Result Value Ref Range    Lactic Acid 1.2 0.4 - 2.0 mmol/L   COVID-19    Collection Time: 01/12/22 12:32 PM   Result Value Ref Range    SARS-CoV-2 Detected (A) Not detected   Microscopic Urinalysis    Collection Time: 01/12/22 12:32 PM   Result Value Ref Range    Hyaline Casts, UA 0 0 - 8 /LPF    WBC, UA 3 0 - 5 /HPF    RBC, UA 1 0 - 4 /HPF    Epithelial Cells, UA 1 0 - 5 /HPF   Culture, Urine    Collection Time: 01/12/22 12:32 PM    Specimen: Urine, clean catch   Result Value Ref Range    Urine Culture, Routine No growth at 18 to 36 hours    SPECIMEN REJECTION    Collection Time: 01/12/22  1:23 PM   Result Value Ref Range    Rejected Test bnp bmp liver t     Reason for Rejection see below    Brain Natriuretic Peptide    Collection Time: 01/12/22  1:36 PM   Result Value Ref Range    Pro-BNP 1,168 (H) 0 - 449 pg/mL   Basic Metabolic Panel w/ Reflex to MG    Collection Time: 01/12/22  1:36 PM   Result Value Ref Range    Sodium 137 136 - 145 mmol/L    Potassium reflex Magnesium 4.7 3.5 - 5.1 mmol/L    Chloride 103 99 - 110 mmol/L    CO2 23 21 - 32 mmol/L    Anion Gap 11 3 - 16    Glucose 100 (H) 70 - 99 mg/dL    BUN 11 7 - 20 mg/dL    CREATININE 0.7 0.6 - 1.2 mg/dL    GFR Non-African American >60 >60    GFR African American >60 >60    Calcium 8.7 8.3 - 10.6 mg/dL   Hepatic Function Panel    Collection Time: 01/12/22  1:36 PM   Result Value Ref Range    Total Protein 6.4 6.4 - 8.2 g/dL    Albumin 3.7 3.4 - 5.0 g/dL    Alkaline Phosphatase 48 40 - 129 U/L    ALT 10 10 - 40 U/L    AST 31 15 - 37 U/L    Total Bilirubin 0.5 0.0 - 1.0 mg/dL    Bilirubin, Direct <0.2 0.0 - 0.3 mg/dL    Bilirubin, Indirect see below 0.0 - 1.0 mg/dL   Lipase    Collection Time: 01/12/22  1:36 PM   Result Value Ref Range    Lipase 13.0 13.0 - 60.0 U/L   Troponin    Collection Time: 01/12/22  1:36 PM   Result Value Ref Range    Troponin 0.02 (H) <0.01 ng/mL   Procalcitonin    Collection Time: 01/12/22  1:36 PM   Result Value Ref Range    Procalcitonin 0.03 0.00 - 0.15 ng/mL   Troponin    Collection Time: 01/12/22  9:48 PM   Result Value Ref Range    Troponin 0.03 (H) <0.01 ng/mL   TSH    Collection Time: 01/12/22  9:48 PM   Result Value Ref Range    TSH 2.00 0.27 - 4.20 uIU/mL   T4, free    Collection Time: 01/12/22  9:48 PM   Result Value Ref Range    T4 Free 1.5 0.9 - 1.8 ng/dL   Comprehensive Metabolic Panel    Collection Time: 01/13/22  4:39 AM   Result Value Ref Range    Sodium 139 136 - 145 mmol/L    Potassium 3.6 3.5 - 5.1 mmol/L    Chloride 99 99 - 110 mmol/L    CO2 29 21 - 32 mmol/L    Anion Gap 11 3 - 16    Glucose 109 (H) 70 - 99 mg/dL    BUN 13 7 - 20 mg/dL    CREATININE 1.3 (H) 0.6 - 1.2 mg/dL    GFR Non-African American 39 (A) >60    GFR  47 (A) >60    Calcium 9.0 8.3 - 10.6 mg/dL    Total Protein 6.5 6.4 - 8.2 g/dL    Albumin 3.7 3.4 - 5.0 g/dL    Albumin/Globulin Ratio 1.3 1.1 - 2.2    Total Bilirubin 0.5 0.0 - 1.0 mg/dL    Alkaline Phosphatase 51 40 - 129 U/L    ALT 10 10 - 40 U/L    AST 21 15 - 37 U/L   Magnesium    Collection Time: 01/13/22  4:39 AM   Result Value Ref Range    Magnesium 1.90 1.80 - 2.40 mg/dL   Lipid panel - fasting    Collection Time: 01/13/22  4:39 AM   Result Value Ref Range    Cholesterol, Total 161 0 - 199 mg/dL    Triglycerides 69 0 - 150 mg/dL    HDL 76 (H) 40 - 60 mg/dL    LDL Calculated 71 <100 mg/dL    VLDL Cholesterol Calculated 14 Not Established mg/dL   Procalcitonin    Collection Time: 01/13/22  4:39 AM   Result Value Ref Range    Procalcitonin 0.04 0.00 - 0.15 ng/mL   CBC Auto Differential    Collection Time: 01/13/22  4:39 AM   Result Value Ref Range    WBC 3.3 (L) 4.0 - 11.0 K/uL    RBC 3.55 (L) 4.00 - 5.20 M/uL    Hemoglobin 11.2 (L) 12.0 - 16.0 g/dL    Hematocrit 33.3 (L) 36.0 - 48.0 %    MCV 93.9 80.0 - 100.0 fL    MCH 31.5 26.0 - 34.0 pg    MCHC 33.6 31.0 - 36.0 g/dL    RDW 14.2 12.4 - 15.4 %    Platelets 219 212 - 774 K/uL    MPV 7.5 5.0 - 10.5 fL    Neutrophils % 45.0 %    Lymphocytes % 43.7 %    Monocytes % 9.2 %    Eosinophils % 1.7 %    Basophils % 0.4 %    Neutrophils Absolute 1.5 (L) 1.7 - 7.7 K/uL    Lymphocytes Absolute 1.4 1.0 - 5.1 K/uL    Monocytes Absolute 0.3 0.0 - 1.3 K/uL    Eosinophils Absolute 0.1 0.0 - 0.6 K/uL    Basophils Absolute 0.0 0.0 - 0.2 K/uL   C-Reactive Protein    Collection Time: 01/13/22  4:39 AM   Result Value Ref Range    CRP <3.0 0.0 - 5.1 mg/L   D-Dimer, Quantitative    Collection Time: 01/13/22  4:39 AM   Result Value Ref Range    D-Dimer, Quant 204 0 - 229 infection)    CHF (congestive heart failure) (HCC)    Suspected COVID-19 virus infection    Acute systolic heart failure (HCC)    Chronic diastolic heart failure (HCC)    Nausea and vomiting    CORNELL (dyspnea on exertion)  Resolved Problems:    * No resolved hospital problems.  *    Ct IV decadron    wean O2 requirement    monitor renal function DVT prophylaxis    cultures negative    de-escalate antibiotics    discussed with nursing staff

## 2022-01-16 LAB
MAGNESIUM: 2.1 MG/DL (ref 1.8–2.4)
PRO-BNP: 473 PG/ML (ref 0–449)

## 2022-01-16 PROCEDURE — 6360000002 HC RX W HCPCS: Performed by: INTERNAL MEDICINE

## 2022-01-16 PROCEDURE — 6370000000 HC RX 637 (ALT 250 FOR IP): Performed by: INTERNAL MEDICINE

## 2022-01-16 PROCEDURE — 2580000003 HC RX 258: Performed by: INTERNAL MEDICINE

## 2022-01-16 PROCEDURE — 83735 ASSAY OF MAGNESIUM: CPT

## 2022-01-16 PROCEDURE — 2060000000 HC ICU INTERMEDIATE R&B

## 2022-01-16 PROCEDURE — 6370000000 HC RX 637 (ALT 250 FOR IP): Performed by: CLINICAL NURSE SPECIALIST

## 2022-01-16 PROCEDURE — 36415 COLL VENOUS BLD VENIPUNCTURE: CPT

## 2022-01-16 PROCEDURE — 83880 ASSAY OF NATRIURETIC PEPTIDE: CPT

## 2022-01-16 RX ORDER — DEXAMETHASONE 4 MG/1
4 TABLET ORAL EVERY 12 HOURS SCHEDULED
Status: DISCONTINUED | OUTPATIENT
Start: 2022-01-16 | End: 2022-01-17 | Stop reason: HOSPADM

## 2022-01-16 RX ADMIN — PANTOPRAZOLE SODIUM 40 MG: 40 TABLET, DELAYED RELEASE ORAL at 05:38

## 2022-01-16 RX ADMIN — Medication 10 ML: at 20:02

## 2022-01-16 RX ADMIN — ENOXAPARIN SODIUM 30 MG: 30 INJECTION SUBCUTANEOUS at 20:01

## 2022-01-16 RX ADMIN — ONDANSETRON 4 MG: 2 INJECTION INTRAMUSCULAR; INTRAVENOUS at 03:33

## 2022-01-16 RX ADMIN — HYDROMORPHONE HYDROCHLORIDE 0.5 MG: 1 INJECTION, SOLUTION INTRAMUSCULAR; INTRAVENOUS; SUBCUTANEOUS at 06:30

## 2022-01-16 RX ADMIN — OXYCODONE AND ACETAMINOPHEN 1 TABLET: 10; 325 TABLET ORAL at 20:01

## 2022-01-16 RX ADMIN — DEXAMETHASONE SODIUM PHOSPHATE 6 MG: 10 INJECTION INTRAMUSCULAR; INTRAVENOUS at 08:38

## 2022-01-16 RX ADMIN — DOCUSATE SODIUM 100 MG: 100 CAPSULE, LIQUID FILLED ORAL at 20:00

## 2022-01-16 RX ADMIN — PROMETHAZINE HYDROCHLORIDE 25 MG: 25 INJECTION INTRAMUSCULAR; INTRAVENOUS at 18:01

## 2022-01-16 RX ADMIN — ASPIRIN 325 MG: 325 TABLET, COATED ORAL at 08:39

## 2022-01-16 RX ADMIN — Medication 10 ML: at 08:40

## 2022-01-16 RX ADMIN — OXYCODONE AND ACETAMINOPHEN 1 TABLET: 10; 325 TABLET ORAL at 08:40

## 2022-01-16 RX ADMIN — FUROSEMIDE 20 MG: 20 TABLET ORAL at 08:39

## 2022-01-16 RX ADMIN — HYDROMORPHONE HYDROCHLORIDE 0.5 MG: 1 INJECTION, SOLUTION INTRAMUSCULAR; INTRAVENOUS; SUBCUTANEOUS at 11:24

## 2022-01-16 RX ADMIN — ONDANSETRON 4 MG: 2 INJECTION INTRAMUSCULAR; INTRAVENOUS at 14:48

## 2022-01-16 RX ADMIN — Medication 2000 UNITS: at 08:39

## 2022-01-16 RX ADMIN — HYDROMORPHONE HYDROCHLORIDE 0.5 MG: 1 INJECTION, SOLUTION INTRAMUSCULAR; INTRAVENOUS; SUBCUTANEOUS at 16:27

## 2022-01-16 RX ADMIN — DOCUSATE SODIUM 100 MG: 100 CAPSULE, LIQUID FILLED ORAL at 08:39

## 2022-01-16 RX ADMIN — HYDROMORPHONE HYDROCHLORIDE 0.5 MG: 1 INJECTION, SOLUTION INTRAMUSCULAR; INTRAVENOUS; SUBCUTANEOUS at 02:22

## 2022-01-16 RX ADMIN — DEXAMETHASONE 4 MG: 4 TABLET ORAL at 20:00

## 2022-01-16 RX ADMIN — ATORVASTATIN CALCIUM 40 MG: 80 TABLET, FILM COATED ORAL at 20:00

## 2022-01-16 RX ADMIN — CEFUROXIME AXETIL 250 MG: 250 TABLET ORAL at 08:39

## 2022-01-16 ASSESSMENT — PAIN DESCRIPTION - PROGRESSION
CLINICAL_PROGRESSION: GRADUALLY WORSENING
CLINICAL_PROGRESSION: NOT CHANGED

## 2022-01-16 ASSESSMENT — PAIN DESCRIPTION - LOCATION
LOCATION: BACK;NECK
LOCATION: BACK

## 2022-01-16 ASSESSMENT — PAIN SCALES - GENERAL
PAINLEVEL_OUTOF10: 5
PAINLEVEL_OUTOF10: 0
PAINLEVEL_OUTOF10: 8
PAINLEVEL_OUTOF10: 8
PAINLEVEL_OUTOF10: 5
PAINLEVEL_OUTOF10: 8
PAINLEVEL_OUTOF10: 7
PAINLEVEL_OUTOF10: 8
PAINLEVEL_OUTOF10: 7
PAINLEVEL_OUTOF10: 6
PAINLEVEL_OUTOF10: 7
PAINLEVEL_OUTOF10: 5

## 2022-01-16 ASSESSMENT — PAIN DESCRIPTION - PAIN TYPE
TYPE: ACUTE PAIN
TYPE: CHRONIC PAIN

## 2022-01-16 ASSESSMENT — PAIN DESCRIPTION - DIRECTION: RADIATING_TOWARDS: TO LOWER BACK

## 2022-01-16 ASSESSMENT — PAIN DESCRIPTION - ORIENTATION
ORIENTATION: RIGHT;MID;LOWER
ORIENTATION: OTHER (COMMENT)

## 2022-01-16 ASSESSMENT — PAIN DESCRIPTION - ONSET: ONSET: ON-GOING

## 2022-01-16 ASSESSMENT — PAIN DESCRIPTION - FREQUENCY: FREQUENCY: CONTINUOUS

## 2022-01-16 ASSESSMENT — PAIN DESCRIPTION - DESCRIPTORS: DESCRIPTORS: ACHING;DULL

## 2022-01-16 NOTE — PROGRESS NOTES
Department of Internal Medicine  General Internal Medicine   Progress Note      SUBJECTIVE: breathing easier some what nauseated     History obtained from chart review and the patient  General ROS: positive for  - fatigue and malaise  negative for - chills, fever or night sweats  Psychological ROS: positive for - anxiety and memory difficulties  negative for - hallucinations or hostility  Ophthalmic ROS: negative  Respiratory ROS: positive for - cough, shortness of breath and tachypnea  negative for - hemoptysis, stridor or wheezing  Cardiovascular ROS: positive for - dyspnea on exertion and shortness of breath  negative for - chest pain or palpitations  Gastrointestinal ROS: no abdominal pain, change in bowel habits, or black or bloody stools  Genito-Urinary ROS: no dysuria, trouble voiding, or hematuria  Musculoskeletal ROS: negative  Neurological ROS: no TIA or stroke symptoms  Dermatological ROS: negative    OBJECTIVE      Medications      Current Facility-Administered Medications: dexamethasone (DECADRON) tablet 4 mg, 4 mg, Oral, 2 times per day  cefUROXime (CEFTIN) tablet 250 mg, 250 mg, Oral, Daily  sodium chloride (OCEAN, BABY AYR) 0.65 % nasal spray 1 spray, 1 spray, Each Nostril, Q2H PRN  HYDROmorphone HCl PF (DILAUDID) injection 0.5 mg, 0.5 mg, IntraVENous, Q4H PRN  promethazine (PHENERGAN) injection 25 mg, 25 mg, IntraVENous, Q6H PRN  furosemide (LASIX) tablet 20 mg, 20 mg, Oral, Daily  enoxaparin (LOVENOX) injection 30 mg, 30 mg, SubCUTAneous, Nightly  sodium chloride (OCEAN, BABY AYR) 0.65 % nasal spray 1 spray, 1 spray, Each Nostril, Q2H PRN  diphenoxylate-atropine (LOMOTIL) 2.5-0.025 MG per tablet 1 tablet, 1 tablet, Oral, 4x Daily PRN  ondansetron (ZOFRAN-ODT) disintegrating tablet 4 mg, 4 mg, Oral, Q8H PRN  Vitamin D (CHOLECALCIFEROL) tablet 2,000 Units, 2,000 Units, Oral, Daily  docusate sodium (COLACE) capsule 100 mg, 100 mg, Oral, BID  aspirin EC tablet 325 mg, 325 mg, Oral, Daily  atorvastatin (LIPITOR) tablet 40 mg, 40 mg, Oral, Nightly  oxyCODONE-acetaminophen (PERCOCET)  MG per tablet 1 tablet, 1 tablet, Oral, Q6H PRN  perflutren lipid microspheres (DEFINITY) injection 1.65 mg, 1.5 mL, IntraVENous, ONCE PRN  sodium chloride flush 0.9 % injection 5-40 mL, 5-40 mL, IntraVENous, 2 times per day  sodium chloride flush 0.9 % injection 10 mL, 10 mL, IntraVENous, PRN  0.9 % sodium chloride infusion, 25 mL, IntraVENous, PRN  ondansetron (ZOFRAN-ODT) disintegrating tablet 4 mg, 4 mg, Oral, Q8H PRN **OR** ondansetron (ZOFRAN) injection 4 mg, 4 mg, IntraVENous, Q6H PRN  magnesium hydroxide (MILK OF MAGNESIA) 400 MG/5ML suspension 30 mL, 30 mL, Oral, Daily PRN  acetaminophen (TYLENOL) tablet 650 mg, 650 mg, Oral, Q6H PRN **OR** acetaminophen (TYLENOL) suppository 650 mg, 650 mg, Rectal, Q6H PRN  hydrALAZINE (APRESOLINE) injection 10 mg, 10 mg, IntraVENous, Q6H PRN  0.9 % sodium chloride bolus, 500 mL, IntraVENous, PRN  potassium chloride (KLOR-CON M) extended release tablet 40 mEq, 40 mEq, Oral, PRN **OR** potassium bicarb-citric acid (EFFER-K) effervescent tablet 40 mEq, 40 mEq, Oral, PRN **OR** potassium chloride 10 mEq/100 mL IVPB (Peripheral Line), 10 mEq, IntraVENous, PRN  pantoprazole (PROTONIX) tablet 40 mg, 40 mg, Oral, QAM AC    Physical      Vitals: /70   Pulse 64   Temp 98.1 °F (36.7 °C) (Oral)   Resp 18   Ht 5' (1.524 m)   Wt 147 lb (66.7 kg)   SpO2 94%   BMI 28.71 kg/m²   Temp: Temp: 98.1 °F (36.7 °C)  Max: Temp  Av.9 °F (36.6 °C)  Min: 97.2 °F (36.2 °C)  Max: 98.2 °F (36.8 °C)  Respiration range:  Resp  Av  Min: 16  Max: 18  Pulse Range:  Pulse  Av.7  Min: 63  Max: 78  Blood pressure range:  Systolic (88CRE), ZVP:718 , Min:99 , XPO:997   , Diastolic (20TJR), UIY:18, Min:59, Max:85    SpO2  Av.5 %  Min: 93 %  Max: 100 %    Intake/Output Summary (Last 24 hours) at 2022 1525  Last data filed at 2022 1450  Gross per 24 hour   Intake --   Output 3300 ml Net -3300 ml       Vent settings:  Pulse  Av.8  Min: 60  Max: 82  Resp  Av.9  Min: 16  Max: 23  SpO2  Av.4 %  Min: 90 %  Max: 100 %    CONSTITUTIONAL:  fatigued, alert, cooperative, mild distress, appears stated age and mildly obese  EYES:  Unremarkable    NECK:  Mild jvd  and supple, symmetrical, trachea midline  BACK:  kyphotic and symmetric  LUNGS:  tachypneic, Moderate respiratory distress, moderate air exchange, no retractions and crackles diffuse, rhonchi diffuse  CARDIOVASCULAR:  normal apical pulses, regular rate and rhythm, normal S1 and S2, no S3 and no S4  ABDOMEN:  Soft BS + non tender   MUSCULOSKELETAL:  Trace edema   NEUROLOGIC:  Grossly intact   SKIN:  Warm dry and Pale  and no bruising or bleeding    Data      Recent Results (from the past 96 hour(s))   Troponin    Collection Time: 22  9:48 PM   Result Value Ref Range    Troponin 0.03 (H) <0.01 ng/mL   TSH    Collection Time: 22  9:48 PM   Result Value Ref Range    TSH 2.00 0.27 - 4.20 uIU/mL   T4, free    Collection Time: 22  9:48 PM   Result Value Ref Range    T4 Free 1.5 0.9 - 1.8 ng/dL   Comprehensive Metabolic Panel    Collection Time: 22  4:39 AM   Result Value Ref Range    Sodium 139 136 - 145 mmol/L    Potassium 3.6 3.5 - 5.1 mmol/L    Chloride 99 99 - 110 mmol/L    CO2 29 21 - 32 mmol/L    Anion Gap 11 3 - 16    Glucose 109 (H) 70 - 99 mg/dL    BUN 13 7 - 20 mg/dL    CREATININE 1.3 (H) 0.6 - 1.2 mg/dL    GFR Non-African American 39 (A) >60    GFR  47 (A) >60    Calcium 9.0 8.3 - 10.6 mg/dL    Total Protein 6.5 6.4 - 8.2 g/dL    Albumin 3.7 3.4 - 5.0 g/dL    Albumin/Globulin Ratio 1.3 1.1 - 2.2    Total Bilirubin 0.5 0.0 - 1.0 mg/dL    Alkaline Phosphatase 51 40 - 129 U/L    ALT 10 10 - 40 U/L    AST 21 15 - 37 U/L   Magnesium    Collection Time: 22  4:39 AM   Result Value Ref Range    Magnesium 1.90 1.80 - 2.40 mg/dL   Lipid panel - fasting    Collection Time: 22  4:39 AM Result Value Ref Range    Cholesterol, Total 161 0 - 199 mg/dL    Triglycerides 69 0 - 150 mg/dL    HDL 76 (H) 40 - 60 mg/dL    LDL Calculated 71 <100 mg/dL    VLDL Cholesterol Calculated 14 Not Established mg/dL   Procalcitonin    Collection Time: 01/13/22  4:39 AM   Result Value Ref Range    Procalcitonin 0.04 0.00 - 0.15 ng/mL   CBC Auto Differential    Collection Time: 01/13/22  4:39 AM   Result Value Ref Range    WBC 3.3 (L) 4.0 - 11.0 K/uL    RBC 3.55 (L) 4.00 - 5.20 M/uL    Hemoglobin 11.2 (L) 12.0 - 16.0 g/dL    Hematocrit 33.3 (L) 36.0 - 48.0 %    MCV 93.9 80.0 - 100.0 fL    MCH 31.5 26.0 - 34.0 pg    MCHC 33.6 31.0 - 36.0 g/dL    RDW 14.2 12.4 - 15.4 %    Platelets 063 175 - 359 K/uL    MPV 7.5 5.0 - 10.5 fL    Neutrophils % 45.0 %    Lymphocytes % 43.7 %    Monocytes % 9.2 %    Eosinophils % 1.7 %    Basophils % 0.4 %    Neutrophils Absolute 1.5 (L) 1.7 - 7.7 K/uL    Lymphocytes Absolute 1.4 1.0 - 5.1 K/uL    Monocytes Absolute 0.3 0.0 - 1.3 K/uL    Eosinophils Absolute 0.1 0.0 - 0.6 K/uL    Basophils Absolute 0.0 0.0 - 0.2 K/uL   C-Reactive Protein    Collection Time: 01/13/22  4:39 AM   Result Value Ref Range    CRP <3.0 0.0 - 5.1 mg/L   D-Dimer, Quantitative    Collection Time: 01/13/22  4:39 AM   Result Value Ref Range    D-Dimer, Quant 204 0 - 229 ng/mL DDU   Magnesium    Collection Time: 01/14/22  4:40 AM   Result Value Ref Range    Magnesium 1.90 1.80 - 2.40 mg/dL   Brain Natriuretic Peptide    Collection Time: 01/14/22  4:40 AM   Result Value Ref Range    Pro-BNP 1,038 (H) 0 - 449 pg/mL   CBC Auto Differential    Collection Time: 01/14/22  4:40 AM   Result Value Ref Range    WBC 4.8 4.0 - 11.0 K/uL    RBC 3.52 (L) 4.00 - 5.20 M/uL    Hemoglobin 11.2 (L) 12.0 - 16.0 g/dL    Hematocrit 33.0 (L) 36.0 - 48.0 %    MCV 93.6 80.0 - 100.0 fL    MCH 31.9 26.0 - 34.0 pg    MCHC 34.0 31.0 - 36.0 g/dL    RDW 14.0 12.4 - 15.4 %    Platelets 392 458 - 592 K/uL    MPV 7.2 5.0 - 10.5 fL    Neutrophils % 54.2 % Lymphocytes % 36.7 %    Monocytes % 8.8 %    Eosinophils % 0.1 %    Basophils % 0.2 %    Neutrophils Absolute 2.6 1.7 - 7.7 K/uL    Lymphocytes Absolute 1.8 1.0 - 5.1 K/uL    Monocytes Absolute 0.4 0.0 - 1.3 K/uL    Eosinophils Absolute 0.0 0.0 - 0.6 K/uL    Basophils Absolute 0.0 0.0 - 0.2 K/uL   Basic Metabolic Panel    Collection Time: 01/14/22  4:40 AM   Result Value Ref Range    Sodium 137 136 - 145 mmol/L    Potassium 3.6 3.5 - 5.1 mmol/L    Chloride 97 (L) 99 - 110 mmol/L    CO2 25 21 - 32 mmol/L    Anion Gap 15 3 - 16    Glucose 120 (H) 70 - 99 mg/dL    BUN 29 (H) 7 - 20 mg/dL    CREATININE 1.8 (H) 0.6 - 1.2 mg/dL    GFR Non-African American 26 (A) >60    GFR  32 (A) >60    Calcium 9.0 8.3 - 10.6 mg/dL   C-Reactive Protein    Collection Time: 01/14/22  4:40 AM   Result Value Ref Range    CRP <3.0 0.0 - 5.1 mg/L   Magnesium    Collection Time: 01/15/22  4:41 AM   Result Value Ref Range    Magnesium 1.90 1.80 - 2.40 mg/dL   Magnesium    Collection Time: 01/16/22  4:54 AM   Result Value Ref Range    Magnesium 2.10 1.80 - 2.40 mg/dL   Brain Natriuretic Peptide    Collection Time: 01/16/22  4:54 AM   Result Value Ref Range    Pro- (H) 0 - 449 pg/mL       ASSESSMENT AND PLAN     Principal Problem:    COVID-19  Active Problems:    Spinal stenosis of thoracic region    Disc displacement, lumbar    Hypoxia    General weakness    Moderate malnutrition (HCC)    CAD (coronary artery disease)    Hyperlipidemia    GERD (gastroesophageal reflux disease)    UTI (urinary tract infection)    CHF (congestive heart failure) (HCC)    Suspected COVID-19 virus infection    Acute systolic heart failure (HCC)    Chronic diastolic heart failure (HCC)    Nausea and vomiting    CORNELL (dyspnea on exertion)  Resolved Problems:    * No resolved hospital problems.  *    May de-escalate to PO dexamethasone , wean off O2    home O2 eval 'DME order signed

## 2022-01-17 VITALS
RESPIRATION RATE: 16 BRPM | HEART RATE: 63 BPM | HEIGHT: 60 IN | SYSTOLIC BLOOD PRESSURE: 142 MMHG | BODY MASS INDEX: 30.39 KG/M2 | WEIGHT: 154.8 LBS | DIASTOLIC BLOOD PRESSURE: 69 MMHG | OXYGEN SATURATION: 96 % | TEMPERATURE: 97.9 F

## 2022-01-17 LAB
ANION GAP SERPL CALCULATED.3IONS-SCNC: 10 MMOL/L (ref 3–16)
BUN BLDV-MCNC: 32 MG/DL (ref 7–20)
CALCIUM SERPL-MCNC: 9.3 MG/DL (ref 8.3–10.6)
CHLORIDE BLD-SCNC: 97 MMOL/L (ref 99–110)
CO2: 29 MMOL/L (ref 21–32)
CREAT SERPL-MCNC: 1.2 MG/DL (ref 0.6–1.2)
GFR AFRICAN AMERICAN: 51
GFR NON-AFRICAN AMERICAN: 42
GLUCOSE BLD-MCNC: 167 MG/DL (ref 70–99)
HCT VFR BLD CALC: 36.1 % (ref 36–48)
HEMOGLOBIN: 12.1 G/DL (ref 12–16)
MAGNESIUM: 2.1 MG/DL (ref 1.8–2.4)
MCH RBC QN AUTO: 31.8 PG (ref 26–34)
MCHC RBC AUTO-ENTMCNC: 33.5 G/DL (ref 31–36)
MCV RBC AUTO: 95.1 FL (ref 80–100)
PDW BLD-RTO: 13.9 % (ref 12.4–15.4)
PLATELET # BLD: 178 K/UL (ref 135–450)
PMV BLD AUTO: 7.5 FL (ref 5–10.5)
POTASSIUM SERPL-SCNC: 4.7 MMOL/L (ref 3.5–5.1)
RBC # BLD: 3.79 M/UL (ref 4–5.2)
SODIUM BLD-SCNC: 136 MMOL/L (ref 136–145)
WBC # BLD: 4.2 K/UL (ref 4–11)

## 2022-01-17 PROCEDURE — 94680 O2 UPTK RST&XERS DIR SIMPLE: CPT

## 2022-01-17 PROCEDURE — 80048 BASIC METABOLIC PNL TOTAL CA: CPT

## 2022-01-17 PROCEDURE — 6370000000 HC RX 637 (ALT 250 FOR IP): Performed by: CLINICAL NURSE SPECIALIST

## 2022-01-17 PROCEDURE — 6360000002 HC RX W HCPCS: Performed by: INTERNAL MEDICINE

## 2022-01-17 PROCEDURE — 97530 THERAPEUTIC ACTIVITIES: CPT

## 2022-01-17 PROCEDURE — 2580000003 HC RX 258: Performed by: INTERNAL MEDICINE

## 2022-01-17 PROCEDURE — 83735 ASSAY OF MAGNESIUM: CPT

## 2022-01-17 PROCEDURE — 85027 COMPLETE CBC AUTOMATED: CPT

## 2022-01-17 PROCEDURE — 97116 GAIT TRAINING THERAPY: CPT

## 2022-01-17 PROCEDURE — 6370000000 HC RX 637 (ALT 250 FOR IP): Performed by: INTERNAL MEDICINE

## 2022-01-17 PROCEDURE — 36415 COLL VENOUS BLD VENIPUNCTURE: CPT

## 2022-01-17 RX ORDER — CEFUROXIME AXETIL 250 MG/1
250 TABLET ORAL DAILY
Qty: 4 TABLET | Refills: 0 | Status: SHIPPED | OUTPATIENT
Start: 2022-01-17 | End: 2022-01-21

## 2022-01-17 RX ORDER — DEXAMETHASONE 4 MG/1
4 TABLET ORAL EVERY 12 HOURS SCHEDULED
Qty: 10 TABLET | Refills: 0 | Status: SHIPPED | OUTPATIENT
Start: 2022-01-17 | End: 2022-01-22

## 2022-01-17 RX ORDER — OXYCODONE HYDROCHLORIDE AND ACETAMINOPHEN 5; 325 MG/1; MG/1
1 TABLET ORAL EVERY 4 HOURS PRN
Qty: 15 TABLET | Refills: 0 | Status: SHIPPED | OUTPATIENT
Start: 2022-01-17 | End: 2022-03-02 | Stop reason: DRUGHIGH

## 2022-01-17 RX ORDER — FUROSEMIDE 20 MG/1
20 TABLET ORAL DAILY
Qty: 30 TABLET | Refills: 3 | Status: SHIPPED | OUTPATIENT
Start: 2022-01-17 | End: 2022-03-02

## 2022-01-17 RX ADMIN — PANTOPRAZOLE SODIUM 40 MG: 40 TABLET, DELAYED RELEASE ORAL at 05:14

## 2022-01-17 RX ADMIN — ONDANSETRON 4 MG: 2 INJECTION INTRAMUSCULAR; INTRAVENOUS at 08:02

## 2022-01-17 RX ADMIN — OXYCODONE AND ACETAMINOPHEN 1 TABLET: 10; 325 TABLET ORAL at 08:43

## 2022-01-17 RX ADMIN — CEFUROXIME AXETIL 250 MG: 250 TABLET ORAL at 08:43

## 2022-01-17 RX ADMIN — DEXAMETHASONE 4 MG: 4 TABLET ORAL at 08:43

## 2022-01-17 RX ADMIN — ONDANSETRON 4 MG: 2 INJECTION INTRAMUSCULAR; INTRAVENOUS at 01:23

## 2022-01-17 RX ADMIN — DOCUSATE SODIUM 100 MG: 100 CAPSULE, LIQUID FILLED ORAL at 08:43

## 2022-01-17 RX ADMIN — ASPIRIN 325 MG: 325 TABLET, COATED ORAL at 08:44

## 2022-01-17 RX ADMIN — HYDROMORPHONE HYDROCHLORIDE 0.5 MG: 1 INJECTION, SOLUTION INTRAMUSCULAR; INTRAVENOUS; SUBCUTANEOUS at 05:13

## 2022-01-17 RX ADMIN — FUROSEMIDE 20 MG: 20 TABLET ORAL at 08:44

## 2022-01-17 RX ADMIN — Medication 10 ML: at 08:46

## 2022-01-17 RX ADMIN — HYDROMORPHONE HYDROCHLORIDE 0.5 MG: 1 INJECTION, SOLUTION INTRAMUSCULAR; INTRAVENOUS; SUBCUTANEOUS at 11:31

## 2022-01-17 RX ADMIN — Medication 2000 UNITS: at 08:43

## 2022-01-17 ASSESSMENT — PAIN SCALES - GENERAL
PAINLEVEL_OUTOF10: 8
PAINLEVEL_OUTOF10: 7
PAINLEVEL_OUTOF10: 8
PAINLEVEL_OUTOF10: 8

## 2022-01-17 ASSESSMENT — PAIN DESCRIPTION - PAIN TYPE
TYPE: CHRONIC PAIN
TYPE: CHRONIC PAIN

## 2022-01-17 ASSESSMENT — PAIN DESCRIPTION - FREQUENCY
FREQUENCY: CONTINUOUS
FREQUENCY: CONTINUOUS

## 2022-01-17 ASSESSMENT — PAIN DESCRIPTION - LOCATION
LOCATION: BACK
LOCATION: BACK

## 2022-01-17 ASSESSMENT — PAIN DESCRIPTION - DESCRIPTORS
DESCRIPTORS: ACHING;DULL
DESCRIPTORS: ACHING;DULL

## 2022-01-17 ASSESSMENT — PAIN DESCRIPTION - PROGRESSION
CLINICAL_PROGRESSION: GRADUALLY WORSENING
CLINICAL_PROGRESSION: GRADUALLY IMPROVING

## 2022-01-17 ASSESSMENT — PAIN DESCRIPTION - ONSET: ONSET: ON-GOING

## 2022-01-17 NOTE — PROGRESS NOTES
01/17/22 1233   Resting (Room Air)   SpO2 92   HR 74   During Walk (Room Air)   SpO2 93   HR 84   Walk/Assistance Device Ambulation   Rate of Dyspnea 0   After Walk   SpO2 92   HR 87   Rate of Dyspnea 0   Does the Patient Qualify for Home O2 No   Does the Patient Need Portable Oxygen Tanks No     Electronically signed by Juma Chavira RCP on 1/17/2022 at 12:33 PM

## 2022-01-17 NOTE — PROGRESS NOTES
Awake, resting quietly in bed, watching TV. C/O chronic back pain and nausea, nausea med not due, gave ginger ale and saltine crackers. Denies SOB or chest pain. VSS. Gave Percocet per prn order. Assessment completed, see flow charts.   Will continue to monitor.[email or web address]

## 2022-01-17 NOTE — CARE COORDINATION
Patient discharged 1-17-22 returning to [facility] at 2pm via [facility]. [facility], [phone],   fax [phone]. Mamadou was notified. Patient/Family aware of and agreeable to the discharge plan.     All discharge needs met per ca  ase management

## 2022-01-17 NOTE — PROGRESS NOTES
Patient received her discharge instructions, they were included in the packet of information with her CHF teaching that nurse Erick Ayers gave to her because she did not want to loose the information and we felt that it would stay together in the packet. The patient and I spent 45 minutes going over discharge instructions and the benefits of using the stoplight method to prevent re-hospitalizations. We discussed the benefits of reading labels and what information she can learn from the labels. The patient gets most of her food from the dining room so she is limited to her choices but she will realize when things are higher in sodium and cause her to hold water by daily weights and hopefully she might be able to limit or avoid those food choices in the future.

## 2022-01-17 NOTE — PROGRESS NOTES
The [place] Sleepiness Scale       The [place] Sleepiness Scale is widely used in the field of sleep medicine as a subjective measure of a patient's sleepiness. The test is a list of eight situations in which you rate your tendency to become sleepy on a scale of 0, no chance to 3, high chance of dozing. Your score is based on a scale of 0 to 24. The scale estimates whether you are experiencing excessive sleepiness that possibly requires medical attention. How Sleepy Are You? How sleepy are you to doze off or fall asleep in the following situations? You should rate your chances of dozing off, not just feeling tired. Even if you have not done some of these things recently try to determine how they would have affected you.  For each situation, decide whether or not you would have:     0 = No chance of dozing 1 = Slight chance of dozing   2 = Moderate chance of  dozing 3 = High change of dozing       Situation                                                                                     Chance of Dozing    Sitting and reading  0 =  []  1 =    [x] 2 =    [] 3 =    []    Watching TV  0 =  []  1 =    [x] 2 =    [] 3 =    []      Sitting inactive in public place (e.g., a theater or a meeting)  0 =  [x]  1 =    [] 2 =    [] 3 =    []    As a passenger in a car for an hour without a break          0  =  []  1 =    [] 2 =    [x] 3 =    []    Lying down to rest in the afternoon when circumstances permit    0 =  []  1 =    [x] 2 =    [] 3 =    []    Sitting and talking to someone  0 =  [x]  1 =    [] 2 =    [] 3 =    []      Sitting quietly after a lunch without alcohol  0 =  [x]  1 =    [] 2 =    [] 3 =    []    In a car, while stopped for a few minutes in traffic                                                                      0 =  [x]  1 =    [] 2 =    [] 3 =    []    Total Score = 5    If your total score is 10 or greater, you are experiencing excessive sleepiness and should consider seeking a medical follow-up. Take a copy of this screening test to your primary care physician on your next office visit. Interpretation:      0 -   7: It is unlikely that you are abnormally sleepy. 8 -   9: You have an average amount of daytime sleepiness. 10 - 15: You may be excessively sleepy depending on the situation. You may want to consider seeking medical attention. 16 - 24: You are excessively sleepy and should consider seeking medical attention.       Electronically signed by Shira Pro RCP on 1/17/2022 at 12:35 PM

## 2022-01-17 NOTE — PROGRESS NOTES
The [place] Sleepiness Scale       The [place] Sleepiness Scale is widely used in the field of sleep medicine as a subjective measure of a patient's sleepiness. The test is a list of eight situations in which you rate your tendency to become sleepy on a scale of 0, no chance to 3, high chance of dozing. Your score is based on a scale of 0 to 24. The scale estimates whether you are experiencing excessive sleepiness that possibly requires medical attention. How Sleepy Are You? How sleepy are you to doze off or fall asleep in the following situations? You should rate your chances of dozing off, not just feeling tired. Even if you have not done some of these things recently try to determine how they would have affected you.  For each situation, decide whether or not you would have:     0 = No chance of dozing 1 = Slight chance of dozing   2 = Moderate chance of  dozing 3 = High change of dozing       Situation                                                                                     Chance of Dozing    Sitting and reading  0 =  []  1 =    [x] 2 =    [] 3 =    []    Watching TV  0 =  []  1 =    [x] 2 =    [] 3 =    []      Sitting inactive in public place (e.g., a theater or a meeting)  0 =  [x]  1 =    [] 2 =    [] 3 =    []    As a passenger in a car for an hour without a break          0  =  []  1 =    [] 2 =    [x] 3 =    []    Lying down to rest in the afternoon when circumstances permit    0 =  []  1 =    [x] 2 =    [] 3 =    []    Sitting and talking to someone  0 =  [x]  1 =    [] 2 =    [] 3 =    []      Sitting quietly after a lunch without alcohol  0 =  [x]  1 =    [] 2 =    [] 3 =    []    In a car, while stopped for a few minutes in traffic                                                                      0 =  [x]  1 =    [] 2 =    [] 3 =    []    Total Score = 5    If your total score is 10 or greater, you are experiencing excessive sleepiness and should consider seeking a medical follow-up. Take a copy of this screening test to your primary care physician on your next office visit. Interpretation:      0 -   7: It is unlikely that you are abnormally sleepy. 8 -   9: You have an average amount of daytime sleepiness. 10 - 15: You may be excessively sleepy depending on the situation. You may want to consider seeking medical attention. 16 - 24: You are excessively sleepy and should consider seeking medical attention.       Electronically signed by Shira Pro RCP on 1/17/2022 at 12:36 PM

## 2022-01-17 NOTE — PROGRESS NOTES
Physical Therapy  Facility/Department: [address]  Daily Treatment Note  NAME: Constance Oliveira  : 10/16/1932  MRN: [id number]    Date of Service: 2022    Discharge Recommendations: Constance Oliveira scored a 17/24 on the AM-PAC short mobility form. Current research shows that an AM-PAC score of 17 or less is typically not associated with a discharge to the patient's home setting. Based on the patient's AM-PAC score and their current functional mobility deficits, it is recommended that the patient have 3-5 sessions per week of Physical Therapy at d/c to increase the patient's independence. Please see assessment section for further patient specific details. If patient discharges prior to next session this note will serve as a discharge summary. Please see below for the latest assessment towards goals. IF PATIENT DECLINES 3-5 SESSIONS, RECOMMEND HHPT S3 AT [facility]. 3-5 sessions per week   PT Equipment Recommendations  Equipment Needed: No    Assessment   Body structures, Functions, Activity limitations: Decreased functional mobility ; Decreased balance;Decreased endurance;Decreased strength  Assessment: Pt is a 81 yo female presenting with heart failute and COVID. Pt is able to complete bed mobility with SBA, transfers with SBA-min A, and ambulate 10 ft x2 with RW and CGA. Pt is presenting below baseline function and will benefit from continued skilled therapy to improve strength, endurance and balance. Treatment Diagnosis: impaired activtiy tolerance  Prognosis: Good  Decision Making: Medium Complexity  Clinical Presentation: evolving  PT Education: Goals;Transfer Training;PT Role;Plan of Care;Gait Training  Patient Education: d/c recommendations-pt verbalizes understanding  Barriers to Learning: none  REQUIRES PT FOLLOW UP: Yes  Activity Tolerance  Activity Tolerance: Patient limited by pain; Patient limited by endurance; Patient Tolerated treatment well  Activity Tolerance: Pt experiences some lightheadedness and nausea throughout treatment session, nursing notified     Patient Diagnosis(es): The primary encounter diagnosis was Congestive heart failure, unspecified HF chronicity, unspecified heart failure type ([address] Diagnoses of Nausea and vomiting, intractability of vomiting not specified, unspecified vomiting type, Dyspnea on exertion, General weakness, and Spinal stenosis of lumbar region, unspecified whether neurogenic claudication present were also pertinent to this visit. has a past medical history of Arthritis, CAD (coronary artery disease), Cancer ([address] Cystocele, Diabetes mellitus ([address] Hepatitis A, History of blood transfusion, Hyperlipidemia, PVC (premature ventricular contraction), Rectocele, Reflux, and Scoliosis. has a past surgical history that includes Appendectomy; joint replacement (Left); hernia repair (6 YRS AGO); Cholecystectomy; shoulder surgery (Right, 7/26/12); Bunionectomy (7/26/12); Breast surgery; Upper gastrointestinal endoscopy (11/2/12); Hysterectomy; bladder suspension; Dilation and curettage of uterus; other surgical history (Left, 09/22/14); Foot surgery; Cardiac catheterization; Colonoscopy (2008); Cystocopy (03/29/2017); other surgical history (Right, 06/28/2018); Cataract removal with implant (Left, 09/13/2018); pr xcapsl ctrc rmvl insj io lens prosth w/o ecp (Left, 9/13/2018); and Lumbar spine surgery (Right, 5/15/2019). Restrictions  Restrictions/Precautions  Restrictions/Precautions: Fall Risk  Position Activity Restriction  Other position/activity restrictions: Jolynn Frias is a 80 y.o. female who presents via EMS from [facility]. Patient with complaint of persisting intermittent nausea with dry heaving emesis over the past 3 weeks. However, the patient does state over the past week she has had increasing nausea. She reports no melena or hematemesis. Indicates loose stool yesterday.   She also describes a weakness and fatigue. Patient recently on Cipro for UTI diagnosis. No urinary complaints at this time. Patient does state producing a lot of mucus and swallowing and believes this could be the cause of her nausea. She has her usual back pain and she has had multiple back problems or procedures. She reports no specific chest pain, shortness of breath, abdominal pain or lower extremity edema. She does have known GERD and currently on Nexium twice daily. Subjective   General  Chart Reviewed: Yes  Response To Previous Treatment: Patient with no complaints from previous session.   Family / Caregiver Present: No  Subjective  Subjective: Pt reports 8/10 back pain, nursing notified and pain medication administered  General Comment  Comments: Pt supine in bed upon arrival. Pt agreeable to PT  Pain Screening  Patient Currently in Pain: Yes  Pain Assessment  Pain Level: 8  Vital Signs  Patient Currently in Pain: Yes       Orientation  Orientation  Overall Orientation Status: Within Functional Limits  Cognition   Cognition  Overall Cognitive Status: WFL  Objective   Bed mobility  Bridging: Minimal assistance  Rolling to Right: Stand by assistance  Supine to Sit: Stand by assistance (HOB elevated, grab bars)  Sit to Supine: Stand by assistance (HOB flat)  Scooting: Stand by assistance (SBA EOB, to scoot up supine in bed therapist put bed in trendelenburg and pt used grab bars and bridging to scoot up)  Comment: pt reports dizziness when rolling in bed  Transfers  Sit to Stand: Stand by assistance (From EOB and toilet)  Stand to sit: Stand by assistance;Minimal Assistance (SBA from EOB, min A at toilet due to pt fear of missing toilet seat, assist with guiding hips)  Comment: Pt. refused the chair as Pt. reports it is uncomfortable for her back  Ambulation  Ambulation?: Yes  Ambulation 1  Surface: level tile  Device: Rolling Walker  Assistance: Contact guard assistance  Quality of Gait: FW lean due to thoracic kyphosis, decreased step length/height, wide YASSINE, no postural sway  Gait Deviations: Decreased step length;Decreased step height  Distance: 10 ft + 10 ft  Comments: Pt ambulated 10 ft from EOB to sink with CGA and RW, stood at sink ~8 mins then ambulates 2 ft to toilet and sits for~10 mins then ambulates 10 ft to bed with RW and CGA. Stairs/Curb  Stairs?: Yes  Stairs  # Steps : 1 (weight scale)  Curbs: 2\"  Device: Rolling walker  Comment: Pt steps on/off weight scale with CGA and RW. Balance  Posture: Poor (Severe Kyphoscoliosis, ~90 degree bend)  Sitting - Static: Good;-  Sitting - Dynamic: Good;-  Standing - Static: Fair (SBA RW)  Standing - Dynamic: Fair (CGA RW)  Comments: Pt sat EOB to complete ADLs ~10 mins with SBA. Pt stood at sink to complete oral care and face washing with SBA ~8 mins. Pt sat at toilet ~10 mins to attempt BM with SBA. G-Code     OutComes Score                                                     AM-PAC Score  AM-PAC Inpatient Mobility Raw Score : 17 (01/17/22 0959)  AM-PAC Inpatient T-Scale Score : 42.13 (01/17/22 0959)  Mobility Inpatient CMS 0-100% Score: 50.57 (01/17/22 0959)  Mobility Inpatient CMS G-Code Modifier : CK (01/17/22 0959)          Goals  Short term goals  Time Frame for Short term goals: to be met by d/c  Short term goal 1: pt will complete bed mobility with mod I  Short term goal 2: pt will complete STS with mod I  Short term goal 3: pt will complete stand step transfer with mod I  Short term goal 4: pt will ambulate x 48' with mod I  Patient Goals   Patient goals : to improve strength  NO GOALS MET THIS DATE    Plan    Plan  Times per week: 3-5  Times per day: Daily  Current Treatment Recommendations: Strengthening,Transfer Training,Endurance Training,Gait Training,Functional Mobility [employer] Education & Training,Patient/Caregiver Education & Training  Safety Devices  Type of devices:  All fall risk precautions in place,Call light within reach,Bed alarm in place,Left in bed,Telesitter in use,Nurse notified,Gait belt  Restraints  Initially in place: No     Therapy Time   Individual Concurrent Group Co-treatment   Time In [address] [place] [address]         Time Out 0941         Minutes 69         Timed Code Treatment Minutes: [address], [address] [place], [address]

## 2022-01-17 NOTE — CONSULTS
[id number] [facility] 10/16/1932    History:  Past Medical History:   Diagnosis Date    Arthritis     OSTEOARTHRITIS BACK    CAD (coronary artery disease)     PT HAS AV BLOCK AND MVP    Cancer [address]     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    Cystocele     Diabetes mellitus ([facility]     type  2 diet controlled    Hepatitis A 1953    History of blood transfusion     hiatal hernia surgery    Hyperlipidemia     PVC (premature ventricular contraction)     Rectocele     Reflux     Scoliosis        ECHO:  Summary   -Covid+   Normal left ventricle size, wall thickness, and systolic function with an   estimated ejection fraction of 65%. No regional wall motion abnormalities are seen. Normal diastolic filling pattern for age. Mild aortic stenosis with a peak velocity of 2.65 m/s with a mean pressure   gradient of 17 mmHg. There is moderate aortic insufficiency. Severe left atrial enlargement noted. There is mild mitral regurgitation. There is mild to moderate tricuspid regurgitation with a RVSP estimation of   25 mmHg. History of sleep apnea: no  [place] Screen ordered: yes    DM History: no    Last Hospital Admission: 5/24/19 with chest pain  Code Status:  Full   Discharge plans: returning to assisted living at [facility] Present:  Lucy Nur was admitted to the hospital with increased shortness of breath. Patient states HF is new diagnosis but has previously followed with a cardiologist. Has not been seen since 2019. Patient lives in [place] and does have a scale in her apartment. Patient states she receives her food from the cafeteria normally. She does not add salt to season or flavor foods. She is active at home. She administers her own medications and is aware of what she is taking for what. She is compliant with follow up visits.     Patient provided with both written and verbal education on CHF signs/ symptoms, causes, discharge medications, daily weights, low sodium diet, activity, and follow-up. Pt to call if gains 3 pounds in one day or 5 pounds in one week. Mutually agreed upon goals were discussed such as calling the MD as soon as they recognize symptoms and weight gain, maintaining proper diet, taking medications as prescribed, joining cardiac rehab when able. Also reviewed importance of risk factor reduction. Patient provided with CHF Zone Management tool and CHF symptoms magnet. Discussed importance of lifestyle changes: patient agrees to daily weights    PATIENT/CAREGIVER TEACHING:    Level of patient/caregiver understanding able to:   [ x] Verbalize understanding [ ] Demonstrate understanding [ ] Teach back   [ ] Needs reinforcement [ ] Other:       Time spent teachin mins    1. WEIGHT: Admit Weight: 153 lb (69.4 kg)      Today  Weight: 154 lb 12.8 oz (70.2 kg)   2. I/O     Intake/Output Summary (Last 24 hours) at 2022 1129  Last data filed at 2022 0517  Gross per 24 hour   Intake 240 ml   Output 2700 ml   Net -2460 ml       Recommendations:   1. She has a virtual visit scheduled for later this week with HF NP  2. CHF Pathway on maggie for discharge to [place]. 3. Continue to educate on S/S.   4. Emphasize daily weights, diet, and knowing when and who to call  5. Provided patient with CHF Resource Line for questions and concerns.        MORIS GARCÍA RN 2022 11:29 AM

## 2022-01-17 NOTE — PROGRESS NOTES
Department of Internal Medicine  General Internal Medicine   Progress Note      SUBJECTIVE:   Some what nauseated , respiratory symptoms stable     History obtained from chart review and the patient  General ROS: positive for  - fatigue and malaise  negative for - chills, fever or night sweats  Psychological ROS: positive for - anxiety and memory difficulties  negative for - hallucinations or hostility  Ophthalmic ROS: negative  Respiratory ROS: positive for - cough, shortness of breath and tachypnea  negative for - hemoptysis, stridor or wheezing  Cardiovascular ROS: positive for - dyspnea on exertion and shortness of breath  negative for - chest pain or palpitations  Gastrointestinal ROS: no abdominal pain, change in bowel habits, or black or bloody stools  Genito-Urinary ROS: no dysuria, trouble voiding, or hematuria  Musculoskeletal ROS: negative  Neurological ROS: no TIA or stroke symptoms  Dermatological ROS: negative    OBJECTIVE      Medications      Current Facility-Administered Medications: dexamethasone (DECADRON) tablet 4 mg, 4 mg, Oral, 2 times per day  cefUROXime (CEFTIN) tablet 250 mg, 250 mg, Oral, Daily  sodium chloride (OCEAN, BABY AYR) 0.65 % nasal spray 1 spray, 1 spray, Each Nostril, Q2H PRN  HYDROmorphone HCl PF (DILAUDID) injection 0.5 mg, 0.5 mg, IntraVENous, Q4H PRN  promethazine (PHENERGAN) injection 25 mg, 25 mg, IntraVENous, Q6H PRN  furosemide (LASIX) tablet 20 mg, 20 mg, Oral, Daily  enoxaparin (LOVENOX) injection 30 mg, 30 mg, SubCUTAneous, Nightly  sodium chloride (OCEAN, BABY AYR) 0.65 % nasal spray 1 spray, 1 spray, Each Nostril, Q2H PRN  diphenoxylate-atropine (LOMOTIL) 2.5-0.025 MG per tablet 1 tablet, 1 tablet, Oral, 4x Daily PRN  ondansetron (ZOFRAN-ODT) disintegrating tablet 4 mg, 4 mg, Oral, Q8H PRN  Vitamin D (CHOLECALCIFEROL) tablet 2,000 Units, 2,000 Units, Oral, Daily  docusate sodium (COLACE) capsule 100 mg, 100 mg, Oral, BID  aspirin EC tablet 325 mg, 325 mg, Oral, Daily  atorvastatin (LIPITOR) tablet 40 mg, 40 mg, Oral, Nightly  oxyCODONE-acetaminophen (PERCOCET)  MG per tablet 1 tablet, 1 tablet, Oral, Q6H PRN  perflutren lipid microspheres (DEFINITY) injection 1.65 mg, 1.5 mL, IntraVENous, ONCE PRN  sodium chloride flush 0.9 % injection 5-40 mL, 5-40 mL, IntraVENous, 2 times per day  sodium chloride flush 0.9 % injection 10 mL, 10 mL, IntraVENous, PRN  0.9 % sodium chloride infusion, 25 mL, IntraVENous, PRN  ondansetron (ZOFRAN-ODT) disintegrating tablet 4 mg, 4 mg, Oral, Q8H PRN **OR** ondansetron (ZOFRAN) injection 4 mg, 4 mg, IntraVENous, Q6H PRN  magnesium hydroxide (MILK OF MAGNESIA) 400 MG/5ML suspension 30 mL, 30 mL, Oral, Daily PRN  acetaminophen (TYLENOL) tablet 650 mg, 650 mg, Oral, Q6H PRN **OR** acetaminophen (TYLENOL) suppository 650 mg, 650 mg, Rectal, Q6H PRN  hydrALAZINE (APRESOLINE) injection 10 mg, 10 mg, IntraVENous, Q6H PRN  0.9 % sodium chloride bolus, 500 mL, IntraVENous, PRN  potassium chloride (KLOR-CON M) extended release tablet 40 mEq, 40 mEq, Oral, PRN **OR** potassium bicarb-citric acid (EFFER-K) effervescent tablet 40 mEq, 40 mEq, Oral, PRN **OR** potassium chloride 10 mEq/100 mL IVPB (Peripheral Line), 10 mEq, IntraVENous, PRN  pantoprazole (PROTONIX) tablet 40 mg, 40 mg, Oral, QAM AC    Physical      Vitals: /73   Pulse 69   Temp 97.6 °F (36.4 °C) (Oral)   Resp 16   Ht 5' (1.524 m)   Wt 147 lb (66.7 kg)   SpO2 95%   BMI 28.71 kg/m²   Temp: Temp: 97.6 °F (36.4 °C)  Max: Temp  Av °F (36.7 °C)  Min: 97.6 °F (36.4 °C)  Max: 98.2 °F (36.8 °C)  Respiration range:  Resp  Av  Min: 16  Max: 18  Pulse Range:  Pulse  Av.6  Min: 63  Max: 69  Blood pressure range:  Systolic (78MUB), PVB:491 , Min:110 , KZV:768   , Diastolic (45LNR), PZU:14, Min:69, Max:83    SpO2  Av.5 %  Min: 94 %  Max: 95 %    Intake/Output Summary (Last 24 hours) at 2022 0720  Last data filed at 2022 0517  Gross per 24 hour   Intake 1200 ml   Output 2700 ml   Net -1500 ml       Vent settings:  Pulse  Av.1  Min: 60  Max: 82  Resp  Av.7  Min: 16  Max: 23  SpO2  Av.4 %  Min: 90 %  Max: 100 %    CONSTITUTIONAL:  fatigued, alert, cooperative, mild distress, appears stated age and mildly obese  EYES:  Unremarkable    NECK:  Mild jvd  and supple, symmetrical, trachea midline  BACK:  kyphotic and symmetric  LUNGS:  tachypneic, Moderate respiratory distress, moderate air exchange, no retractions and crackles diffuse, rhonchi diffuse  CARDIOVASCULAR:  normal apical pulses, regular rate and rhythm, normal S1 and S2, no S3 and no S4  ABDOMEN:  Soft BS + non tender   MUSCULOSKELETAL:  Trace edema   NEUROLOGIC:  Grossly intact   SKIN:  Warm dry and Pale  and no bruising or bleeding    Data      Recent Results (from the past 96 hour(s))   Magnesium    Collection Time: 22  4:40 AM   Result Value Ref Range    Magnesium 1.90 1.80 - 2.40 mg/dL   Brain Natriuretic Peptide    Collection Time: 22  4:40 AM   Result Value Ref Range    Pro-BNP 1,038 (H) 0 - 449 pg/mL   CBC Auto Differential    Collection Time: 22  4:40 AM   Result Value Ref Range    WBC 4.8 4.0 - 11.0 K/uL    RBC 3.52 (L) 4.00 - 5.20 M/uL    Hemoglobin 11.2 (L) 12.0 - 16.0 g/dL    Hematocrit 33.0 (L) 36.0 - 48.0 %    MCV 93.6 80.0 - 100.0 fL    MCH 31.9 26.0 - 34.0 pg    MCHC 34.0 31.0 - 36.0 g/dL    RDW 14.0 12.4 - 15.4 %    Platelets 916 768 - 585 K/uL    MPV 7.2 5.0 - 10.5 fL    Neutrophils % 54.2 %    Lymphocytes % 36.7 %    Monocytes % 8.8 %    Eosinophils % 0.1 %    Basophils % 0.2 %    Neutrophils Absolute 2.6 1.7 - 7.7 K/uL    Lymphocytes Absolute 1.8 1.0 - 5.1 K/uL    Monocytes Absolute 0.4 0.0 - 1.3 K/uL    Eosinophils Absolute 0.0 0.0 - 0.6 K/uL    Basophils Absolute 0.0 0.0 - 0.2 K/uL   Basic Metabolic Panel    Collection Time: 22  4:40 AM   Result Value Ref Range    Sodium 137 136 - 145 mmol/L    Potassium 3.6 3.5 - 5.1 mmol/L    Chloride 97 (L) 99 - 110 mmol/L    CO2 25 21 - 32 mmol/L    Anion Gap 15 3 - 16    Glucose 120 (H) 70 - 99 mg/dL    BUN 29 (H) 7 - 20 mg/dL    CREATININE 1.8 (H) 0.6 - 1.2 mg/dL    GFR Non-African American 26 (A) >60    GFR  32 (A) >60    Calcium 9.0 8.3 - 10.6 mg/dL   C-Reactive Protein    Collection Time: 01/14/22  4:40 AM   Result Value Ref Range    CRP <3.0 0.0 - 5.1 mg/L   Magnesium    Collection Time: 01/15/22  4:41 AM   Result Value Ref Range    Magnesium 1.90 1.80 - 2.40 mg/dL   Magnesium    Collection Time: 01/16/22  4:54 AM   Result Value Ref Range    Magnesium 2.10 1.80 - 2.40 mg/dL   Brain Natriuretic Peptide    Collection Time: 01/16/22  4:54 AM   Result Value Ref Range    Pro- (H) 0 - 449 pg/mL   Magnesium    Collection Time: 01/17/22  3:13 AM   Result Value Ref Range    Magnesium 2.10 1.80 - 2.40 mg/dL   CBC    Collection Time: 01/17/22  3:13 AM   Result Value Ref Range    WBC 4.2 4.0 - 11.0 K/uL    RBC 3.79 (L) 4.00 - 5.20 M/uL    Hemoglobin 12.1 12.0 - 16.0 g/dL    Hematocrit 36.1 36.0 - 48.0 %    MCV 95.1 80.0 - 100.0 fL    MCH 31.8 26.0 - 34.0 pg    MCHC 33.5 31.0 - 36.0 g/dL    RDW 13.9 12.4 - 15.4 %    Platelets 545 491 - 594 K/uL    MPV 7.5 5.0 - 10.5 fL   Basic Metabolic Panel    Collection Time: 01/17/22  3:13 AM   Result Value Ref Range    Sodium 136 136 - 145 mmol/L    Potassium 4.7 3.5 - 5.1 mmol/L    Chloride 97 (L) 99 - 110 mmol/L    CO2 29 21 - 32 mmol/L    Anion Gap 10 3 - 16    Glucose 167 (H) 70 - 99 mg/dL    BUN 32 (H) 7 - 20 mg/dL    CREATININE 1.2 0.6 - 1.2 mg/dL    GFR Non-African American 42 (A) >60    GFR  51 (A) >60    Calcium 9.3 8.3 - 10.6 mg/dL       ASSESSMENT AND PLAN     Principal Problem:    COVID-19  Active Problems:    Spinal stenosis of thoracic region    Disc displacement, lumbar    Hypoxia    General weakness    Moderate malnutrition (HCC)    CAD (coronary artery disease)    Hyperlipidemia    GERD (gastroesophageal reflux disease)    UTI (urinary tract infection)    CHF (congestive heart failure) (HCC)    Suspected COVID-19 virus infection    Acute systolic heart failure (HCC)    Chronic diastolic heart failure (HCC)    Nausea and vomiting    CORNELL (dyspnea on exertion)  Resolved Problems:    * No resolved hospital problems.  *     can be dismissed with Home O2    no major inpatient hospital management issues unresolved

## 2022-01-18 ENCOUNTER — TELEPHONE (OUTPATIENT)
Dept: OTHER | Age: 87
End: 2022-01-18

## 2022-01-18 ENCOUNTER — CARE COORDINATION (OUTPATIENT)
Dept: CASE MANAGEMENT | Age: 87
End: 2022-01-18

## 2022-01-18 NOTE — TELEPHONE ENCOUNTER
[address] [facility] [address] FAILURE PROGRAM  TELEPHONE ENCOUNTER FORM    Sergo Severe 10/16/1932    Attempted to call patient for HF follow-up. No answer at this time.       Next MD/ Clinic appointment: 1/20 with Almas Lowery RN 1/18/2022 3:41 PM

## 2022-01-19 ENCOUNTER — TELEPHONE (OUTPATIENT)
Dept: OTHER | Age: 87
End: 2022-01-19

## 2022-01-19 ENCOUNTER — CARE COORDINATION (OUTPATIENT)
Dept: CASE MANAGEMENT | Age: 87
End: 2022-01-19

## 2022-01-19 ENCOUNTER — HOSPITAL ENCOUNTER (OUTPATIENT)
Age: 87
Setting detail: SPECIMEN
Discharge: HOME | End: 2022-01-19
Payer: MEDICARE

## 2022-01-19 LAB
ANION GAP SERPL CALCULATED.3IONS-SCNC: 13 MMOL/L (ref 3–16)
BUN BLDV-MCNC: 40 MG/DL (ref 7–20)
CALCIUM SERPL-MCNC: 9.2 MG/DL (ref 8.3–10.6)
CHLORIDE BLD-SCNC: 95 MMOL/L (ref 99–110)
CO2: 26 MMOL/L (ref 21–32)
CREAT SERPL-MCNC: 1.2 MG/DL (ref 0.6–1.2)
GFR AFRICAN AMERICAN: 51
GFR NON-AFRICAN AMERICAN: 42
GLUCOSE BLD-MCNC: 138 MG/DL (ref 70–99)
POTASSIUM SERPL-SCNC: 4.1 MMOL/L (ref 3.5–5.1)
PRO-BNP: 558 PG/ML (ref 0–449)
SODIUM BLD-SCNC: 134 MMOL/L (ref 136–145)

## 2022-01-19 PROCEDURE — 36415 COLL VENOUS BLD VENIPUNCTURE: CPT

## 2022-01-19 PROCEDURE — 83880 ASSAY OF NATRIURETIC PEPTIDE: CPT

## 2022-01-19 PROCEDURE — 80048 BASIC METABOLIC PNL TOTAL CA: CPT

## 2022-01-19 NOTE — CARE COORDINATION
[place] [facility] Transitions Follow Up Call    2022    Patient: Barbara Sanders  Patient : 10/16/1932   MRN: [id number]  Reason for Admission: COVID+; CHF  Discharge Date: 22 RARS: Readmission Risk Score: 12.4 ( )       Final follow up outreach call attempt, no answer. CTN left VM with contact information and request for return call. Patient has cardiology appointment tomorrow. CTN will resolve episode and remain available.       Amarilys Collins RN

## 2022-01-20 ENCOUNTER — TELEPHONE (OUTPATIENT)
Dept: OTHER | Age: 87
End: 2022-01-20

## 2022-01-20 NOTE — TELEPHONE ENCOUNTER
Final attempt to reach patient. Phone goes to voicemail. Left message for return call back. Patient had missed OV with NPRG today.

## 2022-01-26 ENCOUNTER — HOSPITAL ENCOUNTER (OUTPATIENT)
Age: 87
Setting detail: SPECIMEN
Discharge: HOME | End: 2022-01-26
Payer: MEDICARE

## 2022-01-26 LAB
ANION GAP SERPL CALCULATED.3IONS-SCNC: 10 MMOL/L (ref 3–16)
BUN BLDV-MCNC: 21 MG/DL (ref 7–20)
CALCIUM SERPL-MCNC: 9.3 MG/DL (ref 8.3–10.6)
CHLORIDE BLD-SCNC: 99 MMOL/L (ref 99–110)
CO2: 28 MMOL/L (ref 21–32)
CREAT SERPL-MCNC: 1.2 MG/DL (ref 0.6–1.2)
GFR AFRICAN AMERICAN: 51
GFR NON-AFRICAN AMERICAN: 42
GLUCOSE BLD-MCNC: 89 MG/DL (ref 70–99)
POTASSIUM SERPL-SCNC: 4.6 MMOL/L (ref 3.5–5.1)
PRO-BNP: 615 PG/ML (ref 0–449)
SODIUM BLD-SCNC: 137 MMOL/L (ref 136–145)

## 2022-01-26 PROCEDURE — 36415 COLL VENOUS BLD VENIPUNCTURE: CPT

## 2022-01-26 PROCEDURE — 83880 ASSAY OF NATRIURETIC PEPTIDE: CPT

## 2022-01-26 PROCEDURE — 80048 BASIC METABOLIC PNL TOTAL CA: CPT

## 2022-02-02 ENCOUNTER — HOSPITAL ENCOUNTER (OUTPATIENT)
Age: 87
Setting detail: SPECIMEN
Discharge: HOME | End: 2022-02-02
Payer: MEDICARE

## 2022-02-02 LAB
ANION GAP SERPL CALCULATED.3IONS-SCNC: 11 MMOL/L (ref 3–16)
BUN BLDV-MCNC: 27 MG/DL (ref 7–20)
CALCIUM SERPL-MCNC: 9.1 MG/DL (ref 8.3–10.6)
CHLORIDE BLD-SCNC: 99 MMOL/L (ref 99–110)
CO2: 26 MMOL/L (ref 21–32)
CREAT SERPL-MCNC: 1.4 MG/DL (ref 0.6–1.2)
GFR AFRICAN AMERICAN: 43
GFR NON-AFRICAN AMERICAN: 35
GLUCOSE BLD-MCNC: 78 MG/DL (ref 70–99)
POTASSIUM SERPL-SCNC: 4.7 MMOL/L (ref 3.5–5.1)
PRO-BNP: 531 PG/ML (ref 0–449)
SODIUM BLD-SCNC: 136 MMOL/L (ref 136–145)

## 2022-02-02 PROCEDURE — 83880 ASSAY OF NATRIURETIC PEPTIDE: CPT

## 2022-02-02 PROCEDURE — 36415 COLL VENOUS BLD VENIPUNCTURE: CPT

## 2022-02-02 PROCEDURE — 80048 BASIC METABOLIC PNL TOTAL CA: CPT

## 2022-02-09 ENCOUNTER — HOSPITAL ENCOUNTER (OUTPATIENT)
Age: 87
Setting detail: SPECIMEN
Discharge: HOME | End: 2022-02-09
Payer: MEDICARE

## 2022-02-09 LAB
ANION GAP SERPL CALCULATED.3IONS-SCNC: 13 MMOL/L (ref 3–16)
BUN BLDV-MCNC: 22 MG/DL (ref 7–20)
CALCIUM SERPL-MCNC: 9.6 MG/DL (ref 8.3–10.6)
CHLORIDE BLD-SCNC: 101 MMOL/L (ref 99–110)
CO2: 25 MMOL/L (ref 21–32)
CREAT SERPL-MCNC: 1.1 MG/DL (ref 0.6–1.2)
GFR AFRICAN AMERICAN: 57
GFR NON-AFRICAN AMERICAN: 47
GLUCOSE BLD-MCNC: 84 MG/DL (ref 70–99)
POTASSIUM SERPL-SCNC: 4.4 MMOL/L (ref 3.5–5.1)
PRO-BNP: 744 PG/ML (ref 0–449)
SODIUM BLD-SCNC: 139 MMOL/L (ref 136–145)

## 2022-02-09 PROCEDURE — 80048 BASIC METABOLIC PNL TOTAL CA: CPT

## 2022-02-09 PROCEDURE — 36415 COLL VENOUS BLD VENIPUNCTURE: CPT

## 2022-02-09 PROCEDURE — 83880 ASSAY OF NATRIURETIC PEPTIDE: CPT

## 2022-02-11 PROBLEM — N39.0 UTI (URINARY TRACT INFECTION): Status: RESOLVED | Noted: 2022-01-12 | Resolved: 2022-02-11

## 2022-02-23 NOTE — PROGRESS NOTES
Patient seen , discharge dictated scripts given , arrangements made , LINETTE completed .  Discussed with nursing staff  And   If applicable ,  Discussed with  Patient's family , all questions answered and concerns addressed  When applicable

## 2022-02-24 NOTE — DISCHARGE SUMMARY
[place] [address]                     [address] [facility], [address]                               DISCHARGE SUMMARY    PATIENT NAME: Arlet Prescott                  :        10/16/1932  MED REC NO:   [id number]                          ROOM:       7266  ACCOUNT NO:   [de-identified]                           ADMIT DATE: 2022  PROVIDER:     Dragan Silva MD                  DISCHARGE DATE:  2022    FINAL DIAGNOSES:  1.  CHF. 2.  Hypoxemia. 3.  Hyperlipidemia. 4.  GERD. 5.  Urinary tract infection. 6.  Suspected COVID-19 infection. 7.  Acute systolic heart failure. DISCHARGE MEDICATIONS:  1. Omeprazole 20 mg once a day. 2.  Percocet 5/325 one every 4 hours p.r.n.  3.  Cefuroxime 250 p.o. b.i.d. for 4 days. 4.  Decadron 4 mg p.o. b.i.d. for 5 days. 5.  Lasix 20 mg once a day. 6.  Nasal saline aerosol as directed. 7.  Vitamin C 1000 mg daily. 8.  Zinc gluconate 50 mg daily. 9.  Sudafed 1 tablet daily. 10.  Multivitamin once a day. 11.  Aspirin 325 once a day. 12.  Atorvastatin 40 mg nightly. 13.  Esomeprazole 20 mg once a day. 14.  Multivitamin once a day. 15.  Docusate 100 mg p.o. b.i.d.  16.  Naloxone nasal spray as directed. 17.  Zanaflex 2 mg at bedtime. 18.  Fosamax 70 mg daily. HOSPITAL COURSE:  This elderly woman came to the emergency room from  [facility] [place] with history of symptoms suggestive of intermittent shortness  of breath, nausea, vomiting, and diarrhea. The patient was suspected of  having COVID-19 infection. The patient was stable in vital signs, was  found to be having congestive heart failure, hypoxemia, GERD, urinary  tract infection. Her COVID-19 was also positive and detected. Her  white blood cell count was 4.2, hemoglobin/hematocrit of 12.1 and 36.1,  platelet count was 259. Chemistry, sodium was 139, potassium 4.4, BUN  22, creatinine 1.1, blood glucose was 84. ProBNP level 744.   The  patient had a CT scan of the abdomen and pelvis that showed scattered  pulmonary opacities at the lung bases bilaterally with stable intra and  extrahepatic ductal dilatation, non-obstructing punctate, left renal  calculus, diverticulosis without evidence of diverticulitis. Chest  x-ray consistent with asymmetric airspace opacification in the left mid  and lower lung zone with small pleural effusion, questionable small  right pleural effusion as well with pattern may represent pulmonary  edema or a developing pattern of viral pneumonia. The patient was given  PT and OT evaluation. General condition stabilized after 4 to 5 days. The patient was also given respiratory therapy and offered followup with  CHF clinic. The patient also had a transthoracic echocardiogram that  shows LVEF of 65%. Mild aortic stenosis with mild mitral regurgitation  and moderate tricuspid regurgitation with right ventricular systolic  pressure at 25 mmHg. The patient was discharged in stable condition.         Ti Torres MD    D: 02/23/2022 14:22:58       T: 02/23/2022 14:25:35     [id number]  Job#: [id number]     Doc#: [id number]    CC:

## 2022-03-02 ENCOUNTER — OFFICE VISIT (OUTPATIENT)
Dept: CARDIOLOGY CLINIC | Age: 87
End: 2022-03-02
Payer: MEDICARE

## 2022-03-02 VITALS
WEIGHT: 149 LBS | OXYGEN SATURATION: 97 % | HEIGHT: 60 IN | BODY MASS INDEX: 29.25 KG/M2 | HEART RATE: 71 BPM | DIASTOLIC BLOOD PRESSURE: 54 MMHG | SYSTOLIC BLOOD PRESSURE: 146 MMHG

## 2022-03-02 DIAGNOSIS — I50.32 CHRONIC DIASTOLIC HEART FAILURE (HCC): Primary | ICD-10-CM

## 2022-03-02 DIAGNOSIS — E78.5 HYPERLIPIDEMIA, UNSPECIFIED HYPERLIPIDEMIA TYPE: ICD-10-CM

## 2022-03-02 DIAGNOSIS — I42.8 NICM (NONISCHEMIC CARDIOMYOPATHY) (HCC): ICD-10-CM

## 2022-03-02 DIAGNOSIS — U07.1 COVID: ICD-10-CM

## 2022-03-02 PROCEDURE — 1036F TOBACCO NON-USER: CPT | Performed by: CLINICAL NURSE SPECIALIST

## 2022-03-02 PROCEDURE — G8484 FLU IMMUNIZE NO ADMIN: HCPCS | Performed by: CLINICAL NURSE SPECIALIST

## 2022-03-02 PROCEDURE — 1123F ACP DISCUSS/DSCN MKR DOCD: CPT | Performed by: CLINICAL NURSE SPECIALIST

## 2022-03-02 PROCEDURE — G8427 DOCREV CUR MEDS BY ELIG CLIN: HCPCS | Performed by: CLINICAL NURSE SPECIALIST

## 2022-03-02 PROCEDURE — 99214 OFFICE O/P EST MOD 30 MIN: CPT | Performed by: CLINICAL NURSE SPECIALIST

## 2022-03-02 PROCEDURE — G8417 CALC BMI ABV UP PARAM F/U: HCPCS | Performed by: CLINICAL NURSE SPECIALIST

## 2022-03-02 PROCEDURE — 4040F PNEUMOC VAC/ADMIN/RCVD: CPT | Performed by: CLINICAL NURSE SPECIALIST

## 2022-03-02 PROCEDURE — 1090F PRES/ABSN URINE INCON ASSESS: CPT | Performed by: CLINICAL NURSE SPECIALIST

## 2022-03-02 RX ORDER — OXYCODONE AND ACETAMINOPHEN 10; 325 MG/1; MG/1
1 TABLET ORAL EVERY 8 HOURS PRN
COMMUNITY

## 2022-03-02 RX ORDER — ASPIRIN 81 MG/1
81 TABLET ORAL DAILY
COMMUNITY

## 2022-03-02 RX ORDER — GLUCOSAMINE HCL/CHONDROITIN SU 500-400 MG
1 CAPSULE ORAL 3 TIMES DAILY
COMMUNITY

## 2022-03-02 NOTE — PROGRESS NOTES
reports current alcohol use of about 1.0 standard drink of alcohol per week. She reports that she does not use drugs. Family History:   Family History   Problem Relation Age of Onset    Diabetes Maternal Grandmother         type 2, great grandma type1    Cancer Maternal Grandfather         rectal and  scrotal cancer    Breast Cancer Daughter     Breast Cancer Maternal Aunt        Home Medications:  Prior to Admission medications    Medication Sig Start Date End Date Taking? Authorizing Provider   oxyCODONE-acetaminophen (PERCOCET)  MG per tablet Take 1 tablet by mouth every 8 hours as needed for Pain. Yes Historical Provider, MD   Calcium Carb-Cholecalciferol (CALCIUM+D3) 500-600 MG-UNIT TABS Take 1 tablet by mouth in the morning, at noon, and at bedtime   Yes Historical Provider, MD   aspirin 81 MG EC tablet Take 81 mg by mouth daily   Yes Historical Provider, MD   CRANBERRY PO Take by mouth in the morning and at bedtime   Yes Historical Provider, MD   furosemide (LASIX) 20 MG tablet Take 1 tablet by mouth daily 1/17/22 3/2/22 Yes Chuck Shaw MD   saline nasal gel (AYR) GEL by Nasal route as needed for Congestion   Yes Historical Provider, MD   ascorbic acid (VITAMIN C) 500 MG tablet Take 1,000 mg by mouth daily Takes daily along with zinc   Yes Historical Provider, MD   Pseudoephedrine-guaiFENesin (GUAIFENESIN 600/ PO) Take by mouth   Yes Historical Provider, MD   atorvastatin (LIPITOR) 40 MG tablet Take 1 tablet by mouth nightly 6/4/19  Yes Scooter Graham MD   esomeprazole Magnesium (NEXIUM) 20 MG PACK Take 20 mg by mouth daily Took 20 mg Nexium in am and 20 Mg Pepcid at bedtime.    Yes Historical Provider, MD   Multiple Vitamins-Minerals (OCUVITE ADULT FORMULA PO) Take 1 tablet by mouth daily   Yes Historical Provider, MD   docusate sodium (COLACE) 100 MG capsule Take 100 mg by mouth 2 times daily   Yes Historical Provider, MD   Cholecalciferol (VITAMIN D3) 2000 UNITS CAPS Take 2,000 Units by mouth daily   Yes Historical Provider, MD   ondansetron (ZOFRAN ODT) 4 MG disintegrating tablet Take 1 tablet by mouth every 8 hours as needed for Nausea or Vomiting 11/19/15  Yes Barry Jimenez MD   diphenoxylate-atropine (LOMOTIL) 2.5-0.025 MG per tablet Take 1 tablet by mouth 4 times daily as needed. . 12/19/14  Yes Historical Provider, MD        Allergies:  Patient has no known allergies. Review of Systems:   · Constitutional: there has been no unanticipated weight loss. There's been no change in energy level, sleep pattern, or activity level. · Eyes: No visual changes or diplopia. No scleral icterus. · ENT: No Headaches, hearing loss or vertigo. No mouth sores or sore throat. · Cardiovascular: Reviewed in HPI  · Respiratory: No cough or wheezing, no sputum production. No hematemesis. · Gastrointestinal: No abdominal pain, appetite loss, blood in stools. No change in bowel or bladder habits. · Genitourinary: No dysuria, trouble voiding, or hematuria. · Musculoskeletal:  No gait disturbance, weakness or joint complaints. · Integumentary: No rash or pruritis. · Neurological: No headache, diplopia, change in muscle strength, numbness or tingling. No change in gait, balance, coordination, mood, affect, memory, mentation, behavior. · Psychiatric: No anxiety, no depression. · Endocrine: No malaise, fatigue or temperature intolerance. No excessive thirst, fluid intake, or urination. No tremor. · Hematologic/Lymphatic: No abnormal bruising or bleeding, blood clots or swollen lymph nodes. · Allergic/Immunologic: No nasal congestion or hives.     Physical Examination:    Vitals:    03/02/22 1200   BP: (!) 146/54   Pulse: 71   SpO2: 97%   Weight: 149 lb (67.6 kg)   Height: 5' (1.524 m)        Constitutional and General Appearance: Warm and dry, no apparent distress, normal coloration kyphosis   HEENT:  Normocephalic, atraumatic  Respiratory:  · Normal excursion and expansion without use of accessory muscles  · Resp Auscultation: Normal breath sounds without dullness  Cardiovascular:  · The apical impulses not displaced  · Heart tones are crisp and normal  · JVP normal  cm H2O  · Regular rate and rhythm  · Peripheral pulses are symmetrical and full  · There is no clubbing, cyanosis of the extremities.   · No edema  · Pedal Pulses: 2+ and equal   Abdomen:  · No masses or tenderness  · Liver/Spleen: No Abnormalities Noted  Neurological/Psychiatric:  · Alert and oriented in all spheres  · Moves all extremities well  · Exhibits normal gait balance and coordination  · No abnormalities of mood, affect, memory, mentation, or behavior are noted    Lab Data:    CBC:   Lab Results   Component Value Date    WBC 4.2 01/17/2022    WBC 4.8 01/14/2022    WBC 3.3 01/13/2022    RBC 3.79 01/17/2022    RBC 3.52 01/14/2022    RBC 3.55 01/13/2022    HGB 12.1 01/17/2022    HGB 11.2 01/14/2022    HGB 11.2 01/13/2022    HCT 36.1 01/17/2022    HCT 33.0 01/14/2022    HCT 33.3 01/13/2022    MCV 95.1 01/17/2022    MCV 93.6 01/14/2022    MCV 93.9 01/13/2022    RDW 13.9 01/17/2022    RDW 14.0 01/14/2022    RDW 14.2 01/13/2022     01/17/2022     01/14/2022     01/13/2022     BMP:  Lab Results   Component Value Date     02/09/2022     02/02/2022     01/26/2022    K 4.4 02/09/2022    K 4.7 02/02/2022    K 4.6 01/26/2022    K 4.7 01/12/2022    K 4.2 04/01/2019     02/09/2022    CL 99 02/02/2022    CL 99 01/26/2022    CO2 25 02/09/2022    CO2 26 02/02/2022    CO2 28 01/26/2022    PHOS 3.3 01/13/2017    PHOS 3.5 03/12/2015    BUN 22 02/09/2022    BUN 27 02/02/2022    BUN 21 01/26/2022    CREATININE 1.1 02/09/2022    CREATININE 1.4 02/02/2022    CREATININE 1.2 01/26/2022     BNP:   Lab Results   Component Value Date    PROBNP 744 02/09/2022    PROBNP 531 02/02/2022    PROBNP 615 01/26/2022     Cardiac Imaging:  Echo:  1/13/22:   -Covid+   Normal left ventricle size, wall thickness, and systolic function with an   estimated ejection fraction of 65%.   No regional wall motion abnormalities are seen.   Normal diastolic filling pattern for age.  Gerlene Skinner aortic stenosis with a peak velocity of 2.65 m/s with a mean pressure   gradient of 17 mmHg. There is moderate aortic insufficiency.   Severe left atrial enlargement noted.   There is mild mitral regurgitation.   There is mild to moderate tricuspid regurgitation with a RVSP estimation of   25 mmHg. Echo:  5/25/19:   -Normal left ventricle size, wall thickness and systolic function with an   estimated ejection fraction of 60%. No regional wall motion abnormalities   are seen.   -Mild aortic stenosis with a peak velocity of 2.54m/s and a mean pressure   gradient of 15mmHg. Mild aortic regurgitation.   -Mild mitral regurgitation.   -Mild tricuspid regurgitation with a estimated PASP of 31 mmHg plus right   atrial pressure.   -Trivial pulmonic regurgitation present.   -Biatrial enlargement.     5/19:Invasive procedures and treatments.   CathLeft Heart Cath  Dominance      LM: luminal irregularities   LAD: luminal irregularities with 30% proximal, mildly calcified stenosis   LCx: mild plaquing; 30% ostial stenosis of smaller OM1 ; moderate sized second and third marginal free of significant disease  RCA: 40% eccentric mid      LVEDP: 12 mmHg     5 mmHg gradient upon pullback across Ao      Assessment:    1. Chronic diastolic heart failure ([address]    2. COVID    3. NICM (nonischemic cardiomyopathy) ([address]    4. Hyperlipidemia, unspecified hyperlipidemia type        Plan:   Patient Instructions   1. Continue current medications  2. Check blood work in 2 months  3.   RTO in 4 months          I appreciate the opportunity of cooperating in the care of this individual.    DENIZ Montoya - CNS, CNS, 3/3/2022, 9:44 AM

## 2023-06-30 NOTE — PROGRESS NOTES
4/9/19 0100 Pt asked for pain meds but it is not due until 0130, told pt I would bring in at [address] [place] [address] Rd    4/9/19 0130 went to give pt her pain meds ans she is resting quietly and comfortably with eyes closed resp easy and even , will not wake pt to give pain meds Patient informed

## 2023-07-02 ENCOUNTER — HOSPITAL ENCOUNTER (INPATIENT)
Age: 88
LOS: 26 days | Discharge: NURSING, REHAB OR LONG-TERM CARE | End: 2023-07-28
Attending: INTERNAL MEDICINE | Admitting: INTERNAL MEDICINE
Payer: MEDICARE

## 2023-07-02 ENCOUNTER — APPOINTMENT (OUTPATIENT)
Dept: CT IMAGING | Age: 88
End: 2023-07-02
Payer: MEDICARE

## 2023-07-02 DIAGNOSIS — K31.89 ACUTE GASTRIC VOLVULUS: Primary | ICD-10-CM

## 2023-07-02 DIAGNOSIS — D64.9 ANEMIA, UNSPECIFIED TYPE: ICD-10-CM

## 2023-07-02 LAB
ALBUMIN SERPL-MCNC: 4.3 G/DL (ref 3.4–5)
ALBUMIN/GLOB SERPL: 1.7 {RATIO} (ref 1.1–2.2)
ALP SERPL-CCNC: 61 U/L (ref 40–129)
ALT SERPL-CCNC: 14 U/L (ref 10–40)
ANION GAP SERPL CALCULATED.3IONS-SCNC: 12 MMOL/L (ref 3–16)
AST SERPL-CCNC: 27 U/L (ref 15–37)
BASOPHILS # BLD: 0.1 K/UL (ref 0–0.2)
BASOPHILS NFR BLD: 2.9 %
BILIRUB SERPL-MCNC: 0.3 MG/DL (ref 0–1)
BUN SERPL-MCNC: 41 MG/DL (ref 7–20)
CALCIUM SERPL-MCNC: 9.5 MG/DL (ref 8.3–10.6)
CHLORIDE SERPL-SCNC: 101 MMOL/L (ref 99–110)
CO2 SERPL-SCNC: 25 MMOL/L (ref 21–32)
CREAT SERPL-MCNC: 1.1 MG/DL (ref 0.6–1.2)
DEPRECATED RDW RBC AUTO: 14.5 % (ref 12.4–15.4)
EOSINOPHIL # BLD: 0 K/UL (ref 0–0.6)
EOSINOPHIL NFR BLD: 0.8 %
GFR SERPLBLD CREATININE-BSD FMLA CKD-EPI: 48 ML/MIN/{1.73_M2}
GLUCOSE SERPL-MCNC: 154 MG/DL (ref 70–99)
HCT VFR BLD AUTO: 33.2 % (ref 36–48)
HGB BLD-MCNC: 10.8 G/DL (ref 12–16)
LACTATE BLDV-SCNC: 1 MMOL/L (ref 0.4–1.9)
LIPASE SERPL-CCNC: 11 U/L (ref 13–60)
LYMPHOCYTES # BLD: 1 K/UL (ref 1–5.1)
LYMPHOCYTES NFR BLD: 21.5 %
MCH RBC QN AUTO: 30.8 PG (ref 26–34)
MCHC RBC AUTO-ENTMCNC: 32.7 G/DL (ref 31–36)
MCV RBC AUTO: 94.2 FL (ref 80–100)
MONOCYTES # BLD: 0.2 K/UL (ref 0–1.3)
MONOCYTES NFR BLD: 4.8 %
NEUTROPHILS # BLD: 3.1 K/UL (ref 1.7–7.7)
NEUTROPHILS NFR BLD: 70 %
NT-PROBNP SERPL-MCNC: 1233 PG/ML (ref 0–449)
PLATELET # BLD AUTO: 155 K/UL (ref 135–450)
PMV BLD AUTO: 7.4 FL (ref 5–10.5)
POTASSIUM SERPL-SCNC: 4.7 MMOL/L (ref 3.5–5.1)
PROT SERPL-MCNC: 6.8 G/DL (ref 6.4–8.2)
RBC # BLD AUTO: 3.52 M/UL (ref 4–5.2)
SODIUM SERPL-SCNC: 138 MMOL/L (ref 136–145)
TROPONIN, HIGH SENSITIVITY: 36 NG/L (ref 0–14)
TROPONIN, HIGH SENSITIVITY: 38 NG/L (ref 0–14)
WBC # BLD AUTO: 4.4 K/UL (ref 4–11)

## 2023-07-02 PROCEDURE — 83880 ASSAY OF NATRIURETIC PEPTIDE: CPT

## 2023-07-02 PROCEDURE — 84484 ASSAY OF TROPONIN QUANT: CPT

## 2023-07-02 PROCEDURE — 6360000002 HC RX W HCPCS: Performed by: PHYSICIAN ASSISTANT

## 2023-07-02 PROCEDURE — 1200000000 HC SEMI PRIVATE

## 2023-07-02 PROCEDURE — 2700000000 HC OXYGEN THERAPY PER DAY

## 2023-07-02 PROCEDURE — 36415 COLL VENOUS BLD VENIPUNCTURE: CPT

## 2023-07-02 PROCEDURE — 85025 COMPLETE CBC W/AUTO DIFF WBC: CPT

## 2023-07-02 PROCEDURE — 99285 EMERGENCY DEPT VISIT HI MDM: CPT

## 2023-07-02 PROCEDURE — 83690 ASSAY OF LIPASE: CPT

## 2023-07-02 PROCEDURE — 96376 TX/PRO/DX INJ SAME DRUG ADON: CPT

## 2023-07-02 PROCEDURE — 74177 CT ABD & PELVIS W/CONTRAST: CPT

## 2023-07-02 PROCEDURE — 6360000004 HC RX CONTRAST MEDICATION: Performed by: PHYSICIAN ASSISTANT

## 2023-07-02 PROCEDURE — 93005 ELECTROCARDIOGRAM TRACING: CPT | Performed by: PHYSICIAN ASSISTANT

## 2023-07-02 PROCEDURE — 96374 THER/PROPH/DIAG INJ IV PUSH: CPT

## 2023-07-02 PROCEDURE — 80053 COMPREHEN METABOLIC PANEL: CPT

## 2023-07-02 PROCEDURE — 83605 ASSAY OF LACTIC ACID: CPT

## 2023-07-02 RX ORDER — ACETAMINOPHEN 650 MG/1
650 SUPPOSITORY RECTAL EVERY 6 HOURS PRN
Status: DISCONTINUED | OUTPATIENT
Start: 2023-07-02 | End: 2023-07-09

## 2023-07-02 RX ORDER — HYDROMORPHONE HYDROCHLORIDE 1 MG/ML
1 INJECTION, SOLUTION INTRAMUSCULAR; INTRAVENOUS; SUBCUTANEOUS
Status: DISCONTINUED | OUTPATIENT
Start: 2023-07-02 | End: 2023-07-11

## 2023-07-02 RX ORDER — SODIUM CHLORIDE 0.9 % (FLUSH) 0.9 %
5-40 SYRINGE (ML) INJECTION PRN
Status: DISCONTINUED | OUTPATIENT
Start: 2023-07-02 | End: 2023-07-28 | Stop reason: HOSPADM

## 2023-07-02 RX ORDER — HYDROMORPHONE HYDROCHLORIDE 1 MG/ML
1 INJECTION, SOLUTION INTRAMUSCULAR; INTRAVENOUS; SUBCUTANEOUS ONCE
Status: COMPLETED | OUTPATIENT
Start: 2023-07-02 | End: 2023-07-02

## 2023-07-02 RX ORDER — FUROSEMIDE 10 MG/ML
20 INJECTION INTRAMUSCULAR; INTRAVENOUS DAILY
Status: DISCONTINUED | OUTPATIENT
Start: 2023-07-03 | End: 2023-07-23

## 2023-07-02 RX ORDER — LIDOCAINE HYDROCHLORIDE 20 MG/ML
15 SOLUTION OROPHARYNGEAL ONCE
Status: COMPLETED | OUTPATIENT
Start: 2023-07-03 | End: 2023-07-03

## 2023-07-02 RX ORDER — MORPHINE SULFATE 4 MG/ML
4 INJECTION, SOLUTION INTRAMUSCULAR; INTRAVENOUS
Status: COMPLETED | OUTPATIENT
Start: 2023-07-02 | End: 2023-07-02

## 2023-07-02 RX ORDER — ACETAMINOPHEN 325 MG/1
650 TABLET ORAL EVERY 6 HOURS PRN
Status: DISCONTINUED | OUTPATIENT
Start: 2023-07-02 | End: 2023-07-09

## 2023-07-02 RX ORDER — OMEPRAZOLE 40 MG/1
40 CAPSULE, DELAYED RELEASE ORAL DAILY
COMMUNITY

## 2023-07-02 RX ORDER — ONDANSETRON 4 MG/1
4 TABLET, FILM COATED ORAL EVERY 8 HOURS PRN
COMMUNITY
End: 2023-07-02 | Stop reason: SDUPTHER

## 2023-07-02 RX ORDER — MORPHINE SULFATE 2 MG/ML
2 INJECTION, SOLUTION INTRAMUSCULAR; INTRAVENOUS
Status: COMPLETED | OUTPATIENT
Start: 2023-07-02 | End: 2023-07-02

## 2023-07-02 RX ORDER — SODIUM CHLORIDE 9 MG/ML
INJECTION, SOLUTION INTRAVENOUS PRN
Status: DISCONTINUED | OUTPATIENT
Start: 2023-07-02 | End: 2023-07-23 | Stop reason: SDUPTHER

## 2023-07-02 RX ORDER — MAGNESIUM OXIDE 400 MG/1
400 TABLET ORAL DAILY
COMMUNITY
End: 2023-07-02

## 2023-07-02 RX ORDER — SODIUM CHLORIDE/ALOE VERA
GEL (GRAM) NASAL PRN
Status: DISCONTINUED | OUTPATIENT
Start: 2023-07-02 | End: 2023-07-02 | Stop reason: RX

## 2023-07-02 RX ORDER — GLUCAGON 1 MG/ML
1 KIT INJECTION PRN
Status: DISCONTINUED | OUTPATIENT
Start: 2023-07-02 | End: 2023-07-28 | Stop reason: HOSPADM

## 2023-07-02 RX ORDER — LANOLIN ALCOHOL/MO/W.PET/CERES
400 CREAM (GRAM) TOPICAL DAILY
COMMUNITY

## 2023-07-02 RX ORDER — DEXTROSE MONOHYDRATE 100 MG/ML
INJECTION, SOLUTION INTRAVENOUS CONTINUOUS PRN
Status: DISCONTINUED | OUTPATIENT
Start: 2023-07-02 | End: 2023-07-28 | Stop reason: HOSPADM

## 2023-07-02 RX ORDER — ENOXAPARIN SODIUM 100 MG/ML
30 INJECTION SUBCUTANEOUS NIGHTLY
Status: DISCONTINUED | OUTPATIENT
Start: 2023-07-03 | End: 2023-07-13

## 2023-07-02 RX ORDER — ONDANSETRON 2 MG/ML
4 INJECTION INTRAMUSCULAR; INTRAVENOUS EVERY 6 HOURS PRN
Status: DISCONTINUED | OUTPATIENT
Start: 2023-07-02 | End: 2023-07-28 | Stop reason: HOSPADM

## 2023-07-02 RX ORDER — HYDROMORPHONE HYDROCHLORIDE 1 MG/ML
0.5 INJECTION, SOLUTION INTRAMUSCULAR; INTRAVENOUS; SUBCUTANEOUS
Status: DISCONTINUED | OUTPATIENT
Start: 2023-07-02 | End: 2023-07-12

## 2023-07-02 RX ORDER — PANTOPRAZOLE SODIUM 40 MG/10ML
40 INJECTION, POWDER, LYOPHILIZED, FOR SOLUTION INTRAVENOUS DAILY
Status: DISCONTINUED | OUTPATIENT
Start: 2023-07-03 | End: 2023-07-03

## 2023-07-02 RX ORDER — SODIUM CHLORIDE 0.9 % (FLUSH) 0.9 %
5-40 SYRINGE (ML) INJECTION EVERY 12 HOURS SCHEDULED
Status: DISCONTINUED | OUTPATIENT
Start: 2023-07-03 | End: 2023-07-28 | Stop reason: HOSPADM

## 2023-07-02 RX ADMIN — HYDROMORPHONE HYDROCHLORIDE 1 MG: 1 INJECTION, SOLUTION INTRAMUSCULAR; INTRAVENOUS; SUBCUTANEOUS at 23:02

## 2023-07-02 RX ADMIN — MORPHINE SULFATE 2 MG: 2 INJECTION, SOLUTION INTRAMUSCULAR; INTRAVENOUS at 20:23

## 2023-07-02 RX ADMIN — IOHEXOL 50 ML: 350 INJECTION, SOLUTION INTRAVENOUS at 20:34

## 2023-07-02 RX ADMIN — MORPHINE SULFATE 4 MG: 4 INJECTION, SOLUTION INTRAMUSCULAR; INTRAVENOUS at 21:17

## 2023-07-02 NOTE — ED PROVIDER NOTES
[facility]        Pt Name: Tanja Kay  MRN: [id number]  [address] [facility] MariaE 10/16/1932  Date of evaluation: 7/2/2023  Provider: Jo Ann Andrew PA-C  PCP: DENIZ Tenorio CNP  Note Started: 7:12 PM EDT 7/2/23      MIGUEL. I have evaluated this patient. CHIEF COMPLAINT       Chief Complaint   Patient presents with    Abdominal Pain     Started this am; upper abd pain that is getting worse; (+) nausea and vonmiting; given 4mg of zofran in squad w/out relief; lives in assisted care facility; [facility]       HISTORY OF PRESENT ILLNESS: 1 or more Elements     History From: patient    Tanja Kay is a 80 y.o. female with past medical history of diabetes, CAD, prior appendectomy and cholecystectomy, hysterectomy who presents complaining of upper abdominal pain, vomiting since this morning. Patient reports she is on multiple doses of vomiting that began this morning, denies diarrhea or constipation, is still passing flatus. Abdominal pain is epigastric, constant, feels like swelling, nonradiating, no alleviating aggravating factors. Denies fever, trauma, back pain, changes in urination, blood in emesis or stool. Nursing Notes were all reviewed and agreed with or any disagreements were addressed in the HPI. REVIEW OF SYSTEMS :      Review of Systems   All other systems reviewed and are negative. Positives and Pertinent negatives as per HPI. PAST MEDICAL HISTORY    has a past medical history of Arthritis, CAD (coronary artery disease), Cancer ([address]), Cystocele, Diabetes mellitus ([address]), Hepatitis A (1953), History of blood transfusion, Hyperlipidemia, PVC (premature ventricular contraction), Rectocele, Reflux, and Scoliosis.      SURGICAL HISTORY     Past Surgical History:   Procedure Laterality Date    APPENDECTOMY      BLADDER SUSPENSION      3 SURGERIES/ANTERIOR AND POSTERIOR REPAIR    BREAST SURGERY

## 2023-07-03 ENCOUNTER — ANESTHESIA EVENT (OUTPATIENT)
Dept: ENDOSCOPY | Age: 88
End: 2023-07-03
Payer: MEDICARE

## 2023-07-03 ENCOUNTER — APPOINTMENT (OUTPATIENT)
Dept: GENERAL RADIOLOGY | Age: 88
End: 2023-07-03
Payer: MEDICARE

## 2023-07-03 ENCOUNTER — ANESTHESIA (OUTPATIENT)
Dept: ENDOSCOPY | Age: 88
End: 2023-07-03
Payer: MEDICARE

## 2023-07-03 LAB
ALBUMIN SERPL-MCNC: 4.2 G/DL (ref 3.4–5)
ALBUMIN/GLOB SERPL: 1.5 {RATIO} (ref 1.1–2.2)
ALP SERPL-CCNC: 66 U/L (ref 40–129)
ALT SERPL-CCNC: 16 U/L (ref 10–40)
ANION GAP SERPL CALCULATED.3IONS-SCNC: 11 MMOL/L (ref 3–16)
APTT BLD: 39.3 SEC (ref 22.7–35.9)
AST SERPL-CCNC: 27 U/L (ref 15–37)
BASOPHILS # BLD: 0 K/UL (ref 0–0.2)
BASOPHILS NFR BLD: 0.3 %
BILIRUB SERPL-MCNC: 0.5 MG/DL (ref 0–1)
BUN SERPL-MCNC: 36 MG/DL (ref 7–20)
CALCIUM SERPL-MCNC: 9.8 MG/DL (ref 8.3–10.6)
CHLORIDE SERPL-SCNC: 100 MMOL/L (ref 99–110)
CO2 SERPL-SCNC: 26 MMOL/L (ref 21–32)
CREAT SERPL-MCNC: 1.1 MG/DL (ref 0.6–1.2)
DEPRECATED RDW RBC AUTO: 14.4 % (ref 12.4–15.4)
EKG ATRIAL RATE: 79 BPM
EKG DIAGNOSIS: NORMAL
EKG P AXIS: 26 DEGREES
EKG P-R INTERVAL: 322 MS
EKG Q-T INTERVAL: 404 MS
EKG QRS DURATION: 130 MS
EKG QTC CALCULATION (BAZETT): 463 MS
EKG R AXIS: -36 DEGREES
EKG T AXIS: -12 DEGREES
EKG VENTRICULAR RATE: 79 BPM
EOSINOPHIL # BLD: 0 K/UL (ref 0–0.6)
EOSINOPHIL NFR BLD: 0.7 %
GFR SERPLBLD CREATININE-BSD FMLA CKD-EPI: 48 ML/MIN/{1.73_M2}
GLUCOSE BLD-MCNC: 137 MG/DL (ref 70–99)
GLUCOSE SERPL-MCNC: 141 MG/DL (ref 70–99)
HCT VFR BLD AUTO: 34.6 % (ref 36–48)
HGB BLD-MCNC: 11.5 G/DL (ref 12–16)
INR PPP: 1 (ref 0.84–1.16)
LACTATE BLDV-SCNC: 0.8 MMOL/L (ref 0.4–2)
LYMPHOCYTES # BLD: 1.7 K/UL (ref 1–5.1)
LYMPHOCYTES NFR BLD: 30.8 %
MAGNESIUM SERPL-MCNC: 2.5 MG/DL (ref 1.8–2.4)
MCH RBC QN AUTO: 31.2 PG (ref 26–34)
MCHC RBC AUTO-ENTMCNC: 33.3 G/DL (ref 31–36)
MCV RBC AUTO: 93.5 FL (ref 80–100)
MONOCYTES # BLD: 0.3 K/UL (ref 0–1.3)
MONOCYTES NFR BLD: 5.8 %
NEUTROPHILS # BLD: 3.5 K/UL (ref 1.7–7.7)
NEUTROPHILS NFR BLD: 62.4 %
PERFORMED ON: ABNORMAL
PHOSPHATE SERPL-MCNC: 3.8 MG/DL (ref 2.5–4.9)
PLATELET # BLD AUTO: 164 K/UL (ref 135–450)
PMV BLD AUTO: 7.5 FL (ref 5–10.5)
POTASSIUM SERPL-SCNC: 4.4 MMOL/L (ref 3.5–5.1)
PREALB SERPL-MCNC: 20.1 MG/DL (ref 20–40)
PROT SERPL-MCNC: 7 G/DL (ref 6.4–8.2)
PROTHROMBIN TIME: 13.2 SEC (ref 11.5–14.8)
RBC # BLD AUTO: 3.7 M/UL (ref 4–5.2)
SODIUM SERPL-SCNC: 137 MMOL/L (ref 136–145)
TRANSFERRIN SERPL-MCNC: 295 MG/DL (ref 200–360)
WBC # BLD AUTO: 5.6 K/UL (ref 4–11)

## 2023-07-03 PROCEDURE — C9113 INJ PANTOPRAZOLE SODIUM, VIA: HCPCS | Performed by: SURGERY

## 2023-07-03 PROCEDURE — 74018 RADEX ABDOMEN 1 VIEW: CPT

## 2023-07-03 PROCEDURE — 3700000000 HC ANESTHESIA ATTENDED CARE: Performed by: INTERNAL MEDICINE

## 2023-07-03 PROCEDURE — 74240 X-RAY XM UPR GI TRC 1CNTRST: CPT

## 2023-07-03 PROCEDURE — 0DH68UZ INSERTION OF FEEDING DEVICE INTO STOMACH, VIA NATURAL OR ARTIFICIAL OPENING ENDOSCOPIC: ICD-10-PCS | Performed by: INTERNAL MEDICINE

## 2023-07-03 PROCEDURE — 6370000000 HC RX 637 (ALT 250 FOR IP): Performed by: NURSE PRACTITIONER

## 2023-07-03 PROCEDURE — 6360000004 HC RX CONTRAST MEDICATION

## 2023-07-03 PROCEDURE — 2580000003 HC RX 258: Performed by: REGISTERED NURSE

## 2023-07-03 PROCEDURE — 7100000000 HC PACU RECOVERY - FIRST 15 MIN: Performed by: INTERNAL MEDICINE

## 2023-07-03 PROCEDURE — 7100000001 HC PACU RECOVERY - ADDTL 15 MIN: Performed by: INTERNAL MEDICINE

## 2023-07-03 PROCEDURE — 93010 ELECTROCARDIOGRAM REPORT: CPT | Performed by: INTERNAL MEDICINE

## 2023-07-03 PROCEDURE — 2709999900 HC NON-CHARGEABLE SUPPLY: Performed by: INTERNAL MEDICINE

## 2023-07-03 PROCEDURE — 85610 PROTHROMBIN TIME: CPT

## 2023-07-03 PROCEDURE — 6360000002 HC RX W HCPCS: Performed by: SURGERY

## 2023-07-03 PROCEDURE — 84134 ASSAY OF PREALBUMIN: CPT

## 2023-07-03 PROCEDURE — 3700000001 HC ADD 15 MINUTES (ANESTHESIA): Performed by: INTERNAL MEDICINE

## 2023-07-03 PROCEDURE — 3609017100 HC EGD: Performed by: INTERNAL MEDICINE

## 2023-07-03 PROCEDURE — 2500000003 HC RX 250 WO HCPCS: Performed by: REGISTERED NURSE

## 2023-07-03 PROCEDURE — 6360000002 HC RX W HCPCS: Performed by: REGISTERED NURSE

## 2023-07-03 PROCEDURE — 2580000003 HC RX 258: Performed by: INTERNAL MEDICINE

## 2023-07-03 PROCEDURE — 99223 1ST HOSP IP/OBS HIGH 75: CPT | Performed by: SURGERY

## 2023-07-03 PROCEDURE — 85025 COMPLETE CBC W/AUTO DIFF WBC: CPT

## 2023-07-03 PROCEDURE — 83735 ASSAY OF MAGNESIUM: CPT

## 2023-07-03 PROCEDURE — 83605 ASSAY OF LACTIC ACID: CPT

## 2023-07-03 PROCEDURE — 80053 COMPREHEN METABOLIC PANEL: CPT

## 2023-07-03 PROCEDURE — 36415 COLL VENOUS BLD VENIPUNCTURE: CPT

## 2023-07-03 PROCEDURE — 1200000000 HC SEMI PRIVATE

## 2023-07-03 PROCEDURE — 85730 THROMBOPLASTIN TIME PARTIAL: CPT

## 2023-07-03 PROCEDURE — C9113 INJ PANTOPRAZOLE SODIUM, VIA: HCPCS | Performed by: PHYSICIAN ASSISTANT

## 2023-07-03 PROCEDURE — 6360000002 HC RX W HCPCS: Performed by: PHYSICIAN ASSISTANT

## 2023-07-03 PROCEDURE — 2580000003 HC RX 258: Performed by: PHYSICIAN ASSISTANT

## 2023-07-03 PROCEDURE — 84100 ASSAY OF PHOSPHORUS: CPT

## 2023-07-03 PROCEDURE — 84466 ASSAY OF TRANSFERRIN: CPT

## 2023-07-03 RX ORDER — SODIUM CHLORIDE 9 MG/ML
INJECTION, SOLUTION INTRAVENOUS CONTINUOUS PRN
Status: DISCONTINUED | OUTPATIENT
Start: 2023-07-03 | End: 2023-07-03 | Stop reason: SDUPTHER

## 2023-07-03 RX ORDER — LIDOCAINE HYDROCHLORIDE 20 MG/ML
INJECTION, SOLUTION EPIDURAL; INFILTRATION; INTRACAUDAL; PERINEURAL PRN
Status: DISCONTINUED | OUTPATIENT
Start: 2023-07-03 | End: 2023-07-03 | Stop reason: SDUPTHER

## 2023-07-03 RX ORDER — SODIUM CHLORIDE 9 MG/ML
INJECTION, SOLUTION INTRAVENOUS CONTINUOUS
Status: DISCONTINUED | OUTPATIENT
Start: 2023-07-03 | End: 2023-07-05

## 2023-07-03 RX ORDER — PANTOPRAZOLE SODIUM 40 MG/10ML
40 INJECTION, POWDER, LYOPHILIZED, FOR SOLUTION INTRAVENOUS 2 TIMES DAILY
Status: DISCONTINUED | OUTPATIENT
Start: 2023-07-03 | End: 2023-07-28 | Stop reason: HOSPADM

## 2023-07-03 RX ORDER — PROPOFOL 10 MG/ML
INJECTION, EMULSION INTRAVENOUS PRN
Status: DISCONTINUED | OUTPATIENT
Start: 2023-07-03 | End: 2023-07-03 | Stop reason: SDUPTHER

## 2023-07-03 RX ORDER — PROPOFOL 10 MG/ML
INJECTION, EMULSION INTRAVENOUS CONTINUOUS PRN
Status: DISCONTINUED | OUTPATIENT
Start: 2023-07-03 | End: 2023-07-03 | Stop reason: SDUPTHER

## 2023-07-03 RX ADMIN — PANTOPRAZOLE SODIUM 40 MG: 40 INJECTION, POWDER, FOR SOLUTION INTRAVENOUS at 02:47

## 2023-07-03 RX ADMIN — LIDOCAINE HYDROCHLORIDE 15 ML: 20 SOLUTION ORAL; TOPICAL at 00:22

## 2023-07-03 RX ADMIN — SODIUM CHLORIDE: 9 INJECTION, SOLUTION INTRAVENOUS at 12:51

## 2023-07-03 RX ADMIN — HYDROMORPHONE HYDROCHLORIDE 1 MG: 1 INJECTION, SOLUTION INTRAMUSCULAR; INTRAVENOUS; SUBCUTANEOUS at 11:32

## 2023-07-03 RX ADMIN — PHENYLEPHRINE HYDROCHLORIDE 100 MCG: 10 INJECTION INTRAVENOUS at 13:09

## 2023-07-03 RX ADMIN — ONDANSETRON 4 MG: 2 INJECTION INTRAMUSCULAR; INTRAVENOUS at 19:48

## 2023-07-03 RX ADMIN — SODIUM CHLORIDE: 9 INJECTION, SOLUTION INTRAVENOUS at 12:18

## 2023-07-03 RX ADMIN — PANTOPRAZOLE SODIUM 40 MG: 40 INJECTION, POWDER, FOR SOLUTION INTRAVENOUS at 22:07

## 2023-07-03 RX ADMIN — PROPOFOL 80 MCG/KG/MIN: 10 INJECTION, EMULSION INTRAVENOUS at 13:01

## 2023-07-03 RX ADMIN — HYDROMORPHONE HYDROCHLORIDE 1 MG: 1 INJECTION, SOLUTION INTRAMUSCULAR; INTRAVENOUS; SUBCUTANEOUS at 19:48

## 2023-07-03 RX ADMIN — HYDROMORPHONE HYDROCHLORIDE 1 MG: 1 INJECTION, SOLUTION INTRAMUSCULAR; INTRAVENOUS; SUBCUTANEOUS at 08:08

## 2023-07-03 RX ADMIN — IOHEXOL 100 ML: 350 INJECTION, SOLUTION INTRAVENOUS at 15:28

## 2023-07-03 RX ADMIN — ONDANSETRON 4 MG: 2 INJECTION INTRAMUSCULAR; INTRAVENOUS at 12:23

## 2023-07-03 RX ADMIN — FUROSEMIDE 20 MG: 10 INJECTION, SOLUTION INTRAMUSCULAR; INTRAVENOUS at 08:07

## 2023-07-03 RX ADMIN — HYDROMORPHONE HYDROCHLORIDE 1 MG: 1 INJECTION, SOLUTION INTRAMUSCULAR; INTRAVENOUS; SUBCUTANEOUS at 02:47

## 2023-07-03 RX ADMIN — Medication 10 ML: at 22:08

## 2023-07-03 RX ADMIN — LIDOCAINE HYDROCHLORIDE 60 MG: 20 INJECTION, SOLUTION EPIDURAL; INFILTRATION; INTRACAUDAL; PERINEURAL at 13:01

## 2023-07-03 RX ADMIN — Medication 10 ML: at 08:08

## 2023-07-03 RX ADMIN — PHENYLEPHRINE HYDROCHLORIDE 100 MCG: 10 INJECTION INTRAVENOUS at 13:06

## 2023-07-03 RX ADMIN — PROPOFOL 30 MG: 10 INJECTION, EMULSION INTRAVENOUS at 13:01

## 2023-07-03 RX ADMIN — ENOXAPARIN SODIUM 30 MG: 100 INJECTION SUBCUTANEOUS at 22:08

## 2023-07-03 RX ADMIN — HYDROMORPHONE HYDROCHLORIDE 1 MG: 1 INJECTION, SOLUTION INTRAMUSCULAR; INTRAVENOUS; SUBCUTANEOUS at 23:09

## 2023-07-03 ASSESSMENT — PAIN DESCRIPTION - PAIN TYPE
TYPE: ACUTE PAIN
TYPE: ACUTE PAIN

## 2023-07-03 ASSESSMENT — PAIN DESCRIPTION - ORIENTATION
ORIENTATION: MID
ORIENTATION: RIGHT
ORIENTATION: LOWER
ORIENTATION: MID

## 2023-07-03 ASSESSMENT — PAIN DESCRIPTION - LOCATION
LOCATION: ABDOMEN;THROAT
LOCATION: ABDOMEN
LOCATION: ABDOMEN
LOCATION: ABDOMEN;THROAT

## 2023-07-03 ASSESSMENT — PAIN SCALES - GENERAL
PAINLEVEL_OUTOF10: 8
PAINLEVEL_OUTOF10: 7
PAINLEVEL_OUTOF10: 8
PAINLEVEL_OUTOF10: 8
PAINLEVEL_OUTOF10: 7
PAINLEVEL_OUTOF10: 7
PAINLEVEL_OUTOF10: 9
PAINLEVEL_OUTOF10: 7
PAINLEVEL_OUTOF10: 9
PAINLEVEL_OUTOF10: 10
PAINLEVEL_OUTOF10: 7

## 2023-07-03 ASSESSMENT — PAIN SCALES - WONG BAKER
WONGBAKER_NUMERICALRESPONSE: 0

## 2023-07-03 ASSESSMENT — PAIN - FUNCTIONAL ASSESSMENT
PAIN_FUNCTIONAL_ASSESSMENT: PREVENTS OR INTERFERES SOME ACTIVE ACTIVITIES AND ADLS
PAIN_FUNCTIONAL_ASSESSMENT: ACTIVITIES ARE NOT PREVENTED

## 2023-07-03 ASSESSMENT — PAIN DESCRIPTION - ONSET
ONSET: ON-GOING
ONSET: ON-GOING

## 2023-07-03 ASSESSMENT — PAIN DESCRIPTION - FREQUENCY
FREQUENCY: CONTINUOUS
FREQUENCY: CONTINUOUS

## 2023-07-03 ASSESSMENT — PAIN DESCRIPTION - DESCRIPTORS
DESCRIPTORS: ACHING;THROBBING
DESCRIPTORS: ACHING
DESCRIPTORS: ACHING;SHARP;STABBING
DESCRIPTORS: ACHING;DISCOMFORT;SORE
DESCRIPTORS: ACHING;DISCOMFORT

## 2023-07-03 NOTE — PROGRESS NOTES
Pharmacy Home Medication Reconciliation Note    A medication reconciliation has been completed for Cena Sandhoff 10/16/1932    Pharmacy: [address] [place] [address], [address] [place] [address] provided by: patient    The patient's home medication list is as follows: No current facility-administered medications on file prior to encounter. Current Outpatient Medications on File Prior to Encounter   Medication Sig Dispense Refill    omeprazole (PRILOSEC) 40 MG delayed release capsule Take 1 capsule by mouth daily      magnesium oxide (MAG-OX) 400 (240 Mg) MG tablet Take 1 tablet by mouth daily      [DISCONTINUED] magnesium oxide (MAG-OX) 400 MG tablet Take 1 tablet by mouth daily      [DISCONTINUED] ondansetron (ZOFRAN) 4 MG tablet Take 1 tablet by mouth every 8 hours as needed for Nausea or Vomiting      oxyCODONE-acetaminophen (PERCOCET)  MG per tablet Take 1 tablet by mouth every 8 hours as needed for Pain. Calcium Carb-Cholecalciferol (CALCIUM+D3) 500-600 MG-UNIT TABS Take 1 tablet by mouth in the morning, at noon, and at bedtime      aspirin 81 MG chewable tablet Take 1 tablet by mouth daily      CRANBERRY PO Take 500 mg by mouth in the morning and at bedtime      furosemide (LASIX) 20 MG tablet Take 1 tablet by mouth daily 30 tablet 3    saline nasal gel (AYR) GEL by Nasal route as needed for Congestion      ascorbic acid (VITAMIN C) 1000 MG tablet Take 1 tablet by mouth daily Takes daily along with zinc      Pseudoephedrine-guaiFENesin (GUAIFENESIN 600/ PO) Take by mouth (Patient not taking: Reported on 7/2/2023)      atorvastatin (LIPITOR) 40 MG tablet Take 1 tablet by mouth nightly (Patient not taking: Reported on 7/2/2023) 30 tablet 3    esomeprazole Magnesium (NEXIUM) 20 MG PACK Take 20 mg by mouth daily Took 20 mg Nexium in am and 20 Mg Pepcid at bedtime.  (Patient not taking: Reported on 7/2/2023)      Multiple Vitamins-Minerals (OCUVITE ADULT FORMULA PO)

## 2023-07-03 NOTE — ANESTHESIA PRE PROCEDURE
Department of Anesthesiology  Preprocedure Note       Name:  Ender Lora   Age:  80 y.o.  :  10/16/1932                                          MRN:  [id number]         Date:  7/3/2023      Surgeon: Susan Dillon):  Akilah Foreman MD    Procedure: Procedure(s):  EGD DIAGNOSTIC ONLY    Medications prior to admission:   Prior to Admission medications    Medication Sig Start Date End Date Taking? Authorizing Provider   omeprazole (PRILOSEC) 40 MG delayed release capsule Take 1 capsule by mouth daily   Yes Historical Provider, MD   magnesium oxide (MAG-OX) 400 (240 Mg) MG tablet Take 1 tablet by mouth daily   Yes Historical Provider, MD   oxyCODONE-acetaminophen (PERCOCET)  MG per tablet Take 1 tablet by mouth every 8 hours as needed for Pain. Historical Provider, MD   Calcium Carb-Cholecalciferol (CALCIUM+D3) 500-600 MG-UNIT TABS Take 1 tablet by mouth in the morning, at noon, and at bedtime    Historical Provider, MD   aspirin 81 MG chewable tablet Take 1 tablet by mouth daily    Historical Provider, MD   CRANBERRY PO Take 500 mg by mouth in the morning and at bedtime    Historical Provider, MD   furosemide (LASIX) 20 MG tablet Take 1 tablet by mouth daily 22  Florestine MD Mick   saline nasal gel (AYR) GEL by Nasal route as needed for Congestion    Historical Provider, MD   ascorbic acid (VITAMIN C) 1000 MG tablet Take 1 tablet by mouth daily Takes daily along with zinc    Historical Provider, MD   Pseudoephedrine-guaiFENesin (GUAIFENESIN 600/ PO) Take by mouth  Patient not taking: Reported on 2023    Historical Provider, MD   atorvastatin (LIPITOR) 40 MG tablet Take 1 tablet by mouth nightly  Patient not taking: Reported on 2023   Palak Mishra MD   esomeprazole Magnesium (NEXIUM) 20 MG PACK Take 20 mg by mouth daily Took 20 mg Nexium in am and 20 Mg Pepcid at bedtime.   Patient not taking: Reported on 2023    Historical Provider, MD   Multiple Vitamins-Minerals

## 2023-07-03 NOTE — PROGRESS NOTES
Pt transferred to room 4458 at this time. A&O with no signs of distress. Report given to NNEKA SAHU. V/u and denies questions or further needs at this time. 1 (red) hearing aid and upper/lower dentures returned to pt in room.

## 2023-07-03 NOTE — CONSULTS
Gastroenterology Consult Note        Patient: Cena Sandhoff  : 10/16/1932  Acct#:      Date:  7/3/2023    Subjective:       History of Present Illness  Patient is a 80 y.o.  female admitted with Acute gastric volvulus [K31.89] who is seen in consult for gastric volvulus. History of diabetes, mitral valve prolapse, AV block. She presented to the ER yesterday with sudden onset of epigastric pain, nausea, vomiting. Feels bloated in the upper abdomen and pain is constant and cramping. CT at admission was concerning for gastric volvulus. An NG tube was placed. There has been dark aspirate per NG tube. Still with abdominal pain.       Past Medical History:   Diagnosis Date    Arthritis     OSTEOARTHRITIS BACK    CAD (coronary artery disease)     PT HAS AV BLOCK AND MVP    Cancer (HCC)     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    Cystocele     Diabetes mellitus ([address])     type  2 diet controlled    Hepatitis A     History of blood transfusion     hiatal hernia surgery    Hyperlipidemia     PVC (premature ventricular contraction)     Rectocele     Reflux     Scoliosis       Past Surgical History:   Procedure Laterality Date    APPENDECTOMY      BLADDER SUSPENSION      3 SURGERIES/ANTERIOR AND POSTERIOR REPAIR    BREAST SURGERY      RIGHT LUMPECTOMY AND SEVERAL BIOPSIES ON BOTH BREAST    BUNIONECTOMY  12    BUNIONECTOMY BILATERAL  FEET    CARDIAC CATHETERIZATION      AV block    CATARACT EXTRACTION W/  INTRAOCULAR LENS IMPLANT Left 2018    CHOLECYSTECTOMY      COLONOSCOPY      normal    CYSTOSCOPY  2017    U-dil    DILATION AND CURETTAGE OF UTERUS      SUNCTION    FOOT SURGERY      bilat foot surgeries    HERNIA REPAIR  6 YRS AGO    HIATAL HERNIA REPAIR- WIRED  RIBS    HYSTERECTOMY      VAGINAL    JOINT REPLACEMENT Left     TOTAL KNEE REPLACEMENT LEFT    LUMBAR SPINE SURGERY Right 5/15/2019    RIGHT L4 AND L5 TRANSFORAMINAL EPIDURAL STEROID

## 2023-07-03 NOTE — PROCEDURES
Endoscopy Note    Patient: Kaden Beckett  : 10/16/1932  CSN:     Procedure: Esophagogastroduodenoscopy with NG tube placement     Date:  7/3/2023     Surgeon:  Michaelle Rankin MD     Referring Provider:      Preoperative Diagnosis:    -Gastric volvulus/hiatal hernia/epigastric pain. Preop eval    Postoperative Diagnosis:    -Complex hiatal hernia measuring more than 10 cm  -Tortuous esophagus with angulation at the GE junction  -Successful placement of NG tube under endoscopy guidance. The tip of the tube is in the hernial sac. Anesthesia: Propofol sedation    EBL: <5 mL    Indications: This is a 80y.o. year old female who presents today with complaints of abdominal pain/CT evidence for gastric volvulus and hiatal hernia here for evaluation prior to surgical correction. Description of Procedure:  Informed consent was obtained from the patient after explanation of indications, benefits and possible risks and complications of the procedure. The patient was then taken to the endoscopy suite, placed in the left lateral decubitus position and the above IV sedation was administrered. The Olympus videoendoscope was passed through the hypopharynx into the esophagus. The esophagus appeared to be tortuous. There was acute angulation at the GE junction. The scope was advanced into the stomach. Large hiatal hernia noted. There was some food residue in the hernia which was cleared. The scope was eventually advanced into the antrum without any difficulty. The mucosa in the antrum, duodenal bulb and the posterior upper region appeared normal.  The scope was withdrawn. Again complex hiatal hernia noted which was measuring more than 10 cm. The GE junction was at around 35 cm. Previously placed NG tube was in the mid esophagus which was removed. Subsequently under sedation the NG was replaced. I had difficulty in advancing the NG at the GE junction due to acute angulation.   At this point and snare was used to advance the tip of the tube all the way into the antrum but the NG tube was coiling back into the hernial sac. At this point the tip is in the hernial sac and the scope was withdrawn and the procedure was terminated. The patient tolerated the procedure well and was taken to the post anesthesia care unit in good condition. Impression:    -Complex hiatal hernia measuring more than 10 cm  -Tortuous esophagus with angulation at the GE junction  -Successful placement of NG tube under endoscopy guidance. The tip of the tube is in the hernial sac.       Recommendations:   -Protonix IV  -NG tube to suction  -Antiemetics as needed  -Further management per surgery  -Please Call with questions    Julio Hoffman MD, MD  [facility]  [phone]

## 2023-07-03 NOTE — PROGRESS NOTES
Pt stable and able to be transferred from PACU to room 4458. A&O , VSS, with no complaints at this time. 4T called and notified that pt is being transferred out of PACU and to room.

## 2023-07-03 NOTE — PROGRESS NOTES
Reviewed pt problem list, history, H&P and assessment preoperatively. Scope verified using 2 person system. Family in waiting room.     Electronically signed by Apolinar Carrion RN on 7/3/2023 at 1:02 PM

## 2023-07-03 NOTE — ANESTHESIA POSTPROCEDURE EVALUATION
Department of Anesthesiology  Postprocedure Note    Patient: Mei Vega  MRN: [id number]  YOB: 1932  Date of evaluation: 7/3/2023      Procedure Summary     Date: 07/03/23 Room / Location: [address] [place] [address]    Anesthesia Start: 1629 Anesthesia Stop: 6470    Procedure: EGD DIAGNOSTIC ONLY (Abdomen) Diagnosis:       Pain      (Pain [R52])    Surgeons: Shyla Young MD Responsible Provider: Sony Calixto MD    Anesthesia Type: MAC ASA Status: 4          Anesthesia Type: No value filed. Jorge Phase I: Jorge Score: 8    Jorge Phase II:        Anesthesia Post Evaluation    Patient location during evaluation: PACU  Patient participation: complete - patient participated  Level of consciousness: awake  Airway patency: patent  Nausea & Vomiting: no vomiting  Complications: no  Cardiovascular status: hemodynamically stable  Respiratory status: acceptable  Hydration status: euvolemic  There was medical reason for not using a multimodal analgesia pain management approach.

## 2023-07-03 NOTE — CONSULTS
[facility] GENERAL AND LAPAROSCOPIC SURGERY                       PATIENT NAME: Joshua Dickson     ADMISSION DATE: 7/2/2023  6:49 PM      TODAY'S DATE: 7/3/2023    Reason for Consult:  Abd pain    Requesting Physician:  Romero Lyon. Grace Barahona    HISTORY OF PRESENT ILLNESS:              The patient is a 80 y.o. female who presents with abd pain. Upper, mid, radiating to chest, sharp, severe. Better this am than at ER presentation. Pt has had associated N/V at home over several days. Has good days, and bad. Tries to maintain nutrition with Ensure. Concern on CT of HH with gastric volvulus. NG placed, not deep enough, no outputs recorded. Pt has had some prior thoracic approach t a hiatal her ia repair done in the distant past. Also prior appy, cristobal, hysterectomy, bladder suspension. Had breast cancer, no known cancer at this time. Pt lives independently, maintains own apartment, does not drive. On PPI daily at home. Takes daily Percocet for chronic back pain.     Past Medical History:        Diagnosis Date    Arthritis     OSTEOARTHRITIS BACK    CAD (coronary artery disease)     PT HAS AV BLOCK AND MVP    Cancer (HCC)     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    Cystocele     Diabetes mellitus ([address])     type  2 diet controlled    Hepatitis A 1953    History of blood transfusion     hiatal hernia surgery    Hyperlipidemia     PVC (premature ventricular contraction)     Rectocele     Reflux     Scoliosis        Past Surgical History:        Procedure Laterality Date    APPENDECTOMY      BLADDER SUSPENSION      3 SURGERIES/ANTERIOR AND POSTERIOR REPAIR    BREAST SURGERY      RIGHT LUMPECTOMY AND SEVERAL BIOPSIES ON BOTH BREAST    BUNIONECTOMY  7/26/12    BUNIONECTOMY BILATERAL  FEET    CARDIAC CATHETERIZATION      AV block    CATARACT EXTRACTION W/  INTRAOCULAR LENS IMPLANT Left 09/13/2018    CHOLECYSTECTOMY      COLONOSCOPY  2008    normal    CYSTOSCOPY  03/29/2017    U-dil    DILATION AND Prominently dilated large hiatal hernia appears to have organo-axial configuration concerning for gastric volvulus within hiatal hernia. Fluid is seen within the esophagus. Cardiomegaly. Visualized portions of the lung bases are clear. Organs: Normal attenuation pattern throughout the liver. No discrete hepatic lesion. Chronically severe intrahepatic bile duct dilatation is seen. Common bile duct measures 30 mm. The gallbladder is absent status post cholecystectomy. The kidneys, spleen, adrenal glands and pancreas appear unremarkable. GI/Bowel: Large hiatal hernia appears to have organo-axial gastric configuration with gastric volvulus suspected. Multifocal borderline to mildly distended small bowel loops are seen without wall thickening or transition point, probably adynamic ileus. Decompressed colon appears unremarkable with exception numerous diverticula at the descending and sigmoid colon. No acute inflammatory process evident of the colon. Unable to identify the appendix, in keeping with history of appendectomy. Pelvis: Partially filled urinary bladder appears unremarkable. No pelvic adenopathy or free fluid is seen. Peritoneum/Retroperitoneum: No pneumoperitoneum. Calcific atherosclerotic disease aorta. No aneurysm. Unremarkable appearance of the IVC. No adenopathy or fluid. Bones/Soft Tissues: No acute superficial soft tissue or osseous structure abnormality evident. Chronic T12 compression fracture status post vertebroplasty. Unchanged chronic compression fractures L4 and L5. Bones appear osteoporotic. 1. Findings highly suspicious for acute gastric volvulus within hiatal hernia. Immediate surgical consultation recommended. 2. Fluid within the distal esophagus may be related to reflux or obstruction. 3. Chronically severe intra and extrahepatic bile duct dilatation status post cholecystectomy, more and typical reservoir effect alone.   No calcific density evident along the course of the

## 2023-07-03 NOTE — PLAN OF CARE

## 2023-07-03 NOTE — PROGRESS NOTES
Patient's son called, requested update, spoke with him, Vera Lopez, states he is a nurse practitioner and his sister is healthcare power of , but is \"out of pocket right now \"and he is running point at this time. States his phone number is [phone] states that we can call anytime for questions or updates.

## 2023-07-03 NOTE — PROGRESS NOTES
Teaching / education initiated regarding perioperative experience, expectations, and pain management during stay. Patient verbalized understanding.     Electronically signed by Tamera Gomez RN on 7/3/2023 at 12:54 PM

## 2023-07-03 NOTE — H&P
Gastroenterology Note             Pre-operative History and Physical    Patient: Mikaela Argueta  : 10/16/1932  CSN:     History Obtained From:  patient and/or guardian. HISTORY OF PRESENT ILLNESS:    The patient is a 80 y.o. female  here for EGD. Abdominal pain with CT concerning for gastric volvulus and hiatal hernia. Here for endoscopic evaluation prior to surgical correction.      Past Medical History:    Past Medical History:   Diagnosis Date    Arthritis     OSTEOARTHRITIS BACK    CAD (coronary artery disease)     PT HAS AV BLOCK AND MVP    Cancer (HCC)     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    Cystocele     Diabetes mellitus ([address])     type  2 diet controlled    Hepatitis A     History of blood transfusion     hiatal hernia surgery    Hyperlipidemia     PVC (premature ventricular contraction)     Rectocele     Reflux     Scoliosis      Past Surgical History:    Past Surgical History:   Procedure Laterality Date    APPENDECTOMY      BLADDER SUSPENSION      3 SURGERIES/ANTERIOR AND POSTERIOR REPAIR    BREAST SURGERY      RIGHT LUMPECTOMY AND SEVERAL BIOPSIES ON BOTH BREAST    BUNIONECTOMY  12    BUNIONECTOMY BILATERAL  FEET    CARDIAC CATHETERIZATION      AV block    CATARACT EXTRACTION W/  INTRAOCULAR LENS IMPLANT Left 2018    CHOLECYSTECTOMY      COLONOSCOPY      normal    CYSTOSCOPY  2017    U-dil    DILATION AND CURETTAGE OF UTERUS      SUNCTION    FOOT SURGERY      bilat foot surgeries    HERNIA REPAIR  6 YRS AGO    HIATAL HERNIA REPAIR- WIRED  RIBS    HYSTERECTOMY      VAGINAL    JOINT REPLACEMENT Left     TOTAL KNEE REPLACEMENT LEFT    LUMBAR SPINE SURGERY Right 5/15/2019    RIGHT L4 AND L5 TRANSFORAMINAL EPIDURAL STEROID INJECTION WITH FLUOROSCOPY performed by Cesia Moulton MD at [facility] [address] Left 14    removal rib wire    OTHER SURGICAL HISTORY Right 2018    PHACO EMULSIFICATION OF CATARACT WITH INTRAOCULAR LENS implant right eye    VT XCAPSL CTRC RMVL INSJ IO LENS PROSTH W/O ECP Left 9/13/2018    PHACO EMULSIFICATION OF CATARACT WITH INTRAOCULAR LENS IMPLANT LEFT EYE performed by Beverley Cruz MD at [facility] Right 7/26/12    CA DEPOSIT TAKEN OFF RIGHT SHOULDER    UPPER GASTROINTESTINAL ENDOSCOPY  11/2/12     Medications Prior to Admission:   No current facility-administered medications on file prior to encounter. Current Outpatient Medications on File Prior to Encounter   Medication Sig Dispense Refill    omeprazole (PRILOSEC) 40 MG delayed release capsule Take 1 capsule by mouth daily      magnesium oxide (MAG-OX) 400 (240 Mg) MG tablet Take 1 tablet by mouth daily      oxyCODONE-acetaminophen (PERCOCET)  MG per tablet Take 1 tablet by mouth every 8 hours as needed for Pain. Calcium Carb-Cholecalciferol (CALCIUM+D3) 500-600 MG-UNIT TABS Take 1 tablet by mouth in the morning, at noon, and at bedtime      aspirin 81 MG chewable tablet Take 1 tablet by mouth daily      CRANBERRY PO Take 500 mg by mouth in the morning and at bedtime      furosemide (LASIX) 20 MG tablet Take 1 tablet by mouth daily 30 tablet 3    saline nasal gel (AYR) GEL by Nasal route as needed for Congestion      ascorbic acid (VITAMIN C) 1000 MG tablet Take 1 tablet by mouth daily Takes daily along with zinc      Pseudoephedrine-guaiFENesin (GUAIFENESIN 600/ PO) Take by mouth (Patient not taking: Reported on 7/2/2023)      atorvastatin (LIPITOR) 40 MG tablet Take 1 tablet by mouth nightly (Patient not taking: Reported on 7/2/2023) 30 tablet 3    esomeprazole Magnesium (NEXIUM) 20 MG PACK Take 20 mg by mouth daily Took 20 mg Nexium in am and 20 Mg Pepcid at bedtime.  (Patient not taking: Reported on 7/2/2023)      Multiple Vitamins-Minerals (OCUVITE ADULT FORMULA PO) Take 1 tablet by mouth daily      docusate sodium (COLACE) 100 MG capsule Take 1 capsule by mouth 2 times daily      Cholecalciferol

## 2023-07-03 NOTE — ED NOTES
Pt's oxygen saturation dropped to 87% on RA post dilaudid administration. This RN placed PT on 2L of oxygen per NC at this time. Oxygen saturation up to 98%. RN notified.      Alexandira Celestin RN  07/02/23 [id number]

## 2023-07-03 NOTE — PROGRESS NOTES
Pt arrived from ENDO to PACU bay 8. Report received from ENDO staff. Pt arousable to voice. Pt on RA, NSR, and VSS. Will continue to monitor.      R hearing aid and upper and lower dentures with Pt

## 2023-07-03 NOTE — PROGRESS NOTES
Report called to 4T. All questions answered. All belongings with pt. Disconnected from ed monitoring system.

## 2023-07-04 ENCOUNTER — APPOINTMENT (OUTPATIENT)
Dept: GENERAL RADIOLOGY | Age: 88
End: 2023-07-04
Payer: MEDICARE

## 2023-07-04 LAB
ANION GAP SERPL CALCULATED.3IONS-SCNC: 11 MMOL/L (ref 3–16)
BUN SERPL-MCNC: 26 MG/DL (ref 7–20)
CALCIUM SERPL-MCNC: 9.4 MG/DL (ref 8.3–10.6)
CHLORIDE SERPL-SCNC: 100 MMOL/L (ref 99–110)
CO2 SERPL-SCNC: 29 MMOL/L (ref 21–32)
CREAT SERPL-MCNC: 1.2 MG/DL (ref 0.6–1.2)
DEPRECATED RDW RBC AUTO: 14 % (ref 12.4–15.4)
GFR SERPLBLD CREATININE-BSD FMLA CKD-EPI: 43 ML/MIN/{1.73_M2}
GLUCOSE SERPL-MCNC: 121 MG/DL (ref 70–99)
HCT VFR BLD AUTO: 34.6 % (ref 36–48)
HGB BLD-MCNC: 11.4 G/DL (ref 12–16)
MCH RBC QN AUTO: 30.9 PG (ref 26–34)
MCHC RBC AUTO-ENTMCNC: 33 G/DL (ref 31–36)
MCV RBC AUTO: 93.7 FL (ref 80–100)
PLATELET # BLD AUTO: 152 K/UL (ref 135–450)
PMV BLD AUTO: 7.4 FL (ref 5–10.5)
POTASSIUM SERPL-SCNC: 3.9 MMOL/L (ref 3.5–5.1)
RBC # BLD AUTO: 3.69 M/UL (ref 4–5.2)
SODIUM SERPL-SCNC: 140 MMOL/L (ref 136–145)
WBC # BLD AUTO: 4.8 K/UL (ref 4–11)

## 2023-07-04 PROCEDURE — 85027 COMPLETE CBC AUTOMATED: CPT

## 2023-07-04 PROCEDURE — 6360000002 HC RX W HCPCS: Performed by: SURGERY

## 2023-07-04 PROCEDURE — 99232 SBSQ HOSP IP/OBS MODERATE 35: CPT | Performed by: SURGERY

## 2023-07-04 PROCEDURE — 6360000002 HC RX W HCPCS: Performed by: PHYSICIAN ASSISTANT

## 2023-07-04 PROCEDURE — 80048 BASIC METABOLIC PNL TOTAL CA: CPT

## 2023-07-04 PROCEDURE — 74018 RADEX ABDOMEN 1 VIEW: CPT

## 2023-07-04 PROCEDURE — 6370000000 HC RX 637 (ALT 250 FOR IP): Performed by: INTERNAL MEDICINE

## 2023-07-04 PROCEDURE — 36415 COLL VENOUS BLD VENIPUNCTURE: CPT

## 2023-07-04 PROCEDURE — 1200000000 HC SEMI PRIVATE

## 2023-07-04 PROCEDURE — 2700000000 HC OXYGEN THERAPY PER DAY

## 2023-07-04 PROCEDURE — C9113 INJ PANTOPRAZOLE SODIUM, VIA: HCPCS | Performed by: SURGERY

## 2023-07-04 RX ADMIN — ENOXAPARIN SODIUM 30 MG: 100 INJECTION SUBCUTANEOUS at 21:55

## 2023-07-04 RX ADMIN — HYDROMORPHONE HYDROCHLORIDE 1 MG: 1 INJECTION, SOLUTION INTRAMUSCULAR; INTRAVENOUS; SUBCUTANEOUS at 04:17

## 2023-07-04 RX ADMIN — HYDROMORPHONE HYDROCHLORIDE 1 MG: 1 INJECTION, SOLUTION INTRAMUSCULAR; INTRAVENOUS; SUBCUTANEOUS at 21:56

## 2023-07-04 RX ADMIN — ONDANSETRON 4 MG: 2 INJECTION INTRAMUSCULAR; INTRAVENOUS at 05:57

## 2023-07-04 RX ADMIN — HYDROMORPHONE HYDROCHLORIDE 1 MG: 1 INJECTION, SOLUTION INTRAMUSCULAR; INTRAVENOUS; SUBCUTANEOUS at 15:04

## 2023-07-04 RX ADMIN — HYDROMORPHONE HYDROCHLORIDE 1 MG: 1 INJECTION, SOLUTION INTRAMUSCULAR; INTRAVENOUS; SUBCUTANEOUS at 11:08

## 2023-07-04 RX ADMIN — PANTOPRAZOLE SODIUM 40 MG: 40 INJECTION, POWDER, FOR SOLUTION INTRAVENOUS at 07:53

## 2023-07-04 RX ADMIN — PANTOPRAZOLE SODIUM 40 MG: 40 INJECTION, POWDER, FOR SOLUTION INTRAVENOUS at 21:55

## 2023-07-04 RX ADMIN — HYDROMORPHONE HYDROCHLORIDE 1 MG: 1 INJECTION, SOLUTION INTRAMUSCULAR; INTRAVENOUS; SUBCUTANEOUS at 07:53

## 2023-07-04 RX ADMIN — HYDROMORPHONE HYDROCHLORIDE 1 MG: 1 INJECTION, SOLUTION INTRAMUSCULAR; INTRAVENOUS; SUBCUTANEOUS at 18:33

## 2023-07-04 RX ADMIN — PHENOL 1 SPRAY: 1.5 LIQUID ORAL at 13:08

## 2023-07-04 RX ADMIN — FUROSEMIDE 20 MG: 10 INJECTION, SOLUTION INTRAMUSCULAR; INTRAVENOUS at 07:53

## 2023-07-04 ASSESSMENT — PAIN SCALES - GENERAL
PAINLEVEL_OUTOF10: 10
PAINLEVEL_OUTOF10: 9
PAINLEVEL_OUTOF10: 7
PAINLEVEL_OUTOF10: 8
PAINLEVEL_OUTOF10: 0
PAINLEVEL_OUTOF10: 10
PAINLEVEL_OUTOF10: 10
PAINLEVEL_OUTOF10: 7
PAINLEVEL_OUTOF10: 6
PAINLEVEL_OUTOF10: 9
PAINLEVEL_OUTOF10: 8
PAINLEVEL_OUTOF10: 9
PAINLEVEL_OUTOF10: 0

## 2023-07-04 ASSESSMENT — PAIN - FUNCTIONAL ASSESSMENT
PAIN_FUNCTIONAL_ASSESSMENT: ACTIVITIES ARE NOT PREVENTED

## 2023-07-04 ASSESSMENT — PAIN DESCRIPTION - PAIN TYPE: TYPE: ACUTE PAIN

## 2023-07-04 ASSESSMENT — PAIN DESCRIPTION - ORIENTATION
ORIENTATION: LOWER;MID
ORIENTATION: LOWER
ORIENTATION: LOWER;MID

## 2023-07-04 ASSESSMENT — PAIN DESCRIPTION - DESCRIPTORS
DESCRIPTORS: ACHING;DISCOMFORT
DESCRIPTORS: DISCOMFORT;ACHING
DESCRIPTORS: ACHING

## 2023-07-04 ASSESSMENT — PAIN DESCRIPTION - FREQUENCY: FREQUENCY: CONTINUOUS

## 2023-07-04 ASSESSMENT — PAIN DESCRIPTION - LOCATION
LOCATION: BACK
LOCATION: ABDOMEN;BACK
LOCATION: ABDOMEN;BACK
LOCATION: ABDOMEN
LOCATION: ABDOMEN;THROAT

## 2023-07-04 ASSESSMENT — PAIN SCALES - WONG BAKER
WONGBAKER_NUMERICALRESPONSE: 0

## 2023-07-04 ASSESSMENT — PAIN DESCRIPTION - ONSET: ONSET: ON-GOING

## 2023-07-04 NOTE — PLAN OF CARE
Problem: Discharge Planning  Goal: Discharge to home or other facility with appropriate resources  7/4/2023 0953 by Roxanne Faith RN  Outcome: Progressing  7/3/2023 2343 by Corina Quintana RN  Outcome: Progressing     Problem: Chronic Conditions and Co-morbidities  Goal: Patient's chronic conditions and co-morbidity symptoms are monitored and maintained or improved  7/4/2023 0953 by Roxanne Faith RN  Outcome: Progressing  7/3/2023 2343 by Corina Quintana RN  Outcome: Progressing     Problem: Skin/Tissue Integrity  Goal: Absence of new skin breakdown  Description: 1. Monitor for areas of redness and/or skin breakdown  2. Assess vascular access sites hourly  3. Every 4-6 hours minimum:  Change oxygen saturation probe site  4. Every 4-6 hours:  If on nasal continuous positive airway pressure, respiratory therapy assess nares and determine need for appliance change or resting period.   7/4/2023 0953 by Roxanne Faith RN  Outcome: Progressing  7/3/2023 2343 by Corina Quintana RN  Outcome: Progressing     Problem: ABCDS Injury Assessment  Goal: Absence of physical injury  7/4/2023 0953 by Roxanne Faith RN  Outcome: Progressing  7/3/2023 2343 by Corina Quintana RN  Outcome: Progressing     Problem: Safety - Adult  Goal: Free from fall injury  Outcome: Progressing     Problem: Pain  Goal: Verbalizes/displays adequate comfort level or baseline comfort level  Outcome: Progressing

## 2023-07-04 NOTE — PROGRESS NOTES
Shift assessment complete, VSS, A&Ox4, medications given per MAR. Patient reports pain 10/10 in abdomen, denies N/V. Family at bedside. All safety precautions in place, call light, and belongings within reach. The care plan and education has been reviewed and mutually agreed upon with the patient.

## 2023-07-04 NOTE — PLAN OF CARE
Problem: Discharge Planning  Goal: Discharge to home or other facility with appropriate resources  Outcome: Progressing     Problem: Chronic Conditions and Co-morbidities  Goal: Patient's chronic conditions and co-morbidity symptoms are monitored and maintained or improved  Outcome: Progressing     Problem: Skin/Tissue Integrity  Goal: Absence of new skin breakdown  Description: 1. Monitor for areas of redness and/or skin breakdown  2. Assess vascular access sites hourly  3. Every 4-6 hours minimum:  Change oxygen saturation probe site  4. Every 4-6 hours:  If on nasal continuous positive airway pressure, respiratory therapy assess nares and determine need for appliance change or resting period.   Outcome: Progressing     Problem: ABCDS Injury Assessment  Goal: Absence of physical injury  Outcome: Progressing

## 2023-07-04 NOTE — PROGRESS NOTES
relief; lives in assisted care facility; [facility]) FINDINGS: Lower Chest: Prominently dilated large hiatal hernia appears to have organo-axial configuration concerning for gastric volvulus within hiatal hernia. Fluid is seen within the esophagus. Cardiomegaly. Visualized portions of the lung bases are clear. Organs: Normal attenuation pattern throughout the liver. No discrete hepatic lesion. Chronically severe intrahepatic bile duct dilatation is seen. Common bile duct measures 30 mm. The gallbladder is absent status post cholecystectomy. The kidneys, spleen, adrenal glands and pancreas appear unremarkable. GI/Bowel: Large hiatal hernia appears to have organo-axial gastric configuration with gastric volvulus suspected. Multifocal borderline to mildly distended small bowel loops are seen without wall thickening or transition point, probably adynamic ileus. Decompressed colon appears unremarkable with exception numerous diverticula at the descending and sigmoid colon. No acute inflammatory process evident of the colon. Unable to identify the appendix, in keeping with history of appendectomy. Pelvis: Partially filled urinary bladder appears unremarkable. No pelvic adenopathy or free fluid is seen. Peritoneum/Retroperitoneum: No pneumoperitoneum. Calcific atherosclerotic disease aorta. No aneurysm. Unremarkable appearance of the IVC. No adenopathy or fluid. Bones/Soft Tissues: No acute superficial soft tissue or osseous structure abnormality evident. Chronic T12 compression fracture status post vertebroplasty. Unchanged chronic compression fractures L4 and L5. Bones appear osteoporotic. 1. Findings highly suspicious for acute gastric volvulus within hiatal hernia. Immediate surgical consultation recommended. 2. Fluid within the distal esophagus may be related to reflux or obstruction.  3. Chronically severe intra and extrahepatic bile duct dilatation status post cholecystectomy, more and semi-upright position, and swallowed two full cups of water soluble contrast, and spot images of the upper GI tract were performed. There is an NG tube noted coursing into the stomach. There was normal transit of contrast through the esophagus, without evidence of an esophageal ulcer, stricture, or mass. There is a moderate to large sliding-type hiatal hernia, with approximately half of the stomach, including the gastric fundus and gastric body, located within the lower left chest.  There was no evidence of a focal gastric filling defect, mass, or ulcer. The rugal folds are unremarkable, without evidence of fold thickening or gastritis. There was normal transit of contrast through the hiatal hernia and into the lower stomach, without evidence of gastric outlet obstruction. Limited images of the duodenum demonstrate no evidence of abnormal duodenal fold thickening, mass, filling defect, or ulcer. There was moderate gastroesophageal reflux noted throughout the exam, without evidence of reflux esophagitis. The procedure was terminated given patient discomfort and back pain. 1. Study limited, as detailed above. 2. Moderate to large sliding-type hiatal hernia, with approximately half of the proximal stomach located within the lower left chest, without evidence of a gastric mass, filling defect, or ulcer. Normal transit of contrast through the hernia orifice, without evidence of gastric outlet obstruction. 3. Moderate gastroesophageal reflux, without evidence of reflux esophagitis. 4. No evidence of a mass, filling defect, or peptic ulcer disease of the upper GI tract.        Scheduled Meds:   pantoprazole  40 mg IntraVENous BID    furosemide  20 mg IntraVENous Daily    sodium chloride flush  5-40 mL IntraVENous 2 times per day    enoxaparin  30 mg SubCUTAneous Nightly     Continuous Infusions:   sodium chloride 50 mL/hr at 07/03/23 1218    dextrose      sodium chloride       PRN Meds:.phenol, HYDROmorphone

## 2023-07-05 PROBLEM — I35.0 AORTIC VALVE STENOSIS: Status: ACTIVE | Noted: 2023-07-05

## 2023-07-05 PROBLEM — K44.0 HIATAL HERNIA WITH OBSTRUCTION BUT NO GANGRENE: Status: ACTIVE | Noted: 2023-07-05

## 2023-07-05 LAB
ANION GAP SERPL CALCULATED.3IONS-SCNC: 11 MMOL/L (ref 3–16)
BUN SERPL-MCNC: 23 MG/DL (ref 7–20)
CALCIUM SERPL-MCNC: 7.8 MG/DL (ref 8.3–10.6)
CHLORIDE SERPL-SCNC: 110 MMOL/L (ref 99–110)
CO2 SERPL-SCNC: 24 MMOL/L (ref 21–32)
CREAT SERPL-MCNC: 0.9 MG/DL (ref 0.6–1.2)
DEPRECATED RDW RBC AUTO: 14.4 % (ref 12.4–15.4)
GFR SERPLBLD CREATININE-BSD FMLA CKD-EPI: >60 ML/MIN/{1.73_M2}
GLUCOSE SERPL-MCNC: 95 MG/DL (ref 70–99)
HCT VFR BLD AUTO: 32.5 % (ref 36–48)
HGB BLD-MCNC: 10.5 G/DL (ref 12–16)
MCH RBC QN AUTO: 30.7 PG (ref 26–34)
MCHC RBC AUTO-ENTMCNC: 32.3 G/DL (ref 31–36)
MCV RBC AUTO: 95.1 FL (ref 80–100)
PLATELET # BLD AUTO: 131 K/UL (ref 135–450)
PMV BLD AUTO: 7.4 FL (ref 5–10.5)
POTASSIUM SERPL-SCNC: 3.2 MMOL/L (ref 3.5–5.1)
RBC # BLD AUTO: 3.42 M/UL (ref 4–5.2)
SODIUM SERPL-SCNC: 145 MMOL/L (ref 136–145)
WBC # BLD AUTO: 4.8 K/UL (ref 4–11)

## 2023-07-05 PROCEDURE — 2580000003 HC RX 258: Performed by: PHYSICIAN ASSISTANT

## 2023-07-05 PROCEDURE — 1200000000 HC SEMI PRIVATE

## 2023-07-05 PROCEDURE — 99232 SBSQ HOSP IP/OBS MODERATE 35: CPT | Performed by: SURGERY

## 2023-07-05 PROCEDURE — 94760 N-INVAS EAR/PLS OXIMETRY 1: CPT

## 2023-07-05 PROCEDURE — APPNB30 APP NON BILLABLE TIME 0-30 MINS: Performed by: NURSE PRACTITIONER

## 2023-07-05 PROCEDURE — 6360000002 HC RX W HCPCS: Performed by: PHYSICIAN ASSISTANT

## 2023-07-05 PROCEDURE — 6360000002 HC RX W HCPCS: Performed by: SURGERY

## 2023-07-05 PROCEDURE — 2580000003 HC RX 258: Performed by: HOSPITALIST

## 2023-07-05 PROCEDURE — 85027 COMPLETE CBC AUTOMATED: CPT

## 2023-07-05 PROCEDURE — 6370000000 HC RX 637 (ALT 250 FOR IP): Performed by: HOSPITALIST

## 2023-07-05 PROCEDURE — 80048 BASIC METABOLIC PNL TOTAL CA: CPT

## 2023-07-05 PROCEDURE — APPSS15 APP SPLIT SHARED TIME 0-15 MINUTES: Performed by: NURSE PRACTITIONER

## 2023-07-05 PROCEDURE — 6360000002 HC RX W HCPCS: Performed by: HOSPITALIST

## 2023-07-05 PROCEDURE — 99223 1ST HOSP IP/OBS HIGH 75: CPT | Performed by: INTERNAL MEDICINE

## 2023-07-05 PROCEDURE — 36415 COLL VENOUS BLD VENIPUNCTURE: CPT

## 2023-07-05 PROCEDURE — 2700000000 HC OXYGEN THERAPY PER DAY

## 2023-07-05 PROCEDURE — C9113 INJ PANTOPRAZOLE SODIUM, VIA: HCPCS | Performed by: SURGERY

## 2023-07-05 RX ORDER — POTASSIUM CHLORIDE 7.45 MG/ML
10 INJECTION INTRAVENOUS PRN
Status: DISCONTINUED | OUTPATIENT
Start: 2023-07-05 | End: 2023-07-28 | Stop reason: HOSPADM

## 2023-07-05 RX ORDER — POTASSIUM CHLORIDE 20 MEQ/1
40 TABLET, EXTENDED RELEASE ORAL PRN
Status: DISCONTINUED | OUTPATIENT
Start: 2023-07-05 | End: 2023-07-28 | Stop reason: HOSPADM

## 2023-07-05 RX ORDER — OXYCODONE HYDROCHLORIDE AND ACETAMINOPHEN 5; 325 MG/1; MG/1
1 TABLET ORAL EVERY 4 HOURS PRN
Status: DISCONTINUED | OUTPATIENT
Start: 2023-07-05 | End: 2023-07-12

## 2023-07-05 RX ORDER — SODIUM CHLORIDE AND POTASSIUM CHLORIDE 150; 900 MG/100ML; MG/100ML
INJECTION, SOLUTION INTRAVENOUS CONTINUOUS
Status: DISCONTINUED | OUTPATIENT
Start: 2023-07-05 | End: 2023-07-12

## 2023-07-05 RX ADMIN — POTASSIUM CHLORIDE 10 MEQ: 7.46 INJECTION, SOLUTION INTRAVENOUS at 09:18

## 2023-07-05 RX ADMIN — HYDROMORPHONE HYDROCHLORIDE 1 MG: 1 INJECTION, SOLUTION INTRAMUSCULAR; INTRAVENOUS; SUBCUTANEOUS at 02:21

## 2023-07-05 RX ADMIN — ENOXAPARIN SODIUM 30 MG: 100 INJECTION SUBCUTANEOUS at 21:14

## 2023-07-05 RX ADMIN — PANTOPRAZOLE SODIUM 40 MG: 40 INJECTION, POWDER, FOR SOLUTION INTRAVENOUS at 07:52

## 2023-07-05 RX ADMIN — ONDANSETRON 4 MG: 2 INJECTION INTRAMUSCULAR; INTRAVENOUS at 08:08

## 2023-07-05 RX ADMIN — PANTOPRAZOLE SODIUM 40 MG: 40 INJECTION, POWDER, FOR SOLUTION INTRAVENOUS at 21:14

## 2023-07-05 RX ADMIN — POTASSIUM CHLORIDE 10 MEQ: 7.46 INJECTION, SOLUTION INTRAVENOUS at 11:43

## 2023-07-05 RX ADMIN — PROMETHAZINE HYDROCHLORIDE 12.5 MG: 25 INJECTION INTRAMUSCULAR; INTRAVENOUS at 21:16

## 2023-07-05 RX ADMIN — ONDANSETRON 4 MG: 2 INJECTION INTRAMUSCULAR; INTRAVENOUS at 02:29

## 2023-07-05 RX ADMIN — FUROSEMIDE 20 MG: 10 INJECTION, SOLUTION INTRAMUSCULAR; INTRAVENOUS at 07:52

## 2023-07-05 RX ADMIN — POTASSIUM CHLORIDE AND SODIUM CHLORIDE: 900; 150 INJECTION, SOLUTION INTRAVENOUS at 13:29

## 2023-07-05 RX ADMIN — Medication 10 ML: at 21:14

## 2023-07-05 RX ADMIN — HYDROMORPHONE HYDROCHLORIDE 1 MG: 1 INJECTION, SOLUTION INTRAMUSCULAR; INTRAVENOUS; SUBCUTANEOUS at 23:06

## 2023-07-05 RX ADMIN — OXYCODONE AND ACETAMINOPHEN 1 TABLET: 5; 325 TABLET ORAL at 16:11

## 2023-07-05 RX ADMIN — PROMETHAZINE HYDROCHLORIDE 12.5 MG: 25 INJECTION INTRAMUSCULAR; INTRAVENOUS at 12:12

## 2023-07-05 RX ADMIN — POTASSIUM CHLORIDE 10 MEQ: 7.46 INJECTION, SOLUTION INTRAVENOUS at 08:19

## 2023-07-05 RX ADMIN — POTASSIUM CHLORIDE 10 MEQ: 7.46 INJECTION, SOLUTION INTRAVENOUS at 10:23

## 2023-07-05 RX ADMIN — HYDROMORPHONE HYDROCHLORIDE 1 MG: 1 INJECTION, SOLUTION INTRAMUSCULAR; INTRAVENOUS; SUBCUTANEOUS at 07:53

## 2023-07-05 RX ADMIN — ONDANSETRON 4 MG: 2 INJECTION INTRAMUSCULAR; INTRAVENOUS at 17:21

## 2023-07-05 ASSESSMENT — PAIN SCALES - GENERAL
PAINLEVEL_OUTOF10: 0
PAINLEVEL_OUTOF10: 9
PAINLEVEL_OUTOF10: 10
PAINLEVEL_OUTOF10: 9
PAINLEVEL_OUTOF10: 0
PAINLEVEL_OUTOF10: 0
PAINLEVEL_OUTOF10: 6

## 2023-07-05 ASSESSMENT — PAIN DESCRIPTION - ORIENTATION
ORIENTATION: MID
ORIENTATION: MID

## 2023-07-05 ASSESSMENT — PAIN DESCRIPTION - LOCATION
LOCATION: ABDOMEN
LOCATION: ABDOMEN

## 2023-07-05 ASSESSMENT — PAIN DESCRIPTION - DESCRIPTORS
DESCRIPTORS: SHARP;ACHING;DISCOMFORT
DESCRIPTORS: ACHING

## 2023-07-05 ASSESSMENT — PAIN - FUNCTIONAL ASSESSMENT
PAIN_FUNCTIONAL_ASSESSMENT: ACTIVITIES ARE NOT PREVENTED
PAIN_FUNCTIONAL_ASSESSMENT: PREVENTS OR INTERFERES SOME ACTIVE ACTIVITIES AND ADLS

## 2023-07-05 ASSESSMENT — PAIN SCALES - WONG BAKER
WONGBAKER_NUMERICALRESPONSE: 0

## 2023-07-05 ASSESSMENT — PAIN DESCRIPTION - PAIN TYPE: TYPE: ACUTE PAIN

## 2023-07-05 NOTE — PROGRESS NOTES
Ynes Raygoza MD    Hospitalist Progress Note      Name:  Mikaela Argueta /Age/Sex: 10/16/1932  (80 y.o. female)   MRN & CSN:  [id number] & [id number] Admission Date/Time: 2023  6:49 PM   Location:  [id number] PCP: DENIZ Anderson - CNP       I saw and examined the patient on 2023 at 3:51 PM.    Hospital Day: 4  Barriers to discharge: Gastric volvulus. Patient Active Problem List   Diagnosis    Spinal stenosis of thoracic region    Disc displacement, lumbar    Disc displacement, thoracic    Scoliosis    Hypoxia    General weakness    Moderate malnutrition (HCC)    CAD (coronary artery disease)    Hyperlipidemia    GERD (gastroesophageal reflux disease)    CHF (congestive heart failure) (HCC)    Suspected COVID-19 virus infection    Acute systolic heart failure (HCC)    COVID-19    Chronic diastolic heart failure (HCC)    Nausea and vomiting    CORNELL (dyspnea on exertion)    Acute gastric volvulus    Aortic valve stenosis    Hiatal hernia with obstruction but no gangrene          Assessment and Plan:   80year-old admitted with abdominal pain, distention, nausea and vomiting, found to have gastric volvulus, NG tube was placed for decompression, NG tube discontinued, on clear liquids, plan for hernia repair on 2023. Complexity: Hiatus hernia with intermittent gastric volvulus, NG tube discontinued, has flatus, no bowel movement, started on chips and sips by surgery, plan for HIDA scan negative. Noted on Friday. Chronic diastolic heart failure, with compensatory, last echo showed LVEF 65%, cardiology consult requested for surgery, preop evaluation. Elevated troponin, chronically elevated, cardiology consulted by surgery. GERD with large hiatal hernia, PPI. Abdominal pain, chronic narcotic dependence, started on Percocet, prn dilaudid. Subjective:       Objective:      Intake/Output Summary (Last 24 hours) at 2023 1551  Last data filed at 2023 0813  Gross per 24 57.2

## 2023-07-05 NOTE — PROGRESS NOTES
Shift assessment complete, VSS, A&Ox4, medications given per MAR. Patient reports pain 10/10 in abdomen this AM, pain interventions implemented, patient satisfied. Family at bedside. NG tube in place to LIWS, replacing potassium this AM. All safety precautions in place, call light, and belongings within reach. The care plan and education has been reviewed and mutually agreed upon with the patient.

## 2023-07-05 NOTE — CARE COORDINATION
Case Management Assessment  Initial Evaluation    Date/Time of Evaluation: 7/5/2023 2:01 PM  Assessment Completed by: Kermit Millard RN    If patient is discharged prior to next notation, then this note serves as note for discharge by case management. Patient Name: Travis Low                   YOB: 1932  Diagnosis: Acute gastric volvulus [K31.89]                   Date / Time: 7/2/2023  6:49 PM    Patient Admission Status: Inpatient   Readmission Risk (Low < 19, Mod (19-27), High > 27): Readmission Risk Score: 11.3    Current PCP: DENIZ Beltran CNP  PCP verified by CM? (P) Yes    Chart Reviewed: Yes      History Provided by: (P) Patient  Patient Orientation: (P) Alert and Oriented    Patient Cognition: (P) Alert    Hospitalization in the last 30 days (Readmission):  No    If yes, Readmission Assessment in CM Navigator will be completed.     Advance Directives:      Code Status: Full Code   Patient's Primary Decision Maker is: (P) Legal Next of Kin      Discharge Planning:    Patient lives with: (P) Alone Type of Home: (P) Apartment ([facility])  Primary Care Giver: (P) Self  Patient Support Systems include: (P) Children   Current Financial resources: (P) Medicare  Current community resources: (P) None  Current services prior to admission: (P) Oxygen Therapy (night only)            Current DME:              Type of Home Care services:  (P) None (TBD)    ADLS  Prior functional level: (P) Mobility (Cane inside the apartment, walker in the community)  Current functional level: (P) Mobility    PT AM-PAC:   /24  OT AM-PAC:   /24    Family can provide assistance at DC: (P) Yes  Would you like Case Management to discuss the discharge plan with any other family members/significant others, and if so, who? (P) No  Plans to Return to Present Housing: (P) Unknown at present  Other Identified Issues/Barriers to RETURNING to current housing:   Potential Assistance needed at

## 2023-07-05 NOTE — PROGRESS NOTES
Advanced age with life threatening bowel obstruction  No significant CAD  RBBB and first degree AV block on ECG with no significant bradycardia documented. AV disease with mild aortic stenosis and mild aortic insufficiency on echo 1/22.  Need repeat      Rec- ok to proceed with surgery

## 2023-07-05 NOTE — PLAN OF CARE
Problem: Discharge Planning  Goal: Discharge to home or other facility with appropriate resources  7/5/2023 0924 by Teresa Mckeon RN  Outcome: Progressing  7/5/2023 0019 by Sandra Malone RN  Outcome: Progressing     Problem: Chronic Conditions and Co-morbidities  Goal: Patient's chronic conditions and co-morbidity symptoms are monitored and maintained or improved  7/5/2023 0924 by Teresa Mckeon RN  Outcome: Progressing  7/5/2023 0019 by Sandra Malone RN  Outcome: Progressing     Problem: Skin/Tissue Integrity  Goal: Absence of new skin breakdown  Description: 1. Monitor for areas of redness and/or skin breakdown  2. Assess vascular access sites hourly  3. Every 4-6 hours minimum:  Change oxygen saturation probe site  4. Every 4-6 hours:  If on nasal continuous positive airway pressure, respiratory therapy assess nares and determine need for appliance change or resting period.   7/5/2023 0924 by Teresa Mckeon RN  Outcome: Progressing  7/5/2023 0019 by Sandra Malone RN  Outcome: Progressing     Problem: ABCDS Injury Assessment  Goal: Absence of physical injury  Outcome: Progressing     Problem: Safety - Adult  Goal: Free from fall injury  Outcome: Progressing     Problem: Pain  Goal: Verbalizes/displays adequate comfort level or baseline comfort level  Outcome: Progressing

## 2023-07-06 ENCOUNTER — ANESTHESIA EVENT (OUTPATIENT)
Dept: OPERATING ROOM | Age: 88
End: 2023-07-06
Payer: MEDICARE

## 2023-07-06 LAB
ANION GAP SERPL CALCULATED.3IONS-SCNC: 13 MMOL/L (ref 3–16)
BUN SERPL-MCNC: 29 MG/DL (ref 7–20)
CALCIUM SERPL-MCNC: 9.3 MG/DL (ref 8.3–10.6)
CHLORIDE SERPL-SCNC: 106 MMOL/L (ref 99–110)
CO2 SERPL-SCNC: 24 MMOL/L (ref 21–32)
CREAT SERPL-MCNC: 1 MG/DL (ref 0.6–1.2)
DEPRECATED RDW RBC AUTO: 14.3 % (ref 12.4–15.4)
GFR SERPLBLD CREATININE-BSD FMLA CKD-EPI: 53 ML/MIN/{1.73_M2}
GLUCOSE BLD-MCNC: 91 MG/DL (ref 70–99)
GLUCOSE SERPL-MCNC: 108 MG/DL (ref 70–99)
HCT VFR BLD AUTO: 36 % (ref 36–48)
HGB BLD-MCNC: 11.7 G/DL (ref 12–16)
LV EF: 58 %
LVEF MODALITY: NORMAL
MCH RBC QN AUTO: 31.1 PG (ref 26–34)
MCHC RBC AUTO-ENTMCNC: 32.5 G/DL (ref 31–36)
MCV RBC AUTO: 95.8 FL (ref 80–100)
PERFORMED ON: NORMAL
PLATELET # BLD AUTO: 151 K/UL (ref 135–450)
PMV BLD AUTO: 7.2 FL (ref 5–10.5)
POTASSIUM SERPL-SCNC: 5 MMOL/L (ref 3.5–5.1)
RBC # BLD AUTO: 3.76 M/UL (ref 4–5.2)
SODIUM SERPL-SCNC: 143 MMOL/L (ref 136–145)
WBC # BLD AUTO: 5.5 K/UL (ref 4–11)

## 2023-07-06 PROCEDURE — 80048 BASIC METABOLIC PNL TOTAL CA: CPT

## 2023-07-06 PROCEDURE — 1200000000 HC SEMI PRIVATE

## 2023-07-06 PROCEDURE — C8929 TTE W OR WO FOL WCON,DOPPLER: HCPCS

## 2023-07-06 PROCEDURE — C9113 INJ PANTOPRAZOLE SODIUM, VIA: HCPCS | Performed by: SURGERY

## 2023-07-06 PROCEDURE — 2580000003 HC RX 258: Performed by: HOSPITALIST

## 2023-07-06 PROCEDURE — APPSS15 APP SPLIT SHARED TIME 0-15 MINUTES: Performed by: NURSE PRACTITIONER

## 2023-07-06 PROCEDURE — 2580000003 HC RX 258: Performed by: PHYSICIAN ASSISTANT

## 2023-07-06 PROCEDURE — 85027 COMPLETE CBC AUTOMATED: CPT

## 2023-07-06 PROCEDURE — 6360000002 HC RX W HCPCS: Performed by: PHYSICIAN ASSISTANT

## 2023-07-06 PROCEDURE — 99232 SBSQ HOSP IP/OBS MODERATE 35: CPT | Performed by: SURGERY

## 2023-07-06 PROCEDURE — 36415 COLL VENOUS BLD VENIPUNCTURE: CPT

## 2023-07-06 PROCEDURE — 6360000002 HC RX W HCPCS: Performed by: HOSPITALIST

## 2023-07-06 PROCEDURE — APPNB30 APP NON BILLABLE TIME 0-30 MINS: Performed by: NURSE PRACTITIONER

## 2023-07-06 PROCEDURE — 6360000004 HC RX CONTRAST MEDICATION: Performed by: INTERNAL MEDICINE

## 2023-07-06 PROCEDURE — 6360000002 HC RX W HCPCS: Performed by: SURGERY

## 2023-07-06 RX ADMIN — Medication 10 ML: at 20:48

## 2023-07-06 RX ADMIN — PANTOPRAZOLE SODIUM 40 MG: 40 INJECTION, POWDER, FOR SOLUTION INTRAVENOUS at 08:58

## 2023-07-06 RX ADMIN — ONDANSETRON 4 MG: 2 INJECTION INTRAMUSCULAR; INTRAVENOUS at 09:42

## 2023-07-06 RX ADMIN — PANTOPRAZOLE SODIUM 40 MG: 40 INJECTION, POWDER, FOR SOLUTION INTRAVENOUS at 20:48

## 2023-07-06 RX ADMIN — HYDROMORPHONE HYDROCHLORIDE 1 MG: 1 INJECTION, SOLUTION INTRAMUSCULAR; INTRAVENOUS; SUBCUTANEOUS at 23:54

## 2023-07-06 RX ADMIN — ONDANSETRON 4 MG: 2 INJECTION INTRAMUSCULAR; INTRAVENOUS at 23:52

## 2023-07-06 RX ADMIN — PROMETHAZINE HYDROCHLORIDE 12.5 MG: 25 INJECTION INTRAMUSCULAR; INTRAVENOUS at 20:43

## 2023-07-06 RX ADMIN — POTASSIUM CHLORIDE AND SODIUM CHLORIDE: 900; 150 INJECTION, SOLUTION INTRAVENOUS at 18:01

## 2023-07-06 RX ADMIN — HYDROMORPHONE HYDROCHLORIDE 1 MG: 1 INJECTION, SOLUTION INTRAMUSCULAR; INTRAVENOUS; SUBCUTANEOUS at 17:12

## 2023-07-06 RX ADMIN — PERFLUTREN 1.5 ML: 6.52 INJECTION, SUSPENSION INTRAVENOUS at 16:25

## 2023-07-06 RX ADMIN — HYDROMORPHONE HYDROCHLORIDE 1 MG: 1 INJECTION, SOLUTION INTRAMUSCULAR; INTRAVENOUS; SUBCUTANEOUS at 09:13

## 2023-07-06 RX ADMIN — Medication 10 ML: at 09:14

## 2023-07-06 RX ADMIN — HYDROMORPHONE HYDROCHLORIDE 1 MG: 1 INJECTION, SOLUTION INTRAMUSCULAR; INTRAVENOUS; SUBCUTANEOUS at 20:48

## 2023-07-06 RX ADMIN — HYDROMORPHONE HYDROCHLORIDE 1 MG: 1 INJECTION, SOLUTION INTRAMUSCULAR; INTRAVENOUS; SUBCUTANEOUS at 13:12

## 2023-07-06 RX ADMIN — ONDANSETRON 4 MG: 2 INJECTION INTRAMUSCULAR; INTRAVENOUS at 15:28

## 2023-07-06 RX ADMIN — FUROSEMIDE 20 MG: 10 INJECTION, SOLUTION INTRAMUSCULAR; INTRAVENOUS at 08:58

## 2023-07-06 RX ADMIN — ONDANSETRON 4 MG: 2 INJECTION INTRAMUSCULAR; INTRAVENOUS at 03:45

## 2023-07-06 RX ADMIN — HYDROMORPHONE HYDROCHLORIDE 1 MG: 1 INJECTION, SOLUTION INTRAMUSCULAR; INTRAVENOUS; SUBCUTANEOUS at 05:04

## 2023-07-06 ASSESSMENT — PAIN SCALES - GENERAL
PAINLEVEL_OUTOF10: 3
PAINLEVEL_OUTOF10: 8
PAINLEVEL_OUTOF10: 8
PAINLEVEL_OUTOF10: 9
PAINLEVEL_OUTOF10: 3

## 2023-07-06 ASSESSMENT — PAIN DESCRIPTION - LOCATION
LOCATION: ABDOMEN
LOCATION: ABDOMEN
LOCATION: ABDOMEN;BACK
LOCATION: ABDOMEN
LOCATION: ABDOMEN;BACK

## 2023-07-06 ASSESSMENT — PAIN DESCRIPTION - DESCRIPTORS
DESCRIPTORS: ACHING
DESCRIPTORS: ACHING

## 2023-07-06 NOTE — PROGRESS NOTES
Adriana Melvin MD    Hospitalist Progress Note      Name:  Sandee Baldwin /Age/Sex: 10/16/1932  (80 y.o. female)   MRN & CSN:  [id number] & [id number] Admission Date/Time: 2023  6:49 PM   Location:  [id number] PCP: DENIZ Mobley - CNP       I saw and examined the patient on 2023 at 4:38 PM.    Hospital Day: 5  Barriers to discharge: Gastric volvulus. Patient Active Problem List   Diagnosis    Spinal stenosis of thoracic region    Disc displacement, lumbar    Disc displacement, thoracic    Scoliosis    Hypoxia    General weakness    Moderate malnutrition (HCC)    CAD (coronary artery disease)    Hyperlipidemia    GERD (gastroesophageal reflux disease)    CHF (congestive heart failure) ([facility])    Suspected COVID-19 virus infection    Acute systolic heart failure (HCC)    COVID-19    Chronic diastolic heart failure ([facility])    Nausea and vomiting    CORNELL (dyspnea on exertion)    Acute gastric volvulus    Aortic valve stenosis    Hiatal hernia with obstruction but no gangrene          Assessment and Plan:   80year-old admitted with abdominal pain, distention, nausea and vomiting, found to have gastric volvulus, NG tube was placed for decompression, NG tube discontinued, on clear liquids, plan for hernia repair tomorrow, cardiology clearance appreciated. Complexity: Hiatus hernia with intermittent gastric volvulus, NG tube discontinued, has flatus, no bowel movement, on chips and sips by surgery, plan for hiatus hernia repair tomoorow. Chronic diastolic heart failure, with compensatory, last Echo showed LVEF 65%, cardiology clearance for surgery appreciated. Elevated troponin, chronically elevated, cardiology consulted by surgery. GERD with large hiatal hernia, PPI. Abdominal pain, chronic narcotic dependence, started on Percocet, prn dilaudid. Subjective:     Seen and examine, tolerating liquids, passing flatus, plan for surgery tomorrow. Objective:      Intake/Output

## 2023-07-06 NOTE — PROGRESS NOTES
Shift assessment completed. Patient alert and oriented. . Call light within reach, Morning medication given per [facility]. Iv Dilaudid given f\\or Pain. The care plan and education has been reviewed and mutually agreed upon with the patient. 3:31 PM Patient left unit for Echo.  5:11 PM back to unit.

## 2023-07-06 NOTE — PLAN OF CARE
Problem: Discharge Planning  Goal: Discharge to home or other facility with appropriate resources  7/5/2023 2055 by Natanael Ordaz RN  Outcome: Progressing  7/5/2023 0924 by Vivien Grimm RN  Outcome: Progressing     Problem: Chronic Conditions and Co-morbidities  Goal: Patient's chronic conditions and co-morbidity symptoms are monitored and maintained or improved  7/5/2023 2055 by Natanael Ordaz RN  Outcome: Progressing  7/5/2023 0924 by Vivien Grimm RN  Outcome: Progressing     Problem: Skin/Tissue Integrity  Goal: Absence of new skin breakdown  Description: 1. Monitor for areas of redness and/or skin breakdown  2. Assess vascular access sites hourly  3. Every 4-6 hours minimum:  Change oxygen saturation probe site  4. Every 4-6 hours:  If on nasal continuous positive airway pressure, respiratory therapy assess nares and determine need for appliance change or resting period.   7/5/2023 2055 by Natanael Ordaz RN  Outcome: Progressing  7/5/2023 0924 by Vivien Grimm RN  Outcome: Progressing     Problem: ABCDS Injury Assessment  Goal: Absence of physical injury  7/5/2023 2055 by Natanael Ordaz RN  Outcome: Progressing  7/5/2023 0924 by Vivien Grimm RN  Outcome: Progressing     Problem: Safety - Adult  Goal: Free from fall injury  7/5/2023 2055 by Natanael Ordaz RN  Outcome: Progressing  7/5/2023 0924 by Vivien Grimm RN  Outcome: Progressing     Problem: Pain  Goal: Verbalizes/displays adequate comfort level or baseline comfort level  7/5/2023 2055 by Natanael Ordaz RN  Outcome: Progressing  7/5/2023 0924 by Vivien Grimm RN  Outcome: Progressing

## 2023-07-06 NOTE — PROGRESS NOTES
Physical/Occupational Therapy  Liam Almanza    Attempted to see pt for PT/OT evaluation. Pt declined therapy at this time due to increased pain and nausea. Nursing present and aware. Will follow up with pt as schedule permits. Thanks, Zhen Sagastume, PT, DPT [id number], ANTONELLA LyleEd., OTR/L [id number]    2nd attempt to see pt for OT/PT evaluation. Pt and family declined, stating that the RN told them that the pt is going RAY soon for an echo. They reported that pt was up in a chair for much of the day and recently back to bed. Pt stated that if she needs rehab, she prefers to go to the ARU, as she has been there in the past. Pt scheduled for hiatal hernia repair tomorrow. Will attempt again s/p hernia repair, when pt is medically able to tolerate therapy. Thank you.    Amy Lyle., OTR/L, [id number]  [facility] Patient, [address] [place] [address]#[id number]

## 2023-07-06 NOTE — PLAN OF CARE
Problem: Discharge Planning  Goal: Discharge to home or other facility with appropriate resources  7/6/2023 0929 by Gabriela Hanks RN  Outcome: Progressing  7/5/2023 2055 by Jacky Lopez RN  Outcome: Progressing     Problem: Chronic Conditions and Co-morbidities  Goal: Patient's chronic conditions and co-morbidity symptoms are monitored and maintained or improved  7/6/2023 0929 by Gabriela Hanks RN  Outcome: Progressing  7/5/2023 2055 by Jacky Lopez RN  Outcome: Progressing     Problem: Skin/Tissue Integrity  Goal: Absence of new skin breakdown  Description: 1. Monitor for areas of redness and/or skin breakdown  2. Assess vascular access sites hourly  3. Every 4-6 hours minimum:  Change oxygen saturation probe site  4. Every 4-6 hours:  If on nasal continuous positive airway pressure, respiratory therapy assess nares and determine need for appliance change or resting period.   7/6/2023 0929 by Gabriela Hanks RN  Outcome: Progressing  7/5/2023 2055 by Jacky Lopez RN  Outcome: Progressing     Problem: ABCDS Injury Assessment  Goal: Absence of physical injury  7/6/2023 0929 by Gabriela Hanks RN  Outcome: Progressing  7/5/2023 2055 by Jacky oLpez RN  Outcome: Progressing     Problem: Safety - Adult  Goal: Free from fall injury  7/6/2023 0929 by Gabriela Hanks RN  Outcome: Progressing  7/5/2023 2055 by Jacky Lopez RN  Outcome: Progressing     Problem: Pain  Goal: Verbalizes/displays adequate comfort level or baseline comfort level  7/6/2023 0929 by Gabriela Hanks RN  Outcome: Progressing  7/5/2023 2055 by Jacky Lopez RN  Outcome: Progressing

## 2023-07-06 NOTE — CONSULTS
[address] [facility]                     [address], [address]                                  CONSULTATION    PATIENT NAME: Shannon Christopher                  :        10/16/1932  MED REC NO:   [id number]                          ROOM:       [id number]  ACCOUNT NO:   [de-identified]                           ADMIT DATE: 2023  PROVIDER:     Kevin Velasquez MD    CONSULT DATE:  2023    REASON FOR CONSULTATION:  Evaluate patient with acute bowel obstruction  or hernia, who is awaiting abdominal surgery and please provide a  cardiac clearance. HISTORY OF PRESENT ILLNESS:  The patient is a pleasant 59-year-old lady  who has enjoyed a good cardiovascular health throughout her lifetime. She is known to have chronic diastolic congestive heart failure,  successfully managed with use of a daily Lasix. She does get around  well in her independent living situation at the [facility] [place]. She now  presents with acute bowel obstruction and responding to conservative  management but per general surgery is in need of further surgery. The  patient not had any current chest discomfort. The patient has had a  cardiac catheterization approximately 3 years ago, which showed mild  coronary artery disease. She last had echocardiogram 2022, which  showed aortic valve disease with mild aortic stenosis, moderate aortic  insufficiency, preserved left ventricular ejection fraction. MEDICATIONS[de-identified]  Medications prior to admission are calcium; aspirin;  Lasix, she was on 20 mg daily but that has been discontinued. Currently  in the hospital, she is receiving Lasix intravenous daily. PAST MEDICAL HISTORY:  Past medical history is notable for generalized  arthritis, mild coronary artery disease with cardiac catheterization  approximately 4 years ago, history of diabetes mellitus, hyperlipidemia.     PAST SURGICAL HISTORY:  Includes appendectomy, hernia repair,  cholecystectomy, previous

## 2023-07-07 ENCOUNTER — APPOINTMENT (OUTPATIENT)
Dept: GENERAL RADIOLOGY | Age: 88
End: 2023-07-07
Payer: MEDICARE

## 2023-07-07 ENCOUNTER — ANESTHESIA (OUTPATIENT)
Dept: OPERATING ROOM | Age: 88
End: 2023-07-07
Payer: MEDICARE

## 2023-07-07 PROBLEM — K56.609 SBO (SMALL BOWEL OBSTRUCTION) (HCC): Status: ACTIVE | Noted: 2023-07-07

## 2023-07-07 LAB
GLUCOSE BLD-MCNC: 123 MG/DL (ref 70–99)
PERFORMED ON: ABNORMAL

## 2023-07-07 PROCEDURE — 2709999900 HC NON-CHARGEABLE SUPPLY: Performed by: SURGERY

## 2023-07-07 PROCEDURE — 7100000000 HC PACU RECOVERY - FIRST 15 MIN: Performed by: SURGERY

## 2023-07-07 PROCEDURE — 3600000014 HC SURGERY LEVEL 4 ADDTL 15MIN: Performed by: SURGERY

## 2023-07-07 PROCEDURE — 2720000010 HC SURG SUPPLY STERILE: Performed by: SURGERY

## 2023-07-07 PROCEDURE — 6360000002 HC RX W HCPCS: Performed by: NURSE ANESTHETIST, CERTIFIED REGISTERED

## 2023-07-07 PROCEDURE — 6360000002 HC RX W HCPCS: Performed by: HOSPITALIST

## 2023-07-07 PROCEDURE — 6360000002 HC RX W HCPCS: Performed by: SURGERY

## 2023-07-07 PROCEDURE — C9290 INJ, BUPIVACAINE LIPOSOME: HCPCS | Performed by: SURGERY

## 2023-07-07 PROCEDURE — 3700000001 HC ADD 15 MINUTES (ANESTHESIA): Performed by: SURGERY

## 2023-07-07 PROCEDURE — 2580000003 HC RX 258: Performed by: NURSE ANESTHETIST, CERTIFIED REGISTERED

## 2023-07-07 PROCEDURE — C9113 INJ PANTOPRAZOLE SODIUM, VIA: HCPCS | Performed by: SURGERY

## 2023-07-07 PROCEDURE — P9045 ALBUMIN (HUMAN), 5%, 250 ML: HCPCS | Performed by: NURSE ANESTHETIST, CERTIFIED REGISTERED

## 2023-07-07 PROCEDURE — 0DV40ZZ RESTRICTION OF ESOPHAGOGASTRIC JUNCTION, OPEN APPROACH: ICD-10-PCS | Performed by: SURGERY

## 2023-07-07 PROCEDURE — 03HC33Z INSERTION OF INFUSION DEVICE INTO LEFT RADIAL ARTERY, PERCUTANEOUS APPROACH: ICD-10-PCS | Performed by: ANESTHESIOLOGY

## 2023-07-07 PROCEDURE — 6360000002 HC RX W HCPCS: Performed by: PHYSICIAN ASSISTANT

## 2023-07-07 PROCEDURE — 94760 N-INVAS EAR/PLS OXIMETRY 1: CPT

## 2023-07-07 PROCEDURE — 2500000003 HC RX 250 WO HCPCS: Performed by: NURSE ANESTHETIST, CERTIFIED REGISTERED

## 2023-07-07 PROCEDURE — 0DNN0ZZ RELEASE SIGMOID COLON, OPEN APPROACH: ICD-10-PCS | Performed by: SURGERY

## 2023-07-07 PROCEDURE — 2580000003 HC RX 258: Performed by: SURGERY

## 2023-07-07 PROCEDURE — 6360000002 HC RX W HCPCS: Performed by: ANESTHESIOLOGY

## 2023-07-07 PROCEDURE — 2580000003 HC RX 258: Performed by: PHYSICIAN ASSISTANT

## 2023-07-07 PROCEDURE — 3700000000 HC ANESTHESIA ATTENDED CARE: Performed by: SURGERY

## 2023-07-07 PROCEDURE — 2000000000 HC ICU R&B

## 2023-07-07 PROCEDURE — 2500000003 HC RX 250 WO HCPCS: Performed by: SURGERY

## 2023-07-07 PROCEDURE — 44005 FREEING OF BOWEL ADHESION: CPT | Performed by: SURGERY

## 2023-07-07 PROCEDURE — 2580000003 HC RX 258: Performed by: HOSPITALIST

## 2023-07-07 PROCEDURE — 3600000004 HC SURGERY LEVEL 4 BASE: Performed by: SURGERY

## 2023-07-07 PROCEDURE — 7100000001 HC PACU RECOVERY - ADDTL 15 MIN: Performed by: SURGERY

## 2023-07-07 PROCEDURE — 71045 X-RAY EXAM CHEST 1 VIEW: CPT

## 2023-07-07 PROCEDURE — 6360000002 HC RX W HCPCS

## 2023-07-07 PROCEDURE — 0BQT0ZZ REPAIR DIAPHRAGM, OPEN APPROACH: ICD-10-PCS | Performed by: SURGERY

## 2023-07-07 PROCEDURE — A4217 STERILE WATER/SALINE, 500 ML: HCPCS | Performed by: SURGERY

## 2023-07-07 PROCEDURE — 0DH60UZ INSERTION OF FEEDING DEVICE INTO STOMACH, OPEN APPROACH: ICD-10-PCS | Performed by: SURGERY

## 2023-07-07 PROCEDURE — 2700000000 HC OXYGEN THERAPY PER DAY

## 2023-07-07 DEVICE — IMPLANTABLE DEVICE: Type: IMPLANTABLE DEVICE | Site: ABDOMEN | Status: FUNCTIONAL

## 2023-07-07 RX ORDER — ONDANSETRON 2 MG/ML
4 INJECTION INTRAMUSCULAR; INTRAVENOUS
Status: COMPLETED | OUTPATIENT
Start: 2023-07-07 | End: 2023-07-07

## 2023-07-07 RX ORDER — ESMOLOL HYDROCHLORIDE 10 MG/ML
INJECTION INTRAVENOUS PRN
Status: DISCONTINUED | OUTPATIENT
Start: 2023-07-07 | End: 2023-07-07 | Stop reason: SDUPTHER

## 2023-07-07 RX ORDER — SODIUM CHLORIDE 9 MG/ML
INJECTION, SOLUTION INTRAVENOUS CONTINUOUS PRN
Status: DISCONTINUED | OUTPATIENT
Start: 2023-07-07 | End: 2023-07-07 | Stop reason: SDUPTHER

## 2023-07-07 RX ORDER — ONDANSETRON 2 MG/ML
INJECTION INTRAMUSCULAR; INTRAVENOUS PRN
Status: DISCONTINUED | OUTPATIENT
Start: 2023-07-07 | End: 2023-07-07 | Stop reason: SDUPTHER

## 2023-07-07 RX ORDER — ROCURONIUM BROMIDE 10 MG/ML
INJECTION, SOLUTION INTRAVENOUS PRN
Status: DISCONTINUED | OUTPATIENT
Start: 2023-07-07 | End: 2023-07-07 | Stop reason: SDUPTHER

## 2023-07-07 RX ORDER — HYDROMORPHONE HCL 110MG/55ML
PATIENT CONTROLLED ANALGESIA SYRINGE INTRAVENOUS PRN
Status: DISCONTINUED | OUTPATIENT
Start: 2023-07-07 | End: 2023-07-07

## 2023-07-07 RX ORDER — SODIUM CHLORIDE 0.9 % (FLUSH) 0.9 %
5-40 SYRINGE (ML) INJECTION EVERY 12 HOURS SCHEDULED
Status: DISCONTINUED | OUTPATIENT
Start: 2023-07-07 | End: 2023-07-24

## 2023-07-07 RX ORDER — HYDROMORPHONE HCL 110MG/55ML
PATIENT CONTROLLED ANALGESIA SYRINGE INTRAVENOUS PRN
Status: DISCONTINUED | OUTPATIENT
Start: 2023-07-07 | End: 2023-07-07 | Stop reason: SDUPTHER

## 2023-07-07 RX ORDER — ETOMIDATE 2 MG/ML
INJECTION INTRAVENOUS PRN
Status: DISCONTINUED | OUTPATIENT
Start: 2023-07-07 | End: 2023-07-07 | Stop reason: SDUPTHER

## 2023-07-07 RX ORDER — HYDROMORPHONE HYDROCHLORIDE 2 MG/ML
0.25 INJECTION, SOLUTION INTRAMUSCULAR; INTRAVENOUS; SUBCUTANEOUS EVERY 5 MIN PRN
Status: DISCONTINUED | OUTPATIENT
Start: 2023-07-07 | End: 2023-07-08

## 2023-07-07 RX ORDER — MORPHINE SULFATE 2 MG/ML
2 INJECTION, SOLUTION INTRAMUSCULAR; INTRAVENOUS
Status: DISCONTINUED | OUTPATIENT
Start: 2023-07-07 | End: 2023-07-07 | Stop reason: SDUPTHER

## 2023-07-07 RX ORDER — ALBUMIN, HUMAN INJ 5% 5 %
SOLUTION INTRAVENOUS PRN
Status: DISCONTINUED | OUTPATIENT
Start: 2023-07-07 | End: 2023-07-07 | Stop reason: SDUPTHER

## 2023-07-07 RX ORDER — LIDOCAINE HYDROCHLORIDE 20 MG/ML
INJECTION, SOLUTION EPIDURAL; INFILTRATION; INTRACAUDAL; PERINEURAL PRN
Status: DISCONTINUED | OUTPATIENT
Start: 2023-07-07 | End: 2023-07-07 | Stop reason: SDUPTHER

## 2023-07-07 RX ORDER — SODIUM CHLORIDE 0.9 % (FLUSH) 0.9 %
5-40 SYRINGE (ML) INJECTION PRN
Status: DISCONTINUED | OUTPATIENT
Start: 2023-07-07 | End: 2023-07-24

## 2023-07-07 RX ORDER — DEXAMETHASONE SODIUM PHOSPHATE 4 MG/ML
INJECTION, SOLUTION INTRA-ARTICULAR; INTRALESIONAL; INTRAMUSCULAR; INTRAVENOUS; SOFT TISSUE PRN
Status: DISCONTINUED | OUTPATIENT
Start: 2023-07-07 | End: 2023-07-07 | Stop reason: SDUPTHER

## 2023-07-07 RX ORDER — FENTANYL CITRATE 50 UG/ML
INJECTION, SOLUTION INTRAMUSCULAR; INTRAVENOUS PRN
Status: DISCONTINUED | OUTPATIENT
Start: 2023-07-07 | End: 2023-07-07 | Stop reason: SDUPTHER

## 2023-07-07 RX ORDER — MAGNESIUM SULFATE HEPTAHYDRATE 500 MG/ML
INJECTION, SOLUTION INTRAMUSCULAR; INTRAVENOUS PRN
Status: DISCONTINUED | OUTPATIENT
Start: 2023-07-07 | End: 2023-07-07 | Stop reason: SDUPTHER

## 2023-07-07 RX ORDER — MAGNESIUM HYDROXIDE 1200 MG/15ML
LIQUID ORAL CONTINUOUS PRN
Status: COMPLETED | OUTPATIENT
Start: 2023-07-07 | End: 2023-07-07

## 2023-07-07 RX ORDER — SUCCINYLCHOLINE/SOD CL,ISO/PF 200MG/10ML
SYRINGE (ML) INTRAVENOUS PRN
Status: DISCONTINUED | OUTPATIENT
Start: 2023-07-07 | End: 2023-07-07 | Stop reason: SDUPTHER

## 2023-07-07 RX ORDER — BUPIVACAINE HYDROCHLORIDE AND EPINEPHRINE 5; 5 MG/ML; UG/ML
INJECTION, SOLUTION EPIDURAL; INTRACAUDAL; PERINEURAL
Status: COMPLETED | OUTPATIENT
Start: 2023-07-07 | End: 2023-07-07

## 2023-07-07 RX ORDER — SODIUM CHLORIDE 9 MG/ML
INJECTION, SOLUTION INTRAVENOUS PRN
Status: DISCONTINUED | OUTPATIENT
Start: 2023-07-07 | End: 2023-07-28 | Stop reason: HOSPADM

## 2023-07-07 RX ORDER — HYDROMORPHONE HYDROCHLORIDE 2 MG/ML
0.5 INJECTION, SOLUTION INTRAMUSCULAR; INTRAVENOUS; SUBCUTANEOUS EVERY 5 MIN PRN
Status: DISCONTINUED | OUTPATIENT
Start: 2023-07-07 | End: 2023-07-08

## 2023-07-07 RX ORDER — CEFAZOLIN 2 G/1
INJECTION, POWDER, FOR SOLUTION INTRAMUSCULAR; INTRAVENOUS
Status: COMPLETED
Start: 2023-07-07 | End: 2023-07-07

## 2023-07-07 RX ORDER — DIPHENHYDRAMINE HYDROCHLORIDE 50 MG/ML
12.5 INJECTION INTRAMUSCULAR; INTRAVENOUS
Status: ACTIVE | OUTPATIENT
Start: 2023-07-07 | End: 2023-07-08

## 2023-07-07 RX ORDER — LIDOCAINE HYDROCHLORIDE 10 MG/ML
5 INJECTION, SOLUTION EPIDURAL; INFILTRATION; INTRACAUDAL; PERINEURAL ONCE
Status: DISCONTINUED | OUTPATIENT
Start: 2023-07-07 | End: 2023-07-11

## 2023-07-07 RX ORDER — SODIUM CHLORIDE, SODIUM LACTATE, POTASSIUM CHLORIDE, AND CALCIUM CHLORIDE .6; .31; .03; .02 G/100ML; G/100ML; G/100ML; G/100ML
IRRIGANT IRRIGATION
Status: COMPLETED | OUTPATIENT
Start: 2023-07-07 | End: 2023-07-07

## 2023-07-07 RX ORDER — SODIUM CHLORIDE 9 MG/ML
25 INJECTION, SOLUTION INTRAVENOUS PRN
Status: DISCONTINUED | OUTPATIENT
Start: 2023-07-07 | End: 2023-07-28 | Stop reason: HOSPADM

## 2023-07-07 RX ORDER — METOCLOPRAMIDE HYDROCHLORIDE 5 MG/ML
5 INJECTION INTRAMUSCULAR; INTRAVENOUS EVERY 6 HOURS
Status: COMPLETED | OUTPATIENT
Start: 2023-07-07 | End: 2023-07-09

## 2023-07-07 RX ADMIN — ROCURONIUM BROMIDE 20 MG: 50 INJECTION INTRAVENOUS at 12:36

## 2023-07-07 RX ADMIN — PHENYLEPHRINE HYDROCHLORIDE 50 MCG/MIN: 10 INJECTION INTRAVENOUS at 11:03

## 2023-07-07 RX ADMIN — FENTANYL CITRATE 25 MCG: 50 INJECTION, SOLUTION INTRAMUSCULAR; INTRAVENOUS at 11:35

## 2023-07-07 RX ADMIN — LIDOCAINE HYDROCHLORIDE 100 MG: 20 INJECTION, SOLUTION EPIDURAL; INFILTRATION; INTRACAUDAL; PERINEURAL at 11:04

## 2023-07-07 RX ADMIN — PHENYLEPHRINE HYDROCHLORIDE 100 MCG: 10 INJECTION INTRAVENOUS at 13:57

## 2023-07-07 RX ADMIN — ROCURONIUM BROMIDE 20 MG: 50 INJECTION INTRAVENOUS at 13:21

## 2023-07-07 RX ADMIN — CEFAZOLIN 2000 MG: 2 INJECTION, POWDER, FOR SOLUTION INTRAMUSCULAR; INTRAVENOUS at 10:39

## 2023-07-07 RX ADMIN — FENTANYL CITRATE 25 MCG: 50 INJECTION, SOLUTION INTRAMUSCULAR; INTRAVENOUS at 14:27

## 2023-07-07 RX ADMIN — HYDROMORPHONE HYDROCHLORIDE 0.5 MG: 2 INJECTION, SOLUTION INTRAMUSCULAR; INTRAVENOUS; SUBCUTANEOUS at 15:46

## 2023-07-07 RX ADMIN — Medication 100 MG: at 11:04

## 2023-07-07 RX ADMIN — PROMETHAZINE HYDROCHLORIDE 12.5 MG: 25 INJECTION INTRAMUSCULAR; INTRAVENOUS at 03:58

## 2023-07-07 RX ADMIN — SUGAMMADEX 200 MG: 100 INJECTION, SOLUTION INTRAVENOUS at 14:24

## 2023-07-07 RX ADMIN — ETOMIDATE INJECTION 10 MG: 2 SOLUTION INTRAVENOUS at 11:04

## 2023-07-07 RX ADMIN — ROCURONIUM BROMIDE 40 MG: 50 INJECTION INTRAVENOUS at 11:14

## 2023-07-07 RX ADMIN — ENOXAPARIN SODIUM 30 MG: 100 INJECTION SUBCUTANEOUS at 20:41

## 2023-07-07 RX ADMIN — HYDROMORPHONE HYDROCHLORIDE 0.5 MG: 1 INJECTION, SOLUTION INTRAMUSCULAR; INTRAVENOUS; SUBCUTANEOUS at 17:58

## 2023-07-07 RX ADMIN — PANTOPRAZOLE SODIUM 40 MG: 40 INJECTION, POWDER, FOR SOLUTION INTRAVENOUS at 07:57

## 2023-07-07 RX ADMIN — Medication 10 ML: at 08:00

## 2023-07-07 RX ADMIN — ROCURONIUM BROMIDE 10 MG: 50 INJECTION INTRAVENOUS at 11:04

## 2023-07-07 RX ADMIN — HYDROMORPHONE HYDROCHLORIDE 1 MG: 1 INJECTION, SOLUTION INTRAMUSCULAR; INTRAVENOUS; SUBCUTANEOUS at 07:57

## 2023-07-07 RX ADMIN — ROCURONIUM BROMIDE 20 MG: 50 INJECTION INTRAVENOUS at 11:45

## 2023-07-07 RX ADMIN — METOCLOPRAMIDE 5 MG: 5 INJECTION, SOLUTION INTRAMUSCULAR; INTRAVENOUS at 22:29

## 2023-07-07 RX ADMIN — Medication 10 ML: at 20:58

## 2023-07-07 RX ADMIN — FENTANYL CITRATE 50 MCG: 50 INJECTION, SOLUTION INTRAMUSCULAR; INTRAVENOUS at 11:04

## 2023-07-07 RX ADMIN — ONDANSETRON 4 MG: 2 INJECTION INTRAMUSCULAR; INTRAVENOUS at 15:46

## 2023-07-07 RX ADMIN — HYDROMORPHONE HYDROCHLORIDE 0.2 MG: 2 INJECTION, SOLUTION INTRAMUSCULAR; INTRAVENOUS; SUBCUTANEOUS at 12:06

## 2023-07-07 RX ADMIN — SODIUM CHLORIDE: 9 INJECTION, SOLUTION INTRAVENOUS at 10:51

## 2023-07-07 RX ADMIN — Medication 10 ML: at 20:57

## 2023-07-07 RX ADMIN — SODIUM CHLORIDE, PRESERVATIVE FREE 10 ML: 5 INJECTION INTRAVENOUS at 20:58

## 2023-07-07 RX ADMIN — DEXAMETHASONE SODIUM PHOSPHATE 4 MG: 4 INJECTION, SOLUTION INTRAMUSCULAR; INTRAVENOUS at 11:16

## 2023-07-07 RX ADMIN — HYDROMORPHONE HYDROCHLORIDE 1 MG: 1 INJECTION, SOLUTION INTRAMUSCULAR; INTRAVENOUS; SUBCUTANEOUS at 03:25

## 2023-07-07 RX ADMIN — FENTANYL CITRATE 25 MCG: 50 INJECTION, SOLUTION INTRAMUSCULAR; INTRAVENOUS at 11:30

## 2023-07-07 RX ADMIN — HYDROMORPHONE HYDROCHLORIDE 0.2 MG: 2 INJECTION, SOLUTION INTRAMUSCULAR; INTRAVENOUS; SUBCUTANEOUS at 14:27

## 2023-07-07 RX ADMIN — ONDANSETRON 4 MG: 2 INJECTION INTRAMUSCULAR; INTRAVENOUS at 14:03

## 2023-07-07 RX ADMIN — PANTOPRAZOLE SODIUM 40 MG: 40 INJECTION, POWDER, FOR SOLUTION INTRAVENOUS at 20:41

## 2023-07-07 RX ADMIN — FENTANYL CITRATE 50 MCG: 50 INJECTION, SOLUTION INTRAMUSCULAR; INTRAVENOUS at 12:07

## 2023-07-07 RX ADMIN — PHENYLEPHRINE HYDROCHLORIDE 100 MCG: 10 INJECTION INTRAVENOUS at 11:23

## 2023-07-07 RX ADMIN — HYDROMORPHONE HYDROCHLORIDE 0.5 MG: 2 INJECTION, SOLUTION INTRAMUSCULAR; INTRAVENOUS; SUBCUTANEOUS at 16:09

## 2023-07-07 RX ADMIN — HYDROMORPHONE HYDROCHLORIDE 0.4 MG: 2 INJECTION, SOLUTION INTRAMUSCULAR; INTRAVENOUS; SUBCUTANEOUS at 11:40

## 2023-07-07 RX ADMIN — HYDROMORPHONE HYDROCHLORIDE 1 MG: 1 INJECTION, SOLUTION INTRAMUSCULAR; INTRAVENOUS; SUBCUTANEOUS at 20:40

## 2023-07-07 RX ADMIN — PHENYLEPHRINE HYDROCHLORIDE 100 MCG: 10 INJECTION INTRAVENOUS at 14:20

## 2023-07-07 RX ADMIN — ONDANSETRON 4 MG: 2 INJECTION INTRAMUSCULAR; INTRAVENOUS at 06:38

## 2023-07-07 RX ADMIN — HYDROMORPHONE HYDROCHLORIDE 0.2 MG: 2 INJECTION, SOLUTION INTRAMUSCULAR; INTRAVENOUS; SUBCUTANEOUS at 13:13

## 2023-07-07 RX ADMIN — MAGNESIUM SULFATE HEPTAHYDRATE 1 G: 500 INJECTION, SOLUTION INTRAMUSCULAR; INTRAVENOUS at 11:37

## 2023-07-07 RX ADMIN — FUROSEMIDE 20 MG: 10 INJECTION, SOLUTION INTRAMUSCULAR; INTRAVENOUS at 07:57

## 2023-07-07 RX ADMIN — ALBUMIN (HUMAN) 12.5 G: 12.5 INJECTION, SOLUTION INTRAVENOUS at 11:07

## 2023-07-07 RX ADMIN — ESMOLOL HYDROCHLORIDE 20 MG: 100 INJECTION, SOLUTION INTRAVENOUS at 12:08

## 2023-07-07 RX ADMIN — METOCLOPRAMIDE 5 MG: 5 INJECTION, SOLUTION INTRAMUSCULAR; INTRAVENOUS at 19:08

## 2023-07-07 RX ADMIN — SODIUM CHLORIDE: 9 INJECTION, SOLUTION INTRAVENOUS at 10:50

## 2023-07-07 ASSESSMENT — PAIN DESCRIPTION - ORIENTATION
ORIENTATION: RIGHT;MID

## 2023-07-07 ASSESSMENT — PAIN DESCRIPTION - LOCATION
LOCATION: ABDOMEN
LOCATION: ABDOMEN;BACK
LOCATION: ABDOMEN

## 2023-07-07 ASSESSMENT — PAIN DESCRIPTION - DESCRIPTORS
DESCRIPTORS: ACHING
DESCRIPTORS: PATIENT UNABLE TO DESCRIBE
DESCRIPTORS: PATIENT UNABLE TO DESCRIBE
DESCRIPTORS: ACHING

## 2023-07-07 ASSESSMENT — PAIN SCALES - GENERAL
PAINLEVEL_OUTOF10: 0
PAINLEVEL_OUTOF10: 9
PAINLEVEL_OUTOF10: 5
PAINLEVEL_OUTOF10: 7
PAINLEVEL_OUTOF10: 0
PAINLEVEL_OUTOF10: 8
PAINLEVEL_OUTOF10: 9
PAINLEVEL_OUTOF10: 6
PAINLEVEL_OUTOF10: 9

## 2023-07-07 ASSESSMENT — PAIN DESCRIPTION - PAIN TYPE
TYPE: SURGICAL PAIN

## 2023-07-07 ASSESSMENT — PAIN - FUNCTIONAL ASSESSMENT
PAIN_FUNCTIONAL_ASSESSMENT: 0-10
PAIN_FUNCTIONAL_ASSESSMENT: PREVENTS OR INTERFERES SOME ACTIVE ACTIVITIES AND ADLS
PAIN_FUNCTIONAL_ASSESSMENT: PREVENTS OR INTERFERES SOME ACTIVE ACTIVITIES AND ADLS

## 2023-07-07 ASSESSMENT — PAIN DESCRIPTION - ONSET
ONSET: ON-GOING
ONSET: ON-GOING

## 2023-07-07 ASSESSMENT — ENCOUNTER SYMPTOMS: SHORTNESS OF BREATH: 1

## 2023-07-07 ASSESSMENT — PAIN DESCRIPTION - FREQUENCY
FREQUENCY: CONTINUOUS
FREQUENCY: CONTINUOUS

## 2023-07-07 NOTE — CARE COORDINATION
Discharge Planning Note:    Update:    - Patient is scheduled to have a Lap Hiatal Hernia repair today 07/07/2023.    - PT/OT pending after surgery. - Discharge plan TBD    Will continue to follow.     ELEAZAR RobertsN RN    [facility]  Phone: [phone]

## 2023-07-07 NOTE — PROGRESS NOTES
Dr. Ana María Azar to the bedside in PACU. Gtube is now to gravity drainage and irrigated with 10-20mL NS with scant output. Pt is nauseous. NG tube inserted to 58cm R nare per MD request.  This was immediately put to suction- about 200mL brown output. NG to LWS at this time, XR on the way to further verify placement. Pt appears more comfortable.

## 2023-07-07 NOTE — BRIEF OP NOTE
Brief Postoperative Note      Patient: Kimberly Duarte  YOB: 1932  MRN: [id number]    Date of Procedure: 7/7/2023    Pre-Op Diagnosis Codes:     * Hiatal hernia [K44.9]    Post-Op Diagnosis: Same and SBO       Procedure(s):  LAPAROSCOPIC HIATAL HERNIA REPAIR, NISSEN FUNDOPLICATION  EXPLORATORY LAPAROTOMY,  LYSIS OF ADHESIONS, GASTRECTOMY TUBE PLACEMENT    Surgeon(s):  Edwina Soni MD    Assistant:  First Assistant: Nilda Mitchell    Anesthesia: General    Estimated Blood Loss (mL): Minimal    Complications: None    Specimens:   * No specimens in log *    Implants:  Implant Name Type Inv.  Item Serial No.  Lot No. LRB No. Used Action   TUBE JEJUNOSTOMY FEED 18 FR 7-10 CC SECUR-ODILIA EXT ENFIT JAIR - [id number]  TUBE JEJUNOSTOMY FEED 18 FR 7-10 CC SECUR-ODILIA EXT ENFIT JAIR  [facility]- [id number] N/A 1 Implanted         Drains:   NG/OG/NJ/NE Tube 16 fr Right nostril (Active)   Surrounding Skin Clean, dry & intact 07/05/23 0813   Securement device Adhesive based mora 07/05/23 0813   Status Suction-low intermittent 07/05/23 0813   Placement Verified X-Ray (Initial) 07/05/23 0813   NG/OG/NJ/NE External Measurement (cm) 55 cm 07/05/23 0813   Drainage Appearance Clear;Brown 07/05/23 0813   Output (mL) 100 ml 07/05/23 0813       Gastrostomy/Enterostomy/Jejunostomy Tube Gastrostomy 18 fr (Active)       Urinary Catheter 07/07/23 Petersen (Active)       [REMOVED] NG/OG/NJ/NE Tube Nasogastric 16 fr Left nostril (Removed)   Surrounding Skin Clean, dry & intact 07/03/23 1219   Securement device Adhesive based mora 07/03/23 1219   Status Suction-low intermittent 07/03/23 2100   Placement Verified X-Ray (Initial) 07/03/23 2100   NG/OG/NJ/NE External Measurement (cm) 55 cm 07/03/23 2100   Drainage Appearance Bernett Ranks 07/03/23 2100   Action Taken Placement verified (comment) 07/03/23 0115       [REMOVED] External Urinary Catheter (Removed)       [REMOVED] External Urinary Catheter (Removed)   Site

## 2023-07-07 NOTE — PROGRESS NOTES
Shift assessment completed. Patient alert and oriented. Call light within reach, Morning medication given per [facility]. Pain med given. 0950Am: Patient left unit for surgery.

## 2023-07-07 NOTE — PROGRESS NOTES
Pt arrived to PACU from OR in stable condition and is alert to verbal stimuli. Pt can move extremities to command. Respirations are even on 3L O2 per NC. Skin warm, dry, and with appropriate for ethnicity color. Abd is soft and flat. Pain is tolerable at this time. Pt cleaned and dried after vomiting in the OR prior to PACU arrival.  Cheryle Palacios aware; instructed team to take pt to PACU where he would meet them. Midline abdominal surgical site intact with dressing= stapled closed, then covered with ADRIANA which is CDI and blinking green  3 additional small surgical sites intact with dressing= surgical glue; one is covered with the tape securing the new G tube  New G tube- LUQ currently clamped, OR team working to find adaptor to put it to gravity drainage bag  F/C- intact, yellow output to gravity drainage bag  Arterial line left radius correlating well with cuff pressure    Dr. Ximena Nina from anesthesia visited and assessed patient. He asks that pt be transferred to ICU due to her medical fragility when she is finished here in recovery.       S/P: laparoscopic converted to open, exploratory laparotomy, lysis of adhesions, gastrectomy tube placement with Dr. Cheryle Palacios at [facility].      [address] [facility], RN  Pre-Op/Recovery   Same Day Surgery

## 2023-07-07 NOTE — ANESTHESIA PROCEDURE NOTES
Arterial Line:    An arterial line was placed using surface landmarks, in the OR for the following indication(s): continuous blood pressure monitoring and blood sampling needed. A 20 gauge (size), 1 and 3/8 inch (length), Angiocath (type) catheter was placed, Seldinger technique not used, into the left radial artery, secured by tape and Tegaderm. Anesthesia type: Local    Events:  patient tolerated procedure well with no complications and EBL < 5mL. 7/7/2023 10:10 AM7/7/2023 10:15 AM  Anesthesiologist: Nicole Puga MD  Performed: Anesthesiologist   Preanesthetic Checklist  Completed: patient identified, IV checked, site marked, risks and benefits discussed, surgical/procedural consents, equipment checked, pre-op evaluation, timeout performed, anesthesia consent given, oxygen available and monitors applied/VS acknowledged

## 2023-07-07 NOTE — PROGRESS NOTES
Shilpi Fournier MD    Hospitalist Progress Note      Name:  Mary Lynch /Age/Sex: 10/16/1932  (80 y.o. female)   MRN & CSN:  [id number] & [id number] Admission Date/Time: 2023  6:49 PM   Location:  [id number] PCP: DENIZ Orellana - CNP       I saw and examined the patient on 2023 at 6:36 PM.    Hospital Day: 6  Barriers to discharge: Gastric volvulus. Patient Active Problem List   Diagnosis    Spinal stenosis of thoracic region    Disc displacement, lumbar    Disc displacement, thoracic    Scoliosis    Hypoxia    General weakness    Moderate malnutrition ([facility])    CAD (coronary artery disease)    Hyperlipidemia    GERD (gastroesophageal reflux disease)    CHF (congestive heart failure) ([facility])    Suspected COVID-19 virus infection    Acute systolic heart failure ([facility])    COVID-19    Chronic diastolic heart failure ([facility])    Nausea and vomiting    CORNELL (dyspnea on exertion)    Acute gastric volvulus    Aortic valve stenosis    Hiatal hernia with obstruction but no gangrene    SBO (small bowel obstruction) ([facility])          Assessment and Plan:   80year-old admitted with abdominal pain, distention, nausea and vomiting, found to have gastric volvulus, NG tube was placed for decompression, NG tube discontinued, on clear liquids, plan for hernia repair tomorrow, cardiology clearance appreciated. Complex Hiatus hernia with intermittent gastric volvulus s/p laparoscopic hiatus hernia repair, nissen fundoplication, exploratory laparotomy, lysis of adhesions, gastrostomy tube placement, incisional pain. NG tube in place to suction. Chronic diastolic heart failure, with compensatory, last Echo showed LVEF 65%, cardiology clearance for surgery appreciated. Elevated troponin, chronically elevated, cardiology consulted by surgery. GERD with large hiatal hernia, PPI. Abdominal pain, chronic narcotic dependence, started on Percocet, prn dilaudid.           Subjective:     Seen and examined,

## 2023-07-07 NOTE — ANESTHESIA POSTPROCEDURE EVALUATION
Department of Anesthesiology  Postprocedure Note    Patient: Rosenda Flores  MRN: [id number]  YOB: 1932  Date of evaluation: 7/7/2023      Procedure Summary     Date: 07/07/23 Room / Location: [address]    Anesthesia Start: 1054 Anesthesia Stop: 8393    Procedure: LAPAROSCOPIC CONVERTED TO OPEN, EXPLORATORY LAPAROTOMY,  LYSIS OF ADHESIONS, GASTRECTOMY TUBE PLACEMENT (Abdomen) Diagnosis:       Hiatal hernia      (Hiatal hernia [K44.9])    Surgeons: John Robbins MD Responsible Provider: Ellie Anderson MD    Anesthesia Type: general ASA Status: 3          Anesthesia Type: No value filed.     Jorge Phase I: Jorge Score: 10    Jorge Phase II:        Anesthesia Post Evaluation    Patient location during evaluation: PACU  Patient participation: complete - patient participated  Level of consciousness: awake and alert  Pain score: 2  Airway patency: patent  Nausea & Vomiting: no vomiting  Complications: no  Cardiovascular status: blood pressure returned to baseline  Respiratory status: acceptable  Hydration status: euvolemic  Multimodal analgesia pain management approach

## 2023-07-07 NOTE — PLAN OF CARE
Problem: Discharge Planning  Goal: Discharge to home or other facility with appropriate resources  7/6/2023 2238 by Kylie Kang RN  Outcome: Progressing  7/6/2023 0929 by Jenny Alvarez RN  Outcome: Progressing     Problem: Chronic Conditions and Co-morbidities  Goal: Patient's chronic conditions and co-morbidity symptoms are monitored and maintained or improved  7/6/2023 2238 by Kylie Kang RN  Outcome: Progressing  7/6/2023 0929 by Jenny Alvarez RN  Outcome: Progressing     Problem: Skin/Tissue Integrity  Goal: Absence of new skin breakdown  Description: 1. Monitor for areas of redness and/or skin breakdown  2. Assess vascular access sites hourly  3. Every 4-6 hours minimum:  Change oxygen saturation probe site  4. Every 4-6 hours:  If on nasal continuous positive airway pressure, respiratory therapy assess nares and determine need for appliance change or resting period.   7/6/2023 2238 by Kylie Kang RN  Outcome: Progressing  7/6/2023 0929 by Jenny Alvarez RN  Outcome: Progressing     Problem: ABCDS Injury Assessment  Goal: Absence of physical injury  7/6/2023 2238 by Kylie Kang RN  Outcome: Progressing  7/6/2023 0929 by Jenny Alvarez RN  Outcome: Progressing     Problem: Safety - Adult  Goal: Free from fall injury  7/6/2023 2238 by Kylie Kang RN  Outcome: Progressing  7/6/2023 0929 by Jenny Alvarez RN  Outcome: Progressing     Problem: Pain  Goal: Verbalizes/displays adequate comfort level or baseline comfort level  7/6/2023 2238 by Kylie Kang RN  Outcome: Progressing  7/6/2023 0929 by Jenny Alvarez RN  Outcome: Progressing

## 2023-07-07 NOTE — PROGRESS NOTES
Physical/Occupational Therapy  PT/OT orders received and appreciated. Pt has sx scheduled today at 12PM. Will plan to see pt as schedule permits following sx.     Thank you,   Caridad Peters, OTR/L, [id number]   Claudene Brace, [address] [place] [address], T [id number]

## 2023-07-07 NOTE — ANESTHESIA PRE PROCEDURE
07/06/2023 05:30 AM    CO2 24 07/06/2023 05:30 AM    BUN 29 07/06/2023 05:30 AM    CREATININE 1.0 07/06/2023 05:30 AM    GFRAA 57 02/09/2022 01:45 PM    GFRAA 56 01/10/2010 08:07 PM    AGRATIO 1.5 07/03/2023 04:29 AM    LABGLOM 53 07/06/2023 05:30 AM    GLUCOSE 108 07/06/2023 05:30 AM    PROT 7.0 07/03/2023 04:29 AM    PROT 6.1 01/13/2010 07:51 PM    CALCIUM 9.3 07/06/2023 05:30 AM    BILITOT 0.5 07/03/2023 04:29 AM    ALKPHOS 66 07/03/2023 04:29 AM    AST 27 07/03/2023 04:29 AM    ALT 16 07/03/2023 04:29 AM       POC Tests:   Recent Labs     07/06/23  0709   POCGLU 91       Coags:   Lab Results   Component Value Date/Time    PROTIME 13.2 07/03/2023 04:29 AM    INR 1.00 07/03/2023 04:29 AM    APTT 39.3 07/03/2023 04:29 AM       HCG (If Applicable): No results found for: PREGTESTUR, PREGSERUM, HCG, HCGQUANT     ABGs: No results found for: PHART, PO2ART, OGQ1JLW, JRW0IBJ, BEART, H7NOHVIC     Type & Screen (If Applicable):  No results found for: LABABO, LABRH    Drug/Infectious Status (If Applicable):  No results found for: HIV, HEPCAB    COVID-19 Screening (If Applicable):   Lab Results   Component Value Date/Time    COVID19 Detected 01/12/2022 12:32 PM           Anesthesia Evaluation  Patient summary reviewed and Nursing notes reviewed no history of anesthetic complications:   Airway: Mallampati: II  TM distance: >3 FB   Neck ROM: full  Mouth opening: > = 3 FB   Dental:    (+) upper dentures and lower dentures      Pulmonary:       (-) asthma and wheezes                           Cardiovascular:    (+) valvular problems/murmurs (MVP):, CAD:, dysrhythmias (AV block):, CHF:, CORNELL:, murmur,       ECG reviewed                     ROS comment:  Summary   -Covid+   Normal left ventricle size, wall thickness, and systolic function with an   estimated ejection fraction of 65%. No regional wall motion abnormalities are seen. Normal diastolic filling pattern for age.    Mild aortic stenosis with a peak velocity of
age.   Mild aortic stenosis with a peak velocity of 2.65 m/s with a mean pressure   gradient of 17 mmHg. There is moderate aortic insufficiency. Severe left atrial enlargement noted. There is mild mitral regurgitation. There is mild to moderate tricuspid regurgitation with a RVSP estimation of   25 mmHg. Neuro/Psych:   (+) neuromuscular disease:,    (-) TIA and CVA            ROS comment: [place] GI/Hepatic/Renal:   (+) hiatal hernia, GERD: well controlled, hepatitis: A, liver disease:,           Endo/Other:    (+) DiabetesType II DM, well controlled, , malignancy/cancer. Abdominal:             Vascular: Other Findings:             Anesthesia Plan      general     ASA 3       Induction: intravenous. MIPS: Prophylactic antiemetics administered. Anesthetic plan and risks discussed with patient. Plan discussed with CRNA.           Post-op pain plan if not by surgeon: single peripheral nerve block            Kedar Villarreal MD   7/7/2023

## 2023-07-08 LAB
ANION GAP SERPL CALCULATED.3IONS-SCNC: 19 MMOL/L (ref 3–16)
BASOPHILS # BLD: 0 K/UL (ref 0–0.2)
BASOPHILS NFR BLD: 0.2 %
BUN SERPL-MCNC: 35 MG/DL (ref 7–20)
CALCIUM SERPL-MCNC: 8.3 MG/DL (ref 8.3–10.6)
CHLORIDE SERPL-SCNC: 104 MMOL/L (ref 99–110)
CO2 SERPL-SCNC: 21 MMOL/L (ref 21–32)
CREAT SERPL-MCNC: 1.3 MG/DL (ref 0.6–1.2)
DEPRECATED RDW RBC AUTO: 14 % (ref 12.4–15.4)
EOSINOPHIL # BLD: 0 K/UL (ref 0–0.6)
EOSINOPHIL NFR BLD: 0 %
GFR SERPLBLD CREATININE-BSD FMLA CKD-EPI: 39 ML/MIN/{1.73_M2}
GLUCOSE BLD-MCNC: 143 MG/DL (ref 70–99)
GLUCOSE SERPL-MCNC: 160 MG/DL (ref 70–99)
HCT VFR BLD AUTO: 36.5 % (ref 36–48)
HGB BLD-MCNC: 11.7 G/DL (ref 12–16)
LYMPHOCYTES # BLD: 1.1 K/UL (ref 1–5.1)
LYMPHOCYTES NFR BLD: 14 %
MAGNESIUM SERPL-MCNC: 2.1 MG/DL (ref 1.8–2.4)
MCH RBC QN AUTO: 30.5 PG (ref 26–34)
MCHC RBC AUTO-ENTMCNC: 32.2 G/DL (ref 31–36)
MCV RBC AUTO: 94.8 FL (ref 80–100)
MONOCYTES # BLD: 0.5 K/UL (ref 0–1.3)
MONOCYTES NFR BLD: 6.6 %
NEUTROPHILS # BLD: 6.3 K/UL (ref 1.7–7.7)
NEUTROPHILS NFR BLD: 79.2 %
PERFORMED ON: ABNORMAL
PHOSPHATE SERPL-MCNC: 3.8 MG/DL (ref 2.5–4.9)
PLATELET # BLD AUTO: 160 K/UL (ref 135–450)
PMV BLD AUTO: 7.8 FL (ref 5–10.5)
POTASSIUM SERPL-SCNC: 4.6 MMOL/L (ref 3.5–5.1)
RBC # BLD AUTO: 3.85 M/UL (ref 4–5.2)
SODIUM SERPL-SCNC: 144 MMOL/L (ref 136–145)
WBC # BLD AUTO: 8 K/UL (ref 4–11)

## 2023-07-08 PROCEDURE — 02HV33Z INSERTION OF INFUSION DEVICE INTO SUPERIOR VENA CAVA, PERCUTANEOUS APPROACH: ICD-10-PCS | Performed by: INTERNAL MEDICINE

## 2023-07-08 PROCEDURE — 2580000003 HC RX 258: Performed by: NURSE PRACTITIONER

## 2023-07-08 PROCEDURE — 36569 INSJ PICC 5 YR+ W/O IMAGING: CPT

## 2023-07-08 PROCEDURE — 2580000003 HC RX 258: Performed by: INTERNAL MEDICINE

## 2023-07-08 PROCEDURE — 2700000000 HC OXYGEN THERAPY PER DAY

## 2023-07-08 PROCEDURE — 84100 ASSAY OF PHOSPHORUS: CPT

## 2023-07-08 PROCEDURE — 2580000003 HC RX 258: Performed by: SURGERY

## 2023-07-08 PROCEDURE — 2000000000 HC ICU R&B

## 2023-07-08 PROCEDURE — 80048 BASIC METABOLIC PNL TOTAL CA: CPT

## 2023-07-08 PROCEDURE — 6360000002 HC RX W HCPCS: Performed by: SURGERY

## 2023-07-08 PROCEDURE — 94760 N-INVAS EAR/PLS OXIMETRY 1: CPT

## 2023-07-08 PROCEDURE — 97530 THERAPEUTIC ACTIVITIES: CPT

## 2023-07-08 PROCEDURE — 97167 OT EVAL HIGH COMPLEX 60 MIN: CPT

## 2023-07-08 PROCEDURE — C1751 CATH, INF, PER/CENT/MIDLINE: HCPCS

## 2023-07-08 PROCEDURE — 36415 COLL VENOUS BLD VENIPUNCTURE: CPT

## 2023-07-08 PROCEDURE — C9113 INJ PANTOPRAZOLE SODIUM, VIA: HCPCS | Performed by: SURGERY

## 2023-07-08 PROCEDURE — 83735 ASSAY OF MAGNESIUM: CPT

## 2023-07-08 PROCEDURE — 2500000003 HC RX 250 WO HCPCS: Performed by: SURGERY

## 2023-07-08 PROCEDURE — 97162 PT EVAL MOD COMPLEX 30 MIN: CPT

## 2023-07-08 PROCEDURE — 99024 POSTOP FOLLOW-UP VISIT: CPT | Performed by: SURGERY

## 2023-07-08 PROCEDURE — 85025 COMPLETE CBC W/AUTO DIFF WBC: CPT

## 2023-07-08 RX ORDER — 0.9 % SODIUM CHLORIDE 0.9 %
500 INTRAVENOUS SOLUTION INTRAVENOUS ONCE
Status: COMPLETED | OUTPATIENT
Start: 2023-07-08 | End: 2023-07-08

## 2023-07-08 RX ORDER — SODIUM CHLORIDE, SODIUM LACTATE, POTASSIUM CHLORIDE, AND CALCIUM CHLORIDE .6; .31; .03; .02 G/100ML; G/100ML; G/100ML; G/100ML
500 INJECTION, SOLUTION INTRAVENOUS ONCE
Status: COMPLETED | OUTPATIENT
Start: 2023-07-08 | End: 2023-07-08

## 2023-07-08 RX ORDER — INSULIN LISPRO 100 [IU]/ML
0-8 INJECTION, SOLUTION INTRAVENOUS; SUBCUTANEOUS EVERY 6 HOURS
Status: DISCONTINUED | OUTPATIENT
Start: 2023-07-08 | End: 2023-07-09

## 2023-07-08 RX ORDER — LEVOFLOXACIN 5 MG/ML
750 INJECTION, SOLUTION INTRAVENOUS
Status: DISCONTINUED | OUTPATIENT
Start: 2023-07-08 | End: 2023-07-12

## 2023-07-08 RX ADMIN — HYDROMORPHONE HYDROCHLORIDE 0.5 MG: 1 INJECTION, SOLUTION INTRAMUSCULAR; INTRAVENOUS; SUBCUTANEOUS at 10:01

## 2023-07-08 RX ADMIN — HYDROMORPHONE HYDROCHLORIDE 1 MG: 1 INJECTION, SOLUTION INTRAMUSCULAR; INTRAVENOUS; SUBCUTANEOUS at 01:10

## 2023-07-08 RX ADMIN — METOCLOPRAMIDE 5 MG: 5 INJECTION, SOLUTION INTRAMUSCULAR; INTRAVENOUS at 11:32

## 2023-07-08 RX ADMIN — HYDROMORPHONE HYDROCHLORIDE 0.5 MG: 1 INJECTION, SOLUTION INTRAMUSCULAR; INTRAVENOUS; SUBCUTANEOUS at 18:04

## 2023-07-08 RX ADMIN — Medication 10 ML: at 08:22

## 2023-07-08 RX ADMIN — SODIUM CHLORIDE 500 ML: 9 INJECTION, SOLUTION INTRAVENOUS at 11:32

## 2023-07-08 RX ADMIN — Medication 10 ML: at 21:00

## 2023-07-08 RX ADMIN — SODIUM CHLORIDE, PRESERVATIVE FREE 10 ML: 5 INJECTION INTRAVENOUS at 20:40

## 2023-07-08 RX ADMIN — Medication 10 ML: at 08:23

## 2023-07-08 RX ADMIN — SODIUM CHLORIDE 500 ML: 9 INJECTION, SOLUTION INTRAVENOUS at 06:55

## 2023-07-08 RX ADMIN — ONDANSETRON 4 MG: 2 INJECTION INTRAMUSCULAR; INTRAVENOUS at 02:12

## 2023-07-08 RX ADMIN — ENOXAPARIN SODIUM 30 MG: 100 INJECTION SUBCUTANEOUS at 20:40

## 2023-07-08 RX ADMIN — ONDANSETRON 4 MG: 2 INJECTION INTRAMUSCULAR; INTRAVENOUS at 13:53

## 2023-07-08 RX ADMIN — METOCLOPRAMIDE 5 MG: 5 INJECTION, SOLUTION INTRAMUSCULAR; INTRAVENOUS at 05:22

## 2023-07-08 RX ADMIN — LEVOFLOXACIN 750 MG: 5 INJECTION, SOLUTION INTRAVENOUS at 17:16

## 2023-07-08 RX ADMIN — HYDROMORPHONE HYDROCHLORIDE 1 MG: 1 INJECTION, SOLUTION INTRAMUSCULAR; INTRAVENOUS; SUBCUTANEOUS at 05:27

## 2023-07-08 RX ADMIN — HYDROMORPHONE HYDROCHLORIDE 0.5 MG: 1 INJECTION, SOLUTION INTRAMUSCULAR; INTRAVENOUS; SUBCUTANEOUS at 21:18

## 2023-07-08 RX ADMIN — LEUCINE, PHENYLALANINE, LYSINE, METHIONINE, ISOLEUCINE, VALINE, HISTIDINE, THREONINE, TRYPTOPHAN, ALANINE, GLYCINE, ARGININE, PROLINE, SERINE, TYROSINE, SODIUM ACETATE, DIBASIC POTASSIUM PHOSPHATE, MAGNESIUM CHLORIDE, SODIUM CHLORIDE, CALCIUM CHLORIDE, DEXTROSE
365; 280; 290; 200; 300; 290; 240; 210; 90; 1035; 515; 575; 340; 250; 20; 340; 261; 51; 59; 33; 20 INJECTION INTRAVENOUS at 18:40

## 2023-07-08 RX ADMIN — SODIUM CHLORIDE, PRESERVATIVE FREE 10 ML: 5 INJECTION INTRAVENOUS at 08:21

## 2023-07-08 RX ADMIN — HYDROMORPHONE HYDROCHLORIDE 0.5 MG: 1 INJECTION, SOLUTION INTRAMUSCULAR; INTRAVENOUS; SUBCUTANEOUS at 13:47

## 2023-07-08 RX ADMIN — METOCLOPRAMIDE 5 MG: 5 INJECTION, SOLUTION INTRAMUSCULAR; INTRAVENOUS at 17:17

## 2023-07-08 RX ADMIN — SODIUM CHLORIDE, POTASSIUM CHLORIDE, SODIUM LACTATE AND CALCIUM CHLORIDE 500 ML: 600; 310; 30; 20 INJECTION, SOLUTION INTRAVENOUS at 02:45

## 2023-07-08 RX ADMIN — PANTOPRAZOLE SODIUM 40 MG: 40 INJECTION, POWDER, FOR SOLUTION INTRAVENOUS at 08:22

## 2023-07-08 RX ADMIN — PANTOPRAZOLE SODIUM 40 MG: 40 INJECTION, POWDER, FOR SOLUTION INTRAVENOUS at 20:40

## 2023-07-08 ASSESSMENT — PAIN SCALES - GENERAL
PAINLEVEL_OUTOF10: 4
PAINLEVEL_OUTOF10: 6
PAINLEVEL_OUTOF10: 8
PAINLEVEL_OUTOF10: 6
PAINLEVEL_OUTOF10: 6
PAINLEVEL_OUTOF10: 9
PAINLEVEL_OUTOF10: 8
PAINLEVEL_OUTOF10: 0
PAINLEVEL_OUTOF10: 0
PAINLEVEL_OUTOF10: 6
PAINLEVEL_OUTOF10: 5
PAINLEVEL_OUTOF10: 8
PAINLEVEL_OUTOF10: 7

## 2023-07-08 ASSESSMENT — PAIN DESCRIPTION - LOCATION
LOCATION: ABDOMEN

## 2023-07-08 ASSESSMENT — PAIN DESCRIPTION - DESCRIPTORS
DESCRIPTORS: SHARP
DESCRIPTORS: ACHING
DESCRIPTORS: SHARP
DESCRIPTORS: ACHING

## 2023-07-08 ASSESSMENT — PAIN - FUNCTIONAL ASSESSMENT
PAIN_FUNCTIONAL_ASSESSMENT: PREVENTS OR INTERFERES SOME ACTIVE ACTIVITIES AND ADLS
PAIN_FUNCTIONAL_ASSESSMENT: ACTIVITIES ARE NOT PREVENTED
PAIN_FUNCTIONAL_ASSESSMENT: PREVENTS OR INTERFERES SOME ACTIVE ACTIVITIES AND ADLS
PAIN_FUNCTIONAL_ASSESSMENT: ACTIVITIES ARE NOT PREVENTED

## 2023-07-08 ASSESSMENT — PAIN DESCRIPTION - ORIENTATION
ORIENTATION: RIGHT;MID
ORIENTATION: MID
ORIENTATION: MID;RIGHT
ORIENTATION: MID

## 2023-07-08 ASSESSMENT — PAIN DESCRIPTION - FREQUENCY
FREQUENCY: CONTINUOUS

## 2023-07-08 ASSESSMENT — PAIN DESCRIPTION - PAIN TYPE
TYPE: SURGICAL PAIN

## 2023-07-08 ASSESSMENT — PAIN DESCRIPTION - ONSET
ONSET: ON-GOING

## 2023-07-08 NOTE — PROGRESS NOTES
assistance  Grooming Comments: oral care performed with use of mouth swab  Toileting: dependent. Toileting Comments: posadas catheter present, pt did not express needs for toileting  Comments: Daughter applied lotion to back and to B feet, pt declined further ADL   Instrumental Activities of Daily Living  No IADL completed on this date. Functional Mobility  Bed Mobility:  Supine to Sit: 2 person assistance with mod A x2   Sit to Supine: 2 person assistance with mod A x2   Rolling Left: minimal assistance  Rolling Right: minimal assistance  Scooting: contact guard assistance, dependent of 2 to scoot towards HOB  Comments:  Transfers:  Sit to stand transfer:2 person assistance with min A x2 from EOB level progressing to mod A x2 from recliner chair level   Stand to sit transfer: 2 person assistance with min A x2 progressing to mod A x2 from recliner chair level   Bed / Chair transfer: 2 person assistance with min A x2 EOB > recliner chair, mod  A x2 from recliner chair > EOB . Bed / Chair equipment: rolling walker  Bed / Chair comments: max verbal and physical cues for safe RW proximity and management during transitions. Comments: increased time and effort to perform all transitional movements. Functional Mobility  No functional mobility completed on this date secondary to the above transfers performed only.   Balance:  Static Sitting Balance: fair (-): maintains balance at CGA with use of UE support  Dynamic Sitting Balance: fair (-): maintains balance at CGA with use of UE support  Static Standing Balance: poor (-): requires max (A) to maintain balance  Dynamic Standing Balance: poor (-): requires max (A) to maintain balance  Comments:    Other Therapeutic Interventions    Functional Outcomes  AM-PAC Inpatient Daily Activity Raw Score: 9    Cognition  WFL, difficult to assess secondary to [facility]  Orientation:    alert and oriented x 4  Command Following:   [facility]     Education  Barriers To Learning: complete upper body ADL at minimal assistance   Patient will complete lower body ADL at minimal assistance   Patient will complete toileting at minimal assistance   Patient will complete grooming at stand by assistance   Patient will complete functional transfers at moderate assistance   Patient will increase functional standing balance to moderate assist for improved ADL completion  Patient will complete bed mobility at moderate assistance      Above goals reviewed on 7/8/2023. All goals are ongoing at this time unless indicated above.      Therapy Session Time     Individual Group Co-treatment   Time In    1356   Time Out    1449   Minutes    53        Timed Code Treatment Minutes:   38 Minutes  Total Treatment Minutes: 48 Minutes     Electronically Signed By: Karen Cash OT

## 2023-07-08 NOTE — PROGRESS NOTES
PT BP is very low, 80/36 with a map of 50 from the Artline and 89/57 with a map of 68 from the blood pressure cuff. Perfectserved Aayush Yuan NP.  Waiting for a response

## 2023-07-08 NOTE — CONSULTS
Clinical Pharmacy Note    Pharmacy consulted by Dr. Smitha Yap to manage TPN    Current TPN rate: 0 ml/hr  Goal TPN rate: 70 ml/hr    Access: PICC  Indication: ex lap    Labs:  General Labs:  BMP:    Lab Results   Component Value Date/Time     07/08/2023 03:10 AM    K 4.6 07/08/2023 03:10 AM    K 4.7 07/02/2023 07:24 PM     07/08/2023 03:10 AM    CO2 21 07/08/2023 03:10 AM    BUN 35 07/08/2023 03:10 AM    LABALBU 4.2 07/03/2023 04:29 AM    CREATININE 1.3 07/08/2023 03:10 AM    CALCIUM 8.3 07/08/2023 03:10 AM    GFRAA 57 02/09/2022 01:45 PM    GFRAA 56 01/10/2010 08:07 PM    LABGLOM 39 07/08/2023 03:10 AM    GLUCOSE 160 07/08/2023 03:10 AM     Magnesium:    Lab Results   Component Value Date/Time    MG 2.10 07/08/2023 03:10 AM     Phosphorus:    Lab Results   Component Value Date/Time    PHOS 3.8 07/08/2023 03:10 AM       Electrolyte replacement as follows:   None needed    Blood sugars over past 24 hours: 123-160    Blood sugar management:  Start medium dose sliding scale (Humalog) insulin q6h    Plan to initiate TPN at 30 ml/hr. Thank you for allowing pharmacy to participate in the care of this patient.     Alana EspositoD, [facility]  [id number]  7/8/2023 11:18 AM

## 2023-07-08 NOTE — PROGRESS NOTES
Order for PICC line per Dr. Serjio Wyatt MD. Pre procedure and timeout done with pt's RN. Successful insertion of a Double lumen PICC line into pt's right basilic vein. No issues gaining access or advancing guidewire/introducer/PICC line. PICC tip terminates in the SVC according to Sherlock 3CG tip confirmation system. PICC was seen dropping into SVC with tip tracking technology and discernable peaked p waves were noted without negative deflection. A printout will be in pt's chart. PICC is cut at 40 cm and out externally 0 cm. All  lumens flush without resistance and draw back brisk blood return. PICC site CDI with hemostasis maintained and a bio dressing applied to site. Pt instructed to stay in bed and keep arm flat and still for 30 minutes  to promote hemostasis.      Vero Verma RN given handoff report

## 2023-07-08 NOTE — PROGRESS NOTES
Nutrition Note    RECOMMENDATIONS  PO Diet: NPO  ONS: NPO  Nutrition Support:     Bag 1: Clinimix 5/20 starting at 30 mL/hr  Physician/LIP to monitor closely and correct lytes (Phos,Mg,K+) d/t risk of refeeding syndrome  Bag 2: As long as electrolytes WNL, advance Clinimix 5/20 to goal rate of 70 mL/hr  Recommend 250 mL 20% lipids 2 times per week  Recommend FSBS, monitor glucose, need for insulin  Pharmacy to adjust MVI and Trace Elements as needed     NUTRITION ASSESSMENT   Consult order for TPN received. Pt was admitted with abdominal pain, distention, nausea and vomiting, found to have gastric volvulus, NG tube was placed for decompression. Complex Hiatus hernia with intermittent gastric volvulus s/p laparoscopic hiatus hernia repair. G tube drains was in placed. Wt hx review indicates 11lb (7.7%) wt loss x 6 days . Insufficent date for po intake and wt prior to admission. Pt is at risk for refeeding d/t on NPO/ Clear liquid x 5 days and wt loss during admission. Current Lytes are WNL (K+ 4.6, Mg2+: 2.1, Phos 3.8). Will initiate TPN at 30ml/hr and monitor for tolerance of TPN and lytes. Nutrition Related Findings: I/O: -6.1L. 0.9 NS via IV at 75ml/hr. LBM 7/1. GI: cramping/nausea, emesis: brown. No edema noted. Wounds: Surgical Incision  Nutrition Education:  Education not indicated   Nutrition Goals: Tolerate nutrition support at goal rate, by next RD assessment     MALNUTRITION ASSESSMENT   Acute Illness  Malnutrition Status:  At risk for malnutrition (Comment)  Findings of the 6 clinical characteristics of malnutrition:  Energy Intake:  50% or less of estimated energy requirements for 5 or more days  Weight Loss:  1% to 2% over 1 week     Body Fat Loss:  No significant body fat loss     Muscle Mass Loss:  No significant muscle mass loss    Fluid Accumulation:  No significant fluid accumulation     Strength:  Not Performed      NUTRITION DIAGNOSIS   Inadequate oral intake related to altered GI

## 2023-07-08 NOTE — PROGRESS NOTES
Physical/Occupational Therapy  Paresh Reddy    Attempted to see pt for PT/OT evaluation. Discussed with nursing, requesting therapy to attempt later this date. Will hold therapy at this time and will follow up with pt as schedule permits.  Thanks, Tia Perkins, PT, DPT [id number], Hilbert Goldberg, OTR/L- [id number]

## 2023-07-08 NOTE — PROGRESS NOTES
Hospitalist Progress Note  Yvon Kam MD      Name:  Kimberly Duarte /Age/Sex: 10/16/1932  (80 y.o. female)   MRN & CSN:  [id number] & [id number] Admission Date/Time: 2023  6:49 PM   Location:  [place] [id number] PCP: David Alford Day: 7    Assessment and Plan:   Kimberly Duarte is a 80-year-old admitted with abdominal pain, distention, nausea and vomiting, found to have gastric volvulus, NG tube was placed for decompression, NG tube discontinued, on clear liquids, plan for hernia repair tomorrow, cardiology clearance appreciated. # Complex Hiatus hernia with intermittent gastric volvulus s/p laparoscopic hiatus hernia repair, nissen fundoplication, exploratory laparotomy, lysis of adhesions, gastrostomy tube placement, incisional pain. NG tube in place to suction. PICC line was placed plan noted for TPN to be started today, continue supplemental oxygen, continue to wean, recommend to be out of bed, continue PT OT, continue perioperative antibiotics appreciate general surgery input    # Chronic diastolic heart failure, with compensatory, last Echo showed LVEF 65%, cardiology clearance for surgery appreciated. #Elevated troponin, chronically elevated, cardiology consulted by surgery. # GERD with large hiatal hernia, PPI. # Abdominal pain, chronic narcotic dependence, started on Percocet, prn dilaudid. Diet Diet NPO   DVT Prophylaxis Lovenox    Code Status DNR-CCA   Disposition To snf IN 2-3 DAYS      Subjective:     Patient was seen and examined at bedside, patient had a PICC line placed this morning, plan noted for TPN, continues to be on supplemental oxygen, has NG tube in place, Petersen catheter in place, daughter at bedside, she is afebrile, more awake today    Ten point ROS reviewed negative, unless as noted above    Objective:        Intake/Output Summary (Last 24 hours) at 2023 0950  Last data filed at 2023 0400  Gross per 24 hour

## 2023-07-08 NOTE — PROGRESS NOTES
Pt BP after the 500 ml of bolus given is still low. current BP is 85/36 with a map of 57 from the Artline, 108/58 with a map of 74 from the BP cuff. Perfectserved hospitalist if they can put in more fluid bolus for the pt? pt is currently NPO. Early morning shows that pt has a hgb of 11.7. Pt is not showing any signs of confusion or discomfort.

## 2023-07-08 NOTE — PROGRESS NOTES
Shift assessment completed ( See Flow sheets for details. Pt responds to voice, able to follow simple commands. Vitals stable. Pt moaning on pain, prn pain medications administered. Surgical sites clean, dry and intact. NG intact and on low intermittent suction. Turned and repositioned pt. All safety measures stay active.

## 2023-07-08 NOTE — PROGRESS NOTES
[facility] Department   Phone: [phone]    Physical Therapy    [x] Initial Evaluation            [] Daily Treatment Note         [] Discharge Summary      Patient: Johnathan Acevedo   : 10/16/1932   MRN: [id number]   Date of Service:  2023  Admitting Diagnosis: Acute gastric volvulus  Current Admission Summary: 80 y.o. female status post ex lap, extensive lysis of adhesions, GT placement  Past Medical History:  has a past medical history of Arthritis, CAD (coronary artery disease), Cancer ([address]), Cystocele, Diabetes mellitus ([address]), Hepatitis A, History of blood transfusion, Hyperlipidemia, PVC (premature ventricular contraction), Rectocele, Reflux, and Scoliosis. Past Surgical History:  has a past surgical history that includes Appendectomy; joint replacement (Left); hernia repair (6 YRS AGO); Cholecystectomy; shoulder surgery (Right, 12); Bunionectomy (12); Breast surgery; Upper gastrointestinal endoscopy (12); Hysterectomy; bladder suspension; Dilation and curettage of uterus; other surgical history (Left, 14); Foot surgery; Cardiac catheterization; Colonoscopy (); Cystocopy (2017); other surgical history (Right, 2018); Cataract removal with implant (Left, 2018); pr xcapsl ctrc rmvl insj io lens prosth w/o ecp (Left, 2018); Lumbar spine surgery (Right, 5/15/2019); and Upper gastrointestinal endoscopy (N/A, 7/3/2023). Discharge Recommendations: Johnathan Acevedo scored a 9/24 on the AM-PAC short mobility form. Current research shows that an AM-PAC score of 17 or less is typically not associated with a discharge to the patient's home setting. Based on the patient's AM-PAC score and their current functional mobility deficits, it is recommended that the patient have 5-7 sessions per week of Physical Therapy at d/c to increase the patient's independence.   At this time, this patient demonstrates complex nursing, medical, play games on her tablet, Scrabble   Recent Falls: ~1-2 years fall possible a year ago. Examination   Vision:   Vision Gross Assessment: Impaired and Vision Corrective Device: wears glasses for reading  Hearing:   hard of hearing, left hearing aid, right hearing aid  Observation:   G-tube, NG, posadas, janet, PICC line  Redness noted on thoracic spine due to kyphosis - pt and daughter refusing mepilex. Nursing alerted. Posture:   Very kyphotic, scoliosis     ROM:   (B) LE AROM WFL  Strength:   (B) LE strength grossly WFL  Therapist Clinical Decision Making (Complexity): medium complexity  Clinical Presentation: evolving      Subjective  General: Supine in bed upon arrival. Agreeable to therapy with encouragement. Reporting increased pain and nausea following transfer to chair. Requesting to get back into bed. Pain: Patient does not rate upon questioning  Pain Interventions: RN notified       Functional Mobility  Bed Mobility:  Supine to Sit: 2 person assistance with mod A of 2 with log roll   Sit to Supine: 2 person assistance with mod A of 2 with log roll   Rolling Left: minimal assistance  Rolling Right: minimal assistance  Comments:  Transfers:  Sit to stand transfer: 2 person assistance with min A of 2 from bed, mod A of 2 from chair   Stand to sit transfer: moderate assistance  Comments: refusing gait belt  Ambulation:  Surface:level surface  Assistive Device: rolling walker  Other Appliance: supplemental O2  Assistance: 2 person assistance with min A of 2 from bed to chair, mod A of 2 from chair to bed   Distance: 2-3'  Gait Mechanics: shuffling gait, flexed posture, assist for walker management  Comments:    Stair Mobility:  Stair mobility not completed on this date.   Comments:    Balance:  Static Sitting Balance: fair (+): maintains balance at SBA/supervision without use of UE support  Dynamic Sitting Balance: fair (+): maintains balance at SBA/supervision without use of UE support  Static Standing

## 2023-07-08 NOTE — PROGRESS NOTES
Clamped pt NG tube @1120, pt G tube had 0 ml drain. Pt NG unclamped and to low intermittent suction. Left message to Dr. Strange Boards office .

## 2023-07-08 NOTE — PROGRESS NOTES
Pt admitted to ICU room #8 from OR. Pt responds to voice. Complaining of pain when moved or touched. Prn pain medication administered as ordered. Vitals stable. Surgical sites, ADRIANA dressing, midline incision  and G tube drains intact. NG tube on right intact and currently on low intermittent suction. Art line intact. Family bedside. Updated with the plan. Turned and repositioned pt. All safety measures stay active.

## 2023-07-08 NOTE — PLAN OF CARE
Problem: Discharge Planning  Goal: Discharge to home or other facility with appropriate resources  Recent Flowsheet Documentation  Taken 7/7/2023 2000 by Aditya Devi  Discharge to home or other facility with appropriate resources:   Identify barriers to discharge with patient and caregiver   Arrange for needed discharge resources and transportation as appropriate   Identify discharge learning needs (meds, wound care, etc)   Arrange for interpreters to assist at discharge as needed   Refer to discharge planning if patient needs post-hospital services based on physician order or complex needs related to functional status, cognitive ability or social support system     Problem: Chronic Conditions and Co-morbidities  Goal: Patient's chronic conditions and co-morbidity symptoms are monitored and maintained or improved  Recent Flowsheet Documentation  Taken 7/7/2023 2000 by [address] [facility] - Patient's Chronic Conditions and Co-Morbidity Symptoms are Monitored and Maintained or Improved:   Monitor and assess patient's chronic conditions and comorbid symptoms for stability, deterioration, or improvement   Collaborate with multidisciplinary team to address chronic and comorbid conditions and prevent exacerbation or deterioration   Update acute care plan with appropriate goals if chronic or comorbid symptoms are exacerbated and prevent overall improvement and discharge     Problem: ABCDS Injury Assessment  Goal: Absence of physical injury  Recent Flowsheet Documentation  Taken 7/8/2023 0148 by Aditya Devi  Absence of Physical Injury: Implement safety measures based on patient assessment     Problem: Safety - Adult  Goal: Free from fall injury  Recent Flowsheet Documentation  Taken 7/8/2023 0148 by Aditya Devi  Free From Fall Injury:   Instruct family/caregiver on patient safety   Based on caregiver fall risk screen, instruct family/caregiver to ask for assistance with transferring infant if caregiver noted to

## 2023-07-09 LAB
ALBUMIN SERPL-MCNC: 3.1 G/DL (ref 3.4–5)
ALBUMIN/GLOB SERPL: 1.3 {RATIO} (ref 1.1–2.2)
ALP SERPL-CCNC: 43 U/L (ref 40–129)
ALT SERPL-CCNC: 8 U/L (ref 10–40)
ANION GAP SERPL CALCULATED.3IONS-SCNC: 9 MMOL/L (ref 3–16)
AST SERPL-CCNC: 19 U/L (ref 15–37)
BILIRUB SERPL-MCNC: 0.5 MG/DL (ref 0–1)
BUN SERPL-MCNC: 40 MG/DL (ref 7–20)
CALCIUM SERPL-MCNC: 8.5 MG/DL (ref 8.3–10.6)
CHLORIDE SERPL-SCNC: 109 MMOL/L (ref 99–110)
CO2 SERPL-SCNC: 27 MMOL/L (ref 21–32)
CREAT SERPL-MCNC: 1.2 MG/DL (ref 0.6–1.2)
GFR SERPLBLD CREATININE-BSD FMLA CKD-EPI: 43 ML/MIN/{1.73_M2}
GLUCOSE BLD-MCNC: 154 MG/DL (ref 70–99)
GLUCOSE BLD-MCNC: 155 MG/DL (ref 70–99)
GLUCOSE BLD-MCNC: 159 MG/DL (ref 70–99)
GLUCOSE BLD-MCNC: 172 MG/DL (ref 70–99)
GLUCOSE BLD-MCNC: 225 MG/DL (ref 70–99)
GLUCOSE SERPL-MCNC: 201 MG/DL (ref 70–99)
MAGNESIUM SERPL-MCNC: 2.3 MG/DL (ref 1.8–2.4)
PERFORMED ON: ABNORMAL
PHOSPHATE SERPL-MCNC: 1.9 MG/DL (ref 2.5–4.9)
POTASSIUM SERPL-SCNC: 3.9 MMOL/L (ref 3.5–5.1)
PROT SERPL-MCNC: 5.5 G/DL (ref 6.4–8.2)
SODIUM SERPL-SCNC: 145 MMOL/L (ref 136–145)
TRIGL SERPL-MCNC: 82 MG/DL (ref 0–150)

## 2023-07-09 PROCEDURE — C9113 INJ PANTOPRAZOLE SODIUM, VIA: HCPCS | Performed by: SURGERY

## 2023-07-09 PROCEDURE — 83735 ASSAY OF MAGNESIUM: CPT

## 2023-07-09 PROCEDURE — 84100 ASSAY OF PHOSPHORUS: CPT

## 2023-07-09 PROCEDURE — 84478 ASSAY OF TRIGLYCERIDES: CPT

## 2023-07-09 PROCEDURE — 99024 POSTOP FOLLOW-UP VISIT: CPT | Performed by: SURGERY

## 2023-07-09 PROCEDURE — 2580000003 HC RX 258: Performed by: SURGERY

## 2023-07-09 PROCEDURE — 6360000002 HC RX W HCPCS: Performed by: SURGERY

## 2023-07-09 PROCEDURE — 2000000000 HC ICU R&B

## 2023-07-09 PROCEDURE — 80053 COMPREHEN METABOLIC PANEL: CPT

## 2023-07-09 PROCEDURE — 2500000003 HC RX 250 WO HCPCS: Performed by: SURGERY

## 2023-07-09 PROCEDURE — 6370000000 HC RX 637 (ALT 250 FOR IP): Performed by: SURGERY

## 2023-07-09 RX ORDER — THIAMINE HYDROCHLORIDE 100 MG/ML
100 INJECTION, SOLUTION INTRAMUSCULAR; INTRAVENOUS DAILY
Status: COMPLETED | OUTPATIENT
Start: 2023-07-09 | End: 2023-07-15

## 2023-07-09 RX ORDER — ACETAMINOPHEN 500 MG
1000 TABLET ORAL EVERY 8 HOURS SCHEDULED
Status: DISPENSED | OUTPATIENT
Start: 2023-07-09 | End: 2023-07-11

## 2023-07-09 RX ORDER — INSULIN LISPRO 100 [IU]/ML
0-8 INJECTION, SOLUTION INTRAVENOUS; SUBCUTANEOUS EVERY 4 HOURS
Status: DISCONTINUED | OUTPATIENT
Start: 2023-07-09 | End: 2023-07-24

## 2023-07-09 RX ORDER — KETOROLAC TROMETHAMINE 15 MG/ML
15 INJECTION, SOLUTION INTRAMUSCULAR; INTRAVENOUS EVERY 8 HOURS
Status: COMPLETED | OUTPATIENT
Start: 2023-07-09 | End: 2023-07-10

## 2023-07-09 RX ADMIN — POTASSIUM CHLORIDE AND SODIUM CHLORIDE: 900; 150 INJECTION, SOLUTION INTRAVENOUS at 15:05

## 2023-07-09 RX ADMIN — ENOXAPARIN SODIUM 30 MG: 100 INJECTION SUBCUTANEOUS at 21:30

## 2023-07-09 RX ADMIN — THIAMINE HYDROCHLORIDE 100 MG: 100 INJECTION, SOLUTION INTRAMUSCULAR; INTRAVENOUS at 11:03

## 2023-07-09 RX ADMIN — Medication 10 ML: at 23:57

## 2023-07-09 RX ADMIN — Medication 10 ML: at 08:01

## 2023-07-09 RX ADMIN — ACETAMINOPHEN 1000 MG: 500 TABLET ORAL at 22:10

## 2023-07-09 RX ADMIN — KETOROLAC TROMETHAMINE 15 MG: 15 INJECTION, SOLUTION INTRAMUSCULAR; INTRAVENOUS at 21:29

## 2023-07-09 RX ADMIN — KETOROLAC TROMETHAMINE 15 MG: 15 INJECTION, SOLUTION INTRAMUSCULAR; INTRAVENOUS at 14:48

## 2023-07-09 RX ADMIN — METOCLOPRAMIDE 5 MG: 5 INJECTION, SOLUTION INTRAMUSCULAR; INTRAVENOUS at 00:12

## 2023-07-09 RX ADMIN — OXYCODONE AND ACETAMINOPHEN 1 TABLET: 5; 325 TABLET ORAL at 22:10

## 2023-07-09 RX ADMIN — INSULIN LISPRO 2 UNITS: 100 INJECTION, SOLUTION INTRAVENOUS; SUBCUTANEOUS at 00:17

## 2023-07-09 RX ADMIN — ONDANSETRON 4 MG: 2 INJECTION INTRAMUSCULAR; INTRAVENOUS at 03:40

## 2023-07-09 RX ADMIN — SODIUM CHLORIDE, PRESERVATIVE FREE 10 ML: 5 INJECTION INTRAVENOUS at 21:37

## 2023-07-09 RX ADMIN — POTASSIUM PHOSPHATE, MONOBASIC AND POTASSIUM PHOSPHATE, DIBASIC 20 MMOL: 224; 236 INJECTION, SOLUTION, CONCENTRATE INTRAVENOUS at 11:10

## 2023-07-09 RX ADMIN — HYDROMORPHONE HYDROCHLORIDE 0.5 MG: 1 INJECTION, SOLUTION INTRAMUSCULAR; INTRAVENOUS; SUBCUTANEOUS at 00:18

## 2023-07-09 RX ADMIN — HYDROMORPHONE HYDROCHLORIDE 0.5 MG: 1 INJECTION, SOLUTION INTRAMUSCULAR; INTRAVENOUS; SUBCUTANEOUS at 03:40

## 2023-07-09 RX ADMIN — PANTOPRAZOLE SODIUM 40 MG: 40 INJECTION, POWDER, FOR SOLUTION INTRAVENOUS at 08:00

## 2023-07-09 RX ADMIN — LEUCINE, PHENYLALANINE, LYSINE, METHIONINE, ISOLEUCINE, VALINE, HISTIDINE, THREONINE, TRYPTOPHAN, ALANINE, GLYCINE, ARGININE, PROLINE, SERINE, TYROSINE, SODIUM ACETATE, DIBASIC POTASSIUM PHOSPHATE, MAGNESIUM CHLORIDE, SODIUM CHLORIDE, CALCIUM CHLORIDE, DEXTROSE
365; 280; 290; 200; 300; 290; 240; 210; 90; 1035; 515; 575; 340; 250; 20; 340; 261; 51; 59; 33; 20 INJECTION INTRAVENOUS at 18:14

## 2023-07-09 RX ADMIN — HYDROMORPHONE HYDROCHLORIDE 0.5 MG: 1 INJECTION, SOLUTION INTRAMUSCULAR; INTRAVENOUS; SUBCUTANEOUS at 07:33

## 2023-07-09 RX ADMIN — METOCLOPRAMIDE 5 MG: 5 INJECTION, SOLUTION INTRAMUSCULAR; INTRAVENOUS at 11:03

## 2023-07-09 RX ADMIN — SODIUM CHLORIDE 10 ML/HR: 9 INJECTION, SOLUTION INTRAVENOUS at 03:48

## 2023-07-09 RX ADMIN — Medication 10 ML: at 00:21

## 2023-07-09 RX ADMIN — INSULIN LISPRO 2 UNITS: 100 INJECTION, SOLUTION INTRAVENOUS; SUBCUTANEOUS at 05:43

## 2023-07-09 RX ADMIN — HYDROMORPHONE HYDROCHLORIDE 0.5 MG: 1 INJECTION, SOLUTION INTRAMUSCULAR; INTRAVENOUS; SUBCUTANEOUS at 11:03

## 2023-07-09 RX ADMIN — SODIUM CHLORIDE, PRESERVATIVE FREE 10 ML: 5 INJECTION INTRAVENOUS at 08:00

## 2023-07-09 RX ADMIN — METOCLOPRAMIDE 5 MG: 5 INJECTION, SOLUTION INTRAMUSCULAR; INTRAVENOUS at 05:44

## 2023-07-09 RX ADMIN — PANTOPRAZOLE SODIUM 40 MG: 40 INJECTION, POWDER, FOR SOLUTION INTRAVENOUS at 21:30

## 2023-07-09 ASSESSMENT — PAIN SCALES - GENERAL
PAINLEVEL_OUTOF10: 4
PAINLEVEL_OUTOF10: 7
PAINLEVEL_OUTOF10: 7
PAINLEVEL_OUTOF10: 4
PAINLEVEL_OUTOF10: 4
PAINLEVEL_OUTOF10: 9
PAINLEVEL_OUTOF10: 4
PAINLEVEL_OUTOF10: 9
PAINLEVEL_OUTOF10: 0
PAINLEVEL_OUTOF10: 9
PAINLEVEL_OUTOF10: 8
PAINLEVEL_OUTOF10: 7
PAINLEVEL_OUTOF10: 4
PAINLEVEL_OUTOF10: 8
PAINLEVEL_OUTOF10: 7

## 2023-07-09 ASSESSMENT — PAIN DESCRIPTION - ORIENTATION
ORIENTATION: MID

## 2023-07-09 ASSESSMENT — PAIN DESCRIPTION - LOCATION
LOCATION: ABDOMEN

## 2023-07-09 ASSESSMENT — PAIN DESCRIPTION - FREQUENCY
FREQUENCY: CONTINUOUS

## 2023-07-09 ASSESSMENT — PAIN DESCRIPTION - PAIN TYPE
TYPE: SURGICAL PAIN

## 2023-07-09 ASSESSMENT — PAIN SCALES - WONG BAKER
WONGBAKER_NUMERICALRESPONSE: 0
WONGBAKER_NUMERICALRESPONSE: 4

## 2023-07-09 ASSESSMENT — PAIN DESCRIPTION - ONSET
ONSET: ON-GOING

## 2023-07-09 ASSESSMENT — PAIN - FUNCTIONAL ASSESSMENT
PAIN_FUNCTIONAL_ASSESSMENT: PREVENTS OR INTERFERES SOME ACTIVE ACTIVITIES AND ADLS

## 2023-07-09 ASSESSMENT — PAIN DESCRIPTION - DESCRIPTORS
DESCRIPTORS: ACHING
DESCRIPTORS: ACHING
DESCRIPTORS: BURNING

## 2023-07-09 NOTE — PROGRESS NOTES
Shift assessment completed ( See Flow sheets for details). Pt alert and oriented. Able to follow commands. Continues to complain of pain in her abdomen. Prn pain medications administered as order. Surgical sites clean dry and intact. G tube intact with minimal drainage. NG to low  continues suction. Turned and repositioned pt. All safety measures stay active.

## 2023-07-09 NOTE — FLOWSHEET NOTE
See flow sheets for assessment. VSS. Monitor SR. NG to LIWS with brown drainage, G-tube to gravity with small amount brown drainage. Abd dressing with ADRIANA CD&I. Petersen with adequate urine. Repositioned for comfort, IS used. Plan of care discussed.

## 2023-07-09 NOTE — PROGRESS NOTES
Hospitalist Progress Note  Meena Vail MD      Name:  Shannon Christopher /Age/Sex: 10/16/1932  (80 y.o. female)   MRN & CSN:  [id number] & [id number] Admission Date/Time: 2023  6:49 PM   Location:  [id number]/[id number] PCP: David Bruce Day: 8    Assessment and Plan:     Shannon Christopher is a 44-year-old admitted with abdominal pain, distention, nausea and vomiting, found to have gastric volvulus, NG tube was placed for decompression, NG tube discontinued, on clear liquids, plan for hernia repair tomorrow, cardiology clearance appreciated. 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 NG tube in place to suction. PICC line was placed and TPN current rate is 30 mL with a goal of 70 mL/h so far tolerating TPN well continue supplemental oxygen, continue to wean, recommend to be out of bed, continue PT OT, continue perioperative antibiotics appreciate general surgery input, continue n.p.o. status not ready for diet yet    # Chronic diastolic heart failure, with compensatory, last Echo showed LVEF 65%, cardiology clearance for surgery appreciated. #Elevated troponin, chronically elevated, cardiology consulted by surgery. # GERD with large hiatal hernia, PPI. # Abdominal pain, chronic narcotic dependence, started on Percocet, prn dilaudid.      Plan of care was coordinated with patient, family at bedside, RN  Evaluated by PT OT and recommending ECF      Diet Diet NPO  PN-Adult Premix 5/20 - Standard Electrolytes - Central Line  PN-Adult Premix 5/20 - Standard Electrolytes - Central Line   DVT Prophylaxis Lovenox    Code Status DNR-CCA   Disposition To snf IN 2-3 DAYS      Subjective:     Patient was seen and examined at bedside, patient was started on TPN after placement of PICC line,

## 2023-07-09 NOTE — PLAN OF CARE

## 2023-07-09 NOTE — PROGRESS NOTES
Clinical Pharmacy Note    Pharmacy consulted by Dr. Keisha Gracia to manage TPN    Current TPN rate: 30 ml/hr  Goal TPN rate: 70 ml/hr    Access: PICC  Indication: ex lap    Labs:  General Labs:  BMP:    Lab Results   Component Value Date/Time     07/09/2023 04:35 AM    K 3.9 07/09/2023 04:35 AM    K 4.7 07/02/2023 07:24 PM     07/09/2023 04:35 AM    CO2 27 07/09/2023 04:35 AM    BUN 40 07/09/2023 04:35 AM    LABALBU 3.1 07/09/2023 04:35 AM    CREATININE 1.2 07/09/2023 04:35 AM    CALCIUM 8.5 07/09/2023 04:35 AM    GFRAA 57 02/09/2022 01:45 PM    GFRAA 56 01/10/2010 08:07 PM    LABGLOM 43 07/09/2023 04:35 AM    GLUCOSE 201 07/09/2023 04:35 AM     Magnesium:    Lab Results   Component Value Date/Time    MG 2.30 07/09/2023 04:35 AM     Phosphorus:    Lab Results   Component Value Date/Time    PHOS 1.9 07/09/2023 04:35 AM       Electrolyte replacement as follows: Will give Kphos 20 mmol    Blood sugars over past 24 hours: 143-225    Blood sugar management:  Increase to medium dose sliding scale (Humalog) insulin q4h    Will not advance TPN today given phos level went from 3.8 to 1.9 with initiation of TPN     Plan to continue TPN at 30 ml/hr. Thank you for allowing pharmacy to participate in the care of this patient.     Meliton Carter, PharmD, [facility]  [id number]  7/9/2023 10:34 AM

## 2023-07-09 NOTE — PROGRESS NOTES
Clamped NG @11 am  to  see if pt will have drainage from her G tube. Pt had very minimal drainage from NG tube, <20. Unclamped NG [id number] and to low intermittent suction.

## 2023-07-10 LAB
ALBUMIN SERPL-MCNC: 3 G/DL (ref 3.4–5)
ALBUMIN/GLOB SERPL: 1.3 {RATIO} (ref 1.1–2.2)
ALP SERPL-CCNC: 43 U/L (ref 40–129)
ALT SERPL-CCNC: 9 U/L (ref 10–40)
ANION GAP SERPL CALCULATED.3IONS-SCNC: 11 MMOL/L (ref 3–16)
AST SERPL-CCNC: 21 U/L (ref 15–37)
BASOPHILS # BLD: 0 K/UL (ref 0–0.2)
BASOPHILS NFR BLD: 0.2 %
BILIRUB SERPL-MCNC: 0.4 MG/DL (ref 0–1)
BUN SERPL-MCNC: 34 MG/DL (ref 7–20)
CALCIUM SERPL-MCNC: 8.3 MG/DL (ref 8.3–10.6)
CHLORIDE SERPL-SCNC: 112 MMOL/L (ref 99–110)
CO2 SERPL-SCNC: 26 MMOL/L (ref 21–32)
CREAT SERPL-MCNC: 0.9 MG/DL (ref 0.6–1.2)
DEPRECATED RDW RBC AUTO: 14.5 % (ref 12.4–15.4)
EOSINOPHIL # BLD: 0.1 K/UL (ref 0–0.6)
EOSINOPHIL NFR BLD: 1 %
GFR SERPLBLD CREATININE-BSD FMLA CKD-EPI: >60 ML/MIN/{1.73_M2}
GLUCOSE BLD-MCNC: 119 MG/DL (ref 70–99)
GLUCOSE BLD-MCNC: 135 MG/DL (ref 70–99)
GLUCOSE BLD-MCNC: 138 MG/DL (ref 70–99)
GLUCOSE BLD-MCNC: 148 MG/DL (ref 70–99)
GLUCOSE BLD-MCNC: 150 MG/DL (ref 70–99)
GLUCOSE BLD-MCNC: 150 MG/DL (ref 70–99)
GLUCOSE SERPL-MCNC: 145 MG/DL (ref 70–99)
HCT VFR BLD AUTO: 28.5 % (ref 36–48)
HGB BLD-MCNC: 9.3 G/DL (ref 12–16)
LYMPHOCYTES # BLD: 1.4 K/UL (ref 1–5.1)
LYMPHOCYTES NFR BLD: 22.5 %
MAGNESIUM SERPL-MCNC: 2.3 MG/DL (ref 1.8–2.4)
MCH RBC QN AUTO: 30.9 PG (ref 26–34)
MCHC RBC AUTO-ENTMCNC: 32.6 G/DL (ref 31–36)
MCV RBC AUTO: 94.6 FL (ref 80–100)
MONOCYTES # BLD: 0.3 K/UL (ref 0–1.3)
MONOCYTES NFR BLD: 5.1 %
NEUTROPHILS # BLD: 4.5 K/UL (ref 1.7–7.7)
NEUTROPHILS NFR BLD: 71.2 %
PERFORMED ON: ABNORMAL
PHOSPHATE SERPL-MCNC: 2.2 MG/DL (ref 2.5–4.9)
PLATELET # BLD AUTO: 112 K/UL (ref 135–450)
PMV BLD AUTO: 7.7 FL (ref 5–10.5)
POTASSIUM SERPL-SCNC: 4.1 MMOL/L (ref 3.5–5.1)
PROT SERPL-MCNC: 5.3 G/DL (ref 6.4–8.2)
RBC # BLD AUTO: 3.01 M/UL (ref 4–5.2)
REASON FOR REJECTION: NORMAL
REJECTED TEST: NORMAL
SODIUM SERPL-SCNC: 149 MMOL/L (ref 136–145)
WBC # BLD AUTO: 6.3 K/UL (ref 4–11)

## 2023-07-10 PROCEDURE — 6370000000 HC RX 637 (ALT 250 FOR IP): Performed by: SURGERY

## 2023-07-10 PROCEDURE — 85025 COMPLETE CBC W/AUTO DIFF WBC: CPT

## 2023-07-10 PROCEDURE — APPNB30 APP NON BILLABLE TIME 0-30 MINS: Performed by: NURSE PRACTITIONER

## 2023-07-10 PROCEDURE — 97530 THERAPEUTIC ACTIVITIES: CPT

## 2023-07-10 PROCEDURE — C9113 INJ PANTOPRAZOLE SODIUM, VIA: HCPCS | Performed by: SURGERY

## 2023-07-10 PROCEDURE — 6360000002 HC RX W HCPCS: Performed by: SURGERY

## 2023-07-10 PROCEDURE — 99024 POSTOP FOLLOW-UP VISIT: CPT | Performed by: SURGERY

## 2023-07-10 PROCEDURE — 2000000000 HC ICU R&B

## 2023-07-10 PROCEDURE — 83735 ASSAY OF MAGNESIUM: CPT

## 2023-07-10 PROCEDURE — 80053 COMPREHEN METABOLIC PANEL: CPT

## 2023-07-10 PROCEDURE — 2580000003 HC RX 258: Performed by: SURGERY

## 2023-07-10 PROCEDURE — 84100 ASSAY OF PHOSPHORUS: CPT

## 2023-07-10 PROCEDURE — 36415 COLL VENOUS BLD VENIPUNCTURE: CPT

## 2023-07-10 PROCEDURE — 97116 GAIT TRAINING THERAPY: CPT

## 2023-07-10 PROCEDURE — 2500000003 HC RX 250 WO HCPCS: Performed by: SURGERY

## 2023-07-10 RX ORDER — METOCLOPRAMIDE HYDROCHLORIDE 5 MG/ML
5 INJECTION INTRAMUSCULAR; INTRAVENOUS EVERY 6 HOURS
Status: COMPLETED | OUTPATIENT
Start: 2023-07-10 | End: 2023-07-11

## 2023-07-10 RX ORDER — BISACODYL 10 MG
10 SUPPOSITORY, RECTAL RECTAL ONCE
Status: COMPLETED | OUTPATIENT
Start: 2023-07-10 | End: 2023-07-10

## 2023-07-10 RX ADMIN — POTASSIUM CHLORIDE AND SODIUM CHLORIDE: 900; 150 INJECTION, SOLUTION INTRAVENOUS at 11:16

## 2023-07-10 RX ADMIN — OXYCODONE AND ACETAMINOPHEN 1 TABLET: 5; 325 TABLET ORAL at 12:10

## 2023-07-10 RX ADMIN — HYDROMORPHONE HYDROCHLORIDE 1 MG: 1 INJECTION, SOLUTION INTRAMUSCULAR; INTRAVENOUS; SUBCUTANEOUS at 00:41

## 2023-07-10 RX ADMIN — ACETAMINOPHEN 1000 MG: 500 TABLET ORAL at 13:56

## 2023-07-10 RX ADMIN — Medication 10 ML: at 08:25

## 2023-07-10 RX ADMIN — PANTOPRAZOLE SODIUM 40 MG: 40 INJECTION, POWDER, FOR SOLUTION INTRAVENOUS at 08:23

## 2023-07-10 RX ADMIN — ACETAMINOPHEN 1000 MG: 500 TABLET ORAL at 05:50

## 2023-07-10 RX ADMIN — HYDROMORPHONE HYDROCHLORIDE 1 MG: 1 INJECTION, SOLUTION INTRAMUSCULAR; INTRAVENOUS; SUBCUTANEOUS at 13:55

## 2023-07-10 RX ADMIN — I.V. FAT EMULSION 250 ML: 20 EMULSION INTRAVENOUS at 17:53

## 2023-07-10 RX ADMIN — ACETAMINOPHEN 1000 MG: 500 TABLET ORAL at 22:25

## 2023-07-10 RX ADMIN — Medication 10 ML: at 21:07

## 2023-07-10 RX ADMIN — HYDROMORPHONE HYDROCHLORIDE 1 MG: 1 INJECTION, SOLUTION INTRAMUSCULAR; INTRAVENOUS; SUBCUTANEOUS at 19:40

## 2023-07-10 RX ADMIN — PANTOPRAZOLE SODIUM 40 MG: 40 INJECTION, POWDER, FOR SOLUTION INTRAVENOUS at 20:53

## 2023-07-10 RX ADMIN — LEVOFLOXACIN 750 MG: 5 INJECTION, SOLUTION INTRAVENOUS at 15:30

## 2023-07-10 RX ADMIN — ASCORBIC ACID, VITAMIN A PALMITATE, CHOLECALCIFEROL, THIAMINE HYDROCHLORIDE, RIBOFLAVIN-5 PHOSPHATE SODIUM, PYRIDOXINE HYDROCHLORIDE, NIACINAMIDE, DEXPANTHENOL, ALPHA-TOCOPHEROL ACETATE, VITAMIN K1, FOLIC ACID, BIOTIN, CYANOCOBALAMIN: 200; 3300; 200; 6; 3.6; 6; 40; 15; 10; 150; 600; 60; 5 INJECTION, SOLUTION INTRAVENOUS at 17:44

## 2023-07-10 RX ADMIN — Medication 10 ML: at 08:24

## 2023-07-10 RX ADMIN — ENOXAPARIN SODIUM 30 MG: 100 INJECTION SUBCUTANEOUS at 20:53

## 2023-07-10 RX ADMIN — SODIUM CHLORIDE, PRESERVATIVE FREE 10 ML: 5 INJECTION INTRAVENOUS at 19:40

## 2023-07-10 RX ADMIN — HYDROMORPHONE HYDROCHLORIDE 1 MG: 1 INJECTION, SOLUTION INTRAMUSCULAR; INTRAVENOUS; SUBCUTANEOUS at 09:54

## 2023-07-10 RX ADMIN — METOCLOPRAMIDE 5 MG: 5 INJECTION, SOLUTION INTRAMUSCULAR; INTRAVENOUS at 18:17

## 2023-07-10 RX ADMIN — KETOROLAC TROMETHAMINE 15 MG: 15 INJECTION, SOLUTION INTRAMUSCULAR; INTRAVENOUS at 05:50

## 2023-07-10 RX ADMIN — BISACODYL 10 MG: 10 SUPPOSITORY RECTAL at 18:18

## 2023-07-10 RX ADMIN — THIAMINE HYDROCHLORIDE 100 MG: 100 INJECTION, SOLUTION INTRAMUSCULAR; INTRAVENOUS at 09:53

## 2023-07-10 RX ADMIN — OXYCODONE AND ACETAMINOPHEN 1 TABLET: 5; 325 TABLET ORAL at 20:53

## 2023-07-10 RX ADMIN — SODIUM CHLORIDE, PRESERVATIVE FREE 10 ML: 5 INJECTION INTRAVENOUS at 08:24

## 2023-07-10 ASSESSMENT — PAIN DESCRIPTION - ORIENTATION
ORIENTATION: RIGHT;MID
ORIENTATION: RIGHT;LEFT;MID

## 2023-07-10 ASSESSMENT — PAIN SCALES - GENERAL
PAINLEVEL_OUTOF10: 10
PAINLEVEL_OUTOF10: 8
PAINLEVEL_OUTOF10: 8
PAINLEVEL_OUTOF10: 6
PAINLEVEL_OUTOF10: 7
PAINLEVEL_OUTOF10: 7
PAINLEVEL_OUTOF10: 8
PAINLEVEL_OUTOF10: 5
PAINLEVEL_OUTOF10: 6
PAINLEVEL_OUTOF10: 6
PAINLEVEL_OUTOF10: 9
PAINLEVEL_OUTOF10: 8

## 2023-07-10 ASSESSMENT — PAIN DESCRIPTION - LOCATION
LOCATION: ABDOMEN
LOCATION: ABDOMEN
LOCATION: ABDOMEN;BACK
LOCATION: ABDOMEN;BACK
LOCATION: ABDOMEN
LOCATION: ABDOMEN;BACK
LOCATION: ABDOMEN;BACK

## 2023-07-10 ASSESSMENT — PAIN DESCRIPTION - DESCRIPTORS
DESCRIPTORS: ACHING
DESCRIPTORS: ACHING
DESCRIPTORS: ACHING;SORE

## 2023-07-10 NOTE — PROGRESS NOTES
Pt. Judah Medrano in the chair this AM per PT. VSS, afebrile. Pt complaints of pain 8/10 requesting Dilaudid 1 mg. Pt. Reports 0.5 mg Dilaudid not effective. Repositioned for comfort in chair. Pt with poor posture due to scoliosis. NG removed this AM per surgeon. Pt. Requesting PO clears. Pt.remains NPO at this time.

## 2023-07-10 NOTE — PLAN OF CARE
Problem: Discharge Planning  Goal: Discharge to home or other facility with appropriate resources  7/10/2023 1045 by Kenny Doherty RN  Outcome: Progressing  7/9/2023 2228 by Yesy Stovall RN  Outcome: Progressing     Problem: Chronic Conditions and Co-morbidities  Goal: Patient's chronic conditions and co-morbidity symptoms are monitored and maintained or improved  7/10/2023 1045 by Kenny Doherty RN  Outcome: Progressing  7/9/2023 2228 by Yesy Stovall RN  Outcome: Progressing     Problem: Skin/Tissue Integrity  Goal: Absence of new skin breakdown  Description: 1. Monitor for areas of redness and/or skin breakdown  2. Assess vascular access sites hourly  3. Every 4-6 hours minimum:  Change oxygen saturation probe site  4. Every 4-6 hours:  If on nasal continuous positive airway pressure, respiratory therapy assess nares and determine need for appliance change or resting period.   7/10/2023 1045 by Kenny Doherty RN  Outcome: Progressing  7/9/2023 2228 by Yesy Stovall RN  Outcome: Progressing     Problem: ABCDS Injury Assessment  Goal: Absence of physical injury  7/10/2023 1045 by Kenny Doherty RN  Outcome: Progressing  7/9/2023 2228 by Yesy Stovall RN  Outcome: Progressing     Problem: Safety - Adult  Goal: Free from fall injury  7/10/2023 1045 by Kenny Doherty RN  Outcome: Progressing  7/9/2023 2228 by Yesy Stovall RN  Outcome: Progressing     Problem: Pain  Goal: Verbalizes/displays adequate comfort level or baseline comfort level  7/10/2023 1045 by Kenny Doherty RN  Outcome: Progressing  7/9/2023 2228 by Yesy Stovall RN  Outcome: Progressing     Problem: Nutrition Deficit:  Goal: Optimize nutritional status  7/10/2023 1045 by Kenny Doherty RN  Outcome: Progressing  7/9/2023 2228 by Yesy Stovall RN  Outcome: Progressing

## 2023-07-10 NOTE — PROGRESS NOTES
Resting now in bed. Family left for the night. Petersen catheter removed and external catheter placed with depend. Bed alarm intact and call light in reach.

## 2023-07-10 NOTE — Clinical Note
Pt. Up out of bed per PT/OT. Pt requesting PO popsicle and drinks. Pain to abdomen cramping and aching. No complaint of nausea. NG removed by surgery this.

## 2023-07-10 NOTE — PROGRESS NOTES
Hospitalist Progress Note  Arie Villaseñor MD      Name:  Apryl Cates /Age/Sex: 10/16/1932  (80 y.o. female)   MRN & CSN:  [id number] & [id number] Admission Date/Time: 2023  6:49 PM   Location:  [place] [id number] PCP: Joylene Mcardle, [address] [place] [address] Day: 9    Assessment and Plan:     Apryl Cates is a 22-year-old admitted with abdominal pain, distention, nausea and vomiting, found to have gastric volvulus, NG tube was placed for decompression, NG tube discontinued, on clear liquids, plan for hernia repair tomorrow, cardiology clearance appreciated. 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 NG tube in place to suction. PICC line was placed and TPN current rate is 30 mL with a goal of 70 mL/h so far tolerating TPN well continue supplemental oxygen, continue to wean, recommend to be out of bed, continue PT OT, continue perioperative antibiotics appreciate general surgery input, continue n.p.o. status not ready for diet yet    # Chronic diastolic heart failure, with compensatory, last Echo showed LVEF 65%, cardiology clearance for surgery appreciated. #Elevated troponin, chronically elevated, cardiology consulted by surgery. # GERD with large hiatal hernia, PPI. # Abdominal pain, chronic narcotic dependence, started on Percocet, prn dilaudid.      Plan of care was coordinated with patient, family at bedside, RN  Evaluated by PT OT and recommending rehab, consult placed for ARU      Diet Diet NPO  PN-Adult Premix 5/20 - Standard Electrolytes - Central Line   DVT Prophylaxis Lovenox    Code Status DNR-CCA   Disposition To snf IN 2-3 DAYS      Subjective:     Patient was seen and examined at bedside, on TPN tolerating well, vital stable pain is tolerable working with PT OT    [place] ROS

## 2023-07-10 NOTE — PROGRESS NOTES
Clinical Pharmacy Note    Pharmacy consulted by Dr. Reed Eisenmenger to manage TPN    Current TPN rate: 30 ml/hr  Goal TPN rate: 70 ml/hr    Access: PICC  Indication: ex lap    Labs:  General Labs:  BMP:    Lab Results   Component Value Date/Time     07/10/2023 05:40 AM    K 4.1 07/10/2023 05:40 AM    K 4.7 07/02/2023 07:24 PM     07/10/2023 05:40 AM    CO2 26 07/10/2023 05:40 AM    BUN 34 07/10/2023 05:40 AM    LABALBU 3.0 07/10/2023 05:40 AM    CREATININE 0.9 07/10/2023 05:40 AM    CALCIUM 8.3 07/10/2023 05:40 AM    GFRAA 57 02/09/2022 01:45 PM    GFRAA 56 01/10/2010 08:07 PM    LABGLOM >60 07/10/2023 05:40 AM    GLUCOSE 145 07/10/2023 05:40 AM       Electrolyte replacement as follows:   Replace phosphorous with 30 mmol of potassium phosphate administered IV over 4 hours    Blood sugars over past 24 hours: 138-155    Blood sugar management:  Plan to Continue Humalog q4 hour sliding scale at medium dose. Plan to increase TPN to 50 ml/hr. Thank you for allowing pharmacy to participate in the care of this patient.     Elena Zurita, [address] [place] [address], PharmD 7/10/2023

## 2023-07-10 NOTE — PROGRESS NOTES
[address] [facility] Department   Phone: [phone]    Occupational Therapy    [] Initial Evaluation            [x] Daily Treatment Note         [] Discharge Summary      Patient: Liam Almanza   : 10/16/1932   MRN: [id number]   Date of Service:  7/10/2023    Admitting Diagnosis:  Acute gastric volvulus  Current Admission Summary: Liam Almanza is a 80 y.o. female who presented to ED for evaluation of nonradiating, epigastric abdominal pain and vomiting which began this morning. Patient describes pain as a constant fullness/swelling. She reports she has had multiple episodes of vomiting. She denies any known aggravating or alleviating factors. She denies hematemesis or coffee-ground emesis. She denies melena or hematochezia. She denies diarrhea, constipation, urinary symptoms. She has a history of appendectomy, cholecystectomy, and hysterectomy. She denies fever, dizziness, chest pain, shortness of breath. S/p ex lap , extensive ALLYSON, GT placement. Past Medical History:  has a past medical history of Arthritis, CAD (coronary artery disease), Cancer ([address]), Cystocele, Diabetes mellitus ([address]), Hepatitis A, History of blood transfusion, Hyperlipidemia, PVC (premature ventricular contraction), Rectocele, Reflux, and Scoliosis. Past Surgical History:  has a past surgical history that includes Appendectomy; joint replacement (Left); hernia repair (6 YRS AGO); Cholecystectomy; shoulder surgery (Right, 12); Bunionectomy (12); Breast surgery; Upper gastrointestinal endoscopy (12); Hysterectomy; bladder suspension; Dilation and curettage of uterus; other surgical history (Left, 14); Foot surgery; Cardiac catheterization; Colonoscopy (); Cystocopy (2017); other surgical history (Right, 2018); Cataract removal with implant (Left, 2018); pr xcapsl ctrc rmvl insj io lens prosth w/o ecp (Left, 2018);  Lumbar spine surgery (Right,

## 2023-07-10 NOTE — PROGRESS NOTES
[address] [facility] Department   Phone: [phone]    Physical Therapy     []Initial Evaluation            [x] Daily Treatment Note         [] Discharge Summary      Patient: Shannon Christopher   : 10/16/1932   MRN: [id number]   Date of Service:  7/10/2023  Admitting Diagnosis: Acute gastric volvulus  Current Admission Summary: 80 y.o. female status post ex lap, extensive lysis of adhesions, GT placement  Past Medical History:  has a past medical history of Arthritis, CAD (coronary artery disease), Cancer ([address]), Cystocele, Diabetes mellitus ([address]), Hepatitis A, History of blood transfusion, Hyperlipidemia, PVC (premature ventricular contraction), Rectocele, Reflux, and Scoliosis. Past Surgical History:  has a past surgical history that includes Appendectomy; joint replacement (Left); hernia repair (6 YRS AGO); Cholecystectomy; shoulder surgery (Right, 12); Bunionectomy (12); Breast surgery; Upper gastrointestinal endoscopy (12); Hysterectomy; bladder suspension; Dilation and curettage of uterus; other surgical history (Left, 14); Foot surgery; Cardiac catheterization; Colonoscopy (); Cystocopy (2017); other surgical history (Right, 2018); Cataract removal with implant (Left, 2018); pr xcapsl ctrc rmvl insj io lens prosth w/o ecp (Left, 2018); Lumbar spine surgery (Right, 5/15/2019); Upper gastrointestinal endoscopy (N/A, 7/3/2023); and laparotomy (N/A, 2023). Discharge Recommendations: Shannon Christopher scored a 13/24 on the AM-PAC short mobility form. Current research shows that an AM-PAC score of 17 or less is typically not associated with a discharge to the patient's home setting. Based on the patient's AM-PAC score and their current functional mobility deficits, it is recommended that the patient have 5-7 sessions per week of Physical Therapy at d/c to increase the patient's independence.   At this time, this patient posture  Comments: Pt required assist for walker management and verbal cueing for hand placement  Stair Mobility:  Stair mobility not completed on this date. Comments:    Balance:  Static Sitting Balance: fair (+): maintains balance at SBA/supervision without use of UE support  Dynamic Sitting Balance: fair (-): maintains balance at SBA with use of UE support  Static Standing Balance: fair (-): maintains balance at CGA with use of UE support  Dynamic Standing Balance: fair (-): maintains balance at CGA with use of UE support  Comments: Pt sat EOB ~10 minutes prior to chair transfer; reporting dizziness; SpO2 100% on 2L O2 Nasal cannula, /89    Other Therapeutic Interventions    Functional Outcomes  AM-PAC Inpatient Mobility Raw Score : 13              Cognition  WFL - difficult to fully assess due to [facility]  Orientation:    alert and oriented x 4  Command Following:   [facility]    Education  Barriers To Learning: hearing  Patient Education: patient educated on goals, PT role and benefits, plan of care, general safety, functional mobility training, proper use of assistive device/equipment, transfer training, discharge recommendations  Learning Assessment:  patient verbalizes and demonstrates understanding    Assessment  Activity Tolerance: Limited; SpO2 100% on 2L O2; /89  Impairments Requiring Therapeutic Intervention: decreased functional mobility, decreased strength, decreased safety awareness, decreased endurance, decreased balance, increased pain, decreased posture  Prognosis: good  Clinical Assessment: Patient is a 79 y/o. Female who is not at baseline function and would benefit from skilled PT to address above deficits and facilitate return to baseline function. Pt is normally independent with RW, following abdominal surgery needing min A to CGA assist with tranfers while demonstrating decreased activity tolerance.     Safety Interventions: patient left in chair, chair alarm in place, call light within

## 2023-07-10 NOTE — PROGRESS NOTES
Pt. With active bowel sounds and increased pain. Pain treated with Percocet and Dilaudid and Tylenol without sustained pain relief. Pt. pain level  back up to 8-9/10 after 1 hour. Five minutes after a dilaudid injection pt asked if she ever received her Dilaudid. Pt. Turned and repositioned frequently for pain. Bed alarm intact and call light in reach.

## 2023-07-10 NOTE — PROGRESS NOTES
Patient assessments completed. Patient is alert and oriented x 4, hard of hearing but follows commands and is cooperative with all assessments. Patient complains of abdominal pain; Tylenol and Percocet administered. Heart sounds are regular, lung sounds are clear and bowel sounds are distant. Remains on TPN with NG tube at 58 cm to low intermittent suction and G tube to gravity with green drainage observed. Petersen in place and draining clear yellow urine. Call light within reach. Will continue to monitor.

## 2023-07-10 NOTE — PROGRESS NOTES
Family sitting around the bedside eating dinner. Family asked to call me when they are done eating as I need to place a rectal suppository and remove posadas cathter, pt. asking for an external female catheter as she feels she will be able to wait on assistance.

## 2023-07-10 NOTE — PROGRESS NOTES
Pt. Requested back to bed for now. Reports the chair is uncomfortable with her scoliosis. Daughter at bedside. Reports Dilaudid worked well and denies reporting any pain number. Bed alarm intact and call light in reach.

## 2023-07-11 LAB
ALBUMIN SERPL-MCNC: 2.9 G/DL (ref 3.4–5)
ALBUMIN/GLOB SERPL: 1.3 {RATIO} (ref 1.1–2.2)
ALP SERPL-CCNC: 42 U/L (ref 40–129)
ALT SERPL-CCNC: 12 U/L (ref 10–40)
ANION GAP SERPL CALCULATED.3IONS-SCNC: 10 MMOL/L (ref 3–16)
AST SERPL-CCNC: 25 U/L (ref 15–37)
BILIRUB SERPL-MCNC: 0.4 MG/DL (ref 0–1)
BUN SERPL-MCNC: 26 MG/DL (ref 7–20)
CALCIUM SERPL-MCNC: 8.3 MG/DL (ref 8.3–10.6)
CHLORIDE SERPL-SCNC: 111 MMOL/L (ref 99–110)
CO2 SERPL-SCNC: 25 MMOL/L (ref 21–32)
CREAT SERPL-MCNC: 0.8 MG/DL (ref 0.6–1.2)
GFR SERPLBLD CREATININE-BSD FMLA CKD-EPI: >60 ML/MIN/{1.73_M2}
GLUCOSE BLD-MCNC: 155 MG/DL (ref 70–99)
GLUCOSE BLD-MCNC: 157 MG/DL (ref 70–99)
GLUCOSE BLD-MCNC: 158 MG/DL (ref 70–99)
GLUCOSE BLD-MCNC: 160 MG/DL (ref 70–99)
GLUCOSE BLD-MCNC: 163 MG/DL (ref 70–99)
GLUCOSE BLD-MCNC: 192 MG/DL (ref 70–99)
GLUCOSE SERPL-MCNC: 161 MG/DL (ref 70–99)
MAGNESIUM SERPL-MCNC: 2.1 MG/DL (ref 1.8–2.4)
PERFORMED ON: ABNORMAL
PHOSPHATE SERPL-MCNC: 2.1 MG/DL (ref 2.5–4.9)
POTASSIUM SERPL-SCNC: 4.1 MMOL/L (ref 3.5–5.1)
PROT SERPL-MCNC: 5.2 G/DL (ref 6.4–8.2)
SODIUM SERPL-SCNC: 146 MMOL/L (ref 136–145)

## 2023-07-11 PROCEDURE — 83735 ASSAY OF MAGNESIUM: CPT

## 2023-07-11 PROCEDURE — 2500000003 HC RX 250 WO HCPCS: Performed by: SURGERY

## 2023-07-11 PROCEDURE — 84100 ASSAY OF PHOSPHORUS: CPT

## 2023-07-11 PROCEDURE — 97535 SELF CARE MNGMENT TRAINING: CPT

## 2023-07-11 PROCEDURE — 6360000002 HC RX W HCPCS: Performed by: INTERNAL MEDICINE

## 2023-07-11 PROCEDURE — 97530 THERAPEUTIC ACTIVITIES: CPT

## 2023-07-11 PROCEDURE — 2580000003 HC RX 258: Performed by: SURGERY

## 2023-07-11 PROCEDURE — 6370000000 HC RX 637 (ALT 250 FOR IP): Performed by: SURGERY

## 2023-07-11 PROCEDURE — APPSS15 APP SPLIT SHARED TIME 0-15 MINUTES: Performed by: NURSE PRACTITIONER

## 2023-07-11 PROCEDURE — 2000000000 HC ICU R&B

## 2023-07-11 PROCEDURE — 2580000003 HC RX 258: Performed by: INTERNAL MEDICINE

## 2023-07-11 PROCEDURE — 80053 COMPREHEN METABOLIC PANEL: CPT

## 2023-07-11 PROCEDURE — 6360000002 HC RX W HCPCS: Performed by: SURGERY

## 2023-07-11 PROCEDURE — APPNB30 APP NON BILLABLE TIME 0-30 MINS: Performed by: NURSE PRACTITIONER

## 2023-07-11 PROCEDURE — 97110 THERAPEUTIC EXERCISES: CPT

## 2023-07-11 PROCEDURE — 99024 POSTOP FOLLOW-UP VISIT: CPT | Performed by: SURGERY

## 2023-07-11 PROCEDURE — 97116 GAIT TRAINING THERAPY: CPT

## 2023-07-11 PROCEDURE — C9113 INJ PANTOPRAZOLE SODIUM, VIA: HCPCS | Performed by: SURGERY

## 2023-07-11 RX ORDER — LANOLIN ALCOHOL/MO/W.PET/CERES
3 CREAM (GRAM) TOPICAL NIGHTLY PRN
Status: DISCONTINUED | OUTPATIENT
Start: 2023-07-11 | End: 2023-07-28 | Stop reason: HOSPADM

## 2023-07-11 RX ADMIN — HYDROMORPHONE HYDROCHLORIDE 1 MG: 1 INJECTION, SOLUTION INTRAMUSCULAR; INTRAVENOUS; SUBCUTANEOUS at 04:20

## 2023-07-11 RX ADMIN — ONDANSETRON 4 MG: 2 INJECTION INTRAMUSCULAR; INTRAVENOUS at 12:47

## 2023-07-11 RX ADMIN — HYDROMORPHONE HYDROCHLORIDE 0.5 MG: 1 INJECTION, SOLUTION INTRAMUSCULAR; INTRAVENOUS; SUBCUTANEOUS at 16:03

## 2023-07-11 RX ADMIN — ENOXAPARIN SODIUM 30 MG: 100 INJECTION SUBCUTANEOUS at 21:50

## 2023-07-11 RX ADMIN — HYDROMORPHONE HYDROCHLORIDE 0.5 MG: 1 INJECTION, SOLUTION INTRAMUSCULAR; INTRAVENOUS; SUBCUTANEOUS at 23:03

## 2023-07-11 RX ADMIN — POTASSIUM CHLORIDE AND SODIUM CHLORIDE: 900; 150 INJECTION, SOLUTION INTRAVENOUS at 00:27

## 2023-07-11 RX ADMIN — LEUCINE, PHENYLALANINE, LYSINE, METHIONINE, ISOLEUCINE, VALINE, HISTIDINE, THREONINE, TRYPTOPHAN, ALANINE, GLYCINE, ARGININE, PROLINE, SERINE, TYROSINE, SODIUM ACETATE, DIBASIC POTASSIUM PHOSPHATE, MAGNESIUM CHLORIDE, SODIUM CHLORIDE, CALCIUM CHLORIDE, DEXTROSE
365; 280; 290; 200; 300; 290; 240; 210; 90; 1035; 515; 575; 340; 250; 20; 340; 261; 51; 59; 33; 20 INJECTION INTRAVENOUS at 17:01

## 2023-07-11 RX ADMIN — HYDROMORPHONE HYDROCHLORIDE 1 MG: 1 INJECTION, SOLUTION INTRAMUSCULAR; INTRAVENOUS; SUBCUTANEOUS at 00:16

## 2023-07-11 RX ADMIN — POTASSIUM PHOSPHATE, MONOBASIC AND POTASSIUM PHOSPHATE, DIBASIC 30 MMOL: 224; 236 INJECTION, SOLUTION, CONCENTRATE INTRAVENOUS at 13:51

## 2023-07-11 RX ADMIN — HYDROMORPHONE HYDROCHLORIDE 1 MG: 1 INJECTION, SOLUTION INTRAMUSCULAR; INTRAVENOUS; SUBCUTANEOUS at 11:22

## 2023-07-11 RX ADMIN — HYDROMORPHONE HYDROCHLORIDE 1 MG: 1 INJECTION, SOLUTION INTRAMUSCULAR; INTRAVENOUS; SUBCUTANEOUS at 07:43

## 2023-07-11 RX ADMIN — SODIUM CHLORIDE, PRESERVATIVE FREE 10 ML: 5 INJECTION INTRAVENOUS at 07:44

## 2023-07-11 RX ADMIN — PANTOPRAZOLE SODIUM 40 MG: 40 INJECTION, POWDER, FOR SOLUTION INTRAVENOUS at 07:43

## 2023-07-11 RX ADMIN — THIAMINE HYDROCHLORIDE 100 MG: 100 INJECTION, SOLUTION INTRAMUSCULAR; INTRAVENOUS at 07:42

## 2023-07-11 RX ADMIN — OXYCODONE AND ACETAMINOPHEN 1 TABLET: 5; 325 TABLET ORAL at 14:57

## 2023-07-11 RX ADMIN — Medication 10 ML: at 07:45

## 2023-07-11 RX ADMIN — ONDANSETRON 4 MG: 2 INJECTION INTRAMUSCULAR; INTRAVENOUS at 22:58

## 2023-07-11 RX ADMIN — ONDANSETRON 4 MG: 2 INJECTION INTRAMUSCULAR; INTRAVENOUS at 06:49

## 2023-07-11 RX ADMIN — SODIUM CHLORIDE, PRESERVATIVE FREE 10 ML: 5 INJECTION INTRAVENOUS at 21:51

## 2023-07-11 RX ADMIN — POTASSIUM CHLORIDE AND SODIUM CHLORIDE: 900; 150 INJECTION, SOLUTION INTRAVENOUS at 07:15

## 2023-07-11 RX ADMIN — PANTOPRAZOLE SODIUM 40 MG: 40 INJECTION, POWDER, FOR SOLUTION INTRAVENOUS at 21:51

## 2023-07-11 RX ADMIN — Medication 10 ML: at 07:44

## 2023-07-11 RX ADMIN — Medication 10 ML: at 21:51

## 2023-07-11 RX ADMIN — PROMETHAZINE HYDROCHLORIDE 25 MG: 25 INJECTION INTRAMUSCULAR; INTRAVENOUS at 18:02

## 2023-07-11 RX ADMIN — METOCLOPRAMIDE 5 MG: 5 INJECTION, SOLUTION INTRAMUSCULAR; INTRAVENOUS at 00:08

## 2023-07-11 ASSESSMENT — PAIN DESCRIPTION - DESCRIPTORS
DESCRIPTORS: ACHING
DESCRIPTORS: ACHING
DESCRIPTORS: ACHING;CRAMPING
DESCRIPTORS: ACHING

## 2023-07-11 ASSESSMENT — PAIN SCALES - GENERAL
PAINLEVEL_OUTOF10: 9
PAINLEVEL_OUTOF10: 5
PAINLEVEL_OUTOF10: 9
PAINLEVEL_OUTOF10: 7
PAINLEVEL_OUTOF10: 5
PAINLEVEL_OUTOF10: 8
PAINLEVEL_OUTOF10: 9
PAINLEVEL_OUTOF10: 8
PAINLEVEL_OUTOF10: 0
PAINLEVEL_OUTOF10: 8
PAINLEVEL_OUTOF10: 7
PAINLEVEL_OUTOF10: 4

## 2023-07-11 ASSESSMENT — PAIN DESCRIPTION - LOCATION
LOCATION: ABDOMEN;BACK
LOCATION: ABDOMEN;BACK
LOCATION: ABDOMEN

## 2023-07-11 ASSESSMENT — PAIN DESCRIPTION - ORIENTATION
ORIENTATION: RIGHT;LEFT

## 2023-07-11 NOTE — PROGRESS NOTES
Daughter returned to eat dinner with her mom. Pt sleeping right now. Phenergan ordered for nausea. VSS, afebrile. Bed alarm intact and call light in reach.

## 2023-07-11 NOTE — PROGRESS NOTES
Pt. Remains upset about the external posadas catheter, multiple attempts to encourage patient that she is doing better and should just try and maybe she will surprise herself. This assurance to no avail she reports she is unable to get comfortable due to pain and nausea. Awaiting nausea medication.

## 2023-07-11 NOTE — PROGRESS NOTES
Nutrition Note    RECOMMENDATIONS  PO Diet: Per surgery  ONS: Offer Ensure Clear BID  Nutrition Support:   Recommend to increase Clinimix 5/20 to goal rate 75 mL/hr. Physician/LIP to monitor closely and correct lytes (Phos,Mg,K+) d/t risk of refeeding syndrome  Continue 250 mL 20% lipids 2 times per week  Recommend FSBS, monitor glucose, need for insulin  Pharmacy to adjust MVI and Trace Elements as needed     NUTRITION ASSESSMENT   Pt's nutrition status is improving. Pt tolerating Clinimix 5/20 at 50 mL/hr. Plan to increase to goal ate 75 mL/hr tonight. Na+ 146. Phos 2.1. NG tube removed 7/10. G tube is to gravity. Pt with small stool out last night, Started on clear liquid diet this morning per surgery; G tube to remain to drainage. Will offer Ensure Clear BID for additional nutrition. Nutrition Related Findings: NS at 50 mL/hr. No edema noted. LBM 7/10. -3.2 liters. Wounds: Surgical Incision  Nutrition Education:  Education not indicated   Nutrition Goals: Tolerate nutrition support at goal rate, by next RD assessment     MALNUTRITION ASSESSMENT   Acute Illness  Malnutrition Status: At risk for malnutrition (Comment)  Findings of the 6 clinical characteristics of malnutrition:  Energy Intake:  50% or less of estimated energy requirements for 5 or more days  Weight Loss:  No significant weight loss     Body Fat Loss:  No significant body fat loss     Muscle Mass Loss:  No significant muscle mass loss    Fluid Accumulation:  No significant fluid accumulation     Strength:  Not Performed    NUTRITION DIAGNOSIS   Inadequate oral intake related to altered GI function, altered GI structure as evidenced by NPO or clear liquid status due to medical condition, nutrition support - parenteral nutrition    CURRENT NUTRITION THERAPIES  PN-Adult Premix 5/20 - Standard Electrolytes - Central Line  ADULT DIET;  Clear Liquid     PO Intake: Unable to assess (Just started on clear liquid diet)   PO Supplement Intake:None Ordered    Current Parenteral Nutrition Orders:  Type and Formula: Premix Central   Lipids: 250ml, Two times weekly  Duration: Continuous  Current PN Order Provides: Clinimix 5/20 at 50 mL/hr to provide 1200 mL total volume, 1056 calories, 60 grams protein, dextrose load 2.77 mg/kg/min. Goal PN Orders Provides: Clinimix 5/20 at 75 mL/hr to provide 1800 mL total volume, 1584 calories, 90 grams protein, dextrose load 4.16 mg/kg/min. ANTHROPOMETRICS  Current Height: 5' (152.4 cm)  Current Weight - Scale: 132 lb 7.9 oz (60.1 kg)    Admission weight: 142 lb 6.7 oz (64.6 kg)  Ideal Body Weight (IBW): 100 lbs  (45 kg)        BMI: 25.8    COMPARATIVE STANDARDS  Total Energy Requirements (kcals/day): 5114-8282     Protein (g):   grams       Fluid (mL/day):  8805-4560 mL    The patient will be monitored per nutrition standards of care. Consult dietitian if additional nutrition interventions are needed prior to RD reassessment.      Lula Carpenter MS, [address] [facility]    Contact: [phone]

## 2023-07-11 NOTE — PROGRESS NOTES
Assessment complete, vitals obtained. NSR. Pt A/O. PRN dilaudid given for pain 8/10, improved to 7/10. Pt still uncomfortable. PRN percocet & due meds given, see MAR. Pt on room air. Lungs clear. Ab flat, soft. G-tube to gravity w/ brown output. Mid abdominal incision w/ ADRIANA dressing. Active bowel sounds. Pt reports passing gas. Skin warm, dry. Scattered bruises and redness to extremities. Peripheral pulses palpable. External catheter in place. TPN, fat emulsion & IVF infusing per MAR. Pt reports repositioning self, denies further needs at this time. Call light in reach. Safety precautions in place.

## 2023-07-11 NOTE — PROGRESS NOTES
Clinical Pharmacy Note    Pharmacy consulted by Dr. Lavonne Pleitez to manage TPN    Current TPN rate: 50 ml/hr  Goal TPN rate: 75 ml/hr    Access: PICC  Indication: ex-lap    Labs:  General Labs:  BMP:    Lab Results   Component Value Date/Time     07/11/2023 04:20 AM    K 4.1 07/11/2023 04:20 AM    K 4.7 07/02/2023 07:24 PM     07/11/2023 04:20 AM    CO2 25 07/11/2023 04:20 AM    BUN 26 07/11/2023 04:20 AM    LABALBU 2.9 07/11/2023 04:20 AM    CREATININE 0.8 07/11/2023 04:20 AM    CALCIUM 8.3 07/11/2023 04:20 AM    GFRAA 57 02/09/2022 01:45 PM    GFRAA 56 01/10/2010 08:07 PM    LABGLOM >60 07/11/2023 04:20 AM    GLUCOSE 161 07/11/2023 04:20 AM       Electrolyte replacement as follows:   Replace phosphorous with 30 mmol of potassium phosphate administered IV over 4 hours    Blood sugars over past 24 hours: 119-163    Blood sugar management:  Plan to Continue Humalog q4 hour sliding scale at medium dose. Plan to increase TPN to goal of 75 ml/hr. Thank you for allowing pharmacy to participate in the care of this patient.     Christie Brock, [facility], PharmD 7/11/2023

## 2023-07-11 NOTE — PROGRESS NOTES
[address] [facility] Department   Phone: [phone]    Occupational Therapy    [] Initial Evaluation            [x] Daily Treatment Note         [] Discharge Summary      Patient: Rosenda Flores   : 10/16/1932   MRN: [id number]   Date of Service:  2023    Admitting Diagnosis:  Acute gastric volvulus  Current Admission Summary: Rosenda Flores is a 80 y.o. female who presented to ED for evaluation of nonradiating, epigastric abdominal pain and vomiting which began this morning. Patient describes pain as a constant fullness/swelling. She reports she has had multiple episodes of vomiting. She denies any known aggravating or alleviating factors. She denies hematemesis or coffee-ground emesis. She denies melena or hematochezia. She denies diarrhea, constipation, urinary symptoms. She has a history of appendectomy, cholecystectomy, and hysterectomy. She denies fever, dizziness, chest pain, shortness of breath. S/p ex lap , extensive ALLYSON, GT placement. Past Medical History:  has a past medical history of Arthritis, CAD (coronary artery disease), Cancer ([address]), Cystocele, Diabetes mellitus ([address]), Hepatitis A, History of blood transfusion, Hyperlipidemia, PVC (premature ventricular contraction), Rectocele, Reflux, and Scoliosis. Past Surgical History:  has a past surgical history that includes Appendectomy; joint replacement (Left); hernia repair (6 YRS AGO); Cholecystectomy; shoulder surgery (Right, 12); Bunionectomy (12); Breast surgery; Upper gastrointestinal endoscopy (12); Hysterectomy; bladder suspension; Dilation and curettage of uterus; other surgical history (Left, 14); Foot surgery; Cardiac catheterization; Colonoscopy (); Cystocopy (2017); other surgical history (Right, 2018); Cataract removal with implant (Left, 2018); pr xcapsl ctrc rmvl insj io lens prosth w/o ecp (Left, 2018);  Lumbar spine surgery (Right, date.    Functional Mobility  Bed Mobility:  Supine to Sit: stand by assistance  Scooting: stand by assistance,   Comments: HOB slightly elevated, increased time and bed rail used  Transfers:  Sit to stand transfer:contact guard assistance  Stand to sit transfer: contact guard assistance  Comments: EOB to FWW    Functional Mobility  Functional Mobility Activity: 20ft  Device Use: rolling walker  Required Assistance: contact guard assistance  Comment: slow pace, pt reports fatigue post ambulation  Balance:  Static Sitting Balance: fair (-): maintains balance at SBA with use of UE support  Dynamic Standing Balance: fair (-): maintains balance at CGA with use of UE support  Comments:   Other Therapeutic Interventions    Functional Outcomes  AM-PAC Inpatient Daily Activity Raw Score: 15    Cognition  WFL, difficult to assess secondary to [facility]  Orientation:    alert and oriented x 4  Command Following:   [facility]     Education  Barriers To Learning: hearing  Patient Education: patient educated on goals, OT role and benefits, plan of care, proper use of assistive device/equipment, adaptive device training, orientation, family education, disease specific education, pressure relief, transfer training, discharge recommendations  Learning Assessment:  patient verbalizes understanding, would benefit from continued reinforcement    Assessment  Activity Tolerance: fair, pt fatigues quickly. Pt on RA, destats 88% and recovers with rest break  Impairments Requiring Therapeutic Intervention: decreased functional mobility, decreased ADL status, decreased strength, decreased endurance, decreased balance, decreased IADL, increased pain, decreased posture  Prognosis: good  Clinical Assessment: Pt presents with the above deficits impacting occupational performance secondary to recent ex lap on 7/7.  Pt demonstrates improvements in functional transfers and mobility, however continues to be limited by increased pain and decreased activity

## 2023-07-11 NOTE — PROGRESS NOTES
Family left to waiting room to let patient sleep. Pt. Slept for a bit then awoke to her phone and hospitalist visit. MD made aware pt has not slept well in a couple days. Melatonin ordered. Clear liquid diet ordered for patient. Dr. Panchiot Corcoran told patient he want her eating up in the chair. Pt. in the chair now. Complaints of nausea. Zofran for nausea now. Patient's lunch tray and family lunches are on the way soon per dietary services. Pt. Bowel sounds are more active and she is passing gas regularly. Call light in reach. Chair alarm intact.

## 2023-07-11 NOTE — PROGRESS NOTES
Awake, alert, and oriented X3. VSS. Afebrile. Pain 8-9/10. Dilaudid now for pain. Bed alarm intact and call light in reach.

## 2023-07-11 NOTE — PROGRESS NOTES
Hospitalist Progress Note  Felipe Lewis MD      Name:  Clive Sweet /Age/Sex: 10/16/1932  (80 y.o. female)   MRN & CSN:  [id number] & [id number] Admission Date/Time: 2023  6:49 PM   Location:  [place] [id number] PCP: David Elaine Day: 10    Assessment and Plan:     Clive Sweet is a 40-year-old admitted with abdominal pain, distention, nausea and vomiting, found to have gastric volvulus, NG tube was placed for decompression, NG tube discontinued, on clear liquids, plan for hernia repair tomorrow, cardiology clearance appreciated. 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 NG tube in place to suction. PICC line was placed and TPN current rate is 30 mL with a goal of 70 mL/h so far tolerating TPN well continue supplemental oxygen, continue to wean, recommend to be out of bed, continue PT OT, continue perioperative antibiotics appreciate general surgery input, continue n.p.o. status not ready for diet yet     Chronic diastolic heart failure, with compensatory, last Echo showed LVEF 65%, cardiology clearance for surgery appreciated. Elevated troponin, chronically elevated, cardiology consulted by surgery. GERD with large hiatal hernia, PPI. Abdominal pain, chronic narcotic dependence, started on Percocet, prn dilaudid. Plan of care was coordinated with patient, family at bedside, RN  Evaluated by PT OT and recommending rehab, consult placed for ARU      Diet PN-Adult Premix 5/20 - Standard Electrolytes - Central Line  ADULT DIET;  Clear Liquid  ADULT ORAL NUTRITION SUPPLEMENT; Dinner, Breakfast; Clear Liquid Oral Supplement  PN-Adult Premix 5/20 - Standard Electrolytes - Central Line   DVT Prophylaxis Lovenox    Code Status DNR-CCA   Disposition To snf IN 2-3 DAYS

## 2023-07-11 NOTE — PROGRESS NOTES
Pt passing more gas, up to bedside commode x1 w/ walker. Pt had 3 small pieces of stool with moderate amount of mucous. Radha care complete, pt returned to bed & assisted to comfortable position.

## 2023-07-11 NOTE — PROGRESS NOTES
Pt. Up to the chair for three hours today. Back tp bed after up to the bedside commode. Passing gas, no BM. Pt. Turned and repositioned off coccyx. Pt. Reports she wants to sleep now. She refuses to remove pure wick at this time she states will remove after a nap. Pt. Has not slept well since admission. Pt. With visitors all day. Daughter did leave several times today to allow for her mom to sleep.  Pt. Sleep remains interrupted in the hospital.

## 2023-07-11 NOTE — PLAN OF CARE
Problem: Discharge Planning  Goal: Discharge to home or other facility with appropriate resources  Outcome: Progressing     Problem: Chronic Conditions and Co-morbidities  Goal: Patient's chronic conditions and co-morbidity symptoms are monitored and maintained or improved  Outcome: Progressing     Problem: Skin/Tissue Integrity  Goal: Absence of new skin breakdown  Description: 1. Monitor for areas of redness and/or skin breakdown  2. Assess vascular access sites hourly  3. Every 4-6 hours minimum:  Change oxygen saturation probe site  4. Every 4-6 hours:  If on nasal continuous positive airway pressure, respiratory therapy assess nares and determine need for appliance change or resting period.   Outcome: Progressing     Problem: ABCDS Injury Assessment  Goal: Absence of physical injury  Outcome: Progressing     Problem: Safety - Adult  Goal: Free from fall injury  Outcome: Progressing     Problem: Pain  Goal: Verbalizes/displays adequate comfort level or baseline comfort level  Outcome: Progressing     Problem: Nutrition Deficit:  Goal: Optimize nutritional status  Outcome: Progressing  Flowsheets (Taken 7/11/2023 1142 by Katelin Hickman, MS, RD, LD)  Nutrient intake appropriate for improving, restoring, or maintaining nutritional needs:   Monitor oral intake, labs, and treatment plans   Recommend appropriate diets, oral nutritional supplements, and vitamin/mineral supplements   Recommend, monitor, and adjust tube feedings and TPN/PPN based on assessed needs

## 2023-07-11 NOTE — PROGRESS NOTES
Pt. Up in the chair, she is nodding off but fighting sleep. Pain 9/10. Dilaudid 1 mg for pain now per request. Voiding per external female catheter. Daughter left room for awhile to allow pt. to sleep. Call light in reach and chair alarm intact.

## 2023-07-11 NOTE — PROGRESS NOTES
Explained to patient surgery would like her to get up to the bathroom to urinate when she has to go. They do not want her to use th external catheter. Pt. Ailyn Arreguin about this. Pt  says no to removing the catheter. Patient is in tears she states she is 89 not 48, not younger. She replies she could not possibly make it to the commode without wetting herself and cant believe they would want her to wet herself although she understands why they would want that it is not possible at her age with her scoliosis and surgery. She wants her hospitalist to know that she refuses. She reports she is in pain and nauseated. Dilaudid administered PRN for pain. Awaiting medication for nausea per Dr. Severiano Beverly. Bed alarm intact and call light in reach.

## 2023-07-12 ENCOUNTER — APPOINTMENT (OUTPATIENT)
Dept: GENERAL RADIOLOGY | Age: 88
End: 2023-07-12
Payer: MEDICARE

## 2023-07-12 LAB
ALBUMIN SERPL-MCNC: 3.2 G/DL (ref 3.4–5)
ALBUMIN/GLOB SERPL: 1.3 {RATIO} (ref 1.1–2.2)
ALP SERPL-CCNC: 57 U/L (ref 40–129)
ALT SERPL-CCNC: 22 U/L (ref 10–40)
ANION GAP SERPL CALCULATED.3IONS-SCNC: 10 MMOL/L (ref 3–16)
AST SERPL-CCNC: 33 U/L (ref 15–37)
BILIRUB SERPL-MCNC: 0.5 MG/DL (ref 0–1)
BUN SERPL-MCNC: 24 MG/DL (ref 7–20)
CALCIUM SERPL-MCNC: 8.6 MG/DL (ref 8.3–10.6)
CHLORIDE SERPL-SCNC: 107 MMOL/L (ref 99–110)
CO2 SERPL-SCNC: 24 MMOL/L (ref 21–32)
CREAT SERPL-MCNC: 0.6 MG/DL (ref 0.6–1.2)
GFR SERPLBLD CREATININE-BSD FMLA CKD-EPI: >60 ML/MIN/{1.73_M2}
GLUCOSE BLD-MCNC: 146 MG/DL (ref 70–99)
GLUCOSE BLD-MCNC: 198 MG/DL (ref 70–99)
GLUCOSE BLD-MCNC: 200 MG/DL (ref 70–99)
GLUCOSE BLD-MCNC: 213 MG/DL (ref 70–99)
GLUCOSE BLD-MCNC: 215 MG/DL (ref 70–99)
GLUCOSE SERPL-MCNC: 220 MG/DL (ref 70–99)
MAGNESIUM SERPL-MCNC: 2.1 MG/DL (ref 1.8–2.4)
PERFORMED ON: ABNORMAL
PHOSPHATE SERPL-MCNC: 2.8 MG/DL (ref 2.5–4.9)
POTASSIUM SERPL-SCNC: 4.3 MMOL/L (ref 3.5–5.1)
PROT SERPL-MCNC: 5.7 G/DL (ref 6.4–8.2)
SODIUM SERPL-SCNC: 141 MMOL/L (ref 136–145)

## 2023-07-12 PROCEDURE — 6360000002 HC RX W HCPCS: Performed by: SURGERY

## 2023-07-12 PROCEDURE — 2500000003 HC RX 250 WO HCPCS: Performed by: SURGERY

## 2023-07-12 PROCEDURE — 6370000000 HC RX 637 (ALT 250 FOR IP): Performed by: SURGERY

## 2023-07-12 PROCEDURE — 2000000000 HC ICU R&B

## 2023-07-12 PROCEDURE — 84100 ASSAY OF PHOSPHORUS: CPT

## 2023-07-12 PROCEDURE — 2580000003 HC RX 258: Performed by: SURGERY

## 2023-07-12 PROCEDURE — APPSS15 APP SPLIT SHARED TIME 0-15 MINUTES: Performed by: NURSE PRACTITIONER

## 2023-07-12 PROCEDURE — C9113 INJ PANTOPRAZOLE SODIUM, VIA: HCPCS | Performed by: SURGERY

## 2023-07-12 PROCEDURE — APPNB30 APP NON BILLABLE TIME 0-30 MINS: Performed by: NURSE PRACTITIONER

## 2023-07-12 PROCEDURE — 6370000000 HC RX 637 (ALT 250 FOR IP): Performed by: NURSE PRACTITIONER

## 2023-07-12 PROCEDURE — 6360000002 HC RX W HCPCS: Performed by: INTERNAL MEDICINE

## 2023-07-12 PROCEDURE — 80053 COMPREHEN METABOLIC PANEL: CPT

## 2023-07-12 PROCEDURE — 74019 RADEX ABDOMEN 2 VIEWS: CPT

## 2023-07-12 PROCEDURE — 83735 ASSAY OF MAGNESIUM: CPT

## 2023-07-12 PROCEDURE — 99024 POSTOP FOLLOW-UP VISIT: CPT | Performed by: SURGERY

## 2023-07-12 RX ORDER — OXYCODONE AND ACETAMINOPHEN 10; 325 MG/1; MG/1
1 TABLET ORAL EVERY 4 HOURS PRN
Status: DISCONTINUED | OUTPATIENT
Start: 2023-07-12 | End: 2023-07-18

## 2023-07-12 RX ORDER — LIDOCAINE 4 G/G
1 PATCH TOPICAL DAILY
Status: DISCONTINUED | OUTPATIENT
Start: 2023-07-12 | End: 2023-07-28 | Stop reason: HOSPADM

## 2023-07-12 RX ORDER — HYDROMORPHONE HYDROCHLORIDE 1 MG/ML
1 INJECTION, SOLUTION INTRAMUSCULAR; INTRAVENOUS; SUBCUTANEOUS EVERY 4 HOURS PRN
Status: DISCONTINUED | OUTPATIENT
Start: 2023-07-12 | End: 2023-07-14

## 2023-07-12 RX ORDER — SCOLOPAMINE TRANSDERMAL SYSTEM 1 MG/1
1 PATCH, EXTENDED RELEASE TRANSDERMAL
Status: DISCONTINUED | OUTPATIENT
Start: 2023-07-12 | End: 2023-07-28 | Stop reason: HOSPADM

## 2023-07-12 RX ORDER — LEVOFLOXACIN 5 MG/ML
750 INJECTION, SOLUTION INTRAVENOUS EVERY 24 HOURS
Status: DISCONTINUED | OUTPATIENT
Start: 2023-07-12 | End: 2023-07-14

## 2023-07-12 RX ORDER — HYDROMORPHONE HYDROCHLORIDE 1 MG/ML
0.5 INJECTION, SOLUTION INTRAMUSCULAR; INTRAVENOUS; SUBCUTANEOUS EVERY 4 HOURS PRN
Status: DISCONTINUED | OUTPATIENT
Start: 2023-07-12 | End: 2023-07-18

## 2023-07-12 RX ORDER — PROCHLORPERAZINE EDISYLATE 5 MG/ML
10 INJECTION INTRAMUSCULAR; INTRAVENOUS EVERY 6 HOURS PRN
Status: DISCONTINUED | OUTPATIENT
Start: 2023-07-12 | End: 2023-07-28 | Stop reason: HOSPADM

## 2023-07-12 RX ADMIN — PANTOPRAZOLE SODIUM 40 MG: 40 INJECTION, POWDER, FOR SOLUTION INTRAVENOUS at 09:51

## 2023-07-12 RX ADMIN — Medication 10 ML: at 21:24

## 2023-07-12 RX ADMIN — HYDROMORPHONE HYDROCHLORIDE 1 MG: 1 INJECTION, SOLUTION INTRAMUSCULAR; INTRAVENOUS; SUBCUTANEOUS at 21:23

## 2023-07-12 RX ADMIN — ENOXAPARIN SODIUM 30 MG: 100 INJECTION SUBCUTANEOUS at 21:22

## 2023-07-12 RX ADMIN — Medication 10 ML: at 09:52

## 2023-07-12 RX ADMIN — HYDROMORPHONE HYDROCHLORIDE 0.5 MG: 1 INJECTION, SOLUTION INTRAMUSCULAR; INTRAVENOUS; SUBCUTANEOUS at 15:21

## 2023-07-12 RX ADMIN — ONDANSETRON 4 MG: 2 INJECTION INTRAMUSCULAR; INTRAVENOUS at 21:23

## 2023-07-12 RX ADMIN — PANTOPRAZOLE SODIUM 40 MG: 40 INJECTION, POWDER, FOR SOLUTION INTRAVENOUS at 21:23

## 2023-07-12 RX ADMIN — INSULIN LISPRO 2 UNITS: 100 INJECTION, SOLUTION INTRAVENOUS; SUBCUTANEOUS at 07:25

## 2023-07-12 RX ADMIN — INSULIN LISPRO 2 UNITS: 100 INJECTION, SOLUTION INTRAVENOUS; SUBCUTANEOUS at 16:25

## 2023-07-12 RX ADMIN — ONDANSETRON 4 MG: 2 INJECTION INTRAMUSCULAR; INTRAVENOUS at 05:07

## 2023-07-12 RX ADMIN — ASCORBIC ACID, VITAMIN A PALMITATE, CHOLECALCIFEROL, THIAMINE HYDROCHLORIDE, RIBOFLAVIN-5 PHOSPHATE SODIUM, PYRIDOXINE HYDROCHLORIDE, NIACINAMIDE, DEXPANTHENOL, ALPHA-TOCOPHEROL ACETATE, VITAMIN K1, FOLIC ACID, BIOTIN, CYANOCOBALAMIN: 200; 3300; 200; 6; 3.6; 6; 40; 15; 10; 150; 600; 60; 5 INJECTION, SOLUTION INTRAVENOUS at 18:13

## 2023-07-12 RX ADMIN — INSULIN LISPRO 2 UNITS: 100 INJECTION, SOLUTION INTRAVENOUS; SUBCUTANEOUS at 02:38

## 2023-07-12 RX ADMIN — POTASSIUM CHLORIDE AND SODIUM CHLORIDE: 900; 150 INJECTION, SOLUTION INTRAVENOUS at 01:44

## 2023-07-12 RX ADMIN — THIAMINE HYDROCHLORIDE 100 MG: 100 INJECTION, SOLUTION INTRAMUSCULAR; INTRAVENOUS at 09:51

## 2023-07-12 RX ADMIN — SODIUM CHLORIDE, PRESERVATIVE FREE 10 ML: 5 INJECTION INTRAVENOUS at 21:23

## 2023-07-12 RX ADMIN — PHENOL 1 SPRAY: 1.5 LIQUID ORAL at 21:24

## 2023-07-12 RX ADMIN — LEVOFLOXACIN 750 MG: 5 INJECTION, SOLUTION INTRAVENOUS at 13:26

## 2023-07-12 RX ADMIN — Medication 10 ML: at 09:51

## 2023-07-12 RX ADMIN — PHENOL 1 SPRAY: 1.5 LIQUID ORAL at 20:52

## 2023-07-12 RX ADMIN — SODIUM CHLORIDE, PRESERVATIVE FREE 10 ML: 5 INJECTION INTRAVENOUS at 09:51

## 2023-07-12 ASSESSMENT — PAIN DESCRIPTION - PAIN TYPE: TYPE: SURGICAL PAIN

## 2023-07-12 ASSESSMENT — PAIN SCALES - GENERAL
PAINLEVEL_OUTOF10: 8
PAINLEVEL_OUTOF10: 6
PAINLEVEL_OUTOF10: 8
PAINLEVEL_OUTOF10: 8

## 2023-07-12 ASSESSMENT — PAIN DESCRIPTION - LOCATION
LOCATION: ABDOMEN
LOCATION: ABDOMEN

## 2023-07-12 ASSESSMENT — PAIN DESCRIPTION - FREQUENCY: FREQUENCY: CONTINUOUS

## 2023-07-12 NOTE — PROGRESS NOTES
Clinical Pharmacy Note    Pharmacy consulted by Dr. Madai De Oliveira to manage TPN    Current TPN rate: 75 ml/hr  Goal TPN rate: 75 ml/hr    Access: PICC  Indication: ex-lap    Labs:  General Labs:  BMP:    Lab Results   Component Value Date/Time     07/12/2023 05:20 AM    K 4.3 07/12/2023 05:20 AM    K 4.7 07/02/2023 07:24 PM     07/12/2023 05:20 AM    CO2 24 07/12/2023 05:20 AM    BUN 24 07/12/2023 05:20 AM    LABALBU 3.2 07/12/2023 05:20 AM    CREATININE 0.6 07/12/2023 05:20 AM    CALCIUM 8.6 07/12/2023 05:20 AM    GFRAA 57 02/09/2022 01:45 PM    GFRAA 56 01/10/2010 08:07 PM    LABGLOM >60 07/12/2023 05:20 AM    GLUCOSE 220 07/12/2023 05:20 AM       Electrolyte replacement as follows: No electrolyte replacement needed today    Blood sugars over past 24 hours: 158-215    Blood sugar management:  Plan to Continue Humalog q4 hour sliding scale at low dose. Plan to continue TPN at 75 ml/hr. Thank you for allowing pharmacy to participate in the care of this patient.     Mabel Quan, [facility], PharmD 7/12/2023

## 2023-07-12 NOTE — OP NOTE
[address] [facility]                     [address], [address]                                OPERATIVE REPORT    PATIENT NAME: Deni Matias                  :        10/16/1932  MED REC NO:   [id number]                          ROOM:       4096  ACCOUNT NO:   [de-identified]                           ADMIT DATE: 2023  PROVIDER:     Corie Bui MD    DATE OF PROCEDURE:  2023    PREOPERATIVE DIAGNOSES:  Hiatal hernia and small bowel obstruction. POSTOPERATIVE DIAGNOSES:  Hiatal hernia and small bowel obstruction. PROCEDURE PERFORMED:  Exploratory laparoscopy converted to open  laparotomy, extensive intraabdominal lysis of adhesions and gastrostomy  tube placement. SURGEON:  Corie Bui MD.    ANESTHESIA:  General.    ESTIMATED BLOOD LOSS:  Minimal.    COMPLICATIONS:  None. SPECIMENS:  None. INDICATIONS AND FINDINGS:  The patient presents for surgery today due to  intestinal obstruction. She was initially thought most significantly to  have a gastric volvulus and she has had a nasogastric tube decompression  of this with throughput noted of contrast and the endoscope. The  patient has had intermittent abdominal pain, nausea on and off and  emesis on a recurrent basis over the last year or so. She actually had  more pain than usual over the last couple of days including more  abdominal distention as noted in the preoperative holding bay when I  spoke with her today prior to surgery. In terms of the initial findings at surgery when the laparoscopy was  initially done, there was noted to be marked dilation of multiple small  bowel loops through the laparoscope placed in the left upper quadrant.    There were matted small bowel loops present to the anterior abdominal  wall throughout the patient's previous midline incision and I noted the  transverse and right colon to be collapsed and decompressed with the  exception of noting some stool in the colon. The bowel appeared  especially adhesed in the mid lower abdomen and low down in the pelvis  and with these findings became definitely more concerned of a high-grade  small bowel obstruction causing her issues than the gastric volvulus  situation. The stomach has likely been filling up with fluid including  reflux back into the esophagus due to downstream obstruction. In terms of operative findings and results, the decision was made at  this point probably to proceed with a laparotomy for evaluation. There  was extensive open lysis of adhesions present which took an hour and a  half more than typical of lysis of adhesions due to the dense matted  nature of all the bowel requiring well over 3 hours of operative time to  complete the surgery. The patient had a high-grade mid ileal  obstruction noted with interloop adhesions and an internal hernia  present with the bowel tightly tethered down in the pelvis. This area  was released and with this, the area of the bowel which was bruised and  compromised-appearing returned to viability with no resection required. The patient did indeed have a high-grade mechanical small bowel  obstruction. The lysis of adhesions was carried throughout the entire  small intestine from the ligament of Treitz to the ileocecal valve and  we were able to get all the bowel up and out of the pelvis and  straightening out all of the prior adhesions. The bowel was run several  times following completion to assure good throughput of contents and be  sure that there were no enterotomies. There were a couple of serosal  tears, which were reapproximated with silk sutures and at the tight band  of the distal point of the obstruction, there was one side on the bowel  which appeared slightly ischemic about 3 mm in size and this was abutted  and oversewn transversely with 3-0 silk Lembert sutures to close over  this area.     In terms of the gastric volvulus and

## 2023-07-12 NOTE — PROGRESS NOTES
Pt sleeping, wakes to voice. Assessment complete, vitals obtained. Pt appears comfortable, nodding off during assessment. On 2L NC. Lungs clear / diminished. Ab flat, soft. + bowel sounds. ADRIANA dressing clean, dry, intact. G-tube with scant brown/clear output. Skin warm, dry. Pulses palpable. External catheter in place. Due meds given, see MAR. Offered pt assistance repositioning, pt declined. No further needs at this time. Call light in reach. Safety precautions in place.

## 2023-07-13 LAB
ALBUMIN SERPL-MCNC: 2.8 G/DL (ref 3.4–5)
ALBUMIN/GLOB SERPL: 1.2 {RATIO} (ref 1.1–2.2)
ALP SERPL-CCNC: 54 U/L (ref 40–129)
ALT SERPL-CCNC: 29 U/L (ref 10–40)
ANION GAP SERPL CALCULATED.3IONS-SCNC: 8 MMOL/L (ref 3–16)
ANISOCYTOSIS BLD QL SMEAR: ABNORMAL
AST SERPL-CCNC: 48 U/L (ref 15–37)
BASOPHILS # BLD: 0 K/UL (ref 0–0.2)
BASOPHILS NFR BLD: 0 %
BILIRUB SERPL-MCNC: 0.4 MG/DL (ref 0–1)
BUN SERPL-MCNC: 36 MG/DL (ref 7–20)
CALCIUM SERPL-MCNC: 8.9 MG/DL (ref 8.3–10.6)
CHLORIDE SERPL-SCNC: 107 MMOL/L (ref 99–110)
CO2 SERPL-SCNC: 27 MMOL/L (ref 21–32)
CREAT SERPL-MCNC: 0.8 MG/DL (ref 0.6–1.2)
DEPRECATED RDW RBC AUTO: 14.4 % (ref 12.4–15.4)
EOSINOPHIL # BLD: 0.1 K/UL (ref 0–0.6)
EOSINOPHIL NFR BLD: 1 %
GFR SERPLBLD CREATININE-BSD FMLA CKD-EPI: >60 ML/MIN/{1.73_M2}
GLUCOSE BLD-MCNC: 125 MG/DL (ref 70–99)
GLUCOSE BLD-MCNC: 140 MG/DL (ref 70–99)
GLUCOSE BLD-MCNC: 155 MG/DL (ref 70–99)
GLUCOSE BLD-MCNC: 186 MG/DL (ref 70–99)
GLUCOSE BLD-MCNC: 192 MG/DL (ref 70–99)
GLUCOSE BLD-MCNC: 202 MG/DL (ref 70–99)
GLUCOSE BLD-MCNC: 233 MG/DL (ref 70–99)
GLUCOSE SERPL-MCNC: 138 MG/DL (ref 70–99)
HCT VFR BLD AUTO: 28.6 % (ref 36–48)
HGB BLD-MCNC: 9.3 G/DL (ref 12–16)
LYMPHOCYTES # BLD: 1.6 K/UL (ref 1–5.1)
LYMPHOCYTES NFR BLD: 21 %
MAGNESIUM SERPL-MCNC: 2.2 MG/DL (ref 1.8–2.4)
MCH RBC QN AUTO: 30.6 PG (ref 26–34)
MCHC RBC AUTO-ENTMCNC: 32.5 G/DL (ref 31–36)
MCV RBC AUTO: 94.2 FL (ref 80–100)
MONOCYTES # BLD: 0.2 K/UL (ref 0–1.3)
MONOCYTES NFR BLD: 3 %
NEUTROPHILS # BLD: 5.6 K/UL (ref 1.7–7.7)
NEUTROPHILS NFR BLD: 71 %
NEUTS BAND NFR BLD MANUAL: 4 % (ref 0–7)
OVALOCYTES BLD QL SMEAR: ABNORMAL
PERFORMED ON: ABNORMAL
PHOSPHATE SERPL-MCNC: 3.2 MG/DL (ref 2.5–4.9)
PLATELET # BLD AUTO: 154 K/UL (ref 135–450)
PLATELET BLD QL SMEAR: ADEQUATE
PMV BLD AUTO: 7.9 FL (ref 5–10.5)
POLYCHROMASIA BLD QL SMEAR: ABNORMAL
POTASSIUM SERPL-SCNC: 4.5 MMOL/L (ref 3.5–5.1)
PROT SERPL-MCNC: 5.1 G/DL (ref 6.4–8.2)
RBC # BLD AUTO: 3.04 M/UL (ref 4–5.2)
SLIDE REVIEW: ABNORMAL
SODIUM SERPL-SCNC: 142 MMOL/L (ref 136–145)
WBC # BLD AUTO: 7.4 K/UL (ref 4–11)

## 2023-07-13 PROCEDURE — C9113 INJ PANTOPRAZOLE SODIUM, VIA: HCPCS | Performed by: SURGERY

## 2023-07-13 PROCEDURE — 36415 COLL VENOUS BLD VENIPUNCTURE: CPT

## 2023-07-13 PROCEDURE — 2580000003 HC RX 258: Performed by: SURGERY

## 2023-07-13 PROCEDURE — 1200000000 HC SEMI PRIVATE

## 2023-07-13 PROCEDURE — 2500000003 HC RX 250 WO HCPCS: Performed by: SURGERY

## 2023-07-13 PROCEDURE — 84100 ASSAY OF PHOSPHORUS: CPT

## 2023-07-13 PROCEDURE — 6360000002 HC RX W HCPCS: Performed by: SURGERY

## 2023-07-13 PROCEDURE — 97530 THERAPEUTIC ACTIVITIES: CPT

## 2023-07-13 PROCEDURE — 99024 POSTOP FOLLOW-UP VISIT: CPT | Performed by: SURGERY

## 2023-07-13 PROCEDURE — 80053 COMPREHEN METABOLIC PANEL: CPT

## 2023-07-13 PROCEDURE — 6370000000 HC RX 637 (ALT 250 FOR IP): Performed by: SURGERY

## 2023-07-13 PROCEDURE — 6370000000 HC RX 637 (ALT 250 FOR IP): Performed by: NURSE PRACTITIONER

## 2023-07-13 PROCEDURE — 85025 COMPLETE CBC W/AUTO DIFF WBC: CPT

## 2023-07-13 PROCEDURE — 83735 ASSAY OF MAGNESIUM: CPT

## 2023-07-13 PROCEDURE — 6360000002 HC RX W HCPCS: Performed by: INTERNAL MEDICINE

## 2023-07-13 RX ORDER — BISACODYL 10 MG
10 SUPPOSITORY, RECTAL RECTAL ONCE
Status: COMPLETED | OUTPATIENT
Start: 2023-07-13 | End: 2023-07-13

## 2023-07-13 RX ORDER — METOCLOPRAMIDE HYDROCHLORIDE 5 MG/ML
5 INJECTION INTRAMUSCULAR; INTRAVENOUS EVERY 6 HOURS
Status: COMPLETED | OUTPATIENT
Start: 2023-07-13 | End: 2023-07-14

## 2023-07-13 RX ORDER — ENOXAPARIN SODIUM 100 MG/ML
40 INJECTION SUBCUTANEOUS NIGHTLY
Status: DISCONTINUED | OUTPATIENT
Start: 2023-07-13 | End: 2023-07-28 | Stop reason: HOSPADM

## 2023-07-13 RX ADMIN — PANTOPRAZOLE SODIUM 40 MG: 40 INJECTION, POWDER, FOR SOLUTION INTRAVENOUS at 20:13

## 2023-07-13 RX ADMIN — HYDROMORPHONE HYDROCHLORIDE 1 MG: 1 INJECTION, SOLUTION INTRAMUSCULAR; INTRAVENOUS; SUBCUTANEOUS at 11:07

## 2023-07-13 RX ADMIN — INSULIN LISPRO 2 UNITS: 100 INJECTION, SOLUTION INTRAVENOUS; SUBCUTANEOUS at 14:24

## 2023-07-13 RX ADMIN — Medication 10 ML: at 10:41

## 2023-07-13 RX ADMIN — Medication 10 ML: at 08:45

## 2023-07-13 RX ADMIN — SODIUM CHLORIDE, PRESERVATIVE FREE 10 ML: 5 INJECTION INTRAVENOUS at 08:47

## 2023-07-13 RX ADMIN — HYDROMORPHONE HYDROCHLORIDE 1 MG: 1 INJECTION, SOLUTION INTRAMUSCULAR; INTRAVENOUS; SUBCUTANEOUS at 20:14

## 2023-07-13 RX ADMIN — ONDANSETRON 4 MG: 2 INJECTION INTRAMUSCULAR; INTRAVENOUS at 04:15

## 2023-07-13 RX ADMIN — HYDROMORPHONE HYDROCHLORIDE 1 MG: 1 INJECTION, SOLUTION INTRAMUSCULAR; INTRAVENOUS; SUBCUTANEOUS at 02:40

## 2023-07-13 RX ADMIN — METOCLOPRAMIDE HYDROCHLORIDE 5 MG: 5 INJECTION INTRAMUSCULAR; INTRAVENOUS at 10:41

## 2023-07-13 RX ADMIN — LEVOFLOXACIN 750 MG: 5 INJECTION, SOLUTION INTRAVENOUS at 14:29

## 2023-07-13 RX ADMIN — METOCLOPRAMIDE HYDROCHLORIDE 5 MG: 5 INJECTION INTRAMUSCULAR; INTRAVENOUS at 16:37

## 2023-07-13 RX ADMIN — PANTOPRAZOLE SODIUM 40 MG: 40 INJECTION, POWDER, FOR SOLUTION INTRAVENOUS at 08:47

## 2023-07-13 RX ADMIN — HYDROMORPHONE HYDROCHLORIDE 1 MG: 1 INJECTION, SOLUTION INTRAMUSCULAR; INTRAVENOUS; SUBCUTANEOUS at 07:05

## 2023-07-13 RX ADMIN — ONDANSETRON 4 MG: 2 INJECTION INTRAMUSCULAR; INTRAVENOUS at 11:46

## 2023-07-13 RX ADMIN — THIAMINE HYDROCHLORIDE 100 MG: 100 INJECTION, SOLUTION INTRAMUSCULAR; INTRAVENOUS at 09:04

## 2023-07-13 RX ADMIN — I.V. FAT EMULSION 250 ML: 20 EMULSION INTRAVENOUS at 18:31

## 2023-07-13 RX ADMIN — METOCLOPRAMIDE HYDROCHLORIDE 5 MG: 5 INJECTION INTRAMUSCULAR; INTRAVENOUS at 22:56

## 2023-07-13 RX ADMIN — BISACODYL 10 MG: 10 SUPPOSITORY RECTAL at 10:41

## 2023-07-13 RX ADMIN — INSULIN LISPRO 2 UNITS: 100 INJECTION, SOLUTION INTRAVENOUS; SUBCUTANEOUS at 05:21

## 2023-07-13 RX ADMIN — LEUCINE, PHENYLALANINE, LYSINE, METHIONINE, ISOLEUCINE, VALINE, HISTIDINE, THREONINE, TRYPTOPHAN, ALANINE, GLYCINE, ARGININE, PROLINE, SERINE, TYROSINE, SODIUM ACETATE, DIBASIC POTASSIUM PHOSPHATE, MAGNESIUM CHLORIDE, SODIUM CHLORIDE, CALCIUM CHLORIDE, DEXTROSE
365; 280; 290; 200; 300; 290; 240; 210; 90; 1035; 515; 575; 340; 250; 20; 340; 261; 51; 59; 33; 20 INJECTION INTRAVENOUS at 18:28

## 2023-07-13 RX ADMIN — ENOXAPARIN SODIUM 40 MG: 100 INJECTION SUBCUTANEOUS at 20:13

## 2023-07-13 RX ADMIN — HYDROMORPHONE HYDROCHLORIDE 1 MG: 1 INJECTION, SOLUTION INTRAMUSCULAR; INTRAVENOUS; SUBCUTANEOUS at 16:00

## 2023-07-13 ASSESSMENT — PAIN DESCRIPTION - LOCATION
LOCATION: ABDOMEN;BACK
LOCATION: ABDOMEN

## 2023-07-13 ASSESSMENT — PAIN DESCRIPTION - DESCRIPTORS
DESCRIPTORS: ACHING;DISCOMFORT
DESCRIPTORS: ACHING
DESCRIPTORS: ACHING;DISCOMFORT

## 2023-07-13 ASSESSMENT — PAIN SCALES - GENERAL
PAINLEVEL_OUTOF10: 0
PAINLEVEL_OUTOF10: 7
PAINLEVEL_OUTOF10: 2
PAINLEVEL_OUTOF10: 8
PAINLEVEL_OUTOF10: 8
PAINLEVEL_OUTOF10: 9
PAINLEVEL_OUTOF10: 9

## 2023-07-13 ASSESSMENT — PAIN DESCRIPTION - PAIN TYPE: TYPE: SURGICAL PAIN

## 2023-07-13 ASSESSMENT — PAIN - FUNCTIONAL ASSESSMENT
PAIN_FUNCTIONAL_ASSESSMENT: INTOLERABLE, UNABLE TO DO ANY ACTIVE OR PASSIVE ACTIVITIES
PAIN_FUNCTIONAL_ASSESSMENT: ACTIVITIES ARE NOT PREVENTED
PAIN_FUNCTIONAL_ASSESSMENT: INTOLERABLE, UNABLE TO DO ANY ACTIVE OR PASSIVE ACTIVITIES

## 2023-07-13 ASSESSMENT — PAIN DESCRIPTION - ORIENTATION: ORIENTATION: RIGHT;LEFT

## 2023-07-13 ASSESSMENT — PAIN DESCRIPTION - ONSET: ONSET: ON-GOING

## 2023-07-13 ASSESSMENT — PAIN DESCRIPTION - FREQUENCY: FREQUENCY: CONTINUOUS

## 2023-07-13 NOTE — PROGRESS NOTES
Clinical Pharmacy Note    Pharmacy consulted by Dr. Cheryle Palacios to manage TPN    Current TPN rate: 75 ml/hr  Goal TPN rate: 75 ml/hr    Access: PICC  Indication: ex-lap    Labs:  General Labs:  BMP:    Lab Results   Component Value Date/Time     07/13/2023 08:40 AM    K 4.5 07/13/2023 08:40 AM    K 4.7 07/02/2023 07:24 PM     07/13/2023 08:40 AM    CO2 27 07/13/2023 08:40 AM    BUN 36 07/13/2023 08:40 AM    LABALBU 2.8 07/13/2023 08:40 AM    CREATININE 0.8 07/13/2023 08:40 AM    CALCIUM 8.9 07/13/2023 08:40 AM    GFRAA 57 02/09/2022 01:45 PM    GFRAA 56 01/10/2010 08:07 PM    LABGLOM >60 07/13/2023 08:40 AM    GLUCOSE 138 07/13/2023 08:40 AM       Electrolyte replacement as follows: No electrolyte replacement needed today    Blood sugars over past 24 hours: 138-233    Blood sugar management:  Plan to Continue Humalog q4 hour sliding scale at medium dose. Plan to continue TPN at 75 ml/hr. Thank you for allowing pharmacy to participate in the care of this patient.     Barbee Aase, PharmD, BCPS  [id number]  7/13/2023 10:55 AM

## 2023-07-13 NOTE — PLAN OF CARE
Patient alert and oriented times 4, discharge planning in process, Skin assessed and flow sheet completed. Patient without injury or fall this admission. Safety precaution in place. No complaints of pain or discomfort at time of arrival to unit. TPN infusing. RN will continue to monitor and treat as ordered.

## 2023-07-13 NOTE — PROGRESS NOTES
7:51 AM   Result Value Ref Range    POC Glucose 125 (H) 70 - 99 mg/dl    Performed on ACCU-CHEK    Comprehensive Metabolic Panel    Collection Time: 07/13/23  8:40 AM   Result Value Ref Range    Sodium 142 136 - 145 mmol/L    Potassium 4.5 3.5 - 5.1 mmol/L    Chloride 107 99 - 110 mmol/L    CO2 27 21 - 32 mmol/L    Anion Gap 8 3 - 16    Glucose 138 (H) 70 - 99 mg/dL    BUN 36 (H) 7 - 20 mg/dL    Creatinine 0.8 0.6 - 1.2 mg/dL    Est, Glom Filt Rate >60 >60    Calcium 8.9 8.3 - 10.6 mg/dL    Total Protein 5.1 (L) 6.4 - 8.2 g/dL    Albumin 2.8 (L) 3.4 - 5.0 g/dL    Albumin/Globulin Ratio 1.2 1.1 - 2.2    Total Bilirubin 0.4 0.0 - 1.0 mg/dL    Alkaline Phosphatase 54 40 - 129 U/L    ALT 29 10 - 40 U/L    AST 48 (H) 15 - 37 U/L   Magnesium    Collection Time: 07/13/23  8:40 AM   Result Value Ref Range    Magnesium 2.20 1.80 - 2.40 mg/dL   Phosphorus    Collection Time: 07/13/23  8:40 AM   Result Value Ref Range    Phosphorus 3.2 2.5 - 4.9 mg/dL   CBC with Auto Differential    Collection Time: 07/13/23  8:41 AM   Result Value Ref Range    WBC 7.4 4.0 - 11.0 K/uL    RBC 3.04 (L) 4.00 - 5.20 M/uL    Hemoglobin 9.3 (L) 12.0 - 16.0 g/dL    Hematocrit 28.6 (L) 36.0 - 48.0 %    MCV 94.2 80.0 - 100.0 fL    MCH 30.6 26.0 - 34.0 pg    MCHC 32.5 31.0 - 36.0 g/dL    RDW 14.4 12.4 - 15.4 %    Platelets 690 951 - 487 K/uL    MPV 7.9 5.0 - 10.5 fL    PLATELET SLIDE REVIEW Adequate     SLIDE REVIEW see below     Neutrophils % 71.0 %    Lymphocytes % 21.0 %    Monocytes % 3.0 %    Eosinophils % 1.0 %    Basophils % 0.0 %    Neutrophils Absolute 5.6 1.7 - 7.7 K/uL    Lymphocytes Absolute 1.6 1.0 - 5.1 K/uL    Monocytes Absolute 0.2 0.0 - 1.3 K/uL    Eosinophils Absolute 0.1 0.0 - 0.6 K/uL    Basophils Absolute 0.0 0.0 - 0.2 K/uL    Bands Relative 4 0 - 7 %    Anisocytosis Occasional (A)     Polychromasia Occasional (A)     Ovalocytes Occasional (A)          Plan: We can plan to admit to our rehab unit when medically stable and ready.   We will wait till her ileus resolves.         Dr. Juan Michelle

## 2023-07-13 NOTE — PROGRESS NOTES
Upon arrival from ICU to 3 A unit approximately 0250, patient alert and oriented times 4. Denies pain or discomfort. Patient is hard of hearing, answer questions correctly and follow demands. Patient's only concern at this time is making sure her hearing aids are not lost, which has been placed in a green container with clear white lid and place in her night stand drawer next to her hospital be in her room, witnessed by patient, patient gave RN thumbs up. Patient placed on telemetry, Normal Sinus Rhythm (NSR) with heart rate of 88. TPN infusing into purple port of her Right upper extremity, double lumen PICC. NG tube connected to intermittent low wall suction, with dark green drainage coming out of NG tube drainage system. Red port has normal saline fluids infusing as keep vein open per orders. Patient reported having bowel movement today, per reporting nurse in ICU, reported incontinent small bowel movement today. Right upper extremity double lumen PICC assessed, no noted infiltration, no signs of infection. See nursing flow sheet for ausculation of lung fields, and bowel sounds. No noted edema, pulses are palpable, see flow sheet. Precilla Harrison drain to gravity bag, not much drainage noted, scat amount in bag. Patient oriented to room and surrounding. Safety precautions in place, bed alarm on. Vitals stable, and patient appears to be stable. Lidocaine patch times 2 to the abdomen for comfort measures. RN will continue to monitor and treat per orders.

## 2023-07-13 NOTE — PROGRESS NOTES
Pt transferred to  [id number], receiving RN denies further needs from this RN. PRN dilaudid given for pain, see MAR. Asked pt if she wanted her daughter called & informed of transfer, pt states not at this time. All belongings sent with pt.

## 2023-07-13 NOTE — PROGRESS NOTES
Nutrition Note    RECOMMENDATIONS  PO Diet: NPO  Nutrition Support:    Con't TPN @ goal rate of 75 ml/hr. Physician/LIP to monitor closely and correct lytes (Phos,Mg,K+) d/t risk of refeeding syndrome  Continue 250 mL 20% lipids 2 times per week  Recommend FSBS, monitor glucose, need for insulin  Pharmacy to adjust MVI and Trace Elements as needed     NUTRITION ASSESSMENT   Nausea noted this am with clear liquid. Pt is now NPO again with NG tube replaced d/t persistent ileus. Continues to tolerate TPN @ goal rate of 75 ml/hr.  +flatus noted, but very little BM. Will monitor for diet to advance & resume Ensure clear with meals. Nutrition Related Findings: Lytes WNL; g-tube for drainage; 600 ml output from NG tube; -1L per I/o's  Wounds: Surgical Incision  Nutrition Education:  Education not indicated   Nutrition Goals: Tolerate nutrition support at goal rate, by next RD assessment     MALNUTRITION ASSESSMENT   Acute Illness  Malnutrition Status: At risk for malnutrition (Comment)    NUTRITION DIAGNOSIS   Inadequate oral intake related to altered GI function, altered GI structure as evidenced by NPO or clear liquid status due to medical condition, nutrition support - parenteral nutrition      CURRENT NUTRITION THERAPIES  PN-Adult Premix 5/20 - Standard Electrolytes - Central Line  Diet NPO Exceptions are: Ice Chips, Sips of Water with Meds, Popsicles  PN-Adult Premix 5/20 - Standard Electrolytes - Central Line     PO Intake: NPO   PO Supplement Intake:NPO    ANTHROPOMETRICS  Current Height: 5' (152.4 cm)  Current Weight - Scale: 157 lb 3 oz (71.3 kg)    Admission weight: 142 lb (64.4 kg)  Ideal Body Weight (IBW): 100 lbs  (45 kg)        BMI: 30.7      COMPARATIVE STANDARDS  Total Energy Requirements (kcals/day): 7971-8618     Protein (g):   grams       Fluid (mL/day):  6342-6375 mL    The patient will be monitored per nutrition standards of care.  Consult dietitian if additional nutrition interventions are needed prior to RD reassessment.      Maria Dolores Billingsley RD, LD    Contact: [phone]

## 2023-07-13 NOTE — PROGRESS NOTES
Hospitalist Progress Note  Ignacio Feliz MD      Name:  Yumiko Cobian /Age/Sex: 10/16/1932  (80 y.o. female)   MRN & CSN:  [id number] & [id number] Admission Date/Time: 2023  6:49 PM   Location:  [facility]0928/1755-30 PCP: Ephraim Rivera, [address] [place] [address] Day: 11    Assessment and Plan:     Yumiko Cobian is a 80-year-old admitted with abdominal pain, distention, nausea and vomiting, found to have gastric volvulus, NG tube was placed for decompression, NG tube discontinued, on clear liquids, plan for hernia repair tomorrow, cardiology clearance appreciated. 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 NG tube in place to suction. PICC line was placed and TPN current rate is 30 mL with a goal of 70 mL/h so far tolerating TPN well continue supplemental oxygen, continue to wean, recommend to be out of bed, continue PT OT, continue perioperative antibiotics appreciate general surgery input, continue n.p.o. status not ready for diet yet  I have increased pain meds available. Belly appears soft and drain is not really putting much out. Chronic diastolic heart failure, with compensatory, last Echo showed LVEF 65%, cardiology clearance for surgery appreciated. Monitor for increased swelling. Elevated troponin, chronically elevated, cardiology consulted by surgery, but was only seen for clearance for surgery. GERD with large hiatal hernia, PPI. Abdominal pain, chronic narcotic dependence, started on Percocet, prn dilaudid. Doses increased to at lest her home dose of percocet and dilaudid is available. Plan of care was coordinated with patient, family at bedside, RN  Evaluated by PT OT and recommending rehab, consult placed for ARU      Diet ADULT DIET;  Clear Liquid  ADULT ORAL NUTRITION

## 2023-07-13 NOTE — PROGRESS NOTES
Assessment complete, vitals obtained. NSR. Pt incontinent of small BM. Radha care complete, purewick replaced. Pt c/o pain 8/10 & nausea. PRN zofran, dilaudid & due meds given, see MAR. Pt on room air. Lungs clear / diminished. Ab soft, + bowel sounds. NGT @ 62 cm to cont LWS draining brown output. ADRIANA dressing to abdominal incision. G-Tube to gravity. Skin warm, dry. Pulses palpable. Pt declined assistance repositioning, denies further needs. Call light in reach. Safety precautions in place.

## 2023-07-13 NOTE — PLAN OF CARE
Problem: Discharge Planning  Goal: Discharge to home or other facility with appropriate resources  7/13/2023 1549 by Jed Otoole RN  Outcome: Progressing     Problem: Chronic Conditions and Co-morbidities  Goal: Patient's chronic conditions and co-morbidity symptoms are monitored and maintained or improved  7/13/2023 1549 by Jed Otoole RN  Outcome: Progressing     Problem: Skin/Tissue Integrity  Goal: Absence of new skin breakdown  Description: 1. Monitor for areas of redness and/or skin breakdown  2. Assess vascular access sites hourly  3. Every 4-6 hours minimum:  Change oxygen saturation probe site  4. Every 4-6 hours:  If on nasal continuous positive airway pressure, respiratory therapy assess nares and determine need for appliance change or resting period.   7/13/2023 1549 by Jed Otoole RN  Outcome: Progressing     Problem: ABCDS Injury Assessment  Goal: Absence of physical injury  7/13/2023 1549 by Jed Otoole RN  Outcome: Progressing

## 2023-07-13 NOTE — PROGRESS NOTES
training, IADL training, home management training, pain management, safety education, equipment evaluation/education, and positioning    Goals  Patient Goals: to go home   Short Term Goals:  Time Frame: to be met by discharge  Patient will complete upper body ADL at minimal assistance ,goal met 7/11, upgrade mod I  Patient will complete lower body ADL at minimal assistance   Patient will complete toileting at minimal assistance   Patient will complete grooming at stand by assistance  Patient will complete functional transfers at moderate assistance, goal met 7/10, upgrade SBA  Patient will increase functional standing balance to moderate assist for improved ADL completion, goal met 7/10, upgrade SBA  Patient will complete bed mobility at moderate assistance, goal met 7/10 upgrade sup     Above goals reviewed on 7/13/2023. All goals are ongoing at this time unless indicated above.      Therapy Session Time     Individual Group Co-treatment   Time In 3267     Time Out 1611     Minutes 26          Timed Code Treatment Minutes:   26 Minutes  Total Treatment Minutes: 26 Minutes     Electronically Signed By: Daniel Martinez, [address] OTR/L [id number]

## 2023-07-14 ENCOUNTER — APPOINTMENT (OUTPATIENT)
Dept: GENERAL RADIOLOGY | Age: 88
End: 2023-07-14
Payer: MEDICARE

## 2023-07-14 LAB
ALBUMIN SERPL-MCNC: 2.7 G/DL (ref 3.4–5)
ALBUMIN/GLOB SERPL: 1.4 {RATIO} (ref 1.1–2.2)
ALP SERPL-CCNC: 51 U/L (ref 40–129)
ALT SERPL-CCNC: 23 U/L (ref 10–40)
ANION GAP SERPL CALCULATED.3IONS-SCNC: 9 MMOL/L (ref 3–16)
AST SERPL-CCNC: 29 U/L (ref 15–37)
BILIRUB SERPL-MCNC: 0.5 MG/DL (ref 0–1)
BUN SERPL-MCNC: 43 MG/DL (ref 7–20)
CALCIUM SERPL-MCNC: 8.7 MG/DL (ref 8.3–10.6)
CHLORIDE SERPL-SCNC: 102 MMOL/L (ref 99–110)
CO2 SERPL-SCNC: 27 MMOL/L (ref 21–32)
CREAT SERPL-MCNC: 1 MG/DL (ref 0.6–1.2)
DEPRECATED RDW RBC AUTO: 14.3 % (ref 12.4–15.4)
GFR SERPLBLD CREATININE-BSD FMLA CKD-EPI: 53 ML/MIN/{1.73_M2}
GLUCOSE BLD-MCNC: 126 MG/DL (ref 70–99)
GLUCOSE BLD-MCNC: 159 MG/DL (ref 70–99)
GLUCOSE BLD-MCNC: 168 MG/DL (ref 70–99)
GLUCOSE BLD-MCNC: 175 MG/DL (ref 70–99)
GLUCOSE BLD-MCNC: 180 MG/DL (ref 70–99)
GLUCOSE SERPL-MCNC: 172 MG/DL (ref 70–99)
HCT VFR BLD AUTO: 25.5 % (ref 36–48)
HGB BLD-MCNC: 8.2 G/DL (ref 12–16)
MAGNESIUM SERPL-MCNC: 2.3 MG/DL (ref 1.8–2.4)
MCH RBC QN AUTO: 30.7 PG (ref 26–34)
MCHC RBC AUTO-ENTMCNC: 32.3 G/DL (ref 31–36)
MCV RBC AUTO: 95.2 FL (ref 80–100)
PERFORMED ON: ABNORMAL
PHOSPHATE SERPL-MCNC: 3.9 MG/DL (ref 2.5–4.9)
PLATELET # BLD AUTO: 148 K/UL (ref 135–450)
PMV BLD AUTO: 7.8 FL (ref 5–10.5)
POTASSIUM SERPL-SCNC: 4.4 MMOL/L (ref 3.5–5.1)
PROT SERPL-MCNC: 4.7 G/DL (ref 6.4–8.2)
RBC # BLD AUTO: 2.68 M/UL (ref 4–5.2)
SODIUM SERPL-SCNC: 138 MMOL/L (ref 136–145)
TRIGL SERPL-MCNC: 102 MG/DL (ref 0–150)
WBC # BLD AUTO: 8.3 K/UL (ref 4–11)

## 2023-07-14 PROCEDURE — 6360000002 HC RX W HCPCS: Performed by: INTERNAL MEDICINE

## 2023-07-14 PROCEDURE — 85027 COMPLETE CBC AUTOMATED: CPT

## 2023-07-14 PROCEDURE — 6360000002 HC RX W HCPCS: Performed by: SURGERY

## 2023-07-14 PROCEDURE — 2580000003 HC RX 258: Performed by: SURGERY

## 2023-07-14 PROCEDURE — 84478 ASSAY OF TRIGLYCERIDES: CPT

## 2023-07-14 PROCEDURE — 6370000000 HC RX 637 (ALT 250 FOR IP): Performed by: NURSE PRACTITIONER

## 2023-07-14 PROCEDURE — 1200000000 HC SEMI PRIVATE

## 2023-07-14 PROCEDURE — 2500000003 HC RX 250 WO HCPCS: Performed by: SURGERY

## 2023-07-14 PROCEDURE — 36415 COLL VENOUS BLD VENIPUNCTURE: CPT

## 2023-07-14 PROCEDURE — 84100 ASSAY OF PHOSPHORUS: CPT

## 2023-07-14 PROCEDURE — 83735 ASSAY OF MAGNESIUM: CPT

## 2023-07-14 PROCEDURE — C9113 INJ PANTOPRAZOLE SODIUM, VIA: HCPCS | Performed by: SURGERY

## 2023-07-14 PROCEDURE — 74019 RADEX ABDOMEN 2 VIEWS: CPT

## 2023-07-14 PROCEDURE — 80053 COMPREHEN METABOLIC PANEL: CPT

## 2023-07-14 PROCEDURE — APPNB30 APP NON BILLABLE TIME 0-30 MINS: Performed by: NURSE PRACTITIONER

## 2023-07-14 PROCEDURE — 99024 POSTOP FOLLOW-UP VISIT: CPT | Performed by: SURGERY

## 2023-07-14 RX ORDER — LEVOFLOXACIN 5 MG/ML
750 INJECTION, SOLUTION INTRAVENOUS
Status: DISCONTINUED | OUTPATIENT
Start: 2023-07-15 | End: 2023-07-20

## 2023-07-14 RX ADMIN — HYDROMORPHONE HYDROCHLORIDE 0.5 MG: 1 INJECTION, SOLUTION INTRAMUSCULAR; INTRAVENOUS; SUBCUTANEOUS at 17:02

## 2023-07-14 RX ADMIN — ASCORBIC ACID, VITAMIN A PALMITATE, CHOLECALCIFEROL, THIAMINE HYDROCHLORIDE, RIBOFLAVIN-5 PHOSPHATE SODIUM, PYRIDOXINE HYDROCHLORIDE, NIACINAMIDE, DEXPANTHENOL, ALPHA-TOCOPHEROL ACETATE, VITAMIN K1, FOLIC ACID, BIOTIN, CYANOCOBALAMIN: 200; 3300; 200; 6; 3.6; 6; 40; 15; 10; 150; 600; 60; 5 INJECTION, SOLUTION INTRAVENOUS at 18:43

## 2023-07-14 RX ADMIN — ONDANSETRON 4 MG: 2 INJECTION INTRAMUSCULAR; INTRAVENOUS at 02:02

## 2023-07-14 RX ADMIN — SODIUM CHLORIDE, PRESERVATIVE FREE 10 ML: 5 INJECTION INTRAVENOUS at 21:24

## 2023-07-14 RX ADMIN — ONDANSETRON 4 MG: 2 INJECTION INTRAMUSCULAR; INTRAVENOUS at 19:58

## 2023-07-14 RX ADMIN — HYDROMORPHONE HYDROCHLORIDE 0.5 MG: 1 INJECTION, SOLUTION INTRAMUSCULAR; INTRAVENOUS; SUBCUTANEOUS at 12:15

## 2023-07-14 RX ADMIN — METOCLOPRAMIDE HYDROCHLORIDE 5 MG: 5 INJECTION INTRAMUSCULAR; INTRAVENOUS at 04:23

## 2023-07-14 RX ADMIN — SODIUM CHLORIDE, PRESERVATIVE FREE 10 ML: 5 INJECTION INTRAVENOUS at 08:34

## 2023-07-14 RX ADMIN — HYDROMORPHONE HYDROCHLORIDE 1 MG: 1 INJECTION, SOLUTION INTRAMUSCULAR; INTRAVENOUS; SUBCUTANEOUS at 04:24

## 2023-07-14 RX ADMIN — SODIUM CHLORIDE, PRESERVATIVE FREE 10 ML: 5 INJECTION INTRAVENOUS at 00:31

## 2023-07-14 RX ADMIN — ONDANSETRON 4 MG: 2 INJECTION INTRAMUSCULAR; INTRAVENOUS at 12:15

## 2023-07-14 RX ADMIN — ENOXAPARIN SODIUM 40 MG: 100 INJECTION SUBCUTANEOUS at 21:22

## 2023-07-14 RX ADMIN — HYDROMORPHONE HYDROCHLORIDE 1 MG: 1 INJECTION, SOLUTION INTRAMUSCULAR; INTRAVENOUS; SUBCUTANEOUS at 00:30

## 2023-07-14 RX ADMIN — HYDROMORPHONE HYDROCHLORIDE 0.5 MG: 1 INJECTION, SOLUTION INTRAMUSCULAR; INTRAVENOUS; SUBCUTANEOUS at 21:22

## 2023-07-14 RX ADMIN — THIAMINE HYDROCHLORIDE 100 MG: 100 INJECTION, SOLUTION INTRAMUSCULAR; INTRAVENOUS at 08:33

## 2023-07-14 RX ADMIN — PANTOPRAZOLE SODIUM 40 MG: 40 INJECTION, POWDER, FOR SOLUTION INTRAVENOUS at 21:22

## 2023-07-14 RX ADMIN — HYDROMORPHONE HYDROCHLORIDE 1 MG: 1 INJECTION, SOLUTION INTRAMUSCULAR; INTRAVENOUS; SUBCUTANEOUS at 08:35

## 2023-07-14 RX ADMIN — PANTOPRAZOLE SODIUM 40 MG: 40 INJECTION, POWDER, FOR SOLUTION INTRAVENOUS at 08:33

## 2023-07-14 ASSESSMENT — PAIN DESCRIPTION - ONSET
ONSET: ON-GOING

## 2023-07-14 ASSESSMENT — PAIN SCALES - GENERAL
PAINLEVEL_OUTOF10: 0
PAINLEVEL_OUTOF10: 9
PAINLEVEL_OUTOF10: 10
PAINLEVEL_OUTOF10: 5
PAINLEVEL_OUTOF10: 3
PAINLEVEL_OUTOF10: 6
PAINLEVEL_OUTOF10: 5
PAINLEVEL_OUTOF10: 5
PAINLEVEL_OUTOF10: 10
PAINLEVEL_OUTOF10: 6
PAINLEVEL_OUTOF10: 8

## 2023-07-14 ASSESSMENT — PAIN DESCRIPTION - LOCATION
LOCATION: ABDOMEN

## 2023-07-14 ASSESSMENT — PAIN DESCRIPTION - ORIENTATION
ORIENTATION: RIGHT;LEFT;MID
ORIENTATION: RIGHT;LEFT;MID
ORIENTATION: RIGHT;LEFT
ORIENTATION: RIGHT;LEFT;MID

## 2023-07-14 ASSESSMENT — PAIN - FUNCTIONAL ASSESSMENT
PAIN_FUNCTIONAL_ASSESSMENT: INTOLERABLE, UNABLE TO DO ANY ACTIVE OR PASSIVE ACTIVITIES
PAIN_FUNCTIONAL_ASSESSMENT: ACTIVITIES ARE NOT PREVENTED
PAIN_FUNCTIONAL_ASSESSMENT: ACTIVITIES ARE NOT PREVENTED
PAIN_FUNCTIONAL_ASSESSMENT: INTOLERABLE, UNABLE TO DO ANY ACTIVE OR PASSIVE ACTIVITIES
PAIN_FUNCTIONAL_ASSESSMENT: PREVENTS OR INTERFERES SOME ACTIVE ACTIVITIES AND ADLS
PAIN_FUNCTIONAL_ASSESSMENT: PREVENTS OR INTERFERES SOME ACTIVE ACTIVITIES AND ADLS
PAIN_FUNCTIONAL_ASSESSMENT: ACTIVITIES ARE NOT PREVENTED

## 2023-07-14 ASSESSMENT — PAIN DESCRIPTION - DESCRIPTORS
DESCRIPTORS: ACHING
DESCRIPTORS: ACHING;DISCOMFORT
DESCRIPTORS: ACHING

## 2023-07-14 ASSESSMENT — PAIN DESCRIPTION - FREQUENCY
FREQUENCY: CONTINUOUS

## 2023-07-14 ASSESSMENT — PAIN DESCRIPTION - PAIN TYPE
TYPE: ACUTE PAIN
TYPE: SURGICAL PAIN
TYPE: ACUTE PAIN
TYPE: ACUTE PAIN
TYPE: SURGICAL PAIN
TYPE: ACUTE PAIN
TYPE: ACUTE PAIN

## 2023-07-14 NOTE — PLAN OF CARE
Problem: Chronic Conditions and Co-morbidities  Goal: Patient's chronic conditions and co-morbidity symptoms are monitored and maintained or improved  7/13/2023 2251 by Sotero Karimi RN  Outcome: Progressing  7/13/2023 1549 by Lula Henley RN  Outcome: Progressing     Problem: Skin/Tissue Integrity  Goal: Absence of new skin breakdown  Description: 1. Monitor for areas of redness and/or skin breakdown  2. Assess vascular access sites hourly  3. Every 4-6 hours minimum:  Change oxygen saturation probe site  4. Every 4-6 hours:  If on nasal continuous positive airway pressure, respiratory therapy assess nares and determine need for appliance change or resting period.   7/13/2023 2251 by Sotero Karimi RN  Outcome: Progressing  7/13/2023 1549 by Lula Henley RN  Outcome: Progressing     Problem: ABCDS Injury Assessment  Goal: Absence of physical injury  7/13/2023 2251 by Sotero Karimi RN  Outcome: Progressing  7/13/2023 1549 by Lula Henley RN  Outcome: Progressing     Problem: Safety - Adult  Goal: Free from fall injury  7/13/2023 2251 by Sotero Karimi RN  Outcome: Progressing  7/13/2023 1549 by Lula Henley RN  Outcome: Progressing     Problem: Pain  Goal: Verbalizes/displays adequate comfort level or baseline comfort level  7/13/2023 2251 by Sotero Karimi RN  Outcome: Progressing  7/13/2023 1549 by Lula Henley RN  Outcome: Progressing     Problem: Nutrition Deficit:  Goal: Optimize nutritional status  7/13/2023 2251 by Sotero Karimi RN  Outcome: Progressing  7/13/2023 1549 by Lula Henley RN  Outcome: Progressing  Flowsheets (Taken 7/13/2023 1231 by Dejon Dupree, RD, LD)  Nutrient intake appropriate for improving, restoring, or maintaining nutritional needs:   Assess nutritional status and recommend course of action   Monitor oral intake, labs, and treatment plans   Recommend, monitor, and adjust tube feedings and TPN/PPN based on assessed needs   Recommend appropriate

## 2023-07-14 NOTE — CARE COORDINATION
Discharge Planning Assessment    RN/SW discharge planner met with patient/ (and family member) to discuss reason for admission, current living situation, and potential needs at the time of discharge    Demographics/Insurance verified Yes/No    Current type of dwelling: Assisted Living  [address]     PCP: Gary Morales III  DME:  Cane,Wheelchair-manual,Rolling walker,4 wheeled walker,Oxygen,Alert Button (3 L at night)    Transportation at the time of discharge:  daughter  Patient is currently active with [facility], [phone] to restart pending a home care order. Please complete & sign the LINETTE/AVS/Prescriptions,  RN please print LINETTE/AVS once completed.  Thank you, THI Matthews, [phone] amoxicillin-clavulanate 875 mg-125 mg oral tablet: 1 tab(s) orally every 12 hours  Colace 100 mg oral capsule: 2 cap(s) orally every 8 hours as needed for  constipation  Crestor:  orally   finasteride:  orally   oxyCODONE 5 mg oral tablet: 1 tab(s) orally every 6 hours as needed for  severe pain MDD: 4 tablets

## 2023-07-14 NOTE — PROGRESS NOTES
Clinical Pharmacy Note    Pharmacy consulted by Dr. Nirmal Horton to manage TPN    Current TPN rate: 75 ml/hr  Goal TPN rate: 75 ml/hr    Access: PICC  Indication: ex-lap    Labs:  General Labs:  BMP:    Lab Results   Component Value Date/Time     07/14/2023 09:12 AM    K 4.4 07/14/2023 09:12 AM    K 4.7 07/02/2023 07:24 PM     07/14/2023 09:12 AM    CO2 27 07/14/2023 09:12 AM    BUN 43 07/14/2023 09:12 AM    LABALBU 2.7 07/14/2023 09:12 AM    CREATININE 1.0 07/14/2023 09:12 AM    CALCIUM 8.7 07/14/2023 09:12 AM    GFRAA 57 02/09/2022 01:45 PM    GFRAA 56 01/10/2010 08:07 PM    LABGLOM 53 07/14/2023 09:12 AM    GLUCOSE 172 07/14/2023 09:12 AM       Electrolyte replacement as follows: No electrolyte replacement needed today    Blood sugars over past 24 hours: 125-202    Blood sugar management:  Plan to Continue Humalog q4 hour sliding scale at medium dose. Plan to continue TPN at 75 ml/hr. Thank you for allowing pharmacy to participate in the care of this patient.     Clifton Bill, PharmD, BCPS  [id number]  7/14/2023 10:02 AM

## 2023-07-15 LAB
ALBUMIN SERPL-MCNC: 2.8 G/DL (ref 3.4–5)
ALBUMIN/GLOB SERPL: 1.3 {RATIO} (ref 1.1–2.2)
ALP SERPL-CCNC: 55 U/L (ref 40–129)
ALT SERPL-CCNC: 18 U/L (ref 10–40)
ANION GAP SERPL CALCULATED.3IONS-SCNC: 4 MMOL/L (ref 3–16)
AST SERPL-CCNC: 20 U/L (ref 15–37)
BILIRUB SERPL-MCNC: 0.5 MG/DL (ref 0–1)
BUN SERPL-MCNC: 39 MG/DL (ref 7–20)
CALCIUM SERPL-MCNC: 8.6 MG/DL (ref 8.3–10.6)
CHLORIDE SERPL-SCNC: 105 MMOL/L (ref 99–110)
CO2 SERPL-SCNC: 29 MMOL/L (ref 21–32)
CREAT SERPL-MCNC: 0.9 MG/DL (ref 0.6–1.2)
DEPRECATED RDW RBC AUTO: 14.3 % (ref 12.4–15.4)
GFR SERPLBLD CREATININE-BSD FMLA CKD-EPI: >60 ML/MIN/{1.73_M2}
GLUCOSE BLD-MCNC: 148 MG/DL (ref 70–99)
GLUCOSE BLD-MCNC: 164 MG/DL (ref 70–99)
GLUCOSE BLD-MCNC: 172 MG/DL (ref 70–99)
GLUCOSE BLD-MCNC: 185 MG/DL (ref 70–99)
GLUCOSE BLD-MCNC: 185 MG/DL (ref 70–99)
GLUCOSE SERPL-MCNC: 167 MG/DL (ref 70–99)
HCT VFR BLD AUTO: 24.3 % (ref 36–48)
HGB BLD-MCNC: 7.9 G/DL (ref 12–16)
MAGNESIUM SERPL-MCNC: 2.4 MG/DL (ref 1.8–2.4)
MCH RBC QN AUTO: 30.8 PG (ref 26–34)
MCHC RBC AUTO-ENTMCNC: 32.7 G/DL (ref 31–36)
MCV RBC AUTO: 94.3 FL (ref 80–100)
PERFORMED ON: ABNORMAL
PHOSPHATE SERPL-MCNC: 4 MG/DL (ref 2.5–4.9)
PLATELET # BLD AUTO: 178 K/UL (ref 135–450)
PMV BLD AUTO: 8 FL (ref 5–10.5)
POTASSIUM SERPL-SCNC: 4.1 MMOL/L (ref 3.5–5.1)
PROT SERPL-MCNC: 5 G/DL (ref 6.4–8.2)
RBC # BLD AUTO: 2.57 M/UL (ref 4–5.2)
SODIUM SERPL-SCNC: 138 MMOL/L (ref 136–145)
WBC # BLD AUTO: 6.7 K/UL (ref 4–11)

## 2023-07-15 PROCEDURE — 6360000002 HC RX W HCPCS: Performed by: SURGERY

## 2023-07-15 PROCEDURE — 2580000003 HC RX 258: Performed by: SURGERY

## 2023-07-15 PROCEDURE — 2500000003 HC RX 250 WO HCPCS: Performed by: SURGERY

## 2023-07-15 PROCEDURE — 85027 COMPLETE CBC AUTOMATED: CPT

## 2023-07-15 PROCEDURE — 84100 ASSAY OF PHOSPHORUS: CPT

## 2023-07-15 PROCEDURE — C9113 INJ PANTOPRAZOLE SODIUM, VIA: HCPCS | Performed by: SURGERY

## 2023-07-15 PROCEDURE — 6370000000 HC RX 637 (ALT 250 FOR IP): Performed by: NURSE PRACTITIONER

## 2023-07-15 PROCEDURE — 99024 POSTOP FOLLOW-UP VISIT: CPT | Performed by: SURGERY

## 2023-07-15 PROCEDURE — 80053 COMPREHEN METABOLIC PANEL: CPT

## 2023-07-15 PROCEDURE — 1200000000 HC SEMI PRIVATE

## 2023-07-15 PROCEDURE — 83735 ASSAY OF MAGNESIUM: CPT

## 2023-07-15 PROCEDURE — 6360000002 HC RX W HCPCS: Performed by: INTERNAL MEDICINE

## 2023-07-15 RX ADMIN — ENOXAPARIN SODIUM 40 MG: 100 INJECTION SUBCUTANEOUS at 22:54

## 2023-07-15 RX ADMIN — HYDROMORPHONE HYDROCHLORIDE 0.5 MG: 1 INJECTION, SOLUTION INTRAMUSCULAR; INTRAVENOUS; SUBCUTANEOUS at 13:28

## 2023-07-15 RX ADMIN — ONDANSETRON 4 MG: 2 INJECTION INTRAMUSCULAR; INTRAVENOUS at 11:45

## 2023-07-15 RX ADMIN — HYDROMORPHONE HYDROCHLORIDE 0.5 MG: 1 INJECTION, SOLUTION INTRAMUSCULAR; INTRAVENOUS; SUBCUTANEOUS at 22:54

## 2023-07-15 RX ADMIN — LEUCINE, PHENYLALANINE, LYSINE, METHIONINE, ISOLEUCINE, VALINE, HISTIDINE, THREONINE, TRYPTOPHAN, ALANINE, GLYCINE, ARGININE, PROLINE, SERINE, TYROSINE, SODIUM ACETATE, DIBASIC POTASSIUM PHOSPHATE, MAGNESIUM CHLORIDE, SODIUM CHLORIDE, CALCIUM CHLORIDE, DEXTROSE
365; 280; 290; 200; 300; 290; 240; 210; 90; 1035; 515; 575; 340; 250; 20; 340; 261; 51; 59; 33; 20 INJECTION INTRAVENOUS at 18:08

## 2023-07-15 RX ADMIN — SODIUM CHLORIDE, PRESERVATIVE FREE 10 ML: 5 INJECTION INTRAVENOUS at 08:36

## 2023-07-15 RX ADMIN — Medication 10 ML: at 08:41

## 2023-07-15 RX ADMIN — SODIUM CHLORIDE, PRESERVATIVE FREE 10 ML: 5 INJECTION INTRAVENOUS at 22:55

## 2023-07-15 RX ADMIN — PANTOPRAZOLE SODIUM 40 MG: 40 INJECTION, POWDER, FOR SOLUTION INTRAVENOUS at 22:54

## 2023-07-15 RX ADMIN — PANTOPRAZOLE SODIUM 40 MG: 40 INJECTION, POWDER, FOR SOLUTION INTRAVENOUS at 08:36

## 2023-07-15 RX ADMIN — HYDROMORPHONE HYDROCHLORIDE 0.5 MG: 1 INJECTION, SOLUTION INTRAMUSCULAR; INTRAVENOUS; SUBCUTANEOUS at 08:35

## 2023-07-15 RX ADMIN — HYDROMORPHONE HYDROCHLORIDE 0.5 MG: 1 INJECTION, SOLUTION INTRAMUSCULAR; INTRAVENOUS; SUBCUTANEOUS at 02:23

## 2023-07-15 RX ADMIN — HYDROMORPHONE HYDROCHLORIDE 0.5 MG: 1 INJECTION, SOLUTION INTRAMUSCULAR; INTRAVENOUS; SUBCUTANEOUS at 18:02

## 2023-07-15 RX ADMIN — LEVOFLOXACIN 750 MG: 5 INJECTION, SOLUTION INTRAVENOUS at 15:13

## 2023-07-15 RX ADMIN — THIAMINE HYDROCHLORIDE 100 MG: 100 INJECTION, SOLUTION INTRAMUSCULAR; INTRAVENOUS at 09:00

## 2023-07-15 ASSESSMENT — PAIN DESCRIPTION - LOCATION
LOCATION: ABDOMEN
LOCATION: ABDOMEN
LOCATION: BACK
LOCATION: ABDOMEN
LOCATION: ABDOMEN
LOCATION: BACK
LOCATION: ABDOMEN
LOCATION: ABDOMEN

## 2023-07-15 ASSESSMENT — PAIN SCALES - WONG BAKER
WONGBAKER_NUMERICALRESPONSE: 0
WONGBAKER_NUMERICALRESPONSE: 6
WONGBAKER_NUMERICALRESPONSE: 0

## 2023-07-15 ASSESSMENT — PAIN DESCRIPTION - DESCRIPTORS
DESCRIPTORS: ACHING

## 2023-07-15 ASSESSMENT — PAIN SCALES - GENERAL
PAINLEVEL_OUTOF10: 0
PAINLEVEL_OUTOF10: 0
PAINLEVEL_OUTOF10: 7
PAINLEVEL_OUTOF10: 7
PAINLEVEL_OUTOF10: 2
PAINLEVEL_OUTOF10: 2
PAINLEVEL_OUTOF10: 8
PAINLEVEL_OUTOF10: 7
PAINLEVEL_OUTOF10: 6
PAINLEVEL_OUTOF10: 2

## 2023-07-15 ASSESSMENT — PAIN DESCRIPTION - FREQUENCY: FREQUENCY: CONTINUOUS

## 2023-07-15 ASSESSMENT — PAIN DESCRIPTION - ONSET: ONSET: ON-GOING

## 2023-07-15 ASSESSMENT — PAIN DESCRIPTION - PAIN TYPE: TYPE: ACUTE PAIN

## 2023-07-15 NOTE — PLAN OF CARE
Problem: Discharge Planning  Goal: Discharge to home or other facility with appropriate resources  Outcome: Progressing     Problem: Chronic Conditions and Co-morbidities  Goal: Patient's chronic conditions and co-morbidity symptoms are monitored and maintained or improved  Outcome: Progressing     Problem: Skin/Tissue Integrity  Goal: Absence of new skin breakdown  Description: 1. Monitor for areas of redness and/or skin breakdown  2. Assess vascular access sites hourly  3. Every 4-6 hours minimum:  Change oxygen saturation probe site  4. Every 4-6 hours:  If on nasal continuous positive airway pressure, respiratory therapy assess nares and determine need for appliance change or resting period.   Outcome: Progressing     Problem: ABCDS Injury Assessment  Goal: Absence of physical injury  Outcome: Progressing     Problem: Safety - Adult  Goal: Free from fall injury  Outcome: Progressing     Problem: Pain  Goal: Verbalizes/displays adequate comfort level or baseline comfort level  Outcome: Progressing  Flowsheets (Taken 7/15/2023 4383)  Verbalizes/displays adequate comfort level or baseline comfort level: Encourage patient to monitor pain and request assistance     Problem: Nutrition Deficit:  Goal: Optimize nutritional status  Outcome: Progressing

## 2023-07-15 NOTE — PROGRESS NOTES
Comfort Hearn is a 80 y.o. female patient.     Current Facility-Administered Medications   Medication Dose Route Frequency Provider Last Rate Last Admin    PN-Adult Premix 5/20 - Standard Electrolytes - Central Line   IntraVENous Continuous TPN Luis Gardiner MD        benzocaine-menthol (CEPACOL SORE THROAT) lozenge 1 lozenge  1 lozenge Oral Q2H PRN Alfonso Caicedo APRN - CNP        PN-Adult Premix 5/20 - Standard Electrolytes - Central Line   IntraVENous Continuous TPN Maryanne Neville MD 75 mL/hr at 07/14/23 1843 New Bag at 07/14/23 1843    levoFLOXacin (LEVAQUIN) 750 MG/150ML infusion 750 mg  750 mg IntraVENous Q48H Nhan Hdz MD        enoxaparin (LOVENOX) injection 40 mg  40 mg SubCUTAneous Nightly Nhan Hdz MD   40 mg at 07/14/23 2122    lidocaine 4 % external patch 1 patch  1 patch TransDERmal Daily AlfonsoDENIZ Gordon - CNP   1 patch at 07/15/23 0835    scopolamine (TRANSDERM-SCOP) transdermal patch 1 patch  1 patch TransDERmal Q72H AlfonsoDENIZ Gordon - CNP   1 patch at 07/12/23 1320    prochlorperazine (COMPAZINE) injection 10 mg  10 mg IntraVENous Q6H PRN Alfonso Caicedo APRN - CRISTOPHER        oxyCODONE-acetaminophen (PERCOCET)  MG per tablet 1 tablet  1 tablet Oral Q4H PRN Theresa Newsome MD        HYDROmorphone HCl PF (DILAUDID) injection 0.5 mg  0.5 mg IntraVENous Q4H PRN Theresa Newsome MD   0.5 mg at 07/15/23 1328    melatonin tablet 3 mg  3 mg Oral Nightly PRN Theresa Newsome MD        insulin lispro (HUMALOG) injection vial 0-8 Units  0-8 Units SubCUTAneous Q4H Luis Gardiner MD   2 Units at 07/13/23 1424    thiamine (B-1) injection 100 mg  100 mg IntraVENous Daily Luis Gardiner MD   100 mg at 07/14/23 0833    fat emulsion (INTRALIPID/NUTRILIPID) 20 % infusion 250 mL  250 mL IntraVENous Once per day on Mon Thu Luis Gardiner MD   Stopped at 07/14/23 0631    sodium chloride flush 0.9 % injection 5-40 mL  5-40 mL IntraVENous 2 times per day Zully Villafana MD   10 mL at 07/15/23 0841    sodium chloride flush 0.9 % injection 5-40 mL  5-40 mL IntraVENous PRN Zully Villafana MD        0.9 % sodium chloride infusion   IntraVENous PRN Zully Villafana MD 10 mL/hr at 07/09/23 0348 10 mL/hr at 07/09/23 0348    sodium chloride flush 0.9 % injection 5-40 mL  5-40 mL IntraVENous 2 times per day Zully Villafana MD   10 mL at 07/15/23 0836    sodium chloride flush 0.9 % injection 5-40 mL  5-40 mL IntraVENous PRN Zully Villafana MD   10 mL at 07/14/23 0031    0.9 % sodium chloride infusion  25 mL IntraVENous PRN Zully Villafana MD        potassium chloride (KLOR-CON M) extended release tablet 40 mEq  40 mEq Oral PRN Zully Villafana MD        Or    potassium bicarb-citric acid (EFFER-K) effervescent tablet 40 mEq  40 mEq Oral PRN Zully Villafana MD        Or    potassium chloride 10 mEq/100 mL IVPB (Peripheral Line)  10 mEq IntraVENous PRN Zully Villafana MD 85 mL/hr at 07/05/23 1143 10 mEq at 07/05/23 1143    phenol 1.4 % mouth spray 1 spray  1 spray Mouth/Throat Q2H PRN Zully Villafana MD   1 spray at 07/12/23 2124    pantoprazole (PROTONIX) injection 40 mg  40 mg IntraVENous BID Zully Villafana MD   40 mg at 07/15/23 0836    dextrose bolus 10% 125 mL  125 mL IntraVENous PRN Zully Villafana MD        Or    dextrose bolus 10% 250 mL  250 mL IntraVENous PRN Zully Villafana MD        glucagon injection 1 mg  1 mg SubCUTAneous PRN Zully Villafana MD        dextrose 10 % infusion   IntraVENous Continuous PRN Zully Villafana MD        [facility] by provider] furosemide (LASIX) injection 20 mg  20 mg IntraVENous Daily Zully Villafana MD   20 mg at 07/07/23 0757    sodium chloride flush 0.9 % injection 5-40 mL  5-40 mL IntraVENous 2 times per day Zully Villafana MD   10 mL at 07/15/23 0841    sodium chloride flush 0.9 % injection 5-40

## 2023-07-16 LAB
ALBUMIN SERPL-MCNC: 2.7 G/DL (ref 3.4–5)
ALBUMIN/GLOB SERPL: 1.1 {RATIO} (ref 1.1–2.2)
ALP SERPL-CCNC: 57 U/L (ref 40–129)
ALT SERPL-CCNC: 14 U/L (ref 10–40)
ANION GAP SERPL CALCULATED.3IONS-SCNC: 9 MMOL/L (ref 3–16)
AST SERPL-CCNC: 17 U/L (ref 15–37)
BILIRUB SERPL-MCNC: 0.5 MG/DL (ref 0–1)
BUN SERPL-MCNC: 35 MG/DL (ref 7–20)
CALCIUM SERPL-MCNC: 8.6 MG/DL (ref 8.3–10.6)
CHLORIDE SERPL-SCNC: 103 MMOL/L (ref 99–110)
CO2 SERPL-SCNC: 29 MMOL/L (ref 21–32)
CREAT SERPL-MCNC: 0.8 MG/DL (ref 0.6–1.2)
DEPRECATED RDW RBC AUTO: 14.9 % (ref 12.4–15.4)
EKG ATRIAL RATE: 81 BPM
EKG DIAGNOSIS: NORMAL
EKG P AXIS: 38 DEGREES
EKG P-R INTERVAL: 308 MS
EKG Q-T INTERVAL: 406 MS
EKG QRS DURATION: 126 MS
EKG QTC CALCULATION (BAZETT): 471 MS
EKG R AXIS: -27 DEGREES
EKG T AXIS: -13 DEGREES
EKG VENTRICULAR RATE: 81 BPM
GFR SERPLBLD CREATININE-BSD FMLA CKD-EPI: >60 ML/MIN/{1.73_M2}
GLUCOSE BLD-MCNC: 127 MG/DL (ref 70–99)
GLUCOSE BLD-MCNC: 138 MG/DL (ref 70–99)
GLUCOSE BLD-MCNC: 145 MG/DL (ref 70–99)
GLUCOSE BLD-MCNC: 178 MG/DL (ref 70–99)
GLUCOSE BLD-MCNC: 183 MG/DL (ref 70–99)
GLUCOSE BLD-MCNC: 188 MG/DL (ref 70–99)
GLUCOSE BLD-MCNC: 224 MG/DL (ref 70–99)
GLUCOSE SERPL-MCNC: 145 MG/DL (ref 70–99)
HCT VFR BLD AUTO: 24.3 % (ref 36–48)
HGB BLD-MCNC: 7.9 G/DL (ref 12–16)
MAGNESIUM SERPL-MCNC: 2.6 MG/DL (ref 1.8–2.4)
MCH RBC QN AUTO: 30.6 PG (ref 26–34)
MCHC RBC AUTO-ENTMCNC: 32.5 G/DL (ref 31–36)
MCV RBC AUTO: 94.1 FL (ref 80–100)
PERFORMED ON: ABNORMAL
PHOSPHATE SERPL-MCNC: 3.5 MG/DL (ref 2.5–4.9)
PLATELET # BLD AUTO: 204 K/UL (ref 135–450)
PMV BLD AUTO: 8.1 FL (ref 5–10.5)
POTASSIUM SERPL-SCNC: 4.2 MMOL/L (ref 3.5–5.1)
PROT SERPL-MCNC: 5.1 G/DL (ref 6.4–8.2)
RBC # BLD AUTO: 2.59 M/UL (ref 4–5.2)
SODIUM SERPL-SCNC: 141 MMOL/L (ref 136–145)
WBC # BLD AUTO: 6.4 K/UL (ref 4–11)

## 2023-07-16 PROCEDURE — 93005 ELECTROCARDIOGRAM TRACING: CPT | Performed by: INTERNAL MEDICINE

## 2023-07-16 PROCEDURE — 99024 POSTOP FOLLOW-UP VISIT: CPT | Performed by: SURGERY

## 2023-07-16 PROCEDURE — C9113 INJ PANTOPRAZOLE SODIUM, VIA: HCPCS | Performed by: SURGERY

## 2023-07-16 PROCEDURE — 6360000002 HC RX W HCPCS: Performed by: SURGERY

## 2023-07-16 PROCEDURE — 2580000003 HC RX 258: Performed by: SURGERY

## 2023-07-16 PROCEDURE — 85027 COMPLETE CBC AUTOMATED: CPT

## 2023-07-16 PROCEDURE — 6360000002 HC RX W HCPCS: Performed by: INTERNAL MEDICINE

## 2023-07-16 PROCEDURE — 83735 ASSAY OF MAGNESIUM: CPT

## 2023-07-16 PROCEDURE — 2500000003 HC RX 250 WO HCPCS: Performed by: INTERNAL MEDICINE

## 2023-07-16 PROCEDURE — 84100 ASSAY OF PHOSPHORUS: CPT

## 2023-07-16 PROCEDURE — 93010 ELECTROCARDIOGRAM REPORT: CPT | Performed by: INTERNAL MEDICINE

## 2023-07-16 PROCEDURE — 6370000000 HC RX 637 (ALT 250 FOR IP): Performed by: NURSE PRACTITIONER

## 2023-07-16 PROCEDURE — 80053 COMPREHEN METABOLIC PANEL: CPT

## 2023-07-16 PROCEDURE — 1200000000 HC SEMI PRIVATE

## 2023-07-16 PROCEDURE — 6370000000 HC RX 637 (ALT 250 FOR IP): Performed by: SURGERY

## 2023-07-16 RX ORDER — METOCLOPRAMIDE HYDROCHLORIDE 5 MG/ML
10 INJECTION INTRAMUSCULAR; INTRAVENOUS EVERY 8 HOURS
Status: DISCONTINUED | OUTPATIENT
Start: 2023-07-16 | End: 2023-07-18

## 2023-07-16 RX ADMIN — ONDANSETRON 4 MG: 2 INJECTION INTRAMUSCULAR; INTRAVENOUS at 03:16

## 2023-07-16 RX ADMIN — METOCLOPRAMIDE 10 MG: 5 INJECTION, SOLUTION INTRAMUSCULAR; INTRAVENOUS at 11:00

## 2023-07-16 RX ADMIN — SODIUM CHLORIDE, PRESERVATIVE FREE 10 ML: 5 INJECTION INTRAVENOUS at 08:55

## 2023-07-16 RX ADMIN — LEUCINE, PHENYLALANINE, LYSINE, METHIONINE, ISOLEUCINE, VALINE, HISTIDINE, THREONINE, TRYPTOPHAN, ALANINE, GLYCINE, ARGININE, PROLINE, SERINE, TYROSINE, SODIUM ACETATE, DIBASIC POTASSIUM PHOSPHATE, MAGNESIUM CHLORIDE, SODIUM CHLORIDE, CALCIUM CHLORIDE, DEXTROSE
365; 280; 290; 200; 300; 290; 240; 210; 90; 1035; 515; 575; 340; 250; 20; 340; 261; 51; 59; 33; 20 INJECTION INTRAVENOUS at 18:41

## 2023-07-16 RX ADMIN — METOCLOPRAMIDE 10 MG: 5 INJECTION, SOLUTION INTRAMUSCULAR; INTRAVENOUS at 18:25

## 2023-07-16 RX ADMIN — SODIUM CHLORIDE, PRESERVATIVE FREE 10 ML: 5 INJECTION INTRAVENOUS at 22:02

## 2023-07-16 RX ADMIN — Medication 10 ML: at 08:56

## 2023-07-16 RX ADMIN — PANTOPRAZOLE SODIUM 40 MG: 40 INJECTION, POWDER, FOR SOLUTION INTRAVENOUS at 08:55

## 2023-07-16 RX ADMIN — HYDROMORPHONE HYDROCHLORIDE 0.5 MG: 1 INJECTION, SOLUTION INTRAMUSCULAR; INTRAVENOUS; SUBCUTANEOUS at 18:26

## 2023-07-16 RX ADMIN — HYDROMORPHONE HYDROCHLORIDE 0.5 MG: 1 INJECTION, SOLUTION INTRAMUSCULAR; INTRAVENOUS; SUBCUTANEOUS at 22:52

## 2023-07-16 RX ADMIN — PANTOPRAZOLE SODIUM 40 MG: 40 INJECTION, POWDER, FOR SOLUTION INTRAVENOUS at 22:02

## 2023-07-16 RX ADMIN — HYDROMORPHONE HYDROCHLORIDE 0.5 MG: 1 INJECTION, SOLUTION INTRAMUSCULAR; INTRAVENOUS; SUBCUTANEOUS at 13:08

## 2023-07-16 RX ADMIN — HYDROMORPHONE HYDROCHLORIDE 0.5 MG: 1 INJECTION, SOLUTION INTRAMUSCULAR; INTRAVENOUS; SUBCUTANEOUS at 08:54

## 2023-07-16 RX ADMIN — ENOXAPARIN SODIUM 40 MG: 100 INJECTION SUBCUTANEOUS at 22:03

## 2023-07-16 RX ADMIN — INSULIN LISPRO 2 UNITS: 100 INJECTION, SOLUTION INTRAVENOUS; SUBCUTANEOUS at 12:00

## 2023-07-16 RX ADMIN — HYDROMORPHONE HYDROCHLORIDE 0.5 MG: 1 INJECTION, SOLUTION INTRAMUSCULAR; INTRAVENOUS; SUBCUTANEOUS at 05:04

## 2023-07-16 ASSESSMENT — PAIN SCALES - GENERAL
PAINLEVEL_OUTOF10: 7
PAINLEVEL_OUTOF10: 0
PAINLEVEL_OUTOF10: 7
PAINLEVEL_OUTOF10: 0
PAINLEVEL_OUTOF10: 8
PAINLEVEL_OUTOF10: 0
PAINLEVEL_OUTOF10: 8
PAINLEVEL_OUTOF10: 7

## 2023-07-16 ASSESSMENT — PAIN DESCRIPTION - ORIENTATION
ORIENTATION: RIGHT;LEFT;MID
ORIENTATION: MID
ORIENTATION: LEFT;RIGHT
ORIENTATION: LOWER
ORIENTATION: MID

## 2023-07-16 ASSESSMENT — PAIN SCALES - WONG BAKER
WONGBAKER_NUMERICALRESPONSE: 0

## 2023-07-16 ASSESSMENT — PAIN DESCRIPTION - LOCATION
LOCATION: CHEST
LOCATION: ABDOMEN

## 2023-07-16 ASSESSMENT — PAIN DESCRIPTION - ONSET: ONSET: ON-GOING

## 2023-07-16 ASSESSMENT — PAIN - FUNCTIONAL ASSESSMENT
PAIN_FUNCTIONAL_ASSESSMENT: PREVENTS OR INTERFERES SOME ACTIVE ACTIVITIES AND ADLS

## 2023-07-16 ASSESSMENT — PAIN DESCRIPTION - DESCRIPTORS
DESCRIPTORS: ACHING

## 2023-07-16 ASSESSMENT — PAIN DESCRIPTION - FREQUENCY: FREQUENCY: CONTINUOUS

## 2023-07-16 ASSESSMENT — PAIN DESCRIPTION - PAIN TYPE: TYPE: ACUTE PAIN

## 2023-07-16 NOTE — PROGRESS NOTES
Selin Hall is a 80 y.o. female patient.     Current Facility-Administered Medications   Medication Dose Route Frequency Provider Last Rate Last Admin    PN-Adult Premix 5/20 - Standard Electrolytes - Central Line   IntraVENous Continuous TPN Judith Schumacher MD        metoclopramide (REGLAN) injection 10 mg  10 mg IntraVENous Q8H Anay Trinh MD        PN-Adult Premix 5/20 - Standard Electrolytes - Central Line   IntraVENous Continuous TPN Aditya Seth MD 75 mL/hr at 07/15/23 1808 New Bag at 07/15/23 1808    benzocaine-menthol (CEPACOL SORE THROAT) lozenge 1 lozenge  1 lozenge Oral Q2H PRN Jose Cera, APRN - CNP        levoFLOXacin (LEVAQUIN) 750 MG/150ML infusion 750 mg  750 mg IntraVENous Q48H Judith Schumacher MD   Stopped at 07/15/23 1655    enoxaparin (LOVENOX) injection 40 mg  40 mg SubCUTAneous Nightly Judith Schumacher MD   40 mg at 07/15/23 2254    lidocaine 4 % external patch 1 patch  1 patch TransDERmal Daily Jose Cera, APRN - CNP   1 patch at 07/16/23 0857    scopolamine (TRANSDERM-SCOP) transdermal patch 1 patch  1 patch TransDERmal Q72H Jose Cera, APRN - CNP   1 patch at 07/15/23 1514    prochlorperazine (COMPAZINE) injection 10 mg  10 mg IntraVENous Q6H PRN Jose Cera, APRN - CNP        oxyCODONE-acetaminophen (PERCOCET)  MG per tablet 1 tablet  1 tablet Oral Q4H PRN Galina Benson MD        HYDROmorphone HCl PF (DILAUDID) injection 0.5 mg  0.5 mg IntraVENous Q4H PRN Galina Benson MD   0.5 mg at 07/16/23 0854    melatonin tablet 3 mg  3 mg Oral Nightly PRN Galina Benson MD        insulin lispro (HUMALOG) injection vial 0-8 Units  0-8 Units SubCUTAneous Q4H Aditya Seth MD   2 Units at 07/13/23 1424    fat emulsion (INTRALIPID/NUTRILIPID) 20 % infusion 250 mL  250 mL IntraVENous Once per day on Mon Thu Aditya Seth MD   Stopped at 07/14/23 0631    sodium chloride flush 0.9 % injection 5-40 mL  5-40 mL IntraVENous 2

## 2023-07-17 ENCOUNTER — APPOINTMENT (OUTPATIENT)
Dept: GENERAL RADIOLOGY | Age: 88
End: 2023-07-17
Payer: MEDICARE

## 2023-07-17 LAB
ALBUMIN SERPL-MCNC: 2.9 G/DL (ref 3.4–5)
ALBUMIN/GLOB SERPL: 1.3 {RATIO} (ref 1.1–2.2)
ALP SERPL-CCNC: 66 U/L (ref 40–129)
ALT SERPL-CCNC: 13 U/L (ref 10–40)
ANION GAP SERPL CALCULATED.3IONS-SCNC: 9 MMOL/L (ref 3–16)
AST SERPL-CCNC: 20 U/L (ref 15–37)
BILIRUB SERPL-MCNC: 0.5 MG/DL (ref 0–1)
BUN SERPL-MCNC: 35 MG/DL (ref 7–20)
CALCIUM SERPL-MCNC: 8.7 MG/DL (ref 8.3–10.6)
CHLORIDE SERPL-SCNC: 98 MMOL/L (ref 99–110)
CO2 SERPL-SCNC: 28 MMOL/L (ref 21–32)
CREAT SERPL-MCNC: 0.8 MG/DL (ref 0.6–1.2)
DEPRECATED RDW RBC AUTO: 14.6 % (ref 12.4–15.4)
GFR SERPLBLD CREATININE-BSD FMLA CKD-EPI: >60 ML/MIN/{1.73_M2}
GLUCOSE BLD-MCNC: 131 MG/DL (ref 70–99)
GLUCOSE BLD-MCNC: 141 MG/DL (ref 70–99)
GLUCOSE BLD-MCNC: 141 MG/DL (ref 70–99)
GLUCOSE BLD-MCNC: 147 MG/DL (ref 70–99)
GLUCOSE BLD-MCNC: 156 MG/DL (ref 70–99)
GLUCOSE BLD-MCNC: 179 MG/DL (ref 70–99)
GLUCOSE BLD-MCNC: 184 MG/DL (ref 70–99)
GLUCOSE SERPL-MCNC: 138 MG/DL (ref 70–99)
HCT VFR BLD AUTO: 25.6 % (ref 36–48)
HGB BLD-MCNC: 8.4 G/DL (ref 12–16)
MAGNESIUM SERPL-MCNC: 2.4 MG/DL (ref 1.8–2.4)
MCH RBC QN AUTO: 30.7 PG (ref 26–34)
MCHC RBC AUTO-ENTMCNC: 32.7 G/DL (ref 31–36)
MCV RBC AUTO: 93.9 FL (ref 80–100)
PERFORMED ON: ABNORMAL
PHOSPHATE SERPL-MCNC: 3.7 MG/DL (ref 2.5–4.9)
PLATELET # BLD AUTO: 229 K/UL (ref 135–450)
PMV BLD AUTO: 8 FL (ref 5–10.5)
POTASSIUM SERPL-SCNC: 4 MMOL/L (ref 3.5–5.1)
PROT SERPL-MCNC: 5.2 G/DL (ref 6.4–8.2)
RBC # BLD AUTO: 2.72 M/UL (ref 4–5.2)
REASON FOR REJECTION: NORMAL
REASON FOR REJECTION: NORMAL
REJECTED TEST: NORMAL
REJECTED TEST: NORMAL
SODIUM SERPL-SCNC: 135 MMOL/L (ref 136–145)
WBC # BLD AUTO: 7.6 K/UL (ref 4–11)

## 2023-07-17 PROCEDURE — 1200000000 HC SEMI PRIVATE

## 2023-07-17 PROCEDURE — 97530 THERAPEUTIC ACTIVITIES: CPT

## 2023-07-17 PROCEDURE — 2500000003 HC RX 250 WO HCPCS: Performed by: SURGERY

## 2023-07-17 PROCEDURE — 2580000003 HC RX 258: Performed by: SURGERY

## 2023-07-17 PROCEDURE — 74019 RADEX ABDOMEN 2 VIEWS: CPT

## 2023-07-17 PROCEDURE — 6370000000 HC RX 637 (ALT 250 FOR IP): Performed by: NURSE PRACTITIONER

## 2023-07-17 PROCEDURE — 6370000000 HC RX 637 (ALT 250 FOR IP): Performed by: SURGERY

## 2023-07-17 PROCEDURE — 85027 COMPLETE CBC AUTOMATED: CPT

## 2023-07-17 PROCEDURE — 6360000002 HC RX W HCPCS: Performed by: INTERNAL MEDICINE

## 2023-07-17 PROCEDURE — 6360000002 HC RX W HCPCS: Performed by: SURGERY

## 2023-07-17 PROCEDURE — APPSS15 APP SPLIT SHARED TIME 0-15 MINUTES: Performed by: NURSE PRACTITIONER

## 2023-07-17 PROCEDURE — 84100 ASSAY OF PHOSPHORUS: CPT

## 2023-07-17 PROCEDURE — 2500000003 HC RX 250 WO HCPCS: Performed by: INTERNAL MEDICINE

## 2023-07-17 PROCEDURE — APPNB30 APP NON BILLABLE TIME 0-30 MINS: Performed by: NURSE PRACTITIONER

## 2023-07-17 PROCEDURE — 99024 POSTOP FOLLOW-UP VISIT: CPT | Performed by: SURGERY

## 2023-07-17 PROCEDURE — 83735 ASSAY OF MAGNESIUM: CPT

## 2023-07-17 PROCEDURE — 80053 COMPREHEN METABOLIC PANEL: CPT

## 2023-07-17 PROCEDURE — C9113 INJ PANTOPRAZOLE SODIUM, VIA: HCPCS | Performed by: SURGERY

## 2023-07-17 RX ORDER — BISACODYL 10 MG
10 SUPPOSITORY, RECTAL RECTAL ONCE
Status: COMPLETED | OUTPATIENT
Start: 2023-07-17 | End: 2023-07-17

## 2023-07-17 RX ADMIN — PANTOPRAZOLE SODIUM 40 MG: 40 INJECTION, POWDER, FOR SOLUTION INTRAVENOUS at 08:38

## 2023-07-17 RX ADMIN — HYDROMORPHONE HYDROCHLORIDE 0.5 MG: 1 INJECTION, SOLUTION INTRAMUSCULAR; INTRAVENOUS; SUBCUTANEOUS at 17:10

## 2023-07-17 RX ADMIN — I.V. FAT EMULSION 250 ML: 20 EMULSION INTRAVENOUS at 18:43

## 2023-07-17 RX ADMIN — ASCORBIC ACID, VITAMIN A PALMITATE, CHOLECALCIFEROL, THIAMINE HYDROCHLORIDE, RIBOFLAVIN-5 PHOSPHATE SODIUM, PYRIDOXINE HYDROCHLORIDE, NIACINAMIDE, DEXPANTHENOL, ALPHA-TOCOPHEROL ACETATE, VITAMIN K1, FOLIC ACID, BIOTIN, CYANOCOBALAMIN: 200; 3300; 200; 6; 3.6; 6; 40; 15; 10; 150; 600; 60; 5 INJECTION, SOLUTION INTRAVENOUS at 18:44

## 2023-07-17 RX ADMIN — BISACODYL 10 MG: 10 SUPPOSITORY RECTAL at 14:34

## 2023-07-17 RX ADMIN — METOCLOPRAMIDE 10 MG: 5 INJECTION, SOLUTION INTRAMUSCULAR; INTRAVENOUS at 08:47

## 2023-07-17 RX ADMIN — PANTOPRAZOLE SODIUM 40 MG: 40 INJECTION, POWDER, FOR SOLUTION INTRAVENOUS at 20:57

## 2023-07-17 RX ADMIN — METOCLOPRAMIDE 10 MG: 5 INJECTION, SOLUTION INTRAMUSCULAR; INTRAVENOUS at 02:41

## 2023-07-17 RX ADMIN — SODIUM CHLORIDE, PRESERVATIVE FREE 10 ML: 5 INJECTION INTRAVENOUS at 08:47

## 2023-07-17 RX ADMIN — HYDROMORPHONE HYDROCHLORIDE 0.5 MG: 1 INJECTION, SOLUTION INTRAMUSCULAR; INTRAVENOUS; SUBCUTANEOUS at 13:15

## 2023-07-17 RX ADMIN — ENOXAPARIN SODIUM 40 MG: 100 INJECTION SUBCUTANEOUS at 20:57

## 2023-07-17 RX ADMIN — SODIUM CHLORIDE, PRESERVATIVE FREE 10 ML: 5 INJECTION INTRAVENOUS at 20:53

## 2023-07-17 RX ADMIN — SODIUM CHLORIDE, PRESERVATIVE FREE 10 ML: 5 INJECTION INTRAVENOUS at 08:38

## 2023-07-17 RX ADMIN — LEVOFLOXACIN 750 MG: 5 INJECTION, SOLUTION INTRAVENOUS at 14:34

## 2023-07-17 RX ADMIN — METOCLOPRAMIDE 10 MG: 5 INJECTION, SOLUTION INTRAMUSCULAR; INTRAVENOUS at 17:12

## 2023-07-17 RX ADMIN — HYDROMORPHONE HYDROCHLORIDE 0.5 MG: 1 INJECTION, SOLUTION INTRAMUSCULAR; INTRAVENOUS; SUBCUTANEOUS at 08:48

## 2023-07-17 RX ADMIN — HYDROMORPHONE HYDROCHLORIDE 0.5 MG: 1 INJECTION, SOLUTION INTRAMUSCULAR; INTRAVENOUS; SUBCUTANEOUS at 04:37

## 2023-07-17 ASSESSMENT — PAIN DESCRIPTION - ORIENTATION
ORIENTATION: RIGHT;LEFT;MID

## 2023-07-17 ASSESSMENT — PAIN DESCRIPTION - LOCATION
LOCATION: ABDOMEN
LOCATION: ABDOMEN;BACK
LOCATION: ABDOMEN

## 2023-07-17 ASSESSMENT — PAIN DESCRIPTION - DESCRIPTORS
DESCRIPTORS: ACHING;SHARP
DESCRIPTORS: ACHING;PRESSURE;SHARP
DESCRIPTORS: ACHING;SPASM
DESCRIPTORS: ACHING

## 2023-07-17 ASSESSMENT — PAIN SCALES - GENERAL
PAINLEVEL_OUTOF10: 6
PAINLEVEL_OUTOF10: 9
PAINLEVEL_OUTOF10: 9
PAINLEVEL_OUTOF10: 6
PAINLEVEL_OUTOF10: 7
PAINLEVEL_OUTOF10: 6
PAINLEVEL_OUTOF10: 3
PAINLEVEL_OUTOF10: 9

## 2023-07-17 ASSESSMENT — PAIN - FUNCTIONAL ASSESSMENT
PAIN_FUNCTIONAL_ASSESSMENT: PREVENTS OR INTERFERES SOME ACTIVE ACTIVITIES AND ADLS
PAIN_FUNCTIONAL_ASSESSMENT: PREVENTS OR INTERFERES SOME ACTIVE ACTIVITIES AND ADLS

## 2023-07-17 ASSESSMENT — PAIN DESCRIPTION - ONSET: ONSET: GRADUAL

## 2023-07-17 ASSESSMENT — PAIN DESCRIPTION - FREQUENCY: FREQUENCY: INTERMITTENT

## 2023-07-17 ASSESSMENT — PAIN DESCRIPTION - PAIN TYPE: TYPE: SURGICAL PAIN

## 2023-07-17 NOTE — PROGRESS NOTES
[facility] Department   Phone: [phone]    Occupational Therapy    [] Initial Evaluation            [x] Daily Treatment Note         [] Discharge Summary      Patient: Silvino Quiroga   : 10/16/1932   MRN: [id number]   Date of Service:  2023    Admitting Diagnosis:  Acute gastric volvulus  Current Admission Summary: Silvino Quiroga is a 80 y.o. female who presented to ED for evaluation of nonradiating, epigastric abdominal pain and vomiting which began this morning. Patient describes pain as a constant fullness/swelling. She reports she has had multiple episodes of vomiting. She denies any known aggravating or alleviating factors. She denies hematemesis or coffee-ground emesis. She denies melena or hematochezia. She denies diarrhea, constipation, urinary symptoms. She has a history of appendectomy, cholecystectomy, and hysterectomy. She denies fever, dizziness, chest pain, shortness of breath. S/p ex lap , extensive ALLYSON, GT placement. Past Medical History:  has a past medical history of Arthritis, CAD (coronary artery disease), Cancer ([address]), Cystocele, Diabetes mellitus ([address]), Hepatitis A, History of blood transfusion, Hyperlipidemia, PVC (premature ventricular contraction), Rectocele, Reflux, and Scoliosis. Past Surgical History:  has a past surgical history that includes Appendectomy; joint replacement (Left); hernia repair (6 YRS AGO); Cholecystectomy; shoulder surgery (Right, 12); Bunionectomy (12); Breast surgery; Upper gastrointestinal endoscopy (12); Hysterectomy; bladder suspension; Dilation and curettage of uterus; other surgical history (Left, 14); Foot surgery; Cardiac catheterization; Colonoscopy (); Cystocopy (2017); other surgical history (Right, 2018); Cataract removal with implant (Left, 2018); pr xcapsl ctrc rmvl insj io lens prosth w/o ecp (Left, 2018);  Lumbar spine surgery (Right, requires max A x2. Pt rolls bilaterally SBA and bed rail use  Transfers:  No transfers completed on this date secondary to pt  declines. Comments:   Functional Mobility  No functional mobility completed on this date secondary to pt declines. Balance:  Static Sitting Balance: fair (-): maintains balance at CGA-mod A with use of UE support  Comments: vary assist, restless seated EOB d/t pain, would take brief rest breaks and would lean back into therapist requiring mod A  Other Therapeutic Interventions    Functional Outcomes  AM-PAC Inpatient Daily Activity Raw Score: 12    Cognition  WFL, difficult to assess secondary to [facility]  Orientation:    alert and oriented x 4  Command Following:   [facility]     Education  Barriers To Learning: hearing  Patient Education: patient educated on goals, OT role and benefits, plan of care, proper use of assistive device/equipment, adaptive device training, orientation, family education, disease specific education, pressure relief, transfer training, discharge recommendations  Learning Assessment:  patient verbalizes understanding, would benefit from continued reinforcement    Assessment  Activity Tolerance: poor  Impairments Requiring Therapeutic Intervention: decreased functional mobility, decreased ADL status, decreased strength, decreased endurance, decreased balance, decreased IADL, increased pain, decreased posture  Prognosis: good  Clinical Assessment: Pt presents with the above deficits impacting occupational performance secondary to recent ex lap on 7/7. Pt mainly limited by pain this date. Pt would continue to benefit from skilled OT services in order to maximize safety and independence to return to PLOF.    Safety Interventions: patient left in bed, bed alarm in place, call light within reach, and nurse notified    Plan  Frequency: 3-5 x/per week  Current Treatment Recommendations: strengthening, ROM, balance training, functional mobility training, transfer training, endurance

## 2023-07-17 NOTE — PROGRESS NOTES
Physical/Occupational Therapy  Leodan Licea    Orders received, chart reviewed. Attempted PT/OT evaluation this date. Pt currently off floor for imaging. Will re-attempt evaluation later this date as schedule allows.    [facility] PT, [facility], OTR/L, [place], [id number]

## 2023-07-17 NOTE — PLAN OF CARE
Problem: Discharge Planning  Goal: Discharge to home or other facility with appropriate resources  Outcome: Progressing     Problem: Chronic Conditions and Co-morbidities  Goal: Patient's chronic conditions and co-morbidity symptoms are monitored and maintained or improved  Outcome: Progressing     Problem: Skin/Tissue Integrity  Goal: Absence of new skin breakdown  Description: 1. Monitor for areas of redness and/or skin breakdown  2. Assess vascular access sites hourly  3. Every 4-6 hours minimum:  Change oxygen saturation probe site  4. Every 4-6 hours:  If on nasal continuous positive airway pressure, respiratory therapy assess nares and determine need for appliance change or resting period.   Outcome: Progressing     Problem: ABCDS Injury Assessment  Goal: Absence of physical injury  Outcome: Progressing     Problem: Safety - Adult  Goal: Free from fall injury  Outcome: Progressing     Problem: Pain  Goal: Verbalizes/displays adequate comfort level or baseline comfort level  Outcome: Progressing  Flowsheets (Taken 7/16/2023 5459)  Verbalizes/displays adequate comfort level or baseline comfort level: Encourage patient to monitor pain and request assistance     Problem: Nutrition Deficit:  Goal: Optimize nutritional status  Outcome: Progressing

## 2023-07-17 NOTE — PROGRESS NOTES
[address] [facility] Department   Phone: [phone]    Physical Therapy     []Initial Evaluation            [x] Daily Treatment Note         [] Discharge Summary      Patient: Joshua Dickson   : 10/16/1932   MRN: [id number]   Date of Service:  2023  Admitting Diagnosis: Acute gastric volvulus  Current Admission Summary: 80 y.o. female status post ex lap, extensive lysis of adhesions, GT placement  Past Medical History:  has a past medical history of Arthritis, CAD (coronary artery disease), Cancer ([address]), Cystocele, Diabetes mellitus ([address]), Hepatitis A, History of blood transfusion, Hyperlipidemia, PVC (premature ventricular contraction), Rectocele, Reflux, and Scoliosis. Past Surgical History:  has a past surgical history that includes Appendectomy; joint replacement (Left); hernia repair (6 YRS AGO); Cholecystectomy; shoulder surgery (Right, 12); Bunionectomy (12); Breast surgery; Upper gastrointestinal endoscopy (12); Hysterectomy; bladder suspension; Dilation and curettage of uterus; other surgical history (Left, 14); Foot surgery; Cardiac catheterization; Colonoscopy (); Cystocopy (2017); other surgical history (Right, 2018); Cataract removal with implant (Left, 2018); pr xcapsl ctrc rmvl insj io lens prosth w/o ecp (Left, 2018); Lumbar spine surgery (Right, 5/15/2019); Upper gastrointestinal endoscopy (N/A, 7/3/2023); and laparotomy (N/A, 2023). Discharge Recommendations: Joshua Dickson scored a  on the AM-PAC short mobility form. Current research shows that an AM-PAC score of 17 or less is typically not associated with a discharge to the patient's home setting. Based on the patient's AM-PAC score and their current functional mobility deficits, it is recommended that the patient have 5-7 sessions per week of Physical Therapy at d/c to increase the patient's independence.   At this time, this patient Following:   [facility]    Education  Barriers To Learning: hearing  Patient Education: patient educated on goals, PT role and benefits, plan of care, general safety, functional mobility training, proper use of assistive device/equipment, energy conservation, transfer training, discharge recommendations  Learning Assessment:  patient verbalizes and demonstrates understanding    Assessment  Activity Tolerance: Limited by pain. Impairments Requiring Therapeutic Intervention: decreased functional mobility, decreased strength, decreased safety awareness, decreased endurance, decreased balance, increased pain, decreased posture  Prognosis: good  Clinical Assessment: Pt requiring increased assist for bed mobility this date and varying assist for sitting EOB. Pt continues to be limited by pain this date however, requiring increased time to perform tasks and continues to present below baseline mobility. Pt would benefit from continued skilled PT services to address deficits and facilitate return to OF. Safety Interventions: patient left in bed, bed alarm in place, call light within reach, patient at risk for falls, telesitter in use, and nurse notified    Plan  Frequency: 3-5 x/per week  Current Treatment Recommendations: strengthening, balance training, functional mobility training, transfer training, gait training, endurance training, patient/caregiver education, home exercise program, and safety education    Goals  Patient Goals: to go home   Short Term Goals:  Time Frame: discharge  Patient will complete bed mobility at minimal assistance - met 07/10/23  Patient will complete transfers at minimal assistance - met 7/10/23  Patient will ambulate 25 ft with use of rolling walker at minimal assistance - Progressing  New goal as of 07/10/23: Patient will complete bed mobility of rolling left & right at contact guard assist with use of bed rails as needed. --MET 7/17/23  Patient will transfer sit to/from stand with RW with

## 2023-07-17 NOTE — PROGRESS NOTES
Nutrition Note    RECOMMENDATIONS  PO Diet: NPO  Nutrition Support:   Clinimix 5/20 at 83 mL/hr  Continue 250 mL of 20% lipids two times per week      NUTRITION ASSESSMENT   Pt remains NPO with parenteral nutrition/Clinimix 5/20 at 75 mL/hr. Pt with NGT and high output. Recommend to continue aggressive nutrition support until po diet initiated and tolerated. Nutrition Related Findings: g-tube to gravity; 7/16: Na+ 141, K+ 4.2, Mg 2.6, phos 3.5; 7/12 LBM; flat affect; upper & lower dentures; 7/14:   Wounds: Surgical Incision  Nutrition Education:  Education not indicated   Nutrition Goals: Tolerate nutrition support at goal rate, by next RD assessment     MALNUTRITION ASSESSMENT   Acute Illness  Malnutrition Status: At risk for malnutrition (Comment)  Findings of the 6 clinical characteristics of malnutrition:  Energy Intake:  50% or less of estimated energy requirements for 5 or more days  Weight Loss:  No significant weight loss     Body Fat Loss:  No significant body fat loss     Muscle Mass Loss:  No significant muscle mass loss    Fluid Accumulation:  No significant fluid accumulation     Strength:  Not Performed      NUTRITION DIAGNOSIS   Inadequate oral intake related to altered GI function, altered GI structure as evidenced by NPO or clear liquid status due to medical condition, nutrition support - parenteral nutrition      CURRENT NUTRITION THERAPIES  Diet NPO Exceptions are: Ice Chips, Sips of Water with Meds, Popsicles  PN-Adult Premix 5/20 - Standard Electrolytes - Central Line  PN-Adult Premix 5/20 - Standard Electrolytes - Central Line     PO Intake: NPO   PO Supplement Intake:NPO    Parenteral Nutrition:  Type and Formula: Premix Central   Lipids: 250ml, Two times weekly  Duration: Continuous  Current PN Order Provides: Clinimix 5/20 at 75 mL/hr to provide 1800 mL total volume, 1584 calories, 90 grams protein, dextrose load 3.4 mg/kg/min.   Goal PN Orders Provides: 83 mL/hr to provide 3260

## 2023-07-18 LAB
ALBUMIN SERPL-MCNC: 2.7 G/DL (ref 3.4–5)
ALBUMIN/GLOB SERPL: 1.3 {RATIO} (ref 1.1–2.2)
ALP SERPL-CCNC: 63 U/L (ref 40–129)
ALT SERPL-CCNC: 13 U/L (ref 10–40)
ANION GAP SERPL CALCULATED.3IONS-SCNC: 6 MMOL/L (ref 3–16)
AST SERPL-CCNC: 20 U/L (ref 15–37)
BILIRUB SERPL-MCNC: 0.5 MG/DL (ref 0–1)
BUN SERPL-MCNC: 33 MG/DL (ref 7–20)
CALCIUM SERPL-MCNC: 8.2 MG/DL (ref 8.3–10.6)
CHLORIDE SERPL-SCNC: 101 MMOL/L (ref 99–110)
CO2 SERPL-SCNC: 27 MMOL/L (ref 21–32)
CREAT SERPL-MCNC: 0.9 MG/DL (ref 0.6–1.2)
DEPRECATED RDW RBC AUTO: 14.5 % (ref 12.4–15.4)
GFR SERPLBLD CREATININE-BSD FMLA CKD-EPI: >60 ML/MIN/{1.73_M2}
GLUCOSE BLD-MCNC: 149 MG/DL (ref 70–99)
GLUCOSE BLD-MCNC: 150 MG/DL (ref 70–99)
GLUCOSE BLD-MCNC: 151 MG/DL (ref 70–99)
GLUCOSE BLD-MCNC: 158 MG/DL (ref 70–99)
GLUCOSE BLD-MCNC: 174 MG/DL (ref 70–99)
GLUCOSE SERPL-MCNC: 162 MG/DL (ref 70–99)
HCT VFR BLD AUTO: 22.9 % (ref 36–48)
HGB BLD-MCNC: 7.6 G/DL (ref 12–16)
MAGNESIUM SERPL-MCNC: 2.4 MG/DL (ref 1.8–2.4)
MCH RBC QN AUTO: 31.1 PG (ref 26–34)
MCHC RBC AUTO-ENTMCNC: 33.1 G/DL (ref 31–36)
MCV RBC AUTO: 93.8 FL (ref 80–100)
PERFORMED ON: ABNORMAL
PHOSPHATE SERPL-MCNC: 3.6 MG/DL (ref 2.5–4.9)
PLATELET # BLD AUTO: 210 K/UL (ref 135–450)
PMV BLD AUTO: 7.1 FL (ref 5–10.5)
POTASSIUM SERPL-SCNC: 3.9 MMOL/L (ref 3.5–5.1)
PROT SERPL-MCNC: 4.8 G/DL (ref 6.4–8.2)
RBC # BLD AUTO: 2.45 M/UL (ref 4–5.2)
SODIUM SERPL-SCNC: 134 MMOL/L (ref 136–145)
WBC # BLD AUTO: 6.8 K/UL (ref 4–11)

## 2023-07-18 PROCEDURE — 6360000002 HC RX W HCPCS: Performed by: NURSE PRACTITIONER

## 2023-07-18 PROCEDURE — 6360000002 HC RX W HCPCS: Performed by: INTERNAL MEDICINE

## 2023-07-18 PROCEDURE — 2580000003 HC RX 258: Performed by: SURGERY

## 2023-07-18 PROCEDURE — 1200000000 HC SEMI PRIVATE

## 2023-07-18 PROCEDURE — APPNB30 APP NON BILLABLE TIME 0-30 MINS: Performed by: NURSE PRACTITIONER

## 2023-07-18 PROCEDURE — 2500000003 HC RX 250 WO HCPCS: Performed by: HOSPITALIST

## 2023-07-18 PROCEDURE — 6360000002 HC RX W HCPCS: Performed by: SURGERY

## 2023-07-18 PROCEDURE — C9113 INJ PANTOPRAZOLE SODIUM, VIA: HCPCS | Performed by: SURGERY

## 2023-07-18 PROCEDURE — 85027 COMPLETE CBC AUTOMATED: CPT

## 2023-07-18 PROCEDURE — 6370000000 HC RX 637 (ALT 250 FOR IP): Performed by: SURGERY

## 2023-07-18 PROCEDURE — 97530 THERAPEUTIC ACTIVITIES: CPT

## 2023-07-18 PROCEDURE — 83735 ASSAY OF MAGNESIUM: CPT

## 2023-07-18 PROCEDURE — 6370000000 HC RX 637 (ALT 250 FOR IP): Performed by: NURSE PRACTITIONER

## 2023-07-18 PROCEDURE — 99024 POSTOP FOLLOW-UP VISIT: CPT | Performed by: SURGERY

## 2023-07-18 PROCEDURE — 80053 COMPREHEN METABOLIC PANEL: CPT

## 2023-07-18 PROCEDURE — APPSS15 APP SPLIT SHARED TIME 0-15 MINUTES: Performed by: NURSE PRACTITIONER

## 2023-07-18 PROCEDURE — 84100 ASSAY OF PHOSPHORUS: CPT

## 2023-07-18 RX ORDER — KETOROLAC TROMETHAMINE 15 MG/ML
15 INJECTION, SOLUTION INTRAMUSCULAR; INTRAVENOUS EVERY 6 HOURS PRN
Status: DISCONTINUED | OUTPATIENT
Start: 2023-07-18 | End: 2023-07-18

## 2023-07-18 RX ORDER — OXYCODONE HYDROCHLORIDE AND ACETAMINOPHEN 5; 325 MG/1; MG/1
1 TABLET ORAL EVERY 4 HOURS PRN
Status: DISCONTINUED | OUTPATIENT
Start: 2023-07-18 | End: 2023-07-28 | Stop reason: HOSPADM

## 2023-07-18 RX ORDER — HYDROMORPHONE HYDROCHLORIDE 1 MG/ML
0.5 INJECTION, SOLUTION INTRAMUSCULAR; INTRAVENOUS; SUBCUTANEOUS EVERY 4 HOURS PRN
Status: DISCONTINUED | OUTPATIENT
Start: 2023-07-18 | End: 2023-07-18 | Stop reason: SDUPTHER

## 2023-07-18 RX ORDER — KETOROLAC TROMETHAMINE 15 MG/ML
15 INJECTION, SOLUTION INTRAMUSCULAR; INTRAVENOUS EVERY 6 HOURS PRN
Status: DISPENSED | OUTPATIENT
Start: 2023-07-18 | End: 2023-07-23

## 2023-07-18 RX ORDER — BISACODYL 10 MG
10 SUPPOSITORY, RECTAL RECTAL ONCE
Status: COMPLETED | OUTPATIENT
Start: 2023-07-18 | End: 2023-07-18

## 2023-07-18 RX ORDER — METOCLOPRAMIDE HYDROCHLORIDE 5 MG/ML
5 INJECTION INTRAMUSCULAR; INTRAVENOUS EVERY 8 HOURS
Status: DISCONTINUED | OUTPATIENT
Start: 2023-07-18 | End: 2023-07-22

## 2023-07-18 RX ORDER — HYDROMORPHONE HYDROCHLORIDE 1 MG/ML
0.25 INJECTION, SOLUTION INTRAMUSCULAR; INTRAVENOUS; SUBCUTANEOUS EVERY 4 HOURS PRN
Status: DISCONTINUED | OUTPATIENT
Start: 2023-07-18 | End: 2023-07-18 | Stop reason: SDUPTHER

## 2023-07-18 RX ADMIN — ENOXAPARIN SODIUM 40 MG: 100 INJECTION SUBCUTANEOUS at 21:45

## 2023-07-18 RX ADMIN — PANTOPRAZOLE SODIUM 40 MG: 40 INJECTION, POWDER, FOR SOLUTION INTRAVENOUS at 21:45

## 2023-07-18 RX ADMIN — SODIUM CHLORIDE, PRESERVATIVE FREE 10 ML: 5 INJECTION INTRAVENOUS at 08:20

## 2023-07-18 RX ADMIN — METOCLOPRAMIDE 10 MG: 5 INJECTION, SOLUTION INTRAMUSCULAR; INTRAVENOUS at 10:22

## 2023-07-18 RX ADMIN — PANTOPRAZOLE SODIUM 40 MG: 40 INJECTION, POWDER, FOR SOLUTION INTRAVENOUS at 08:20

## 2023-07-18 RX ADMIN — METOCLOPRAMIDE 5 MG: 5 INJECTION, SOLUTION INTRAMUSCULAR; INTRAVENOUS at 18:30

## 2023-07-18 RX ADMIN — KETOROLAC TROMETHAMINE 15 MG: 15 INJECTION, SOLUTION INTRAMUSCULAR; INTRAVENOUS at 14:56

## 2023-07-18 RX ADMIN — BISACODYL 10 MG: 10 SUPPOSITORY RECTAL at 14:16

## 2023-07-18 RX ADMIN — HYDROMORPHONE HYDROCHLORIDE 0.5 MG: 1 INJECTION, SOLUTION INTRAMUSCULAR; INTRAVENOUS; SUBCUTANEOUS at 10:22

## 2023-07-18 RX ADMIN — HYDROMORPHONE HYDROCHLORIDE 0.5 MG: 1 INJECTION, SOLUTION INTRAMUSCULAR; INTRAVENOUS; SUBCUTANEOUS at 00:49

## 2023-07-18 RX ADMIN — METOCLOPRAMIDE 10 MG: 5 INJECTION, SOLUTION INTRAMUSCULAR; INTRAVENOUS at 02:38

## 2023-07-18 RX ADMIN — LEUCINE, PHENYLALANINE, LYSINE, METHIONINE, ISOLEUCINE, VALINE, HISTIDINE, THREONINE, TRYPTOPHAN, ALANINE, GLYCINE, ARGININE, PROLINE, SERINE, TYROSINE, SODIUM ACETATE, DIBASIC POTASSIUM PHOSPHATE, MAGNESIUM CHLORIDE, SODIUM CHLORIDE, CALCIUM CHLORIDE, DEXTROSE
365; 280; 290; 200; 300; 290; 240; 210; 90; 1035; 515; 575; 340; 250; 20; 340; 261; 51; 59; 33; 20 INJECTION INTRAVENOUS at 18:53

## 2023-07-18 RX ADMIN — KETOROLAC TROMETHAMINE 15 MG: 15 INJECTION, SOLUTION INTRAMUSCULAR; INTRAVENOUS at 21:45

## 2023-07-18 RX ADMIN — SODIUM CHLORIDE, PRESERVATIVE FREE 10 ML: 5 INJECTION INTRAVENOUS at 21:46

## 2023-07-18 RX ADMIN — HYDROMORPHONE HYDROCHLORIDE 0.5 MG: 1 INJECTION, SOLUTION INTRAMUSCULAR; INTRAVENOUS; SUBCUTANEOUS at 05:05

## 2023-07-18 ASSESSMENT — PAIN DESCRIPTION - ORIENTATION
ORIENTATION: RIGHT;LEFT;MID

## 2023-07-18 ASSESSMENT — PAIN - FUNCTIONAL ASSESSMENT
PAIN_FUNCTIONAL_ASSESSMENT: PREVENTS OR INTERFERES SOME ACTIVE ACTIVITIES AND ADLS

## 2023-07-18 ASSESSMENT — PAIN SCALES - GENERAL
PAINLEVEL_OUTOF10: 4
PAINLEVEL_OUTOF10: 8
PAINLEVEL_OUTOF10: 9
PAINLEVEL_OUTOF10: 5
PAINLEVEL_OUTOF10: 8

## 2023-07-18 ASSESSMENT — PAIN DESCRIPTION - DESCRIPTORS
DESCRIPTORS: ACHING
DESCRIPTORS: ACHING;SHARP
DESCRIPTORS: ACHING;SHARP
DESCRIPTORS: ACHING

## 2023-07-18 ASSESSMENT — PAIN DESCRIPTION - PAIN TYPE
TYPE: SURGICAL PAIN
TYPE: SURGICAL PAIN

## 2023-07-18 ASSESSMENT — PAIN DESCRIPTION - LOCATION
LOCATION: ABDOMEN;BACK

## 2023-07-18 ASSESSMENT — PAIN DESCRIPTION - ONSET
ONSET: GRADUAL
ONSET: GRADUAL

## 2023-07-18 ASSESSMENT — PAIN DESCRIPTION - FREQUENCY
FREQUENCY: INTERMITTENT
FREQUENCY: INTERMITTENT

## 2023-07-18 NOTE — PROGRESS NOTES
Shift assessment complete, VSS, A&Ox4. Morning medications administered per MAR. Patient reports pain of 8/10 but pain medication is not due at this time. Patient repositioned and then moved into chair per request of surgery. Patient NG tube clamped per surgery for clamping trial. Patient states no further needs at this time. Call light and personal belongings within reach. 6:16 PM  Patient refusing to get up to chair. Educated that Dr. Tejas Perez stated she needs to get up to chair to help her get better, but patient continues to refuse.

## 2023-07-18 NOTE — PROGRESS NOTES
Clinical Pharmacy Note    Pharmacy consulted by Dr. Luli Oh to manage TPN    Current TPN rate: 75 ml/hr  Goal TPN rate: 83 ml/hr    Access: Central line  Indication: Inadequate oral intake related to altered GI fxn    Labs:  General Labs:  BMP:    Lab Results   Component Value Date/Time     07/18/2023 04:44 AM    K 3.9 07/18/2023 04:44 AM    K 4.7 07/02/2023 07:24 PM     07/18/2023 04:44 AM    CO2 27 07/18/2023 04:44 AM    BUN 33 07/18/2023 04:44 AM    LABALBU 2.7 07/18/2023 04:44 AM    CREATININE 0.9 07/18/2023 04:44 AM    CALCIUM 8.2 07/18/2023 04:44 AM    GFRAA 57 02/09/2022 01:45 PM    GFRAA 56 01/10/2010 08:07 PM    LABGLOM >60 07/18/2023 04:44 AM    GLUCOSE 162 07/18/2023 04:44 AM           Blood sugars over past 24 hours: 138-162    Blood sugar management:  Plan to Continue Humalog q4 hour sliding scale at medium dose. Plan to increase TPN to 83 ml/hr. Thank you for allowing pharmacy to participate in the care of this patient.     Pedro Mcgowan [facility], PharmD 7/18/2023

## 2023-07-18 NOTE — PROGRESS NOTES
[address] [facility] Department   Phone: [phone]    Occupational Therapy    [] Initial Evaluation            [x] Daily Treatment Note         [] Discharge Summary      Patient: Storm Barraza   : 10/16/1932   MRN: [id number]   Date of Service:  2023    Admitting Diagnosis:  Acute gastric volvulus  Current Admission Summary: Storm Barraza is a 80 y.o. female who presented to ED for evaluation of nonradiating, epigastric abdominal pain and vomiting which began this morning. Patient describes pain as a constant fullness/swelling. She reports she has had multiple episodes of vomiting. She denies any known aggravating or alleviating factors. She denies hematemesis or coffee-ground emesis. She denies melena or hematochezia. She denies diarrhea, constipation, urinary symptoms. She has a history of appendectomy, cholecystectomy, and hysterectomy. She denies fever, dizziness, chest pain, shortness of breath. S/p ex lap , extensive ALLYSON, GT placement. Past Medical History:  has a past medical history of Arthritis, CAD (coronary artery disease), Cancer ([address]), Cystocele, Diabetes mellitus ([address]), Hepatitis A, History of blood transfusion, Hyperlipidemia, PVC (premature ventricular contraction), Rectocele, Reflux, and Scoliosis. Past Surgical History:  has a past surgical history that includes Appendectomy; joint replacement (Left); hernia repair (6 YRS AGO); Cholecystectomy; shoulder surgery (Right, 12); Bunionectomy (12); Breast surgery; Upper gastrointestinal endoscopy (12); Hysterectomy; bladder suspension; Dilation and curettage of uterus; other surgical history (Left, 14); Foot surgery; Cardiac catheterization; Colonoscopy (); Cystocopy (2017); other surgical history (Right, 2018); Cataract removal with implant (Left, 2018); pr xcapsl ctrc rmvl insj io lens prosth w/o ecp (Left, 2018);  Lumbar spine surgery (Right, poor (+): requires min (A) to maintain balance  Dynamic Standing Balance: poor: requires mod (A) to maintain balance  Comments:   Other Therapeutic Interventions    Functional Outcomes  AM-PAC Inpatient Daily Activity Raw Score: 12    Cognition  WFL, difficult to assess secondary to [facility]  Orientation:    alert and oriented x 4  Command Following:   [facility]     Education  Barriers To Learning: hearing  Patient Education: patient educated on goals, OT role and benefits, plan of care, proper use of assistive device/equipment, adaptive device training, orientation, family education, disease specific education, pressure relief, transfer training, discharge recommendations  Learning Assessment:  patient verbalizes understanding, would benefit from continued reinforcement    Assessment  Activity Tolerance: poor  Impairments Requiring Therapeutic Intervention: decreased functional mobility, decreased ADL status, decreased strength, decreased endurance, decreased balance, decreased IADL, increased pain, decreased posture  Prognosis: good  Clinical Assessment: Pt presents with the above deficits impacting occupational performance secondary to recent ex lap on 7/7. Pt mainly limited by pain this date. Pt would continue to benefit from skilled OT services in order to maximize safety and independence to return to OF.    Safety Interventions: patient left in bed, bed alarm in place, call light within reach, and nurse notified    Plan  Frequency: 3-5 x/per week  Current Treatment Recommendations: strengthening, ROM, balance training, functional mobility training, transfer training, endurance training, patient/caregiver education, ADL/self-care training, IADL training, home management training, pain management, safety education, equipment evaluation/education, and positioning    Goals  Patient Goals: to go home   Short Term Goals:  Time Frame: to be met by discharge  Patient will complete upper body ADL at minimal assistance ,goal met

## 2023-07-18 NOTE — PROGRESS NOTES
Physician Progress Note      PATIENTBenedetta Phoenix  CSN #:                  [id number]  :                       10/16/1932  ADMIT DATE:       2023 6:49 PM  [facility] DATE:  RESPONDING  PROVIDER #:        Delmi Rosen MD          QUERY TEXT:    Patient admitted with SBO with extensive adhesions. Noted *\"mid ileal   obstruction noted with interloop adhesions and an internal hernia present with   the bowel tightly tethered down in the pelvis. This area was released and   with this, the area of the bowel which was bruised and compromised-appearing   returned to viability\" per OP note. Please document in progress notes and   discharge summary if you are evaluating and/or treating any of the following: The medical record reflects the following:  Risk Factors: SBO, hiatal hernia, extensive adhesions  Clinical Indicators: \" clearly demarcated by a tight band of the bowel and its   attendant mesentery looped over the colon with some bowel compromise from   this adhesion. Once freed up, the mesenteric flow returned and the bowel   appeared healthy. \" and \"one side on the bowel which appeared slightly ischemic   \" noted per OP note  Treatment: lysis of adhesions, exploratory lap, EGD, NG, GI/Gen Surg consult,   TPN, supportive care    Thank you,  Rose Dallas RN, CDS  [email or web address]  Options provided:  -- Acute focal ischemia of small intestine  -- Other - I will add my own diagnosis  -- Disagree - Not applicable / Not valid  -- Disagree - Clinically unable to determine / Unknown  -- Refer to Clinical Documentation Reviewer    PROVIDER RESPONSE TEXT:    This patient has acute focal ischemia of small intestine.     Query created by: Rose Dallas on 2023 7:04 AM      Electronically signed by:  Delmi Rosen MD 2023 11:26 AM

## 2023-07-18 NOTE — PROGRESS NOTES
[address] [facility] Department   Phone: [phone]    Physical Therapy     []Initial Evaluation            [x] Daily Treatment Note         [] Discharge Summary      Patient: Selin Hall   : 10/16/1932   MRN: [id number]   Date of Service:  2023  Admitting Diagnosis: Acute gastric volvulus  Current Admission Summary: 80 y.o. female status post ex lap, extensive lysis of adhesions, GT placement  Past Medical History:  has a past medical history of Arthritis, CAD (coronary artery disease), Cancer ([address]), Cystocele, Diabetes mellitus ([address]), Hepatitis A, History of blood transfusion, Hyperlipidemia, PVC (premature ventricular contraction), Rectocele, Reflux, and Scoliosis. Past Surgical History:  has a past surgical history that includes Appendectomy; joint replacement (Left); hernia repair (6 YRS AGO); Cholecystectomy; shoulder surgery (Right, 12); Bunionectomy (12); Breast surgery; Upper gastrointestinal endoscopy (12); Hysterectomy; bladder suspension; Dilation and curettage of uterus; other surgical history (Left, 14); Foot surgery; Cardiac catheterization; Colonoscopy (); Cystocopy (2017); other surgical history (Right, 2018); Cataract removal with implant (Left, 2018); pr xcapsl ctrc rmvl insj io lens prosth w/o ecp (Left, 2018); Lumbar spine surgery (Right, 5/15/2019); Upper gastrointestinal endoscopy (N/A, 7/3/2023); and laparotomy (N/A, 2023). Discharge Recommendations: Selin Hall scored a 10/24 on the AM-PAC short mobility form. Current research shows that an AM-PAC score of 17 or less is typically not associated with a discharge to the patient's home setting. Based on the patient's AM-PAC score and their current functional mobility deficits, it is recommended that the patient have 3-5 sessions per week of Physical Therapy at d/c to increase the patient's independence.   Please see assessment section for

## 2023-07-18 NOTE — PLAN OF CARE
Problem: Discharge Planning  Goal: Discharge to home or other facility with appropriate resources  Outcome: Progressing  Flowsheets (Taken 7/18/2023 0800)  Discharge to home or other facility with appropriate resources:   Identify barriers to discharge with patient and caregiver   Arrange for needed discharge resources and transportation as appropriate   Identify discharge learning needs (meds, wound care, etc)   Refer to discharge planning if patient needs post-hospital services based on physician order or complex needs related to functional status, cognitive ability or social support system     Problem: Chronic Conditions and Co-morbidities  Goal: Patient's chronic conditions and co-morbidity symptoms are monitored and maintained or improved  Outcome: Progressing  Flowsheets (Taken 7/18/2023 0800)  Care Plan - Patient's Chronic Conditions and Co-Morbidity Symptoms are Monitored and Maintained or Improved:   Monitor and assess patient's chronic conditions and comorbid symptoms for stability, deterioration, or improvement   Collaborate with multidisciplinary team to address chronic and comorbid conditions and prevent exacerbation or deterioration     Problem: Skin/Tissue Integrity  Goal: Absence of new skin breakdown  Description: 1. Monitor for areas of redness and/or skin breakdown  2. Assess vascular access sites hourly  3. Every 4-6 hours minimum:  Change oxygen saturation probe site  4. Every 4-6 hours:  If on nasal continuous positive airway pressure, respiratory therapy assess nares and determine need for appliance change or resting period.   Outcome: Progressing     Problem: ABCDS Injury Assessment  Goal: Absence of physical injury  Outcome: Progressing     Problem: Safety - Adult  Goal: Free from fall injury  Outcome: Progressing     Problem: Pain  Goal: Verbalizes/displays adequate comfort level or baseline comfort level  Outcome: Progressing     Problem: Nutrition Deficit:  Goal: Optimize nutritional

## 2023-07-18 NOTE — CARE COORDINATION
Per IDR and chart review ARU still recommended and following. NG clamped for trial. Pt still receiving TPN. Will continue to follow for d/c plan.     Manny Nuñez RN, BSN  [phone]

## 2023-07-19 LAB
ALBUMIN SERPL-MCNC: 2.4 G/DL (ref 3.4–5)
ALBUMIN/GLOB SERPL: 1.1 {RATIO} (ref 1.1–2.2)
ALP SERPL-CCNC: 59 U/L (ref 40–129)
ALT SERPL-CCNC: 13 U/L (ref 10–40)
ANION GAP SERPL CALCULATED.3IONS-SCNC: 5 MMOL/L (ref 3–16)
AST SERPL-CCNC: 19 U/L (ref 15–37)
BILIRUB SERPL-MCNC: 0.4 MG/DL (ref 0–1)
BUN SERPL-MCNC: 44 MG/DL (ref 7–20)
CA-I BLD-SCNC: 1.11 MMOL/L (ref 1.12–1.32)
CALCIUM SERPL-MCNC: 8.1 MG/DL (ref 8.3–10.6)
CHLORIDE SERPL-SCNC: 102 MMOL/L (ref 99–110)
CO2 SERPL-SCNC: 28 MMOL/L (ref 21–32)
CREAT SERPL-MCNC: 0.9 MG/DL (ref 0.6–1.2)
DEPRECATED RDW RBC AUTO: 14.6 % (ref 12.4–15.4)
GFR SERPLBLD CREATININE-BSD FMLA CKD-EPI: >60 ML/MIN/{1.73_M2}
GLUCOSE BLD-MCNC: 151 MG/DL (ref 70–99)
GLUCOSE BLD-MCNC: 157 MG/DL (ref 70–99)
GLUCOSE BLD-MCNC: 165 MG/DL (ref 70–99)
GLUCOSE BLD-MCNC: 170 MG/DL (ref 70–99)
GLUCOSE BLD-MCNC: 175 MG/DL (ref 70–99)
GLUCOSE SERPL-MCNC: 159 MG/DL (ref 70–99)
HCT VFR BLD AUTO: 21.8 % (ref 36–48)
HGB BLD-MCNC: 7.1 G/DL (ref 12–16)
MAGNESIUM SERPL-MCNC: 2.4 MG/DL (ref 1.8–2.4)
MCH RBC QN AUTO: 30.1 PG (ref 26–34)
MCHC RBC AUTO-ENTMCNC: 32.5 G/DL (ref 31–36)
MCV RBC AUTO: 92.8 FL (ref 80–100)
PERFORMED ON: ABNORMAL
PH BLDV: 7.44 [PH] (ref 7.35–7.45)
PHOSPHATE SERPL-MCNC: 4.3 MG/DL (ref 2.5–4.9)
PLATELET # BLD AUTO: 207 K/UL (ref 135–450)
PMV BLD AUTO: 7.5 FL (ref 5–10.5)
POTASSIUM SERPL-SCNC: 4.1 MMOL/L (ref 3.5–5.1)
PROT SERPL-MCNC: 4.5 G/DL (ref 6.4–8.2)
RBC # BLD AUTO: 2.34 M/UL (ref 4–5.2)
SODIUM SERPL-SCNC: 135 MMOL/L (ref 136–145)
WBC # BLD AUTO: 6.1 K/UL (ref 4–11)

## 2023-07-19 PROCEDURE — C9113 INJ PANTOPRAZOLE SODIUM, VIA: HCPCS | Performed by: SURGERY

## 2023-07-19 PROCEDURE — 2580000003 HC RX 258: Performed by: SURGERY

## 2023-07-19 PROCEDURE — 6360000002 HC RX W HCPCS: Performed by: SURGERY

## 2023-07-19 PROCEDURE — 80053 COMPREHEN METABOLIC PANEL: CPT

## 2023-07-19 PROCEDURE — 6370000000 HC RX 637 (ALT 250 FOR IP): Performed by: NURSE PRACTITIONER

## 2023-07-19 PROCEDURE — 6360000002 HC RX W HCPCS: Performed by: INTERNAL MEDICINE

## 2023-07-19 PROCEDURE — 6360000002 HC RX W HCPCS: Performed by: NURSE PRACTITIONER

## 2023-07-19 PROCEDURE — 97110 THERAPEUTIC EXERCISES: CPT

## 2023-07-19 PROCEDURE — 94760 N-INVAS EAR/PLS OXIMETRY 1: CPT

## 2023-07-19 PROCEDURE — 82330 ASSAY OF CALCIUM: CPT

## 2023-07-19 PROCEDURE — 84100 ASSAY OF PHOSPHORUS: CPT

## 2023-07-19 PROCEDURE — 6370000000 HC RX 637 (ALT 250 FOR IP): Performed by: SURGERY

## 2023-07-19 PROCEDURE — 85027 COMPLETE CBC AUTOMATED: CPT

## 2023-07-19 PROCEDURE — 1200000000 HC SEMI PRIVATE

## 2023-07-19 PROCEDURE — 6370000000 HC RX 637 (ALT 250 FOR IP): Performed by: INTERNAL MEDICINE

## 2023-07-19 PROCEDURE — APPNB30 APP NON BILLABLE TIME 0-30 MINS: Performed by: NURSE PRACTITIONER

## 2023-07-19 PROCEDURE — 99024 POSTOP FOLLOW-UP VISIT: CPT | Performed by: SURGERY

## 2023-07-19 PROCEDURE — 97116 GAIT TRAINING THERAPY: CPT

## 2023-07-19 PROCEDURE — 83735 ASSAY OF MAGNESIUM: CPT

## 2023-07-19 PROCEDURE — 97530 THERAPEUTIC ACTIVITIES: CPT

## 2023-07-19 PROCEDURE — 2500000003 HC RX 250 WO HCPCS: Performed by: SURGERY

## 2023-07-19 PROCEDURE — APPSS15 APP SPLIT SHARED TIME 0-15 MINUTES: Performed by: NURSE PRACTITIONER

## 2023-07-19 PROCEDURE — 97535 SELF CARE MNGMENT TRAINING: CPT

## 2023-07-19 PROCEDURE — 2580000003 HC RX 258: Performed by: NURSE PRACTITIONER

## 2023-07-19 RX ORDER — LIDOCAINE 4 G/G
2 PATCH TOPICAL DAILY
Status: DISCONTINUED | OUTPATIENT
Start: 2023-07-19 | End: 2023-07-28 | Stop reason: HOSPADM

## 2023-07-19 RX ADMIN — SODIUM CHLORIDE, PRESERVATIVE FREE 10 ML: 5 INJECTION INTRAVENOUS at 09:43

## 2023-07-19 RX ADMIN — METOCLOPRAMIDE 5 MG: 5 INJECTION, SOLUTION INTRAMUSCULAR; INTRAVENOUS at 09:48

## 2023-07-19 RX ADMIN — Medication 10 ML: at 20:07

## 2023-07-19 RX ADMIN — OXYCODONE AND ACETAMINOPHEN 1 TABLET: 5; 325 TABLET ORAL at 01:36

## 2023-07-19 RX ADMIN — ASCORBIC ACID, VITAMIN A PALMITATE, CHOLECALCIFEROL, THIAMINE HYDROCHLORIDE, RIBOFLAVIN-5 PHOSPHATE SODIUM, PYRIDOXINE HYDROCHLORIDE, NIACINAMIDE, DEXPANTHENOL, ALPHA-TOCOPHEROL ACETATE, VITAMIN K1, FOLIC ACID, BIOTIN, CYANOCOBALAMIN: 200; 3300; 200; 6; 3.6; 6; 40; 15; 10; 150; 600; 60; 5 INJECTION, SOLUTION INTRAVENOUS at 22:22

## 2023-07-19 RX ADMIN — SODIUM CHLORIDE, PRESERVATIVE FREE 10 ML: 5 INJECTION INTRAVENOUS at 20:06

## 2023-07-19 RX ADMIN — MELATONIN TAB 3 MG 3 MG: 3 TAB at 02:13

## 2023-07-19 RX ADMIN — KETOROLAC TROMETHAMINE 15 MG: 15 INJECTION, SOLUTION INTRAMUSCULAR; INTRAVENOUS at 12:00

## 2023-07-19 RX ADMIN — LEVOFLOXACIN 750 MG: 5 INJECTION, SOLUTION INTRAVENOUS at 16:33

## 2023-07-19 RX ADMIN — PANTOPRAZOLE SODIUM 40 MG: 40 INJECTION, POWDER, FOR SOLUTION INTRAVENOUS at 09:40

## 2023-07-19 RX ADMIN — METOCLOPRAMIDE 5 MG: 5 INJECTION, SOLUTION INTRAMUSCULAR; INTRAVENOUS at 17:33

## 2023-07-19 RX ADMIN — METOCLOPRAMIDE 5 MG: 5 INJECTION, SOLUTION INTRAMUSCULAR; INTRAVENOUS at 01:40

## 2023-07-19 RX ADMIN — ENOXAPARIN SODIUM 40 MG: 100 INJECTION SUBCUTANEOUS at 19:56

## 2023-07-19 RX ADMIN — ONDANSETRON 4 MG: 2 INJECTION INTRAMUSCULAR; INTRAVENOUS at 19:56

## 2023-07-19 RX ADMIN — OXYCODONE AND ACETAMINOPHEN 1 TABLET: 5; 325 TABLET ORAL at 17:33

## 2023-07-19 RX ADMIN — KETOROLAC TROMETHAMINE 15 MG: 15 INJECTION, SOLUTION INTRAMUSCULAR; INTRAVENOUS at 20:16

## 2023-07-19 RX ADMIN — PANTOPRAZOLE SODIUM 40 MG: 40 INJECTION, POWDER, FOR SOLUTION INTRAVENOUS at 19:56

## 2023-07-19 RX ADMIN — KETOROLAC TROMETHAMINE 15 MG: 15 INJECTION, SOLUTION INTRAMUSCULAR; INTRAVENOUS at 06:06

## 2023-07-19 RX ADMIN — SODIUM CHLORIDE, PRESERVATIVE FREE 10 ML: 5 INJECTION INTRAVENOUS at 09:41

## 2023-07-19 RX ADMIN — IRON SUCROSE 200 MG: 20 INJECTION, SOLUTION INTRAVENOUS at 09:56

## 2023-07-19 RX ADMIN — OXYCODONE AND ACETAMINOPHEN 1 TABLET: 5; 325 TABLET ORAL at 09:09

## 2023-07-19 ASSESSMENT — PAIN DESCRIPTION - ORIENTATION
ORIENTATION: LOWER;MID
ORIENTATION: LOWER;MID
ORIENTATION: RIGHT;LEFT;MID
ORIENTATION: RIGHT;LEFT;MID
ORIENTATION: MID;LOWER

## 2023-07-19 ASSESSMENT — PAIN SCALES - GENERAL
PAINLEVEL_OUTOF10: 7
PAINLEVEL_OUTOF10: 7
PAINLEVEL_OUTOF10: 2
PAINLEVEL_OUTOF10: 8
PAINLEVEL_OUTOF10: 7
PAINLEVEL_OUTOF10: 8
PAINLEVEL_OUTOF10: 8
PAINLEVEL_OUTOF10: 5
PAINLEVEL_OUTOF10: 4

## 2023-07-19 ASSESSMENT — PAIN DESCRIPTION - LOCATION
LOCATION: ABDOMEN;BACK
LOCATION: ABDOMEN
LOCATION: ABDOMEN
LOCATION: ABDOMEN;BACK

## 2023-07-19 ASSESSMENT — PAIN DESCRIPTION - DESCRIPTORS
DESCRIPTORS: ACHING;DISCOMFORT
DESCRIPTORS: ACHING
DESCRIPTORS: ACHING;DISCOMFORT
DESCRIPTORS: ACHING

## 2023-07-19 ASSESSMENT — PAIN DESCRIPTION - FREQUENCY: FREQUENCY: CONTINUOUS

## 2023-07-19 ASSESSMENT — PAIN - FUNCTIONAL ASSESSMENT
PAIN_FUNCTIONAL_ASSESSMENT: ACTIVITIES ARE NOT PREVENTED

## 2023-07-19 ASSESSMENT — PAIN DESCRIPTION - PAIN TYPE: TYPE: ACUTE PAIN

## 2023-07-19 ASSESSMENT — PAIN DESCRIPTION - ONSET: ONSET: ON-GOING

## 2023-07-19 NOTE — CARE COORDINATION
Call to Subhash [phone] at [address] [place] [address]. She confirms that they can take the patient in case patient may need placement at an LTAC.      [address] [place] [address], [address] [place] [address]  Report: [phone]  Fax: [phone]     Electronically signed by Chris Marie RN on 7/19/2023 at 1:47 PM

## 2023-07-19 NOTE — PROGRESS NOTES
Patient NG tube removed at 3978 with no complications. Patient is to remain NPO until physicians can see her in the morning. Watch for vomiting and nausea. PICC dressing was changed. New purewick and brief were put on patient.

## 2023-07-19 NOTE — PROGRESS NOTES
[address] [facility] Department   Phone: [phone]    Occupational Therapy    [] Initial Evaluation            [x] Daily Treatment Note         [] Discharge Summary      Patient: Rosenda Flores   : 10/16/1932   MRN: [id number]   Date of Service:  2023    Admitting Diagnosis:  Acute gastric volvulus  Current Admission Summary: Rosenda Flores is a 80 y.o. female who presented to ED for evaluation of nonradiating, epigastric abdominal pain and vomiting which began this morning. Patient describes pain as a constant fullness/swelling. She reports she has had multiple episodes of vomiting. She denies any known aggravating or alleviating factors. She denies hematemesis or coffee-ground emesis. She denies melena or hematochezia. She denies diarrhea, constipation, urinary symptoms. She has a history of appendectomy, cholecystectomy, and hysterectomy. She denies fever, dizziness, chest pain, shortness of breath. S/p ex lap , extensive ALLYSON, GT placement. Past Medical History:  has a past medical history of Arthritis, CAD (coronary artery disease), Cancer ([address]), Cystocele, Diabetes mellitus ([address]), Hepatitis A, History of blood transfusion, Hyperlipidemia, PVC (premature ventricular contraction), Rectocele, Reflux, and Scoliosis. Past Surgical History:  has a past surgical history that includes Appendectomy; joint replacement (Left); hernia repair (6 YRS AGO); Cholecystectomy; shoulder surgery (Right, 12); Bunionectomy (12); Breast surgery; Upper gastrointestinal endoscopy (12); Hysterectomy; bladder suspension; Dilation and curettage of uterus; other surgical history (Left, 14); Foot surgery; Cardiac catheterization; Colonoscopy (); Cystocopy (2017); other surgical history (Right, 2018); Cataract removal with implant (Left, 2018); pr xcapsl ctrc rmvl insj io lens prosth w/o ecp (Left, 2018);  Lumbar spine surgery (Right, bars around toilet, built in shower seat  Toilet Height: elevated height  Home Equipment: rolling walker, quad cane, electric scooter, reacher, alert button  Transfer Assistance: modified independent with use of RW during most of her mobility  Ambulation Assistance:modified independent with use of quad cane vs. RW  ADL Assistance: independent with all ADL's  IADL Assistance: requires assistance with meal prep, requires assistance with laundry, requires assistance with cleaning, requires assistance with driving/transportation, sometimes orders groceries from [facility] to be delivered to her apartment. Active :        [] Yes  [x] No  Hand Dominance: [] Left  [x] Right  Current Employment: retired. Occupation: OB RN  Hobbies: play games on her tablet, Scrabble   Recent Falls: ~1-2 years ago pt experienced a possible fall. Subjective  General: Pt supine in bed upon entry, agreeable to OT session. Pt requesting to get up to recliner chair   Pain: 8/10. Location: abdomen  Pain Interventions: RN notified and repositioned       Activities of Daily Living  Basic Activities of Daily Living  Upper Extremity Bathing: moderate assistance  Upper Extremity Dressing: maximum assistance  Toileting: dependent. General Comments: posadas catheter present  Comments: Fatigues quickly seated EOB, assist for thoroughness with bathing tasks  Instrumental Activities of Daily Living  No IADL completed on this date. Functional Mobility  Bed Mobility:  Supine to Sit: contact guard assistance  Scooting: contact guard assistance  Comments: HOB elevated and use of bed rail. Transfers:  Sit to stand transfer:contact guard assistance  Stand to sit transfer: contact guard assistance  Bed / Chair transfer: contact guard assistance.     Bed / Chair equipment: rolling walker  Bed / Chair comments: transfer performed EOB > recliner chair, fatigues quickly in stance  Comments: shuffling gait, forward flexed posture  Functional

## 2023-07-19 NOTE — PROGRESS NOTES
[address] [facility] Department   Phone: [phone]    Physical Therapy     []Initial Evaluation            [x] Daily Treatment Note         [] Discharge Summary      Patient: Juan Page   : 10/16/1932   MRN: [id number]   Date of Service:  2023  Admitting Diagnosis: Acute gastric volvulus  Current Admission Summary: 80 y.o. female status post ex lap, extensive lysis of adhesions, GT placement  Past Medical History:  has a past medical history of Arthritis, CAD (coronary artery disease), Cancer ([address]), Cystocele, Diabetes mellitus ([address]), Hepatitis A, History of blood transfusion, Hyperlipidemia, PVC (premature ventricular contraction), Rectocele, Reflux, and Scoliosis. Past Surgical History:  has a past surgical history that includes Appendectomy; joint replacement (Left); hernia repair (6 YRS AGO); Cholecystectomy; shoulder surgery (Right, 12); Bunionectomy (12); Breast surgery; Upper gastrointestinal endoscopy (12); Hysterectomy; bladder suspension; Dilation and curettage of uterus; other surgical history (Left, 14); Foot surgery; Cardiac catheterization; Colonoscopy (); Cystocopy (2017); other surgical history (Right, 2018); Cataract removal with implant (Left, 2018); pr xcapsl ctrc rmvl insj io lens prosth w/o ecp (Left, 2018); Lumbar spine surgery (Right, 5/15/2019); Upper gastrointestinal endoscopy (N/A, 7/3/2023); and laparotomy (N/A, 2023). Discharge Recommendations: Juan Page scored a 15/24 on the AM-PAC short mobility form. Current research shows that an AM-PAC score of 17 or less is typically not associated with a discharge to the patient's home setting. Based on the patient's AM-PAC score and their current functional mobility deficits, it is recommended that the patient have 5-7 sessions per week of Physical Therapy at d/c to increase the patient's independence.   At this time, this patient

## 2023-07-20 ENCOUNTER — APPOINTMENT (OUTPATIENT)
Dept: GENERAL RADIOLOGY | Age: 88
End: 2023-07-20
Payer: MEDICARE

## 2023-07-20 LAB
ALBUMIN SERPL-MCNC: 2.5 G/DL (ref 3.4–5)
ALBUMIN/GLOB SERPL: 1.1 {RATIO} (ref 1.1–2.2)
ALP SERPL-CCNC: 64 U/L (ref 40–129)
ALT SERPL-CCNC: 13 U/L (ref 10–40)
ANION GAP SERPL CALCULATED.3IONS-SCNC: 8 MMOL/L (ref 3–16)
AST SERPL-CCNC: 18 U/L (ref 15–37)
BACTERIA URNS QL MICRO: ABNORMAL /HPF
BILIRUB SERPL-MCNC: 0.4 MG/DL (ref 0–1)
BILIRUB UR QL STRIP.AUTO: NEGATIVE
BUN SERPL-MCNC: 47 MG/DL (ref 7–20)
CALCIUM SERPL-MCNC: 8.1 MG/DL (ref 8.3–10.6)
CHLORIDE SERPL-SCNC: 100 MMOL/L (ref 99–110)
CLARITY UR: ABNORMAL
CO2 SERPL-SCNC: 25 MMOL/L (ref 21–32)
COLOR UR: ABNORMAL
CREAT SERPL-MCNC: 0.8 MG/DL (ref 0.6–1.2)
DEPRECATED RDW RBC AUTO: 14.9 % (ref 12.4–15.4)
EPI CELLS #/AREA URNS AUTO: 4 /HPF (ref 0–5)
FERRITIN SERPL IA-MCNC: 347.4 NG/ML (ref 15–150)
FOLATE SERPL-MCNC: 5.74 NG/ML (ref 4.78–24.2)
GFR SERPLBLD CREATININE-BSD FMLA CKD-EPI: >60 ML/MIN/{1.73_M2}
GLUCOSE BLD-MCNC: 154 MG/DL (ref 70–99)
GLUCOSE BLD-MCNC: 159 MG/DL (ref 70–99)
GLUCOSE BLD-MCNC: 161 MG/DL (ref 70–99)
GLUCOSE BLD-MCNC: 171 MG/DL (ref 70–99)
GLUCOSE BLD-MCNC: 172 MG/DL (ref 70–99)
GLUCOSE SERPL-MCNC: 146 MG/DL (ref 70–99)
GLUCOSE UR STRIP.AUTO-MCNC: NEGATIVE MG/DL
HCT VFR BLD AUTO: 22.9 % (ref 36–48)
HGB BLD-MCNC: 7.7 G/DL (ref 12–16)
HGB UR QL STRIP.AUTO: ABNORMAL
HYALINE CASTS #/AREA URNS AUTO: 0 /LPF (ref 0–8)
IRON SATN MFR SERPL: ABNORMAL % (ref 15–50)
IRON SERPL-MCNC: 226 UG/DL (ref 37–145)
KETONES UR STRIP.AUTO-MCNC: NEGATIVE MG/DL
LEUKOCYTE ESTERASE UR QL STRIP.AUTO: ABNORMAL
MAGNESIUM SERPL-MCNC: 2.5 MG/DL (ref 1.8–2.4)
MCH RBC QN AUTO: 31.2 PG (ref 26–34)
MCHC RBC AUTO-ENTMCNC: 33.5 G/DL (ref 31–36)
MCV RBC AUTO: 93.3 FL (ref 80–100)
NITRITE UR QL STRIP.AUTO: NEGATIVE
PERFORMED ON: ABNORMAL
PH UR STRIP.AUTO: 6 [PH] (ref 5–8)
PHOSPHATE SERPL-MCNC: 4.3 MG/DL (ref 2.5–4.9)
PLATELET # BLD AUTO: 229 K/UL (ref 135–450)
PMV BLD AUTO: 7.6 FL (ref 5–10.5)
POTASSIUM SERPL-SCNC: 4 MMOL/L (ref 3.5–5.1)
PROT SERPL-MCNC: 4.7 G/DL (ref 6.4–8.2)
PROT UR STRIP.AUTO-MCNC: ABNORMAL MG/DL
RBC # BLD AUTO: 2.45 M/UL (ref 4–5.2)
RBC #/AREA URNS HPF: ABNORMAL /HPF (ref 0–4)
SODIUM SERPL-SCNC: 133 MMOL/L (ref 136–145)
SP GR UR STRIP.AUTO: 1.01 (ref 1–1.03)
TIBC SERPL-MCNC: ABNORMAL UG/DL (ref 260–445)
UA DIPSTICK W REFLEX MICRO PNL UR: YES
URN SPEC COLLECT METH UR: ABNORMAL
UROBILINOGEN UR STRIP-ACNC: 1 E.U./DL
VIT B12 SERPL-MCNC: 676 PG/ML (ref 211–911)
WBC # BLD AUTO: 5.8 K/UL (ref 4–11)
WBC #/AREA URNS AUTO: 2 /HPF (ref 0–5)

## 2023-07-20 PROCEDURE — 84100 ASSAY OF PHOSPHORUS: CPT

## 2023-07-20 PROCEDURE — 83550 IRON BINDING TEST: CPT

## 2023-07-20 PROCEDURE — 2580000003 HC RX 258: Performed by: SURGERY

## 2023-07-20 PROCEDURE — 82728 ASSAY OF FERRITIN: CPT

## 2023-07-20 PROCEDURE — 85027 COMPLETE CBC AUTOMATED: CPT

## 2023-07-20 PROCEDURE — 82607 VITAMIN B-12: CPT

## 2023-07-20 PROCEDURE — 82746 ASSAY OF FOLIC ACID SERUM: CPT

## 2023-07-20 PROCEDURE — 80053 COMPREHEN METABOLIC PANEL: CPT

## 2023-07-20 PROCEDURE — 87086 URINE CULTURE/COLONY COUNT: CPT

## 2023-07-20 PROCEDURE — 6370000000 HC RX 637 (ALT 250 FOR IP): Performed by: SURGERY

## 2023-07-20 PROCEDURE — 2580000003 HC RX 258: Performed by: INTERNAL MEDICINE

## 2023-07-20 PROCEDURE — 83735 ASSAY OF MAGNESIUM: CPT

## 2023-07-20 PROCEDURE — 6370000000 HC RX 637 (ALT 250 FOR IP): Performed by: INTERNAL MEDICINE

## 2023-07-20 PROCEDURE — 6360000002 HC RX W HCPCS: Performed by: INTERNAL MEDICINE

## 2023-07-20 PROCEDURE — 6360000002 HC RX W HCPCS: Performed by: SURGERY

## 2023-07-20 PROCEDURE — 97535 SELF CARE MNGMENT TRAINING: CPT

## 2023-07-20 PROCEDURE — 6360000002 HC RX W HCPCS: Performed by: NURSE PRACTITIONER

## 2023-07-20 PROCEDURE — 2580000003 HC RX 258: Performed by: NURSE PRACTITIONER

## 2023-07-20 PROCEDURE — 74019 RADEX ABDOMEN 2 VIEWS: CPT

## 2023-07-20 PROCEDURE — 36415 COLL VENOUS BLD VENIPUNCTURE: CPT

## 2023-07-20 PROCEDURE — 97530 THERAPEUTIC ACTIVITIES: CPT

## 2023-07-20 PROCEDURE — 87077 CULTURE AEROBIC IDENTIFY: CPT

## 2023-07-20 PROCEDURE — 87186 SC STD MICRODIL/AGAR DIL: CPT

## 2023-07-20 PROCEDURE — 83540 ASSAY OF IRON: CPT

## 2023-07-20 PROCEDURE — 81001 URINALYSIS AUTO W/SCOPE: CPT

## 2023-07-20 PROCEDURE — 6370000000 HC RX 637 (ALT 250 FOR IP): Performed by: NURSE PRACTITIONER

## 2023-07-20 PROCEDURE — 2500000003 HC RX 250 WO HCPCS: Performed by: SURGERY

## 2023-07-20 PROCEDURE — C9113 INJ PANTOPRAZOLE SODIUM, VIA: HCPCS | Performed by: SURGERY

## 2023-07-20 PROCEDURE — 99024 POSTOP FOLLOW-UP VISIT: CPT | Performed by: SURGERY

## 2023-07-20 PROCEDURE — 1200000000 HC SEMI PRIVATE

## 2023-07-20 RX ORDER — SODIUM CHLORIDE 9 MG/ML
INJECTION, SOLUTION INTRAVENOUS
Status: DISPENSED
Start: 2023-07-20 | End: 2023-07-21

## 2023-07-20 RX ORDER — 0.9 % SODIUM CHLORIDE 0.9 %
250 INTRAVENOUS SOLUTION INTRAVENOUS ONCE
Status: COMPLETED | OUTPATIENT
Start: 2023-07-20 | End: 2023-07-20

## 2023-07-20 RX ADMIN — OXYCODONE AND ACETAMINOPHEN 1 TABLET: 5; 325 TABLET ORAL at 00:17

## 2023-07-20 RX ADMIN — Medication 10 ML: at 09:29

## 2023-07-20 RX ADMIN — OXYCODONE AND ACETAMINOPHEN 1 TABLET: 5; 325 TABLET ORAL at 12:44

## 2023-07-20 RX ADMIN — KETOROLAC TROMETHAMINE 15 MG: 15 INJECTION, SOLUTION INTRAMUSCULAR; INTRAVENOUS at 22:53

## 2023-07-20 RX ADMIN — Medication 10 ML: at 09:28

## 2023-07-20 RX ADMIN — PANTOPRAZOLE SODIUM 40 MG: 40 INJECTION, POWDER, FOR SOLUTION INTRAVENOUS at 21:37

## 2023-07-20 RX ADMIN — KETOROLAC TROMETHAMINE 15 MG: 15 INJECTION, SOLUTION INTRAMUSCULAR; INTRAVENOUS at 15:46

## 2023-07-20 RX ADMIN — METOCLOPRAMIDE 5 MG: 5 INJECTION, SOLUTION INTRAMUSCULAR; INTRAVENOUS at 11:39

## 2023-07-20 RX ADMIN — SODIUM CHLORIDE 250 ML: 9 INJECTION, SOLUTION INTRAVENOUS at 13:40

## 2023-07-20 RX ADMIN — PANTOPRAZOLE SODIUM 40 MG: 40 INJECTION, POWDER, FOR SOLUTION INTRAVENOUS at 09:27

## 2023-07-20 RX ADMIN — KETOROLAC TROMETHAMINE 15 MG: 15 INJECTION, SOLUTION INTRAMUSCULAR; INTRAVENOUS at 09:27

## 2023-07-20 RX ADMIN — IRON SUCROSE 200 MG: 20 INJECTION, SOLUTION INTRAVENOUS at 11:37

## 2023-07-20 RX ADMIN — METOCLOPRAMIDE 5 MG: 5 INJECTION, SOLUTION INTRAMUSCULAR; INTRAVENOUS at 18:48

## 2023-07-20 RX ADMIN — SODIUM CHLORIDE, PRESERVATIVE FREE 10 ML: 5 INJECTION INTRAVENOUS at 09:27

## 2023-07-20 RX ADMIN — LEUCINE, PHENYLALANINE, LYSINE, METHIONINE, ISOLEUCINE, VALINE, HISTIDINE, THREONINE, TRYPTOPHAN, ALANINE, GLYCINE, ARGININE, PROLINE, SERINE, TYROSINE, SODIUM ACETATE, DIBASIC POTASSIUM PHOSPHATE, MAGNESIUM CHLORIDE, SODIUM CHLORIDE, CALCIUM CHLORIDE, DEXTROSE
365; 280; 290; 200; 300; 290; 240; 210; 90; 1035; 515; 575; 340; 250; 20; 340; 261; 51; 59; 33; 20 INJECTION INTRAVENOUS at 18:51

## 2023-07-20 RX ADMIN — I.V. FAT EMULSION 250 ML: 20 EMULSION INTRAVENOUS at 18:51

## 2023-07-20 RX ADMIN — ENOXAPARIN SODIUM 40 MG: 100 INJECTION SUBCUTANEOUS at 21:37

## 2023-07-20 ASSESSMENT — PAIN SCALES - GENERAL
PAINLEVEL_OUTOF10: 9
PAINLEVEL_OUTOF10: 6
PAINLEVEL_OUTOF10: 8

## 2023-07-20 NOTE — PROGRESS NOTES
Patient reported feeling nauseous, zofran was given. Patient reports she over did the fluids but felt better after the dose of zofran.

## 2023-07-20 NOTE — PROGRESS NOTES
Clinical Pharmacy Note    Pharmacy consulted by Dr. Ken Gutierrez to manage TPN    Current TPN rate: 83 ml/hr  Goal TPN rate: 83 ml/hr    Access: PICC  Indication: ex-lap    Labs:  General Labs:  BMP:    Lab Results   Component Value Date/Time     07/20/2023 06:00 AM    K 4.0 07/20/2023 06:00 AM    K 4.7 07/02/2023 07:24 PM     07/20/2023 06:00 AM    CO2 25 07/20/2023 06:00 AM    BUN 47 07/20/2023 06:00 AM    LABALBU 2.5 07/20/2023 06:00 AM    CREATININE 0.8 07/20/2023 06:00 AM    CALCIUM 8.1 07/20/2023 06:00 AM    GFRAA 57 02/09/2022 01:45 PM    GFRAA 56 01/10/2010 08:07 PM    LABGLOM >60 07/20/2023 06:00 AM    GLUCOSE 146 07/20/2023 06:00 AM       No electrolyte replacement needed        Blood sugars over past 24 hours: 146-165    Blood sugar management:  Plan to Continue Humalog q4 hour sliding scale at medium dose. Plan to continue TPN at 83 ml/hr. Thank you for allowing pharmacy to participate in the care of this patient.     Vernon Seth, [facility], PharmD 7/20/2023

## 2023-07-20 NOTE — PROGRESS NOTES
[facility] Department   Phone: [phone]    Occupational Therapy    [] Initial Evaluation            [x] Daily Treatment Note         [] Discharge Summary      Patient: Talat Low   : 10/16/1932   MRN: [id number]   Date of Service:  2023    Admitting Diagnosis:  Acute gastric volvulus  Current Admission Summary: Talat Low is a 80 y.o. female who presented to ED for evaluation of nonradiating, epigastric abdominal pain and vomiting which began this morning. Patient describes pain as a constant fullness/swelling. She reports she has had multiple episodes of vomiting. She denies any known aggravating or alleviating factors. She denies hematemesis or coffee-ground emesis. She denies melena or hematochezia. She denies diarrhea, constipation, urinary symptoms. She has a history of appendectomy, cholecystectomy, and hysterectomy. She denies fever, dizziness, chest pain, shortness of breath. S/p ex lap , extensive ALLYSON, GT placement. Past Medical History:  has a past medical history of Arthritis, CAD (coronary artery disease), Cancer ([address]), Cystocele, Diabetes mellitus ([address]), Hepatitis A, History of blood transfusion, Hyperlipidemia, PVC (premature ventricular contraction), Rectocele, Reflux, and Scoliosis. Past Surgical History:  has a past surgical history that includes Appendectomy; joint replacement (Left); hernia repair (6 YRS AGO); Cholecystectomy; shoulder surgery (Right, 12); Bunionectomy (12); Breast surgery; Upper gastrointestinal endoscopy (12); Hysterectomy; bladder suspension; Dilation and curettage of uterus; other surgical history (Left, 14); Foot surgery; Cardiac catheterization; Colonoscopy (); Cystocopy (2017); other surgical history (Right, 2018); Cataract removal with implant (Left, 2018); pr xcapsl ctrc rmvl insj io lens prosth w/o ecp (Left, 2018);  Lumbar spine surgery (Right,

## 2023-07-20 NOTE — CARE COORDINATION
Spoke with daughter Madhav Lancaster [phone] and gave her a list of SNF and LTAC facilities for potential options for her mother depending on her level of need at the time of discharge, specifically TPN administration. Daughter chose [facility] as her first choice. Pt's insurance is compatible with her mother's insurance. [facility] administration coordinator Eric Ayala [phone] stated that the patient could be admitted with no pre-cert necessary. CM to follow.      Electronically signed by Latesha Henley RN on 7/20/2023 at 11:37 AM

## 2023-07-21 LAB
ALBUMIN SERPL-MCNC: 2.5 G/DL (ref 3.4–5)
ALBUMIN/GLOB SERPL: 1 {RATIO} (ref 1.1–2.2)
ALP SERPL-CCNC: 74 U/L (ref 40–129)
ALT SERPL-CCNC: 10 U/L (ref 10–40)
ANION GAP SERPL CALCULATED.3IONS-SCNC: 9 MMOL/L (ref 3–16)
AST SERPL-CCNC: 17 U/L (ref 15–37)
BILIRUB SERPL-MCNC: 0.3 MG/DL (ref 0–1)
BUN SERPL-MCNC: 47 MG/DL (ref 7–20)
CALCIUM SERPL-MCNC: 8.4 MG/DL (ref 8.3–10.6)
CHLORIDE SERPL-SCNC: 99 MMOL/L (ref 99–110)
CO2 SERPL-SCNC: 24 MMOL/L (ref 21–32)
CREAT SERPL-MCNC: 0.9 MG/DL (ref 0.6–1.2)
DEPRECATED RDW RBC AUTO: 14.6 % (ref 12.4–15.4)
GFR SERPLBLD CREATININE-BSD FMLA CKD-EPI: >60 ML/MIN/{1.73_M2}
GLUCOSE BLD-MCNC: 134 MG/DL (ref 70–99)
GLUCOSE BLD-MCNC: 144 MG/DL (ref 70–99)
GLUCOSE BLD-MCNC: 150 MG/DL (ref 70–99)
GLUCOSE BLD-MCNC: 160 MG/DL (ref 70–99)
GLUCOSE BLD-MCNC: 174 MG/DL (ref 70–99)
GLUCOSE SERPL-MCNC: 350 MG/DL (ref 70–99)
HCT VFR BLD AUTO: 23.7 % (ref 36–48)
HGB BLD-MCNC: 7.9 G/DL (ref 12–16)
MAGNESIUM SERPL-MCNC: 2.5 MG/DL (ref 1.8–2.4)
MCH RBC QN AUTO: 30.7 PG (ref 26–34)
MCHC RBC AUTO-ENTMCNC: 33.1 G/DL (ref 31–36)
MCV RBC AUTO: 92.9 FL (ref 80–100)
PERFORMED ON: ABNORMAL
PHOSPHATE SERPL-MCNC: 4.7 MG/DL (ref 2.5–4.9)
PLATELET # BLD AUTO: 254 K/UL (ref 135–450)
PMV BLD AUTO: 7.5 FL (ref 5–10.5)
POTASSIUM SERPL-SCNC: 4.2 MMOL/L (ref 3.5–5.1)
PROT SERPL-MCNC: 4.9 G/DL (ref 6.4–8.2)
RBC # BLD AUTO: 2.56 M/UL (ref 4–5.2)
SODIUM SERPL-SCNC: 132 MMOL/L (ref 136–145)
WBC # BLD AUTO: 6.3 K/UL (ref 4–11)

## 2023-07-21 PROCEDURE — 6370000000 HC RX 637 (ALT 250 FOR IP): Performed by: SURGERY

## 2023-07-21 PROCEDURE — 83735 ASSAY OF MAGNESIUM: CPT

## 2023-07-21 PROCEDURE — APPSS15 APP SPLIT SHARED TIME 0-15 MINUTES: Performed by: NURSE PRACTITIONER

## 2023-07-21 PROCEDURE — 6360000002 HC RX W HCPCS: Performed by: NURSE PRACTITIONER

## 2023-07-21 PROCEDURE — 6360000002 HC RX W HCPCS: Performed by: SURGERY

## 2023-07-21 PROCEDURE — 80053 COMPREHEN METABOLIC PANEL: CPT

## 2023-07-21 PROCEDURE — 6360000002 HC RX W HCPCS: Performed by: INTERNAL MEDICINE

## 2023-07-21 PROCEDURE — 1200000000 HC SEMI PRIVATE

## 2023-07-21 PROCEDURE — C9113 INJ PANTOPRAZOLE SODIUM, VIA: HCPCS | Performed by: SURGERY

## 2023-07-21 PROCEDURE — APPNB30 APP NON BILLABLE TIME 0-30 MINS: Performed by: NURSE PRACTITIONER

## 2023-07-21 PROCEDURE — 6370000000 HC RX 637 (ALT 250 FOR IP): Performed by: INTERNAL MEDICINE

## 2023-07-21 PROCEDURE — 99024 POSTOP FOLLOW-UP VISIT: CPT | Performed by: SURGERY

## 2023-07-21 PROCEDURE — 2580000003 HC RX 258: Performed by: INTERNAL MEDICINE

## 2023-07-21 PROCEDURE — 51798 US URINE CAPACITY MEASURE: CPT

## 2023-07-21 PROCEDURE — 51701 INSERT BLADDER CATHETER: CPT

## 2023-07-21 PROCEDURE — 2580000003 HC RX 258: Performed by: SURGERY

## 2023-07-21 PROCEDURE — 2500000003 HC RX 250 WO HCPCS: Performed by: INTERNAL MEDICINE

## 2023-07-21 PROCEDURE — 6370000000 HC RX 637 (ALT 250 FOR IP): Performed by: NURSE PRACTITIONER

## 2023-07-21 PROCEDURE — 85027 COMPLETE CBC AUTOMATED: CPT

## 2023-07-21 PROCEDURE — 84100 ASSAY OF PHOSPHORUS: CPT

## 2023-07-21 RX ORDER — TAMSULOSIN HYDROCHLORIDE 0.4 MG/1
0.4 CAPSULE ORAL DAILY
Status: DISCONTINUED | OUTPATIENT
Start: 2023-07-21 | End: 2023-07-28 | Stop reason: HOSPADM

## 2023-07-21 RX ORDER — SODIUM CHLORIDE 9 MG/ML
INJECTION, SOLUTION INTRAVENOUS CONTINUOUS
Status: DISCONTINUED | OUTPATIENT
Start: 2023-07-21 | End: 2023-07-21

## 2023-07-21 RX ORDER — CALCIUM CARBONATE 500 MG/1
500 TABLET, CHEWABLE ORAL 3 TIMES DAILY PRN
Status: DISCONTINUED | OUTPATIENT
Start: 2023-07-21 | End: 2023-07-28 | Stop reason: HOSPADM

## 2023-07-21 RX ADMIN — SODIUM CHLORIDE, PRESERVATIVE FREE 10 ML: 5 INJECTION INTRAVENOUS at 21:27

## 2023-07-21 RX ADMIN — ENOXAPARIN SODIUM 40 MG: 100 INJECTION SUBCUTANEOUS at 21:26

## 2023-07-21 RX ADMIN — METOCLOPRAMIDE 5 MG: 5 INJECTION, SOLUTION INTRAMUSCULAR; INTRAVENOUS at 21:28

## 2023-07-21 RX ADMIN — Medication 10 ML: at 08:52

## 2023-07-21 RX ADMIN — KETOROLAC TROMETHAMINE 15 MG: 15 INJECTION, SOLUTION INTRAMUSCULAR; INTRAVENOUS at 05:36

## 2023-07-21 RX ADMIN — METOCLOPRAMIDE 5 MG: 5 INJECTION, SOLUTION INTRAMUSCULAR; INTRAVENOUS at 02:15

## 2023-07-21 RX ADMIN — SODIUM CHLORIDE, PRESERVATIVE FREE 10 ML: 5 INJECTION INTRAVENOUS at 08:49

## 2023-07-21 RX ADMIN — PANTOPRAZOLE SODIUM 40 MG: 40 INJECTION, POWDER, FOR SOLUTION INTRAVENOUS at 08:50

## 2023-07-21 RX ADMIN — KETOROLAC TROMETHAMINE 15 MG: 15 INJECTION, SOLUTION INTRAMUSCULAR; INTRAVENOUS at 13:46

## 2023-07-21 RX ADMIN — SODIUM CHLORIDE: 9 INJECTION, SOLUTION INTRAVENOUS at 09:00

## 2023-07-21 RX ADMIN — KETOROLAC TROMETHAMINE 15 MG: 15 INJECTION, SOLUTION INTRAMUSCULAR; INTRAVENOUS at 21:33

## 2023-07-21 RX ADMIN — ASCORBIC ACID, VITAMIN A PALMITATE, CHOLECALCIFEROL, THIAMINE HYDROCHLORIDE, RIBOFLAVIN-5 PHOSPHATE SODIUM, PYRIDOXINE HYDROCHLORIDE, NIACINAMIDE, DEXPANTHENOL, ALPHA-TOCOPHEROL ACETATE, VITAMIN K1, FOLIC ACID, BIOTIN, CYANOCOBALAMIN: 200; 3300; 200; 6; 3.6; 6; 40; 15; 10; 150; 600; 60; 5 INJECTION, SOLUTION INTRAVENOUS at 19:17

## 2023-07-21 RX ADMIN — OXYCODONE AND ACETAMINOPHEN 1 TABLET: 5; 325 TABLET ORAL at 21:26

## 2023-07-21 RX ADMIN — OXYCODONE AND ACETAMINOPHEN 1 TABLET: 5; 325 TABLET ORAL at 23:58

## 2023-07-21 RX ADMIN — Medication 10 ML: at 21:28

## 2023-07-21 RX ADMIN — ONDANSETRON 4 MG: 2 INJECTION INTRAMUSCULAR; INTRAVENOUS at 05:36

## 2023-07-21 RX ADMIN — TAMSULOSIN HYDROCHLORIDE 0.4 MG: 0.4 CAPSULE ORAL at 16:05

## 2023-07-21 RX ADMIN — OXYCODONE AND ACETAMINOPHEN 1 TABLET: 5; 325 TABLET ORAL at 08:56

## 2023-07-21 RX ADMIN — PANTOPRAZOLE SODIUM 40 MG: 40 INJECTION, POWDER, FOR SOLUTION INTRAVENOUS at 21:26

## 2023-07-21 RX ADMIN — MELATONIN TAB 3 MG 3 MG: 3 TAB at 21:26

## 2023-07-21 RX ADMIN — METOCLOPRAMIDE 5 MG: 5 INJECTION, SOLUTION INTRAMUSCULAR; INTRAVENOUS at 08:50

## 2023-07-21 ASSESSMENT — PAIN DESCRIPTION - LOCATION
LOCATION: ABDOMEN
LOCATION: ABDOMEN
LOCATION: ABDOMEN;BACK

## 2023-07-21 ASSESSMENT — PAIN DESCRIPTION - ORIENTATION
ORIENTATION: MID
ORIENTATION: RIGHT;LEFT
ORIENTATION: LEFT;MID

## 2023-07-21 ASSESSMENT — PAIN DESCRIPTION - FREQUENCY
FREQUENCY: INTERMITTENT
FREQUENCY: INTERMITTENT

## 2023-07-21 ASSESSMENT — PAIN SCALES - GENERAL
PAINLEVEL_OUTOF10: 8
PAINLEVEL_OUTOF10: 2
PAINLEVEL_OUTOF10: 8
PAINLEVEL_OUTOF10: 9
PAINLEVEL_OUTOF10: 6
PAINLEVEL_OUTOF10: 7
PAINLEVEL_OUTOF10: 6

## 2023-07-21 ASSESSMENT — PAIN DESCRIPTION - ONSET
ONSET: ON-GOING
ONSET: ON-GOING

## 2023-07-21 ASSESSMENT — PAIN DESCRIPTION - DESCRIPTORS
DESCRIPTORS: ACHING;DISCOMFORT
DESCRIPTORS: ACHING
DESCRIPTORS: ACHING;DISCOMFORT

## 2023-07-21 ASSESSMENT — PAIN - FUNCTIONAL ASSESSMENT
PAIN_FUNCTIONAL_ASSESSMENT: ACTIVITIES ARE NOT PREVENTED
PAIN_FUNCTIONAL_ASSESSMENT: ACTIVITIES ARE NOT PREVENTED

## 2023-07-21 ASSESSMENT — PAIN DESCRIPTION - PAIN TYPE
TYPE: ACUTE PAIN
TYPE: ACUTE PAIN

## 2023-07-21 NOTE — PROGRESS NOTES
Shift assessment completed. Neuro WNL. Reports pain 8/10 at this time. AM meds administered per MAR. BRIGITTE surgical incisions on abd; dressing in place. The care plan and education has been reviewed and mutually agreed upon with the patient. Standard safety measures in place.

## 2023-07-21 NOTE — CARE COORDINATION
Patients daughter and POA is providing SNF choices if [facility] does not meet her discharge criteria. [facility] of [address], [address]  Phone: [phone]  Fax: [phone]). She states they can take the patient, but because of the nursing shortage they can no longer service TPN. [address]  [address] [address] [facility]  Report: [phone]  Fax: [phone]     Lazarus Bruckner [phone] [facility] and  left. She called back, but I was unavailable to take the call at that time. I called back left . Waiting for a return call. [facility]: [place] [address] [address]  Phone: [phone]  Fax: [phone] Fay Edouard) [phone] and she states they can take the patient, but because of the nursing shortage they can no longer service TPN.

## 2023-07-21 NOTE — CARE COORDINATION
Called Rose Song called [phone] to get back up choices for SNF placement in case [facility] is not need appropriate. CM to follow.     Electronically signed by Lauryn Crystal RN on 7/21/2023 at 12:04 PM

## 2023-07-22 LAB
ALBUMIN SERPL-MCNC: 2.7 G/DL (ref 3.4–5)
ALBUMIN/GLOB SERPL: 1.4 {RATIO} (ref 1.1–2.2)
ALP SERPL-CCNC: 67 U/L (ref 40–129)
ALT SERPL-CCNC: 10 U/L (ref 10–40)
ANION GAP SERPL CALCULATED.3IONS-SCNC: 8 MMOL/L (ref 3–16)
AST SERPL-CCNC: 18 U/L (ref 15–37)
BILIRUB SERPL-MCNC: 0.3 MG/DL (ref 0–1)
BUN SERPL-MCNC: 51 MG/DL (ref 7–20)
CALCIUM SERPL-MCNC: 8.3 MG/DL (ref 8.3–10.6)
CHLORIDE SERPL-SCNC: 100 MMOL/L (ref 99–110)
CO2 SERPL-SCNC: 26 MMOL/L (ref 21–32)
CREAT SERPL-MCNC: 0.9 MG/DL (ref 0.6–1.2)
DEPRECATED RDW RBC AUTO: 14.7 % (ref 12.4–15.4)
GFR SERPLBLD CREATININE-BSD FMLA CKD-EPI: >60 ML/MIN/{1.73_M2}
GLUCOSE BLD-MCNC: 115 MG/DL (ref 70–99)
GLUCOSE BLD-MCNC: 156 MG/DL (ref 70–99)
GLUCOSE BLD-MCNC: 157 MG/DL (ref 70–99)
GLUCOSE BLD-MCNC: 159 MG/DL (ref 70–99)
GLUCOSE BLD-MCNC: 166 MG/DL (ref 70–99)
GLUCOSE BLD-MCNC: 176 MG/DL (ref 70–99)
GLUCOSE SERPL-MCNC: 102 MG/DL (ref 70–99)
HCT VFR BLD AUTO: 21.3 % (ref 36–48)
HGB BLD-MCNC: 7.1 G/DL (ref 12–16)
MAGNESIUM SERPL-MCNC: 2.5 MG/DL (ref 1.8–2.4)
MCH RBC QN AUTO: 30.9 PG (ref 26–34)
MCHC RBC AUTO-ENTMCNC: 33.3 G/DL (ref 31–36)
MCV RBC AUTO: 92.9 FL (ref 80–100)
PERFORMED ON: ABNORMAL
PHOSPHATE SERPL-MCNC: 4.3 MG/DL (ref 2.5–4.9)
PLATELET # BLD AUTO: 243 K/UL (ref 135–450)
PMV BLD AUTO: 7.4 FL (ref 5–10.5)
POTASSIUM SERPL-SCNC: 4.3 MMOL/L (ref 3.5–5.1)
PROT SERPL-MCNC: 4.7 G/DL (ref 6.4–8.2)
RBC # BLD AUTO: 2.3 M/UL (ref 4–5.2)
SODIUM SERPL-SCNC: 134 MMOL/L (ref 136–145)
WBC # BLD AUTO: 5.6 K/UL (ref 4–11)

## 2023-07-22 PROCEDURE — 84100 ASSAY OF PHOSPHORUS: CPT

## 2023-07-22 PROCEDURE — 6360000002 HC RX W HCPCS: Performed by: NURSE PRACTITIONER

## 2023-07-22 PROCEDURE — 6360000002 HC RX W HCPCS: Performed by: INTERNAL MEDICINE

## 2023-07-22 PROCEDURE — 51798 US URINE CAPACITY MEASURE: CPT

## 2023-07-22 PROCEDURE — 6360000002 HC RX W HCPCS: Performed by: SURGERY

## 2023-07-22 PROCEDURE — 83735 ASSAY OF MAGNESIUM: CPT

## 2023-07-22 PROCEDURE — APPNB30 APP NON BILLABLE TIME 0-30 MINS: Performed by: NURSE PRACTITIONER

## 2023-07-22 PROCEDURE — C9113 INJ PANTOPRAZOLE SODIUM, VIA: HCPCS | Performed by: SURGERY

## 2023-07-22 PROCEDURE — 2500000003 HC RX 250 WO HCPCS: Performed by: SURGERY

## 2023-07-22 PROCEDURE — 80053 COMPREHEN METABOLIC PANEL: CPT

## 2023-07-22 PROCEDURE — 6370000000 HC RX 637 (ALT 250 FOR IP): Performed by: SURGERY

## 2023-07-22 PROCEDURE — APPSS15 APP SPLIT SHARED TIME 0-15 MINUTES: Performed by: NURSE PRACTITIONER

## 2023-07-22 PROCEDURE — 2580000003 HC RX 258: Performed by: SURGERY

## 2023-07-22 PROCEDURE — 99024 POSTOP FOLLOW-UP VISIT: CPT | Performed by: SURGERY

## 2023-07-22 PROCEDURE — 6370000000 HC RX 637 (ALT 250 FOR IP): Performed by: INTERNAL MEDICINE

## 2023-07-22 PROCEDURE — 2580000003 HC RX 258: Performed by: NURSE PRACTITIONER

## 2023-07-22 PROCEDURE — 6370000000 HC RX 637 (ALT 250 FOR IP): Performed by: NURSE PRACTITIONER

## 2023-07-22 PROCEDURE — 85027 COMPLETE CBC AUTOMATED: CPT

## 2023-07-22 PROCEDURE — 1200000000 HC SEMI PRIVATE

## 2023-07-22 RX ORDER — FERROUS SULFATE 325(65) MG
325 TABLET ORAL 2 TIMES DAILY WITH MEALS
Status: DISCONTINUED | OUTPATIENT
Start: 2023-07-23 | End: 2023-07-23

## 2023-07-22 RX ADMIN — KETOROLAC TROMETHAMINE 15 MG: 15 INJECTION, SOLUTION INTRAMUSCULAR; INTRAVENOUS at 20:01

## 2023-07-22 RX ADMIN — OXYCODONE AND ACETAMINOPHEN 1 TABLET: 5; 325 TABLET ORAL at 08:52

## 2023-07-22 RX ADMIN — LEUCINE, PHENYLALANINE, LYSINE, METHIONINE, ISOLEUCINE, VALINE, HISTIDINE, THREONINE, TRYPTOPHAN, ALANINE, GLYCINE, ARGININE, PROLINE, SERINE, TYROSINE, SODIUM ACETATE, DIBASIC POTASSIUM PHOSPHATE, MAGNESIUM CHLORIDE, SODIUM CHLORIDE, CALCIUM CHLORIDE, DEXTROSE
365; 280; 290; 200; 300; 290; 240; 210; 90; 1035; 515; 575; 340; 250; 20; 340; 261; 51; 59; 33; 20 INJECTION INTRAVENOUS at 16:54

## 2023-07-22 RX ADMIN — OXYCODONE AND ACETAMINOPHEN 1 TABLET: 5; 325 TABLET ORAL at 21:56

## 2023-07-22 RX ADMIN — ENOXAPARIN SODIUM 40 MG: 100 INJECTION SUBCUTANEOUS at 20:01

## 2023-07-22 RX ADMIN — OXYCODONE AND ACETAMINOPHEN 1 TABLET: 5; 325 TABLET ORAL at 17:20

## 2023-07-22 RX ADMIN — OXYCODONE AND ACETAMINOPHEN 1 TABLET: 5; 325 TABLET ORAL at 13:11

## 2023-07-22 RX ADMIN — Medication 10 ML: at 20:02

## 2023-07-22 RX ADMIN — SODIUM CHLORIDE, PRESERVATIVE FREE 10 ML: 5 INJECTION INTRAVENOUS at 20:02

## 2023-07-22 RX ADMIN — KETOROLAC TROMETHAMINE 15 MG: 15 INJECTION, SOLUTION INTRAMUSCULAR; INTRAVENOUS at 06:40

## 2023-07-22 RX ADMIN — PANTOPRAZOLE SODIUM 40 MG: 40 INJECTION, POWDER, FOR SOLUTION INTRAVENOUS at 08:52

## 2023-07-22 RX ADMIN — FUROSEMIDE 20 MG: 10 INJECTION, SOLUTION INTRAMUSCULAR; INTRAVENOUS at 08:52

## 2023-07-22 RX ADMIN — PANTOPRAZOLE SODIUM 40 MG: 40 INJECTION, POWDER, FOR SOLUTION INTRAVENOUS at 20:01

## 2023-07-22 RX ADMIN — KETOROLAC TROMETHAMINE 15 MG: 15 INJECTION, SOLUTION INTRAMUSCULAR; INTRAVENOUS at 13:11

## 2023-07-22 RX ADMIN — IRON SUCROSE 200 MG: 20 INJECTION, SOLUTION INTRAVENOUS at 13:20

## 2023-07-22 RX ADMIN — Medication 10 ML: at 08:54

## 2023-07-22 RX ADMIN — ONDANSETRON 4 MG: 2 INJECTION INTRAMUSCULAR; INTRAVENOUS at 04:47

## 2023-07-22 RX ADMIN — OXYCODONE AND ACETAMINOPHEN 1 TABLET: 5; 325 TABLET ORAL at 03:54

## 2023-07-22 RX ADMIN — TAMSULOSIN HYDROCHLORIDE 0.4 MG: 0.4 CAPSULE ORAL at 08:52

## 2023-07-22 ASSESSMENT — PAIN SCALES - GENERAL
PAINLEVEL_OUTOF10: 8
PAINLEVEL_OUTOF10: 6
PAINLEVEL_OUTOF10: 9
PAINLEVEL_OUTOF10: 6
PAINLEVEL_OUTOF10: 7
PAINLEVEL_OUTOF10: 8

## 2023-07-22 ASSESSMENT — PAIN DESCRIPTION - DESCRIPTORS
DESCRIPTORS: ACHING

## 2023-07-22 ASSESSMENT — PAIN DESCRIPTION - LOCATION
LOCATION: BACK
LOCATION: BACK;ABDOMEN
LOCATION: ABDOMEN;BACK

## 2023-07-22 ASSESSMENT — PAIN DESCRIPTION - ORIENTATION
ORIENTATION: MID;LOWER
ORIENTATION: MID;LOWER

## 2023-07-22 NOTE — PLAN OF CARE
Problem: Discharge Planning  Goal: Discharge to home or other facility with appropriate resources  Outcome: Progressing  Flowsheets (Taken 7/21/2023 2014 by Camilo Ruvalcaba RN)  Discharge to home or other facility with appropriate resources: Identify barriers to discharge with patient and caregiver     Problem: Chronic Conditions and Co-morbidities  Goal: Patient's chronic conditions and co-morbidity symptoms are monitored and maintained or improved  Outcome: Progressing  Flowsheets (Taken 7/21/2023 2014 by Camilo Ruvalcaba RN)  Care Plan - Patient's Chronic Conditions and Co-Morbidity Symptoms are Monitored and Maintained or Improved: Monitor and assess patient's chronic conditions and comorbid symptoms for stability, deterioration, or improvement     Problem: Skin/Tissue Integrity  Goal: Absence of new skin breakdown  Description: 1. Monitor for areas of redness and/or skin breakdown  2. Assess vascular access sites hourly  3. Every 4-6 hours minimum:  Change oxygen saturation probe site  4. Every 4-6 hours:  If on nasal continuous positive airway pressure, respiratory therapy assess nares and determine need for appliance change or resting period.   Outcome: Progressing     Problem: ABCDS Injury Assessment  Goal: Absence of physical injury  Outcome: Progressing     Problem: Safety - Adult  Goal: Free from fall injury  Outcome: Progressing     Problem: Pain  Goal: Verbalizes/displays adequate comfort level or baseline comfort level  Outcome: Progressing     Problem: Nutrition Deficit:  Goal: Optimize nutritional status  Outcome: Progressing

## 2023-07-22 NOTE — PROGRESS NOTES
Clinical Pharmacy Note    Pharmacy consulted by Dr. Joya Gonzalez to manage TPN    Current TPN rate: 83 ml/hr  Goal TPN rate: 83 ml/hr    Access: Central line  Indication: Inadequate nutrition     Labs:  General Labs:  BMP:    Lab Results   Component Value Date/Time     07/22/2023 04:40 AM    K 4.3 07/22/2023 04:40 AM    K 4.7 07/02/2023 07:24 PM     07/22/2023 04:40 AM    CO2 26 07/22/2023 04:40 AM    BUN 51 07/22/2023 04:40 AM    LABALBU 2.7 07/22/2023 04:40 AM    CREATININE 0.9 07/22/2023 04:40 AM    CALCIUM 8.3 07/22/2023 04:40 AM    GFRAA 57 02/09/2022 01:45 PM    GFRAA 56 01/10/2010 08:07 PM    LABGLOM >60 07/22/2023 04:40 AM    GLUCOSE 102 07/22/2023 04:40 AM       Electrolyte replacement as follows: No electrolyte replacement needed at this time    Blood sugars over past 24 hours: 134-166    Blood sugar management:  Plan to Continue Humalog q4 hour sliding scale at medium dose. Plan to continue TPN at 83 ml/hr. Working on [email or web address], surgery wants to continue until PO intake adequate. Thank you for allowing pharmacy to participate in the care of this patient.     Tim Stewart, [facility], PharmD 7/22/2023

## 2023-07-22 NOTE — PROGRESS NOTES
Hospitalist Progress Note      PCP: DENIZ Mane - CNP    Date of Admission: 7/2/2023    Chief Complaint: Abd pain      Subjective:   Patient seen and examined. No interval events. Denies any complaints. Pain well controlled.   Good BM after enema yesterday  Started on full liquid diet today  No nausea or vomiting       Medications:  Reviewed    Infusion Medications    PN-Adult Premix 5/20 - Standard Electrolytes - Central Line      PN-Adult Premix 5/20 - Standard Electrolytes - Central Line 83 mL/hr at 07/21/23 1917    sodium chloride 10 mL/hr (07/09/23 0348)    sodium chloride      dextrose      sodium chloride       Scheduled Medications    iron sucrose (VENOFER) iv piggyback 100 mL  200 mg IntraVENous Once    [START ON 7/23/2023] ferrous sulfate  325 mg Oral BID WC    tamsulosin  0.4 mg Oral Daily    lidocaine  2 patch TransDERmal Daily    enoxaparin  40 mg SubCUTAneous Nightly    lidocaine  1 patch TransDERmal Daily    scopolamine  1 patch TransDERmal Q72H    insulin lispro  0-8 Units SubCUTAneous Q4H    fat emulsion  250 mL IntraVENous Once per day on Mon Thu    sodium chloride flush  5-40 mL IntraVENous 2 times per day    sodium chloride flush  5-40 mL IntraVENous 2 times per day    pantoprazole  40 mg IntraVENous BID    furosemide  20 mg IntraVENous Daily    sodium chloride flush  5-40 mL IntraVENous 2 times per day     PRN Meds: calcium carbonate, oxyCODONE-acetaminophen, ketorolac, benzocaine-menthol, prochlorperazine, melatonin, sodium chloride flush, sodium chloride, sodium chloride flush, sodium chloride, potassium chloride **OR** potassium alternative oral replacement **OR** potassium chloride, phenol, dextrose bolus **OR** dextrose bolus, glucagon (rDNA), dextrose, sodium chloride flush, sodium chloride, ondansetron      Intake/Output Summary (Last 24 hours) at 7/22/2023 1223  Last data filed at 7/22/2023 0434  Gross per 24 hour   Intake 40227.22 ml   Output 425 ml   Net 58358.22

## 2023-07-23 LAB
ABO + RH BLD: NORMAL
ANION GAP SERPL CALCULATED.3IONS-SCNC: 7 MMOL/L (ref 3–16)
BACTERIA UR CULT: ABNORMAL
BLD GP AB SCN SERPL QL: NORMAL
BLOOD BANK DISPENSE STATUS: NORMAL
BLOOD BANK PRODUCT CODE: NORMAL
BPU ID: NORMAL
BUN SERPL-MCNC: 64 MG/DL (ref 7–20)
CALCIUM SERPL-MCNC: 8.3 MG/DL (ref 8.3–10.6)
CHLORIDE SERPL-SCNC: 100 MMOL/L (ref 99–110)
CO2 SERPL-SCNC: 26 MMOL/L (ref 21–32)
CREAT SERPL-MCNC: 1.2 MG/DL (ref 0.6–1.2)
DEPRECATED RDW RBC AUTO: 15.1 % (ref 12.4–15.4)
DESCRIPTION BLOOD BANK: NORMAL
GFR SERPLBLD CREATININE-BSD FMLA CKD-EPI: 43 ML/MIN/{1.73_M2}
GLUCOSE BLD-MCNC: 132 MG/DL (ref 70–99)
GLUCOSE BLD-MCNC: 142 MG/DL (ref 70–99)
GLUCOSE BLD-MCNC: 154 MG/DL (ref 70–99)
GLUCOSE BLD-MCNC: 173 MG/DL (ref 70–99)
GLUCOSE BLD-MCNC: 174 MG/DL (ref 70–99)
GLUCOSE BLD-MCNC: 183 MG/DL (ref 70–99)
GLUCOSE SERPL-MCNC: 150 MG/DL (ref 70–99)
HCT VFR BLD AUTO: 20.2 % (ref 36–48)
HCT VFR BLD AUTO: 31.9 % (ref 36–48)
HGB BLD-MCNC: 7 G/DL (ref 12–16)
HGB BLD-MCNC: 9.9 G/DL (ref 12–16)
MCH RBC QN AUTO: 32.2 PG (ref 26–34)
MCHC RBC AUTO-ENTMCNC: 34.6 G/DL (ref 31–36)
MCV RBC AUTO: 93 FL (ref 80–100)
ORGANISM: ABNORMAL
PERFORMED ON: ABNORMAL
PLATELET # BLD AUTO: 221 K/UL (ref 135–450)
PMV BLD AUTO: 7.1 FL (ref 5–10.5)
POTASSIUM SERPL-SCNC: 4.3 MMOL/L (ref 3.5–5.1)
RBC # BLD AUTO: 2.18 M/UL (ref 4–5.2)
SODIUM SERPL-SCNC: 133 MMOL/L (ref 136–145)
WBC # BLD AUTO: 6.2 K/UL (ref 4–11)

## 2023-07-23 PROCEDURE — 6370000000 HC RX 637 (ALT 250 FOR IP): Performed by: SURGERY

## 2023-07-23 PROCEDURE — 99024 POSTOP FOLLOW-UP VISIT: CPT | Performed by: SURGERY

## 2023-07-23 PROCEDURE — P9040 RBC LEUKOREDUCED IRRADIATED: HCPCS

## 2023-07-23 PROCEDURE — 6360000002 HC RX W HCPCS: Performed by: NURSE PRACTITIONER

## 2023-07-23 PROCEDURE — 6360000002 HC RX W HCPCS: Performed by: STUDENT IN AN ORGANIZED HEALTH CARE EDUCATION/TRAINING PROGRAM

## 2023-07-23 PROCEDURE — 85018 HEMOGLOBIN: CPT

## 2023-07-23 PROCEDURE — 36430 TRANSFUSION BLD/BLD COMPNT: CPT

## 2023-07-23 PROCEDURE — 2500000003 HC RX 250 WO HCPCS: Performed by: SURGERY

## 2023-07-23 PROCEDURE — 6370000000 HC RX 637 (ALT 250 FOR IP): Performed by: NURSE PRACTITIONER

## 2023-07-23 PROCEDURE — 85014 HEMATOCRIT: CPT

## 2023-07-23 PROCEDURE — 86850 RBC ANTIBODY SCREEN: CPT

## 2023-07-23 PROCEDURE — 80048 BASIC METABOLIC PNL TOTAL CA: CPT

## 2023-07-23 PROCEDURE — 6360000002 HC RX W HCPCS: Performed by: SURGERY

## 2023-07-23 PROCEDURE — 86923 COMPATIBILITY TEST ELECTRIC: CPT

## 2023-07-23 PROCEDURE — APPNB30 APP NON BILLABLE TIME 0-30 MINS: Performed by: NURSE PRACTITIONER

## 2023-07-23 PROCEDURE — 30233N1 TRANSFUSION OF NONAUTOLOGOUS RED BLOOD CELLS INTO PERIPHERAL VEIN, PERCUTANEOUS APPROACH: ICD-10-PCS | Performed by: INTERNAL MEDICINE

## 2023-07-23 PROCEDURE — 2580000003 HC RX 258: Performed by: SURGERY

## 2023-07-23 PROCEDURE — C9113 INJ PANTOPRAZOLE SODIUM, VIA: HCPCS | Performed by: SURGERY

## 2023-07-23 PROCEDURE — APPSS15 APP SPLIT SHARED TIME 0-15 MINUTES: Performed by: NURSE PRACTITIONER

## 2023-07-23 PROCEDURE — 36415 COLL VENOUS BLD VENIPUNCTURE: CPT

## 2023-07-23 PROCEDURE — 86900 BLOOD TYPING SEROLOGIC ABO: CPT

## 2023-07-23 PROCEDURE — P9016 RBC LEUKOCYTES REDUCED: HCPCS

## 2023-07-23 PROCEDURE — 6370000000 HC RX 637 (ALT 250 FOR IP): Performed by: INTERNAL MEDICINE

## 2023-07-23 PROCEDURE — 6360000002 HC RX W HCPCS: Performed by: INTERNAL MEDICINE

## 2023-07-23 PROCEDURE — 1200000000 HC SEMI PRIVATE

## 2023-07-23 PROCEDURE — 85027 COMPLETE CBC AUTOMATED: CPT

## 2023-07-23 PROCEDURE — 86901 BLOOD TYPING SEROLOGIC RH(D): CPT

## 2023-07-23 RX ORDER — FERROUS SULFATE 325(65) MG
325 TABLET ORAL
Status: DISCONTINUED | OUTPATIENT
Start: 2023-07-24 | End: 2023-07-28 | Stop reason: HOSPADM

## 2023-07-23 RX ORDER — SODIUM CHLORIDE 9 MG/ML
INJECTION, SOLUTION INTRAVENOUS PRN
Status: DISCONTINUED | OUTPATIENT
Start: 2023-07-23 | End: 2023-07-28 | Stop reason: HOSPADM

## 2023-07-23 RX ORDER — FUROSEMIDE 20 MG/1
20 TABLET ORAL DAILY
Status: DISCONTINUED | OUTPATIENT
Start: 2023-07-24 | End: 2023-07-28 | Stop reason: HOSPADM

## 2023-07-23 RX ORDER — FUROSEMIDE 10 MG/ML
20 INJECTION INTRAMUSCULAR; INTRAVENOUS ONCE
Status: COMPLETED | OUTPATIENT
Start: 2023-07-23 | End: 2023-07-23

## 2023-07-23 RX ORDER — FUROSEMIDE 10 MG/ML
20 INJECTION INTRAMUSCULAR; INTRAVENOUS DAILY
Status: DISCONTINUED | OUTPATIENT
Start: 2023-07-24 | End: 2023-07-23

## 2023-07-23 RX ADMIN — OXYCODONE AND ACETAMINOPHEN 1 TABLET: 5; 325 TABLET ORAL at 22:06

## 2023-07-23 RX ADMIN — OXYCODONE AND ACETAMINOPHEN 1 TABLET: 5; 325 TABLET ORAL at 17:17

## 2023-07-23 RX ADMIN — Medication 10 ML: at 08:58

## 2023-07-23 RX ADMIN — KETOROLAC TROMETHAMINE 15 MG: 15 INJECTION, SOLUTION INTRAMUSCULAR; INTRAVENOUS at 17:17

## 2023-07-23 RX ADMIN — ENOXAPARIN SODIUM 40 MG: 100 INJECTION SUBCUTANEOUS at 21:38

## 2023-07-23 RX ADMIN — PANTOPRAZOLE SODIUM 40 MG: 40 INJECTION, POWDER, FOR SOLUTION INTRAVENOUS at 21:38

## 2023-07-23 RX ADMIN — FERROUS SULFATE TAB 325 MG (65 MG ELEMENTAL FE) 325 MG: 325 (65 FE) TAB at 08:57

## 2023-07-23 RX ADMIN — SODIUM CHLORIDE, PRESERVATIVE FREE 10 ML: 5 INJECTION INTRAVENOUS at 08:58

## 2023-07-23 RX ADMIN — KETOROLAC TROMETHAMINE 15 MG: 15 INJECTION, SOLUTION INTRAMUSCULAR; INTRAVENOUS at 08:59

## 2023-07-23 RX ADMIN — Medication 10 ML: at 21:38

## 2023-07-23 RX ADMIN — SODIUM CHLORIDE, PRESERVATIVE FREE 10 ML: 5 INJECTION INTRAVENOUS at 21:38

## 2023-07-23 RX ADMIN — FUROSEMIDE 20 MG: 10 INJECTION, SOLUTION INTRAMUSCULAR; INTRAVENOUS at 15:19

## 2023-07-23 RX ADMIN — OXYCODONE AND ACETAMINOPHEN 1 TABLET: 5; 325 TABLET ORAL at 04:27

## 2023-07-23 RX ADMIN — OXYCODONE AND ACETAMINOPHEN 1 TABLET: 5; 325 TABLET ORAL at 08:59

## 2023-07-23 RX ADMIN — ONDANSETRON 4 MG: 2 INJECTION INTRAMUSCULAR; INTRAVENOUS at 17:17

## 2023-07-23 RX ADMIN — FUROSEMIDE 20 MG: 10 INJECTION, SOLUTION INTRAMUSCULAR; INTRAVENOUS at 08:57

## 2023-07-23 RX ADMIN — ALTEPLASE 1 MG: 2.2 INJECTION, POWDER, LYOPHILIZED, FOR SOLUTION INTRAVENOUS at 21:35

## 2023-07-23 RX ADMIN — LEUCINE, PHENYLALANINE, LYSINE, METHIONINE, ISOLEUCINE, VALINE, HISTIDINE, THREONINE, TRYPTOPHAN, ALANINE, GLYCINE, ARGININE, PROLINE, SERINE, TYROSINE, SODIUM ACETATE, DIBASIC POTASSIUM PHOSPHATE, MAGNESIUM CHLORIDE, SODIUM CHLORIDE, CALCIUM CHLORIDE, DEXTROSE
365; 280; 290; 200; 300; 290; 240; 210; 90; 1035; 515; 575; 340; 250; 20; 340; 261; 51; 59; 33; 20 INJECTION INTRAVENOUS at 17:44

## 2023-07-23 RX ADMIN — TAMSULOSIN HYDROCHLORIDE 0.4 MG: 0.4 CAPSULE ORAL at 08:58

## 2023-07-23 RX ADMIN — OXYCODONE AND ACETAMINOPHEN 1 TABLET: 5; 325 TABLET ORAL at 13:00

## 2023-07-23 RX ADMIN — PANTOPRAZOLE SODIUM 40 MG: 40 INJECTION, POWDER, FOR SOLUTION INTRAVENOUS at 08:58

## 2023-07-23 ASSESSMENT — PAIN DESCRIPTION - LOCATION
LOCATION: ABDOMEN;BACK
LOCATION: ABDOMEN;BACK
LOCATION: BACK;ABDOMEN

## 2023-07-23 ASSESSMENT — PAIN DESCRIPTION - ORIENTATION
ORIENTATION: LOWER;MID
ORIENTATION: MID;LOWER
ORIENTATION: MID;LOWER

## 2023-07-23 ASSESSMENT — PAIN - FUNCTIONAL ASSESSMENT: PAIN_FUNCTIONAL_ASSESSMENT: PREVENTS OR INTERFERES SOME ACTIVE ACTIVITIES AND ADLS

## 2023-07-23 ASSESSMENT — PAIN SCALES - GENERAL
PAINLEVEL_OUTOF10: 8
PAINLEVEL_OUTOF10: 3
PAINLEVEL_OUTOF10: 8
PAINLEVEL_OUTOF10: 8
PAINLEVEL_OUTOF10: 1
PAINLEVEL_OUTOF10: 7
PAINLEVEL_OUTOF10: 8

## 2023-07-23 ASSESSMENT — PAIN DESCRIPTION - DESCRIPTORS
DESCRIPTORS: ACHING
DESCRIPTORS: ACHING
DESCRIPTORS: CRAMPING;ACHING

## 2023-07-23 ASSESSMENT — PAIN DESCRIPTION - PAIN TYPE: TYPE: ACUTE PAIN;CHRONIC PAIN

## 2023-07-23 ASSESSMENT — PAIN DESCRIPTION - ONSET: ONSET: ON-GOING

## 2023-07-23 ASSESSMENT — PAIN DESCRIPTION - FREQUENCY: FREQUENCY: CONTINUOUS

## 2023-07-23 NOTE — PLAN OF CARE
Problem: Discharge Planning  Goal: Discharge to home or other facility with appropriate resources  7/23/2023 1013 by Stacey Malagon RN  Outcome: Progressing  7/23/2023 0042 by Sam Olivas RN  Outcome: Progressing  Flowsheets (Taken 7/22/2023 2000)  Discharge to home or other facility with appropriate resources: Identify barriers to discharge with patient and caregiver     Problem: Chronic Conditions and Co-morbidities  Goal: Patient's chronic conditions and co-morbidity symptoms are monitored and maintained or improved  7/23/2023 1013 by Stacey Malagon RN  Outcome: Progressing  7/23/2023 0042 by Sam Olivas RN  Outcome: Progressing  Flowsheets (Taken 7/22/2023 2000)  Care Plan - Patient's Chronic Conditions and Co-Morbidity Symptoms are Monitored and Maintained or Improved: Monitor and assess patient's chronic conditions and comorbid symptoms for stability, deterioration, or improvement     Problem: Skin/Tissue Integrity  Goal: Absence of new skin breakdown  Description: 1. Monitor for areas of redness and/or skin breakdown  2. Assess vascular access sites hourly  3. Every 4-6 hours minimum:  Change oxygen saturation probe site  4. Every 4-6 hours:  If on nasal continuous positive airway pressure, respiratory therapy assess nares and determine need for appliance change or resting period.   7/23/2023 1013 by Stacey Malagon RN  Outcome: Progressing  7/23/2023 0042 by Sam Olivas RN  Outcome: Progressing     Problem: ABCDS Injury Assessment  Goal: Absence of physical injury  7/23/2023 1013 by Stacey Malagon RN  Outcome: Progressing  7/23/2023 0042 by Sam Olivas RN  Outcome: Progressing     Problem: Safety - Adult  Goal: Free from fall injury  7/23/2023 1013 by Stacey Malagon RN  Outcome: Progressing  7/23/2023 0042 by Sam Olivas RN  Outcome: Progressing     Problem: Pain  Goal: Verbalizes/displays adequate comfort level or baseline comfort level  7/23/2023 1013 by Stacey Malagon RN  Outcome:

## 2023-07-23 NOTE — PROGRESS NOTES
Clinical Pharmacy Note    Pharmacy consulted by Dr. Francis Red to manage TPN    Current TPN rate: 83 ml/hr  Goal TPN rate: 83 ml/hr    Access: central line  Indication: inadequate nutrition     Labs:  General Labs:  BMP:    Lab Results   Component Value Date/Time     07/23/2023 04:55 AM    K 4.3 07/23/2023 04:55 AM    K 4.7 07/02/2023 07:24 PM     07/23/2023 04:55 AM    CO2 26 07/23/2023 04:55 AM    BUN 64 07/23/2023 04:55 AM    LABALBU 2.7 07/22/2023 04:40 AM    CREATININE 1.2 07/23/2023 04:55 AM    CALCIUM 8.3 07/23/2023 04:55 AM    GFRAA 57 02/09/2022 01:45 PM    GFRAA 56 01/10/2010 08:07 PM    LABGLOM 43 07/23/2023 04:55 AM    GLUCOSE 150 07/23/2023 04:55 AM       Electrolyte replacement as follows: No electrolyte replacement ordered today     Blood sugars over past 24 hours: 142-173    Blood sugar management:  Plan to Continue medium dose Humalog q4 hour sliding scale. Plan to continue TPN at 83 ml/hr. Per surgery, anticipate stopping TPN tomorrow. Thank you for allowing pharmacy to participate in the care of this patient.     Tabatha Guerrero, [facility], PharmD 7/23/2023

## 2023-07-23 NOTE — PLAN OF CARE
Problem: Discharge Planning  Goal: Discharge to home or other facility with appropriate resources  Outcome: Progressing  Flowsheets (Taken 7/22/2023 2000)  Discharge to home or other facility with appropriate resources: Identify barriers to discharge with patient and caregiver     Problem: Chronic Conditions and Co-morbidities  Goal: Patient's chronic conditions and co-morbidity symptoms are monitored and maintained or improved  Outcome: Progressing  Flowsheets (Taken 7/22/2023 2000)  Care Plan - Patient's Chronic Conditions and Co-Morbidity Symptoms are Monitored and Maintained or Improved: Monitor and assess patient's chronic conditions and comorbid symptoms for stability, deterioration, or improvement     Problem: Skin/Tissue Integrity  Goal: Absence of new skin breakdown  Description: 1. Monitor for areas of redness and/or skin breakdown  2. Assess vascular access sites hourly  3. Every 4-6 hours minimum:  Change oxygen saturation probe site  4. Every 4-6 hours:  If on nasal continuous positive airway pressure, respiratory therapy assess nares and determine need for appliance change or resting period.   Outcome: Progressing     Problem: ABCDS Injury Assessment  Goal: Absence of physical injury  Outcome: Progressing     Problem: Safety - Adult  Goal: Free from fall injury  Outcome: Progressing     Problem: Pain  Goal: Verbalizes/displays adequate comfort level or baseline comfort level  Outcome: Progressing     Problem: Nutrition Deficit:  Goal: Optimize nutritional status  Outcome: Progressing

## 2023-07-23 NOTE — PROGRESS NOTES
Glucose    Collection Time: 07/23/23  4:37 AM   Result Value Ref Range    POC Glucose 154 (H) 70 - 99 mg/dl    Performed on ACCU-CHEK    CBC    Collection Time: 07/23/23  4:55 AM   Result Value Ref Range    WBC 6.2 4.0 - 11.0 K/uL    RBC 2.18 (L) 4.00 - 5.20 M/uL    Hemoglobin 7.0 (L) 12.0 - 16.0 g/dL    Hematocrit 20.2 (LL) 36.0 - 48.0 %    MCV 93.0 80.0 - 100.0 fL    MCH 32.2 26.0 - 34.0 pg    MCHC 34.6 31.0 - 36.0 g/dL    RDW 15.1 12.4 - 15.4 %    Platelets 284 991 - 177 K/uL    MPV 7.1 5.0 - 10.5 fL   Basic Metabolic Panel    Collection Time: 07/23/23  4:55 AM   Result Value Ref Range    Sodium 133 (L) 136 - 145 mmol/L    Potassium 4.3 3.5 - 5.1 mmol/L    Chloride 100 99 - 110 mmol/L    CO2 26 21 - 32 mmol/L    Anion Gap 7 3 - 16    Glucose 150 (H) 70 - 99 mg/dL    BUN 64 (H) 7 - 20 mg/dL    Creatinine 1.2 0.6 - 1.2 mg/dL    Est, Glom Filt Rate 43 (A) >60    Calcium 8.3 8.3 - 10.6 mg/dL   POCT Glucose    Collection Time: 07/23/23  7:38 AM   Result Value Ref Range    POC Glucose 183 (H) 70 - 99 mg/dl    Performed on ACCU-CHEK         Imaging/Diagnostics Last 24 Hours   No results found.     Electronically signed by Adriano Edwards MD on 7/23/2023 at 11:04 AM

## 2023-07-24 LAB
ANION GAP SERPL CALCULATED.3IONS-SCNC: 5 MMOL/L (ref 3–16)
BUN SERPL-MCNC: 66 MG/DL (ref 7–20)
CALCIUM SERPL-MCNC: 8 MG/DL (ref 8.3–10.6)
CHLORIDE SERPL-SCNC: 99 MMOL/L (ref 99–110)
CO2 SERPL-SCNC: 26 MMOL/L (ref 21–32)
CREAT SERPL-MCNC: 1.2 MG/DL (ref 0.6–1.2)
DAT POLY-SP REAG RBC QL: NORMAL
DEPRECATED RDW RBC AUTO: 15.4 % (ref 12.4–15.4)
GFR SERPLBLD CREATININE-BSD FMLA CKD-EPI: 43 ML/MIN/{1.73_M2}
GLUCOSE BLD-MCNC: 114 MG/DL (ref 70–99)
GLUCOSE BLD-MCNC: 136 MG/DL (ref 70–99)
GLUCOSE BLD-MCNC: 137 MG/DL (ref 70–99)
GLUCOSE BLD-MCNC: 145 MG/DL (ref 70–99)
GLUCOSE BLD-MCNC: 163 MG/DL (ref 70–99)
GLUCOSE BLD-MCNC: 168 MG/DL (ref 70–99)
GLUCOSE SERPL-MCNC: 131 MG/DL (ref 70–99)
HAPTOGLOB SERPL-MCNC: 165 MG/DL (ref 30–200)
HCT VFR BLD AUTO: 23.1 % (ref 36–48)
HCT VFR BLD AUTO: 23.6 % (ref 36–48)
HGB BLD-MCNC: 7.9 G/DL (ref 12–16)
IMMATURE RETIC FRACT: 0.64 (ref 0.21–0.37)
LDH SERPL L TO P-CCNC: 247 U/L (ref 100–190)
MCH RBC QN AUTO: 31.6 PG (ref 26–34)
MCHC RBC AUTO-ENTMCNC: 33.4 G/DL (ref 31–36)
MCV RBC AUTO: 94.7 FL (ref 80–100)
PERFORMED ON: ABNORMAL
PLATELET # BLD AUTO: 221 K/UL (ref 135–450)
PMV BLD AUTO: 7 FL (ref 5–10.5)
POTASSIUM SERPL-SCNC: 4.3 MMOL/L (ref 3.5–5.1)
RBC # BLD AUTO: 2.49 M/UL (ref 4–5.2)
RETICS # AUTO: 0.09 M/UL (ref 0.02–0.1)
RETICS/RBC NFR AUTO: 3.82 % (ref 0.5–2.18)
SODIUM SERPL-SCNC: 130 MMOL/L (ref 136–145)
WBC # BLD AUTO: 6 K/UL (ref 4–11)

## 2023-07-24 PROCEDURE — 6370000000 HC RX 637 (ALT 250 FOR IP): Performed by: INTERNAL MEDICINE

## 2023-07-24 PROCEDURE — 80048 BASIC METABOLIC PNL TOTAL CA: CPT

## 2023-07-24 PROCEDURE — 85045 AUTOMATED RETICULOCYTE COUNT: CPT

## 2023-07-24 PROCEDURE — 84165 PROTEIN E-PHORESIS SERUM: CPT

## 2023-07-24 PROCEDURE — 6360000002 HC RX W HCPCS: Performed by: SURGERY

## 2023-07-24 PROCEDURE — C9113 INJ PANTOPRAZOLE SODIUM, VIA: HCPCS | Performed by: SURGERY

## 2023-07-24 PROCEDURE — 36415 COLL VENOUS BLD VENIPUNCTURE: CPT

## 2023-07-24 PROCEDURE — 6370000000 HC RX 637 (ALT 250 FOR IP): Performed by: NURSE PRACTITIONER

## 2023-07-24 PROCEDURE — 51798 US URINE CAPACITY MEASURE: CPT

## 2023-07-24 PROCEDURE — APPNB30 APP NON BILLABLE TIME 0-30 MINS: Performed by: NURSE PRACTITIONER

## 2023-07-24 PROCEDURE — 83010 ASSAY OF HAPTOGLOBIN QUANT: CPT

## 2023-07-24 PROCEDURE — 51702 INSERT TEMP BLADDER CATH: CPT

## 2023-07-24 PROCEDURE — 84630 ASSAY OF ZINC: CPT

## 2023-07-24 PROCEDURE — 1200000000 HC SEMI PRIVATE

## 2023-07-24 PROCEDURE — 6370000000 HC RX 637 (ALT 250 FOR IP): Performed by: STUDENT IN AN ORGANIZED HEALTH CARE EDUCATION/TRAINING PROGRAM

## 2023-07-24 PROCEDURE — 51701 INSERT BLADDER CATHETER: CPT

## 2023-07-24 PROCEDURE — 6360000002 HC RX W HCPCS: Performed by: INTERNAL MEDICINE

## 2023-07-24 PROCEDURE — APPSS15 APP SPLIT SHARED TIME 0-15 MINUTES: Performed by: NURSE PRACTITIONER

## 2023-07-24 PROCEDURE — 85027 COMPLETE CBC AUTOMATED: CPT

## 2023-07-24 PROCEDURE — 6370000000 HC RX 637 (ALT 250 FOR IP): Performed by: SURGERY

## 2023-07-24 PROCEDURE — 99024 POSTOP FOLLOW-UP VISIT: CPT | Performed by: SURGERY

## 2023-07-24 PROCEDURE — 2580000003 HC RX 258: Performed by: SURGERY

## 2023-07-24 PROCEDURE — 84155 ASSAY OF PROTEIN SERUM: CPT

## 2023-07-24 PROCEDURE — 36592 COLLECT BLOOD FROM PICC: CPT

## 2023-07-24 PROCEDURE — 83615 LACTATE (LD) (LDH) ENZYME: CPT

## 2023-07-24 PROCEDURE — 82390 ASSAY OF CERULOPLASMIN: CPT

## 2023-07-24 PROCEDURE — 86880 COOMBS TEST DIRECT: CPT

## 2023-07-24 RX ORDER — INSULIN LISPRO 100 [IU]/ML
0-8 INJECTION, SOLUTION INTRAVENOUS; SUBCUTANEOUS
Status: DISCONTINUED | OUTPATIENT
Start: 2023-07-24 | End: 2023-07-28 | Stop reason: HOSPADM

## 2023-07-24 RX ORDER — INSULIN LISPRO 100 [IU]/ML
0-4 INJECTION, SOLUTION INTRAVENOUS; SUBCUTANEOUS NIGHTLY
Status: DISCONTINUED | OUTPATIENT
Start: 2023-07-24 | End: 2023-07-28 | Stop reason: HOSPADM

## 2023-07-24 RX ADMIN — FUROSEMIDE 20 MG: 20 TABLET ORAL at 09:19

## 2023-07-24 RX ADMIN — OXYCODONE AND ACETAMINOPHEN 1 TABLET: 5; 325 TABLET ORAL at 15:47

## 2023-07-24 RX ADMIN — Medication 10 ML: at 09:21

## 2023-07-24 RX ADMIN — OXYCODONE AND ACETAMINOPHEN 1 TABLET: 5; 325 TABLET ORAL at 20:55

## 2023-07-24 RX ADMIN — FERROUS SULFATE TAB 325 MG (65 MG ELEMENTAL FE) 325 MG: 325 (65 FE) TAB at 09:19

## 2023-07-24 RX ADMIN — OXYCODONE AND ACETAMINOPHEN 1 TABLET: 5; 325 TABLET ORAL at 10:41

## 2023-07-24 RX ADMIN — ENOXAPARIN SODIUM 40 MG: 100 INJECTION SUBCUTANEOUS at 20:55

## 2023-07-24 RX ADMIN — PANTOPRAZOLE SODIUM 40 MG: 40 INJECTION, POWDER, FOR SOLUTION INTRAVENOUS at 09:19

## 2023-07-24 RX ADMIN — SODIUM CHLORIDE, PRESERVATIVE FREE 10 ML: 5 INJECTION INTRAVENOUS at 09:20

## 2023-07-24 RX ADMIN — OXYCODONE AND ACETAMINOPHEN 1 TABLET: 5; 325 TABLET ORAL at 06:17

## 2023-07-24 RX ADMIN — PANTOPRAZOLE SODIUM 40 MG: 40 INJECTION, POWDER, FOR SOLUTION INTRAVENOUS at 20:55

## 2023-07-24 RX ADMIN — ONDANSETRON 4 MG: 2 INJECTION INTRAMUSCULAR; INTRAVENOUS at 15:54

## 2023-07-24 RX ADMIN — OXYCODONE AND ACETAMINOPHEN 1 TABLET: 5; 325 TABLET ORAL at 02:04

## 2023-07-24 RX ADMIN — TAMSULOSIN HYDROCHLORIDE 0.4 MG: 0.4 CAPSULE ORAL at 09:19

## 2023-07-24 ASSESSMENT — PAIN DESCRIPTION - LOCATION
LOCATION: ABDOMEN;BACK
LOCATION: BACK
LOCATION: BACK;ABDOMEN
LOCATION: BACK;ABDOMEN

## 2023-07-24 ASSESSMENT — PAIN SCALES - GENERAL
PAINLEVEL_OUTOF10: 2
PAINLEVEL_OUTOF10: 2
PAINLEVEL_OUTOF10: 8
PAINLEVEL_OUTOF10: 8
PAINLEVEL_OUTOF10: 6
PAINLEVEL_OUTOF10: 8
PAINLEVEL_OUTOF10: 3
PAINLEVEL_OUTOF10: 8

## 2023-07-24 ASSESSMENT — PAIN DESCRIPTION - ORIENTATION: ORIENTATION: MID;LOWER

## 2023-07-24 ASSESSMENT — PAIN - FUNCTIONAL ASSESSMENT
PAIN_FUNCTIONAL_ASSESSMENT: PREVENTS OR INTERFERES SOME ACTIVE ACTIVITIES AND ADLS

## 2023-07-24 ASSESSMENT — PAIN DESCRIPTION - PAIN TYPE: TYPE: ACUTE PAIN;CHRONIC PAIN;SURGICAL PAIN

## 2023-07-24 ASSESSMENT — PAIN DESCRIPTION - DESCRIPTORS
DESCRIPTORS: DISCOMFORT;ACHING
DESCRIPTORS: ACHING;DISCOMFORT
DESCRIPTORS: ACHING;SORE;CRAMPING

## 2023-07-24 NOTE — CARE COORDINATION
Discharge Planning Note:    Update:    - [address], admission, [phone]    Will continue to follow.     Joanette Angelucci, BSN RN    [facility]  Phone: [phone]

## 2023-07-24 NOTE — PROGRESS NOTES
Occupational Therapy  [facility]    Attempted OT tx this PM. Pt lying supine in bed upon arrival, DIL and son present. Pt reporting discomfort in abdomen and in back. Pt encouraged to complete EOB and/or OOB activity to improve functional mobility/activity and to improve overall comfort, however, pt declined. Discussed expectations of ARU if being d/c'd there. Pt and DIL reported understanding, and pt continued to decline therapy or any OOB activity at this time. Spent 8 minutes with pt. Will re-attempt tomorrow as schedule allows.     Thanks,  Laura Huber, [address], OTR/L [id number]

## 2023-07-24 NOTE — PROGRESS NOTES
Patient complaining of heaviness in chest abdomen area. Bladder scan showed greater than 500. Straight cath drained 850ml of luis a colored urine. Patient tolerated well. Heaviness improved after straight cath. Will continue to monitor.

## 2023-07-24 NOTE — CARE COORDINATION
Discharge Planning Note:    Talked with Osbaldo Shoemaker, daughter, [phone]: An approved SNf list was reviewed and the following referrals were placed a the request of the daughter:    - [facility] - waiting on response    - [facility] - waiting on response    - [facility] - waiting on response    Will continue to follow.     ADRIÁN Hebert RN    [facility]  Phone: [phone]

## 2023-07-24 NOTE — PROGRESS NOTES
Nutrition Note    RECOMMENDATIONS  PO Diet: Low fiber as tolerated  ONS: magic cup BID  Nutrition Support: d/c TPN per MD    NUTRITION ASSESSMENT   Post-op ileus improving as LBM documented 7/22 & pt is tolerating a low fiber diet. G-tube is clamped. TPN will be discontinued today. Pt does not like Ensure but is willing to try Magic cups BID. Will con't to monitor progress & encourage po/supplement intake. Nutrition Related Findings: Gluc 137; LBM 7/22; trace BLE edema  Wounds: Surgical Incision, Multiple  Nutrition Education:  Education not indicated   Nutrition Goals: PO intake 50% or greater     MALNUTRITION ASSESSMENT   Acute Illness  Malnutrition Status: At risk for malnutrition (Comment)    NUTRITION DIAGNOSIS   Inadequate oral intake related to altered GI function, inadequate protein-energy intake as evidenced by intake 0-25%, intake 26-50%, nutrition support - parenteral nutrition      CURRENT NUTRITION THERAPIES  ADULT ORAL NUTRITION SUPPLEMENT; Breakfast, Lunch, Dinner; Standard High Calorie/High Protein Oral Supplement  ADULT DIET; Regular; Low Fiber     PO Intake: 0%, 26-50%   PO Supplement Intake:Refusing to take    Current Parenteral Nutrition Orders:  Type and Formula: Premix Central   Lipids: 250ml, Two times weekly  Duration: Continuous  Current PN Order Provides: As below  Goal PN Orders Provides: Clinimix 5/20 at 83 mL/hr provides 1992 mL total volume, 1755 calories without lipids, 100 grams of protein, and dextrose load of 4.19 mg/kg/min    ANTHROPOMETRICS  Current Height: 5' (152.4 cm)  Current Weight - Scale: 142 lb 13.7 oz (64.8 kg)    Admission weight: 142 lb (64.4 kg)  Ideal Body Weight (IBW): 100 lbs  (45 kg)        BMI: 27.7      COMPARATIVE STANDARDS  Total Energy Requirements (kcals/day): 1850 - 2220     Protein (g):  89 - 148       Fluid (mL/day):  1850 - 2220    The patient will be monitored per nutrition standards of care.  Consult dietitian if additional nutrition interventions are needed prior to RD reassessment.      Betzaida Sagastume RD, LD    Contact: [phone]

## 2023-07-24 NOTE — PLAN OF CARE
Problem: Discharge Planning  Goal: Discharge to home or other facility with appropriate resources  7/23/2023 2118 by Carolee Lai RN  Outcome: Progressing  Flowsheets (Taken 7/23/2023 2014)  Discharge to home or other facility with appropriate resources: Identify barriers to discharge with patient and caregiver  7/23/2023 1013 by Stacey May RN  Outcome: Progressing     Problem: Chronic Conditions and Co-morbidities  Goal: Patient's chronic conditions and co-morbidity symptoms are monitored and maintained or improved  7/23/2023 2118 by Carolee Lai RN  Outcome: Progressing  Flowsheets (Taken 7/23/2023 2014)  Care Plan - Patient's Chronic Conditions and Co-Morbidity Symptoms are Monitored and Maintained or Improved: Monitor and assess patient's chronic conditions and comorbid symptoms for stability, deterioration, or improvement  7/23/2023 1013 by Stacey May RN  Outcome: Progressing     Problem: Skin/Tissue Integrity  Goal: Absence of new skin breakdown  Description: 1. Monitor for areas of redness and/or skin breakdown  2. Assess vascular access sites hourly  3. Every 4-6 hours minimum:  Change oxygen saturation probe site  4. Every 4-6 hours:  If on nasal continuous positive airway pressure, respiratory therapy assess nares and determine need for appliance change or resting period.   7/23/2023 2118 by Carolee Lai RN  Outcome: Progressing  7/23/2023 1013 by Stacey May RN  Outcome: Progressing     Problem: ABCDS Injury Assessment  Goal: Absence of physical injury  7/23/2023 2118 by Carolee Lai RN  Outcome: Progressing  7/23/2023 1013 by Stacey May RN  Outcome: Progressing     Problem: Safety - Adult  Goal: Free from fall injury  7/23/2023 2118 by Carolee Lai RN  Outcome: Progressing  7/23/2023 1013 by Stacey May RN  Outcome: Progressing     Problem: Pain  Goal: Verbalizes/displays adequate comfort level or baseline comfort level  7/23/2023 2118 by Carolee Lai RN  Outcome:

## 2023-07-24 NOTE — PROGRESS NOTES
Physical Therapy  Pt resting in bed at arrival.  Declined therapy stating she was already up this morning, walked to bathroom, and sat in the chair for about 1 hour. States she is tired. Will follow-up another time as able.    Thanks, Lashell Ellsworth, DPT [id number]

## 2023-07-24 NOTE — PROGRESS NOTES
Shift assessment completed, see flowsheets. Medications administered, see MAR. Vital signs stable, SPO2 94% on room air. Plan of care discussed with patient. Fall precautions in place. Call light and bedside table within reach. Nonskid footwear on. Pt denies further needs at this time. Will continue to monitor.   Siri Goddard RN

## 2023-07-24 NOTE — CONSULTS
[facility]    Consult Note      Requesting Physician: The hospitalist    CHIEF COMPLAINT:  Weakness      HISTORY OF PRESENT ILLNESS:    Ms. Clifton Ivey  is a 80 y.o. female we are seeing in consultation for Anemia. She was admitted for abdominal pain with CT concerning for gastric volvulus and hiatal hernia. She has significant normocytic and normochromic anemia. ICD-10-CM    1.  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    Cystocele     Diabetes mellitus ([address])     type  2 diet controlled    Hepatitis A 1953    History of blood transfusion     hiatal hernia surgery    Hyperlipidemia     PVC (premature ventricular contraction)     Rectocele     Reflux     Scoliosis        Past Surgical History:  Past Surgical History:   Procedure Laterality Date    APPENDECTOMY      BLADDER SUSPENSION      3 SURGERIES/ANTERIOR AND POSTERIOR REPAIR    BREAST SURGERY      RIGHT LUMPECTOMY AND SEVERAL BIOPSIES ON BOTH BREAST    BUNIONECTOMY  7/26/12    BUNIONECTOMY BILATERAL  FEET    CARDIAC CATHETERIZATION      AV block    CATARACT REMOVAL WITH IMPLANT Left 09/13/2018    CHOLECYSTECTOMY      COLONOSCOPY  2008    normal    CYSTOSCOPY  03/29/2017    U-dil    DILATION AND CURETTAGE OF UTERUS      SUNCTION    FOOT SURGERY      bilat foot surgeries    HERNIA REPAIR  6 YRS AGO    HIATAL HERNIA REPAIR- WIRED  RIBS    HYSTERECTOMY (CERVIX STATUS UNKNOWN)      VAGINAL    JOINT REPLACEMENT Left     TOTAL KNEE REPLACEMENT LEFT    LAPAROTOMY N/A 7/7/2023    LAPAROSCOPIC CONVERTED TO OPEN, EXPLORATORY LAPAROTOMY,  LYSIS OF ADHESIONS, GASTRECTOMY TUBE PLACEMENT performed by cooperative, no apparent distress. She looks frail and pale. EYES: pupils equal, round and reactive to light, sclera clear and conjunctiva normal  ENT: Normocephalic, without obvious abnormality, atraumatic  NECK: supple, symmetrical, no jugular venous distension and no carotid bruits   HEMATOLOGIC/LYMPHATIC: no cervical, supraclavicular or axillary lymphadenopathy   LUNGS: no increased work of breathing and clear to auscultation   CARDIOVASCULAR: regular rate and rhythm, normal S1 and S2, no murmur noted  ABDOMEN: normal bowel sounds x 4, soft, non-distended, non-tender, no masses palpated, no hepatosplenomegaly   MUSCULOSKELETAL: full range of motion noted, tone is normal  NEUROLOGIC: awake, alert, oriented to name, place and time. Motor skills grossly intact. SKIN: Normal skin color, texture, turgor and no jaundice. appears intact   EXTREMITIES: no LE edema       DATA:    PT/INR:    Recent Labs     07/22/23  0440 07/21/23  0540 07/20/23  0600 07/19/23  0520 07/18/23  0444 07/09/23  0435 07/03/23  0429   PROT 4.7* 4.9* 4.7* 4.5* 4.8*   < > 7.0   INR  --   --   --   --   --   --  1.00    < > = values in this interval not displayed. PTT:    Recent Labs     07/03/23 0429   APTT 39.3*       CMP:    Recent Labs     07/24/23  0430 07/23/23  0455 07/22/23  0440   *   < > 134*   K 4.3   < > 4.3   CL 99   < > 100   CO2 26   < > 26   BUN 66*   < > 51*   PROT  --   --  4.7*    < > = values in this interval not displayed.      Mg:    Recent Labs     07/22/23  0440 07/21/23  0540 07/20/23  0600   MG 2.50* 2.50* 2.50*       Lab Results   Component Value Date    CALCIUM 8.0 (L) 07/24/2023    PHOS 4.3 07/22/2023       CBC:    Recent Labs     07/24/23  0948 07/24/23  0430 07/23/23  1740 07/23/23  0455 07/22/23  0440 07/21/23  0540 07/20/23  0600 07/14/23  0912 07/13/23  0841 07/10/23  0728 07/08/23  0310 07/04/23  1035 07/03/23  0429 07/02/23  1924   WBC  --  6.0  --  6.2 5.6 6.3 5.8   < > 7.4 6.3 8.0   < > 5.6 4.4

## 2023-07-25 ENCOUNTER — APPOINTMENT (OUTPATIENT)
Dept: CT IMAGING | Age: 88
End: 2023-07-25
Payer: MEDICARE

## 2023-07-25 LAB
ALBUMIN SERPL-MCNC: 2 G/DL (ref 3.4–5)
ALBUMIN/GLOB SERPL: 1.1 {RATIO} (ref 1.1–2.2)
ALP SERPL-CCNC: 66 U/L (ref 40–129)
ALT SERPL-CCNC: 10 U/L (ref 10–40)
ANION GAP SERPL CALCULATED.3IONS-SCNC: 5 MMOL/L (ref 3–16)
ANISOCYTOSIS BLD QL SMEAR: ABNORMAL
AST SERPL-CCNC: 17 U/L (ref 15–37)
BASOPHILS # BLD: 0 K/UL (ref 0–0.2)
BASOPHILS NFR BLD: 0 %
BILIRUB SERPL-MCNC: 0.3 MG/DL (ref 0–1)
BLOOD BANK DISPENSE STATUS: NORMAL
BLOOD BANK PRODUCT CODE: NORMAL
BPU ID: NORMAL
BUN SERPL-MCNC: 49 MG/DL (ref 7–20)
CALCIUM SERPL-MCNC: 6.7 MG/DL (ref 8.3–10.6)
CHLORIDE SERPL-SCNC: 109 MMOL/L (ref 99–110)
CO2 SERPL-SCNC: 22 MMOL/L (ref 21–32)
CREAT SERPL-MCNC: 0.9 MG/DL (ref 0.6–1.2)
DEPRECATED RDW RBC AUTO: 15.8 % (ref 12.4–15.4)
DESCRIPTION BLOOD BANK: NORMAL
EOSINOPHIL # BLD: 0 K/UL (ref 0–0.6)
EOSINOPHIL NFR BLD: 0 %
GFR SERPLBLD CREATININE-BSD FMLA CKD-EPI: >60 ML/MIN/{1.73_M2}
GLUCOSE BLD-MCNC: 100 MG/DL (ref 70–99)
GLUCOSE BLD-MCNC: 104 MG/DL (ref 70–99)
GLUCOSE BLD-MCNC: 120 MG/DL (ref 70–99)
GLUCOSE BLD-MCNC: 143 MG/DL (ref 70–99)
GLUCOSE SERPL-MCNC: 80 MG/DL (ref 70–99)
HCT VFR BLD AUTO: 19.1 % (ref 36–48)
HCT VFR BLD AUTO: 25.4 % (ref 36–48)
HGB BLD-MCNC: 6.3 G/DL (ref 12–16)
HGB BLD-MCNC: 8.3 G/DL (ref 12–16)
LYMPHOCYTES # BLD: 1.2 K/UL (ref 1–5.1)
LYMPHOCYTES NFR BLD: 21 %
MAGNESIUM SERPL-MCNC: 1.8 MG/DL (ref 1.8–2.4)
MCH RBC QN AUTO: 32 PG (ref 26–34)
MCHC RBC AUTO-ENTMCNC: 33 G/DL (ref 31–36)
MCV RBC AUTO: 96.8 FL (ref 80–100)
METAMYELOCYTES NFR BLD MANUAL: 2 %
MONOCYTES # BLD: 0.5 K/UL (ref 0–1.3)
MONOCYTES NFR BLD: 10 %
MYELOCYTES NFR BLD MANUAL: 2 %
NEUTROPHILS # BLD: 3.4 K/UL (ref 1.7–7.7)
NEUTROPHILS NFR BLD: 58 %
NEUTS BAND NFR BLD MANUAL: 4 % (ref 0–7)
OVALOCYTES BLD QL SMEAR: ABNORMAL
PERFORMED ON: ABNORMAL
PHOSPHATE SERPL-MCNC: 3 MG/DL (ref 2.5–4.9)
PLATELET # BLD AUTO: 172 K/UL (ref 135–450)
PLATELET BLD QL SMEAR: ADEQUATE
PMV BLD AUTO: 7.2 FL (ref 5–10.5)
POTASSIUM SERPL-SCNC: 3.7 MMOL/L (ref 3.5–5.1)
PROT SERPL-MCNC: 3.9 G/DL (ref 6.4–8.2)
RBC # BLD AUTO: 1.98 M/UL (ref 4–5.2)
SLIDE REVIEW: ABNORMAL
SODIUM SERPL-SCNC: 136 MMOL/L (ref 136–145)
VARIANT LYMPHS NFR BLD MANUAL: 3 % (ref 0–6)
WBC # BLD AUTO: 5.1 K/UL (ref 4–11)

## 2023-07-25 PROCEDURE — 6370000000 HC RX 637 (ALT 250 FOR IP): Performed by: NURSE PRACTITIONER

## 2023-07-25 PROCEDURE — 2580000003 HC RX 258: Performed by: INTERNAL MEDICINE

## 2023-07-25 PROCEDURE — 36430 TRANSFUSION BLD/BLD COMPNT: CPT

## 2023-07-25 PROCEDURE — 6370000000 HC RX 637 (ALT 250 FOR IP): Performed by: INTERNAL MEDICINE

## 2023-07-25 PROCEDURE — APPSS15 APP SPLIT SHARED TIME 0-15 MINUTES: Performed by: NURSE PRACTITIONER

## 2023-07-25 PROCEDURE — 6370000000 HC RX 637 (ALT 250 FOR IP): Performed by: SURGERY

## 2023-07-25 PROCEDURE — 83735 ASSAY OF MAGNESIUM: CPT

## 2023-07-25 PROCEDURE — 6370000000 HC RX 637 (ALT 250 FOR IP): Performed by: STUDENT IN AN ORGANIZED HEALTH CARE EDUCATION/TRAINING PROGRAM

## 2023-07-25 PROCEDURE — 74177 CT ABD & PELVIS W/CONTRAST: CPT

## 2023-07-25 PROCEDURE — 85018 HEMOGLOBIN: CPT

## 2023-07-25 PROCEDURE — 1200000000 HC SEMI PRIVATE

## 2023-07-25 PROCEDURE — 97535 SELF CARE MNGMENT TRAINING: CPT

## 2023-07-25 PROCEDURE — 2580000003 HC RX 258: Performed by: SURGERY

## 2023-07-25 PROCEDURE — C9113 INJ PANTOPRAZOLE SODIUM, VIA: HCPCS | Performed by: SURGERY

## 2023-07-25 PROCEDURE — 85014 HEMATOCRIT: CPT

## 2023-07-25 PROCEDURE — 84100 ASSAY OF PHOSPHORUS: CPT

## 2023-07-25 PROCEDURE — 6360000002 HC RX W HCPCS: Performed by: SURGERY

## 2023-07-25 PROCEDURE — 80053 COMPREHEN METABOLIC PANEL: CPT

## 2023-07-25 PROCEDURE — 6360000002 HC RX W HCPCS: Performed by: INTERNAL MEDICINE

## 2023-07-25 PROCEDURE — 6360000004 HC RX CONTRAST MEDICATION: Performed by: NURSE PRACTITIONER

## 2023-07-25 PROCEDURE — APPNB30 APP NON BILLABLE TIME 0-30 MINS: Performed by: NURSE PRACTITIONER

## 2023-07-25 PROCEDURE — 99024 POSTOP FOLLOW-UP VISIT: CPT | Performed by: SURGERY

## 2023-07-25 PROCEDURE — 97530 THERAPEUTIC ACTIVITIES: CPT

## 2023-07-25 PROCEDURE — 85025 COMPLETE CBC W/AUTO DIFF WBC: CPT

## 2023-07-25 RX ORDER — SODIUM CHLORIDE 9 MG/ML
INJECTION, SOLUTION INTRAVENOUS PRN
Status: DISCONTINUED | OUTPATIENT
Start: 2023-07-25 | End: 2023-07-28 | Stop reason: HOSPADM

## 2023-07-25 RX ADMIN — Medication 10 ML: at 08:44

## 2023-07-25 RX ADMIN — CALCIUM GLUCONATE 2000 MG: 98 INJECTION, SOLUTION INTRAVENOUS at 19:01

## 2023-07-25 RX ADMIN — IOPAMIDOL 75 ML: 755 INJECTION, SOLUTION INTRAVENOUS at 17:47

## 2023-07-25 RX ADMIN — OXYCODONE AND ACETAMINOPHEN 1 TABLET: 5; 325 TABLET ORAL at 12:46

## 2023-07-25 RX ADMIN — TAMSULOSIN HYDROCHLORIDE 0.4 MG: 0.4 CAPSULE ORAL at 08:32

## 2023-07-25 RX ADMIN — MELATONIN TAB 3 MG 3 MG: 3 TAB at 02:33

## 2023-07-25 RX ADMIN — OXYCODONE AND ACETAMINOPHEN 1 TABLET: 5; 325 TABLET ORAL at 08:32

## 2023-07-25 RX ADMIN — OXYCODONE AND ACETAMINOPHEN 1 TABLET: 5; 325 TABLET ORAL at 02:33

## 2023-07-25 RX ADMIN — PANTOPRAZOLE SODIUM 40 MG: 40 INJECTION, POWDER, FOR SOLUTION INTRAVENOUS at 08:32

## 2023-07-25 RX ADMIN — OXYCODONE AND ACETAMINOPHEN 1 TABLET: 5; 325 TABLET ORAL at 17:15

## 2023-07-25 RX ADMIN — OXYCODONE AND ACETAMINOPHEN 1 TABLET: 5; 325 TABLET ORAL at 23:13

## 2023-07-25 RX ADMIN — FUROSEMIDE 20 MG: 20 TABLET ORAL at 08:32

## 2023-07-25 RX ADMIN — FERROUS SULFATE TAB 325 MG (65 MG ELEMENTAL FE) 325 MG: 325 (65 FE) TAB at 08:32

## 2023-07-25 RX ADMIN — POLYETHYLENE GLYCOL-3350 AND ELECTROLYTES 4000 ML: 236; 6.74; 5.86; 2.97; 22.74 POWDER, FOR SOLUTION ORAL at 15:47

## 2023-07-25 ASSESSMENT — PAIN DESCRIPTION - LOCATION
LOCATION: ABDOMEN;BACK
LOCATION: BACK

## 2023-07-25 ASSESSMENT — PAIN - FUNCTIONAL ASSESSMENT
PAIN_FUNCTIONAL_ASSESSMENT: ACTIVITIES ARE NOT PREVENTED

## 2023-07-25 ASSESSMENT — PAIN SCALES - GENERAL
PAINLEVEL_OUTOF10: 7
PAINLEVEL_OUTOF10: 8
PAINLEVEL_OUTOF10: 8
PAINLEVEL_OUTOF10: 5
PAINLEVEL_OUTOF10: 5

## 2023-07-25 ASSESSMENT — PAIN DESCRIPTION - DESCRIPTORS
DESCRIPTORS: ACHING;SORE;STABBING;THROBBING
DESCRIPTORS: ACHING;SORE
DESCRIPTORS: ACHING;SORE;THROBBING

## 2023-07-25 ASSESSMENT — PAIN DESCRIPTION - ORIENTATION
ORIENTATION: LOWER;MID
ORIENTATION: RIGHT;LEFT
ORIENTATION: RIGHT;LEFT;LOWER

## 2023-07-25 NOTE — PROGRESS NOTES
Physical Therapy  Attempted to see Pt., Hgb 6.3, actively getting blood. RN approved therapy. Pt. Reporting pain meds due in 15 minutes and would like to hold off on therapy until after pain meds given. Will reattempt at later time if able. Attempted a second time and Pt. Sound asleep, still getting blood, decided not to wake Pt. Will reattempt later or tomorrow.     [place], [place], [id number]

## 2023-07-25 NOTE — CONSULTS
Gastroenterology Consult Note        Patient: Talat Low  : 10/16/1932  Acct#:      Date:  2023    Subjective:       History of Present Illness  Patient is a 80 y.o.  female admitted with Acute gastric volvulus [K31.89] who is seen in consult for Anemia. History of diabetes, mitral valve prolapse, AV block. She presented earlier this month with epigastric abdominal pain, nausea, vomiting. CT concerning for gastric volvulus. She is POD #18 for exploratory laparoscopy converted to open laparotomy, extensive intraabdominal lysis of adhesions and gastrostomy tube placement for complex recurrent hiatal hernia with intermittent gastric volvulus, and high-grade small bowel obstruction. Since surgery she was on TPN recently transitioned to low fiber diet. We are reconsulted due to a drop in her hemoglobin. Per nursing [employer] of stool with therapy today. No bloody output per G tube.        Past Medical History:   Diagnosis Date    Arthritis     OSTEOARTHRITIS BACK    CAD (coronary artery disease)     PT HAS AV BLOCK AND MVP    Cancer (HCC)     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    Cystocele     Diabetes mellitus ([address])     type  2 diet controlled    Hepatitis A     History of blood transfusion     hiatal hernia surgery    Hyperlipidemia     PVC (premature ventricular contraction)     Rectocele     Reflux     Scoliosis       Past Surgical History:   Procedure Laterality Date    APPENDECTOMY      BLADDER SUSPENSION      3 SURGERIES/ANTERIOR AND POSTERIOR REPAIR    BREAST SURGERY      RIGHT LUMPECTOMY AND SEVERAL BIOPSIES ON BOTH BREAST    BUNIONECTOMY  12    BUNIONECTOMY BILATERAL  FEET    CARDIAC CATHETERIZATION      AV block    CATARACT REMOVAL WITH IMPLANT Left 2018    CHOLECYSTECTOMY      COLONOSCOPY      normal    CYSTOSCOPY  2017    U-dil    DILATION AND CURETTAGE OF UTERUS      SUNCTION    FOOT SURGERY      bilat foot surgeries

## 2023-07-25 NOTE — CARE COORDINATION
WARD spoke with pt's dtr, Paige Campbell, who wanted an update on pt's discharge status. Reviewed notes and saw pt was accepted at [facility] in [place]. Ethan Larson is also reviewing but unsure if pt can handle 3 hours of therapy at this time. They are continuing to follow. If ARU is unable to accept, dtr is ok with pt discharging to [facility] ER in [place]. Dtr has some medical questions. WARD transferred dtr to pt's RN to answer these.     Electronically signed by THI Cochran, LSW on 7/25/2023 at 1:30 PM

## 2023-07-25 NOTE — PROGRESS NOTES
[address] [facility] Department   Phone: [phone]    Occupational Therapy    [] Initial Evaluation            [x] Daily Treatment Note         [] Discharge Summary      Patient: Juan Page   : 10/16/1932   MRN: [id number]   Date of Service:  2023    Admitting Diagnosis:  Acute gastric volvulus    Current Admission Summary: Juan Page is a 80 y.o. female who presented to ED for evaluation of nonradiating, epigastric abdominal pain and vomiting which began this morning. Patient describes pain as a constant fullness/swelling. She reports she has had multiple episodes of vomiting. She denies any known aggravating or alleviating factors. She denies hematemesis or coffee-ground emesis. She denies melena or hematochezia. She denies diarrhea, constipation, urinary symptoms. She has a history of appendectomy, cholecystectomy, and hysterectomy. She denies fever, dizziness, chest pain, shortness of breath. S/p ex lap , extensive ALLYSON, GT placement. Past Medical History:  has a past medical history of Arthritis, CAD (coronary artery disease), Cancer ([address]), Cystocele, Diabetes mellitus ([address]), Hepatitis A, History of blood transfusion, Hyperlipidemia, PVC (premature ventricular contraction), Rectocele, Reflux, and Scoliosis. Past Surgical History:  has a past surgical history that includes Appendectomy; joint replacement (Left); hernia repair (6 YRS AGO); Cholecystectomy; shoulder surgery (Right, 12); Bunionectomy (12); Breast surgery; Upper gastrointestinal endoscopy (12); Hysterectomy; bladder suspension; Dilation and curettage of uterus; other surgical history (Left, 14); Foot surgery; Cardiac catheterization; Colonoscopy (); Cystocopy (2017); other surgical history (Right, 2018); Cataract removal with implant (Left, 2018); pr xcapsl ctrc rmvl insj io lens prosth w/o ecp (Left, 2018);  Lumbar spine surgery Signed By: Juan Miguel Chandler, OTR/L [phone]

## 2023-07-25 NOTE — CONSULTS
Component Value Date/Time    WBC 5.1 07/25/2023 07:10 AM    RBC 1.98 07/25/2023 07:10 AM    HGB 6.3 07/25/2023 07:10 AM    HCT 19.1 07/25/2023 07:10 AM    MCV 96.8 07/25/2023 07:10 AM    MCH 32.0 07/25/2023 07:10 AM    MCHC 33.0 07/25/2023 07:10 AM    RDW 15.8 07/25/2023 07:10 AM     07/25/2023 07:10 AM    MPV 7.2 07/25/2023 07:10 AM     BMP:   Lab Results   Component Value Date/Time     07/25/2023 07:10 AM    K 3.7 07/25/2023 07:10 AM    K 4.7 07/02/2023 07:24 PM     07/25/2023 07:10 AM    CO2 22 07/25/2023 07:10 AM    BUN 49 07/25/2023 07:10 AM    CREATININE 0.9 07/25/2023 07:10 AM    GLUCOSE 80 07/25/2023 07:10 AM    CALCIUM 6.7 07/25/2023 07:10 AM     Urinalysis:   Lab Results   Component Value Date/Time    COLORU DARK YELLOW 07/20/2023 03:50 PM    GLUCOSEU Negative 07/20/2023 03:50 PM    BLOODU LARGE 07/20/2023 03:50 PM    NITRU Negative 07/20/2023 03:50 PM    LEUKOCYTESUR LARGE 07/20/2023 03:50 PM     Urine culture: No results for input(s): LABURIN in the last 72 hours. IMAGING     CT ABDOMEN PELVIS W IV CONTRAST Additional Contrast? None    Result Date: 7/2/2023  EXAMINATION: CT OF THE ABDOMEN AND PELVIS WITH CONTRAST 7/2/2023 8:19 pm TECHNIQUE: CT of the abdomen and pelvis was performed with the administration of intravenous contrast. Multiplanar reformatted images are provided for review. Automated exposure control, iterative reconstruction, and/or weight based adjustment of the mA/kV was utilized to reduce the radiation dose to as low as reasonably achievable.  COMPARISON: CT abdomen and pelvis 01/12/2022 HISTORY: ORDERING SYSTEM PROVIDED HISTORY: upper abdominal pain, vomiting Decision Support Exception - unselect if not a suspected or confirmed emergency medical condition->Emergency Medical Condition (MA) Reason for Exam: Abdominal Pain (Started this am; upper abd pain that is getting worse; (+) nausea and vomiting; given 4 mg of Zofran in squad w/out relief; lives in assisted stomach within the hiatal hernia. Mildly prominent gas-filled small bowel loops in the visualized upper abdomen. XR ABDOMEN FOR NG/OG/NE TUBE PLACEMENT    Result Date: 7/3/2023  EXAMINATION: ONE SUPINE XRAY VIEW(S) OF THE ABDOMEN 7/3/2023 9:03 am COMPARISON: 07/02/2023 HISTORY: ORDERING SYSTEM PROVIDED HISTORY: NG tube placement TECHNOLOGIST PROVIDED HISTORY: Reason for exam:->NG tube placement Portable? ->Yes Reason for Exam: NG tube placement FINDINGS: Tip of nasogastric tube projects at the mediastinum, approximately 4 cm proximal to the diaphragm. Elevation of right hemidiaphragm. Bibasilar heterogeneous opacity is seen. Blunting of the costophrenic angles. Air seen within loops of bowel in a nonspecific pattern. Lower pelvis was not included. Evidence of vertebral body augmentation. Nasogastric tube has tip approximately 4 cm proximal to the diaphragm. Recommend repositioning and repeat radiograph for evaluation. Bibasilar pleuroparenchymal disease. Findings were called by the radiology call center. FL UGI    Result Date: 7/3/2023  EXAMINATION: SINGLE CONTRAST UPPER GI SERIES 7/3/2023 HISTORY: ORDERING SYSTEM PROVIDED HISTORY: hiaTAL HERNIA TECHNOLOGIST PROVIDED HISTORY: Reason for exam:->hiaTAL HERNIA Reason for Exam: hiatal hernia COMPARISON: Abdominal CT 07/02/2023 TECHNIQUE: Multiple single contrast images of the esophagus, gastroesophageal junction and stomach were obtained following the oral administration of water soluble contrast FLUOROSCOPY DOSE AND TYPE: Reference air kerma of 31.20 mGy. FINDINGS: The study was limited by patient kyphotic positioning and weakened state, and inability to stand for the study. The patient was placed in a supine semi-upright position, and swallowed two full cups of water soluble contrast, and spot images of the upper GI tract were performed. There is an NG tube noted coursing into the stomach.   There was normal transit of contrast through the esophagus,

## 2023-07-25 NOTE — PROGRESS NOTES
Patient up to chair, min assist with walker. Shift assessment completed, morning medication given per MAR. VSS, alert and oriented. Call light within reach. The care plan and education has been reviewed and mutually agreed upon with the patient.

## 2023-07-26 ENCOUNTER — ANESTHESIA (OUTPATIENT)
Dept: ENDOSCOPY | Age: 88
End: 2023-07-26
Payer: MEDICARE

## 2023-07-26 ENCOUNTER — ANESTHESIA EVENT (OUTPATIENT)
Dept: ENDOSCOPY | Age: 88
End: 2023-07-26
Payer: MEDICARE

## 2023-07-26 LAB
ALBUMIN SERPL ELPH-MCNC: 2 G/DL (ref 3.1–4.9)
ALPHA1 GLOB SERPL ELPH-MCNC: 0.3 G/DL (ref 0.2–0.4)
ALPHA2 GLOB SERPL ELPH-MCNC: 0.6 G/DL (ref 0.4–1.1)
ANION GAP SERPL CALCULATED.3IONS-SCNC: 11 MMOL/L (ref 3–16)
B-GLOBULIN SERPL ELPH-MCNC: 0.8 G/DL (ref 0.9–1.6)
BUN SERPL-MCNC: 40 MG/DL (ref 7–20)
CALCIUM SERPL-MCNC: 8.6 MG/DL (ref 8.3–10.6)
CERULOPLASMIN SERPL-MCNC: 18 MG/DL (ref 16–45)
CHLORIDE SERPL-SCNC: 99 MMOL/L (ref 99–110)
CO2 SERPL-SCNC: 25 MMOL/L (ref 21–32)
CREAT SERPL-MCNC: 1 MG/DL (ref 0.6–1.2)
DEPRECATED RDW RBC AUTO: 15.9 % (ref 12.4–15.4)
GAMMA GLOB SERPL ELPH-MCNC: 0.7 G/DL (ref 0.6–1.8)
GFR SERPLBLD CREATININE-BSD FMLA CKD-EPI: 53 ML/MIN/{1.73_M2}
GLUCOSE BLD-MCNC: 114 MG/DL (ref 70–99)
GLUCOSE BLD-MCNC: 123 MG/DL (ref 70–99)
GLUCOSE BLD-MCNC: 123 MG/DL (ref 70–99)
GLUCOSE BLD-MCNC: 143 MG/DL (ref 70–99)
GLUCOSE SERPL-MCNC: 111 MG/DL (ref 70–99)
HCT VFR BLD AUTO: 32.4 % (ref 36–48)
HGB BLD-MCNC: 10.7 G/DL (ref 12–16)
MCH RBC QN AUTO: 31.6 PG (ref 26–34)
MCHC RBC AUTO-ENTMCNC: 33.1 G/DL (ref 31–36)
MCV RBC AUTO: 95.4 FL (ref 80–100)
PERFORMED ON: ABNORMAL
PLATELET # BLD AUTO: 255 K/UL (ref 135–450)
PMV BLD AUTO: 6.9 FL (ref 5–10.5)
POTASSIUM SERPL-SCNC: 4 MMOL/L (ref 3.5–5.1)
PROT SERPL-MCNC: 4.4 G/DL (ref 6.4–8.2)
RBC # BLD AUTO: 3.39 M/UL (ref 4–5.2)
SODIUM SERPL-SCNC: 135 MMOL/L (ref 136–145)
SPE/IFE INTERPRETATION: NORMAL
WBC # BLD AUTO: 5.9 K/UL (ref 4–11)
ZINC SERPL-MCNC: 53 UG/DL (ref 60–120)

## 2023-07-26 PROCEDURE — 88342 IMHCHEM/IMCYTCHM 1ST ANTB: CPT

## 2023-07-26 PROCEDURE — 6370000000 HC RX 637 (ALT 250 FOR IP): Performed by: INTERNAL MEDICINE

## 2023-07-26 PROCEDURE — 2709999900 HC NON-CHARGEABLE SUPPLY: Performed by: INTERNAL MEDICINE

## 2023-07-26 PROCEDURE — C9113 INJ PANTOPRAZOLE SODIUM, VIA: HCPCS | Performed by: INTERNAL MEDICINE

## 2023-07-26 PROCEDURE — 85027 COMPLETE CBC AUTOMATED: CPT

## 2023-07-26 PROCEDURE — 0DB58ZX EXCISION OF ESOPHAGUS, VIA NATURAL OR ARTIFICIAL OPENING ENDOSCOPIC, DIAGNOSTIC: ICD-10-PCS | Performed by: INTERNAL MEDICINE

## 2023-07-26 PROCEDURE — 3700000001 HC ADD 15 MINUTES (ANESTHESIA): Performed by: INTERNAL MEDICINE

## 2023-07-26 PROCEDURE — 6370000000 HC RX 637 (ALT 250 FOR IP): Performed by: NURSE PRACTITIONER

## 2023-07-26 PROCEDURE — 6360000002 HC RX W HCPCS: Performed by: SURGERY

## 2023-07-26 PROCEDURE — 1200000000 HC SEMI PRIVATE

## 2023-07-26 PROCEDURE — APPNB30 APP NON BILLABLE TIME 0-30 MINS: Performed by: NURSE PRACTITIONER

## 2023-07-26 PROCEDURE — 7100000000 HC PACU RECOVERY - FIRST 15 MIN: Performed by: INTERNAL MEDICINE

## 2023-07-26 PROCEDURE — 99024 POSTOP FOLLOW-UP VISIT: CPT | Performed by: SURGERY

## 2023-07-26 PROCEDURE — 3700000000 HC ANESTHESIA ATTENDED CARE: Performed by: INTERNAL MEDICINE

## 2023-07-26 PROCEDURE — 2580000003 HC RX 258: Performed by: INTERNAL MEDICINE

## 2023-07-26 PROCEDURE — 2580000003 HC RX 258: Performed by: NURSE ANESTHETIST, CERTIFIED REGISTERED

## 2023-07-26 PROCEDURE — 51702 INSERT TEMP BLADDER CATH: CPT

## 2023-07-26 PROCEDURE — 80048 BASIC METABOLIC PNL TOTAL CA: CPT

## 2023-07-26 PROCEDURE — 6360000002 HC RX W HCPCS: Performed by: INTERNAL MEDICINE

## 2023-07-26 PROCEDURE — APPSS15 APP SPLIT SHARED TIME 0-15 MINUTES: Performed by: NURSE PRACTITIONER

## 2023-07-26 PROCEDURE — 3609027000 HC COLONOSCOPY: Performed by: INTERNAL MEDICINE

## 2023-07-26 PROCEDURE — 3609012400 HC EGD TRANSORAL BIOPSY SINGLE/MULTIPLE: Performed by: INTERNAL MEDICINE

## 2023-07-26 PROCEDURE — 7100000001 HC PACU RECOVERY - ADDTL 15 MIN: Performed by: INTERNAL MEDICINE

## 2023-07-26 PROCEDURE — 6360000002 HC RX W HCPCS: Performed by: NURSE ANESTHETIST, CERTIFIED REGISTERED

## 2023-07-26 PROCEDURE — 88305 TISSUE EXAM BY PATHOLOGIST: CPT

## 2023-07-26 PROCEDURE — 0DJD8ZZ INSPECTION OF LOWER INTESTINAL TRACT, VIA NATURAL OR ARTIFICIAL OPENING ENDOSCOPIC: ICD-10-PCS | Performed by: INTERNAL MEDICINE

## 2023-07-26 PROCEDURE — 2500000003 HC RX 250 WO HCPCS: Performed by: NURSE ANESTHETIST, CERTIFIED REGISTERED

## 2023-07-26 PROCEDURE — 0DB78ZX EXCISION OF STOMACH, PYLORUS, VIA NATURAL OR ARTIFICIAL OPENING ENDOSCOPIC, DIAGNOSTIC: ICD-10-PCS | Performed by: INTERNAL MEDICINE

## 2023-07-26 PROCEDURE — 88341 IMHCHEM/IMCYTCHM EA ADD ANTB: CPT

## 2023-07-26 RX ORDER — PROPOFOL 10 MG/ML
INJECTION, EMULSION INTRAVENOUS CONTINUOUS PRN
Status: DISCONTINUED | OUTPATIENT
Start: 2023-07-26 | End: 2023-07-26 | Stop reason: SDUPTHER

## 2023-07-26 RX ORDER — LIDOCAINE HYDROCHLORIDE 20 MG/ML
INJECTION, SOLUTION INFILTRATION; PERINEURAL PRN
Status: DISCONTINUED | OUTPATIENT
Start: 2023-07-26 | End: 2023-07-26 | Stop reason: SDUPTHER

## 2023-07-26 RX ORDER — EPHEDRINE SULFATE/0.9% NACL/PF 50 MG/5 ML
SYRINGE (ML) INTRAVENOUS PRN
Status: DISCONTINUED | OUTPATIENT
Start: 2023-07-26 | End: 2023-07-26 | Stop reason: SDUPTHER

## 2023-07-26 RX ORDER — SODIUM CHLORIDE 9 MG/ML
INJECTION, SOLUTION INTRAVENOUS CONTINUOUS PRN
Status: DISCONTINUED | OUTPATIENT
Start: 2023-07-26 | End: 2023-07-26 | Stop reason: SDUPTHER

## 2023-07-26 RX ORDER — PROPOFOL 10 MG/ML
INJECTION, EMULSION INTRAVENOUS PRN
Status: DISCONTINUED | OUTPATIENT
Start: 2023-07-26 | End: 2023-07-26 | Stop reason: SDUPTHER

## 2023-07-26 RX ADMIN — FUROSEMIDE 20 MG: 20 TABLET ORAL at 12:51

## 2023-07-26 RX ADMIN — SODIUM CHLORIDE: 9 INJECTION, SOLUTION INTRAVENOUS at 10:47

## 2023-07-26 RX ADMIN — PROPOFOL 25 MG: 10 INJECTION, EMULSION INTRAVENOUS at 10:52

## 2023-07-26 RX ADMIN — PANTOPRAZOLE SODIUM 40 MG: 40 INJECTION, POWDER, FOR SOLUTION INTRAVENOUS at 21:40

## 2023-07-26 RX ADMIN — Medication 10 MG: at 11:27

## 2023-07-26 RX ADMIN — OXYCODONE AND ACETAMINOPHEN 1 TABLET: 5; 325 TABLET ORAL at 21:40

## 2023-07-26 RX ADMIN — Medication 10 MG: at 11:39

## 2023-07-26 RX ADMIN — PANTOPRAZOLE SODIUM 40 MG: 40 INJECTION, POWDER, FOR SOLUTION INTRAVENOUS at 12:52

## 2023-07-26 RX ADMIN — OXYCODONE AND ACETAMINOPHEN 1 TABLET: 5; 325 TABLET ORAL at 14:58

## 2023-07-26 RX ADMIN — Medication 10 ML: at 12:53

## 2023-07-26 RX ADMIN — Medication 10 MG: at 11:04

## 2023-07-26 RX ADMIN — MELATONIN TAB 3 MG 3 MG: 3 TAB at 21:40

## 2023-07-26 RX ADMIN — LIDOCAINE HYDROCHLORIDE 50 MG: 20 INJECTION, SOLUTION INFILTRATION; PERINEURAL at 10:52

## 2023-07-26 RX ADMIN — PROPOFOL 150 MCG/KG/MIN: 10 INJECTION, EMULSION INTRAVENOUS at 10:52

## 2023-07-26 RX ADMIN — TAMSULOSIN HYDROCHLORIDE 0.4 MG: 0.4 CAPSULE ORAL at 12:51

## 2023-07-26 RX ADMIN — Medication 10 MG: at 11:12

## 2023-07-26 RX ADMIN — Medication 10 ML: at 21:46

## 2023-07-26 RX ADMIN — FERROUS SULFATE TAB 325 MG (65 MG ELEMENTAL FE) 325 MG: 325 (65 FE) TAB at 12:51

## 2023-07-26 RX ADMIN — POLYETHYLENE GLYCOL-3350 AND ELECTROLYTES 4000 ML: 236; 6.74; 5.86; 2.97; 22.74 POWDER, FOR SOLUTION ORAL at 02:25

## 2023-07-26 RX ADMIN — ONDANSETRON 4 MG: 2 INJECTION INTRAMUSCULAR; INTRAVENOUS at 05:00

## 2023-07-26 RX ADMIN — Medication 10 MG: at 11:18

## 2023-07-26 ASSESSMENT — PAIN SCALES - GENERAL
PAINLEVEL_OUTOF10: 6
PAINLEVEL_OUTOF10: 5
PAINLEVEL_OUTOF10: 3
PAINLEVEL_OUTOF10: 7
PAINLEVEL_OUTOF10: 4
PAINLEVEL_OUTOF10: 0

## 2023-07-26 ASSESSMENT — PAIN DESCRIPTION - DESCRIPTORS
DESCRIPTORS: ACHING
DESCRIPTORS: ACHING;DISCOMFORT
DESCRIPTORS: ACHING

## 2023-07-26 ASSESSMENT — PAIN DESCRIPTION - LOCATION
LOCATION: ABDOMEN;BACK
LOCATION: BACK
LOCATION: ABDOMEN;BACK
LOCATION: BACK;ABDOMEN
LOCATION: BACK

## 2023-07-26 ASSESSMENT — PAIN DESCRIPTION - ORIENTATION
ORIENTATION: LOWER
ORIENTATION: LOWER;MID
ORIENTATION: LOWER;MID
ORIENTATION: LOWER

## 2023-07-26 ASSESSMENT — PAIN DESCRIPTION - PAIN TYPE: TYPE: ACUTE PAIN

## 2023-07-26 ASSESSMENT — PAIN SCALES - WONG BAKER: WONGBAKER_NUMERICALRESPONSE: 0

## 2023-07-26 NOTE — PROGRESS NOTES
Occupational [facility] for EGD/Colonsocoy this am. Will check back this pm as time permits. Continue with POC.     Eric Madrid, [facility]

## 2023-07-26 NOTE — PROGRESS NOTES
Pt transferred to room 4477 at this time. A&O with no signs of distress. Tele on, with visual on monitor, HR of 77. Report given to [facility] RN. V/u and denies questions or further needs at this time.

## 2023-07-26 NOTE — PROGRESS NOTES
Pt stable and able to be transferred from PACU to room 4477. A&O , VSS, with no complaints at this time. Manpreet EARLY called and notified that pt is being transferred out of PACU and to room.

## 2023-07-26 NOTE — ANESTHESIA POSTPROCEDURE EVALUATION
Department of Anesthesiology  Postprocedure Note    Patient: Storm Barraza  MRN: [id number]  YOB: 1932  Date of evaluation: 7/26/2023      Procedure Summary     Date: 07/26/23 Room / Location: [address] [place] [address]    Anesthesia Start: 7882 Anesthesia Stop: 0139    Procedures:       EGD BIOPSY (Abdomen)      COLONOSCOPY DIAGNOSTIC Diagnosis:       Anemia, unspecified type      (Anemia, unspecified type [D64.9])    Surgeons: Evie Cheung MD Responsible Provider: Joellen Kraus MD    Anesthesia Type: MAC ASA Status: 3          Anesthesia Type: No value filed. Jorge Phase I: Jorge Score: 8    Jorge Phase II:        Anesthesia Post Evaluation    Patient location during evaluation: PACU  Airway patency: patent  Nausea & Vomiting: no vomiting  Complications: no  Cardiovascular status: hemodynamically stable  Respiratory status: acceptable  Hydration status: euvolemic  There was medical reason for not using a multimodal analgesia pain management approach.

## 2023-07-26 NOTE — PROGRESS NOTES
Pt arrived from ENDO to PACU bay 1. Reported received from ENDO, RN/ CRNA staff. Pt is arousable to voice. Pt on RA, NSR 1st degree av block, and VSS. Will continue to monitor.

## 2023-07-26 NOTE — BRIEF OP NOTE
Brief Postoperative Note - Full Note in Chart Review/Procedures tab       Patient: Mikaela Argueta  YOB: 1932  MRN: [id number]    Date of Procedure: 7/26/2023    Pre-Op Diagnosis Codes:     * Anemia, unspecified type [D64.9]    Post-Op Diagnosis: Same       Procedure(s):  EGD BIOPSY  COLONOSCOPY DIAGNOSTIC    Surgeon(s):  González Zheng MD    Assistant:  * No surgical staff found *    Anesthesia: Monitor Anesthesia Care    Estimated Blood Loss (mL): Minimal    Complications: None    Specimens:   ID Type Source Tests Collected by Time Destination   A : Gastric ulcer Bx Tissue Stomach SURGICAL PATHOLOGY González Zheng MD 7/26/2023 1100    B : Esophagus Bx Tissue Esophagus SURGICAL PATHOLOGY González Zheng MD 7/26/2023 1100        Implants:  * No implants in log *      Drains:   Gastrostomy/Enterostomy/Jejunostomy Tube Gastrostomy 18 fr (Active)   Drainage Appearance Rondall Handler 07/23/23 0621   Site Description Dry; Intact 07/26/23 0830   G Port Status Other(comment) 07/19/23 1743   Surrounding Skin Clean, dry & intact 07/26/23 0830   Dressing Status Clean, dry & intact 07/23/23 0544   Dressing Type Dry dressing;Split gauze 07/26/23 0830   G-Tube Care Completed Yes 07/23/23 0544   Output (mL) 175 ml 07/23/23 0544       Rectal Tube (Active)       Urinary Catheter 07/24/23 Petersen (Active)   $ Urethral catheter insertion $ Not inserted for procedure 07/24/23 1741   Catheter Indications Urinary retention (acute or chronic), continuous bladder irrigation or bladder outlet obstruction 07/26/23 0800   Site Assessment No urethral drainage 07/26/23 0800   Urine Color Yellow 07/24/23 1741   Urine Appearance Clear 07/24/23 1741   Collection Container Standard 07/24/23 1741   Securement Method Securing device (Describe) 07/24/23 1741   Catheter Care  Perineal wipes 07/24/23 1741   Catheter Best Practices  Drainage tube clipped to bed;Catheter secured to thigh; Tamper seal intact; Bag below bladder;Bag not on

## 2023-07-26 NOTE — CARE COORDINATION
Discharge Planning Note:    Received a phone call from [facility]:    - Patient is ACTIVE with [address]: [phone]    Will continue to follow.     ELEAZAR MartínezN RN    [facility]  Phone: [phone]

## 2023-07-26 NOTE — PROGRESS NOTES
Despite 5000 ml of Gavilyte, patient's stool remains dark black in color with pieces of formed stool. Will continue to administer Gavilyte but as of this time, patient is not clear for her scheduled colonoscopy.

## 2023-07-27 LAB
ALBUMIN SERPL-MCNC: 2.3 G/DL (ref 3.4–5)
ALBUMIN/GLOB SERPL: 1.2 {RATIO} (ref 1.1–2.2)
ALP SERPL-CCNC: 72 U/L (ref 40–129)
ALT SERPL-CCNC: 17 U/L (ref 10–40)
ANION GAP SERPL CALCULATED.3IONS-SCNC: 6 MMOL/L (ref 3–16)
ANISOCYTOSIS BLD QL SMEAR: ABNORMAL
AST SERPL-CCNC: 25 U/L (ref 15–37)
BASOPHILS # BLD: 0 K/UL (ref 0–0.2)
BASOPHILS NFR BLD: 0 %
BILIRUB SERPL-MCNC: 0.4 MG/DL (ref 0–1)
BUN SERPL-MCNC: 28 MG/DL (ref 7–20)
CALCIUM SERPL-MCNC: 7.7 MG/DL (ref 8.3–10.6)
CHLORIDE SERPL-SCNC: 100 MMOL/L (ref 99–110)
CO2 SERPL-SCNC: 26 MMOL/L (ref 21–32)
CREAT SERPL-MCNC: 0.8 MG/DL (ref 0.6–1.2)
DEPRECATED RDW RBC AUTO: 16.1 % (ref 12.4–15.4)
EOSINOPHIL # BLD: 0.2 K/UL (ref 0–0.6)
EOSINOPHIL NFR BLD: 6 %
GFR SERPLBLD CREATININE-BSD FMLA CKD-EPI: >60 ML/MIN/{1.73_M2}
GLUCOSE BLD-MCNC: 104 MG/DL (ref 70–99)
GLUCOSE BLD-MCNC: 134 MG/DL (ref 70–99)
GLUCOSE BLD-MCNC: 144 MG/DL (ref 70–99)
GLUCOSE BLD-MCNC: 153 MG/DL (ref 70–99)
GLUCOSE SERPL-MCNC: 97 MG/DL (ref 70–99)
HCT VFR BLD AUTO: 23.3 % (ref 36–48)
HCT VFR BLD AUTO: 25 % (ref 36–48)
HGB BLD-MCNC: 7.7 G/DL (ref 12–16)
HGB BLD-MCNC: 8.4 G/DL (ref 12–16)
LYMPHOCYTES # BLD: 1.5 K/UL (ref 1–5.1)
LYMPHOCYTES NFR BLD: 39 %
MAGNESIUM SERPL-MCNC: 1.9 MG/DL (ref 1.8–2.4)
MCH RBC QN AUTO: 31.6 PG (ref 26–34)
MCHC RBC AUTO-ENTMCNC: 33.1 G/DL (ref 31–36)
MCV RBC AUTO: 95.6 FL (ref 80–100)
MONOCYTES # BLD: 0.3 K/UL (ref 0–1.3)
MONOCYTES NFR BLD: 8 %
NEUTROPHILS # BLD: 1.8 K/UL (ref 1.7–7.7)
NEUTROPHILS NFR BLD: 34 %
NEUTS BAND NFR BLD MANUAL: 12 % (ref 0–7)
PERFORMED ON: ABNORMAL
PHOSPHATE SERPL-MCNC: 2.8 MG/DL (ref 2.5–4.9)
PLATELET # BLD AUTO: 190 K/UL (ref 135–450)
PLATELET BLD QL SMEAR: ADEQUATE
PMV BLD AUTO: 6.7 FL (ref 5–10.5)
POTASSIUM SERPL-SCNC: 3.4 MMOL/L (ref 3.5–5.1)
PROMYELOCYTES NFR BLD MANUAL: 1 %
PROT SERPL-MCNC: 4.2 G/DL (ref 6.4–8.2)
RBC # BLD AUTO: 2.44 M/UL (ref 4–5.2)
SLIDE REVIEW: ABNORMAL
SODIUM SERPL-SCNC: 132 MMOL/L (ref 136–145)
WBC # BLD AUTO: 3.8 K/UL (ref 4–11)

## 2023-07-27 PROCEDURE — 2580000003 HC RX 258: Performed by: INTERNAL MEDICINE

## 2023-07-27 PROCEDURE — 85014 HEMATOCRIT: CPT

## 2023-07-27 PROCEDURE — 80053 COMPREHEN METABOLIC PANEL: CPT

## 2023-07-27 PROCEDURE — 6370000000 HC RX 637 (ALT 250 FOR IP): Performed by: INTERNAL MEDICINE

## 2023-07-27 PROCEDURE — 51702 INSERT TEMP BLADDER CATH: CPT

## 2023-07-27 PROCEDURE — 99024 POSTOP FOLLOW-UP VISIT: CPT | Performed by: SURGERY

## 2023-07-27 PROCEDURE — 83735 ASSAY OF MAGNESIUM: CPT

## 2023-07-27 PROCEDURE — C9113 INJ PANTOPRAZOLE SODIUM, VIA: HCPCS | Performed by: INTERNAL MEDICINE

## 2023-07-27 PROCEDURE — 85025 COMPLETE CBC W/AUTO DIFF WBC: CPT

## 2023-07-27 PROCEDURE — 1200000000 HC SEMI PRIVATE

## 2023-07-27 PROCEDURE — APPSS15 APP SPLIT SHARED TIME 0-15 MINUTES: Performed by: NURSE PRACTITIONER

## 2023-07-27 PROCEDURE — 6360000002 HC RX W HCPCS: Performed by: INTERNAL MEDICINE

## 2023-07-27 PROCEDURE — APPNB30 APP NON BILLABLE TIME 0-30 MINS: Performed by: NURSE PRACTITIONER

## 2023-07-27 PROCEDURE — 84100 ASSAY OF PHOSPHORUS: CPT

## 2023-07-27 PROCEDURE — 85018 HEMOGLOBIN: CPT

## 2023-07-27 RX ADMIN — OXYCODONE AND ACETAMINOPHEN 1 TABLET: 5; 325 TABLET ORAL at 13:30

## 2023-07-27 RX ADMIN — OXYCODONE AND ACETAMINOPHEN 1 TABLET: 5; 325 TABLET ORAL at 17:37

## 2023-07-27 RX ADMIN — PANTOPRAZOLE SODIUM 40 MG: 40 INJECTION, POWDER, FOR SOLUTION INTRAVENOUS at 21:26

## 2023-07-27 RX ADMIN — OXYCODONE AND ACETAMINOPHEN 1 TABLET: 5; 325 TABLET ORAL at 09:19

## 2023-07-27 RX ADMIN — TAMSULOSIN HYDROCHLORIDE 0.4 MG: 0.4 CAPSULE ORAL at 09:19

## 2023-07-27 RX ADMIN — FERROUS SULFATE TAB 325 MG (65 MG ELEMENTAL FE) 325 MG: 325 (65 FE) TAB at 09:22

## 2023-07-27 RX ADMIN — FUROSEMIDE 20 MG: 20 TABLET ORAL at 09:19

## 2023-07-27 RX ADMIN — OXYCODONE AND ACETAMINOPHEN 1 TABLET: 5; 325 TABLET ORAL at 21:26

## 2023-07-27 RX ADMIN — Medication 10 ML: at 21:26

## 2023-07-27 RX ADMIN — OXYCODONE AND ACETAMINOPHEN 1 TABLET: 5; 325 TABLET ORAL at 01:45

## 2023-07-27 RX ADMIN — MELATONIN TAB 3 MG 3 MG: 3 TAB at 21:26

## 2023-07-27 RX ADMIN — PANTOPRAZOLE SODIUM 40 MG: 40 INJECTION, POWDER, FOR SOLUTION INTRAVENOUS at 09:22

## 2023-07-27 RX ADMIN — Medication 10 ML: at 09:20

## 2023-07-27 ASSESSMENT — PAIN DESCRIPTION - DESCRIPTORS
DESCRIPTORS: ACHING
DESCRIPTORS: ACHING

## 2023-07-27 ASSESSMENT — PAIN SCALES - GENERAL
PAINLEVEL_OUTOF10: 0
PAINLEVEL_OUTOF10: 6
PAINLEVEL_OUTOF10: 7

## 2023-07-27 ASSESSMENT — PAIN DESCRIPTION - ORIENTATION
ORIENTATION: LOWER
ORIENTATION: LEFT;RIGHT;MID

## 2023-07-27 ASSESSMENT — PAIN DESCRIPTION - LOCATION
LOCATION: ABDOMEN;BACK
LOCATION: BACK

## 2023-07-27 ASSESSMENT — PAIN - FUNCTIONAL ASSESSMENT: PAIN_FUNCTIONAL_ASSESSMENT: ACTIVITIES ARE NOT PREVENTED

## 2023-07-27 NOTE — PLAN OF CARE
Problem: Chronic Conditions and Co-morbidities  Goal: Patient's chronic conditions and co-morbidity symptoms are monitored and maintained or improved  7/27/2023 1139 by Amanuel Henley RN  Outcome: Progressing  Flowsheets (Taken 7/27/2023 1139)  Care Plan - Patient's Chronic Conditions and Co-Morbidity Symptoms are Monitored and Maintained or Improved:   Monitor and assess patient's chronic conditions and comorbid symptoms for stability, deterioration, or improvement   Collaborate with multidisciplinary team to address chronic and comorbid conditions and prevent exacerbation or deterioration   Update acute care plan with appropriate goals if chronic or comorbid symptoms are exacerbated and prevent overall improvement and discharge       Problem: Skin/Tissue Integrity  Goal: Absence of new skin breakdown  Description: 1. Monitor for areas of redness and/or skin breakdown  2. Assess vascular access sites hourly  3. Every 4-6 hours minimum:  Change oxygen saturation probe site  4. Every 4-6 hours:  If on nasal continuous positive airway pressure, respiratory therapy assess nares and determine need for appliance change or resting period.   7/27/2023 1139 by Amanuel Henley RN  Outcome: Progressing       Problem: ABCDS Injury Assessment  Goal: Absence of physical injury  7/27/2023 1139 by Amanuel Henley RN  Outcome: Progressing  Flowsheets (Taken 7/27/2023 1139)  Absence of Physical Injury: Implement safety measures based on patient assessment       Problem: Safety - Adult  Goal: Free from fall injury  Outcome: Progressing  Flowsheets (Taken 7/27/2023 1139)  Free From Fall Injury:   Instruct family/caregiver on patient safety   Based on caregiver fall risk screen, instruct family/caregiver to ask for assistance with transferring infant if caregiver noted to have fall risk factors     Problem: Pain  Goal: Verbalizes/displays adequate comfort level or baseline comfort level  Outcome: Progressing  Flowsheets (Taken 7/27/2023 1139)  Verbalizes/displays adequate comfort level or baseline comfort level:   Encourage patient to monitor pain and request assistance   Assess pain using appropriate pain scale   Administer analgesics based on type and severity of pain and evaluate response   Implement non-pharmacological measures as appropriate and evaluate response   Consider cultural and social influences on pain and pain management   Notify Licensed Independent Practitioner if interventions unsuccessful or patient reports new pain     Problem: Nutrition Deficit:  Goal: Optimize nutritional status  Outcome: Progressing  Flowsheets (Taken 7/27/2023 1139)  Nutrient intake appropriate for improving, restoring, or maintaining nutritional needs:   Assess nutritional status and recommend course of action   Monitor oral intake, labs, and treatment plans   Recommend appropriate diets, oral nutritional supplements, and vitamin/mineral supplements   Provide specific nutrition education to patient or family as appropriate

## 2023-07-27 NOTE — PROGRESS NOTES
Patient refused to have posadas removed, notified MD. Elmore Dinning to leave in until morning. Patient medicated per mar for pain generalized(back, legs, feet) Patient tolerating diet, resting in bed at this time.

## 2023-07-27 NOTE — PLAN OF CARE
Problem: Discharge Planning  Goal: Discharge to home or other facility with appropriate resources  7/26/2023 2235 by Dori Salvador RN  Outcome: Progressing     Problem: Chronic Conditions and Co-morbidities  Goal: Patient's chronic conditions and co-morbidity symptoms are monitored and maintained or improved  7/26/2023 2235 by Dori Salvador RN  Outcome: Progressing     Problem: Skin/Tissue Integrity  Goal: Absence of new skin breakdown  Description: 1. Monitor for areas of redness and/or skin breakdown  2. Assess vascular access sites hourly  3. Every 4-6 hours minimum:  Change oxygen saturation probe site  4. Every 4-6 hours:  If on nasal continuous positive airway pressure, respiratory therapy assess nares and determine need for appliance change or resting period.   7/26/2023 2235 by Dori Salvador RN  Outcome: Progressing

## 2023-07-27 NOTE — PROGRESS NOTES
Occupational Therapy  7/27 8:45  Pt declined therapy this AM. Requesting pain medication before working with therapy. Therapist will follow up as schedule allows. 7/27 10:55  Second attempt: pt reports \"lunch will be here soon\" and she would like therapy to follow up later.  Therapist will follow up as schedule allows    Thank you,  Tosin Garcia, [place] OTR/L [id number]

## 2023-07-27 NOTE — PROGRESS NOTES
Catherine Marques MD    Hospitalist Progress Note      Name:  Bart Chau /Age/Sex: 10/16/1932  (80 y.o. female)   MRN & CSN:  [id number] & [id number] Admission Date/Time: 2023  6:49 PM   Location:  [facility]3712/[id number] PCP: Lindle Phalen, APRN - CNP       I saw and examined the patient on 2023 at 8:32 AM.    Hospital Day:   Barriers to discharge: Drop in H&H, hemoglobin monitoring  Assessment and Plan:     Bart Chau is a 80-year-old admitted with abdominal pain, distention, nausea and vomiting, found to have gastric volvulus, NG tube was placed for decompression, NG tube discontinued, on clear liquids, s/p laparoscopic hiatus hernia repair, nissen fundoplication, exploratory laparotomy, lysis of adhesions, gastrostomy tube placement on . Needed TPN now G-tube has been clamped patient tolerating diet. Drop in H&H overnight    # Persistent normocytic anemia:   Completed 2 doses of Venofer. Work-up without hemolysis or nutritional deficiency. No overt bleeding noted  SPEP pending. CT A/P negative for retroperitoneal hematoma. GI consulted: s/p EGD and colonoscopy  with findings of erosive esophagitis, gastric body ulcer at gastrostomy site, small rectal ulcer, sigmoid diverticulosis and diminutive colon polyp not removed. Follow-up biopsy report. Drop in H&H overnight. Serial H&H monitoring. # Continue Protonix twice daily. Transfused 2 unit PRBC on this admission. Monitor H&H and transfuse as needed. Goal Hgb > 7 g/dl. Hem consult appreciated: Recommendations noted        # Complex hiatal hernia with intermittent gastric volvulus:  s/p laparoscopic hiatus hernia repair, nissen fundoplication, exploratory laparotomy, lysis of adhesions, gastrostomy tube placement on 23     # PEG in place; clamped. TPN discontinued . Tolerating regular diet. Continue PPI, Pain control, antiemetics. #  Ileus: Resolved. Continue bowel regimen.      # Chronic

## 2023-07-28 VITALS
HEART RATE: 84 BPM | OXYGEN SATURATION: 97 % | SYSTOLIC BLOOD PRESSURE: 103 MMHG | WEIGHT: 147.71 LBS | BODY MASS INDEX: 29 KG/M2 | TEMPERATURE: 98.1 F | RESPIRATION RATE: 16 BRPM | HEIGHT: 60 IN | DIASTOLIC BLOOD PRESSURE: 56 MMHG

## 2023-07-28 LAB
GLUCOSE BLD-MCNC: 108 MG/DL (ref 70–99)
GLUCOSE BLD-MCNC: 122 MG/DL (ref 70–99)
GLUCOSE BLD-MCNC: 126 MG/DL (ref 70–99)
HCT VFR BLD AUTO: 23.9 % (ref 36–48)
HCT VFR BLD AUTO: 27.8 % (ref 36–48)
HGB BLD-MCNC: 8 G/DL (ref 12–16)
HGB BLD-MCNC: 9.1 G/DL (ref 12–16)
PERFORMED ON: ABNORMAL

## 2023-07-28 PROCEDURE — APPNB30 APP NON BILLABLE TIME 0-30 MINS: Performed by: NURSE PRACTITIONER

## 2023-07-28 PROCEDURE — 6370000000 HC RX 637 (ALT 250 FOR IP): Performed by: INTERNAL MEDICINE

## 2023-07-28 PROCEDURE — 51798 US URINE CAPACITY MEASURE: CPT

## 2023-07-28 PROCEDURE — 85018 HEMOGLOBIN: CPT

## 2023-07-28 PROCEDURE — 97535 SELF CARE MNGMENT TRAINING: CPT

## 2023-07-28 PROCEDURE — C9113 INJ PANTOPRAZOLE SODIUM, VIA: HCPCS | Performed by: INTERNAL MEDICINE

## 2023-07-28 PROCEDURE — APPSS15 APP SPLIT SHARED TIME 0-15 MINUTES: Performed by: NURSE PRACTITIONER

## 2023-07-28 PROCEDURE — 99024 POSTOP FOLLOW-UP VISIT: CPT | Performed by: SURGERY

## 2023-07-28 PROCEDURE — 6360000002 HC RX W HCPCS: Performed by: INTERNAL MEDICINE

## 2023-07-28 PROCEDURE — 85014 HEMATOCRIT: CPT

## 2023-07-28 RX ORDER — OXYCODONE AND ACETAMINOPHEN 10; 325 MG/1; MG/1
1 TABLET ORAL EVERY 8 HOURS PRN
Qty: 9 TABLET | Refills: 0 | Status: SHIPPED | OUTPATIENT
Start: 2023-07-28 | End: 2023-07-31

## 2023-07-28 RX ORDER — LIDOCAINE 4 G/G
1 PATCH TOPICAL DAILY
Qty: 20 PATCH | Refills: 0 | Status: SHIPPED | OUTPATIENT
Start: 2023-07-29

## 2023-07-28 RX ORDER — FERROUS SULFATE 325(65) MG
325 TABLET ORAL
Qty: 30 TABLET | Refills: 3 | Status: SHIPPED | OUTPATIENT
Start: 2023-07-29

## 2023-07-28 RX ORDER — TAMSULOSIN HYDROCHLORIDE 0.4 MG/1
0.4 CAPSULE ORAL DAILY
Qty: 30 CAPSULE | Refills: 3 | Status: SHIPPED | OUTPATIENT
Start: 2023-07-29

## 2023-07-28 RX ADMIN — OXYCODONE AND ACETAMINOPHEN 1 TABLET: 5; 325 TABLET ORAL at 17:43

## 2023-07-28 RX ADMIN — TAMSULOSIN HYDROCHLORIDE 0.4 MG: 0.4 CAPSULE ORAL at 09:58

## 2023-07-28 RX ADMIN — FERROUS SULFATE TAB 325 MG (65 MG ELEMENTAL FE) 325 MG: 325 (65 FE) TAB at 10:19

## 2023-07-28 RX ADMIN — OXYCODONE AND ACETAMINOPHEN 1 TABLET: 5; 325 TABLET ORAL at 05:14

## 2023-07-28 RX ADMIN — OXYCODONE AND ACETAMINOPHEN 1 TABLET: 5; 325 TABLET ORAL at 14:01

## 2023-07-28 RX ADMIN — OXYCODONE AND ACETAMINOPHEN 1 TABLET: 5; 325 TABLET ORAL at 09:58

## 2023-07-28 RX ADMIN — OXYCODONE AND ACETAMINOPHEN 1 TABLET: 5; 325 TABLET ORAL at 01:31

## 2023-07-28 RX ADMIN — FUROSEMIDE 20 MG: 20 TABLET ORAL at 09:58

## 2023-07-28 RX ADMIN — PANTOPRAZOLE SODIUM 40 MG: 40 INJECTION, POWDER, FOR SOLUTION INTRAVENOUS at 09:58

## 2023-07-28 ASSESSMENT — PAIN DESCRIPTION - LOCATION
LOCATION: BACK
LOCATION: BACK

## 2023-07-28 ASSESSMENT — PAIN DESCRIPTION - DESCRIPTORS
DESCRIPTORS: ACHING
DESCRIPTORS: ACHING

## 2023-07-28 ASSESSMENT — PAIN SCALES - GENERAL
PAINLEVEL_OUTOF10: 8
PAINLEVEL_OUTOF10: 7
PAINLEVEL_OUTOF10: 8
PAINLEVEL_OUTOF10: 6

## 2023-07-28 NOTE — PROGRESS NOTES
Posadas catheter removed. Patient voiced concerns of incontinence. Patient educated on importance of removal and checking her ability to void. Patient agreeable at this time. 10 CC removed from balloon and posadas removed without complication. Patient placed in brief at this time.

## 2023-07-28 NOTE — PROGRESS NOTES
[address] [facility] Department   Phone: [phone]    Occupational Therapy    [] Initial Evaluation            [x] Daily Treatment Note         [] Discharge Summary      Patient: Monster Casper   : 10/16/1932   MRN: [id number]   Date of Service:  2023    Admitting Diagnosis:  Acute gastric volvulus    Current Admission Summary: Monster Casper is a 80 y.o. female who presented to ED for evaluation of nonradiating, epigastric abdominal pain and vomiting which began this morning. Patient describes pain as a constant fullness/swelling. She reports she has had multiple episodes of vomiting. She denies any known aggravating or alleviating factors. She denies hematemesis or coffee-ground emesis. She denies melena or hematochezia. She denies diarrhea, constipation, urinary symptoms. She has a history of appendectomy, cholecystectomy, and hysterectomy. She denies fever, dizziness, chest pain, shortness of breath. S/p ex lap , extensive ALLYSON, GT placement. Past Medical History:  has a past medical history of Arthritis, CAD (coronary artery disease), Cancer ([address]), Cystocele, Diabetes mellitus ([address]), Hepatitis A, History of blood transfusion, Hyperlipidemia, PVC (premature ventricular contraction), Rectocele, Reflux, and Scoliosis. Past Surgical History:  has a past surgical history that includes Appendectomy; joint replacement (Left); hernia repair (6 YRS AGO); Cholecystectomy; shoulder surgery (Right, 12); Bunionectomy (12); Breast surgery; Upper gastrointestinal endoscopy (12); Hysterectomy; bladder suspension; Dilation and curettage of uterus; other surgical history (Left, 14); Foot surgery; Cardiac catheterization; Colonoscopy (); Cystocopy (2017); other surgical history (Right, 2018); Cataract removal with implant (Left, 2018); pr xcapsl ctrc rmvl insj io lens prosth w/o ecp (Left, 2018);  Lumbar spine surgery benefit from continued reinforcement    Assessment  Activity Tolerance: Fair, decreased unsupported sitting tolerance/mobility tolerance this date  Impairments Requiring Therapeutic Intervention: decreased functional mobility, decreased ADL status, decreased strength, decreased endurance, decreased balance, decreased IADL, increased pain, decreased posture  Prognosis: good  Clinical Assessment: Pt presents with the above deficits impacting occupational performance secondary to recent ex lap on 7/7. Pt currently CGA for transfers/functional mobility, MaxA for LB dressing. Pt limited by decreased unsupported sitting and activity tolerance this date. Pt would continue to benefit from skilled OT services in order to maximize safety and independence to return to OF.    Safety Interventions: patient left in chair, chair alarm in place, call light within reach, telesitter in use, and nurse notified    Plan  Frequency: 3-5 x/per week  Current Treatment Recommendations: strengthening, ROM, balance training, functional mobility training, transfer training, endurance training, patient/caregiver education, ADL/self-care training, IADL training, home management training, pain management, safety education, equipment evaluation/education, and positioning    Goals  Patient Goals: to go home   Short Term Goals:  Time Frame: to be met by discharge  Patient will complete upper body ADL at minimal assistance ,goal met 7/11, upgrade mod I  Patient will complete lower body ADL at minimal assistance   Patient will complete toileting at minimal assistance   Patient will complete grooming at stand by assistance-- setup 7/25  Patient will complete functional transfers at moderate assistance, goal met 7/10, upgrade SBA  Patient will increase functional standing balance to moderate assist for improved ADL completion, goal met 7/10, upgrade SBA  Patient will complete bed mobility at moderate assistance, goal met 7/10 upgrade sup    CONTINUE

## 2023-07-28 NOTE — DISCHARGE INSTR - COC
Continuity of Care Form    Patient Name: Kina Marie   :  10/16/1932  MRN:  [id number]    Admit date:  2023  Discharge date:  2023    Code Status Order: DNR-CCA   Advance Directives:   [address] Documentation       Date/Time Healthcare Directive Type of Healthcare Directive Copy in [address] Agent's Name Healthcare Agent's Phone Number    23 0932 Yes, patient has an advance directive for healthcare treatment Health care treatment directive No, copy requested from clinic -- -- --    23 1006 No, patient does not have an advance directive for healthcare treatment -- -- -- -- --    23 1254 No, patient does not have an advance directive for healthcare treatment -- -- -- -- --            Admitting Physician:  Ernesto Marr MD  PCP: DENIZ Garcia CNP    Discharging Nurse: [facility] Unit/Room#: 8US-9101/3519-94  Discharging Unit Phone Number: [phone]    Emergency Contact:   Extended Emergency Contact Information  Primary Emergency Contact: JoeMercedez  Mobile Phone: [phone]  Relation: Child   needed?  No  Secondary Emergency Contact: Vangie Mueller  Address: [address] [place] [address] of [address] [facility] Phone: [phone]  Relation: Child    Past Surgical History:  Past Surgical History:   Procedure Laterality Date    APPENDECTOMY      BLADDER SUSPENSION      3 SURGERIES/ANTERIOR AND POSTERIOR REPAIR    BREAST SURGERY      RIGHT LUMPECTOMY AND SEVERAL BIOPSIES ON BOTH BREAST    BUNIONECTOMY  12    BUNIONECTOMY BILATERAL  FEET    CARDIAC CATHETERIZATION      AV block    CATARACT REMOVAL WITH IMPLANT Left 2018    CHOLECYSTECTOMY      COLONOSCOPY      normal    COLONOSCOPY N/A 2023    COLONOSCOPY DIAGNOSTIC performed by Tod Dickson MD at [address] [place] [address]  2017    U-dil    DILATION AND CURETTAGE OF UTERUS

## 2023-07-28 NOTE — CARE COORDINATION
Discharge Plan:     Patient discharged to: [facility]  [address] [facility] [address]  [facility], [address]    SW/DC Planner faxed, [address] [facility] and AVS to: [phone]    Narcotic Prescriptions faxed were: yes    RN: will call report to:   [phone]    Medical Transport with: [facility] [phone]     time: [phone]    Family advised of discharge?: yes, daughter    Jayda Snyder?:  yes    All discharge needs met per case management.     ELEAZAR AraizaN RN    [facility]  Phone: [phone]

## 2023-07-28 NOTE — PROGRESS NOTES
Nutrition Note    RECOMMENDATIONS  PO Diet: CCC  ONS: Ensure or magic cup TID     NUTRITION ASSESSMENT   G-tube remains clamped; pt is tolerating CCC diet & states appetite slowly improving. No po intake recorded x past 5 days. Pt states is eating at least 50% of meals. Complains of being tired of Ensure, but is willing to try again. Also willing to try magic cups, which were cancelled when pt was NPO. Will con't to monitor for improved appetite & acceptance of supplements. Nutrition Related Findings: Gluc 108; trace edema BLE; +BM 7/24  Wounds: Surgical Incision, Multiple  Nutrition Education:  Education not indicated   Nutrition Goals: PO intake 50% or greater     MALNUTRITION ASSESSMENT   Acute Illness  Malnutrition Status: At risk for malnutrition (Comment)    NUTRITION DIAGNOSIS   Inadequate oral intake related to altered GI function, inadequate protein-energy intake as evidenced by intake 0-25%, intake 26-50%      CURRENT NUTRITION THERAPIES  ADULT DIET; Regular; 5 carb choices (75 gm/meal)     PO Intake: 26-50%, 51-75% (per pt)   PO Supplement Intake:None Ordered    ANTHROPOMETRICS  Current Height: 5' (152.4 cm)  Current Weight - Scale: 147 lb 11.3 oz (67 kg)    Admission weight: 142 lb (64.4 kg)  Ideal Body Weight (IBW): 100 lbs  (45 kg)        BMI: 28.7      COMPARATIVE STANDARDS  Total Energy Requirements (kcals/day): 1850 - 2220     Protein (g):  89 - 148       Fluid (mL/day):  1850 - 2220    The patient will be monitored per nutrition standards of care. Consult dietitian if additional nutrition interventions are needed prior to RD reassessment.      Ayana Jean RD, LD    Contact: [phone]

## 2023-07-28 NOTE — PLAN OF CARE
Problem: Discharge Planning  Goal: Discharge to home or other facility with appropriate resources  Outcome: Progressing  Flowsheets (Taken 7/27/2023 2251)  Discharge to home or other facility with appropriate resources:   Identify barriers to discharge with patient and caregiver   Arrange for needed discharge resources and transportation as appropriate     Problem: Chronic Conditions and Co-morbidities  Goal: Patient's chronic conditions and co-morbidity symptoms are monitored and maintained or improved  7/27/2023 2251 by Patricia Ferrara RN  Outcome: Progressing  Flowsheets (Taken 7/27/2023 1139 by Andria Denson RN)  Care Plan - Patient's Chronic Conditions and Co-Morbidity Symptoms are Monitored and Maintained or Improved:   Monitor and assess patient's chronic conditions and comorbid symptoms for stability, deterioration, or improvement   Collaborate with multidisciplinary team to address chronic and comorbid conditions and prevent exacerbation or deterioration   Update acute care plan with appropriate goals if chronic or comorbid symptoms are exacerbated and prevent overall improvement and discharge     Problem: Skin/Tissue Integrity  Goal: Absence of new skin breakdown  Description: 1. Monitor for areas of redness and/or skin breakdown  2. Assess vascular access sites hourly  3. Every 4-6 hours minimum:  Change oxygen saturation probe site  4. Every 4-6 hours:  If on nasal continuous positive airway pressure, respiratory therapy assess nares and determine need for appliance change or resting period.   7/27/2023 2251 by Patricia Ferrara RN  Outcome: Progressing     Problem: ABCDS Injury Assessment  Goal: Absence of physical injury  7/27/2023 2251 by Patricia Ferrara RN  Outcome: Progressing  Flowsheets (Taken 7/27/2023 2251)  Absence of Physical Injury: Implement safety measures based on patient assessment     Problem: Safety - Adult  Goal: Free from fall injury  7/27/2023 2251 by Lachelle Renner

## 2023-07-28 NOTE — DISCHARGE INSTRUCTIONS
[facility]    Discharge Instructions      Activity  - activity as tolerated, no driving while on analgesics, and no heavy lifting for 4 weeks; it is OK to be up walking around--walking up and down stairs is also OK. Do what is comfortable: stop and rest if you feel tired. Diet  - regular diet  - Drink plenty of fluids     Pain  - You may use narcotic pain medication as prescribed for breakthrough pain  - You can use OTC Tylenol or Ibuprofen for 1-2 weeks (may need to adjust the dose as directed on the bottle if enteric coated ibuprofen chosen)  - Avoid taking narcotic pain medication on an empty stomach which may result in nausea, if pain medication is causing nausea try decreasing the dose  - Narcotic pain medication is not necessary, please contact the office if pain is not controlled  - Narcotic pain medication will not be refilled on weekends and after hours  - Please contact the office Monday-Friday 8a-4p if a refill is requested    Nausea  - Avoid taking narcotic pain medication on an empty stomach which may result in nausea  - If pain medication is causing nausea you may try decreasing the dose  - Nausea can be common for 24 hours after surgery--if nausea uncontrolled or worsening, contact the office or go to the ED    Constipation  - Pain medication can be constipating  - Often, it helps to take a stool softener with the goal of at least one soft bowel movement daily with minimal straining  - You may also need to increase water and fiber intake--may supplement with Benefiber and increasing your activity will also be helpful  - If a stool softener is needed, the following OTC medications are recommend: Colace 100 mg by mouth twice daily, Miralax 17 gm by mouth 1-3 times daily with glass of water, dulcolax suppositories, and Fleets enemas    Wound Care  - keep wound clean and dry  - If incisional bleeding is noted, hold firm pressure for 15-20 minutes.  If remains

## 2023-07-28 NOTE — DISCHARGE SUMMARY
Discharge Summary  Laisha Vera MD     Name:  Deni Matias /Age/Sex: 10/16/1932  (80 y.o. female)   MRN & CSN:  [id number] & [id number] Admission Date/Time: 2023  6:49 PM   Attending:  Laisha Vera MD Discharging Physician: Laisha Vera MD     Hospital Course:   Deni Matias is a 72-year-old admitted with abdominal pain, distention, nausea and vomiting, found to have gastric volvulus, NG tube was placed for decompression, NG tube discontinued, on clear liquids, s/p laparoscopic hiatus hernia repair, nissen fundoplication, exploratory laparotomy, lysis of adhesions, gastrostomy tube placement on . Needed TPN now G-tube has been clamped patient tolerating diet. H&H stable. Will be discharged to SNF, close monitoring of p.o. intake/tolerance, if continues to do well will need to follow-up with general surgery. Can be decided if G-tube can be removed     # Persistent normocytic anemia:   Completed 2 doses of Venofer. Work-up without hemolysis or nutritional deficiency. No overt bleeding noted  SPEP pending. CT A/P negative for retroperitoneal hematoma. GI consulted: s/p EGD and colonoscopy  with findings of erosive esophagitis, gastric body ulcer at gastrostomy site, small rectal ulcer, sigmoid diverticulosis and diminutive colon polyp not removed. Follow-up biopsy report. During outpatient follow-up with GI        # Continue Protonix twice daily. Transfused 2 unit PRBC on this admission. Monitor H&H and transfuse as needed. Goal Hgb > 7 g/dl. Hem consult appreciated: Recommendations noted        # Complex hiatal hernia with intermittent gastric volvulus:  s/p laparoscopic hiatus hernia repair, nissen fundoplication, exploratory laparotomy, lysis of adhesions, gastrostomy tube placement on 23     # PEG in place; clamped. TPN discontinued . Tolerating regular diet. Continue PPI, Pain control, antiemetics.   Outpatient follow-up with general surgery in 3  weeks to

## 2023-07-29 NOTE — PROGRESS NOTES
Pt transported to [facility] via stretcher. Pain medication given early so patient would not have to have a large gap in time between doses during transport. PICC removed. Belongings sent with pt and with her daughter.

## 2023-12-15 ENCOUNTER — APPOINTMENT (OUTPATIENT)
Dept: CT IMAGING | Age: 88
DRG: 391 | End: 2023-12-15
Payer: MEDICARE

## 2023-12-15 ENCOUNTER — HOSPITAL ENCOUNTER (INPATIENT)
Age: 88
LOS: 10 days | Discharge: HOSPICE | DRG: 391 | End: 2023-12-26
Attending: STUDENT IN AN ORGANIZED HEALTH CARE EDUCATION/TRAINING PROGRAM | Admitting: INTERNAL MEDICINE
Payer: MEDICARE

## 2023-12-15 DIAGNOSIS — K20.90 ESOPHAGITIS: ICD-10-CM

## 2023-12-15 DIAGNOSIS — R11.2 NAUSEA AND VOMITING, UNSPECIFIED VOMITING TYPE: ICD-10-CM

## 2023-12-15 DIAGNOSIS — R94.31 QT PROLONGATION: Primary | ICD-10-CM

## 2023-12-15 DIAGNOSIS — A09 DIARRHEA OF INFECTIOUS ORIGIN: ICD-10-CM

## 2023-12-15 DIAGNOSIS — K31.89 GASTRIC DISTENTION: ICD-10-CM

## 2023-12-15 LAB
ALBUMIN SERPL-MCNC: 4.4 G/DL (ref 3.4–5)
ALBUMIN/GLOB SERPL: 1.4 {RATIO} (ref 1.1–2.2)
ALP SERPL-CCNC: 171 U/L (ref 40–129)
ALT SERPL-CCNC: 59 U/L (ref 10–40)
ANION GAP SERPL CALCULATED.3IONS-SCNC: 11 MMOL/L (ref 3–16)
AST SERPL-CCNC: 67 U/L (ref 15–37)
BILIRUB SERPL-MCNC: 0.6 MG/DL (ref 0–1)
BUN SERPL-MCNC: 26 MG/DL (ref 7–20)
CALCIUM SERPL-MCNC: 9.7 MG/DL (ref 8.3–10.6)
CHLORIDE SERPL-SCNC: 96 MMOL/L (ref 99–110)
CO2 SERPL-SCNC: 31 MMOL/L (ref 21–32)
CREAT SERPL-MCNC: 1.1 MG/DL (ref 0.6–1.2)
GFR SERPLBLD CREATININE-BSD FMLA CKD-EPI: 47 ML/MIN/{1.73_M2}
GLUCOSE SERPL-MCNC: 136 MG/DL (ref 70–99)
LIPASE SERPL-CCNC: 18 U/L (ref 13–60)
POTASSIUM SERPL-SCNC: 3.5 MMOL/L (ref 3.5–5.1)
PROT SERPL-MCNC: 7.6 G/DL (ref 6.4–8.2)
SODIUM SERPL-SCNC: 138 MMOL/L (ref 136–145)
TROPONIN, HIGH SENSITIVITY: 45 NG/L (ref 0–14)

## 2023-12-15 PROCEDURE — 87186 SC STD MICRODIL/AGAR DIL: CPT

## 2023-12-15 PROCEDURE — 87088 URINE BACTERIA CULTURE: CPT

## 2023-12-15 PROCEDURE — 85025 COMPLETE CBC W/AUTO DIFF WBC: CPT

## 2023-12-15 PROCEDURE — 74177 CT ABD & PELVIS W/CONTRAST: CPT

## 2023-12-15 PROCEDURE — 83690 ASSAY OF LIPASE: CPT

## 2023-12-15 PROCEDURE — 96375 TX/PRO/DX INJ NEW DRUG ADDON: CPT

## 2023-12-15 PROCEDURE — 2580000003 HC RX 258: Performed by: PHYSICIAN ASSISTANT

## 2023-12-15 PROCEDURE — 81001 URINALYSIS AUTO W/SCOPE: CPT

## 2023-12-15 PROCEDURE — 99285 EMERGENCY DEPT VISIT HI MDM: CPT

## 2023-12-15 PROCEDURE — 93005 ELECTROCARDIOGRAM TRACING: CPT | Performed by: PHYSICIAN ASSISTANT

## 2023-12-15 PROCEDURE — 6360000004 HC RX CONTRAST MEDICATION: Performed by: STUDENT IN AN ORGANIZED HEALTH CARE EDUCATION/TRAINING PROGRAM

## 2023-12-15 PROCEDURE — 87086 URINE CULTURE/COLONY COUNT: CPT

## 2023-12-15 PROCEDURE — 80053 COMPREHEN METABOLIC PANEL: CPT

## 2023-12-15 PROCEDURE — 84484 ASSAY OF TROPONIN QUANT: CPT

## 2023-12-15 PROCEDURE — 6360000002 HC RX W HCPCS: Performed by: PHYSICIAN ASSISTANT

## 2023-12-15 RX ORDER — MORPHINE SULFATE 4 MG/ML
4 INJECTION, SOLUTION INTRAMUSCULAR; INTRAVENOUS EVERY 30 MIN PRN
Status: DISCONTINUED | OUTPATIENT
Start: 2023-12-15 | End: 2023-12-16

## 2023-12-15 RX ORDER — MAGNESIUM SULFATE 1 G/100ML
1000 INJECTION INTRAVENOUS ONCE
Status: COMPLETED | OUTPATIENT
Start: 2023-12-16 | End: 2023-12-16

## 2023-12-15 RX ORDER — METOCLOPRAMIDE HYDROCHLORIDE 5 MG/ML
5 INJECTION INTRAMUSCULAR; INTRAVENOUS ONCE
Status: COMPLETED | OUTPATIENT
Start: 2023-12-15 | End: 2023-12-16

## 2023-12-15 RX ORDER — 0.9 % SODIUM CHLORIDE 0.9 %
1000 INTRAVENOUS SOLUTION INTRAVENOUS ONCE
Status: COMPLETED | OUTPATIENT
Start: 2023-12-15 | End: 2023-12-16

## 2023-12-15 RX ORDER — ONDANSETRON 2 MG/ML
4 INJECTION INTRAMUSCULAR; INTRAVENOUS EVERY 6 HOURS PRN
Status: DISCONTINUED | OUTPATIENT
Start: 2023-12-15 | End: 2023-12-16

## 2023-12-15 RX ORDER — DIPHENHYDRAMINE HYDROCHLORIDE 50 MG/ML
12.5 INJECTION INTRAMUSCULAR; INTRAVENOUS ONCE
Status: COMPLETED | OUTPATIENT
Start: 2023-12-15 | End: 2023-12-16

## 2023-12-15 RX ADMIN — IOPAMIDOL 75 ML: 755 INJECTION, SOLUTION INTRAVENOUS at 23:59

## 2023-12-15 RX ADMIN — SODIUM CHLORIDE 1000 ML: 9 INJECTION, SOLUTION INTRAVENOUS at 23:01

## 2023-12-15 RX ADMIN — ONDANSETRON 4 MG: 2 INJECTION INTRAMUSCULAR; INTRAVENOUS at 23:02

## 2023-12-15 RX ADMIN — MORPHINE SULFATE 4 MG: 4 INJECTION, SOLUTION INTRAMUSCULAR; INTRAVENOUS at 23:02

## 2023-12-16 ENCOUNTER — APPOINTMENT (OUTPATIENT)
Dept: GENERAL RADIOLOGY | Age: 88
DRG: 391 | End: 2023-12-16
Payer: MEDICARE

## 2023-12-16 PROBLEM — K31.89 GASTRIC DISTENTION: Status: ACTIVE | Noted: 2023-12-16

## 2023-12-16 LAB
ANION GAP SERPL CALCULATED.3IONS-SCNC: 9 MMOL/L (ref 3–16)
BACTERIA URNS QL MICRO: ABNORMAL /HPF
BASOPHILS # BLD: 0 K/UL (ref 0–0.2)
BASOPHILS # BLD: 0 K/UL (ref 0–0.2)
BASOPHILS NFR BLD: 0.4 %
BASOPHILS NFR BLD: 0.6 %
BILIRUB UR QL STRIP.AUTO: NEGATIVE
BUN SERPL-MCNC: 24 MG/DL (ref 7–20)
CALCIUM SERPL-MCNC: 8.8 MG/DL (ref 8.3–10.6)
CHLORIDE SERPL-SCNC: 98 MMOL/L (ref 99–110)
CLARITY UR: ABNORMAL
CO2 SERPL-SCNC: 30 MMOL/L (ref 21–32)
COLOR UR: YELLOW
CREAT SERPL-MCNC: 1.1 MG/DL (ref 0.6–1.2)
DEPRECATED RDW RBC AUTO: 14.1 % (ref 12.4–15.4)
DEPRECATED RDW RBC AUTO: 14.1 % (ref 12.4–15.4)
EKG ATRIAL RATE: 87 BPM
EKG DIAGNOSIS: NORMAL
EKG P AXIS: -53 DEGREES
EKG P-R INTERVAL: 342 MS
EKG Q-T INTERVAL: 442 MS
EKG QRS DURATION: 142 MS
EKG QTC CALCULATION (BAZETT): 531 MS
EKG R AXIS: -43 DEGREES
EKG T AXIS: 8 DEGREES
EKG VENTRICULAR RATE: 87 BPM
EOSINOPHIL # BLD: 0 K/UL (ref 0–0.6)
EOSINOPHIL # BLD: 0 K/UL (ref 0–0.6)
EOSINOPHIL NFR BLD: 0.1 %
EOSINOPHIL NFR BLD: 0.7 %
EPI CELLS #/AREA URNS AUTO: 6 /HPF (ref 0–5)
GFR SERPLBLD CREATININE-BSD FMLA CKD-EPI: 47 ML/MIN/{1.73_M2}
GLUCOSE SERPL-MCNC: 123 MG/DL (ref 70–99)
GLUCOSE UR STRIP.AUTO-MCNC: NEGATIVE MG/DL
HCT VFR BLD AUTO: 36.3 % (ref 36–48)
HCT VFR BLD AUTO: 37.8 % (ref 36–48)
HGB BLD-MCNC: 12 G/DL (ref 12–16)
HGB BLD-MCNC: 12.7 G/DL (ref 12–16)
HGB UR QL STRIP.AUTO: ABNORMAL
HYALINE CASTS #/AREA URNS AUTO: 3 /LPF (ref 0–8)
KETONES UR STRIP.AUTO-MCNC: ABNORMAL MG/DL
LACTATE BLDV-SCNC: 1.3 MMOL/L (ref 0.4–1.9)
LEUKOCYTE ESTERASE UR QL STRIP.AUTO: ABNORMAL
LYMPHOCYTES # BLD: 1.3 K/UL (ref 1–5.1)
LYMPHOCYTES # BLD: 2.1 K/UL (ref 1–5.1)
LYMPHOCYTES NFR BLD: 27 %
LYMPHOCYTES NFR BLD: 31.4 %
MAGNESIUM SERPL-MCNC: 2.3 MG/DL (ref 1.8–2.4)
MAGNESIUM SERPL-MCNC: 2.4 MG/DL (ref 1.8–2.4)
MCH RBC QN AUTO: 31.2 PG (ref 26–34)
MCH RBC QN AUTO: 31.5 PG (ref 26–34)
MCHC RBC AUTO-ENTMCNC: 33 G/DL (ref 31–36)
MCHC RBC AUTO-ENTMCNC: 33.6 G/DL (ref 31–36)
MCV RBC AUTO: 93.8 FL (ref 80–100)
MCV RBC AUTO: 94.7 FL (ref 80–100)
MONOCYTES # BLD: 0.2 K/UL (ref 0–1.3)
MONOCYTES # BLD: 0.4 K/UL (ref 0–1.3)
MONOCYTES NFR BLD: 4.7 %
MONOCYTES NFR BLD: 6 %
NEUTROPHILS # BLD: 3.3 K/UL (ref 1.7–7.7)
NEUTROPHILS # BLD: 4.1 K/UL (ref 1.7–7.7)
NEUTROPHILS NFR BLD: 62.1 %
NEUTROPHILS NFR BLD: 67 %
NITRITE UR QL STRIP.AUTO: POSITIVE
PH UR STRIP.AUTO: 7.5 [PH] (ref 5–8)
PLATELET # BLD AUTO: 138 K/UL (ref 135–450)
PLATELET # BLD AUTO: 139 K/UL (ref 135–450)
PLATELET BLD QL SMEAR: ADEQUATE
PMV BLD AUTO: 7.3 FL (ref 5–10.5)
PMV BLD AUTO: 7.8 FL (ref 5–10.5)
POTASSIUM SERPL-SCNC: 3.4 MMOL/L (ref 3.5–5.1)
POTASSIUM SERPL-SCNC: 4 MMOL/L (ref 3.5–5.1)
PROT UR STRIP.AUTO-MCNC: 100 MG/DL
RBC # BLD AUTO: 3.83 M/UL (ref 4–5.2)
RBC # BLD AUTO: 4.03 M/UL (ref 4–5.2)
RBC CLUMPS #/AREA URNS AUTO: 7 /HPF (ref 0–4)
SLIDE REVIEW: NORMAL
SODIUM SERPL-SCNC: 137 MMOL/L (ref 136–145)
SP GR UR STRIP.AUTO: >=1.03 (ref 1–1.03)
TROPONIN, HIGH SENSITIVITY: 36 NG/L (ref 0–14)
UA COMPLETE W REFLEX CULTURE PNL UR: YES
UA DIPSTICK W REFLEX MICRO PNL UR: YES
URN SPEC COLLECT METH UR: ABNORMAL
UROBILINOGEN UR STRIP-ACNC: 1 E.U./DL
WBC # BLD AUTO: 4.9 K/UL (ref 4–11)
WBC # BLD AUTO: 6.6 K/UL (ref 4–11)
WBC #/AREA URNS AUTO: 204 /HPF (ref 0–5)

## 2023-12-16 PROCEDURE — 74018 RADEX ABDOMEN 1 VIEW: CPT

## 2023-12-16 PROCEDURE — 6360000002 HC RX W HCPCS: Performed by: STUDENT IN AN ORGANIZED HEALTH CARE EDUCATION/TRAINING PROGRAM

## 2023-12-16 PROCEDURE — 2580000003 HC RX 258: Performed by: PHYSICIAN ASSISTANT

## 2023-12-16 PROCEDURE — 6360000002 HC RX W HCPCS: Performed by: PHYSICIAN ASSISTANT

## 2023-12-16 PROCEDURE — 96375 TX/PRO/DX INJ NEW DRUG ADDON: CPT

## 2023-12-16 PROCEDURE — 84132 ASSAY OF SERUM POTASSIUM: CPT

## 2023-12-16 PROCEDURE — 84484 ASSAY OF TROPONIN QUANT: CPT

## 2023-12-16 PROCEDURE — C9113 INJ PANTOPRAZOLE SODIUM, VIA: HCPCS | Performed by: STUDENT IN AN ORGANIZED HEALTH CARE EDUCATION/TRAINING PROGRAM

## 2023-12-16 PROCEDURE — 80048 BASIC METABOLIC PNL TOTAL CA: CPT

## 2023-12-16 PROCEDURE — 6370000000 HC RX 637 (ALT 250 FOR IP): Performed by: PHYSICIAN ASSISTANT

## 2023-12-16 PROCEDURE — 83605 ASSAY OF LACTIC ACID: CPT

## 2023-12-16 PROCEDURE — 2580000003 HC RX 258: Performed by: STUDENT IN AN ORGANIZED HEALTH CARE EDUCATION/TRAINING PROGRAM

## 2023-12-16 PROCEDURE — 96365 THER/PROPH/DIAG IV INF INIT: CPT

## 2023-12-16 PROCEDURE — 93010 ELECTROCARDIOGRAM REPORT: CPT | Performed by: INTERNAL MEDICINE

## 2023-12-16 PROCEDURE — 83735 ASSAY OF MAGNESIUM: CPT

## 2023-12-16 PROCEDURE — 1200000000 HC SEMI PRIVATE

## 2023-12-16 PROCEDURE — 36415 COLL VENOUS BLD VENIPUNCTURE: CPT

## 2023-12-16 PROCEDURE — 85025 COMPLETE CBC W/AUTO DIFF WBC: CPT

## 2023-12-16 PROCEDURE — 51798 US URINE CAPACITY MEASURE: CPT

## 2023-12-16 RX ORDER — DICYCLOMINE HYDROCHLORIDE 10 MG/1
10 CAPSULE ORAL 3 TIMES DAILY PRN
Qty: 30 CAPSULE | Refills: 0 | Status: SHIPPED | OUTPATIENT
Start: 2023-12-16

## 2023-12-16 RX ORDER — ENOXAPARIN SODIUM 100 MG/ML
30 INJECTION SUBCUTANEOUS NIGHTLY
Status: DISCONTINUED | OUTPATIENT
Start: 2023-12-16 | End: 2023-12-26 | Stop reason: HOSPADM

## 2023-12-16 RX ORDER — PROMETHAZINE HYDROCHLORIDE 25 MG/ML
12.5 INJECTION, SOLUTION INTRAMUSCULAR; INTRAVENOUS EVERY 6 HOURS PRN
Status: DISCONTINUED | OUTPATIENT
Start: 2023-12-16 | End: 2023-12-17

## 2023-12-16 RX ORDER — LIDOCAINE 4 G/G
1 PATCH TOPICAL DAILY
Status: DISCONTINUED | OUTPATIENT
Start: 2023-12-16 | End: 2023-12-16

## 2023-12-16 RX ORDER — SODIUM CHLORIDE 9 MG/ML
INJECTION, SOLUTION INTRAVENOUS CONTINUOUS
Status: ACTIVE | OUTPATIENT
Start: 2023-12-16 | End: 2023-12-17

## 2023-12-16 RX ORDER — LACTOBACILLUS RHAMNOSUS GG 10B CELL
1 CAPSULE ORAL 2 TIMES DAILY WITH MEALS
Status: DISCONTINUED | OUTPATIENT
Start: 2023-12-17 | End: 2023-12-26 | Stop reason: HOSPADM

## 2023-12-16 RX ORDER — MAGNESIUM SULFATE IN WATER 40 MG/ML
2000 INJECTION, SOLUTION INTRAVENOUS PRN
Status: DISCONTINUED | OUTPATIENT
Start: 2023-12-16 | End: 2023-12-16

## 2023-12-16 RX ORDER — ESOMEPRAZOLE MAGNESIUM 20 MG/1
20 GRANULE, DELAYED RELEASE ORAL 2 TIMES DAILY
COMMUNITY

## 2023-12-16 RX ORDER — HYDROMORPHONE HYDROCHLORIDE 1 MG/ML
0.5 INJECTION, SOLUTION INTRAMUSCULAR; INTRAVENOUS; SUBCUTANEOUS
Status: DISCONTINUED | OUTPATIENT
Start: 2023-12-16 | End: 2023-12-17

## 2023-12-16 RX ORDER — POTASSIUM CHLORIDE 7.45 MG/ML
10 INJECTION INTRAVENOUS
Status: DISPENSED | OUTPATIENT
Start: 2023-12-16 | End: 2023-12-16

## 2023-12-16 RX ORDER — SCOLOPAMINE TRANSDERMAL SYSTEM 1 MG/1
1 PATCH, EXTENDED RELEASE TRANSDERMAL
Status: DISCONTINUED | OUTPATIENT
Start: 2023-12-16 | End: 2023-12-26 | Stop reason: HOSPADM

## 2023-12-16 RX ORDER — SODIUM CHLORIDE 9 MG/ML
INJECTION, SOLUTION INTRAVENOUS PRN
Status: DISCONTINUED | OUTPATIENT
Start: 2023-12-16 | End: 2023-12-26 | Stop reason: HOSPADM

## 2023-12-16 RX ORDER — SODIUM CHLORIDE 0.9 % (FLUSH) 0.9 %
5-40 SYRINGE (ML) INJECTION EVERY 12 HOURS SCHEDULED
Status: DISCONTINUED | OUTPATIENT
Start: 2023-12-16 | End: 2023-12-26 | Stop reason: HOSPADM

## 2023-12-16 RX ORDER — SODIUM CHLORIDE 9 MG/ML
INJECTION, SOLUTION INTRAVENOUS CONTINUOUS
Status: DISCONTINUED | OUTPATIENT
Start: 2023-12-16 | End: 2023-12-16

## 2023-12-16 RX ORDER — SENNOSIDES A AND B 8.6 MG/1
1 TABLET, FILM COATED ORAL DAILY PRN
Status: DISCONTINUED | OUTPATIENT
Start: 2023-12-16 | End: 2023-12-26 | Stop reason: HOSPADM

## 2023-12-16 RX ORDER — PANTOPRAZOLE SODIUM 40 MG/10ML
40 INJECTION, POWDER, LYOPHILIZED, FOR SOLUTION INTRAVENOUS DAILY
Status: DISCONTINUED | OUTPATIENT
Start: 2023-12-16 | End: 2023-12-26 | Stop reason: HOSPADM

## 2023-12-16 RX ORDER — ACETAMINOPHEN 650 MG/1
650 SUPPOSITORY RECTAL EVERY 6 HOURS PRN
Status: DISCONTINUED | OUTPATIENT
Start: 2023-12-16 | End: 2023-12-26 | Stop reason: HOSPADM

## 2023-12-16 RX ORDER — PANTOPRAZOLE SODIUM 40 MG/10ML
40 INJECTION, POWDER, LYOPHILIZED, FOR SOLUTION INTRAVENOUS DAILY
Status: DISCONTINUED | OUTPATIENT
Start: 2023-12-16 | End: 2023-12-16

## 2023-12-16 RX ORDER — POTASSIUM CHLORIDE 7.45 MG/ML
10 INJECTION INTRAVENOUS PRN
Status: DISCONTINUED | OUTPATIENT
Start: 2023-12-16 | End: 2023-12-16

## 2023-12-16 RX ORDER — ACETAMINOPHEN 325 MG/1
650 TABLET ORAL EVERY 6 HOURS PRN
Status: DISCONTINUED | OUTPATIENT
Start: 2023-12-16 | End: 2023-12-26 | Stop reason: HOSPADM

## 2023-12-16 RX ORDER — HYDROMORPHONE HYDROCHLORIDE 1 MG/ML
0.25 INJECTION, SOLUTION INTRAMUSCULAR; INTRAVENOUS; SUBCUTANEOUS
Status: DISCONTINUED | OUTPATIENT
Start: 2023-12-16 | End: 2023-12-17

## 2023-12-16 RX ORDER — ONDANSETRON 4 MG/1
4 TABLET, FILM COATED ORAL EVERY 8 HOURS PRN
Qty: 20 TABLET | Refills: 0 | Status: SHIPPED | OUTPATIENT
Start: 2023-12-16

## 2023-12-16 RX ORDER — SODIUM CHLORIDE 0.9 % (FLUSH) 0.9 %
5-40 SYRINGE (ML) INJECTION PRN
Status: DISCONTINUED | OUTPATIENT
Start: 2023-12-16 | End: 2023-12-26 | Stop reason: HOSPADM

## 2023-12-16 RX ADMIN — DIPHENHYDRAMINE HYDROCHLORIDE 12.5 MG: 50 INJECTION INTRAMUSCULAR; INTRAVENOUS at 00:03

## 2023-12-16 RX ADMIN — METOCLOPRAMIDE 5 MG: 5 INJECTION, SOLUTION INTRAMUSCULAR; INTRAVENOUS at 00:02

## 2023-12-16 RX ADMIN — ENOXAPARIN SODIUM 30 MG: 100 INJECTION SUBCUTANEOUS at 21:41

## 2023-12-16 RX ADMIN — MAGNESIUM SULFATE HEPTAHYDRATE 1000 MG: 1 INJECTION, SOLUTION INTRAVENOUS at 00:06

## 2023-12-16 RX ADMIN — SODIUM CHLORIDE: 9 INJECTION, SOLUTION INTRAVENOUS at 04:55

## 2023-12-16 RX ADMIN — SODIUM CHLORIDE: 9 INJECTION, SOLUTION INTRAVENOUS at 21:44

## 2023-12-16 RX ADMIN — PROMETHAZINE HYDROCHLORIDE 12.5 MG: 25 INJECTION INTRAMUSCULAR; INTRAVENOUS at 10:46

## 2023-12-16 RX ADMIN — POTASSIUM CHLORIDE 10 MEQ: 7.46 INJECTION, SOLUTION INTRAVENOUS at 06:10

## 2023-12-16 RX ADMIN — POTASSIUM CHLORIDE 10 MEQ: 7.46 INJECTION, SOLUTION INTRAVENOUS at 08:08

## 2023-12-16 RX ADMIN — SODIUM CHLORIDE: 9 INJECTION, SOLUTION INTRAVENOUS at 16:02

## 2023-12-16 RX ADMIN — PANTOPRAZOLE SODIUM 40 MG: 40 INJECTION, POWDER, FOR SOLUTION INTRAVENOUS at 10:50

## 2023-12-16 RX ADMIN — HYDROMORPHONE HYDROCHLORIDE 0.5 MG: 1 INJECTION, SOLUTION INTRAMUSCULAR; INTRAVENOUS; SUBCUTANEOUS at 08:16

## 2023-12-16 RX ADMIN — POTASSIUM CHLORIDE 10 MEQ: 7.46 INJECTION, SOLUTION INTRAVENOUS at 09:51

## 2023-12-16 RX ADMIN — HYDROMORPHONE HYDROCHLORIDE 0.5 MG: 1 INJECTION, SOLUTION INTRAMUSCULAR; INTRAVENOUS; SUBCUTANEOUS at 04:56

## 2023-12-16 RX ADMIN — POTASSIUM CHLORIDE 10 MEQ: 7.46 INJECTION, SOLUTION INTRAVENOUS at 11:21

## 2023-12-16 RX ADMIN — CEFTRIAXONE SODIUM 1000 MG: 1 INJECTION, POWDER, FOR SOLUTION INTRAMUSCULAR; INTRAVENOUS at 08:43

## 2023-12-16 RX ADMIN — HYDROMORPHONE HYDROCHLORIDE 0.5 MG: 1 INJECTION, SOLUTION INTRAMUSCULAR; INTRAVENOUS; SUBCUTANEOUS at 14:14

## 2023-12-16 NOTE — ED PROVIDER NOTES
In addition to the advanced practice provider, I personally saw Paresh Reddy and performed a substantive portion of the visit including all aspects of the medical decision making. Medical Decision Making    Patient is here primarily concerned for diarrhea for several days described as a loose stool. Not watery. No blood. She has some diffuse abdominal discomfort. She threw up 1 time as well. She is concerned for food poisoning. She lives in a nursing facility. I reviewed her recent admission from July, she was seen for nausea and vomiting and had a gastric volvulus. She had NG tube placement for decompression and underwent ex lap for lysis of adhesions. She was initially doing tube feeds but eventually had G-tube removed and is tolerating p.o. Endoscopy in July showed erosive esophagitis with gastric ulcer    EKG  The Ekg interpreted by me in the absence of a cardiologist shows. normal sinus rhythm with a rate of 87  Axis is   left. Right bundle branch block. QTc is  prolonged  No specific ST-T wave changes appreciated. No evidence of acute ischemia. Compared to prior 7-, QTc today has prolonged. SEP-1  Is this patient to be included in the SEP-1 Core Measure due to severe sepsis or septic shock? No   Exclusion criteria - the patient is NOT to be included for SEP-1 Core Measure due to: Infection is not suspected    Screenings     [place] Coma Scale  Eye Opening: Spontaneous  Best Verbal Response: Oriented  Best Motor Response: Obeys commands  [place] Coma Scale Score: 15        CT ABDOMEN PELVIS W IV CONTRAST Additional Contrast? None   Final Result   1. Gastric distension with unchanged moderate to severe esophageal wall   thickening likely due to esophagitis; correlate with endoscopy. 2. Unchanged moderate to severe intrahepatic and extrahepatic ductal   dilatation. If there is hyperbilirubinemia, recommend outpatient MRCP for   further evaluation.    3. Improved with
Hyperlipidemia, PVC (premature ventricular contraction), Rectocele, Reflux, and Scoliosis. CONSULTS: (Who and What was discussed)  None      Social Determinants Significantly Affecting Health : None    Records Reviewed (External and Source) previous ER visit on 7/29/2023. CC/HPI Summary, DDx, ED Course, and Reassessment: Briefly, this is a 59-year-old female who presents with nausea, vomiting and diarrhea. Symptoms have been ongoing for the past week, patient states that her symptoms improved, she started with clear liquid diet yesterday, today started with food and then returned with the vomiting and the diarrhea    On exam she does have diffuse tenderness across the upper abdomen. Disposition Considerations (tests considered but not done, Admit vs D/C, Shared Decision Making, Pt Expectation of Test or Tx.):    EKG seconded by my attending, please see her note for further details. She does have a QTc prolongation, will cover with the same as she was requiring antiemetics here. CBC shows no evidence of leukocytosis or anemia. Her CMP does show mildly elevated liver enzymes, however bilirubin is normal.  Lipase is normal.  Troponin is elevated at 45, similar to previous, this will be repeated. CT shows gastric distention, unchanged with moderate to severe esophageal wall thickening likely due to esophagitis. History of intrahepatic mass or ductal dilatation there is no hyperbilirubinemia    Patient was given pain, Zofran, as well as IV fluids. Patient continued to have nausea, and vomiting, therefore he was given Reglan and Benadryl. Upon reevaluation, patient is still having nausea, and vomiting. She does have large central liver 7, and feel that she would benefit from admission. NG tube will be placed. Hospitalist paged for admission, patient was understanding is agreeable plan, stable for admission    I am the Primary Clinician of Record. FINAL IMPRESSION      1. QT prolongation    2.

## 2023-12-16 NOTE — ED NOTES
Purewick placed. Pt tolerated well.      Chante Marinelli, RN  12/15/23 2316
This RN has assumed care of pt. Report received from Mo. Pt currently refusing to wear gown.        Roque Sharpe RN  12/16/23 1489
components within normal limits   TROPONIN - Abnormal; Notable for the following components:    Troponin, High Sensitivity 45 (*)     All other components within normal limits   TROPONIN - Abnormal; Notable for the following components:    Troponin, High Sensitivity 36 (*)     All other components within normal limits   MICROSCOPIC URINALYSIS - Abnormal; Notable for the following components:    Bacteria, UA 4+ (*)     WBC,  (*)     RBC, UA 7 (*)     Epithelial Cells, UA 6 (*)     All other components within normal limits     Critical values: no     Abnormal Assessment Findings:     Background  History:   Past Medical History:   Diagnosis Date    Arthritis     OSTEOARTHRITIS BACK    CAD (coronary artery disease)     PT HAS AV BLOCK AND MVP    Cancer (HCC)     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    Cystocele     Diabetes mellitus (HCC)     type  2 diet controlled    Hepatitis A 1953    History of blood transfusion     hiatal hernia surgery    Hyperlipidemia     PVC (premature ventricular contraction)     Rectocele     Reflux     Scoliosis        Assessment    Vitals/MEWS: MEWS Score: 1  Level of Consciousness: Alert (0)   Vitals:    12/16/23 0200 12/16/23 0215 12/16/23 0245 12/16/23 0315   BP:   (!) 115/52 119/60   Pulse: 82 84 89 80   Resp: 16 17 22 19   Temp:       TempSrc:       SpO2: 92% 94% 90%    Weight:       Height:         FiO2 (%):   O2 Flow Rate: O2 Device: None (Room air)    Cardiac Rhythm:    Pain Assessment:  [x] Verbal [] Jacqulin Arbour Scale  Pain Scale: Pain Assessment  Pain Assessment: 0-10  Pain Level: 3  Last documented pain score (0-10 scale) Pain Level: 3  Last documented pain medication administered: morphine  Mental Status: oriented and alert  Orientation Level:    NIH Score:    C-SSRS: Risk of Suicide: No Risk  Bedside swallow:    Sukhdev Coma Scale (GCS): Sukhdev Coma Scale  Eye Opening: Spontaneous  Best Verbal Response: Oriented  Best Motor Response: Obeys

## 2023-12-17 ENCOUNTER — APPOINTMENT (OUTPATIENT)
Dept: GENERAL RADIOLOGY | Age: 88
DRG: 391 | End: 2023-12-17
Payer: MEDICARE

## 2023-12-17 PROBLEM — R94.31 QT PROLONGATION: Status: ACTIVE | Noted: 2023-12-17

## 2023-12-17 PROBLEM — K44.9 HIATAL HERNIA WITH GERD: Status: ACTIVE | Noted: 2022-01-12

## 2023-12-17 LAB
ALBUMIN SERPL-MCNC: 3.8 G/DL (ref 3.4–5)
ALBUMIN/GLOB SERPL: 1.4 {RATIO} (ref 1.1–2.2)
ALP SERPL-CCNC: 127 U/L (ref 40–129)
ALT SERPL-CCNC: 35 U/L (ref 10–40)
ANION GAP SERPL CALCULATED.3IONS-SCNC: 12 MMOL/L (ref 3–16)
AST SERPL-CCNC: 38 U/L (ref 15–37)
BASOPHILS # BLD: 0 K/UL (ref 0–0.2)
BASOPHILS NFR BLD: 0.7 %
BILIRUB SERPL-MCNC: 0.6 MG/DL (ref 0–1)
BUN SERPL-MCNC: 18 MG/DL (ref 7–20)
CALCIUM SERPL-MCNC: 8.9 MG/DL (ref 8.3–10.6)
CHLORIDE SERPL-SCNC: 104 MMOL/L (ref 99–110)
CO2 SERPL-SCNC: 26 MMOL/L (ref 21–32)
CREAT SERPL-MCNC: 0.9 MG/DL (ref 0.6–1.2)
DEPRECATED RDW RBC AUTO: 14.2 % (ref 12.4–15.4)
EKG ATRIAL RATE: 88 BPM
EKG DIAGNOSIS: NORMAL
EKG P AXIS: 42 DEGREES
EKG P-R INTERVAL: 306 MS
EKG Q-T INTERVAL: 400 MS
EKG QRS DURATION: 142 MS
EKG QTC CALCULATION (BAZETT): 484 MS
EKG R AXIS: -38 DEGREES
EKG T AXIS: 1 DEGREES
EKG VENTRICULAR RATE: 88 BPM
EOSINOPHIL # BLD: 0.1 K/UL (ref 0–0.6)
EOSINOPHIL NFR BLD: 2 %
GFR SERPLBLD CREATININE-BSD FMLA CKD-EPI: >60 ML/MIN/{1.73_M2}
GLUCOSE SERPL-MCNC: 71 MG/DL (ref 70–99)
HCT VFR BLD AUTO: 35.2 % (ref 36–48)
HGB BLD-MCNC: 11.7 G/DL (ref 12–16)
LYMPHOCYTES # BLD: 2.5 K/UL (ref 1–5.1)
LYMPHOCYTES NFR BLD: 38.4 %
MCH RBC QN AUTO: 31.7 PG (ref 26–34)
MCHC RBC AUTO-ENTMCNC: 33.3 G/DL (ref 31–36)
MCV RBC AUTO: 95.1 FL (ref 80–100)
MONOCYTES # BLD: 0.3 K/UL (ref 0–1.3)
MONOCYTES NFR BLD: 4.5 %
NEUTROPHILS # BLD: 3.6 K/UL (ref 1.7–7.7)
NEUTROPHILS NFR BLD: 54.4 %
PLATELET # BLD AUTO: 144 K/UL (ref 135–450)
PMV BLD AUTO: 7.2 FL (ref 5–10.5)
POTASSIUM SERPL-SCNC: 3.5 MMOL/L (ref 3.5–5.1)
POTASSIUM SERPL-SCNC: 4.1 MMOL/L (ref 3.5–5.1)
PROT SERPL-MCNC: 6.5 G/DL (ref 6.4–8.2)
RBC # BLD AUTO: 3.7 M/UL (ref 4–5.2)
REASON FOR REJECTION: NORMAL
REJECTED TEST: NORMAL
SODIUM SERPL-SCNC: 142 MMOL/L (ref 136–145)
TSH SERPL DL<=0.005 MIU/L-ACNC: 1.56 UIU/ML (ref 0.27–4.2)
WBC # BLD AUTO: 6.6 K/UL (ref 4–11)

## 2023-12-17 PROCEDURE — APPNB30 APP NON BILLABLE TIME 0-30 MINS: Performed by: NURSE PRACTITIONER

## 2023-12-17 PROCEDURE — 6360000002 HC RX W HCPCS: Performed by: PHYSICIAN ASSISTANT

## 2023-12-17 PROCEDURE — 1200000000 HC SEMI PRIVATE

## 2023-12-17 PROCEDURE — C9113 INJ PANTOPRAZOLE SODIUM, VIA: HCPCS | Performed by: STUDENT IN AN ORGANIZED HEALTH CARE EDUCATION/TRAINING PROGRAM

## 2023-12-17 PROCEDURE — 93005 ELECTROCARDIOGRAM TRACING: CPT | Performed by: STUDENT IN AN ORGANIZED HEALTH CARE EDUCATION/TRAINING PROGRAM

## 2023-12-17 PROCEDURE — 36415 COLL VENOUS BLD VENIPUNCTURE: CPT

## 2023-12-17 PROCEDURE — 6360000002 HC RX W HCPCS: Performed by: NURSE PRACTITIONER

## 2023-12-17 PROCEDURE — 80053 COMPREHEN METABOLIC PANEL: CPT

## 2023-12-17 PROCEDURE — 2580000003 HC RX 258: Performed by: NURSE PRACTITIONER

## 2023-12-17 PROCEDURE — 93010 ELECTROCARDIOGRAM REPORT: CPT | Performed by: INTERNAL MEDICINE

## 2023-12-17 PROCEDURE — 74018 RADEX ABDOMEN 1 VIEW: CPT

## 2023-12-17 PROCEDURE — 85025 COMPLETE CBC W/AUTO DIFF WBC: CPT

## 2023-12-17 PROCEDURE — 6360000002 HC RX W HCPCS: Performed by: STUDENT IN AN ORGANIZED HEALTH CARE EDUCATION/TRAINING PROGRAM

## 2023-12-17 PROCEDURE — 99222 1ST HOSP IP/OBS MODERATE 55: CPT | Performed by: SURGERY

## 2023-12-17 PROCEDURE — 84443 ASSAY THYROID STIM HORMONE: CPT

## 2023-12-17 PROCEDURE — 2580000003 HC RX 258: Performed by: STUDENT IN AN ORGANIZED HEALTH CARE EDUCATION/TRAINING PROGRAM

## 2023-12-17 PROCEDURE — 2580000003 HC RX 258: Performed by: PHYSICIAN ASSISTANT

## 2023-12-17 PROCEDURE — 84132 ASSAY OF SERUM POTASSIUM: CPT

## 2023-12-17 RX ORDER — SODIUM CHLORIDE 9 MG/ML
INJECTION, SOLUTION INTRAVENOUS CONTINUOUS
Status: ACTIVE | OUTPATIENT
Start: 2023-12-17 | End: 2023-12-17

## 2023-12-17 RX ORDER — HYDROMORPHONE HYDROCHLORIDE 1 MG/ML
0.25 INJECTION, SOLUTION INTRAMUSCULAR; INTRAVENOUS; SUBCUTANEOUS
Status: DISCONTINUED | OUTPATIENT
Start: 2023-12-17 | End: 2023-12-26 | Stop reason: HOSPADM

## 2023-12-17 RX ORDER — POTASSIUM CHLORIDE 7.45 MG/ML
10 INJECTION INTRAVENOUS
Status: COMPLETED | OUTPATIENT
Start: 2023-12-17 | End: 2023-12-17

## 2023-12-17 RX ORDER — HYDROMORPHONE HYDROCHLORIDE 1 MG/ML
0.5 INJECTION, SOLUTION INTRAMUSCULAR; INTRAVENOUS; SUBCUTANEOUS
Status: DISCONTINUED | OUTPATIENT
Start: 2023-12-17 | End: 2023-12-26 | Stop reason: HOSPADM

## 2023-12-17 RX ORDER — HYDROMORPHONE HYDROCHLORIDE 1 MG/ML
0.25 INJECTION, SOLUTION INTRAMUSCULAR; INTRAVENOUS; SUBCUTANEOUS
Status: DISCONTINUED | OUTPATIENT
Start: 2023-12-17 | End: 2023-12-17

## 2023-12-17 RX ORDER — ONDANSETRON 2 MG/ML
4 INJECTION INTRAMUSCULAR; INTRAVENOUS EVERY 6 HOURS PRN
Status: DISCONTINUED | OUTPATIENT
Start: 2023-12-17 | End: 2023-12-19

## 2023-12-17 RX ORDER — HYDROMORPHONE HYDROCHLORIDE 1 MG/ML
0.5 INJECTION, SOLUTION INTRAMUSCULAR; INTRAVENOUS; SUBCUTANEOUS
Status: DISCONTINUED | OUTPATIENT
Start: 2023-12-17 | End: 2023-12-17

## 2023-12-17 RX ADMIN — ONDANSETRON 4 MG: 2 INJECTION INTRAMUSCULAR; INTRAVENOUS at 08:35

## 2023-12-17 RX ADMIN — POTASSIUM CHLORIDE 10 MEQ: 7.46 INJECTION, SOLUTION INTRAVENOUS at 08:46

## 2023-12-17 RX ADMIN — HYDROMORPHONE HYDROCHLORIDE 0.5 MG: 1 INJECTION, SOLUTION INTRAMUSCULAR; INTRAVENOUS; SUBCUTANEOUS at 00:46

## 2023-12-17 RX ADMIN — PROMETHAZINE HYDROCHLORIDE 12.5 MG: 25 INJECTION INTRAMUSCULAR; INTRAVENOUS at 01:07

## 2023-12-17 RX ADMIN — ENOXAPARIN SODIUM 30 MG: 100 INJECTION SUBCUTANEOUS at 19:51

## 2023-12-17 RX ADMIN — SODIUM CHLORIDE, PRESERVATIVE FREE 10 ML: 5 INJECTION INTRAVENOUS at 08:35

## 2023-12-17 RX ADMIN — SODIUM CHLORIDE: 9 INJECTION, SOLUTION INTRAVENOUS at 02:03

## 2023-12-17 RX ADMIN — HYDROMORPHONE HYDROCHLORIDE 0.5 MG: 1 INJECTION, SOLUTION INTRAMUSCULAR; INTRAVENOUS; SUBCUTANEOUS at 16:58

## 2023-12-17 RX ADMIN — SODIUM CHLORIDE, PRESERVATIVE FREE 10 ML: 5 INJECTION INTRAVENOUS at 19:51

## 2023-12-17 RX ADMIN — ONDANSETRON 4 MG: 2 INJECTION INTRAMUSCULAR; INTRAVENOUS at 20:20

## 2023-12-17 RX ADMIN — POTASSIUM CHLORIDE 10 MEQ: 7.46 INJECTION, SOLUTION INTRAVENOUS at 11:24

## 2023-12-17 RX ADMIN — CEFTRIAXONE SODIUM 1000 MG: 1 INJECTION, POWDER, FOR SOLUTION INTRAMUSCULAR; INTRAVENOUS at 08:45

## 2023-12-17 RX ADMIN — HYDROMORPHONE HYDROCHLORIDE 0.5 MG: 1 INJECTION, SOLUTION INTRAMUSCULAR; INTRAVENOUS; SUBCUTANEOUS at 07:07

## 2023-12-17 RX ADMIN — POTASSIUM CHLORIDE 10 MEQ: 7.46 INJECTION, SOLUTION INTRAVENOUS at 10:10

## 2023-12-17 RX ADMIN — HYDROMORPHONE HYDROCHLORIDE 0.5 MG: 1 INJECTION, SOLUTION INTRAMUSCULAR; INTRAVENOUS; SUBCUTANEOUS at 20:20

## 2023-12-17 RX ADMIN — HYDROMORPHONE HYDROCHLORIDE 0.5 MG: 1 INJECTION, SOLUTION INTRAMUSCULAR; INTRAVENOUS; SUBCUTANEOUS at 11:52

## 2023-12-17 RX ADMIN — POTASSIUM CHLORIDE 10 MEQ: 7.46 INJECTION, SOLUTION INTRAVENOUS at 12:17

## 2023-12-17 RX ADMIN — PANTOPRAZOLE SODIUM 40 MG: 40 INJECTION, POWDER, FOR SOLUTION INTRAVENOUS at 08:35

## 2023-12-18 ENCOUNTER — APPOINTMENT (OUTPATIENT)
Dept: NUCLEAR MEDICINE | Age: 88
DRG: 391 | End: 2023-12-18
Payer: MEDICARE

## 2023-12-18 LAB
ALBUMIN SERPL-MCNC: 3.8 G/DL (ref 3.4–5)
ALBUMIN/GLOB SERPL: 1.5 {RATIO} (ref 1.1–2.2)
ALP SERPL-CCNC: 110 U/L (ref 40–129)
ALT SERPL-CCNC: 26 U/L (ref 10–40)
ANION GAP SERPL CALCULATED.3IONS-SCNC: 13 MMOL/L (ref 3–16)
AST SERPL-CCNC: 29 U/L (ref 15–37)
BACTERIA UR CULT: ABNORMAL
BASOPHILS # BLD: 0 K/UL (ref 0–0.2)
BASOPHILS NFR BLD: 0.6 %
BILIRUB SERPL-MCNC: 0.5 MG/DL (ref 0–1)
BUN SERPL-MCNC: 21 MG/DL (ref 7–20)
CALCIUM SERPL-MCNC: 9 MG/DL (ref 8.3–10.6)
CHLORIDE SERPL-SCNC: 105 MMOL/L (ref 99–110)
CO2 SERPL-SCNC: 25 MMOL/L (ref 21–32)
CREAT SERPL-MCNC: 0.9 MG/DL (ref 0.6–1.2)
DEPRECATED RDW RBC AUTO: 14.1 % (ref 12.4–15.4)
EOSINOPHIL # BLD: 0.1 K/UL (ref 0–0.6)
EOSINOPHIL NFR BLD: 1.3 %
GFR SERPLBLD CREATININE-BSD FMLA CKD-EPI: >60 ML/MIN/{1.73_M2}
GLUCOSE BLD-MCNC: 71 MG/DL (ref 70–99)
GLUCOSE BLD-MCNC: 75 MG/DL (ref 70–99)
GLUCOSE SERPL-MCNC: 81 MG/DL (ref 70–99)
HCT VFR BLD AUTO: 34.9 % (ref 36–48)
HEMOCCULT STL QL: NORMAL
HGB BLD-MCNC: 11.7 G/DL (ref 12–16)
LYMPHOCYTES # BLD: 2 K/UL (ref 1–5.1)
LYMPHOCYTES NFR BLD: 34.6 %
MAGNESIUM SERPL-MCNC: 2.2 MG/DL (ref 1.8–2.4)
MCH RBC QN AUTO: 32 PG (ref 26–34)
MCHC RBC AUTO-ENTMCNC: 33.5 G/DL (ref 31–36)
MCV RBC AUTO: 95.3 FL (ref 80–100)
MONOCYTES # BLD: 0.3 K/UL (ref 0–1.3)
MONOCYTES NFR BLD: 4.7 %
NEUTROPHILS # BLD: 3.4 K/UL (ref 1.7–7.7)
NEUTROPHILS NFR BLD: 58.8 %
ORGANISM: ABNORMAL
PERFORMED ON: NORMAL
PERFORMED ON: NORMAL
PLATELET # BLD AUTO: 139 K/UL (ref 135–450)
PMV BLD AUTO: 7.1 FL (ref 5–10.5)
POTASSIUM SERPL-SCNC: 3.5 MMOL/L (ref 3.5–5.1)
PROT SERPL-MCNC: 6.4 G/DL (ref 6.4–8.2)
RBC # BLD AUTO: 3.66 M/UL (ref 4–5.2)
SODIUM SERPL-SCNC: 143 MMOL/L (ref 136–145)
WBC # BLD AUTO: 5.7 K/UL (ref 4–11)

## 2023-12-18 PROCEDURE — 3700000000 HC ANESTHESIA ATTENDED CARE: Performed by: INTERNAL MEDICINE

## 2023-12-18 PROCEDURE — 36415 COLL VENOUS BLD VENIPUNCTURE: CPT

## 2023-12-18 PROCEDURE — 87506 IADNA-DNA/RNA PROBE TQ 6-11: CPT

## 2023-12-18 PROCEDURE — 3700000001 HC ADD 15 MINUTES (ANESTHESIA): Performed by: INTERNAL MEDICINE

## 2023-12-18 PROCEDURE — 6360000002 HC RX W HCPCS: Performed by: SURGERY

## 2023-12-18 PROCEDURE — 3430000000 HC RX DIAGNOSTIC RADIOPHARMACEUTICAL: Performed by: INTERNAL MEDICINE

## 2023-12-18 PROCEDURE — 6360000002 HC RX W HCPCS: Performed by: STUDENT IN AN ORGANIZED HEALTH CARE EDUCATION/TRAINING PROGRAM

## 2023-12-18 PROCEDURE — 2709999900 HC NON-CHARGEABLE SUPPLY: Performed by: INTERNAL MEDICINE

## 2023-12-18 PROCEDURE — 83735 ASSAY OF MAGNESIUM: CPT

## 2023-12-18 PROCEDURE — 82270 OCCULT BLOOD FECES: CPT

## 2023-12-18 PROCEDURE — 0DJ08ZZ INSPECTION OF UPPER INTESTINAL TRACT, VIA NATURAL OR ARTIFICIAL OPENING ENDOSCOPIC: ICD-10-PCS | Performed by: INTERNAL MEDICINE

## 2023-12-18 PROCEDURE — 1200000000 HC SEMI PRIVATE

## 2023-12-18 PROCEDURE — 6360000002 HC RX W HCPCS: Performed by: PHYSICIAN ASSISTANT

## 2023-12-18 PROCEDURE — 78264 GASTRIC EMPTYING IMG STUDY: CPT

## 2023-12-18 PROCEDURE — C9113 INJ PANTOPRAZOLE SODIUM, VIA: HCPCS | Performed by: STUDENT IN AN ORGANIZED HEALTH CARE EDUCATION/TRAINING PROGRAM

## 2023-12-18 PROCEDURE — 2580000003 HC RX 258: Performed by: STUDENT IN AN ORGANIZED HEALTH CARE EDUCATION/TRAINING PROGRAM

## 2023-12-18 PROCEDURE — 3609017100 HC EGD: Performed by: INTERNAL MEDICINE

## 2023-12-18 PROCEDURE — 6360000002 HC RX W HCPCS: Performed by: NURSE PRACTITIONER

## 2023-12-18 PROCEDURE — 85025 COMPLETE CBC W/AUTO DIFF WBC: CPT

## 2023-12-18 PROCEDURE — 7100000000 HC PACU RECOVERY - FIRST 15 MIN: Performed by: INTERNAL MEDICINE

## 2023-12-18 PROCEDURE — 7100000001 HC PACU RECOVERY - ADDTL 15 MIN: Performed by: INTERNAL MEDICINE

## 2023-12-18 PROCEDURE — 80053 COMPREHEN METABOLIC PANEL: CPT

## 2023-12-18 PROCEDURE — A9541 TC99M SULFUR COLLOID: HCPCS | Performed by: INTERNAL MEDICINE

## 2023-12-18 RX ORDER — POTASSIUM CHLORIDE 7.45 MG/ML
10 INJECTION INTRAVENOUS
Status: ACTIVE | OUTPATIENT
Start: 2023-12-18 | End: 2023-12-18

## 2023-12-18 RX ORDER — POTASSIUM CHLORIDE 7.45 MG/ML
10 INJECTION INTRAVENOUS
Status: DISCONTINUED | OUTPATIENT
Start: 2023-12-18 | End: 2023-12-18

## 2023-12-18 RX ORDER — TECHNETIUM TC 99M SULFUR COLLOID 2 MG
0.71 KIT MISCELLANEOUS
Status: COMPLETED | OUTPATIENT
Start: 2023-12-18 | End: 2023-12-18

## 2023-12-18 RX ORDER — POTASSIUM CHLORIDE 7.45 MG/ML
10 INJECTION INTRAVENOUS
Status: COMPLETED | OUTPATIENT
Start: 2023-12-18 | End: 2023-12-18

## 2023-12-18 RX ADMIN — POTASSIUM CHLORIDE 10 MEQ: 7.46 INJECTION, SOLUTION INTRAVENOUS at 21:08

## 2023-12-18 RX ADMIN — HYDROMORPHONE HYDROCHLORIDE 0.5 MG: 1 INJECTION, SOLUTION INTRAMUSCULAR; INTRAVENOUS; SUBCUTANEOUS at 03:15

## 2023-12-18 RX ADMIN — TECHNETIUM TC 99M SULFUR COLLOID 0.71 MILLICURIE: KIT at 11:06

## 2023-12-18 RX ADMIN — POTASSIUM CHLORIDE 10 MEQ: 7.46 INJECTION, SOLUTION INTRAVENOUS at 17:30

## 2023-12-18 RX ADMIN — POTASSIUM CHLORIDE 10 MEQ: 7.46 INJECTION, SOLUTION INTRAVENOUS at 22:22

## 2023-12-18 RX ADMIN — ONDANSETRON 4 MG: 2 INJECTION INTRAMUSCULAR; INTRAVENOUS at 10:31

## 2023-12-18 RX ADMIN — HYDROMORPHONE HYDROCHLORIDE 0.5 MG: 1 INJECTION, SOLUTION INTRAMUSCULAR; INTRAVENOUS; SUBCUTANEOUS at 10:31

## 2023-12-18 RX ADMIN — ONDANSETRON 4 MG: 2 INJECTION INTRAMUSCULAR; INTRAVENOUS at 16:46

## 2023-12-18 RX ADMIN — PANTOPRAZOLE SODIUM 40 MG: 40 INJECTION, POWDER, FOR SOLUTION INTRAVENOUS at 10:31

## 2023-12-18 RX ADMIN — HYDROMORPHONE HYDROCHLORIDE 0.5 MG: 1 INJECTION, SOLUTION INTRAMUSCULAR; INTRAVENOUS; SUBCUTANEOUS at 21:08

## 2023-12-18 RX ADMIN — HYDROMORPHONE HYDROCHLORIDE 0.5 MG: 1 INJECTION, SOLUTION INTRAMUSCULAR; INTRAVENOUS; SUBCUTANEOUS at 15:17

## 2023-12-18 RX ADMIN — CEFTRIAXONE SODIUM 1000 MG: 1 INJECTION, POWDER, FOR SOLUTION INTRAMUSCULAR; INTRAVENOUS at 17:35

## 2023-12-18 RX ADMIN — ONDANSETRON 4 MG: 2 INJECTION INTRAMUSCULAR; INTRAVENOUS at 03:14

## 2023-12-18 RX ADMIN — ENOXAPARIN SODIUM 30 MG: 100 INJECTION SUBCUTANEOUS at 21:08

## 2023-12-18 NOTE — PROCEDURES
Endoscopy Note    Patient: Mei Vega  : 10/16/1932  CSN: [id number]    Procedure: Esophagogastroduodenoscopy with     Date:  2023     Surgeon:  Marko Brown MD     Referring Physician:  DENIZ Vera CNP    Preoperative Diagnosi gastric distention x-ray     postoperative Diagnosis: #1 esophagitis grade B to C #2 blood in NG secondary to NG tube trauma #3 hiatal hernia 5 cm deep with 8 cm wide #4 no obstruction of pyloric channel #6 no obstruction duodenum    Anesthesia:  MAC    EBL: minimal to none. Indications: This is a 80y.o. year old female who comes in today because she has been having problems with nausea vomiting she has a distended stomach she previous had a gastric volvulus she has had an surgery for this she had a previous upper endoscopy earlier this year she has an NG tube in this morning with lots of blood in it were doing an upper endoscopy to evaluate    Description of Procedure:  Informed consent was obtained from the patient after explanation of indications, benefits and possible risks and complications of the procedure. The patient was then taken to the endoscopy suite, placed in the left lateral decubitus position and the above IV sedation was administrered.     The Olympus video endoscope was passed through the hypopharynx     Posterior pharynx was normal she did have an NG tube in  Esophagus the patient continues to have this esophagitis grade B to C esophagitis he has lots of cobblestoning of the distal esophagus  And there was good amount of bleeding from where the restriction or trauma from the NG tube but there is no evidence of an active bleeding lesion  Hiatal hernia this is 5 cm deep and its 8 cm wide new can see where there is some pooling of liquid and food in and part of this hernia the NG tube also was in the part of the hernia that was not in the stomach  Stomach the patient was some inflammation there definitely is trauma from the NG tube  We

## 2023-12-19 PROBLEM — E43 SEVERE MALNUTRITION (HCC): Status: ACTIVE | Noted: 2023-12-19

## 2023-12-19 PROBLEM — K31.84 GASTROPARESIS: Status: ACTIVE | Noted: 2023-12-19

## 2023-12-19 LAB
ALBUMIN SERPL-MCNC: 3.5 G/DL (ref 3.4–5)
ALBUMIN/GLOB SERPL: 1.5 {RATIO} (ref 1.1–2.2)
ALP SERPL-CCNC: 93 U/L (ref 40–129)
ALT SERPL-CCNC: 21 U/L (ref 10–40)
ANION GAP SERPL CALCULATED.3IONS-SCNC: 9 MMOL/L (ref 3–16)
AST SERPL-CCNC: 24 U/L (ref 15–37)
BASOPHILS # BLD: 0 K/UL (ref 0–0.2)
BASOPHILS NFR BLD: 0.6 %
BILIRUB SERPL-MCNC: 0.4 MG/DL (ref 0–1)
BUN SERPL-MCNC: 28 MG/DL (ref 7–20)
CALCIUM SERPL-MCNC: 8.7 MG/DL (ref 8.3–10.6)
CHLORIDE SERPL-SCNC: 102 MMOL/L (ref 99–110)
CO2 SERPL-SCNC: 28 MMOL/L (ref 21–32)
CREAT SERPL-MCNC: 0.9 MG/DL (ref 0.6–1.2)
DEPRECATED RDW RBC AUTO: 14.3 % (ref 12.4–15.4)
EOSINOPHIL # BLD: 0.1 K/UL (ref 0–0.6)
EOSINOPHIL NFR BLD: 1.5 %
GFR SERPLBLD CREATININE-BSD FMLA CKD-EPI: >60 ML/MIN/{1.73_M2}
GI PATHOGENS PNL STL NAA+PROBE: NORMAL
GLUCOSE BLD-MCNC: 129 MG/DL (ref 70–99)
GLUCOSE BLD-MCNC: 137 MG/DL (ref 70–99)
GLUCOSE SERPL-MCNC: 136 MG/DL (ref 70–99)
HCT VFR BLD AUTO: 32.4 % (ref 36–48)
HGB BLD-MCNC: 10.9 G/DL (ref 12–16)
LYMPHOCYTES # BLD: 2.9 K/UL (ref 1–5.1)
LYMPHOCYTES NFR BLD: 42 %
MAGNESIUM SERPL-MCNC: 2.2 MG/DL (ref 1.8–2.4)
MCH RBC QN AUTO: 31.9 PG (ref 26–34)
MCHC RBC AUTO-ENTMCNC: 33.6 G/DL (ref 31–36)
MCV RBC AUTO: 94.8 FL (ref 80–100)
MONOCYTES # BLD: 0.4 K/UL (ref 0–1.3)
MONOCYTES NFR BLD: 6.5 %
NEUTROPHILS # BLD: 3.4 K/UL (ref 1.7–7.7)
NEUTROPHILS NFR BLD: 49.4 %
PERFORMED ON: ABNORMAL
PERFORMED ON: ABNORMAL
PLATELET # BLD AUTO: 143 K/UL (ref 135–450)
PMV BLD AUTO: 7 FL (ref 5–10.5)
POTASSIUM SERPL-SCNC: 4.1 MMOL/L (ref 3.5–5.1)
PROT SERPL-MCNC: 5.9 G/DL (ref 6.4–8.2)
RBC # BLD AUTO: 3.42 M/UL (ref 4–5.2)
SODIUM SERPL-SCNC: 139 MMOL/L (ref 136–145)
WBC # BLD AUTO: 6.9 K/UL (ref 4–11)

## 2023-12-19 PROCEDURE — C1751 CATH, INF, PER/CENT/MIDLINE: HCPCS

## 2023-12-19 PROCEDURE — 85025 COMPLETE CBC W/AUTO DIFF WBC: CPT

## 2023-12-19 PROCEDURE — 1200000000 HC SEMI PRIVATE

## 2023-12-19 PROCEDURE — 6370000000 HC RX 637 (ALT 250 FOR IP): Performed by: PHYSICIAN ASSISTANT

## 2023-12-19 PROCEDURE — 2580000003 HC RX 258: Performed by: PHYSICIAN ASSISTANT

## 2023-12-19 PROCEDURE — 6360000002 HC RX W HCPCS: Performed by: PHYSICIAN ASSISTANT

## 2023-12-19 PROCEDURE — 83735 ASSAY OF MAGNESIUM: CPT

## 2023-12-19 PROCEDURE — 0D9670Z DRAINAGE OF STOMACH WITH DRAINAGE DEVICE, VIA NATURAL OR ARTIFICIAL OPENING: ICD-10-PCS | Performed by: INTERNAL MEDICINE

## 2023-12-19 PROCEDURE — 6360000002 HC RX W HCPCS: Performed by: NURSE PRACTITIONER

## 2023-12-19 PROCEDURE — 36415 COLL VENOUS BLD VENIPUNCTURE: CPT

## 2023-12-19 PROCEDURE — 99232 SBSQ HOSP IP/OBS MODERATE 35: CPT | Performed by: SURGERY

## 2023-12-19 PROCEDURE — 02HV33Z INSERTION OF INFUSION DEVICE INTO SUPERIOR VENA CAVA, PERCUTANEOUS APPROACH: ICD-10-PCS | Performed by: INTERNAL MEDICINE

## 2023-12-19 PROCEDURE — 80053 COMPREHEN METABOLIC PANEL: CPT

## 2023-12-19 PROCEDURE — 36569 INSJ PICC 5 YR+ W/O IMAGING: CPT

## 2023-12-19 PROCEDURE — C9113 INJ PANTOPRAZOLE SODIUM, VIA: HCPCS | Performed by: STUDENT IN AN ORGANIZED HEALTH CARE EDUCATION/TRAINING PROGRAM

## 2023-12-19 PROCEDURE — 2580000003 HC RX 258: Performed by: SURGERY

## 2023-12-19 PROCEDURE — 6360000002 HC RX W HCPCS: Performed by: SURGERY

## 2023-12-19 PROCEDURE — 6360000002 HC RX W HCPCS: Performed by: STUDENT IN AN ORGANIZED HEALTH CARE EDUCATION/TRAINING PROGRAM

## 2023-12-19 PROCEDURE — 2580000003 HC RX 258: Performed by: STUDENT IN AN ORGANIZED HEALTH CARE EDUCATION/TRAINING PROGRAM

## 2023-12-19 RX ORDER — SODIUM CHLORIDE 0.9 % (FLUSH) 0.9 %
5-40 SYRINGE (ML) INJECTION EVERY 12 HOURS SCHEDULED
Status: DISCONTINUED | OUTPATIENT
Start: 2023-12-19 | End: 2023-12-26 | Stop reason: HOSPADM

## 2023-12-19 RX ORDER — DEXTROSE MONOHYDRATE 100 MG/ML
INJECTION, SOLUTION INTRAVENOUS CONTINUOUS PRN
Status: DISCONTINUED | OUTPATIENT
Start: 2023-12-19 | End: 2023-12-26 | Stop reason: HOSPADM

## 2023-12-19 RX ORDER — INSULIN LISPRO 100 [IU]/ML
0-8 INJECTION, SOLUTION INTRAVENOUS; SUBCUTANEOUS EVERY 4 HOURS
Status: DISCONTINUED | OUTPATIENT
Start: 2023-12-19 | End: 2023-12-20

## 2023-12-19 RX ORDER — ONDANSETRON 2 MG/ML
4 INJECTION INTRAMUSCULAR; INTRAVENOUS EVERY 4 HOURS PRN
Status: DISCONTINUED | OUTPATIENT
Start: 2023-12-19 | End: 2023-12-26 | Stop reason: HOSPADM

## 2023-12-19 RX ORDER — LIDOCAINE HYDROCHLORIDE 10 MG/ML
5 INJECTION, SOLUTION EPIDURAL; INFILTRATION; INTRACAUDAL; PERINEURAL ONCE
Status: DISCONTINUED | OUTPATIENT
Start: 2023-12-19 | End: 2023-12-26 | Stop reason: HOSPADM

## 2023-12-19 RX ORDER — GLUCAGON 1 MG/ML
1 KIT INJECTION PRN
Status: DISCONTINUED | OUTPATIENT
Start: 2023-12-19 | End: 2023-12-26 | Stop reason: HOSPADM

## 2023-12-19 RX ORDER — SODIUM CHLORIDE 9 MG/ML
25 INJECTION, SOLUTION INTRAVENOUS PRN
Status: DISCONTINUED | OUTPATIENT
Start: 2023-12-19 | End: 2023-12-26 | Stop reason: HOSPADM

## 2023-12-19 RX ORDER — SODIUM CHLORIDE 0.9 % (FLUSH) 0.9 %
5-40 SYRINGE (ML) INJECTION PRN
Status: DISCONTINUED | OUTPATIENT
Start: 2023-12-19 | End: 2023-12-26 | Stop reason: HOSPADM

## 2023-12-19 RX ADMIN — HYDROMORPHONE HYDROCHLORIDE 0.5 MG: 1 INJECTION, SOLUTION INTRAMUSCULAR; INTRAVENOUS; SUBCUTANEOUS at 10:14

## 2023-12-19 RX ADMIN — HYDROMORPHONE HYDROCHLORIDE 0.5 MG: 1 INJECTION, SOLUTION INTRAMUSCULAR; INTRAVENOUS; SUBCUTANEOUS at 19:44

## 2023-12-19 RX ADMIN — ENOXAPARIN SODIUM 30 MG: 100 INJECTION SUBCUTANEOUS at 21:38

## 2023-12-19 RX ADMIN — SODIUM CHLORIDE, PRESERVATIVE FREE 10 ML: 5 INJECTION INTRAVENOUS at 10:14

## 2023-12-19 RX ADMIN — SODIUM CHLORIDE, PRESERVATIVE FREE 10 ML: 5 INJECTION INTRAVENOUS at 21:40

## 2023-12-19 RX ADMIN — ONDANSETRON 4 MG: 2 INJECTION INTRAMUSCULAR; INTRAVENOUS at 01:26

## 2023-12-19 RX ADMIN — CEFTRIAXONE SODIUM 1000 MG: 1 INJECTION, POWDER, FOR SOLUTION INTRAMUSCULAR; INTRAVENOUS at 10:14

## 2023-12-19 RX ADMIN — HYDROMORPHONE HYDROCHLORIDE 0.5 MG: 1 INJECTION, SOLUTION INTRAMUSCULAR; INTRAVENOUS; SUBCUTANEOUS at 04:52

## 2023-12-19 RX ADMIN — HYDROMORPHONE HYDROCHLORIDE 0.5 MG: 1 INJECTION, SOLUTION INTRAMUSCULAR; INTRAVENOUS; SUBCUTANEOUS at 01:25

## 2023-12-19 RX ADMIN — ONDANSETRON 4 MG: 2 INJECTION INTRAMUSCULAR; INTRAVENOUS at 14:19

## 2023-12-19 RX ADMIN — PANTOPRAZOLE SODIUM 40 MG: 40 INJECTION, POWDER, FOR SOLUTION INTRAVENOUS at 10:14

## 2023-12-19 RX ADMIN — ONDANSETRON 4 MG: 2 INJECTION INTRAMUSCULAR; INTRAVENOUS at 07:26

## 2023-12-19 RX ADMIN — ONDANSETRON 4 MG: 2 INJECTION INTRAMUSCULAR; INTRAVENOUS at 19:43

## 2023-12-20 ENCOUNTER — APPOINTMENT (OUTPATIENT)
Dept: GENERAL RADIOLOGY | Age: 88
DRG: 391 | End: 2023-12-20
Payer: MEDICARE

## 2023-12-20 LAB
ALBUMIN SERPL-MCNC: 3.4 G/DL (ref 3.4–5)
ALBUMIN/GLOB SERPL: 1.3 {RATIO} (ref 1.1–2.2)
ALP SERPL-CCNC: 89 U/L (ref 40–129)
ALT SERPL-CCNC: 21 U/L (ref 10–40)
ANION GAP SERPL CALCULATED.3IONS-SCNC: 8 MMOL/L (ref 3–16)
AST SERPL-CCNC: 31 U/L (ref 15–37)
BASOPHILS # BLD: 0 K/UL (ref 0–0.2)
BASOPHILS NFR BLD: 0 %
BILIRUB SERPL-MCNC: 0.4 MG/DL (ref 0–1)
BUN SERPL-MCNC: 27 MG/DL (ref 7–20)
CALCIUM SERPL-MCNC: 9 MG/DL (ref 8.3–10.6)
CHLORIDE SERPL-SCNC: 102 MMOL/L (ref 99–110)
CO2 SERPL-SCNC: 29 MMOL/L (ref 21–32)
CREAT SERPL-MCNC: 0.8 MG/DL (ref 0.6–1.2)
DEPRECATED RDW RBC AUTO: 14.5 % (ref 12.4–15.4)
EOSINOPHIL # BLD: 0 K/UL (ref 0–0.6)
EOSINOPHIL NFR BLD: 0 %
GFR SERPLBLD CREATININE-BSD FMLA CKD-EPI: >60 ML/MIN/{1.73_M2}
GLUCOSE BLD-MCNC: 115 MG/DL (ref 70–99)
GLUCOSE BLD-MCNC: 134 MG/DL (ref 70–99)
GLUCOSE BLD-MCNC: 142 MG/DL (ref 70–99)
GLUCOSE BLD-MCNC: 198 MG/DL (ref 70–99)
GLUCOSE SERPL-MCNC: 144 MG/DL (ref 70–99)
HCT VFR BLD AUTO: 35.9 % (ref 36–48)
HGB BLD-MCNC: 12.2 G/DL (ref 12–16)
LYMPHOCYTES # BLD: 3.2 K/UL (ref 1–5.1)
LYMPHOCYTES NFR BLD: 32 %
MAGNESIUM SERPL-MCNC: 2.2 MG/DL (ref 1.8–2.4)
MCH RBC QN AUTO: 32.5 PG (ref 26–34)
MCHC RBC AUTO-ENTMCNC: 33.8 G/DL (ref 31–36)
MCV RBC AUTO: 95.9 FL (ref 80–100)
METAMYELOCYTES NFR BLD MANUAL: 1 %
MONOCYTES # BLD: 0.2 K/UL (ref 0–1.3)
MONOCYTES NFR BLD: 2 %
NEUTROPHILS # BLD: 5.5 K/UL (ref 1.7–7.7)
NEUTROPHILS NFR BLD: 60 %
NEUTS BAND NFR BLD MANUAL: 1 % (ref 0–7)
PERFORMED ON: ABNORMAL
PHOSPHATE SERPL-MCNC: 2.1 MG/DL (ref 2.5–4.9)
PLATELET # BLD AUTO: 135 K/UL (ref 135–450)
PLATELET BLD QL SMEAR: ABNORMAL
PMV BLD AUTO: 7.4 FL (ref 5–10.5)
POTASSIUM SERPL-SCNC: 4.4 MMOL/L (ref 3.5–5.1)
PROT SERPL-MCNC: 6 G/DL (ref 6.4–8.2)
RBC # BLD AUTO: 3.75 M/UL (ref 4–5.2)
RBC MORPH BLD: NORMAL
SLIDE REVIEW: ABNORMAL
SODIUM SERPL-SCNC: 139 MMOL/L (ref 136–145)
TRIGL SERPL-MCNC: 81 MG/DL (ref 0–150)
VARIANT LYMPHS NFR BLD MANUAL: 4 % (ref 0–6)
WBC # BLD AUTO: 8.8 K/UL (ref 4–11)

## 2023-12-20 PROCEDURE — 1200000000 HC SEMI PRIVATE

## 2023-12-20 PROCEDURE — 6360000002 HC RX W HCPCS: Performed by: NURSE PRACTITIONER

## 2023-12-20 PROCEDURE — 36415 COLL VENOUS BLD VENIPUNCTURE: CPT

## 2023-12-20 PROCEDURE — 2580000003 HC RX 258: Performed by: STUDENT IN AN ORGANIZED HEALTH CARE EDUCATION/TRAINING PROGRAM

## 2023-12-20 PROCEDURE — 84478 ASSAY OF TRIGLYCERIDES: CPT

## 2023-12-20 PROCEDURE — C9113 INJ PANTOPRAZOLE SODIUM, VIA: HCPCS | Performed by: STUDENT IN AN ORGANIZED HEALTH CARE EDUCATION/TRAINING PROGRAM

## 2023-12-20 PROCEDURE — 2580000003 HC RX 258: Performed by: SURGERY

## 2023-12-20 PROCEDURE — 6360000002 HC RX W HCPCS: Performed by: STUDENT IN AN ORGANIZED HEALTH CARE EDUCATION/TRAINING PROGRAM

## 2023-12-20 PROCEDURE — 2500000003 HC RX 250 WO HCPCS: Performed by: SURGERY

## 2023-12-20 PROCEDURE — 74240 X-RAY XM UPR GI TRC 1CNTRST: CPT

## 2023-12-20 PROCEDURE — 80053 COMPREHEN METABOLIC PANEL: CPT

## 2023-12-20 PROCEDURE — 2580000003 HC RX 258: Performed by: PHYSICIAN ASSISTANT

## 2023-12-20 PROCEDURE — 6360000002 HC RX W HCPCS: Performed by: SURGERY

## 2023-12-20 PROCEDURE — 83735 ASSAY OF MAGNESIUM: CPT

## 2023-12-20 PROCEDURE — 85025 COMPLETE CBC W/AUTO DIFF WBC: CPT

## 2023-12-20 PROCEDURE — 84100 ASSAY OF PHOSPHORUS: CPT

## 2023-12-20 PROCEDURE — 99232 SBSQ HOSP IP/OBS MODERATE 35: CPT | Performed by: SURGERY

## 2023-12-20 PROCEDURE — 6360000002 HC RX W HCPCS: Performed by: PHYSICIAN ASSISTANT

## 2023-12-20 RX ORDER — INSULIN LISPRO 100 [IU]/ML
0-4 INJECTION, SOLUTION INTRAVENOUS; SUBCUTANEOUS EVERY 6 HOURS
Status: DISCONTINUED | OUTPATIENT
Start: 2023-12-20 | End: 2023-12-23

## 2023-12-20 RX ADMIN — CEFTRIAXONE SODIUM 1000 MG: 1 INJECTION, POWDER, FOR SOLUTION INTRAMUSCULAR; INTRAVENOUS at 09:28

## 2023-12-20 RX ADMIN — HYDROMORPHONE HYDROCHLORIDE 0.5 MG: 1 INJECTION, SOLUTION INTRAMUSCULAR; INTRAVENOUS; SUBCUTANEOUS at 09:31

## 2023-12-20 RX ADMIN — HYDROMORPHONE HYDROCHLORIDE 0.5 MG: 1 INJECTION, SOLUTION INTRAMUSCULAR; INTRAVENOUS; SUBCUTANEOUS at 17:29

## 2023-12-20 RX ADMIN — SODIUM CHLORIDE, PRESERVATIVE FREE 10 ML: 5 INJECTION INTRAVENOUS at 21:13

## 2023-12-20 RX ADMIN — PANTOPRAZOLE SODIUM 40 MG: 40 INJECTION, POWDER, FOR SOLUTION INTRAVENOUS at 09:31

## 2023-12-20 RX ADMIN — ONDANSETRON 4 MG: 2 INJECTION INTRAMUSCULAR; INTRAVENOUS at 15:03

## 2023-12-20 RX ADMIN — ONDANSETRON 4 MG: 2 INJECTION INTRAMUSCULAR; INTRAVENOUS at 06:32

## 2023-12-20 RX ADMIN — SODIUM CHLORIDE, PRESERVATIVE FREE 10 ML: 5 INJECTION INTRAVENOUS at 09:35

## 2023-12-20 RX ADMIN — ONDANSETRON 4 MG: 2 INJECTION INTRAMUSCULAR; INTRAVENOUS at 01:36

## 2023-12-20 RX ADMIN — ASCORBIC ACID, VITAMIN A PALMITATE, CHOLECALCIFEROL, THIAMINE HYDROCHLORIDE, RIBOFLAVIN-5 PHOSPHATE SODIUM, PYRIDOXINE HYDROCHLORIDE, NIACINAMIDE, DEXPANTHENOL, ALPHA-TOCOPHEROL ACETATE, VITAMIN K1, FOLIC ACID, BIOTIN, CYANOCOBALAMIN: 200; 3300; 200; 6; 3.6; 6; 40; 15; 10; 150; 600; 60; 5 INJECTION, SOLUTION INTRAVENOUS at 18:02

## 2023-12-20 RX ADMIN — ONDANSETRON 4 MG: 2 INJECTION INTRAMUSCULAR; INTRAVENOUS at 21:11

## 2023-12-20 RX ADMIN — SODIUM PHOSPHATE, MONOBASIC, MONOHYDRATE AND SODIUM PHOSPHATE, DIBASIC, ANHYDROUS 10 MMOL: 142; 276 INJECTION, SOLUTION INTRAVENOUS at 12:56

## 2023-12-20 RX ADMIN — HYDROMORPHONE HYDROCHLORIDE 0.5 MG: 1 INJECTION, SOLUTION INTRAMUSCULAR; INTRAVENOUS; SUBCUTANEOUS at 06:32

## 2023-12-20 RX ADMIN — HYDROMORPHONE HYDROCHLORIDE 0.5 MG: 1 INJECTION, SOLUTION INTRAMUSCULAR; INTRAVENOUS; SUBCUTANEOUS at 21:11

## 2023-12-20 RX ADMIN — ENOXAPARIN SODIUM 30 MG: 100 INJECTION SUBCUTANEOUS at 21:12

## 2023-12-20 RX ADMIN — SODIUM CHLORIDE, PRESERVATIVE FREE 10 ML: 5 INJECTION INTRAVENOUS at 09:28

## 2023-12-20 RX ADMIN — HYDROMORPHONE HYDROCHLORIDE 0.5 MG: 1 INJECTION, SOLUTION INTRAMUSCULAR; INTRAVENOUS; SUBCUTANEOUS at 01:36

## 2023-12-20 RX ADMIN — HYDROMORPHONE HYDROCHLORIDE 0.5 MG: 1 INJECTION, SOLUTION INTRAMUSCULAR; INTRAVENOUS; SUBCUTANEOUS at 12:44

## 2023-12-21 PROBLEM — E83.42 HYPOMAGNESEMIA: Status: ACTIVE | Noted: 2023-12-21

## 2023-12-21 PROBLEM — E83.39 HYPOPHOSPHATEMIA: Status: ACTIVE | Noted: 2023-12-21

## 2023-12-21 LAB
ALBUMIN SERPL-MCNC: 3.2 G/DL (ref 3.4–5)
ALBUMIN/GLOB SERPL: 1.3 {RATIO} (ref 1.1–2.2)
ALP SERPL-CCNC: 70 U/L (ref 40–129)
ALT SERPL-CCNC: 18 U/L (ref 10–40)
ANION GAP SERPL CALCULATED.3IONS-SCNC: 4 MMOL/L (ref 3–16)
AST SERPL-CCNC: 22 U/L (ref 15–37)
BASOPHILS # BLD: 0 K/UL (ref 0–0.2)
BASOPHILS NFR BLD: 0.2 %
BILIRUB SERPL-MCNC: 0.4 MG/DL (ref 0–1)
BUN SERPL-MCNC: 23 MG/DL (ref 7–20)
CALCIUM SERPL-MCNC: 7.9 MG/DL (ref 8.3–10.6)
CHLORIDE SERPL-SCNC: 101 MMOL/L (ref 99–110)
CO2 SERPL-SCNC: 33 MMOL/L (ref 21–32)
CREAT SERPL-MCNC: 0.8 MG/DL (ref 0.6–1.2)
DEPRECATED RDW RBC AUTO: 14.1 % (ref 12.4–15.4)
EOSINOPHIL # BLD: 0.1 K/UL (ref 0–0.6)
EOSINOPHIL NFR BLD: 0.6 %
GFR SERPLBLD CREATININE-BSD FMLA CKD-EPI: >60 ML/MIN/{1.73_M2}
GLUCOSE BLD-MCNC: 151 MG/DL (ref 70–99)
GLUCOSE BLD-MCNC: 182 MG/DL (ref 70–99)
GLUCOSE BLD-MCNC: 185 MG/DL (ref 70–99)
GLUCOSE BLD-MCNC: 187 MG/DL (ref 70–99)
GLUCOSE BLD-MCNC: 193 MG/DL (ref 70–99)
GLUCOSE BLD-MCNC: 193 MG/DL (ref 70–99)
GLUCOSE SERPL-MCNC: 174 MG/DL (ref 70–99)
HCT VFR BLD AUTO: 30.7 % (ref 36–48)
HGB BLD-MCNC: 10.4 G/DL (ref 12–16)
LYMPHOCYTES # BLD: 3.4 K/UL (ref 1–5.1)
LYMPHOCYTES NFR BLD: 35.1 %
MAGNESIUM SERPL-MCNC: 1.7 MG/DL (ref 1.8–2.4)
MCH RBC QN AUTO: 32.3 PG (ref 26–34)
MCHC RBC AUTO-ENTMCNC: 34 G/DL (ref 31–36)
MCV RBC AUTO: 95 FL (ref 80–100)
MONOCYTES # BLD: 0.6 K/UL (ref 0–1.3)
MONOCYTES NFR BLD: 6.5 %
NEUTROPHILS # BLD: 5.6 K/UL (ref 1.7–7.7)
NEUTROPHILS NFR BLD: 57.6 %
PERFORMED ON: ABNORMAL
PHOSPHATE SERPL-MCNC: 2.1 MG/DL (ref 2.5–4.9)
PLATELET # BLD AUTO: 117 K/UL (ref 135–450)
PMV BLD AUTO: 7.5 FL (ref 5–10.5)
POTASSIUM SERPL-SCNC: 3.9 MMOL/L (ref 3.5–5.1)
PROT SERPL-MCNC: 5.7 G/DL (ref 6.4–8.2)
RBC # BLD AUTO: 3.23 M/UL (ref 4–5.2)
SODIUM SERPL-SCNC: 138 MMOL/L (ref 136–145)
WBC # BLD AUTO: 9.7 K/UL (ref 4–11)

## 2023-12-21 PROCEDURE — 2580000003 HC RX 258: Performed by: INTERNAL MEDICINE

## 2023-12-21 PROCEDURE — 6360000002 HC RX W HCPCS: Performed by: PHYSICIAN ASSISTANT

## 2023-12-21 PROCEDURE — 2500000003 HC RX 250 WO HCPCS: Performed by: SURGERY

## 2023-12-21 PROCEDURE — 6360000002 HC RX W HCPCS: Performed by: STUDENT IN AN ORGANIZED HEALTH CARE EDUCATION/TRAINING PROGRAM

## 2023-12-21 PROCEDURE — 85025 COMPLETE CBC W/AUTO DIFF WBC: CPT

## 2023-12-21 PROCEDURE — 99232 SBSQ HOSP IP/OBS MODERATE 35: CPT | Performed by: SURGERY

## 2023-12-21 PROCEDURE — 80053 COMPREHEN METABOLIC PANEL: CPT

## 2023-12-21 PROCEDURE — 97530 THERAPEUTIC ACTIVITIES: CPT

## 2023-12-21 PROCEDURE — 6360000002 HC RX W HCPCS: Performed by: SURGERY

## 2023-12-21 PROCEDURE — 2500000003 HC RX 250 WO HCPCS: Performed by: INTERNAL MEDICINE

## 2023-12-21 PROCEDURE — 97162 PT EVAL MOD COMPLEX 30 MIN: CPT

## 2023-12-21 PROCEDURE — C9113 INJ PANTOPRAZOLE SODIUM, VIA: HCPCS | Performed by: STUDENT IN AN ORGANIZED HEALTH CARE EDUCATION/TRAINING PROGRAM

## 2023-12-21 PROCEDURE — 2580000003 HC RX 258: Performed by: SURGERY

## 2023-12-21 PROCEDURE — 1200000000 HC SEMI PRIVATE

## 2023-12-21 PROCEDURE — 2580000003 HC RX 258: Performed by: STUDENT IN AN ORGANIZED HEALTH CARE EDUCATION/TRAINING PROGRAM

## 2023-12-21 PROCEDURE — 6370000000 HC RX 637 (ALT 250 FOR IP): Performed by: INTERNAL MEDICINE

## 2023-12-21 PROCEDURE — 83735 ASSAY OF MAGNESIUM: CPT

## 2023-12-21 PROCEDURE — 97166 OT EVAL MOD COMPLEX 45 MIN: CPT

## 2023-12-21 PROCEDURE — 6360000002 HC RX W HCPCS: Performed by: INTERNAL MEDICINE

## 2023-12-21 PROCEDURE — 84100 ASSAY OF PHOSPHORUS: CPT

## 2023-12-21 PROCEDURE — 6360000002 HC RX W HCPCS: Performed by: NURSE PRACTITIONER

## 2023-12-21 RX ORDER — LIDOCAINE 4 G/G
1 PATCH TOPICAL DAILY
Status: DISCONTINUED | OUTPATIENT
Start: 2023-12-21 | End: 2023-12-26 | Stop reason: HOSPADM

## 2023-12-21 RX ORDER — MAGNESIUM SULFATE IN WATER 40 MG/ML
2000 INJECTION, SOLUTION INTRAVENOUS ONCE
Status: COMPLETED | OUTPATIENT
Start: 2023-12-21 | End: 2023-12-21

## 2023-12-21 RX ADMIN — ONDANSETRON 4 MG: 2 INJECTION INTRAMUSCULAR; INTRAVENOUS at 15:41

## 2023-12-21 RX ADMIN — ENOXAPARIN SODIUM 30 MG: 100 INJECTION SUBCUTANEOUS at 21:58

## 2023-12-21 RX ADMIN — HYDROMORPHONE HYDROCHLORIDE 0.5 MG: 1 INJECTION, SOLUTION INTRAMUSCULAR; INTRAVENOUS; SUBCUTANEOUS at 05:28

## 2023-12-21 RX ADMIN — HYDROMORPHONE HYDROCHLORIDE 0.5 MG: 1 INJECTION, SOLUTION INTRAMUSCULAR; INTRAVENOUS; SUBCUTANEOUS at 09:13

## 2023-12-21 RX ADMIN — HYDROMORPHONE HYDROCHLORIDE 0.5 MG: 1 INJECTION, SOLUTION INTRAMUSCULAR; INTRAVENOUS; SUBCUTANEOUS at 14:32

## 2023-12-21 RX ADMIN — THIAMINE HYDROCHLORIDE 200 MG: 100 INJECTION, SOLUTION INTRAMUSCULAR; INTRAVENOUS at 15:38

## 2023-12-21 RX ADMIN — CEFTRIAXONE SODIUM 1000 MG: 1 INJECTION, POWDER, FOR SOLUTION INTRAMUSCULAR; INTRAVENOUS at 09:22

## 2023-12-21 RX ADMIN — ONDANSETRON 4 MG: 2 INJECTION INTRAMUSCULAR; INTRAVENOUS at 04:24

## 2023-12-21 RX ADMIN — LEUCINE, PHENYLALANINE, LYSINE, METHIONINE, ISOLEUCINE, VALINE, HISTIDINE, THREONINE, TRYPTOPHAN, ALANINE, GLYCINE, ARGININE, PROLINE, SERINE, TYROSINE, SODIUM ACETATE, DIBASIC POTASSIUM PHOSPHATE, MAGNESIUM CHLORIDE, SODIUM CHLORIDE, CALCIUM CHLORIDE, DEXTROSE
365; 280; 290; 200; 300; 290; 240; 210; 90; 1035; 515; 575; 340; 250; 20; 340; 261; 51; 59; 33; 20 INJECTION INTRAVENOUS at 18:22

## 2023-12-21 RX ADMIN — OLIVE OIL AND SOYBEAN OIL 250 ML: 16; 4 INJECTION, EMULSION INTRAVENOUS at 18:23

## 2023-12-21 RX ADMIN — HYDROMORPHONE HYDROCHLORIDE 0.5 MG: 1 INJECTION, SOLUTION INTRAMUSCULAR; INTRAVENOUS; SUBCUTANEOUS at 01:18

## 2023-12-21 RX ADMIN — PANTOPRAZOLE SODIUM 40 MG: 40 INJECTION, POWDER, FOR SOLUTION INTRAVENOUS at 09:15

## 2023-12-21 RX ADMIN — ONDANSETRON 4 MG: 2 INJECTION INTRAMUSCULAR; INTRAVENOUS at 11:15

## 2023-12-21 RX ADMIN — MAGNESIUM SULFATE HEPTAHYDRATE 2000 MG: 40 INJECTION, SOLUTION INTRAVENOUS at 11:12

## 2023-12-21 RX ADMIN — POTASSIUM PHOSPHATES 20 MMOL: 236; 224 INJECTION, SOLUTION INTRAVENOUS at 11:10

## 2023-12-21 RX ADMIN — HYDROMORPHONE HYDROCHLORIDE 0.5 MG: 1 INJECTION, SOLUTION INTRAMUSCULAR; INTRAVENOUS; SUBCUTANEOUS at 18:25

## 2023-12-22 ENCOUNTER — APPOINTMENT (OUTPATIENT)
Dept: GENERAL RADIOLOGY | Age: 88
DRG: 391 | End: 2023-12-22
Payer: MEDICARE

## 2023-12-22 LAB
ALBUMIN SERPL-MCNC: 3.1 G/DL (ref 3.4–5)
ALBUMIN/GLOB SERPL: 1.2 {RATIO} (ref 1.1–2.2)
ALP SERPL-CCNC: 67 U/L (ref 40–129)
ALT SERPL-CCNC: 16 U/L (ref 10–40)
ANION GAP SERPL CALCULATED.3IONS-SCNC: 8 MMOL/L (ref 3–16)
AST SERPL-CCNC: 21 U/L (ref 15–37)
BASOPHILS # BLD: 0 K/UL (ref 0–0.2)
BASOPHILS NFR BLD: 0 %
BILIRUB SERPL-MCNC: 0.3 MG/DL (ref 0–1)
BUN SERPL-MCNC: 23 MG/DL (ref 7–20)
CALCIUM SERPL-MCNC: 8.6 MG/DL (ref 8.3–10.6)
CHLORIDE SERPL-SCNC: 100 MMOL/L (ref 99–110)
CO2 SERPL-SCNC: 30 MMOL/L (ref 21–32)
CREAT SERPL-MCNC: 0.7 MG/DL (ref 0.6–1.2)
DEPRECATED RDW RBC AUTO: 14.3 % (ref 12.4–15.4)
EOSINOPHIL # BLD: 0 K/UL (ref 0–0.6)
EOSINOPHIL NFR BLD: 0 %
GFR SERPLBLD CREATININE-BSD FMLA CKD-EPI: >60 ML/MIN/{1.73_M2}
GLUCOSE BLD-MCNC: 185 MG/DL (ref 70–99)
GLUCOSE BLD-MCNC: 186 MG/DL (ref 70–99)
GLUCOSE BLD-MCNC: 199 MG/DL (ref 70–99)
GLUCOSE BLD-MCNC: 200 MG/DL (ref 70–99)
GLUCOSE BLD-MCNC: 212 MG/DL (ref 70–99)
GLUCOSE BLD-MCNC: 230 MG/DL (ref 70–99)
GLUCOSE BLD-MCNC: 250 MG/DL (ref 70–99)
GLUCOSE SERPL-MCNC: 212 MG/DL (ref 70–99)
HCT VFR BLD AUTO: 31.1 % (ref 36–48)
HGB BLD-MCNC: 10.4 G/DL (ref 12–16)
LYMPHOCYTES # BLD: 2.3 K/UL (ref 1–5.1)
LYMPHOCYTES NFR BLD: 18 %
MAGNESIUM SERPL-MCNC: 2.2 MG/DL (ref 1.8–2.4)
MCH RBC QN AUTO: 31.8 PG (ref 26–34)
MCHC RBC AUTO-ENTMCNC: 33.6 G/DL (ref 31–36)
MCV RBC AUTO: 94.8 FL (ref 80–100)
MONOCYTES # BLD: 0.1 K/UL (ref 0–1.3)
MONOCYTES NFR BLD: 1 %
NEUTROPHILS # BLD: 7.6 K/UL (ref 1.7–7.7)
NEUTROPHILS NFR BLD: 76 %
PERFORMED ON: ABNORMAL
PHOSPHATE SERPL-MCNC: 2.1 MG/DL (ref 2.5–4.9)
PLATELET # BLD AUTO: 124 K/UL (ref 135–450)
PLATELET BLD QL SMEAR: ABNORMAL
PMV BLD AUTO: 7.5 FL (ref 5–10.5)
POTASSIUM SERPL-SCNC: 3.9 MMOL/L (ref 3.5–5.1)
POTASSIUM SERPL-SCNC: 3.9 MMOL/L (ref 3.5–5.1)
PROT SERPL-MCNC: 5.6 G/DL (ref 6.4–8.2)
RBC # BLD AUTO: 3.28 M/UL (ref 4–5.2)
RBC MORPH BLD: NORMAL
SLIDE REVIEW: ABNORMAL
SODIUM SERPL-SCNC: 138 MMOL/L (ref 136–145)
VARIANT LYMPHS NFR BLD MANUAL: 5 % (ref 0–6)
WBC # BLD AUTO: 10 K/UL (ref 4–11)

## 2023-12-22 PROCEDURE — 6360000002 HC RX W HCPCS: Performed by: INTERNAL MEDICINE

## 2023-12-22 PROCEDURE — 6370000000 HC RX 637 (ALT 250 FOR IP): Performed by: INTERNAL MEDICINE

## 2023-12-22 PROCEDURE — C9113 INJ PANTOPRAZOLE SODIUM, VIA: HCPCS | Performed by: STUDENT IN AN ORGANIZED HEALTH CARE EDUCATION/TRAINING PROGRAM

## 2023-12-22 PROCEDURE — 2709999900 HC NON-CHARGEABLE SUPPLY: Performed by: INTERNAL MEDICINE

## 2023-12-22 PROCEDURE — 80053 COMPREHEN METABOLIC PANEL: CPT

## 2023-12-22 PROCEDURE — 2580000003 HC RX 258: Performed by: SURGERY

## 2023-12-22 PROCEDURE — 1200000000 HC SEMI PRIVATE

## 2023-12-22 PROCEDURE — 3700000000 HC ANESTHESIA ATTENDED CARE: Performed by: INTERNAL MEDICINE

## 2023-12-22 PROCEDURE — 6360000002 HC RX W HCPCS: Performed by: SURGERY

## 2023-12-22 PROCEDURE — 6360000002 HC RX W HCPCS: Performed by: NURSE PRACTITIONER

## 2023-12-22 PROCEDURE — 84100 ASSAY OF PHOSPHORUS: CPT

## 2023-12-22 PROCEDURE — 99232 SBSQ HOSP IP/OBS MODERATE 35: CPT | Performed by: SURGERY

## 2023-12-22 PROCEDURE — 6370000000 HC RX 637 (ALT 250 FOR IP): Performed by: SURGERY

## 2023-12-22 PROCEDURE — 3E0G8GC INTRODUCTION OF OTHER THERAPEUTIC SUBSTANCE INTO UPPER GI, VIA NATURAL OR ARTIFICIAL OPENING ENDOSCOPIC: ICD-10-PCS | Performed by: INTERNAL MEDICINE

## 2023-12-22 PROCEDURE — 7100000000 HC PACU RECOVERY - FIRST 15 MIN: Performed by: INTERNAL MEDICINE

## 2023-12-22 PROCEDURE — 6360000002 HC RX W HCPCS: Performed by: PHYSICIAN ASSISTANT

## 2023-12-22 PROCEDURE — 2500000003 HC RX 250 WO HCPCS: Performed by: SURGERY

## 2023-12-22 PROCEDURE — 7100000001 HC PACU RECOVERY - ADDTL 15 MIN: Performed by: INTERNAL MEDICINE

## 2023-12-22 PROCEDURE — 2580000003 HC RX 258: Performed by: STUDENT IN AN ORGANIZED HEALTH CARE EDUCATION/TRAINING PROGRAM

## 2023-12-22 PROCEDURE — 3700000001 HC ADD 15 MINUTES (ANESTHESIA): Performed by: INTERNAL MEDICINE

## 2023-12-22 PROCEDURE — 83735 ASSAY OF MAGNESIUM: CPT

## 2023-12-22 PROCEDURE — 3609013800 HC EGD SUBMUCOSAL/BOTOX INJECTION: Performed by: INTERNAL MEDICINE

## 2023-12-22 PROCEDURE — 6370000000 HC RX 637 (ALT 250 FOR IP): Performed by: PHYSICIAN ASSISTANT

## 2023-12-22 PROCEDURE — 74022 RADEX COMPL AQT ABD SERIES: CPT

## 2023-12-22 PROCEDURE — 6360000002 HC RX W HCPCS: Performed by: STUDENT IN AN ORGANIZED HEALTH CARE EDUCATION/TRAINING PROGRAM

## 2023-12-22 PROCEDURE — 85025 COMPLETE CBC W/AUTO DIFF WBC: CPT

## 2023-12-22 RX ADMIN — ENOXAPARIN SODIUM 30 MG: 100 INJECTION SUBCUTANEOUS at 20:58

## 2023-12-22 RX ADMIN — CEFTRIAXONE SODIUM 1000 MG: 1 INJECTION, POWDER, FOR SOLUTION INTRAMUSCULAR; INTRAVENOUS at 09:45

## 2023-12-22 RX ADMIN — POTASSIUM PHOSPHATES 20 MMOL: 236; 224 INJECTION, SOLUTION INTRAVENOUS at 10:17

## 2023-12-22 RX ADMIN — ONDANSETRON 4 MG: 2 INJECTION INTRAMUSCULAR; INTRAVENOUS at 09:43

## 2023-12-22 RX ADMIN — HYDROMORPHONE HYDROCHLORIDE 0.5 MG: 1 INJECTION, SOLUTION INTRAMUSCULAR; INTRAVENOUS; SUBCUTANEOUS at 22:55

## 2023-12-22 RX ADMIN — ASCORBIC ACID, VITAMIN A PALMITATE, CHOLECALCIFEROL, THIAMINE HYDROCHLORIDE, RIBOFLAVIN-5 PHOSPHATE SODIUM, PYRIDOXINE HYDROCHLORIDE, NIACINAMIDE, DEXPANTHENOL, ALPHA-TOCOPHEROL ACETATE, VITAMIN K1, FOLIC ACID, BIOTIN, CYANOCOBALAMIN: 200; 3300; 200; 6; 3.6; 6; 40; 15; 10; 150; 600; 60; 5 INJECTION, SOLUTION INTRAVENOUS at 17:35

## 2023-12-22 RX ADMIN — HYDROMORPHONE HYDROCHLORIDE 0.5 MG: 1 INJECTION, SOLUTION INTRAMUSCULAR; INTRAVENOUS; SUBCUTANEOUS at 04:07

## 2023-12-22 RX ADMIN — INSULIN LISPRO 2 UNITS: 100 INJECTION, SOLUTION INTRAVENOUS; SUBCUTANEOUS at 05:12

## 2023-12-22 RX ADMIN — PANTOPRAZOLE SODIUM 40 MG: 40 INJECTION, POWDER, FOR SOLUTION INTRAVENOUS at 09:37

## 2023-12-22 RX ADMIN — THIAMINE HYDROCHLORIDE 200 MG: 100 INJECTION, SOLUTION INTRAMUSCULAR; INTRAVENOUS at 17:23

## 2023-12-22 RX ADMIN — INSULIN LISPRO 1 UNITS: 100 INJECTION, SOLUTION INTRAVENOUS; SUBCUTANEOUS at 00:26

## 2023-12-22 NOTE — CARE COORDINATION
Discharge Planning Note:    Met with patient and family. An approved SNF list was given to the family for review. The family stated that the paient had been at in the past:    - [facility] - waiting on response    Will continue to follow.     Joanette Angelucci, BSN RN    [facility]  Phone: [phone]
Discharge Planning Note:    PT/OT recommends SNF    Referral placed with:     - Patient may decline [facility] [address] [facility] [address] - waiting on response    Note: Patient is not medically ready for discharge. Will continue to follow.     ELEAZAR FelizN RN    [facility]  Phone: [phone]
and shares the quality data associated with the providers was provided to:     Patient Representative Name:       The Patient and/or Patient Representative Agree with the Discharge Plan?       Sunil Pena RN  Case Management Department  Ph: [phone] Fax: [phone]

## 2023-12-22 NOTE — PROCEDURES
Endoscopy Note    Patient: Deni Matias  : 10/16/1932  CSN: [id number]    Procedure: Esophagogastroduodenoscopy with injection of Botox placement of an NG tube    Date:  2023     Surgeon:  Venancio Flores MD     Referring Physician:  DENIZ Haq - CNP    Preoperative Diagnosis:  Dysphagia, unspecified type [R13.10]    Postoperative Diagnosis: #1 patient had barium and fluid left in the stomach  #2 evidence of severe  #3 successful injection of Botox  #4 successful placement of an NG tube      Anesthesia:  MAC    EBL: minimal to none. Indications: This is a 80y.o. year old female who we are trying to help today by injecting Botox into her prepyloric antrum for doing this because she had a gastric emptying study which showed severe gastroparesis and upper GI series which showed delayed gastric emptying    Given her age were trying to do some steps to see if she can avoid any type of surgical intervention    Description of Procedure:  Informed consent was obtained from the patient after explanation of indications, benefits and possible risks and complications of the procedure. The patient was then taken to the endoscopy suite, placed in the left lateral decubitus position and the above IV sedation was administrered.     The Olympus video endoscope was passed through the hypopharynx     Posterior pharynx was normal  Esophagus there was some blood in the esophagus which was different Monday when we scoped her  Hiatal hernia with very large there is lots of black fluid in the hiatal hernia was suctioned out 300 mL of this and then we noted that there was lots of barium  At this point the certified nurse anesthetist and I decided that because there is barium and still fluid in the hiatal hernia that the best thing to do would be to intubate the patient    We needed to protect her from getting barium in her lung    So we did call the daughter she agreed that the patient could be in the    Our

## 2023-12-23 LAB
ALBUMIN SERPL-MCNC: 2.8 G/DL (ref 3.4–5)
ALBUMIN/GLOB SERPL: 1 {RATIO} (ref 1.1–2.2)
ALP SERPL-CCNC: 60 U/L (ref 40–129)
ALT SERPL-CCNC: 13 U/L (ref 10–40)
ANION GAP SERPL CALCULATED.3IONS-SCNC: 4 MMOL/L (ref 3–16)
AST SERPL-CCNC: 19 U/L (ref 15–37)
BASOPHILS # BLD: 0 K/UL (ref 0–0.2)
BASOPHILS NFR BLD: 0.4 %
BILIRUB SERPL-MCNC: 0.3 MG/DL (ref 0–1)
BUN SERPL-MCNC: 31 MG/DL (ref 7–20)
CALCIUM SERPL-MCNC: 8.2 MG/DL (ref 8.3–10.6)
CHLORIDE SERPL-SCNC: 102 MMOL/L (ref 99–110)
CO2 SERPL-SCNC: 32 MMOL/L (ref 21–32)
CREAT SERPL-MCNC: 0.7 MG/DL (ref 0.6–1.2)
DEPRECATED RDW RBC AUTO: 14.1 % (ref 12.4–15.4)
EOSINOPHIL # BLD: 0 K/UL (ref 0–0.6)
EOSINOPHIL NFR BLD: 0.7 %
GFR SERPLBLD CREATININE-BSD FMLA CKD-EPI: >60 ML/MIN/{1.73_M2}
GLUCOSE BLD-MCNC: 165 MG/DL (ref 70–99)
GLUCOSE BLD-MCNC: 166 MG/DL (ref 70–99)
GLUCOSE BLD-MCNC: 171 MG/DL (ref 70–99)
GLUCOSE BLD-MCNC: 175 MG/DL (ref 70–99)
GLUCOSE BLD-MCNC: 210 MG/DL (ref 70–99)
GLUCOSE BLD-MCNC: 223 MG/DL (ref 70–99)
GLUCOSE SERPL-MCNC: 186 MG/DL (ref 70–99)
HCT VFR BLD AUTO: 28.2 % (ref 36–48)
HGB BLD-MCNC: 9.6 G/DL (ref 12–16)
LYMPHOCYTES # BLD: 2.1 K/UL (ref 1–5.1)
LYMPHOCYTES NFR BLD: 29.2 %
MAGNESIUM SERPL-MCNC: 2.4 MG/DL (ref 1.8–2.4)
MCH RBC QN AUTO: 32.2 PG (ref 26–34)
MCHC RBC AUTO-ENTMCNC: 33.8 G/DL (ref 31–36)
MCV RBC AUTO: 95.1 FL (ref 80–100)
MONOCYTES # BLD: 0.5 K/UL (ref 0–1.3)
MONOCYTES NFR BLD: 7.6 %
NEUTROPHILS # BLD: 4.5 K/UL (ref 1.7–7.7)
NEUTROPHILS NFR BLD: 62.1 %
PERFORMED ON: ABNORMAL
PHOSPHATE SERPL-MCNC: 3 MG/DL (ref 2.5–4.9)
PLATELET # BLD AUTO: 125 K/UL (ref 135–450)
PMV BLD AUTO: 7.6 FL (ref 5–10.5)
POTASSIUM SERPL-SCNC: 3.7 MMOL/L (ref 3.5–5.1)
PROT SERPL-MCNC: 5.6 G/DL (ref 6.4–8.2)
RBC # BLD AUTO: 2.97 M/UL (ref 4–5.2)
SODIUM SERPL-SCNC: 138 MMOL/L (ref 136–145)
WBC # BLD AUTO: 7.2 K/UL (ref 4–11)

## 2023-12-23 PROCEDURE — 6360000002 HC RX W HCPCS: Performed by: NURSE PRACTITIONER

## 2023-12-23 PROCEDURE — 80053 COMPREHEN METABOLIC PANEL: CPT

## 2023-12-23 PROCEDURE — 84100 ASSAY OF PHOSPHORUS: CPT

## 2023-12-23 PROCEDURE — 2580000003 HC RX 258: Performed by: STUDENT IN AN ORGANIZED HEALTH CARE EDUCATION/TRAINING PROGRAM

## 2023-12-23 PROCEDURE — 6370000000 HC RX 637 (ALT 250 FOR IP): Performed by: SURGERY

## 2023-12-23 PROCEDURE — 83735 ASSAY OF MAGNESIUM: CPT

## 2023-12-23 PROCEDURE — 99232 SBSQ HOSP IP/OBS MODERATE 35: CPT | Performed by: SURGERY

## 2023-12-23 PROCEDURE — 1200000000 HC SEMI PRIVATE

## 2023-12-23 PROCEDURE — 6370000000 HC RX 637 (ALT 250 FOR IP): Performed by: INTERNAL MEDICINE

## 2023-12-23 PROCEDURE — 6360000002 HC RX W HCPCS: Performed by: PHYSICIAN ASSISTANT

## 2023-12-23 PROCEDURE — 2500000003 HC RX 250 WO HCPCS: Performed by: INTERNAL MEDICINE

## 2023-12-23 PROCEDURE — 85025 COMPLETE CBC W/AUTO DIFF WBC: CPT

## 2023-12-23 PROCEDURE — 2580000003 HC RX 258: Performed by: SURGERY

## 2023-12-23 PROCEDURE — 6360000002 HC RX W HCPCS: Performed by: STUDENT IN AN ORGANIZED HEALTH CARE EDUCATION/TRAINING PROGRAM

## 2023-12-23 PROCEDURE — C9113 INJ PANTOPRAZOLE SODIUM, VIA: HCPCS | Performed by: STUDENT IN AN ORGANIZED HEALTH CARE EDUCATION/TRAINING PROGRAM

## 2023-12-23 PROCEDURE — 6360000002 HC RX W HCPCS: Performed by: SURGERY

## 2023-12-23 PROCEDURE — 2580000003 HC RX 258: Performed by: PHYSICIAN ASSISTANT

## 2023-12-23 RX ORDER — INSULIN LISPRO 100 [IU]/ML
0-8 INJECTION, SOLUTION INTRAVENOUS; SUBCUTANEOUS EVERY 6 HOURS
Status: DISCONTINUED | OUTPATIENT
Start: 2023-12-23 | End: 2023-12-26 | Stop reason: HOSPADM

## 2023-12-23 RX ADMIN — INSULIN LISPRO 2 UNITS: 100 INJECTION, SOLUTION INTRAVENOUS; SUBCUTANEOUS at 09:17

## 2023-12-23 RX ADMIN — THIAMINE HYDROCHLORIDE 200 MG: 100 INJECTION, SOLUTION INTRAMUSCULAR; INTRAVENOUS at 12:09

## 2023-12-23 RX ADMIN — LEUCINE, PHENYLALANINE, LYSINE, METHIONINE, ISOLEUCINE, VALINE, HISTIDINE, THREONINE, TRYPTOPHAN, ALANINE, GLYCINE, ARGININE, PROLINE, SERINE, TYROSINE, SODIUM ACETATE, DIBASIC POTASSIUM PHOSPHATE, MAGNESIUM CHLORIDE, SODIUM CHLORIDE, CALCIUM CHLORIDE, DEXTROSE
365; 280; 290; 200; 300; 290; 240; 210; 90; 1035; 515; 575; 340; 250; 20; 340; 261; 51; 59; 33; 20 INJECTION INTRAVENOUS at 18:05

## 2023-12-23 RX ADMIN — CEFTRIAXONE SODIUM 1000 MG: 1 INJECTION, POWDER, FOR SOLUTION INTRAMUSCULAR; INTRAVENOUS at 08:59

## 2023-12-23 RX ADMIN — SODIUM CHLORIDE, PRESERVATIVE FREE 10 ML: 5 INJECTION INTRAVENOUS at 21:50

## 2023-12-23 RX ADMIN — ENOXAPARIN SODIUM 30 MG: 100 INJECTION SUBCUTANEOUS at 20:37

## 2023-12-23 RX ADMIN — HYDROMORPHONE HYDROCHLORIDE 0.5 MG: 1 INJECTION, SOLUTION INTRAMUSCULAR; INTRAVENOUS; SUBCUTANEOUS at 12:10

## 2023-12-23 RX ADMIN — SODIUM CHLORIDE, PRESERVATIVE FREE 10 ML: 5 INJECTION INTRAVENOUS at 20:37

## 2023-12-23 RX ADMIN — HYDROMORPHONE HYDROCHLORIDE 0.5 MG: 1 INJECTION, SOLUTION INTRAMUSCULAR; INTRAVENOUS; SUBCUTANEOUS at 05:19

## 2023-12-23 RX ADMIN — HYDROMORPHONE HYDROCHLORIDE 0.5 MG: 1 INJECTION, SOLUTION INTRAMUSCULAR; INTRAVENOUS; SUBCUTANEOUS at 15:40

## 2023-12-23 RX ADMIN — HYDROMORPHONE HYDROCHLORIDE 0.5 MG: 1 INJECTION, SOLUTION INTRAMUSCULAR; INTRAVENOUS; SUBCUTANEOUS at 23:52

## 2023-12-23 RX ADMIN — SODIUM CHLORIDE, PRESERVATIVE FREE 10 ML: 5 INJECTION INTRAVENOUS at 21:00

## 2023-12-23 RX ADMIN — SODIUM CHLORIDE, PRESERVATIVE FREE 10 ML: 5 INJECTION INTRAVENOUS at 09:12

## 2023-12-23 RX ADMIN — PANTOPRAZOLE SODIUM 40 MG: 40 INJECTION, POWDER, FOR SOLUTION INTRAVENOUS at 09:01

## 2023-12-23 RX ADMIN — INSULIN LISPRO 1 UNITS: 100 INJECTION, SOLUTION INTRAVENOUS; SUBCUTANEOUS at 00:10

## 2023-12-23 RX ADMIN — HYDROMORPHONE HYDROCHLORIDE 0.5 MG: 1 INJECTION, SOLUTION INTRAMUSCULAR; INTRAVENOUS; SUBCUTANEOUS at 09:06

## 2023-12-23 RX ADMIN — HYDROMORPHONE HYDROCHLORIDE 0.5 MG: 1 INJECTION, SOLUTION INTRAMUSCULAR; INTRAVENOUS; SUBCUTANEOUS at 20:37

## 2023-12-23 RX ADMIN — SODIUM CHLORIDE, PRESERVATIVE FREE 10 ML: 5 INJECTION INTRAVENOUS at 09:11

## 2023-12-24 ENCOUNTER — APPOINTMENT (OUTPATIENT)
Dept: GENERAL RADIOLOGY | Age: 88
DRG: 391 | End: 2023-12-24
Payer: MEDICARE

## 2023-12-24 LAB
ANION GAP SERPL CALCULATED.3IONS-SCNC: 6 MMOL/L (ref 3–16)
BASOPHILS # BLD: 0 K/UL (ref 0–0.2)
BASOPHILS NFR BLD: 1 %
BUN SERPL-MCNC: 37 MG/DL (ref 7–20)
CALCIUM SERPL-MCNC: 8.4 MG/DL (ref 8.3–10.6)
CHLORIDE SERPL-SCNC: 99 MMOL/L (ref 99–110)
CO2 SERPL-SCNC: 29 MMOL/L (ref 21–32)
CREAT SERPL-MCNC: 0.8 MG/DL (ref 0.6–1.2)
DEPRECATED RDW RBC AUTO: 14.4 % (ref 12.4–15.4)
EOSINOPHIL # BLD: 0.2 K/UL (ref 0–0.6)
EOSINOPHIL NFR BLD: 4 %
GFR SERPLBLD CREATININE-BSD FMLA CKD-EPI: >60 ML/MIN/{1.73_M2}
GLUCOSE BLD-MCNC: 160 MG/DL (ref 70–99)
GLUCOSE BLD-MCNC: 172 MG/DL (ref 70–99)
GLUCOSE BLD-MCNC: 176 MG/DL (ref 70–99)
GLUCOSE BLD-MCNC: 189 MG/DL (ref 70–99)
GLUCOSE BLD-MCNC: 190 MG/DL (ref 70–99)
GLUCOSE BLD-MCNC: 295 MG/DL (ref 70–99)
GLUCOSE SERPL-MCNC: 179 MG/DL (ref 70–99)
HCT VFR BLD AUTO: 26.9 % (ref 36–48)
HGB BLD-MCNC: 8.9 G/DL (ref 12–16)
LYMPHOCYTES # BLD: 1.9 K/UL (ref 1–5.1)
LYMPHOCYTES NFR BLD: 39 %
MAGNESIUM SERPL-MCNC: 2.3 MG/DL (ref 1.8–2.4)
MCH RBC QN AUTO: 31.6 PG (ref 26–34)
MCHC RBC AUTO-ENTMCNC: 33.1 G/DL (ref 31–36)
MCV RBC AUTO: 95.6 FL (ref 80–100)
METAMYELOCYTES NFR BLD MANUAL: 2 %
MONOCYTES # BLD: 0.2 K/UL (ref 0–1.3)
MONOCYTES NFR BLD: 5 %
NEUTROPHILS # BLD: 2.5 K/UL (ref 1.7–7.7)
NEUTROPHILS NFR BLD: 48 %
NEUTS BAND NFR BLD MANUAL: 1 % (ref 0–7)
PERFORMED ON: ABNORMAL
PHOSPHATE SERPL-MCNC: 3.4 MG/DL (ref 2.5–4.9)
PLATELET # BLD AUTO: 137 K/UL (ref 135–450)
PLATELET BLD QL SMEAR: ADEQUATE
PMV BLD AUTO: 7.5 FL (ref 5–10.5)
POTASSIUM SERPL-SCNC: 3.9 MMOL/L (ref 3.5–5.1)
RBC # BLD AUTO: 2.82 M/UL (ref 4–5.2)
RBC MORPH BLD: NORMAL
SLIDE REVIEW: ABNORMAL
SODIUM SERPL-SCNC: 134 MMOL/L (ref 136–145)
WBC # BLD AUTO: 4.9 K/UL (ref 4–11)

## 2023-12-24 PROCEDURE — 1200000000 HC SEMI PRIVATE

## 2023-12-24 PROCEDURE — 6370000000 HC RX 637 (ALT 250 FOR IP): Performed by: INTERNAL MEDICINE

## 2023-12-24 PROCEDURE — 84100 ASSAY OF PHOSPHORUS: CPT

## 2023-12-24 PROCEDURE — 6360000002 HC RX W HCPCS: Performed by: STUDENT IN AN ORGANIZED HEALTH CARE EDUCATION/TRAINING PROGRAM

## 2023-12-24 PROCEDURE — 2500000003 HC RX 250 WO HCPCS: Performed by: INTERNAL MEDICINE

## 2023-12-24 PROCEDURE — 83735 ASSAY OF MAGNESIUM: CPT

## 2023-12-24 PROCEDURE — 2580000003 HC RX 258: Performed by: STUDENT IN AN ORGANIZED HEALTH CARE EDUCATION/TRAINING PROGRAM

## 2023-12-24 PROCEDURE — 2580000003 HC RX 258: Performed by: SURGERY

## 2023-12-24 PROCEDURE — 6360000002 HC RX W HCPCS: Performed by: INTERNAL MEDICINE

## 2023-12-24 PROCEDURE — 99232 SBSQ HOSP IP/OBS MODERATE 35: CPT | Performed by: SURGERY

## 2023-12-24 PROCEDURE — 85025 COMPLETE CBC W/AUTO DIFF WBC: CPT

## 2023-12-24 PROCEDURE — C9113 INJ PANTOPRAZOLE SODIUM, VIA: HCPCS | Performed by: STUDENT IN AN ORGANIZED HEALTH CARE EDUCATION/TRAINING PROGRAM

## 2023-12-24 PROCEDURE — 6360000002 HC RX W HCPCS: Performed by: NURSE PRACTITIONER

## 2023-12-24 PROCEDURE — 6360000002 HC RX W HCPCS: Performed by: SURGERY

## 2023-12-24 PROCEDURE — 80048 BASIC METABOLIC PNL TOTAL CA: CPT

## 2023-12-24 PROCEDURE — 71045 X-RAY EXAM CHEST 1 VIEW: CPT

## 2023-12-24 PROCEDURE — 6360000002 HC RX W HCPCS: Performed by: PHYSICIAN ASSISTANT

## 2023-12-24 PROCEDURE — 2580000003 HC RX 258: Performed by: INTERNAL MEDICINE

## 2023-12-24 RX ORDER — METOCLOPRAMIDE HYDROCHLORIDE 5 MG/ML
10 INJECTION INTRAMUSCULAR; INTRAVENOUS EVERY 6 HOURS PRN
Status: DISCONTINUED | OUTPATIENT
Start: 2023-12-24 | End: 2023-12-26 | Stop reason: HOSPADM

## 2023-12-24 RX ADMIN — HYDROMORPHONE HYDROCHLORIDE 0.5 MG: 1 INJECTION, SOLUTION INTRAMUSCULAR; INTRAVENOUS; SUBCUTANEOUS at 06:54

## 2023-12-24 RX ADMIN — HYDROMORPHONE HYDROCHLORIDE 0.5 MG: 1 INJECTION, SOLUTION INTRAMUSCULAR; INTRAVENOUS; SUBCUTANEOUS at 18:31

## 2023-12-24 RX ADMIN — ONDANSETRON 4 MG: 2 INJECTION INTRAMUSCULAR; INTRAVENOUS at 08:50

## 2023-12-24 RX ADMIN — CEFTRIAXONE SODIUM 1000 MG: 1 INJECTION, POWDER, FOR SOLUTION INTRAMUSCULAR; INTRAVENOUS at 08:56

## 2023-12-24 RX ADMIN — SODIUM CHLORIDE, PRESERVATIVE FREE 10 ML: 5 INJECTION INTRAVENOUS at 08:54

## 2023-12-24 RX ADMIN — PANTOPRAZOLE SODIUM 40 MG: 40 INJECTION, POWDER, FOR SOLUTION INTRAVENOUS at 08:53

## 2023-12-24 RX ADMIN — ENOXAPARIN SODIUM 30 MG: 100 INJECTION SUBCUTANEOUS at 21:09

## 2023-12-24 RX ADMIN — INSULIN LISPRO 4 UNITS: 100 INJECTION, SOLUTION INTRAVENOUS; SUBCUTANEOUS at 21:11

## 2023-12-24 RX ADMIN — ONDANSETRON 4 MG: 2 INJECTION INTRAMUSCULAR; INTRAVENOUS at 15:08

## 2023-12-24 RX ADMIN — HYDROMORPHONE HYDROCHLORIDE 0.5 MG: 1 INJECTION, SOLUTION INTRAMUSCULAR; INTRAVENOUS; SUBCUTANEOUS at 14:15

## 2023-12-24 RX ADMIN — METOCLOPRAMIDE 10 MG: 5 INJECTION, SOLUTION INTRAMUSCULAR; INTRAVENOUS at 17:45

## 2023-12-24 RX ADMIN — PROMETHAZINE HYDROCHLORIDE 25 MG: 25 INJECTION INTRAMUSCULAR; INTRAVENOUS at 21:01

## 2023-12-24 RX ADMIN — HYDROMORPHONE HYDROCHLORIDE 0.5 MG: 1 INJECTION, SOLUTION INTRAMUSCULAR; INTRAVENOUS; SUBCUTANEOUS at 02:59

## 2023-12-24 RX ADMIN — HYDROMORPHONE HYDROCHLORIDE 0.5 MG: 1 INJECTION, SOLUTION INTRAMUSCULAR; INTRAVENOUS; SUBCUTANEOUS at 10:40

## 2023-12-24 RX ADMIN — LEUCINE, PHENYLALANINE, LYSINE, METHIONINE, ISOLEUCINE, VALINE, HISTIDINE, THREONINE, TRYPTOPHAN, ALANINE, GLYCINE, ARGININE, PROLINE, SERINE, TYROSINE, SODIUM ACETATE, DIBASIC POTASSIUM PHOSPHATE, MAGNESIUM CHLORIDE, SODIUM CHLORIDE, CALCIUM CHLORIDE, DEXTROSE
365; 280; 290; 200; 300; 290; 240; 210; 90; 1035; 515; 575; 340; 250; 20; 340; 261; 51; 59; 33; 20 INJECTION INTRAVENOUS at 17:50

## 2023-12-24 RX ADMIN — ONDANSETRON 4 MG: 2 INJECTION INTRAMUSCULAR; INTRAVENOUS at 03:36

## 2023-12-25 LAB
ANION GAP SERPL CALCULATED.3IONS-SCNC: 4 MMOL/L (ref 3–16)
BASOPHILS # BLD: 0 K/UL (ref 0–0.2)
BASOPHILS NFR BLD: 0.3 %
BUN SERPL-MCNC: 37 MG/DL (ref 7–20)
CALCIUM SERPL-MCNC: 8.3 MG/DL (ref 8.3–10.6)
CHLORIDE SERPL-SCNC: 102 MMOL/L (ref 99–110)
CO2 SERPL-SCNC: 31 MMOL/L (ref 21–32)
CREAT SERPL-MCNC: 0.7 MG/DL (ref 0.6–1.2)
DEPRECATED RDW RBC AUTO: 14.2 % (ref 12.4–15.4)
EOSINOPHIL # BLD: 0 K/UL (ref 0–0.6)
EOSINOPHIL NFR BLD: 0.3 %
GFR SERPLBLD CREATININE-BSD FMLA CKD-EPI: >60 ML/MIN/{1.73_M2}
GLUCOSE BLD-MCNC: 179 MG/DL (ref 70–99)
GLUCOSE BLD-MCNC: 181 MG/DL (ref 70–99)
GLUCOSE BLD-MCNC: 208 MG/DL (ref 70–99)
GLUCOSE BLD-MCNC: 210 MG/DL (ref 70–99)
GLUCOSE SERPL-MCNC: 205 MG/DL (ref 70–99)
HCT VFR BLD AUTO: 27.1 % (ref 36–48)
HGB BLD-MCNC: 9.2 G/DL (ref 12–16)
LYMPHOCYTES # BLD: 2.1 K/UL (ref 1–5.1)
LYMPHOCYTES NFR BLD: 33.9 %
MAGNESIUM SERPL-MCNC: 2.3 MG/DL (ref 1.8–2.4)
MCH RBC QN AUTO: 32.2 PG (ref 26–34)
MCHC RBC AUTO-ENTMCNC: 33.9 G/DL (ref 31–36)
MCV RBC AUTO: 95 FL (ref 80–100)
MONOCYTES # BLD: 0.5 K/UL (ref 0–1.3)
MONOCYTES NFR BLD: 7.9 %
NEUTROPHILS # BLD: 3.6 K/UL (ref 1.7–7.7)
NEUTROPHILS NFR BLD: 57.6 %
PERFORMED ON: ABNORMAL
PHOSPHATE SERPL-MCNC: 2.7 MG/DL (ref 2.5–4.9)
PLATELET # BLD AUTO: 153 K/UL (ref 135–450)
PMV BLD AUTO: 7.1 FL (ref 5–10.5)
POTASSIUM SERPL-SCNC: 4.5 MMOL/L (ref 3.5–5.1)
RBC # BLD AUTO: 2.85 M/UL (ref 4–5.2)
SODIUM SERPL-SCNC: 137 MMOL/L (ref 136–145)
WBC # BLD AUTO: 6.2 K/UL (ref 4–11)

## 2023-12-25 PROCEDURE — 84100 ASSAY OF PHOSPHORUS: CPT

## 2023-12-25 PROCEDURE — 6360000002 HC RX W HCPCS: Performed by: INTERNAL MEDICINE

## 2023-12-25 PROCEDURE — 6370000000 HC RX 637 (ALT 250 FOR IP): Performed by: INTERNAL MEDICINE

## 2023-12-25 PROCEDURE — 6360000002 HC RX W HCPCS: Performed by: STUDENT IN AN ORGANIZED HEALTH CARE EDUCATION/TRAINING PROGRAM

## 2023-12-25 PROCEDURE — 2580000003 HC RX 258: Performed by: INTERNAL MEDICINE

## 2023-12-25 PROCEDURE — 6360000002 HC RX W HCPCS: Performed by: PHYSICIAN ASSISTANT

## 2023-12-25 PROCEDURE — 99232 SBSQ HOSP IP/OBS MODERATE 35: CPT | Performed by: SURGERY

## 2023-12-25 PROCEDURE — 2500000003 HC RX 250 WO HCPCS: Performed by: INTERNAL MEDICINE

## 2023-12-25 PROCEDURE — 6370000000 HC RX 637 (ALT 250 FOR IP): Performed by: PHYSICIAN ASSISTANT

## 2023-12-25 PROCEDURE — 83735 ASSAY OF MAGNESIUM: CPT

## 2023-12-25 PROCEDURE — 2580000003 HC RX 258: Performed by: PHYSICIAN ASSISTANT

## 2023-12-25 PROCEDURE — 6360000002 HC RX W HCPCS: Performed by: SURGERY

## 2023-12-25 PROCEDURE — 1200000000 HC SEMI PRIVATE

## 2023-12-25 PROCEDURE — 2580000003 HC RX 258: Performed by: SURGERY

## 2023-12-25 PROCEDURE — 85025 COMPLETE CBC W/AUTO DIFF WBC: CPT

## 2023-12-25 PROCEDURE — 6360000002 HC RX W HCPCS: Performed by: NURSE PRACTITIONER

## 2023-12-25 PROCEDURE — C9113 INJ PANTOPRAZOLE SODIUM, VIA: HCPCS | Performed by: STUDENT IN AN ORGANIZED HEALTH CARE EDUCATION/TRAINING PROGRAM

## 2023-12-25 PROCEDURE — 2500000003 HC RX 250 WO HCPCS: Performed by: SURGERY

## 2023-12-25 PROCEDURE — 80048 BASIC METABOLIC PNL TOTAL CA: CPT

## 2023-12-25 RX ADMIN — ONDANSETRON 4 MG: 2 INJECTION INTRAMUSCULAR; INTRAVENOUS at 14:49

## 2023-12-25 RX ADMIN — HYDROMORPHONE HYDROCHLORIDE 0.5 MG: 1 INJECTION, SOLUTION INTRAMUSCULAR; INTRAVENOUS; SUBCUTANEOUS at 14:49

## 2023-12-25 RX ADMIN — ASCORBIC ACID, VITAMIN A PALMITATE, CHOLECALCIFEROL, THIAMINE HYDROCHLORIDE, RIBOFLAVIN-5 PHOSPHATE SODIUM, PYRIDOXINE HYDROCHLORIDE, NIACINAMIDE, DEXPANTHENOL, ALPHA-TOCOPHEROL ACETATE, VITAMIN K1, FOLIC ACID, BIOTIN, CYANOCOBALAMIN: 200; 3300; 200; 6; 3.6; 6; 40; 15; 10; 150; 600; 60; 5 INJECTION, SOLUTION INTRAVENOUS at 18:23

## 2023-12-25 RX ADMIN — HYDROMORPHONE HYDROCHLORIDE 0.5 MG: 1 INJECTION, SOLUTION INTRAMUSCULAR; INTRAVENOUS; SUBCUTANEOUS at 03:39

## 2023-12-25 RX ADMIN — SODIUM CHLORIDE, PRESERVATIVE FREE 10 ML: 5 INJECTION INTRAVENOUS at 09:46

## 2023-12-25 RX ADMIN — OLIVE OIL AND SOYBEAN OIL 250 ML: 16; 4 INJECTION, EMULSION INTRAVENOUS at 18:24

## 2023-12-25 RX ADMIN — ENOXAPARIN SODIUM 30 MG: 100 INJECTION SUBCUTANEOUS at 21:14

## 2023-12-25 RX ADMIN — HYDROMORPHONE HYDROCHLORIDE 0.5 MG: 1 INJECTION, SOLUTION INTRAMUSCULAR; INTRAVENOUS; SUBCUTANEOUS at 09:45

## 2023-12-25 RX ADMIN — PIPERACILLIN AND TAZOBACTAM 3375 MG: 3; .375 INJECTION, POWDER, FOR SOLUTION INTRAVENOUS at 09:54

## 2023-12-25 RX ADMIN — METOCLOPRAMIDE 10 MG: 5 INJECTION, SOLUTION INTRAMUSCULAR; INTRAVENOUS at 16:29

## 2023-12-25 RX ADMIN — PIPERACILLIN AND TAZOBACTAM 3375 MG: 3; .375 INJECTION, POWDER, FOR SOLUTION INTRAVENOUS at 16:34

## 2023-12-25 RX ADMIN — ONDANSETRON 4 MG: 2 INJECTION INTRAMUSCULAR; INTRAVENOUS at 09:44

## 2023-12-25 RX ADMIN — INSULIN LISPRO 2 UNITS: 100 INJECTION, SOLUTION INTRAVENOUS; SUBCUTANEOUS at 09:46

## 2023-12-25 RX ADMIN — SODIUM CHLORIDE, PRESERVATIVE FREE 10 ML: 5 INJECTION INTRAVENOUS at 09:47

## 2023-12-25 RX ADMIN — INSULIN LISPRO 2 UNITS: 100 INJECTION, SOLUTION INTRAVENOUS; SUBCUTANEOUS at 12:36

## 2023-12-25 RX ADMIN — ONDANSETRON 4 MG: 2 INJECTION INTRAMUSCULAR; INTRAVENOUS at 21:23

## 2023-12-25 RX ADMIN — PANTOPRAZOLE SODIUM 40 MG: 40 INJECTION, POWDER, FOR SOLUTION INTRAVENOUS at 09:44

## 2023-12-25 RX ADMIN — HYDROMORPHONE HYDROCHLORIDE 0.25 MG: 1 INJECTION, SOLUTION INTRAMUSCULAR; INTRAVENOUS; SUBCUTANEOUS at 21:24

## 2023-12-26 VITALS
OXYGEN SATURATION: 96 % | DIASTOLIC BLOOD PRESSURE: 73 MMHG | HEART RATE: 94 BPM | SYSTOLIC BLOOD PRESSURE: 126 MMHG | BODY MASS INDEX: 21.66 KG/M2 | WEIGHT: 130 LBS | TEMPERATURE: 98.3 F | RESPIRATION RATE: 16 BRPM | HEIGHT: 65 IN

## 2023-12-26 LAB
ANION GAP SERPL CALCULATED.3IONS-SCNC: 8 MMOL/L (ref 3–16)
BASOPHILS # BLD: 0 K/UL (ref 0–0.2)
BASOPHILS NFR BLD: 0.4 %
BUN SERPL-MCNC: 33 MG/DL (ref 7–20)
CALCIUM SERPL-MCNC: 8.2 MG/DL (ref 8.3–10.6)
CHLORIDE SERPL-SCNC: 102 MMOL/L (ref 99–110)
CO2 SERPL-SCNC: 26 MMOL/L (ref 21–32)
CREAT SERPL-MCNC: 0.7 MG/DL (ref 0.6–1.2)
DEPRECATED RDW RBC AUTO: 14.3 % (ref 12.4–15.4)
EOSINOPHIL # BLD: 0.1 K/UL (ref 0–0.6)
EOSINOPHIL NFR BLD: 1.3 %
GFR SERPLBLD CREATININE-BSD FMLA CKD-EPI: >60 ML/MIN/{1.73_M2}
GLUCOSE BLD-MCNC: 168 MG/DL (ref 70–99)
GLUCOSE BLD-MCNC: 193 MG/DL (ref 70–99)
GLUCOSE BLD-MCNC: 205 MG/DL (ref 70–99)
GLUCOSE BLD-MCNC: 261 MG/DL (ref 70–99)
GLUCOSE SERPL-MCNC: 77 MG/DL (ref 70–99)
HCT VFR BLD AUTO: 29.3 % (ref 36–48)
HGB BLD-MCNC: 9.8 G/DL (ref 12–16)
LYMPHOCYTES # BLD: 2.8 K/UL (ref 1–5.1)
LYMPHOCYTES NFR BLD: 33.6 %
MAGNESIUM SERPL-MCNC: 2.2 MG/DL (ref 1.8–2.4)
MCH RBC QN AUTO: 31.6 PG (ref 26–34)
MCHC RBC AUTO-ENTMCNC: 33.3 G/DL (ref 31–36)
MCV RBC AUTO: 94.9 FL (ref 80–100)
MONOCYTES # BLD: 0.6 K/UL (ref 0–1.3)
MONOCYTES NFR BLD: 7.5 %
NEUTROPHILS # BLD: 4.7 K/UL (ref 1.7–7.7)
NEUTROPHILS NFR BLD: 57.2 %
PERFORMED ON: ABNORMAL
PHOSPHATE SERPL-MCNC: 2.5 MG/DL (ref 2.5–4.9)
PLATELET # BLD AUTO: 181 K/UL (ref 135–450)
PMV BLD AUTO: 7.1 FL (ref 5–10.5)
POTASSIUM SERPL-SCNC: 4.3 MMOL/L (ref 3.5–5.1)
RBC # BLD AUTO: 3.09 M/UL (ref 4–5.2)
SODIUM SERPL-SCNC: 136 MMOL/L (ref 136–145)
WBC # BLD AUTO: 8.3 K/UL (ref 4–11)

## 2023-12-26 PROCEDURE — 6360000002 HC RX W HCPCS: Performed by: INTERNAL MEDICINE

## 2023-12-26 PROCEDURE — 83735 ASSAY OF MAGNESIUM: CPT

## 2023-12-26 PROCEDURE — 2580000003 HC RX 258: Performed by: INTERNAL MEDICINE

## 2023-12-26 PROCEDURE — 99232 SBSQ HOSP IP/OBS MODERATE 35: CPT | Performed by: SURGERY

## 2023-12-26 PROCEDURE — 6360000002 HC RX W HCPCS: Performed by: NURSE PRACTITIONER

## 2023-12-26 PROCEDURE — 80048 BASIC METABOLIC PNL TOTAL CA: CPT

## 2023-12-26 PROCEDURE — 2580000003 HC RX 258: Performed by: SURGERY

## 2023-12-26 PROCEDURE — 6360000002 HC RX W HCPCS: Performed by: STUDENT IN AN ORGANIZED HEALTH CARE EDUCATION/TRAINING PROGRAM

## 2023-12-26 PROCEDURE — 85025 COMPLETE CBC W/AUTO DIFF WBC: CPT

## 2023-12-26 PROCEDURE — 84100 ASSAY OF PHOSPHORUS: CPT

## 2023-12-26 PROCEDURE — 6370000000 HC RX 637 (ALT 250 FOR IP): Performed by: INTERNAL MEDICINE

## 2023-12-26 PROCEDURE — 6360000002 HC RX W HCPCS: Performed by: SURGERY

## 2023-12-26 PROCEDURE — C9113 INJ PANTOPRAZOLE SODIUM, VIA: HCPCS | Performed by: STUDENT IN AN ORGANIZED HEALTH CARE EDUCATION/TRAINING PROGRAM

## 2023-12-26 RX ADMIN — INSULIN LISPRO 2 UNITS: 100 INJECTION, SOLUTION INTRAVENOUS; SUBCUTANEOUS at 02:40

## 2023-12-26 RX ADMIN — METOCLOPRAMIDE 10 MG: 5 INJECTION, SOLUTION INTRAMUSCULAR; INTRAVENOUS at 20:29

## 2023-12-26 RX ADMIN — HYDROMORPHONE HYDROCHLORIDE 0.5 MG: 1 INJECTION, SOLUTION INTRAMUSCULAR; INTRAVENOUS; SUBCUTANEOUS at 18:18

## 2023-12-26 RX ADMIN — PROMETHAZINE HYDROCHLORIDE 25 MG: 25 INJECTION INTRAMUSCULAR; INTRAVENOUS at 04:13

## 2023-12-26 RX ADMIN — METOCLOPRAMIDE 10 MG: 5 INJECTION, SOLUTION INTRAMUSCULAR; INTRAVENOUS at 08:34

## 2023-12-26 RX ADMIN — ONDANSETRON 4 MG: 2 INJECTION INTRAMUSCULAR; INTRAVENOUS at 18:23

## 2023-12-26 RX ADMIN — HYDROMORPHONE HYDROCHLORIDE 0.25 MG: 1 INJECTION, SOLUTION INTRAMUSCULAR; INTRAVENOUS; SUBCUTANEOUS at 02:15

## 2023-12-26 RX ADMIN — HYDROMORPHONE HYDROCHLORIDE 0.5 MG: 1 INJECTION, SOLUTION INTRAMUSCULAR; INTRAVENOUS; SUBCUTANEOUS at 15:01

## 2023-12-26 RX ADMIN — PANTOPRAZOLE SODIUM 40 MG: 40 INJECTION, POWDER, FOR SOLUTION INTRAVENOUS at 08:34

## 2023-12-26 RX ADMIN — SODIUM CHLORIDE, PRESERVATIVE FREE 10 ML: 5 INJECTION INTRAVENOUS at 08:34

## 2023-12-26 RX ADMIN — PIPERACILLIN AND TAZOBACTAM 3375 MG: 3; .375 INJECTION, POWDER, FOR SOLUTION INTRAVENOUS at 00:33

## 2023-12-26 RX ADMIN — PIPERACILLIN AND TAZOBACTAM 3375 MG: 3; .375 INJECTION, POWDER, FOR SOLUTION INTRAVENOUS at 08:45

## 2023-12-26 RX ADMIN — PROMETHAZINE HYDROCHLORIDE 25 MG: 25 INJECTION INTRAMUSCULAR; INTRAVENOUS at 15:07

## 2023-12-26 RX ADMIN — HYDROMORPHONE HYDROCHLORIDE 0.5 MG: 1 INJECTION, SOLUTION INTRAMUSCULAR; INTRAVENOUS; SUBCUTANEOUS at 11:36

## 2023-12-26 RX ADMIN — METOCLOPRAMIDE 10 MG: 5 INJECTION, SOLUTION INTRAMUSCULAR; INTRAVENOUS at 02:14

## 2023-12-26 RX ADMIN — PIPERACILLIN AND TAZOBACTAM 3375 MG: 3; .375 INJECTION, POWDER, FOR SOLUTION INTRAVENOUS at 16:57

## 2023-12-26 RX ADMIN — ONDANSETRON 4 MG: 2 INJECTION INTRAMUSCULAR; INTRAVENOUS at 09:23

## 2023-12-26 RX ADMIN — HYDROMORPHONE HYDROCHLORIDE 0.5 MG: 1 INJECTION, SOLUTION INTRAMUSCULAR; INTRAVENOUS; SUBCUTANEOUS at 08:34

## 2023-12-26 RX ADMIN — SODIUM CHLORIDE, PRESERVATIVE FREE 10 ML: 5 INJECTION INTRAVENOUS at 20:37

## 2023-12-26 RX ADMIN — INSULIN LISPRO 4 UNITS: 100 INJECTION, SOLUTION INTRAVENOUS; SUBCUTANEOUS at 13:07

## 2023-12-26 NOTE — CONSULTS
Clinical Pharmacy Note    Pharmacy consulted by Dr. Nick Cody to manage TPN    Assessment:  Awaiting PICC placement. Plan:  TPN will begin [email or web address] per policy. Ordered monitoring labs and daily weights. Ordered dietitian consult for tpn rate. Blood sugar management:  Every 4 hour POCT glucose  Humalog every 4 hour sliding scale at medium dose. Hypoglycemia treatment orders entered.        Agustin Arriaza, [address] [place] [address], PharmD 12/19/2023
Clinical Pharmacy Note    Pharmacy consulted by Dr. Stef Diaz to manage TPN    Current TPN rate: 55ml/hr  Goal TPN rate: 80ml/hr    Access: PICC  Indication: malnutrition    Labs:  General Labs:  CMP:    Lab Results   Component Value Date/Time     12/22/2023 06:35 AM    K 3.9 12/22/2023 06:35 AM    K 3.9 12/22/2023 06:35 AM     12/22/2023 06:35 AM    CO2 30 12/22/2023 06:35 AM    BUN 23 12/22/2023 06:35 AM    CREATININE 0.7 12/22/2023 06:35 AM    GFRAA 57 02/09/2022 01:45 PM    GFRAA 56 01/10/2010 08:07 PM    AGRATIO 1.2 12/22/2023 06:35 AM    LABGLOM >60 12/22/2023 06:35 AM    GLUCOSE 212 12/22/2023 06:35 AM    PROT 5.6 12/22/2023 06:35 AM    PROT 6.1 01/13/2010 07:51 PM    LABALBU 3.1 12/22/2023 06:35 AM    CALCIUM 8.6 12/22/2023 06:35 AM    BILITOT 0.3 12/22/2023 06:35 AM    ALKPHOS 67 12/22/2023 06:35 AM    AST 21 12/22/2023 06:35 AM    ALT 16 12/22/2023 06:35 AM     Magnesium:    Lab Results   Component Value Date/Time    MG 2.20 12/22/2023 06:35 AM     Phosphorus:    Lab Results   Component Value Date/Time    PHOS 2.1 12/22/2023 06:35 AM       Electrolyte replacement as follows:   Replace phosphorous with 20 mmol of potassium phosphate administered IV over 2hours    Blood sugars over past 24 hours: 180-240    Blood sugar management:  Plan to Continue Humalog q4 hour sliding scale at low dose. Plan to increase TPN to 80 ml/hr. Thank you for allowing pharmacy to participate in the care of this patient.     Hyacinth Polanco [facility], PharmD 12/22/2023
PICC line education:    -Risks  -Benefits  -Alternatives  -Procedure    Discussed the above with patient, verbalized understanding, answered all questions. Provided with information on PICC care to review. PICC tip verified via [employer] (Ok to use). Reported off to patient's  Nurse KEVIN.
Palliative Care:     80 y.o. female. Brought to ED on 12/16/23 due to gastric distention and N/V. NG tube placed with bloody discharge. Pending UGI. Admitted for further evaluation/treatment. Palliative consult placed 12/24/23. Past Medical History:   has a past medical history of Arthritis, CAD (coronary artery disease), Cancer ([address]), Cystocele, Diabetes mellitus ([address]), Hepatitis A, History of blood transfusion, Hyperlipidemia, PVC (premature ventricular contraction), Rectocele, Reflux, and Scoliosis. Past Surgical History:   has a past surgical history that includes Appendectomy; joint replacement (Left); hernia repair (6 YRS AGO); Cholecystectomy; shoulder surgery (Right, 7/26/12); Bunionectomy (7/26/12); Breast surgery; Upper gastrointestinal endoscopy (11/2/12); Hysterectomy; bladder suspension; Dilation and curettage of uterus; other surgical history (Left, 09/22/14); Foot surgery; Cardiac catheterization; Colonoscopy (2008); Cystocopy (03/29/2017); other surgical history (Right, 06/28/2018); Cataract removal with implant (Left, 09/13/2018); pr xcapsl ctrc rmvl insj io lens prosth w/o ecp (Left, 9/13/2018); Lumbar spine surgery (Right, 5/15/2019); Upper gastrointestinal endoscopy (N/A, 7/3/2023); laparotomy (N/A, 7/7/2023); Upper gastrointestinal endoscopy (N/A, 7/26/2023); Colonoscopy (N/A, 7/26/2023); Upper gastrointestinal endoscopy (N/A, 12/18/2023); and Upper gastrointestinal endoscopy (N/A, 12/22/2023). Advance Directives:  DNR-CC. ACP not on file. Patient A/OX3. Supportive children at bedside. Problem Severity: Pain/Other Symptoms: Severe nausea. Pt does not want feeding tube or surgical interventions. Bed Mobility/Toileting/Transfer:  Stand by assist. High fall risk.          Performance Status:        Palliative Performance Scale:  100% []Full Normal activity & work No evidence of disease  90%   [] Full Normal activity & work Some evidence of disease  80%   [] Full Normal
HERNIA REPAIR  6 YRS AGO    HIATAL HERNIA REPAIR- WIRED  RIBS    HYSTERECTOMY (CERVIX STATUS UNKNOWN)      VAGINAL    JOINT REPLACEMENT Left     TOTAL KNEE REPLACEMENT LEFT    LAPAROTOMY N/A 7/7/2023    LAPAROSCOPIC CONVERTED TO OPEN, EXPLORATORY LAPAROTOMY,  LYSIS OF ADHESIONS, GASTRECTOMY TUBE PLACEMENT performed by Latasha Garcia MD at [address] [place] [address] Right 5/15/2019    RIGHT L4 AND L5 TRANSFORAMINAL EPIDURAL STEROID INJECTION WITH FLUOROSCOPY performed by Yang Gardner MD at [facility] [address] Left 09/22/14    removal rib wire    OTHER SURGICAL HISTORY Right 06/28/2018    PHACO EMULSIFICATION OF CATARACT WITH INTRAOCULAR LENS implant right eye    DE XCAPSL CTRC RMVL INSJ IO LENS PROSTH W/O ECP Left 9/13/2018    PHACO EMULSIFICATION OF CATARACT WITH INTRAOCULAR LENS IMPLANT LEFT EYE performed by Tello Kumar MD at [address] [place] [address] Right 7/26/12    [place] DEPOSIT TAKEN OFF RIGHT SHOULDER    UPPER GASTROINTESTINAL ENDOSCOPY  11/2/12    UPPER GASTROINTESTINAL ENDOSCOPY N/A 7/3/2023    EGD DIAGNOSTIC ONLY performed by Kayleigh Wooten MD at [address] [place] [address] N/A 7/26/2023    EGD BIOPSY performed by Elsi Crowley MD at [facility]       Current Medications:   Current Facility-Administered Medications: ondansetron (ZOFRAN) injection 4 mg, 4 mg, IntraVENous, Q6H PRN  HYDROmorphone HCl PF (DILAUDID) injection 0.25 mg, 0.25 mg, IntraVENous, Q3H PRN **OR** HYDROmorphone HCl PF (DILAUDID) injection 0.5 mg, 0.5 mg, IntraVENous, Q3H PRN  scopolamine (TRANSDERM-SCOP) transdermal patch 1 patch, 1 patch, TransDERmal, Q72H  sodium chloride (OCEAN, BABY AYR) 0.65 % nasal spray, , Nasal, PRN  benzocaine-menthol (CEPACOL SORE THROAT) lozenge 1 lozenge, 1 lozenge, Oral, Q2H PRN  sodium chloride flush 0.9 % injection 5-40 mL, 5-40 mL, IntraVENous, 2 times per day  sodium chloride flush 0.9 % injection 5-40 mL, 5-40 mL, IntraVENous, PRN  0.9
esophagitis and hiatal hernia. Colonoscopy revealed diverticulosis and a polyp. Current symptoms could be either related to viral gastroenteritis versus related to underlying hiatal hernia. Plan:  Continue NG tube suction  Consider surgical evaluation  Pending clinical course may consider EGD on Monday morning      MD Sudha Pace    [phone]. Also available via [employer]    Please note that some or all of this record was generated using voice recognition software. If there are any questions about the content of this document, please contact the author as some errors in translation may have occurred.

## 2023-12-27 NOTE — PLAN OF CARE
Problem: Discharge Planning  Goal: Discharge to home or other facility with appropriate resources  12/16/2023 2315 by Abimael Hansen RN  Outcome: Progressing  12/16/2023 1018 by Juan M Bain RN  Outcome: Progressing     Problem: Pain  Goal: Verbalizes/displays adequate comfort level or baseline comfort level  12/16/2023 2315 by Abimael Hansen RN  Outcome: Progressing  12/16/2023 1018 by Juan M Bain RN  Outcome: Progressing     Problem: Skin/Tissue Integrity  Goal: Absence of new skin breakdown  Description: 1. Monitor for areas of redness and/or skin breakdown  2. Assess vascular access sites hourly  3. Every 4-6 hours minimum:  Change oxygen saturation probe site  4. Every 4-6 hours:  If on nasal continuous positive airway pressure, respiratory therapy assess nares and determine need for appliance change or resting period.   12/16/2023 2315 by Abimael Hansen RN  Outcome: Progressing  12/16/2023 1018 by Juan M Bain RN  Outcome: Progressing     Problem: Safety - Adult  Goal: Free from fall injury  12/16/2023 2315 by Abimael Hansen RN  Outcome: Progressing  12/16/2023 1018 by Juan M Bain RN  Outcome: Progressing     Problem: ABCDS Injury Assessment  Goal: Absence of physical injury  12/16/2023 2315 by Abimael Hansen RN  Outcome: Progressing  12/16/2023 1018 by Juan M Bain RN  Outcome: Progressing
Problem: Discharge Planning  Goal: Discharge to home or other facility with appropriate resources  12/17/2023 1031 by Eugenie Elizondo RN  Outcome: Progressing  12/16/2023 2315 by Samia Allen RN  Outcome: Progressing     Problem: Pain  Goal: Verbalizes/displays adequate comfort level or baseline comfort level  12/17/2023 1031 by Eugenie Elizondo RN  Outcome: Progressing  12/16/2023 2315 by Samia Allen RN  Outcome: Progressing     Problem: Skin/Tissue Integrity  Goal: Absence of new skin breakdown  Description: 1. Monitor for areas of redness and/or skin breakdown  2. Assess vascular access sites hourly  3. Every 4-6 hours minimum:  Change oxygen saturation probe site  4. Every 4-6 hours:  If on nasal continuous positive airway pressure, respiratory therapy assess nares and determine need for appliance change or resting period.   12/17/2023 1031 by Eugenie Elizondo RN  Outcome: Progressing  12/16/2023 2315 by Samia Allen RN  Outcome: Progressing     Problem: Safety - Adult  Goal: Free from fall injury  12/17/2023 1031 by Eugenie Elizondo RN  Outcome: Progressing  12/16/2023 2315 by Samia Allen RN  Outcome: Progressing     Problem: ABCDS Injury Assessment  Goal: Absence of physical injury  12/17/2023 1031 by Eugenie Elizondo RN  Outcome: Progressing  12/16/2023 2315 by Samia Allen RN  Outcome: Progressing
Problem: Discharge Planning  Goal: Discharge to home or other facility with appropriate resources  12/17/2023 2310 by Ian Santiago RN  Outcome: Progressing  Flowsheets (Taken 12/17/2023 1954)  Discharge to home or other facility with appropriate resources: Identify barriers to discharge with patient and caregiver  12/17/2023 1031 by Adrianna Smith RN  Outcome: Progressing     Problem: Pain  Goal: Verbalizes/displays adequate comfort level or baseline comfort level  12/17/2023 2310 by Ian Santiago RN  Outcome: Progressing  Flowsheets (Taken 12/17/2023 1951)  Verbalizes/displays adequate comfort level or baseline comfort level: Encourage patient to monitor pain and request assistance  12/17/2023 1031 by Adrianna Smith RN  Outcome: Progressing     Problem: Skin/Tissue Integrity  Goal: Absence of new skin breakdown  Description: 1. Monitor for areas of redness and/or skin breakdown  2. Assess vascular access sites hourly  3. Every 4-6 hours minimum:  Change oxygen saturation probe site  4. Every 4-6 hours:  If on nasal continuous positive airway pressure, respiratory therapy assess nares and determine need for appliance change or resting period.   12/17/2023 1031 by Adrianna Smith RN  Outcome: Progressing
Problem: Discharge Planning  Goal: Discharge to home or other facility with appropriate resources  12/20/2023 0936 by Yulissa Alicia RN  Outcome: Progressing  12/19/2023 2145 by Radha Mejia RN  Outcome: Progressing     Problem: Pain  Goal: Verbalizes/displays adequate comfort level or baseline comfort level  12/20/2023 0936 by Yulissa Alicia RN  Outcome: Progressing  12/19/2023 2145 by Radha Mejia RN  Outcome: Progressing     Problem: Skin/Tissue Integrity  Goal: Absence of new skin breakdown  Description: 1. Monitor for areas of redness and/or skin breakdown  2. Assess vascular access sites hourly  3. Every 4-6 hours minimum:  Change oxygen saturation probe site  4. Every 4-6 hours:  If on nasal continuous positive airway pressure, respiratory therapy assess nares and determine need for appliance change or resting period.   12/20/2023 0936 by Yulissa Alicia RN  Outcome: Progressing  12/19/2023 2145 by Radha Mejia RN  Outcome: Progressing     Problem: Safety - Adult  Goal: Free from fall injury  12/20/2023 0936 by Yulissa Alicia RN  Outcome: Progressing  12/19/2023 2145 by Radha Mejia RN  Outcome: Progressing     Problem: ABCDS Injury Assessment  Goal: Absence of physical injury  12/20/2023 0936 by Yulissa Alicia RN  Outcome: Progressing  12/19/2023 2145 by Radha Mejia RN  Outcome: Progressing     Problem: Chronic Conditions and Co-morbidities  Goal: Patient's chronic conditions and co-morbidity symptoms are monitored and maintained or improved  12/20/2023 0936 by Yulissa Alicia RN  Outcome: Progressing  12/19/2023 2145 by Radha Mejia RN  Outcome: Progressing     Problem: Nutrition Deficit:  Goal: Optimize nutritional status  12/20/2023 0936 by Yulissa Alicia RN  Outcome: Progressing  12/19/2023 2145 by Radha Mejia RN  Outcome: Progressing
Problem: Discharge Planning  Goal: Discharge to home or other facility with appropriate resources  12/23/2023 0858 by Tommie Bello RN  Outcome: Progressing  Flowsheets (Taken 12/23/2023 0830)  Discharge to home or other facility with appropriate resources:   Identify barriers to discharge with patient and caregiver   Arrange for needed discharge resources and transportation as appropriate   Identify discharge learning needs (meds, wound care, etc)   Refer to discharge planning if patient needs post-hospital services based on physician order or complex needs related to functional status, cognitive ability or social support system  12/22/2023 2211 by Charlette Baca RN  Outcome: Progressing     Problem: Pain  Goal: Verbalizes/displays adequate comfort level or baseline comfort level  12/23/2023 0858 by Tommie Bello RN  Outcome: Progressing  12/22/2023 2211 by Charlette Baca RN  Outcome: Progressing     Problem: Skin/Tissue Integrity  Goal: Absence of new skin breakdown  Description: 1. Monitor for areas of redness and/or skin breakdown  2. Assess vascular access sites hourly  3. Every 4-6 hours minimum:  Change oxygen saturation probe site  4. Every 4-6 hours:  If on nasal continuous positive airway pressure, respiratory therapy assess nares and determine need for appliance change or resting period.   12/23/2023 0858 by Tommie Bello RN  Outcome: Progressing  12/22/2023 2211 by Charlette Baca RN  Outcome: Progressing     Problem: Safety - Adult  Goal: Free from fall injury  12/23/2023 0858 by Tommie Bello RN  Outcome: Progressing  12/22/2023 2211 by Charlette Baca RN  Outcome: Progressing     Problem: ABCDS Injury Assessment  Goal: Absence of physical injury  12/23/2023 0858 by Tommie Bello RN  Outcome: Progressing  12/22/2023 2211 by Charlette Baca RN  Outcome: Progressing     Problem: Chronic Conditions and Co-morbidities  Goal: Patient's chronic conditions and co-morbidity symptoms are
Problem: Discharge Planning  Goal: Discharge to home or other facility with appropriate resources  12/23/2023 2216 by Becky Neumann RN  Outcome: Progressing  12/23/2023 0858 by Estela Scruggs RN  Outcome: Progressing  Flowsheets (Taken 12/23/2023 0830)  Discharge to home or other facility with appropriate resources:   Identify barriers to discharge with patient and caregiver   Arrange for needed discharge resources and transportation as appropriate   Identify discharge learning needs (meds, wound care, etc)   Refer to discharge planning if patient needs post-hospital services based on physician order or complex needs related to functional status, cognitive ability or social support system     Problem: Pain  Goal: Verbalizes/displays adequate comfort level or baseline comfort level  12/23/2023 2216 by Becky Neumann RN  Outcome: Progressing  12/23/2023 0858 by Estela Scruggs RN  Outcome: Progressing     Problem: Skin/Tissue Integrity  Goal: Absence of new skin breakdown  Description: 1. Monitor for areas of redness and/or skin breakdown  2. Assess vascular access sites hourly  3. Every 4-6 hours minimum:  Change oxygen saturation probe site  4. Every 4-6 hours:  If on nasal continuous positive airway pressure, respiratory therapy assess nares and determine need for appliance change or resting period.   12/23/2023 2216 by Becky Neumann RN  Outcome: Progressing  12/23/2023 0858 by Estela Scruggs RN  Outcome: Progressing     Problem: ABCDS Injury Assessment  Goal: Absence of physical injury  12/23/2023 2216 by Becky Neumann RN  Outcome: Progressing  12/23/2023 0858 by Estela Scruggs RN  Outcome: Progressing     Problem: Chronic Conditions and Co-morbidities  Goal: Patient's chronic conditions and co-morbidity symptoms are monitored and maintained or improved  12/23/2023 2216 by Becky Neumann RN  Outcome: Progressing  Flowsheets (Taken 12/23/2023 2035)  Care
Problem: Discharge Planning  Goal: Discharge to home or other facility with appropriate resources  12/24/2023 1216 by Torres Gonsales RN  Outcome: Progressing  12/23/2023 2216 by Cindy Angelo RN  Outcome: Progressing     Problem: Pain  Goal: Verbalizes/displays adequate comfort level or baseline comfort level  12/24/2023 1216 by Torres Gonsales RN  Outcome: Progressing  12/23/2023 2216 by Cindy Angelo RN  Outcome: Progressing     Problem: Skin/Tissue Integrity  Goal: Absence of new skin breakdown  Description: 1. Monitor for areas of redness and/or skin breakdown  2. Assess vascular access sites hourly  3. Every 4-6 hours minimum:  Change oxygen saturation probe site  4. Every 4-6 hours:  If on nasal continuous positive airway pressure, respiratory therapy assess nares and determine need for appliance change or resting period.   12/24/2023 1216 by Torres Gonsales RN  Outcome: Progressing  12/23/2023 2216 by Cindy Agnelo RN  Outcome: Progressing     Problem: Safety - Adult  Goal: Free from fall injury  12/24/2023 1216 by Torres Gonsales RN  Outcome: Progressing  12/23/2023 2216 by Cindy Angelo RN  Outcome: Progressing     Problem: ABCDS Injury Assessment  Goal: Absence of physical injury  12/24/2023 1216 by Torres Gonsales RN  Outcome: Progressing  12/23/2023 2216 by Cindy Angelo RN  Outcome: Progressing     Problem: Chronic Conditions and Co-morbidities  Goal: Patient's chronic conditions and co-morbidity symptoms are monitored and maintained or improved  12/24/2023 1216 by Torres Gonsales RN  Outcome: Progressing  12/23/2023 2216 by Cindy Angelo RN  Outcome: Progressing  Flowsheets (Taken 12/23/2023 2035)  Care Plan - Patient's Chronic Conditions and Co-Morbidity Symptoms are Monitored and Maintained or Improved: Monitor and assess patient's chronic conditions and comorbid symptoms for stability, deterioration, or
Problem: Discharge Planning  Goal: Discharge to home or other facility with appropriate resources  12/24/2023 1223 by Lamberto Black RN  Outcome: Progressing  12/24/2023 1216 by Lamberto Black RN  Outcome: Progressing     Problem: Pain  Goal: Verbalizes/displays adequate comfort level or baseline comfort level  12/24/2023 1223 by Lamberto Black RN  Outcome: Progressing  12/24/2023 1216 by Lamberto Black RN  Outcome: Progressing     Problem: Skin/Tissue Integrity  Goal: Absence of new skin breakdown  Description: 1. Monitor for areas of redness and/or skin breakdown  2. Assess vascular access sites hourly  3. Every 4-6 hours minimum:  Change oxygen saturation probe site  4. Every 4-6 hours:  If on nasal continuous positive airway pressure, respiratory therapy assess nares and determine need for appliance change or resting period.   12/24/2023 1223 by Lamberto Black RN  Outcome: Progressing  12/24/2023 1216 by Lamberto Black RN  Outcome: Progressing     Problem: Safety - Adult  Goal: Free from fall injury  12/24/2023 1223 by Lamberto Black RN  Outcome: Progressing  12/24/2023 1216 by Lamberto Black RN  Outcome: Progressing     Problem: ABCDS Injury Assessment  Goal: Absence of physical injury  12/24/2023 1223 by Lamberto Black RN  Outcome: Progressing  12/24/2023 1216 by Lamberto Black RN  Outcome: Progressing     Problem: Chronic Conditions and Co-morbidities  Goal: Patient's chronic conditions and co-morbidity symptoms are monitored and maintained or improved  12/24/2023 1223 by Lamberto Black RN  Outcome: Progressing  12/24/2023 1216 by Lamberto Black RN  Outcome: Progressing     Problem: Nutrition Deficit:  Goal: Optimize nutritional status  12/24/2023 1223 by Lamberto Black RN  Outcome: Progressing  12/24/2023 1216 by Lamberto Black RN  Outcome: Progressing  Flowsheets (Taken 12/24/2023 0906 by Gabriel Gresham RD, LD)  Nutrient intake
Problem: Discharge Planning  Goal: Discharge to home or other facility with appropriate resources  12/24/2023 1936 by Lennie Neely RN  Outcome: Progressing     Problem: Pain  Goal: Verbalizes/displays adequate comfort level or baseline comfort level  12/24/2023 1936 by Lennie Neely RN  Outcome: Progressing     Problem: Skin/Tissue Integrity  Goal: Absence of new skin breakdown  Description: 1. Monitor for areas of redness and/or skin breakdown  2. Assess vascular access sites hourly  3. Every 4-6 hours minimum:  Change oxygen saturation probe site  4. Every 4-6 hours:  If on nasal continuous positive airway pressure, respiratory therapy assess nares and determine need for appliance change or resting period.   12/24/2023 1936 by Lennie Neely RN  Outcome: Progressing     Problem: Safety - Adult  Goal: Free from fall injury  12/24/2023 1936 by Lennie Neely RN  Outcome: Progressing     Problem: ABCDS Injury Assessment  Goal: Absence of physical injury  12/24/2023 1936 by Lennie Neely RN  Outcome: Progressing     Problem: Chronic Conditions and Co-morbidities  Goal: Patient's chronic conditions and co-morbidity symptoms are monitored and maintained or improved  12/24/2023 1936 by Lennie Neely RN  Outcome: Progressing     Problem: Nutrition Deficit:  Goal: Optimize nutritional status  12/24/2023 1936 by Lennie Neely RN  Outcome: Progressing
Problem: Discharge Planning  Goal: Discharge to home or other facility with appropriate resources  12/25/2023 2148 by Anand Malone RN  Outcome: Progressing  12/25/2023 0936 by Tommie Bello RN  Outcome: Progressing  Flowsheets (Taken 12/25/2023 0930)  Discharge to home or other facility with appropriate resources:   Identify barriers to discharge with patient and caregiver   Arrange for needed discharge resources and transportation as appropriate   Identify discharge learning needs (meds, wound care, etc)   Refer to discharge planning if patient needs post-hospital services based on physician order or complex needs related to functional status, cognitive ability or social support system     Problem: Pain  Goal: Verbalizes/displays adequate comfort level or baseline comfort level  12/25/2023 2148 by Anand Malone RN  Outcome: Progressing  Flowsheets (Taken 12/25/2023 1630 by Tommie Bello RN)  Verbalizes/displays adequate comfort level or baseline comfort level:   Encourage patient to monitor pain and request assistance   Assess pain using appropriate pain scale  12/25/2023 0936 by Tommie Bello RN  Outcome: Progressing     Problem: Skin/Tissue Integrity  Goal: Absence of new skin breakdown  Description: 1. Monitor for areas of redness and/or skin breakdown  2. Assess vascular access sites hourly  3. Every 4-6 hours minimum:  Change oxygen saturation probe site  4. Every 4-6 hours:  If on nasal continuous positive airway pressure, respiratory therapy assess nares and determine need for appliance change or resting period.   12/25/2023 2148 by Anand Malone RN  Outcome: Progressing  12/25/2023 0936 by Tommie Bello RN  Outcome: Progressing     Problem: Safety - Adult  Goal: Free from fall injury  12/25/2023 2148 by Anand Malone RN  Outcome: Progressing  12/25/2023 0936 by Tommie Bello RN  Outcome: Progressing     Problem: ABCDS Injury Assessment  Goal: Absence of physical
Problem: Discharge Planning  Goal: Discharge to home or other facility with appropriate resources  12/26/2023 1008 by Beth Milligan RN  Outcome: Progressing  12/25/2023 2148 by Martina Goldstein RN  Outcome: Progressing     Problem: Pain  Goal: Verbalizes/displays adequate comfort level or baseline comfort level  12/26/2023 1008 by Beth Milligan RN  Outcome: Progressing  12/25/2023 2148 by Martina Goldstein RN  Outcome: Progressing  Flowsheets (Taken 12/25/2023 1630 by Beth Milligan RN)  Verbalizes/displays adequate comfort level or baseline comfort level:   Encourage patient to monitor pain and request assistance   Assess pain using appropriate pain scale     Problem: Skin/Tissue Integrity  Goal: Absence of new skin breakdown  Description: 1. Monitor for areas of redness and/or skin breakdown  2. Assess vascular access sites hourly  3. Every 4-6 hours minimum:  Change oxygen saturation probe site  4. Every 4-6 hours:  If on nasal continuous positive airway pressure, respiratory therapy assess nares and determine need for appliance change or resting period.   12/26/2023 1008 by Beth Milligan RN  Outcome: Progressing  12/25/2023 2148 by Martina Goldstein RN  Outcome: Progressing     Problem: Safety - Adult  Goal: Free from fall injury  12/26/2023 1008 by Beth Milligan RN  Outcome: Progressing  12/25/2023 2148 by Martina Goldstein RN  Outcome: Progressing     Problem: ABCDS Injury Assessment  Goal: Absence of physical injury  12/26/2023 1008 by Beth Milligan RN  Outcome: Progressing  12/25/2023 2148 by Martina Goldstien RN  Outcome: Progressing     Problem: Chronic Conditions and Co-morbidities  Goal: Patient's chronic conditions and co-morbidity symptoms are monitored and maintained or improved  12/26/2023 1008 by Beth Milligan RN  Outcome: Progressing  12/25/2023 2148 by Martina Goldstein RN  Outcome: Progressing     Problem: Nutrition Deficit:  Goal: Optimize nutritional
Problem: Discharge Planning  Goal: Discharge to home or other facility with appropriate resources  12/26/2023 2059 by Tavares Scott RN  Outcome: Progressing  Flowsheets (Taken 12/26/2023 2052)  Discharge to home or other facility with appropriate resources: Identify barriers to discharge with patient and caregiver  12/26/2023 1008 by Abraham Paul RN  Outcome: Progressing  Flowsheets (Taken 12/26/2023 0830)  Discharge to home or other facility with appropriate resources:   Identify barriers to discharge with patient and caregiver   Arrange for needed discharge resources and transportation as appropriate   Identify discharge learning needs (meds, wound care, etc)   Refer to discharge planning if patient needs post-hospital services based on physician order or complex needs related to functional status, cognitive ability or social support system     Problem: Pain  Goal: Verbalizes/displays adequate comfort level or baseline comfort level  12/26/2023 2059 by Tavares Scott RN  Outcome: Progressing  12/26/2023 1008 by Abraham Paul RN  Outcome: Progressing     Problem: Skin/Tissue Integrity  Goal: Absence of new skin breakdown  Description: 1. Monitor for areas of redness and/or skin breakdown  2. Assess vascular access sites hourly  3. Every 4-6 hours minimum:  Change oxygen saturation probe site  4. Every 4-6 hours:  If on nasal continuous positive airway pressure, respiratory therapy assess nares and determine need for appliance change or resting period.   12/26/2023 2059 by Tavares Scott RN  Outcome: Progressing  12/26/2023 1008 by Abraham Paul RN  Outcome: Progressing     Problem: Safety - Adult  Goal: Free from fall injury  12/26/2023 2059 by Tavares Scott RN  Outcome: Progressing  12/26/2023 1008 by Abraham Paul RN  Outcome: Progressing     Problem: ABCDS Injury Assessment  Goal: Absence of physical injury  12/26/2023 2059 by Tavares Scott RN  Outcome:
Problem: Discharge Planning  Goal: Discharge to home or other facility with appropriate resources  Outcome: Progressing     Problem: Pain  Goal: Verbalizes/displays adequate comfort level or baseline comfort level  Outcome: Progressing     Problem: Skin/Tissue Integrity  Goal: Absence of new skin breakdown  Description: 1. Monitor for areas of redness and/or skin breakdown  2. Assess vascular access sites hourly  3. Every 4-6 hours minimum:  Change oxygen saturation probe site  4. Every 4-6 hours:  If on nasal continuous positive airway pressure, respiratory therapy assess nares and determine need for appliance change or resting period.   Outcome: Progressing     Problem: Safety - Adult  Goal: Free from fall injury  Outcome: Progressing     Problem: ABCDS Injury Assessment  Goal: Absence of physical injury  Outcome: Progressing
Problem: Discharge Planning  Goal: Discharge to home or other facility with appropriate resources  Outcome: Progressing     Problem: Pain  Goal: Verbalizes/displays adequate comfort level or baseline comfort level  Outcome: Progressing     Problem: Skin/Tissue Integrity  Goal: Absence of new skin breakdown  Description: 1. Monitor for areas of redness and/or skin breakdown  2. Assess vascular access sites hourly  3. Every 4-6 hours minimum:  Change oxygen saturation probe site  4. Every 4-6 hours:  If on nasal continuous positive airway pressure, respiratory therapy assess nares and determine need for appliance change or resting period.   Outcome: Progressing     Problem: Safety - Adult  Goal: Free from fall injury  Outcome: Progressing     Problem: ABCDS Injury Assessment  Goal: Absence of physical injury  Outcome: Progressing     Problem: Chronic Conditions and Co-morbidities  Goal: Patient's chronic conditions and co-morbidity symptoms are monitored and maintained or improved  Outcome: Progressing     Problem: Nutrition Deficit:  Goal: Optimize nutritional status  Outcome: Progressing
Problem: Discharge Planning  Goal: Discharge to home or other facility with appropriate resources  Outcome: Progressing     Problem: Pain  Goal: Verbalizes/displays adequate comfort level or baseline comfort level  Outcome: Progressing     Problem: Skin/Tissue Integrity  Goal: Absence of new skin breakdown  Description: 1. Monitor for areas of redness and/or skin breakdown  2. Assess vascular access sites hourly  3. Every 4-6 hours minimum:  Change oxygen saturation probe site  4. Every 4-6 hours:  If on nasal continuous positive airway pressure, respiratory therapy assess nares and determine need for appliance change or resting period.   Outcome: Progressing     Problem: Safety - Adult  Goal: Free from fall injury  Outcome: Progressing     Problem: ABCDS Injury Assessment  Goal: Absence of physical injury  Outcome: Progressing     Problem: Chronic Conditions and Co-morbidities  Goal: Patient's chronic conditions and co-morbidity symptoms are monitored and maintained or improved  Outcome: Progressing     Problem: Nutrition Deficit:  Goal: Optimize nutritional status  Outcome: Progressing
Problem: Discharge Planning  Goal: Discharge to home or other facility with appropriate resources  Outcome: Progressing  Flowsheets (Taken 12/19/2023 1000)  Discharge to home or other facility with appropriate resources:   Identify barriers to discharge with patient and caregiver   Arrange for needed discharge resources and transportation as appropriate   Identify discharge learning needs (meds, wound care, etc)   Refer to discharge planning if patient needs post-hospital services based on physician order or complex needs related to functional status, cognitive ability or social support system     Problem: Pain  Goal: Verbalizes/displays adequate comfort level or baseline comfort level  Outcome: Progressing     Problem: Skin/Tissue Integrity  Goal: Absence of new skin breakdown  Description: 1. Monitor for areas of redness and/or skin breakdown  2. Assess vascular access sites hourly  3. Every 4-6 hours minimum:  Change oxygen saturation probe site  4. Every 4-6 hours:  If on nasal continuous positive airway pressure, respiratory therapy assess nares and determine need for appliance change or resting period.   Outcome: Progressing     Problem: Safety - Adult  Goal: Free from fall injury  Outcome: Progressing     Problem: ABCDS Injury Assessment  Goal: Absence of physical injury  Outcome: Progressing     Problem: Chronic Conditions and Co-morbidities  Goal: Patient's chronic conditions and co-morbidity symptoms are monitored and maintained or improved  Outcome: Progressing  Flowsheets (Taken 12/19/2023 1000)  Care Plan - Patient's Chronic Conditions and Co-Morbidity Symptoms are Monitored and Maintained or Improved:   Monitor and assess patient's chronic conditions and comorbid symptoms for stability, deterioration, or improvement   Collaborate with multidisciplinary team to address chronic and comorbid conditions and prevent exacerbation or deterioration     Problem: Nutrition Deficit:  Goal: Optimize
Problem: Discharge Planning  Goal: Discharge to home or other facility with appropriate resources  Outcome: Progressing  Flowsheets (Taken 12/21/2023 0900)  Discharge to home or other facility with appropriate resources:   Identify barriers to discharge with patient and caregiver   Arrange for needed discharge resources and transportation as appropriate   Identify discharge learning needs (meds, wound care, etc)   Refer to discharge planning if patient needs post-hospital services based on physician order or complex needs related to functional status, cognitive ability or social support system     Problem: Pain  Goal: Verbalizes/displays adequate comfort level or baseline comfort level  12/21/2023 0926 by Laron Nelson RN  Outcome: Progressing  Flowsheets (Taken 12/21/2023 0900)  Verbalizes/displays adequate comfort level or baseline comfort level:   Assess pain using appropriate pain scale   Encourage patient to monitor pain and request assistance  12/21/2023 0220 by Tia Canchola RN  Outcome: Progressing  Flowsheets (Taken 12/21/2023 0220)  Verbalizes/displays adequate comfort level or baseline comfort level:   Encourage patient to monitor pain and request assistance   Assess pain using appropriate pain scale   Administer analgesics based on type and severity of pain and evaluate response   Implement non-pharmacological measures as appropriate and evaluate response   Notify Licensed Independent Practitioner if interventions unsuccessful or patient reports new pain     Problem: Skin/Tissue Integrity  Goal: Absence of new skin breakdown  Description: 1. Monitor for areas of redness and/or skin breakdown  2. Assess vascular access sites hourly  3. Every 4-6 hours minimum:  Change oxygen saturation probe site  4. Every 4-6 hours:  If on nasal continuous positive airway pressure, respiratory therapy assess nares and determine need for appliance change or resting period.   12/21/2023 0926 by Laron Nelson
Problem: Discharge Planning  Goal: Discharge to home or other facility with appropriate resources  Outcome: Progressing  Flowsheets (Taken 12/25/2023 0930)  Discharge to home or other facility with appropriate resources:   Identify barriers to discharge with patient and caregiver   Arrange for needed discharge resources and transportation as appropriate   Identify discharge learning needs (meds, wound care, etc)   Refer to discharge planning if patient needs post-hospital services based on physician order or complex needs related to functional status, cognitive ability or social support system     Problem: Pain  Goal: Verbalizes/displays adequate comfort level or baseline comfort level  Outcome: Progressing     Problem: Skin/Tissue Integrity  Goal: Absence of new skin breakdown  Description: 1. Monitor for areas of redness and/or skin breakdown  2. Assess vascular access sites hourly  3. Every 4-6 hours minimum:  Change oxygen saturation probe site  4. Every 4-6 hours:  If on nasal continuous positive airway pressure, respiratory therapy assess nares and determine need for appliance change or resting period.   Outcome: Progressing     Problem: Safety - Adult  Goal: Free from fall injury  Outcome: Progressing     Problem: ABCDS Injury Assessment  Goal: Absence of physical injury  Outcome: Progressing     Problem: Chronic Conditions and Co-morbidities  Goal: Patient's chronic conditions and co-morbidity symptoms are monitored and maintained or improved  Outcome: Progressing  Flowsheets (Taken 12/25/2023 0930)  Care Plan - Patient's Chronic Conditions and Co-Morbidity Symptoms are Monitored and Maintained or Improved:   Monitor and assess patient's chronic conditions and comorbid symptoms for stability, deterioration, or improvement   Collaborate with multidisciplinary team to address chronic and comorbid conditions and prevent exacerbation or deterioration     Problem: Nutrition Deficit:  Goal: Optimize
Problem: Pain  Goal: Verbalizes/displays adequate comfort level or baseline comfort level  Outcome: Progressing     Problem: Safety - Adult  Goal: Free from fall injury  Outcome: Progressing
Problem: Pain  Goal: Verbalizes/displays adequate comfort level or baseline comfort level  Outcome: Progressing  Flowsheets (Taken 12/21/2023 0220)  Verbalizes/displays adequate comfort level or baseline comfort level:   Encourage patient to monitor pain and request assistance   Assess pain using appropriate pain scale   Administer analgesics based on type and severity of pain and evaluate response   Implement non-pharmacological measures as appropriate and evaluate response   Notify Licensed Independent Practitioner if interventions unsuccessful or patient reports new pain     Problem: Skin/Tissue Integrity  Goal: Absence of new skin breakdown  Description: 1. Monitor for areas of redness and/or skin breakdown  2. Assess vascular access sites hourly  3. Every 4-6 hours minimum:  Change oxygen saturation probe site  4. Every 4-6 hours:  If on nasal continuous positive airway pressure, respiratory therapy assess nares and determine need for appliance change or resting period.   Outcome: Progressing     Problem: Safety - Adult  Goal: Free from fall injury  Outcome: Progressing
multidisciplinary team to address chronic and comorbid conditions and prevent exacerbation or deterioration     Problem: Nutrition Deficit:  Goal: Optimize nutritional status  12/19/2023 2145 by Vianca Reyna RN  Outcome: Progressing

## (undated) DEVICE — SPONGE LAP W18XL18IN WHT COT 4 PLY FLD STRUNG RADPQ DISP ST 2 PER PACK

## (undated) DEVICE — SOLUTION IV IRRIG WATER 500ML POUR BRL ST 2F7113

## (undated) DEVICE — TROCAR SLEEVE: Brand: KII ® LOW PROFILE SLEEVE

## (undated) DEVICE — DISPOSABLE OR TOWEL: Brand: CARDINAL HEALTH

## (undated) DEVICE — SOLUTION IV IRRIG WATER 1000ML POUR BRL 2F7114

## (undated) DEVICE — Device

## (undated) DEVICE — TK® QUICK LOAD® UNIT: Brand: TK® QUICK LOAD®

## (undated) DEVICE — MOUTHPIECE ENDOSCP L CTRL OPN AND SIDE PORTS DISP

## (undated) DEVICE — STERILE POLYISOPRENE POWDER-FREE SURGICAL GLOVES: Brand: PROTEXIS

## (undated) DEVICE — AIR/WATER CLEANING ADAPTER FOR OLYMPUS® GI ENDOSCOPE: Brand: BULLDOG®

## (undated) DEVICE — ENDOSCOPIC KIT 6X3/16 FT COLON W/ 1.1 OZ 2 GWN W/O BRSH

## (undated) DEVICE — PAD TABLE RAMPED 1 IN TO 2 IN TRENDELENBURG

## (undated) DEVICE — PENCIL ES L3M BTTN SWCH S STL HEX LOK BLDE ELECTRD HOLSTER

## (undated) DEVICE — LAPAROSCOPY PACK: Brand: MEDLINE INDUSTRIES, INC.

## (undated) DEVICE — FORCEPS BX L240CM WRK CHN 2.8MM STD CAP W/ NDL MIC MESH

## (undated) DEVICE — TROCAR: Brand: KII FIOS FIRST ENTRY

## (undated) DEVICE — SHEET,DRAPE,40X58,STERILE: Brand: MEDLINE

## (undated) DEVICE — Device: Brand: JELCO

## (undated) DEVICE — ELECTRODE LAP L36CM PTFE WIRE J HK CLEANCOAT

## (undated) DEVICE — SUTURE ABSORBABLE MONOFILAMENT 0 CTX 60 IN VIO PDS + PDP990G

## (undated) DEVICE — GOWN SIRUS NONREIN LG W/TWL: Brand: MEDLINE INDUSTRIES, INC.

## (undated) DEVICE — SHEET,DRAPE,53X77,STERILE: Brand: MEDLINE

## (undated) DEVICE — SYRINGE, LUER LOCK, 10ML: Brand: MEDLINE

## (undated) DEVICE — SUTURE VCRL + SZ 0 L27IN ABSRB VLT L26MM UR-6 5/8 CIR VCP603H

## (undated) DEVICE — CHLORAPREP 26ML ORANGE

## (undated) DEVICE — 30978 SEE SHARP - ENHANCED INTRAOPERATIVE LAPAROSCOPE CLEANING & DEFOGGING: Brand: 30978 SEE SHARP - ENHANCED INTRAOPERATIVE LAPAROSCOPE CLEANING & DEFOGGING

## (undated) DEVICE — 1010 S-DRAPE TOWEL DRAPE 10/BX: Brand: STERI-DRAPE™

## (undated) DEVICE — CLEARCUT® SLIT KNIFE INTREPID MICRO-COAXIAL SYSTEM 2.2 SB: Brand: CLEARCUT®; INTREPID

## (undated) DEVICE — STANDARD HYPODERMIC NEEDLE,POLYPROPYLENE HUB: Brand: MONOJECT

## (undated) DEVICE — PEN: MARKING STD 100/CS: Brand: MEDICAL ACTION INDUSTRIES

## (undated) DEVICE — BAG,DRAINAGE,4L,A/R TOWER,LL,SLIDE TAP: Brand: MEDLINE

## (undated) DEVICE — PREP SOL PVP IODINE 4%  4 OZ/BTL

## (undated) DEVICE — PLUMEPORT ACTIV LAPAROSCOPIC SMOKE FILTRATION DEVICE: Brand: PLUMEPORT ACTIVE

## (undated) DEVICE — DEVICE SUT 0 L48IN GRN POLY BRAID LD UNIT DISP SURGDAC

## (undated) DEVICE — GAUZE,SPONGE,4"X4",8PLY,STRL,LF,10/TRAY: Brand: MEDLINE

## (undated) DEVICE — TUBING, SUCTION, 1/4" X 10', STRAIGHT: Brand: MEDLINE

## (undated) DEVICE — BW-412T DISP COMBO CLEANING BRUSH: Brand: SINGLE USE COMBINATION CLEANING BRUSH

## (undated) DEVICE — MERCY FAIRFIELD TURNOVER KIT: Brand: MEDLINE INDUSTRIES, INC.

## (undated) DEVICE — PICO 7 10CM X 30CM: Brand: PICO™ 7

## (undated) DEVICE — HYPODERMIC SAFETY NEEDLE: Brand: MAGELLAN

## (undated) DEVICE — NEEDLE HYPO 25GA L1.5IN BLU POLYPR HUB S STL REG BVL STR

## (undated) DEVICE — TOWEL,OR,DSP,ST,BLUE,STD,4/PK,20PK/CS: Brand: MEDLINE

## (undated) DEVICE — DEVICE SUT SHFT L34CM DIA 10MM 2 JAW LD UNIT ENDOSTCH

## (undated) DEVICE — SHEARS LAP L45CM DIA5MM ULTRASONIC CRV TIP ADV HEMSTAS HARM

## (undated) DEVICE — SYRINGE MED 3ML CLR PLAS STD N CTRL LUERLOCK TIP DISP

## (undated) DEVICE — SURGICAL PROCEDURE PACK EYE CLERMONT

## (undated) DEVICE — DRAPE,LAP,CHOLE,W/TROUGHS,STERILE: Brand: MEDLINE

## (undated) DEVICE — [HIGH FLOW INSUFFLATOR,  DO NOT USE IF PACKAGE IS DAMAGED,  KEEP DRY,  KEEP AWAY FROM SUNLIGHT,  PROTECT FROM HEAT AND RADIOACTIVE SOURCES.]: Brand: PNEUMOSURE

## (undated) DEVICE — PORT VLV 2 W NDL FREE SMRTSITE

## (undated) DEVICE — VALVE SUCTION AIR H2O SET ORCA POD + DISP

## (undated) DEVICE — SOLUTION IRRIG 1000ML 0.9% SOD CHL USP POUR PLAS BTL

## (undated) DEVICE — GLOVE ORANGE PI 8   MSG9080

## (undated) DEVICE — SUTURE PERMAHAND SZ 3-0 L18IN NONABSORBABLE BLK L26MM SH C013D

## (undated) DEVICE — LIQUIBAND RAPID ADHESIVE 36/CS 0.8ML: Brand: MEDLINE

## (undated) DEVICE — SNARES COLD OVAL 10MM THIN

## (undated) DEVICE — DEVICE GRSP L230CM SHTH DIA2.4MM HYBRID JAW FLX DST WIRE

## (undated) DEVICE — STAPLER SKIN H3.9MM WIRE DIA0.58MM CRWN 6.9MM 35 STPL ROT

## (undated) DEVICE — MEDIA CONTRAST RX ISOVUE-300 61% 30ML VIALS

## (undated) DEVICE — 3M™ IOBAN™ 2 ANTIMICROBIAL INCISE DRAPE 6650EZ: Brand: IOBAN™ 2

## (undated) DEVICE — APPLICATOR MEDICATED 26 CC SOLUTION HI LT ORNG CHLORAPREP

## (undated) DEVICE — TOTAL TRAY, DB, 100% SILI FOLEY, 16FR 10: Brand: MEDLINE

## (undated) DEVICE — UNIVERSAL BLOCK TRAY: Brand: AVANOS*

## (undated) DEVICE — 6 ML SYRINGE LUER-LOCK TIP: Brand: MONOJECT

## (undated) DEVICE — LAPAROSCOPIC TROCAR SLEEVE/SINGLE USE: Brand: KII® LOW PROFILE OPTICAL ACCESS SYSTEM

## (undated) DEVICE — BLANKET WRM W29.9XL79.1IN UP BODY FORC AIR MISTRAL-AIR

## (undated) DEVICE — NEEDLE INJ 25GA P5MM SHFT L230CM SHTH DIA2.5MM S STL TEF

## (undated) DEVICE — LEGGINGS, PAIR, 31X48, STERILE: Brand: MEDLINE

## (undated) DEVICE — CORD ES L10FT MPLR LAP

## (undated) DEVICE — NDLCTR: FOAM/MAG 40CT 64/CS: Brand: MEDICAL ACTION INDUSTRIES

## (undated) DEVICE — ELECTRODE PT RET AD L9FT HI MOIST COND ADH HYDRGEL CORDED

## (undated) DEVICE — NEEDLE SPNL 22GA L3.5IN BLK HUB S STL REG WALL FIT STYL W/

## (undated) DEVICE — CANNULA NSL 13FT TUBE AD ETCO2 DIV SAMP M

## (undated) DEVICE — SUTURE MCRYL + SZ 4-0 L27IN ABSRB UD L19MM PS-2 3/8 CIR MCP426H

## (undated) DEVICE — TK® TI-KNOT® DEVICE: Brand: TK® TI-KNOT®

## (undated) DEVICE — GLOVE SURG SZ 6.5 L11.2IN FNGR THK9.8MIL STRW LTX POLYMER

## (undated) DEVICE — COVER LT HNDL BLU PLAS

## (undated) DEVICE — SUTURE MCRYL + SZ 3-0 L27IN ABSRB UD PS1 L24MM 3/8 CIR REV MCP936H